# Patient Record
Sex: MALE | Race: WHITE | NOT HISPANIC OR LATINO | Employment: OTHER | URBAN - METROPOLITAN AREA
[De-identification: names, ages, dates, MRNs, and addresses within clinical notes are randomized per-mention and may not be internally consistent; named-entity substitution may affect disease eponyms.]

---

## 2023-06-05 LAB
C REACTIVE PROTEIN (MG/L) IN SER/PLAS: 3.29 MG/DL
ERYTHROCYTE DISTRIBUTION WIDTH (RATIO) BY AUTOMATED COUNT: 16.8 % (ref 11.5–14.5)
ERYTHROCYTE MEAN CORPUSCULAR HEMOGLOBIN CONCENTRATION (G/DL) BY AUTOMATED: 31 G/DL (ref 32–36)
ERYTHROCYTE MEAN CORPUSCULAR VOLUME (FL) BY AUTOMATED COUNT: 80 FL (ref 80–100)
ERYTHROCYTES (10*6/UL) IN BLOOD BY AUTOMATED COUNT: 4.33 X10E12/L (ref 4.5–5.9)
HEMATOCRIT (%) IN BLOOD BY AUTOMATED COUNT: 34.8 % (ref 41–52)
HEMOGLOBIN (G/DL) IN BLOOD: 10.8 G/DL (ref 13.5–17.5)
LEUKOCYTES (10*3/UL) IN BLOOD BY AUTOMATED COUNT: 8.8 X10E9/L (ref 4.4–11.3)
PLATELETS (10*3/UL) IN BLOOD AUTOMATED COUNT: 366 X10E9/L (ref 150–450)
SEDIMENTATION RATE, ERYTHROCYTE: 47 MM/H (ref 0–20)

## 2023-06-06 LAB
ESTIMATED AVERAGE GLUCOSE FOR HBA1C: 217 MG/DL
HEMOGLOBIN A1C/HEMOGLOBIN TOTAL IN BLOOD: 9.2 %

## 2023-10-06 ENCOUNTER — CLINICAL SUPPORT (OUTPATIENT)
Dept: WOUND CARE | Facility: CLINIC | Age: 58
End: 2023-10-06
Payer: MEDICARE

## 2023-10-06 ENCOUNTER — ANCILLARY PROCEDURE (OUTPATIENT)
Dept: RADIOLOGY | Facility: CLINIC | Age: 58
End: 2023-10-06
Payer: MEDICARE

## 2023-10-06 DIAGNOSIS — L97.513 NON-PRESSURE CHRONIC ULCER OF OTHER PART OF RIGHT FOOT WITH NECROSIS OF MUSCLE (MULTI): ICD-10-CM

## 2023-10-06 DIAGNOSIS — E11.621 TYPE 2 DIABETES MELLITUS WITH FOOT ULCER (CODE) (MULTI): ICD-10-CM

## 2023-10-06 PROCEDURE — 73630 X-RAY EXAM OF FOOT: CPT | Mod: RT

## 2023-10-06 PROCEDURE — 73630 X-RAY EXAM OF FOOT: CPT | Mod: RIGHT SIDE | Performed by: RADIOLOGY

## 2023-10-06 PROCEDURE — 99214 OFFICE O/P EST MOD 30 MIN: CPT

## 2023-10-11 ENCOUNTER — APPOINTMENT (OUTPATIENT)
Dept: RADIOLOGY | Facility: HOSPITAL | Age: 58
DRG: 617 | End: 2023-10-11
Payer: MEDICARE

## 2023-10-11 ENCOUNTER — HOSPITAL ENCOUNTER (INPATIENT)
Facility: HOSPITAL | Age: 58
LOS: 4 days | Discharge: HOME | DRG: 617 | End: 2023-10-15
Attending: EMERGENCY MEDICINE | Admitting: FAMILY MEDICINE
Payer: MEDICARE

## 2023-10-11 DIAGNOSIS — M86.9 DIABETIC FOOT ULCER WITH OSTEOMYELITIS (MULTI): Primary | ICD-10-CM

## 2023-10-11 DIAGNOSIS — L97.509 DIABETIC FOOT ULCER WITH OSTEOMYELITIS (MULTI): Primary | ICD-10-CM

## 2023-10-11 DIAGNOSIS — E11.69 DIABETIC FOOT ULCER WITH OSTEOMYELITIS (MULTI): Primary | ICD-10-CM

## 2023-10-11 DIAGNOSIS — M86.171 ACUTE OSTEOMYELITIS OF RIGHT FOOT (MULTI): ICD-10-CM

## 2023-10-11 DIAGNOSIS — E11.621 DIABETIC FOOT ULCER WITH OSTEOMYELITIS (MULTI): Primary | ICD-10-CM

## 2023-10-11 DIAGNOSIS — E11.621 TYPE 2 DIABETES MELLITUS WITH FOOT ULCER, WITH LONG-TERM CURRENT USE OF INSULIN (MULTI): ICD-10-CM

## 2023-10-11 DIAGNOSIS — Z79.4 TYPE 2 DIABETES MELLITUS WITH FOOT ULCER, WITH LONG-TERM CURRENT USE OF INSULIN (MULTI): ICD-10-CM

## 2023-10-11 DIAGNOSIS — L97.509 TYPE 2 DIABETES MELLITUS WITH FOOT ULCER, WITH LONG-TERM CURRENT USE OF INSULIN (MULTI): ICD-10-CM

## 2023-10-11 LAB
ALBUMIN SERPL BCP-MCNC: 3.8 G/DL (ref 3.4–5)
ALP SERPL-CCNC: 157 U/L (ref 33–120)
ALT SERPL W P-5'-P-CCNC: 10 U/L (ref 10–52)
ANION GAP SERPL CALC-SCNC: 11 MMOL/L (ref 10–20)
AST SERPL W P-5'-P-CCNC: 10 U/L (ref 9–39)
BASOPHILS # BLD AUTO: 0.09 X10*3/UL (ref 0–0.1)
BASOPHILS NFR BLD AUTO: 1 %
BILIRUB SERPL-MCNC: 0.3 MG/DL (ref 0–1.2)
BUN SERPL-MCNC: 10 MG/DL (ref 6–23)
CALCIUM SERPL-MCNC: 9.3 MG/DL (ref 8.6–10.3)
CHLORIDE SERPL-SCNC: 95 MMOL/L (ref 98–107)
CO2 SERPL-SCNC: 30 MMOL/L (ref 21–32)
CREAT SERPL-MCNC: 0.71 MG/DL (ref 0.5–1.3)
EOSINOPHIL # BLD AUTO: 0.2 X10*3/UL (ref 0–0.7)
EOSINOPHIL NFR BLD AUTO: 2.2 %
ERYTHROCYTE [DISTWIDTH] IN BLOOD BY AUTOMATED COUNT: 16.2 % (ref 11.5–14.5)
GFR SERPL CREATININE-BSD FRML MDRD: >90 ML/MIN/1.73M*2
GLUCOSE BLD MANUAL STRIP-MCNC: 309 MG/DL (ref 74–99)
GLUCOSE SERPL-MCNC: 280 MG/DL (ref 74–99)
HCT VFR BLD AUTO: 36.9 % (ref 41–52)
HGB BLD-MCNC: 11.6 G/DL (ref 13.5–17.5)
IMM GRANULOCYTES # BLD AUTO: 0.18 X10*3/UL (ref 0–0.7)
IMM GRANULOCYTES NFR BLD AUTO: 2 % (ref 0–0.9)
LACTATE SERPL-SCNC: 1 MMOL/L (ref 0.4–2)
LYMPHOCYTES # BLD AUTO: 2.43 X10*3/UL (ref 1.2–4.8)
LYMPHOCYTES NFR BLD AUTO: 26.4 %
MCH RBC QN AUTO: 24.2 PG (ref 26–34)
MCHC RBC AUTO-ENTMCNC: 31.4 G/DL (ref 32–36)
MCV RBC AUTO: 77 FL (ref 80–100)
MONOCYTES # BLD AUTO: 1.05 X10*3/UL (ref 0.1–1)
MONOCYTES NFR BLD AUTO: 11.4 %
NEUTROPHILS # BLD AUTO: 5.24 X10*3/UL (ref 1.2–7.7)
NEUTROPHILS NFR BLD AUTO: 57 %
NRBC BLD-RTO: 0 /100 WBCS (ref 0–0)
PLATELET # BLD AUTO: 470 X10*3/UL (ref 150–450)
PMV BLD AUTO: 10.2 FL (ref 7.5–11.5)
POTASSIUM SERPL-SCNC: 4.4 MMOL/L (ref 3.5–5.3)
PROT SERPL-MCNC: 7.5 G/DL (ref 6.4–8.2)
RBC # BLD AUTO: 4.79 X10*6/UL (ref 4.5–5.9)
SODIUM SERPL-SCNC: 132 MMOL/L (ref 136–145)
WBC # BLD AUTO: 9.2 X10*3/UL (ref 4.4–11.3)

## 2023-10-11 PROCEDURE — 2500000002 HC RX 250 W HCPCS SELF ADMINISTERED DRUGS (ALT 637 FOR MEDICARE OP, ALT 636 FOR OP/ED): Performed by: FAMILY MEDICINE

## 2023-10-11 PROCEDURE — 96367 TX/PROPH/DG ADDL SEQ IV INF: CPT

## 2023-10-11 PROCEDURE — 2500000004 HC RX 250 GENERAL PHARMACY W/ HCPCS (ALT 636 FOR OP/ED): Performed by: EMERGENCY MEDICINE

## 2023-10-11 PROCEDURE — 96366 THER/PROPH/DIAG IV INF ADDON: CPT

## 2023-10-11 PROCEDURE — 36415 COLL VENOUS BLD VENIPUNCTURE: CPT | Performed by: EMERGENCY MEDICINE

## 2023-10-11 PROCEDURE — 2500000004 HC RX 250 GENERAL PHARMACY W/ HCPCS (ALT 636 FOR OP/ED): Performed by: PHYSICIAN ASSISTANT

## 2023-10-11 PROCEDURE — 99223 1ST HOSP IP/OBS HIGH 75: CPT | Performed by: FAMILY MEDICINE

## 2023-10-11 PROCEDURE — 96365 THER/PROPH/DIAG IV INF INIT: CPT

## 2023-10-11 PROCEDURE — 96372 THER/PROPH/DIAG INJ SC/IM: CPT | Performed by: FAMILY MEDICINE

## 2023-10-11 PROCEDURE — 1100000001 HC PRIVATE ROOM DAILY

## 2023-10-11 PROCEDURE — 73630 X-RAY EXAM OF FOOT: CPT | Mod: RT,FY

## 2023-10-11 PROCEDURE — 99285 EMERGENCY DEPT VISIT HI MDM: CPT | Mod: 25 | Performed by: EMERGENCY MEDICINE

## 2023-10-11 PROCEDURE — 73630 X-RAY EXAM OF FOOT: CPT | Mod: RIGHT SIDE | Performed by: RADIOLOGY

## 2023-10-11 PROCEDURE — 87040 BLOOD CULTURE FOR BACTERIA: CPT | Mod: CMCLAB,PORLAB | Performed by: EMERGENCY MEDICINE

## 2023-10-11 PROCEDURE — 83605 ASSAY OF LACTIC ACID: CPT | Performed by: EMERGENCY MEDICINE

## 2023-10-11 PROCEDURE — 85025 COMPLETE CBC W/AUTO DIFF WBC: CPT | Performed by: EMERGENCY MEDICINE

## 2023-10-11 PROCEDURE — 2500000001 HC RX 250 WO HCPCS SELF ADMINISTERED DRUGS (ALT 637 FOR MEDICARE OP): Performed by: FAMILY MEDICINE

## 2023-10-11 PROCEDURE — 87075 CULTR BACTERIA EXCEPT BLOOD: CPT | Mod: CMCLAB,PORLAB | Performed by: EMERGENCY MEDICINE

## 2023-10-11 PROCEDURE — 82947 ASSAY GLUCOSE BLOOD QUANT: CPT

## 2023-10-11 PROCEDURE — 80053 COMPREHEN METABOLIC PANEL: CPT | Performed by: EMERGENCY MEDICINE

## 2023-10-11 RX ORDER — PANTOPRAZOLE SODIUM 40 MG/1
40 TABLET, DELAYED RELEASE ORAL
Status: DISCONTINUED | OUTPATIENT
Start: 2023-10-12 | End: 2023-10-15 | Stop reason: HOSPADM

## 2023-10-11 RX ORDER — ACETAMINOPHEN 325 MG/1
650 TABLET ORAL EVERY 4 HOURS PRN
Status: DISCONTINUED | OUTPATIENT
Start: 2023-10-11 | End: 2023-10-15 | Stop reason: HOSPADM

## 2023-10-11 RX ORDER — METFORMIN HYDROCHLORIDE 500 MG/1
500 TABLET ORAL
COMMUNITY
End: 2023-10-15 | Stop reason: HOSPADM

## 2023-10-11 RX ORDER — ONDANSETRON 4 MG/1
4 TABLET, FILM COATED ORAL EVERY 8 HOURS PRN
Status: DISCONTINUED | OUTPATIENT
Start: 2023-10-11 | End: 2023-10-15 | Stop reason: HOSPADM

## 2023-10-11 RX ORDER — ONDANSETRON HYDROCHLORIDE 2 MG/ML
4 INJECTION, SOLUTION INTRAVENOUS EVERY 8 HOURS PRN
Status: DISCONTINUED | OUTPATIENT
Start: 2023-10-11 | End: 2023-10-15 | Stop reason: HOSPADM

## 2023-10-11 RX ORDER — POLYETHYLENE GLYCOL 3350 17 G/17G
17 POWDER, FOR SOLUTION ORAL DAILY
Status: DISCONTINUED | OUTPATIENT
Start: 2023-10-11 | End: 2023-10-15 | Stop reason: HOSPADM

## 2023-10-11 RX ORDER — AMMONIUM LACTATE 12 G/100G
CREAM TOPICAL
COMMUNITY
Start: 2022-11-08

## 2023-10-11 RX ORDER — PANTOPRAZOLE SODIUM 40 MG/10ML
40 INJECTION, POWDER, LYOPHILIZED, FOR SOLUTION INTRAVENOUS
Status: DISCONTINUED | OUTPATIENT
Start: 2023-10-12 | End: 2023-10-15 | Stop reason: HOSPADM

## 2023-10-11 RX ORDER — ACETAMINOPHEN 650 MG/1
650 SUPPOSITORY RECTAL EVERY 4 HOURS PRN
Status: DISCONTINUED | OUTPATIENT
Start: 2023-10-11 | End: 2023-10-15 | Stop reason: HOSPADM

## 2023-10-11 RX ORDER — OMEPRAZOLE 40 MG/1
40 CAPSULE, DELAYED RELEASE ORAL DAILY
COMMUNITY

## 2023-10-11 RX ORDER — KETOROLAC TROMETHAMINE 30 MG/ML
30 INJECTION, SOLUTION INTRAMUSCULAR; INTRAVENOUS ONCE
Status: COMPLETED | OUTPATIENT
Start: 2023-10-11 | End: 2023-10-11

## 2023-10-11 RX ORDER — ASPIRIN 81 MG/1
81 TABLET ORAL DAILY
COMMUNITY

## 2023-10-11 RX ORDER — SODIUM CHLORIDE 0.9 % (FLUSH) 0.9 %
SYRINGE (ML) INJECTION
Status: DISPENSED
Start: 2023-10-11 | End: 2023-10-12

## 2023-10-11 RX ORDER — FLUTICASONE PROPIONATE AND SALMETEROL XINAFOATE 230; 21 UG/1; UG/1
1 AEROSOL, METERED RESPIRATORY (INHALATION) 2 TIMES DAILY
COMMUNITY
Start: 2017-06-19 | End: 2024-05-14 | Stop reason: WASHOUT

## 2023-10-11 RX ORDER — NICOTINE 7MG/24HR
1 PATCH, TRANSDERMAL 24 HOURS TRANSDERMAL DAILY
Status: DISCONTINUED | OUTPATIENT
Start: 2023-12-07 | End: 2023-10-15 | Stop reason: HOSPADM

## 2023-10-11 RX ORDER — SULFASALAZINE 500 MG/1
3 TABLET ORAL 2 TIMES DAILY
COMMUNITY

## 2023-10-11 RX ORDER — PREGABALIN 200 MG/1
200 CAPSULE ORAL 3 TIMES DAILY
COMMUNITY
End: 2023-10-15 | Stop reason: HOSPADM

## 2023-10-11 RX ORDER — DEXTROSE 50 % IN WATER (D50W) INTRAVENOUS SYRINGE
25
Status: DISCONTINUED | OUTPATIENT
Start: 2023-10-11 | End: 2023-10-15 | Stop reason: HOSPADM

## 2023-10-11 RX ORDER — DEXTROSE MONOHYDRATE 100 MG/ML
0.3 INJECTION, SOLUTION INTRAVENOUS ONCE AS NEEDED
Status: DISCONTINUED | OUTPATIENT
Start: 2023-10-11 | End: 2023-10-15 | Stop reason: HOSPADM

## 2023-10-11 RX ORDER — IBUPROFEN 200 MG
1 TABLET ORAL DAILY
Status: DISCONTINUED | OUTPATIENT
Start: 2023-11-23 | End: 2023-10-15 | Stop reason: HOSPADM

## 2023-10-11 RX ORDER — HYDROXYCHLOROQUINE SULFATE 200 MG/1
1 TABLET, FILM COATED ORAL 2 TIMES DAILY
COMMUNITY

## 2023-10-11 RX ORDER — ALBUTEROL SULFATE 90 UG/1
2 AEROSOL, METERED RESPIRATORY (INHALATION) EVERY 6 HOURS PRN
COMMUNITY
Start: 2018-06-28

## 2023-10-11 RX ORDER — ATORVASTATIN CALCIUM 40 MG/1
40 TABLET, FILM COATED ORAL
COMMUNITY
Start: 2017-01-03

## 2023-10-11 RX ORDER — VANCOMYCIN HYDROCHLORIDE 1 G/200ML
1 INJECTION, SOLUTION INTRAVENOUS ONCE
Status: DISCONTINUED | OUTPATIENT
Start: 2023-10-11 | End: 2023-10-11

## 2023-10-11 RX ORDER — FUROSEMIDE 40 MG/1
40 TABLET ORAL DAILY PRN
COMMUNITY
Start: 2018-06-28 | End: 2023-10-15 | Stop reason: HOSPADM

## 2023-10-11 RX ORDER — LEVOTHYROXINE SODIUM 50 UG/1
50 TABLET ORAL DAILY
COMMUNITY

## 2023-10-11 RX ORDER — TIOTROPIUM BROMIDE 18 UG/1
1 CAPSULE ORAL; RESPIRATORY (INHALATION)
COMMUNITY
Start: 2022-09-06 | End: 2024-06-07 | Stop reason: WASHOUT

## 2023-10-11 RX ORDER — IBUPROFEN 200 MG
1 TABLET ORAL DAILY
Status: DISCONTINUED | OUTPATIENT
Start: 2023-10-12 | End: 2023-10-15 | Stop reason: HOSPADM

## 2023-10-11 RX ORDER — LISINOPRIL 20 MG/1
20 TABLET ORAL
COMMUNITY
Start: 2021-12-17 | End: 2023-10-15 | Stop reason: HOSPADM

## 2023-10-11 RX ORDER — IBUPROFEN 800 MG/1
800 TABLET ORAL EVERY 8 HOURS PRN
COMMUNITY
End: 2023-10-15 | Stop reason: HOSPADM

## 2023-10-11 RX ORDER — DULOXETIN HYDROCHLORIDE 60 MG/1
60 CAPSULE, DELAYED RELEASE ORAL DAILY
COMMUNITY
Start: 2014-06-25 | End: 2023-10-15 | Stop reason: HOSPADM

## 2023-10-11 RX ORDER — ACETAMINOPHEN 160 MG/5ML
650 SOLUTION ORAL EVERY 4 HOURS PRN
Status: DISCONTINUED | OUTPATIENT
Start: 2023-10-11 | End: 2023-10-15 | Stop reason: HOSPADM

## 2023-10-11 RX ORDER — ENOXAPARIN SODIUM 100 MG/ML
40 INJECTION SUBCUTANEOUS EVERY 24 HOURS
Status: DISCONTINUED | OUTPATIENT
Start: 2023-10-11 | End: 2023-10-15 | Stop reason: HOSPADM

## 2023-10-11 RX ORDER — INSULIN DETEMIR 100 [IU]/ML
50 INJECTION, SOLUTION SUBCUTANEOUS NIGHTLY
COMMUNITY
Start: 2014-12-30

## 2023-10-11 RX ORDER — TALC
3 POWDER (GRAM) TOPICAL DAILY
Status: DISCONTINUED | OUTPATIENT
Start: 2023-10-11 | End: 2023-10-15 | Stop reason: HOSPADM

## 2023-10-11 RX ORDER — PREDNISONE 2.5 MG/1
2 TABLET ORAL DAILY
COMMUNITY

## 2023-10-11 RX ORDER — METHADONE HYDROCHLORIDE 10 MG/ML
77 CONCENTRATE ORAL
COMMUNITY

## 2023-10-11 RX ADMIN — PIPERACILLIN SODIUM AND TAZOBACTAM SODIUM 4.5 G: 4; .5 INJECTION, SOLUTION INTRAVENOUS at 10:37

## 2023-10-11 RX ADMIN — Medication 3 MG: at 20:44

## 2023-10-11 RX ADMIN — INSULIN DETEMIR 50 UNITS: 100 INJECTION, SOLUTION SUBCUTANEOUS at 22:00

## 2023-10-11 RX ADMIN — VANCOMYCIN HYDROCHLORIDE 2000 MG: 1 INJECTION, POWDER, LYOPHILIZED, FOR SOLUTION INTRAVENOUS at 11:12

## 2023-10-11 RX ADMIN — KETOROLAC TROMETHAMINE 30 MG: 30 INJECTION, SOLUTION INTRAMUSCULAR at 20:44

## 2023-10-11 SDOH — SOCIAL STABILITY: SOCIAL INSECURITY: HAVE YOU HAD THOUGHTS OF HARMING ANYONE ELSE?: NO

## 2023-10-11 SDOH — SOCIAL STABILITY: SOCIAL INSECURITY: WERE YOU ABLE TO COMPLETE ALL THE BEHAVIORAL HEALTH SCREENINGS?: YES

## 2023-10-11 ASSESSMENT — COGNITIVE AND FUNCTIONAL STATUS - GENERAL
WALKING IN HOSPITAL ROOM: A LITTLE
MOBILITY SCORE: 22
DAILY ACTIVITIY SCORE: 23
DRESSING REGULAR LOWER BODY CLOTHING: A LITTLE
MOBILITY SCORE: 22
CLIMB 3 TO 5 STEPS WITH RAILING: A LITTLE
DAILY ACTIVITIY SCORE: 23
CLIMB 3 TO 5 STEPS WITH RAILING: A LITTLE
WALKING IN HOSPITAL ROOM: A LITTLE
PATIENT BASELINE BEDBOUND: NO
DRESSING REGULAR LOWER BODY CLOTHING: A LITTLE

## 2023-10-11 ASSESSMENT — COLUMBIA-SUICIDE SEVERITY RATING SCALE - C-SSRS
6. HAVE YOU EVER DONE ANYTHING, STARTED TO DO ANYTHING, OR PREPARED TO DO ANYTHING TO END YOUR LIFE?: NO
2. HAVE YOU ACTUALLY HAD ANY THOUGHTS OF KILLING YOURSELF?: NO
1. IN THE PAST MONTH, HAVE YOU WISHED YOU WERE DEAD OR WISHED YOU COULD GO TO SLEEP AND NOT WAKE UP?: NO

## 2023-10-11 ASSESSMENT — LIFESTYLE VARIABLES
HAVE YOU EVER FELT YOU SHOULD CUT DOWN ON YOUR DRINKING: NO
AUDIT-C TOTAL SCORE: 0
HAVE PEOPLE ANNOYED YOU BY CRITICIZING YOUR DRINKING: NO
AUDIT-C TOTAL SCORE: 0
PRESCIPTION_ABUSE_PAST_12_MONTHS: NO
SKIP TO QUESTIONS 9-10: 1
HOW OFTEN DO YOU HAVE A DRINK CONTAINING ALCOHOL: NEVER
HOW OFTEN DO YOU HAVE 6 OR MORE DRINKS ON ONE OCCASION: NEVER
HOW MANY STANDARD DRINKS CONTAINING ALCOHOL DO YOU HAVE ON A TYPICAL DAY: PATIENT DOES NOT DRINK
SUBSTANCE_ABUSE_PAST_12_MONTHS: NO
EVER HAD A DRINK FIRST THING IN THE MORNING TO STEADY YOUR NERVES TO GET RID OF A HANGOVER: NO
EVER FELT BAD OR GUILTY ABOUT YOUR DRINKING: NO

## 2023-10-11 ASSESSMENT — PATIENT HEALTH QUESTIONNAIRE - PHQ9
SUM OF ALL RESPONSES TO PHQ9 QUESTIONS 1 & 2: 0
1. LITTLE INTEREST OR PLEASURE IN DOING THINGS: NOT AT ALL
2. FEELING DOWN, DEPRESSED OR HOPELESS: NOT AT ALL

## 2023-10-11 ASSESSMENT — PAIN - FUNCTIONAL ASSESSMENT: PAIN_FUNCTIONAL_ASSESSMENT: 0-10

## 2023-10-11 ASSESSMENT — ENCOUNTER SYMPTOMS
WOUND: 1
CARDIOVASCULAR NEGATIVE: 1
ALLERGIC/IMMUNOLOGIC NEGATIVE: 1
RESPIRATORY NEGATIVE: 1
GASTROINTESTINAL NEGATIVE: 1
NEUROLOGICAL NEGATIVE: 1
CONSTITUTIONAL NEGATIVE: 1
PSYCHIATRIC NEGATIVE: 1
ENDOCRINE NEGATIVE: 1
HEMATOLOGIC/LYMPHATIC NEGATIVE: 1
MUSCULOSKELETAL NEGATIVE: 1
EYES NEGATIVE: 1

## 2023-10-11 ASSESSMENT — PAIN DESCRIPTION - ORIENTATION: ORIENTATION: RIGHT

## 2023-10-11 ASSESSMENT — PAIN SCALES - GENERAL: PAINLEVEL_OUTOF10: 7

## 2023-10-11 ASSESSMENT — PAIN DESCRIPTION - LOCATION: LOCATION: FOOT

## 2023-10-11 NOTE — NURSING NOTE
Dr. Tracy notified pt to floor and orders needed. At 0629, Dr. Tracy notified, AGAIN, that orders needed. Still waiting for orders. Pt stable, irritated. I did have a diabetic tray sent up for pt without order so he could eat.

## 2023-10-11 NOTE — ED PROVIDER NOTES
HPI   Chief Complaint   Patient presents with   • Wound Infection     PT HAS TYPE 2 dm. PT HAS FOOT WOUND ON RIGHT FOOT AND RIGHT BIG TOE X 5 MONTHS. PT SEES WOUND CARE WEEKLY. CYFFEIUY0S COMING FROM WOUND, WHITE/GREEN. BONE IS VISIBLE. PT HAD IT WRAPPED IN DUCK TAPE.        Patient presents with a right foot wound.  He has had this foot wound for months.  He has been seeing wound care weekly for this.  The drainage has increased.  Is a whitish and green color.  He states that the bone is visible.  He denies any fevers.  He was seen at the wound center last week and they recommended he come in for IV antibiotics and admission but he did not do this.  He states he has a history of diabetes.  He does have neuropathy in that foot.  Has been checking his blood sugars at home and they have been running around 160.  He has seen a podiatrist at the wound center and he stated that they were speaking of doing a amputation because of these chronic wounds.  He does smoke.                          No data recorded                Patient History   Past Medical History:   Diagnosis Date   • Abnormal result of other cardiovascular function study 06/28/2018    Abnormal stress test   • Atherosclerotic heart disease of native coronary artery with unstable angina pectoris (CMS/[facility]) 06/28/2018    Unstable angina pectoris due to coronary arteriosclerosis   • Fibromyalgia     Fibromyalgia   • Personal history of other diseases of the circulatory system     History of hypertension   • Personal history of other diseases of the musculoskeletal system and connective tissue     History of rheumatoid arthritis   • Personal history of other diseases of the musculoskeletal system and connective tissue     History of degenerative disc disease   • Personal history of other diseases of the musculoskeletal system and connective tissue     History of osteoporosis   • Personal history of other endocrine, nutritional and metabolic disease     History  of hyperlipidemia   • Personal history of other endocrine, nutritional and metabolic disease     History of hypothyroidism   • Type 2 diabetes mellitus without complications (CMS/[facility]) 02/25/2022    Type 2 diabetes mellitus     Past Surgical History:   Procedure Laterality Date   • BACK SURGERY  06/28/2018    Back Surgery   • SEPTOPLASTY  06/28/2018    Septoplasty     No family history on file.  Social History     Tobacco Use   • Smoking status: Not on file   • Smokeless tobacco: Not on file   Substance Use Topics   • Alcohol use: Not on file   • Drug use: Not on file       Physical Exam   ED Triage Vitals [10/11/23 1024]   Temp Heart Rate Resp BP   35.9 °C (96.7 °F) 80 18 147/73      SpO2 Temp Source Heart Rate Source Patient Position   96 % Temporal Monitor --      BP Location FiO2 (%)     -- --       Physical Exam  Vitals and nursing note reviewed.   Constitutional:       Appearance: Normal appearance.   HENT:      Head: Normocephalic and atraumatic.      Mouth/Throat:      Mouth: Mucous membranes are moist.   Eyes:      Extraocular Movements: Extraocular movements intact.      Pupils: Pupils are equal, round, and reactive to light.   Cardiovascular:      Rate and Rhythm: Normal rate and regular rhythm.      Heart sounds: No murmur heard.  Pulmonary:      Effort: Pulmonary effort is normal. No respiratory distress.      Breath sounds: Normal breath sounds.   Abdominal:      General: There is no distension.      Tenderness: There is no abdominal tenderness.   Musculoskeletal:         General: No tenderness or deformity. Normal range of motion.      Right lower leg: No edema.      Left lower leg: No edema.      Comments: Right foot has a small wound on the underside of the great toe.  No erythema or drainage.  There is a larger wound at the ball of the foot on the medial side.  It measures about 4 x 4 cm.  There is a clearish drainage.  Bone is able to be seen.  Mild surrounding erythema and warmth noted.   Skin:      General: Skin is warm and dry.      Findings: No lesion or rash.   Neurological:      General: No focal deficit present.      Mental Status: He is alert and oriented to person, place, and time.      Sensory: No sensory deficit.      Motor: No weakness.   Psychiatric:         Behavior: Behavior normal.       Labs Reviewed   BLOOD CULTURE   BLOOD CULTURE   CBC WITH AUTO DIFFERENTIAL   COMPREHENSIVE METABOLIC PANEL   LACTATE     XR foot right 3+ views    (Results Pending)     ED Course & MDM   Diagnoses as of 10/11/23 3759   Acute osteomyelitis of right foot (CMS/[facility])   Type 2 diabetes mellitus with foot ulcer, with long-term current use of insulin (CMS/[facility])   Diabetic foot ulcer with osteomyelitis (CMS/[facility])       Medical Decision Making  The patient was given vancomycin and Zosyn after my evaluation.  He has a normal WBC count.  Lactate normal.  Glucose is 280.  X-ray of the foot shows soft tissue lucency at the first MTP region, concerning for osteomyelitis.  I feel that he requires admission for further IV antibiotic care and likely podiatry consultation.  Patient was discussed with the hospitalist, Dr. Tracy for admission        Procedure  Procedures     Mayank Reynolds MD  10/11/23 8320

## 2023-10-12 ENCOUNTER — APPOINTMENT (OUTPATIENT)
Dept: RADIOLOGY | Facility: HOSPITAL | Age: 58
DRG: 617 | End: 2023-10-12
Payer: MEDICARE

## 2023-10-12 PROBLEM — M86.171 ACUTE OSTEOMYELITIS OF RIGHT FOOT (MULTI): Status: ACTIVE | Noted: 2023-10-11

## 2023-10-12 LAB
ANION GAP SERPL CALC-SCNC: 10 MMOL/L (ref 10–20)
BUN SERPL-MCNC: 13 MG/DL (ref 6–23)
CALCIUM SERPL-MCNC: 8.8 MG/DL (ref 8.6–10.3)
CHLORIDE SERPL-SCNC: 99 MMOL/L (ref 98–107)
CO2 SERPL-SCNC: 30 MMOL/L (ref 21–32)
CREAT SERPL-MCNC: 0.73 MG/DL (ref 0.5–1.3)
CRP SERPL-MCNC: 5.86 MG/DL
ERYTHROCYTE [DISTWIDTH] IN BLOOD BY AUTOMATED COUNT: 16.1 % (ref 11.5–14.5)
EST. AVERAGE GLUCOSE BLD GHB EST-MCNC: 229 MG/DL
GFR SERPL CREATININE-BSD FRML MDRD: >90 ML/MIN/1.73M*2
GLUCOSE BLD MANUAL STRIP-MCNC: 136 MG/DL (ref 74–99)
GLUCOSE BLD MANUAL STRIP-MCNC: 253 MG/DL (ref 74–99)
GLUCOSE BLD MANUAL STRIP-MCNC: 286 MG/DL (ref 74–99)
GLUCOSE BLD MANUAL STRIP-MCNC: 97 MG/DL (ref 74–99)
GLUCOSE SERPL-MCNC: 93 MG/DL (ref 74–99)
HBA1C MFR BLD: 9.6 %
HCT VFR BLD AUTO: 35 % (ref 41–52)
HGB BLD-MCNC: 10.8 G/DL (ref 13.5–17.5)
MCH RBC QN AUTO: 23.6 PG (ref 26–34)
MCHC RBC AUTO-ENTMCNC: 30.9 G/DL (ref 32–36)
MCV RBC AUTO: 76 FL (ref 80–100)
NRBC BLD-RTO: 0 /100 WBCS (ref 0–0)
PLATELET # BLD AUTO: 411 X10*3/UL (ref 150–450)
PMV BLD AUTO: 10.3 FL (ref 7.5–11.5)
POTASSIUM SERPL-SCNC: 3.7 MMOL/L (ref 3.5–5.3)
RBC # BLD AUTO: 4.58 X10*6/UL (ref 4.5–5.9)
SODIUM SERPL-SCNC: 135 MMOL/L (ref 136–145)
WBC # BLD AUTO: 6.9 X10*3/UL (ref 4.4–11.3)

## 2023-10-12 PROCEDURE — 80048 BASIC METABOLIC PNL TOTAL CA: CPT | Performed by: FAMILY MEDICINE

## 2023-10-12 PROCEDURE — G1004 CDSM NDSC: HCPCS

## 2023-10-12 PROCEDURE — 85027 COMPLETE CBC AUTOMATED: CPT | Performed by: FAMILY MEDICINE

## 2023-10-12 PROCEDURE — 1100000001 HC PRIVATE ROOM DAILY

## 2023-10-12 PROCEDURE — 96372 THER/PROPH/DIAG INJ SC/IM: CPT | Performed by: FAMILY MEDICINE

## 2023-10-12 PROCEDURE — 73718 MRI LOWER EXTREMITY W/O DYE: CPT | Mod: RIGHT SIDE | Performed by: RADIOLOGY

## 2023-10-12 PROCEDURE — 36415 COLL VENOUS BLD VENIPUNCTURE: CPT | Performed by: FAMILY MEDICINE

## 2023-10-12 PROCEDURE — 83036 HEMOGLOBIN GLYCOSYLATED A1C: CPT | Mod: CMCLAB,PORLAB | Performed by: FAMILY MEDICINE

## 2023-10-12 PROCEDURE — 73718 MRI LOWER EXTREMITY W/O DYE: CPT | Mod: RT,MG

## 2023-10-12 PROCEDURE — 2500000001 HC RX 250 WO HCPCS SELF ADMINISTERED DRUGS (ALT 637 FOR MEDICARE OP): Performed by: FAMILY MEDICINE

## 2023-10-12 PROCEDURE — 99233 SBSQ HOSP IP/OBS HIGH 50: CPT | Performed by: FAMILY MEDICINE

## 2023-10-12 PROCEDURE — 2500000004 HC RX 250 GENERAL PHARMACY W/ HCPCS (ALT 636 FOR OP/ED): Performed by: FAMILY MEDICINE

## 2023-10-12 PROCEDURE — 36415 COLL VENOUS BLD VENIPUNCTURE: CPT | Performed by: STUDENT IN AN ORGANIZED HEALTH CARE EDUCATION/TRAINING PROGRAM

## 2023-10-12 PROCEDURE — S4991 NICOTINE PATCH NONLEGEND: HCPCS | Performed by: FAMILY MEDICINE

## 2023-10-12 PROCEDURE — 2500000002 HC RX 250 W HCPCS SELF ADMINISTERED DRUGS (ALT 637 FOR MEDICARE OP, ALT 636 FOR OP/ED): Performed by: FAMILY MEDICINE

## 2023-10-12 PROCEDURE — 82947 ASSAY GLUCOSE BLOOD QUANT: CPT

## 2023-10-12 PROCEDURE — 86140 C-REACTIVE PROTEIN: CPT | Performed by: STUDENT IN AN ORGANIZED HEALTH CARE EDUCATION/TRAINING PROGRAM

## 2023-10-12 RX ORDER — SULFASALAZINE 500 MG/1
1500 TABLET ORAL 2 TIMES DAILY
Status: DISCONTINUED | OUTPATIENT
Start: 2023-10-12 | End: 2023-10-15 | Stop reason: HOSPADM

## 2023-10-12 RX ORDER — ATORVASTATIN CALCIUM 40 MG/1
40 TABLET, FILM COATED ORAL NIGHTLY
Status: DISCONTINUED | OUTPATIENT
Start: 2023-10-12 | End: 2023-10-15 | Stop reason: HOSPADM

## 2023-10-12 RX ORDER — LEVOTHYROXINE SODIUM 50 UG/1
50 TABLET ORAL DAILY
Status: DISCONTINUED | OUTPATIENT
Start: 2023-10-12 | End: 2023-10-15 | Stop reason: HOSPADM

## 2023-10-12 RX ORDER — PREGABALIN 50 MG/1
200 CAPSULE ORAL 3 TIMES DAILY
Status: DISCONTINUED | OUTPATIENT
Start: 2023-10-12 | End: 2023-10-15 | Stop reason: HOSPADM

## 2023-10-12 RX ORDER — PANTOPRAZOLE SODIUM 40 MG/1
40 TABLET, DELAYED RELEASE ORAL
Status: DISCONTINUED | OUTPATIENT
Start: 2023-10-13 | End: 2023-10-12 | Stop reason: SDUPTHER

## 2023-10-12 RX ORDER — FLUTICASONE FUROATE AND VILANTEROL 100; 25 UG/1; UG/1
1 POWDER RESPIRATORY (INHALATION) DAILY
Status: DISCONTINUED | OUTPATIENT
Start: 2023-10-12 | End: 2023-10-15 | Stop reason: HOSPADM

## 2023-10-12 RX ORDER — FLUTICASONE PROPIONATE AND SALMETEROL XINAFOATE 230; 21 UG/1; UG/1
1 AEROSOL, METERED RESPIRATORY (INHALATION) 2 TIMES DAILY
Status: DISCONTINUED | OUTPATIENT
Start: 2023-10-12 | End: 2023-10-12 | Stop reason: RX

## 2023-10-12 RX ORDER — PREDNISONE 5 MG/1
5 TABLET ORAL DAILY
Status: DISCONTINUED | OUTPATIENT
Start: 2023-10-12 | End: 2023-10-15 | Stop reason: HOSPADM

## 2023-10-12 RX ORDER — LISINOPRIL 20 MG/1
20 TABLET ORAL
Status: DISCONTINUED | OUTPATIENT
Start: 2023-10-12 | End: 2023-10-15 | Stop reason: HOSPADM

## 2023-10-12 RX ORDER — SODIUM CHLORIDE 0.9 % (FLUSH) 0.9 %
SYRINGE (ML) INJECTION
Status: COMPLETED
Start: 2023-10-12 | End: 2023-10-12

## 2023-10-12 RX ORDER — ALBUTEROL SULFATE 90 UG/1
2 AEROSOL, METERED RESPIRATORY (INHALATION) EVERY 6 HOURS PRN
Status: DISCONTINUED | OUTPATIENT
Start: 2023-10-12 | End: 2023-10-15 | Stop reason: HOSPADM

## 2023-10-12 RX ORDER — ASPIRIN 81 MG/1
81 TABLET ORAL DAILY
Status: DISCONTINUED | OUTPATIENT
Start: 2023-10-12 | End: 2023-10-15 | Stop reason: HOSPADM

## 2023-10-12 RX ORDER — HYDROXYCHLOROQUINE SULFATE 200 MG/1
200 TABLET, FILM COATED ORAL 2 TIMES DAILY
Status: DISCONTINUED | OUTPATIENT
Start: 2023-10-12 | End: 2023-10-15 | Stop reason: HOSPADM

## 2023-10-12 RX ADMIN — HYDROXYCHLOROQUINE SULFATE 200 MG: 200 TABLET, FILM COATED ORAL at 10:23

## 2023-10-12 RX ADMIN — ACETAMINOPHEN 650 MG: 325 TABLET ORAL at 01:34

## 2023-10-12 RX ADMIN — PREGABALIN 200 MG: 50 CAPSULE ORAL at 21:21

## 2023-10-12 RX ADMIN — PIPERACILLIN SODIUM AND TAZOBACTAM SODIUM 4.5 G: 4; .5 INJECTION, SOLUTION INTRAVENOUS at 11:03

## 2023-10-12 RX ADMIN — Medication 3 MG: at 18:40

## 2023-10-12 RX ADMIN — PREGABALIN 200 MG: 50 CAPSULE ORAL at 15:48

## 2023-10-12 RX ADMIN — ASPIRIN 81 MG: 81 TABLET, COATED ORAL at 10:22

## 2023-10-12 RX ADMIN — SULFASALAZINE 1500 MG: 500 TABLET ORAL at 21:21

## 2023-10-12 RX ADMIN — VANCOMYCIN HYDROCHLORIDE 2000 MG: 1 INJECTION, POWDER, LYOPHILIZED, FOR SOLUTION INTRAVENOUS at 11:02

## 2023-10-12 RX ADMIN — PREDNISONE 5 MG: 5 TABLET ORAL at 10:23

## 2023-10-12 RX ADMIN — INSULIN DETEMIR 50 UNITS: 100 INJECTION, SOLUTION SUBCUTANEOUS at 22:00

## 2023-10-12 RX ADMIN — Medication 10 ML: at 11:04

## 2023-10-12 RX ADMIN — VANCOMYCIN HYDROCHLORIDE 1250 MG: 1.25 INJECTION, POWDER, LYOPHILIZED, FOR SOLUTION INTRAVENOUS at 22:43

## 2023-10-12 RX ADMIN — HYDROXYCHLOROQUINE SULFATE 200 MG: 200 TABLET, FILM COATED ORAL at 21:21

## 2023-10-12 RX ADMIN — PREGABALIN 200 MG: 50 CAPSULE ORAL at 10:23

## 2023-10-12 RX ADMIN — PIPERACILLIN SODIUM AND TAZOBACTAM SODIUM 4.5 G: 4; .5 INJECTION, SOLUTION INTRAVENOUS at 18:40

## 2023-10-12 RX ADMIN — ENOXAPARIN SODIUM 40 MG: 40 INJECTION SUBCUTANEOUS at 18:40

## 2023-10-12 RX ADMIN — ATORVASTATIN CALCIUM 40 MG: 40 TABLET, FILM COATED ORAL at 21:21

## 2023-10-12 RX ADMIN — NICOTINE 1 PATCH: 21 PATCH, EXTENDED RELEASE TRANSDERMAL at 08:32

## 2023-10-12 RX ADMIN — LISINOPRIL 20 MG: 20 TABLET ORAL at 10:23

## 2023-10-12 RX ADMIN — SULFASALAZINE 1500 MG: 500 TABLET ORAL at 11:02

## 2023-10-12 RX ADMIN — LEVOTHYROXINE SODIUM 50 MCG: 50 TABLET ORAL at 10:23

## 2023-10-12 ASSESSMENT — ENCOUNTER SYMPTOMS
GASTROINTESTINAL NEGATIVE: 1
WOUND: 1
HEMATOLOGIC/LYMPHATIC NEGATIVE: 1
RESPIRATORY NEGATIVE: 1
CONSTITUTIONAL NEGATIVE: 1
ALLERGIC/IMMUNOLOGIC NEGATIVE: 1
CARDIOVASCULAR NEGATIVE: 1
NEUROLOGICAL NEGATIVE: 1
PSYCHIATRIC NEGATIVE: 1
ENDOCRINE NEGATIVE: 1
MUSCULOSKELETAL NEGATIVE: 1
EYES NEGATIVE: 1

## 2023-10-12 ASSESSMENT — COGNITIVE AND FUNCTIONAL STATUS - GENERAL
DAILY ACTIVITIY SCORE: 23
CLIMB 3 TO 5 STEPS WITH RAILING: A LITTLE
MOBILITY SCORE: 24
DAILY ACTIVITIY SCORE: 24
DRESSING REGULAR LOWER BODY CLOTHING: A LITTLE
MOBILITY SCORE: 23

## 2023-10-12 ASSESSMENT — ACTIVITIES OF DAILY LIVING (ADL): LACK_OF_TRANSPORTATION: NO

## 2023-10-12 ASSESSMENT — PAIN SCALES - GENERAL: PAINLEVEL_OUTOF10: 0 - NO PAIN

## 2023-10-12 NOTE — CONSULTS
Reason For Consult  Osteomyelitis right foot    History Of Present Illness  Kael Zepeda is a 58 y.o. male presenting with a chronic diabetic foot ulcer on the right foot that is now open to bone. I evaluated patient several weeks ago in the wound center and noted the worsening condition of his foot and advise patient to limit his weight bearing and improve his compliance. He has non-compliance issues with follow up and limiting his weightbearing, as well as not dressing the wound.   He was evaluated by another provider at wound care last week and noted the exposed bone and infection. He was prompted to the ER last week, but did not comply until this week. He does not work, but states he needs to be home to care for his disabled brother.      Past Medical History  He has a past medical history of Abnormal result of other cardiovascular function study (06/28/2018), Atherosclerotic heart disease of native coronary artery with unstable angina pectoris (CMS/[facility]) (06/28/2018), Fibromyalgia, Personal history of other diseases of the circulatory system, Personal history of other diseases of the musculoskeletal system and connective tissue, Personal history of other diseases of the musculoskeletal system and connective tissue, Personal history of other diseases of the musculoskeletal system and connective tissue, Personal history of other endocrine, nutritional and metabolic disease, Personal history of other endocrine, nutritional and metabolic disease, and Type 2 diabetes mellitus without complications (CMS/[facility]) (02/25/2022).    Surgical History  He has a past surgical history that includes Septoplasty (06/28/2018) and Back surgery (06/28/2018).     Social History  He has no history on file for tobacco use, alcohol use, and drug use.    Family History  No family history on file.     Allergies  Patient has no known allergies.    Review of Systems  Asleep initially in the chair. Woke up with prompting.   Fully  "oriented.   No acute distress. Not septic appearing  Breathing room air. No respiratory distress  No subjective nausea, vomiting, fever, chills or general malaise     Physical Exam  Full thickness ulceration on the plantar right foot under the 1st metatarsal.   Bone exposed, appears to be a sesamoid bone.   No abscess or purulent drainage.   Periwound erythema present. No lymphangitis     Last Recorded Vitals  Blood pressure 131/72, pulse 71, temperature 36.7 °C (98.1 °F), resp. rate 17, height 1.702 m (5' 7\"), weight 84.8 kg (187 lb), SpO2 97 %.    Relevant Results      Scheduled medications  aspirin, 81 mg, oral, Daily  atorvastatin, 40 mg, oral, Nightly  enoxaparin, 40 mg, subcutaneous, q24h  fluticasone furoate-vilanteroL, 1 puff, inhalation, Daily  hydroxychloroquine, 200 mg, oral, BID  insulin detemir, 50 Units, subcutaneous, q24h  levothyroxine, 50 mcg, oral, Daily  lisinopril, 20 mg, oral, Daily  melatonin, 3 mg, oral, Daily  methadone, 155 mg, oral, Daily  nicotine, 1 patch, transdermal, Daily   Followed by  [START ON 11/23/2023] nicotine, 1 patch, transdermal, Daily   Followed by  [START ON 12/7/2023] nicotine, 1 patch, transdermal, Daily  pantoprazole, 40 mg, oral, Daily before breakfast   Or  pantoprazole, 40 mg, intravenous, Daily before breakfast  piperacillin-tazobactam, 4.5 g, intravenous, q8h  polyethylene glycol, 17 g, oral, Daily  predniSONE, 5 mg, oral, Daily  pregabalin, 200 mg, oral, TID  sulfaSALAzine, 1,500 mg, oral, BID  tiotropium, 2 Inhalation, inhalation, Daily  vancomycin, 1,250 mg, intravenous, q12h      Continuous medications     PRN medications  PRN medications: acetaminophen **OR** acetaminophen **OR** acetaminophen, albuterol, dextrose 10 % in water (D10W), dextrose, glucagon, ondansetron **OR** ondansetron  Results for orders placed or performed during the hospital encounter of 10/11/23 (from the past 24 hour(s))   POCT GLUCOSE   Result Value Ref Range    POCT Glucose 309 (H) 74 - " 99 mg/dL   Basic Metabolic Panel   Result Value Ref Range    Glucose 93 74 - 99 mg/dL    Sodium 135 (L) 136 - 145 mmol/L    Potassium 3.7 3.5 - 5.3 mmol/L    Chloride 99 98 - 107 mmol/L    Bicarbonate 30 21 - 32 mmol/L    Anion Gap 10 10 - 20 mmol/L    Urea Nitrogen 13 6 - 23 mg/dL    Creatinine 0.73 0.50 - 1.30 mg/dL    eGFR >90 >60 mL/min/1.73m*2    Calcium 8.8 8.6 - 10.3 mg/dL   CBC   Result Value Ref Range    WBC 6.9 4.4 - 11.3 x10*3/uL    nRBC 0.0 0.0 - 0.0 /100 WBCs    RBC 4.58 4.50 - 5.90 x10*6/uL    Hemoglobin 10.8 (L) 13.5 - 17.5 g/dL    Hematocrit 35.0 (L) 41.0 - 52.0 %    MCV 76 (L) 80 - 100 fL    MCH 23.6 (L) 26.0 - 34.0 pg    MCHC 30.9 (L) 32.0 - 36.0 g/dL    RDW 16.1 (H) 11.5 - 14.5 %    Platelets 411 150 - 450 x10*3/uL    MPV 10.3 7.5 - 11.5 fL   POCT GLUCOSE   Result Value Ref Range    POCT Glucose 97 74 - 99 mg/dL   POCT GLUCOSE   Result Value Ref Range    POCT Glucose 136 (H) 74 - 99 mg/dL        Assessment/Plan     Osteomyelitis of Chronic diabetic foot ulcer under 1st metatarsal on right foot  -MRI ordered. Not yet completed. For surgical planning.   -Surgery scheduled for tomorrow AM. Wound debridement, possible amputation of the 1st toe and distal metatarsal pending MRI results and surgical findings.     Addendum:  I reviewed MRI images just completed. Noted dislocated 1st toe, and bone marrow edema and osteolytic changes highly suggestive of osteomyelitis. Will plan for amputation. May likely need PICC line and 6 weeks antibiotics due to chronic nature of the wound and infection. ID consulted. Already discussed with Dr Lovett.       William Raya DPM

## 2023-10-12 NOTE — PROGRESS NOTES
10/12/23 1529   Discharge Planning   Living Arrangements Family members   Support Systems Family members   Assistance Needed Patient is independent - takes care of his younger brother   Type of Residence Private residence   Number of Stairs to Enter Residence 0   Home or Post Acute Services In home services   Type of Home Care Services Home nursing visits   Patient expects to be discharged to: Home - possible home care should it be needed for wound care   Does the patient need discharge transport arranged? No   Financial Resource Strain   How hard is it for you to pay for the very basics like food, housing, medical care, and heating? Not very   Housing Stability   In the last 12 months, was there a time when you were not able to pay the mortgage or rent on time? N   In the last 12 months, how many places have you lived? 1   In the last 12 months, was there a time when you did not have a steady place to sleep or slept in a shelter (including now)? N   Transportation Needs   In the past 12 months, has lack of transportation kept you from medical appointments or from getting medications? no   In the past 12 months, has lack of transportation kept you from meetings, work, or from getting things needed for daily living? 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  Plan is for O.R. on 10/13

## 2023-10-12 NOTE — CONSULTS
Wound Care Consult     Visit Date: 10/12/2023      Patient Name: Kael Zepeda         MRN: [id number]           YOB: 1965     Spoke with Dr. Raya via secure chat, states there is no reason for wound consult/exam at this time. Dr. Raya had seen patient today, applied dressing and plans on surgical intervention tomorrow.     Arabella Merida RN  Wound/Ostomy Care  [phone]  10/12/2023  2:06 PM

## 2023-10-12 NOTE — H&P
HISTORY & PHYSICAL    History Of Present Illness  Kael Zepeda is a 58 y.o. male presenting with right diabetic foot wound which has been present for a few months.  He was seen at the wound clinic on Friday and was recommended to present to the ED but did not do so until today.  Apparently at the wound clinic they could see the bone on exam..  He does have neuropathy and is not experiencing pain with this.  Admits he does not feel he has been taking good care of his diabetes as it is uncertain of his last hemoglobin A1c.    ED course: X-ray of the foot reveals likely osteomyelitis fifth MT    10/12: Today the patient is seen in the bedside chair.  Voices no specific complaints and no acute events overnight.  He does relate the need to be discharged by at least Monday so that he can attend his methadone clinic appointment on Tuesday.  Podiatry saw the patient and plans amputation tomorrow.     Past Medical History  He has a past medical history of Abnormal result of other cardiovascular function study (06/28/2018), Atherosclerotic heart disease of native coronary artery with unstable angina pectoris (CMS/[facility]) (06/28/2018), Fibromyalgia, Personal history of other diseases of the circulatory system, Personal history of other diseases of the musculoskeletal system and connective tissue, Personal history of other diseases of the musculoskeletal system and connective tissue, Personal history of other diseases of the musculoskeletal system and connective tissue, Personal history of other endocrine, nutritional and metabolic disease, Personal history of other endocrine, nutritional and metabolic disease, and Type 2 diabetes mellitus without complications (CMS/[facility]) (02/25/2022).    Surgical History  He has a past surgical history that includes Septoplasty (06/28/2018) and Back surgery (06/28/2018).     Social History  He has no history on file for tobacco use, alcohol use, and drug use.    Family History  No family  history on file.     Allergies  Patient has no known allergies.    Code Status  Full Code     Review of Systems   Constitutional: Negative.    HENT: Negative.     Eyes: Negative.    Respiratory: Negative.     Cardiovascular: Negative.    Gastrointestinal: Negative.    Endocrine: Negative.    Genitourinary: Negative.    Musculoskeletal: Negative.    Skin:  Positive for wound.   Allergic/Immunologic: Negative.    Neurological: Negative.    Hematological: Negative.    Psychiatric/Behavioral: Negative.         Last Recorded Vitals  /72   Pulse 71   Temp 36.7 °C (98.1 °F)   Resp 17   Wt 84.8 kg (187 lb)   SpO2 97%      Physical Exam  Constitutional:       General: He is not in acute distress.     Appearance: Normal appearance. He is normal weight. He is not ill-appearing or toxic-appearing.   HENT:      Head: Normocephalic and atraumatic.      Right Ear: External ear normal.      Left Ear: External ear normal.      Nose: Nose normal.      Mouth/Throat:      Mouth: Mucous membranes are moist.      Pharynx: Oropharynx is clear.   Eyes:      Extraocular Movements: Extraocular movements intact.      Conjunctiva/sclera: Conjunctivae normal.      Pupils: Pupils are equal, round, and reactive to light.   Cardiovascular:      Rate and Rhythm: Normal rate and regular rhythm.      Heart sounds: Normal heart sounds.   Pulmonary:      Effort: Pulmonary effort is normal.      Breath sounds: Normal breath sounds.   Abdominal:      General: Abdomen is flat. Bowel sounds are normal.      Palpations: Abdomen is soft.   Musculoskeletal:         General: Normal range of motion.      Cervical back: Normal range of motion and neck supple.   Skin:     General: Skin is warm and dry.      Findings: Lesion present.   Neurological:      General: No focal deficit present.      Mental Status: He is alert and oriented to person, place, and time.   Psychiatric:         Mood and Affect: Mood normal.         Behavior: Behavior normal.          Thought Content: Thought content normal.         Judgment: Judgment normal.          Relevant Results  Results for orders placed or performed during the hospital encounter of 10/11/23 (from the past 24 hour(s))   POCT GLUCOSE   Result Value Ref Range    POCT Glucose 309 (H) 74 - 99 mg/dL   Hemoglobin A1C   Result Value Ref Range    Hemoglobin A1C 9.6 (H) see below %    Estimated Average Glucose 229 Not Established mg/dL   Basic Metabolic Panel   Result Value Ref Range    Glucose 93 74 - 99 mg/dL    Sodium 135 (L) 136 - 145 mmol/L    Potassium 3.7 3.5 - 5.3 mmol/L    Chloride 99 98 - 107 mmol/L    Bicarbonate 30 21 - 32 mmol/L    Anion Gap 10 10 - 20 mmol/L    Urea Nitrogen 13 6 - 23 mg/dL    Creatinine 0.73 0.50 - 1.30 mg/dL    eGFR >90 >60 mL/min/1.73m*2    Calcium 8.8 8.6 - 10.3 mg/dL   CBC   Result Value Ref Range    WBC 6.9 4.4 - 11.3 x10*3/uL    nRBC 0.0 0.0 - 0.0 /100 WBCs    RBC 4.58 4.50 - 5.90 x10*6/uL    Hemoglobin 10.8 (L) 13.5 - 17.5 g/dL    Hematocrit 35.0 (L) 41.0 - 52.0 %    MCV 76 (L) 80 - 100 fL    MCH 23.6 (L) 26.0 - 34.0 pg    MCHC 30.9 (L) 32.0 - 36.0 g/dL    RDW 16.1 (H) 11.5 - 14.5 %    Platelets 411 150 - 450 x10*3/uL    MPV 10.3 7.5 - 11.5 fL   POCT GLUCOSE   Result Value Ref Range    POCT Glucose 97 74 - 99 mg/dL   POCT GLUCOSE   Result Value Ref Range    POCT Glucose 136 (H) 74 - 99 mg/dL   POCT GLUCOSE   Result Value Ref Range    POCT Glucose 253 (H) 74 - 99 mg/dL      IMAGING:  XR foot right 3+ views   Final Result   Soft tissue lucency with slight deformity to the bone. Findings raise   suspicion for osteomyelitis and or concurrent trauma. Consider CT   scan follow-up             MACRO:   None        Signed by: Luke Sanchez 10/11/2023 11:17 AM   Dictation workstation:   [id number]         Scheduled medications:  aspirin, 81 mg, oral, Daily  atorvastatin, 40 mg, oral, Nightly  enoxaparin, 40 mg, subcutaneous, q24h  fluticasone furoate-vilanteroL, 1 puff, inhalation,  Daily  hydroxychloroquine, 200 mg, oral, BID  insulin detemir, 50 Units, subcutaneous, q24h  levothyroxine, 50 mcg, oral, Daily  lisinopril, 20 mg, oral, Daily  melatonin, 3 mg, oral, Daily  methadone, 155 mg, oral, Daily  nicotine, 1 patch, transdermal, Daily   Followed by  [START ON 11/23/2023] nicotine, 1 patch, transdermal, Daily   Followed by  [START ON 12/7/2023] nicotine, 1 patch, transdermal, Daily  pantoprazole, 40 mg, oral, Daily before breakfast   Or  pantoprazole, 40 mg, intravenous, Daily before breakfast  piperacillin-tazobactam, 4.5 g, intravenous, q8h  polyethylene glycol, 17 g, oral, Daily  predniSONE, 5 mg, oral, Daily  pregabalin, 200 mg, oral, TID  sulfaSALAzine, 1,500 mg, oral, BID  tiotropium, 2 Inhalation, inhalation, Daily  vancomycin, 1,250 mg, intravenous, q12h      Continuous medications:     PRN medications:  PRN medications: acetaminophen **OR** acetaminophen **OR** acetaminophen, albuterol, dextrose 10 % in water (D10W), dextrose, glucagon, ondansetron **OR** ondansetron        Problem List Items Addressed This Visit          Infectious Diseases    * (Principal) Diabetic foot ulcer with osteomyelitis (CMS/[facility]) - Primary    Acute osteomyelitis of right foot (CMS/[facility])    Relevant Orders    Case Request Operating Room: Amputation Digit Foot (Completed)     Other Visit Diagnoses       Type 2 diabetes mellitus with foot ulcer, with long-term current use of insulin (CMS/[facility])               Assessment/Plan   Principal Problem:    Diabetic foot ulcer with osteomyelitis (CMS/[facility])  Active Problems:    Acute osteomyelitis of right foot (CMS/[facility])             Consultation to podiatry, empiric Zosyn and vancomycin             10/12: Podiatry plans amputation partial right foot tomorrow.  DM 2       Continue home insulin regimen, Accu-Cheks, SSI        10/12: Discussed with patient hemoglobin A1c of 9.5 and discussed he would benefit from better control of his diabetes.  Feel at this point he should be  able to do this with his PCP.       Vin Tracy MD  10/12/2023  7:35 PM

## 2023-10-12 NOTE — PROGRESS NOTES
"Vancomycin Dosing by Pharmacy- INITIAL CONSULT NOTE    Kael Zepeda is a 58 y.o. year old male who Pharmacy has been consulted for vancomycin dosing for Vancomycin Indications: Skin & Soft Tissue / Diabetic foot infection. Based on the patient's indication and renal status this patient will be dosed based on a goal AUC of 400-600.     Renal function is currently stable.    Visit Vitals  /74 (BP Location: Left arm, Patient Position: Sitting)   Pulse 74   Temp 36.1 °C (96.9 °F) (Temporal)   Resp 16           Lab Results   Component Value Date    CREATININE 0.73 10/12/2023    CREATININE 0.71 10/11/2023    CREATININE 0.62 02/15/2023    CREATININE 0.68 08/24/2022    CREATININE 0.69 08/23/2022    CREATININE 0.71 08/22/2022       Patient weight is No results found for: \"PTWEIGHT\"    No results found for: \"CULTURE\"    I/O last 3 completed shifts:  In: 100 (1.2 mL/kg) [IV Piggyback:100]  Out: - (0 mL/kg)   Weight: 84.8 kg     No results found for: \"PATIENTTEMP\"       Assessment/Plan     Patient will be given a loading dose of 2000 mg.  Will initiate vancomycin maintenance,  1250 mg every 12 hours.    This dosing regimen is predicted by [employer] to result in the following pharmacokinetic parameters:  Loading dose: N/A  Regimen: 1250 mg IV every 12 hours.  Start time: 11:02 on 10/13/2023  Exposure target: AUC24 (range)400-600 mg/L.hr   AUC24,ss: 515 mg/L.hr  Probability of AUC24 > 400: 75 %  Ctrough,ss: 15.5 mg/L  Probability of Ctrough,ss > 20: 31 %  Probability of nephrotoxicity (Lodise KELLY 2009): 11 %    Follow-up level will be ordered on 10/13 at 0500 unless clinically indicated sooner.  Will continue to monitor renal function daily while on vancomycin and order serum creatinine at least every 48 hours if not already ordered.  Follow for continued vancomycin needs, clinical response, and signs/symptoms of toxicity.       Onesimo Branham PharmD, BCPS      "

## 2023-10-12 NOTE — H&P
HISTORY & PHYSICAL    History Of Present Illness  Kael Zepeda is a 58 y.o. male presenting with right diabetic foot wound which has been present for a few months.  He was seen at the wound clinic on Friday and was recommended to present to the ED but did not do so until today.  Apparently at the wound clinic they could see the bone on exam..  He does have neuropathy and is not experiencing pain with this.  Admits he does not feel he has been taking good care of his diabetes as it is uncertain of his last hemoglobin A1c.    ED course: X-ray of the foot reveals likely osteomyelitis fifth MT     Past Medical History  He has a past medical history of Abnormal result of other cardiovascular function study (06/28/2018), Atherosclerotic heart disease of native coronary artery with unstable angina pectoris (CMS/[facility]) (06/28/2018), Fibromyalgia, Personal history of other diseases of the circulatory system, Personal history of other diseases of the musculoskeletal system and connective tissue, Personal history of other diseases of the musculoskeletal system and connective tissue, Personal history of other diseases of the musculoskeletal system and connective tissue, Personal history of other endocrine, nutritional and metabolic disease, Personal history of other endocrine, nutritional and metabolic disease, and Type 2 diabetes mellitus without complications (CMS/[facility]) (02/25/2022).    Surgical History  He has a past surgical history that includes Septoplasty (06/28/2018) and Back surgery (06/28/2018).     Social History  He has no history on file for tobacco use, alcohol use, and drug use.    Family History  No family history on file.     Allergies  Patient has no known allergies.    Code Status  Full Code     Review of Systems   Constitutional: Negative.    HENT: Negative.     Eyes: Negative.    Respiratory: Negative.     Cardiovascular: Negative.    Gastrointestinal: Negative.    Endocrine: Negative.     Genitourinary: Negative.    Musculoskeletal: Negative.    Skin:  Positive for wound.   Allergic/Immunologic: Negative.    Neurological: Negative.    Hematological: Negative.    Psychiatric/Behavioral: Negative.         Last Recorded Vitals  /80 (BP Location: Right arm, Patient Position: Sitting)   Pulse 63   Temp 36.2 °C (97.2 °F) (Temporal)   Resp 16   Wt 84.8 kg (187 lb)   SpO2 98%      Physical Exam  Constitutional:       General: He is not in acute distress.     Appearance: Normal appearance. He is normal weight. He is not ill-appearing or toxic-appearing.   HENT:      Head: Normocephalic and atraumatic.      Right Ear: External ear normal.      Left Ear: External ear normal.      Nose: Nose normal.      Mouth/Throat:      Mouth: Mucous membranes are moist.      Pharynx: Oropharynx is clear.   Eyes:      Extraocular Movements: Extraocular movements intact.      Conjunctiva/sclera: Conjunctivae normal.      Pupils: Pupils are equal, round, and reactive to light.   Cardiovascular:      Rate and Rhythm: Normal rate and regular rhythm.      Heart sounds: Normal heart sounds.   Pulmonary:      Effort: Pulmonary effort is normal.      Breath sounds: Normal breath sounds.   Abdominal:      General: Abdomen is flat. Bowel sounds are normal.      Palpations: Abdomen is soft.   Musculoskeletal:         General: Normal range of motion.      Cervical back: Normal range of motion and neck supple.   Skin:     General: Skin is warm and dry.      Findings: Lesion present.   Neurological:      General: No focal deficit present.      Mental Status: He is alert and oriented to person, place, and time.   Psychiatric:         Mood and Affect: Mood normal.         Behavior: Behavior normal.         Thought Content: Thought content normal.         Judgment: Judgment normal.          Relevant Results  Results for orders placed or performed during the hospital encounter of 10/11/23 (from the past 24 hour(s))   CBC and Auto  Differential   Result Value Ref Range    WBC 9.2 4.4 - 11.3 x10*3/uL    nRBC 0.0 0.0 - 0.0 /100 WBCs    RBC 4.79 4.50 - 5.90 x10*6/uL    Hemoglobin 11.6 (L) 13.5 - 17.5 g/dL    Hematocrit 36.9 (L) 41.0 - 52.0 %    MCV 77 (L) 80 - 100 fL    MCH 24.2 (L) 26.0 - 34.0 pg    MCHC 31.4 (L) 32.0 - 36.0 g/dL    RDW 16.2 (H) 11.5 - 14.5 %    Platelets 470 (H) 150 - 450 x10*3/uL    MPV 10.2 7.5 - 11.5 fL    Neutrophils % 57.0 40.0 - 80.0 %    Immature Granulocytes %, Automated 2.0 (H) 0.0 - 0.9 %    Lymphocytes % 26.4 13.0 - 44.0 %    Monocytes % 11.4 2.0 - 10.0 %    Eosinophils % 2.2 0.0 - 6.0 %    Basophils % 1.0 0.0 - 2.0 %    Neutrophils Absolute 5.24 1.20 - 7.70 x10*3/uL    Immature Granulocytes Absolute, Automated 0.18 0.00 - 0.70 x10*3/uL    Lymphocytes Absolute 2.43 1.20 - 4.80 x10*3/uL    Monocytes Absolute 1.05 (H) 0.10 - 1.00 x10*3/uL    Eosinophils Absolute 0.20 0.00 - 0.70 x10*3/uL    Basophils Absolute 0.09 0.00 - 0.10 x10*3/uL   Comprehensive Metabolic Panel   Result Value Ref Range    Glucose 280 (H) 74 - 99 mg/dL    Sodium 132 (L) 136 - 145 mmol/L    Potassium 4.4 3.5 - 5.3 mmol/L    Chloride 95 (L) 98 - 107 mmol/L    Bicarbonate 30 21 - 32 mmol/L    Anion Gap 11 10 - 20 mmol/L    Urea Nitrogen 10 6 - 23 mg/dL    Creatinine 0.71 0.50 - 1.30 mg/dL    eGFR >90 >60 mL/min/1.73m*2    Calcium 9.3 8.6 - 10.3 mg/dL    Albumin 3.8 3.4 - 5.0 g/dL    Alkaline Phosphatase 157 (H) 33 - 120 U/L    Total Protein 7.5 6.4 - 8.2 g/dL    AST 10 9 - 39 U/L    Bilirubin, Total 0.3 0.0 - 1.2 mg/dL    ALT 10 10 - 52 U/L   Lactate   Result Value Ref Range    Lactate 1.0 0.4 - 2.0 mmol/L   Blood Culture    Specimen: Peripheral Venipuncture; Blood culture   Result Value Ref Range    Blood Culture Loaded on Instrument - Culture in progress    Blood Culture    Specimen: Peripheral Venipuncture; Blood culture   Result Value Ref Range    Blood Culture Loaded on Instrument - Culture in progress       IMAGING:  XR foot right 3+ views    Final Result   Soft tissue lucency with slight deformity to the bone. Findings raise   suspicion for osteomyelitis and or concurrent trauma. Consider CT   scan follow-up             MACRO:   None        Signed by: Luke Sanchez 10/11/2023 11:17 AM   Dictation workstation:   [id number]         Scheduled medications:  enoxaparin, 40 mg, subcutaneous, q24h  melatonin, 3 mg, oral, Daily  [START ON 10/12/2023] pantoprazole, 40 mg, oral, Daily before breakfast   Or  [START ON 10/12/2023] pantoprazole, 40 mg, intravenous, Daily before breakfast  polyethylene glycol, 17 g, oral, Daily  sodium chloride 0.9%, , ,       Continuous medications:     PRN medications:  PRN medications: acetaminophen **OR** acetaminophen **OR** acetaminophen, ondansetron **OR** ondansetron, sodium chloride 0.9%        Problem List Items Addressed This Visit          Infectious Diseases    * (Principal) Diabetic foot ulcer with osteomyelitis (CMS/HCC) - Primary     Other Visit Diagnoses       Acute osteomyelitis of right foot (CMS/HCC)        Type 2 diabetes mellitus with foot ulcer, with long-term current use of insulin (CMS/[facility])               Assessment/Plan   Principal Problem:    Diabetic foot ulcer with osteomyelitis (CMS/HCC)             Consultation to podiatry, empiric Zosyn and vancomycin  DM 2       Continue home insulin regimen, Accu-Cheks, SSI       Vin Tracy MD  10/11/2023  9:25 PM

## 2023-10-12 NOTE — CONSULTS
Consults  Referred by Dr. Gilma Najera MD: Radha Greer MD    Reason For Consult    Right foot osteomyelitis  History Of Present Illness  Kael Zepeda is a 58 y.o. male with a past medical history of CAD fibromyalgia type 2 diabetes came in due to worsening of his right diabetic foot wound that has been present for the past few months and he was seen in the wound clinic due to worsening wounds as the wound was probing to wound and was concern for osteomyelitis  In the ED x-ray of the foot revealed osteomyelitis of his fifth metatarsal  Patient denies any fevers chills nausea vomiting or diarrhea denied cough shortness of breath or chest pain 12 point review of systems noted to be negative except as noted in HPI  Patient was started on vancomycin and Zosyn  Podiatry was consulted  ID has been consulted for additional recommendations      Review of systems 12 point review of systems noted to be negative except as noted in HPI      Past Medical History  He has a past medical history of Abnormal result of other cardiovascular function study (06/28/2018), Atherosclerotic heart disease of native coronary artery with unstable angina pectoris (CMS/[facility]) (06/28/2018), Fibromyalgia, Personal history of other diseases of the circulatory system, Personal history of other diseases of the musculoskeletal system and connective tissue, Personal history of other diseases of the musculoskeletal system and connective tissue, Personal history of other diseases of the musculoskeletal system and connective tissue, Personal history of other endocrine, nutritional and metabolic disease, Personal history of other endocrine, nutritional and metabolic disease, and Type 2 diabetes mellitus without complications (CMS/[facility]) (02/25/2022).    Surgical History  He has a past surgical history that includes Septoplasty (06/28/2018) and Back surgery (06/28/2018).     Social History     Occupational History    Not on file   Tobacco Use     Smoking status: Not on file    Smokeless tobacco: Not on file   Substance and Sexual Activity    Alcohol use: Not on file    Drug use: Not on file    Sexual activity: Not on file     Travel History   Travel since 09/12/23    No documented travel since 09/12/23              Family History  No family history on file.  Allergies  Patient has no known allergies.     Immunization History   Administered Date(s) Administered    Pfizer Purple Cap SARS-CoV-2 06/04/2021     Medications  Home medications:  Medications Prior to Admission   Medication Sig Dispense Refill Last Dose    albuterol 90 mcg/actuation inhaler Inhale 2 puffs every 6 hours if needed for shortness of breath.       ammonium lactate (Amlactin) 12 % cream Apply topically.       atorvastatin (Lipitor) 40 mg tablet Take 1 tablet (40 mg) by mouth once daily.       DULoxetine (Cymbalta) 60 mg DR capsule Take 1 capsule (60 mg) by mouth once daily.       fluticasone propion-salmeteroL (Advair HFA) 230-21 mcg/actuation inhaler Inhale 1 puff 2 times a day.       furosemide (Lasix) 40 mg tablet Take 1 tablet (40 mg) by mouth once daily as needed.       Levemir FlexPen 100 unit/mL (3 mL) pen Inject 50 Units under the skin once daily at bedtime.       lisinopril 20 mg tablet Take 1 tablet (20 mg) by mouth once daily.       tiotropium (Spiriva with HandiHaler) 18 mcg inhalation capsule Place 1 capsule (18 mcg) into inhaler and inhale once daily.       aspirin 81 mg EC tablet Take 1 tablet (81 mg) by mouth once daily.       hydroxychloroquine (Plaquenil) 200 mg tablet Take 1 tablet (200 mg) by mouth 2 times a day.       ibuprofen 800 mg tablet Take 1 tablet (800 mg) by mouth every 8 hours if needed for mild pain (1 - 3).       levothyroxine (Synthroid, Levoxyl) 50 mcg tablet Take 1 tablet (50 mcg) by mouth once daily.       metFORMIN (Glucophage) 500 mg tablet Take 1 tablet (500 mg) by mouth 2 times a day with meals.       methadone (Dolophine) 10 mg/mL solution Take  7.7 mL (77 mg) by mouth. IN THE MORNING THEN 78 MG IN THE EVENING       omeprazole (PriLOSEC) 40 mg DR capsule Take 1 capsule (40 mg) by mouth once daily.       predniSONE (Deltasone) 2.5 mg tablet Take 2 tablets (5 mg) by mouth once daily.       pregabalin (Lyrica) 200 mg capsule Take 1 capsule (200 mg) by mouth 3 times a day.       riTUXimab-abbs (Truxima) 10 mg/mL solution 100 mL (1,000 mg) every 6 months.   AUG    sulfaSALAzine (Azulfidine) 500 mg tablet Take 3 tablets (1,500 mg) by mouth 2 times a day.        Current medications:  Scheduled medications  aspirin, 81 mg, oral, Daily  atorvastatin, 40 mg, oral, Nightly  enoxaparin, 40 mg, subcutaneous, q24h  fluticasone furoate-vilanteroL, 1 puff, inhalation, Daily  hydroxychloroquine, 200 mg, oral, BID  insulin detemir, 50 Units, subcutaneous, q24h  levothyroxine, 50 mcg, oral, Daily  lisinopril, 20 mg, oral, Daily  melatonin, 3 mg, oral, Daily  methadone, 155 mg, oral, Daily  nicotine, 1 patch, transdermal, Daily   Followed by  [START ON 11/23/2023] nicotine, 1 patch, transdermal, Daily   Followed by  [START ON 12/7/2023] nicotine, 1 patch, transdermal, Daily  pantoprazole, 40 mg, oral, Daily before breakfast   Or  pantoprazole, 40 mg, intravenous, Daily before breakfast  piperacillin-tazobactam, 4.5 g, intravenous, q8h  polyethylene glycol, 17 g, oral, Daily  predniSONE, 5 mg, oral, Daily  pregabalin, 200 mg, oral, TID  sulfaSALAzine, 1,500 mg, oral, BID  tiotropium, 2 Inhalation, inhalation, Daily  vancomycin, 1,250 mg, intravenous, q12h      Continuous medications     PRN medications  PRN medications: acetaminophen **OR** acetaminophen **OR** acetaminophen, albuterol, dextrose 10 % in water (D10W), dextrose, glucagon, ondansetron **OR** ondansetron    Review of Systems  12 point review of systems noted to be negative except as noted in HPI  Objective  Range of Vitals (last 24 hours)  Heart Rate:  [71-79]   Temp:  [36.1 °C (96.9 °F)-36.9 °C (98.4 °F)]   Resp:   "[16-17]   BP: (120-131)/(64-74)   SpO2:  [94 %-98 %]   Daily Weight  10/11/23 : 84.8 kg (187 lb)    Body mass index is 29.29 kg/m².     Physical Exam  General alert oriented x3  Lungs clear to auscultation bilaterally  CV no murmurs  Abdomen soft nontender  Extremities right foot full-thickness ulceration on the plantar foot with exposed bone no open areas of drainage periwound erythema present   relevant Results  Outside Hospital Results    Labs  Results from last 72 hours   Lab Units 10/12/23  0327 10/11/23  1029   WBC AUTO x10*3/uL 6.9 9.2   HEMOGLOBIN g/dL 10.8* 11.6*   HEMATOCRIT % 35.0* 36.9*   PLATELETS AUTO x10*3/uL 411 470*   NEUTROS PCT AUTO %  --  57.0   LYMPHS PCT AUTO %  --  26.4   MONOS PCT AUTO %  --  11.4   EOS PCT AUTO %  --  2.2     Results from last 72 hours   Lab Units 10/12/23  0327 10/11/23  1029   SODIUM mmol/L 135* 132*   POTASSIUM mmol/L 3.7 4.4   CHLORIDE mmol/L 99 95*   CO2 mmol/L 30 30   BUN mg/dL 13 10   CREATININE mg/dL 0.73 0.71   GLUCOSE mg/dL 93 280*   CALCIUM mg/dL 8.8 9.3   ANION GAP mmol/L 10 11   EGFR mL/min/1.73m*2 >90 >90     Results from last 72 hours   Lab Units 10/11/23  1029   ALK PHOS U/L 157*   BILIRUBIN TOTAL mg/dL 0.3   PROTEIN TOTAL g/dL 7.5   ALT U/L 10   AST U/L 10   ALBUMIN g/dL 3.8     Estimated Creatinine Clearance: 113.4 mL/min (by C-G formula based on SCr of 0.73 mg/dL).  CRP   Date Value Ref Range Status   06/05/2023 3.29 (A) mg/dL Final     Comment:     REF VALUE  < 1.00     02/15/2023 2.17 (A) mg/dL Final     Comment:     REF VALUE  < 1.00       Sedimentation Rate   Date Value Ref Range Status   06/05/2023 47 (H) 0 - 20 mm/h Final   02/15/2023 43 (H) 0 - 20 mm/h Final     No results found for: \"HIV1X2\", \"HIVCONF\", \"QNXHIV3NN\"  No results found for: \"HEPCABINIT\", \"HEPCAB\", \"HCVPCRQUANT\"  Microbiology  Lab Results   Component Value Date    BLOODCULT Loaded on Instrument - Culture in progress 10/11/2023    BLOODCULT Loaded on Instrument - Culture in progress " 10/11/2023         Imaging     Assessment/Plan   Right foot osteomyelitis of the first metatarsal  CAD  Type 2 diabetes mellitus  Fibromyalgia  C-reactive protein 3.29 ESR 47  Plan  Await MRI of the right foot patient has been already started on vancomycin and Zosyn continue  Podiatry evaluated surgery scheduled for tomorrow a.m. wound debridement and possible amputation of the first toe and distal metatarsal  Patient has already been started on Vanco and Zosyn  Follow counts while on Zosyn due to risk of cytopenias  Follow serum creatinine and vancomycin levels while on vancomycin due to risk of nephrotoxicity/appreciate pharmacy's assistance in managing levels and adjusting dose  Obtain wound cultures if able/await debrided cultures  Follow fevers white count monitor electrolytes  ID team will follow with additional recommendations  Plan of care discussed with podiatry  Other medical issues  CAD type 2 diabetes fibromyalgia      Plan of care discussed with podiatrist  I spent 25  minutes in the professional and overall care of this patient.      Genet Lovett MD

## 2023-10-13 ENCOUNTER — ANESTHESIA EVENT (OUTPATIENT)
Dept: OPERATING ROOM | Facility: HOSPITAL | Age: 58
DRG: 617 | End: 2023-10-13
Payer: MEDICARE

## 2023-10-13 ENCOUNTER — ANESTHESIA (OUTPATIENT)
Dept: OPERATING ROOM | Facility: HOSPITAL | Age: 58
DRG: 617 | End: 2023-10-13
Payer: MEDICARE

## 2023-10-13 ENCOUNTER — APPOINTMENT (OUTPATIENT)
Dept: WOUND CARE | Facility: CLINIC | Age: 58
End: 2023-10-13
Payer: MEDICARE

## 2023-10-13 PROBLEM — E11.9 DIABETES MELLITUS, TYPE 2 (MULTI): Status: ACTIVE | Noted: 2023-10-13

## 2023-10-13 LAB
ALBUMIN SERPL BCP-MCNC: 3.6 G/DL (ref 3.4–5)
ALP SERPL-CCNC: 135 U/L (ref 33–120)
ALT SERPL W P-5'-P-CCNC: 9 U/L (ref 10–52)
ANION GAP SERPL CALC-SCNC: 14 MMOL/L (ref 10–20)
AST SERPL W P-5'-P-CCNC: 12 U/L (ref 9–39)
BILIRUB SERPL-MCNC: 0.3 MG/DL (ref 0–1.2)
BUN SERPL-MCNC: 18 MG/DL (ref 6–23)
CALCIUM SERPL-MCNC: 9.2 MG/DL (ref 8.6–10.3)
CHLORIDE SERPL-SCNC: 97 MMOL/L (ref 98–107)
CO2 SERPL-SCNC: 30 MMOL/L (ref 21–32)
CREAT SERPL-MCNC: 1.06 MG/DL (ref 0.5–1.3)
ERYTHROCYTE [DISTWIDTH] IN BLOOD BY AUTOMATED COUNT: 16.7 % (ref 11.5–14.5)
GFR SERPL CREATININE-BSD FRML MDRD: 81 ML/MIN/1.73M*2
GLUCOSE BLD MANUAL STRIP-MCNC: 138 MG/DL (ref 74–99)
GLUCOSE BLD MANUAL STRIP-MCNC: 362 MG/DL (ref 74–99)
GLUCOSE BLD MANUAL STRIP-MCNC: 81 MG/DL (ref 74–99)
GLUCOSE SERPL-MCNC: 145 MG/DL (ref 74–99)
HCT VFR BLD AUTO: 37.6 % (ref 41–52)
HGB BLD-MCNC: 11.4 G/DL (ref 13.5–17.5)
MCH RBC QN AUTO: 23.4 PG (ref 26–34)
MCHC RBC AUTO-ENTMCNC: 30.3 G/DL (ref 32–36)
MCV RBC AUTO: 77 FL (ref 80–100)
NRBC BLD-RTO: 0 /100 WBCS (ref 0–0)
PLATELET # BLD AUTO: 453 X10*3/UL (ref 150–450)
PMV BLD AUTO: 10.4 FL (ref 7.5–11.5)
POTASSIUM SERPL-SCNC: 4.1 MMOL/L (ref 3.5–5.3)
PROT SERPL-MCNC: 7.1 G/DL (ref 6.4–8.2)
RBC # BLD AUTO: 4.87 X10*6/UL (ref 4.5–5.9)
SODIUM SERPL-SCNC: 137 MMOL/L (ref 136–145)
VANCOMYCIN SERPL-MCNC: 24.3 UG/ML (ref 5–20)
WBC # BLD AUTO: 7 X10*3/UL (ref 4.4–11.3)

## 2023-10-13 PROCEDURE — 82374 ASSAY BLOOD CARBON DIOXIDE: CPT | Performed by: FAMILY MEDICINE

## 2023-10-13 PROCEDURE — 0753T DGTZ GLS MCRSCP SLD LEVEL IV: CPT | Mod: TC,PORLAB | Performed by: PODIATRIST

## 2023-10-13 PROCEDURE — 3600000008 HC OR TIME - EACH INCREMENTAL 1 MINUTE - PROCEDURE LEVEL THREE: Performed by: PODIATRIST

## 2023-10-13 PROCEDURE — 2500000004 HC RX 250 GENERAL PHARMACY W/ HCPCS (ALT 636 FOR OP/ED): Performed by: ANESTHESIOLOGY

## 2023-10-13 PROCEDURE — 2720000007 HC OR 272 NO HCPCS: Performed by: PODIATRIST

## 2023-10-13 PROCEDURE — 87070 CULTURE OTHR SPECIMN AEROBIC: CPT | Mod: CMCLAB,PORLAB | Performed by: PODIATRIST

## 2023-10-13 PROCEDURE — 2500000004 HC RX 250 GENERAL PHARMACY W/ HCPCS (ALT 636 FOR OP/ED): Performed by: NURSE ANESTHETIST, CERTIFIED REGISTERED

## 2023-10-13 PROCEDURE — 85027 COMPLETE CBC AUTOMATED: CPT | Performed by: FAMILY MEDICINE

## 2023-10-13 PROCEDURE — 99233 SBSQ HOSP IP/OBS HIGH 50: CPT | Performed by: FAMILY MEDICINE

## 2023-10-13 PROCEDURE — 96372 THER/PROPH/DIAG INJ SC/IM: CPT | Performed by: FAMILY MEDICINE

## 2023-10-13 PROCEDURE — 1100000001 HC PRIVATE ROOM DAILY

## 2023-10-13 PROCEDURE — 2580000001 HC RX 258 IV SOLUTIONS: Performed by: ANESTHESIOLOGY

## 2023-10-13 PROCEDURE — S4991 NICOTINE PATCH NONLEGEND: HCPCS | Performed by: FAMILY MEDICINE

## 2023-10-13 PROCEDURE — 7100000002 HC RECOVERY ROOM TIME - EACH INCREMENTAL 1 MINUTE: Performed by: PODIATRIST

## 2023-10-13 PROCEDURE — 2580000001 HC RX 258 IV SOLUTIONS: Performed by: NURSE ANESTHETIST, CERTIFIED REGISTERED

## 2023-10-13 PROCEDURE — 2500000004 HC RX 250 GENERAL PHARMACY W/ HCPCS (ALT 636 FOR OP/ED): Performed by: FAMILY MEDICINE

## 2023-10-13 PROCEDURE — 3600000003 HC OR TIME - INITIAL BASE CHARGE - PROCEDURE LEVEL THREE: Performed by: PODIATRIST

## 2023-10-13 PROCEDURE — 0753T DGTZ GLS MCRSCP SLD LEVEL IV: CPT | Mod: TC,SUR,PORLAB | Performed by: PODIATRIST

## 2023-10-13 PROCEDURE — 2500000004 HC RX 250 GENERAL PHARMACY W/ HCPCS (ALT 636 FOR OP/ED): Performed by: PODIATRIST

## 2023-10-13 PROCEDURE — 80202 ASSAY OF VANCOMYCIN: CPT | Performed by: FAMILY MEDICINE

## 2023-10-13 PROCEDURE — 88305 TISSUE EXAM BY PATHOLOGIST: CPT | Performed by: PATHOLOGY

## 2023-10-13 PROCEDURE — 3700000002 HC GENERAL ANESTHESIA TIME - EACH INCREMENTAL 1 MINUTE: Performed by: PODIATRIST

## 2023-10-13 PROCEDURE — 36415 COLL VENOUS BLD VENIPUNCTURE: CPT | Performed by: FAMILY MEDICINE

## 2023-10-13 PROCEDURE — 3700000001 HC GENERAL ANESTHESIA TIME - INITIAL BASE CHARGE: Performed by: PODIATRIST

## 2023-10-13 PROCEDURE — 7100000001 HC RECOVERY ROOM TIME - INITIAL BASE CHARGE: Performed by: PODIATRIST

## 2023-10-13 PROCEDURE — 88305 TISSUE EXAM BY PATHOLOGIST: CPT | Mod: TC,PORLAB | Performed by: PODIATRIST

## 2023-10-13 PROCEDURE — 2500000005 HC RX 250 GENERAL PHARMACY W/O HCPCS: Performed by: NURSE ANESTHETIST, CERTIFIED REGISTERED

## 2023-10-13 PROCEDURE — 94640 AIRWAY INHALATION TREATMENT: CPT

## 2023-10-13 PROCEDURE — 2500000002 HC RX 250 W HCPCS SELF ADMINISTERED DRUGS (ALT 637 FOR MEDICARE OP, ALT 636 FOR OP/ED): Performed by: FAMILY MEDICINE

## 2023-10-13 PROCEDURE — 0Y6M0Z9 DETACHMENT AT RIGHT FOOT, PARTIAL 1ST RAY, OPEN APPROACH: ICD-10-PCS | Performed by: PODIATRIST

## 2023-10-13 PROCEDURE — 2500000005 HC RX 250 GENERAL PHARMACY W/O HCPCS: Performed by: PODIATRIST

## 2023-10-13 PROCEDURE — 82947 ASSAY GLUCOSE BLOOD QUANT: CPT

## 2023-10-13 PROCEDURE — 87075 CULTR BACTERIA EXCEPT BLOOD: CPT | Mod: CMCLAB,PORLAB | Performed by: PODIATRIST

## 2023-10-13 PROCEDURE — 2500000001 HC RX 250 WO HCPCS SELF ADMINISTERED DRUGS (ALT 637 FOR MEDICARE OP): Performed by: FAMILY MEDICINE

## 2023-10-13 RX ORDER — LIDOCAINE HYDROCHLORIDE 10 MG/ML
INJECTION INFILTRATION; PERINEURAL AS NEEDED
Status: DISCONTINUED | OUTPATIENT
Start: 2023-10-13 | End: 2023-10-13 | Stop reason: HOSPADM

## 2023-10-13 RX ORDER — BUPIVACAINE HYDROCHLORIDE 5 MG/ML
INJECTION, SOLUTION EPIDURAL; INTRACAUDAL AS NEEDED
Status: DISCONTINUED | OUTPATIENT
Start: 2023-10-13 | End: 2023-10-13 | Stop reason: HOSPADM

## 2023-10-13 RX ORDER — LABETALOL HYDROCHLORIDE 5 MG/ML
5 INJECTION, SOLUTION INTRAVENOUS ONCE AS NEEDED
Status: DISCONTINUED | OUTPATIENT
Start: 2023-10-13 | End: 2023-10-13 | Stop reason: HOSPADM

## 2023-10-13 RX ORDER — PROPOFOL 10 MG/ML
INJECTION, EMULSION INTRAVENOUS AS NEEDED
Status: DISCONTINUED | OUTPATIENT
Start: 2023-10-13 | End: 2023-10-13

## 2023-10-13 RX ORDER — KETOROLAC TROMETHAMINE 30 MG/ML
30 INJECTION, SOLUTION INTRAMUSCULAR; INTRAVENOUS EVERY 6 HOURS PRN
Status: DISCONTINUED | OUTPATIENT
Start: 2023-10-13 | End: 2023-10-14

## 2023-10-13 RX ORDER — MEPERIDINE HYDROCHLORIDE 50 MG/ML
12.5 INJECTION INTRAMUSCULAR; INTRAVENOUS; SUBCUTANEOUS EVERY 10 MIN PRN
Status: DISCONTINUED | OUTPATIENT
Start: 2023-10-13 | End: 2023-10-13 | Stop reason: HOSPADM

## 2023-10-13 RX ORDER — LIDOCAINE HYDROCHLORIDE 20 MG/ML
INJECTION, SOLUTION INFILTRATION; PERINEURAL AS NEEDED
Status: DISCONTINUED | OUTPATIENT
Start: 2023-10-13 | End: 2023-10-13

## 2023-10-13 RX ORDER — VANCOMYCIN HYDROCHLORIDE 750 MG/150ML
750 INJECTION, SOLUTION INTRAVENOUS EVERY 12 HOURS
Status: DISCONTINUED | OUTPATIENT
Start: 2023-10-13 | End: 2023-10-14

## 2023-10-13 RX ORDER — PROPOFOL 10 MG/ML
INJECTION, EMULSION INTRAVENOUS CONTINUOUS PRN
Status: DISCONTINUED | OUTPATIENT
Start: 2023-10-13 | End: 2023-10-13

## 2023-10-13 RX ORDER — DIPHENHYDRAMINE HYDROCHLORIDE 50 MG/ML
12.5 INJECTION INTRAMUSCULAR; INTRAVENOUS ONCE AS NEEDED
Status: DISCONTINUED | OUTPATIENT
Start: 2023-10-13 | End: 2023-10-13 | Stop reason: HOSPADM

## 2023-10-13 RX ORDER — SODIUM CHLORIDE, SODIUM LACTATE, POTASSIUM CHLORIDE, CALCIUM CHLORIDE 600; 310; 30; 20 MG/100ML; MG/100ML; MG/100ML; MG/100ML
100 INJECTION, SOLUTION INTRAVENOUS CONTINUOUS
Status: DISCONTINUED | OUTPATIENT
Start: 2023-10-13 | End: 2023-10-13 | Stop reason: HOSPADM

## 2023-10-13 RX ORDER — OXYCODONE HYDROCHLORIDE 5 MG/1
5 TABLET ORAL EVERY 8 HOURS PRN
Status: DISCONTINUED | OUTPATIENT
Start: 2023-10-13 | End: 2023-10-15 | Stop reason: HOSPADM

## 2023-10-13 RX ORDER — FENTANYL CITRATE 50 UG/ML
INJECTION, SOLUTION INTRAMUSCULAR; INTRAVENOUS AS NEEDED
Status: DISCONTINUED | OUTPATIENT
Start: 2023-10-13 | End: 2023-10-13

## 2023-10-13 RX ORDER — FAMOTIDINE 10 MG/ML
20 INJECTION INTRAVENOUS ONCE
Status: COMPLETED | OUTPATIENT
Start: 2023-10-13 | End: 2023-10-13

## 2023-10-13 RX ORDER — LIDOCAINE HYDROCHLORIDE 10 MG/ML
0.1 INJECTION, SOLUTION EPIDURAL; INFILTRATION; INTRACAUDAL; PERINEURAL ONCE
Status: DISCONTINUED | OUTPATIENT
Start: 2023-10-13 | End: 2023-10-13 | Stop reason: HOSPADM

## 2023-10-13 RX ORDER — ALBUTEROL SULFATE 0.83 MG/ML
2.5 SOLUTION RESPIRATORY (INHALATION) ONCE AS NEEDED
Status: DISCONTINUED | OUTPATIENT
Start: 2023-10-13 | End: 2023-10-13 | Stop reason: HOSPADM

## 2023-10-13 RX ORDER — HYDRALAZINE HYDROCHLORIDE 20 MG/ML
5 INJECTION INTRAMUSCULAR; INTRAVENOUS EVERY 30 MIN PRN
Status: DISCONTINUED | OUTPATIENT
Start: 2023-10-13 | End: 2023-10-13 | Stop reason: HOSPADM

## 2023-10-13 RX ORDER — MORPHINE SULFATE 2 MG/ML
2 INJECTION, SOLUTION INTRAMUSCULAR; INTRAVENOUS EVERY 5 MIN PRN
Status: DISCONTINUED | OUTPATIENT
Start: 2023-10-13 | End: 2023-10-13 | Stop reason: HOSPADM

## 2023-10-13 RX ORDER — MIDAZOLAM HYDROCHLORIDE 1 MG/ML
INJECTION, SOLUTION INTRAMUSCULAR; INTRAVENOUS AS NEEDED
Status: DISCONTINUED | OUTPATIENT
Start: 2023-10-13 | End: 2023-10-13

## 2023-10-13 RX ORDER — OXYCODONE AND ACETAMINOPHEN 5; 325 MG/1; MG/1
1 TABLET ORAL EVERY 4 HOURS PRN
Status: DISCONTINUED | OUTPATIENT
Start: 2023-10-13 | End: 2023-10-13 | Stop reason: HOSPADM

## 2023-10-13 RX ORDER — SODIUM CHLORIDE, SODIUM LACTATE, POTASSIUM CHLORIDE, CALCIUM CHLORIDE 600; 310; 30; 20 MG/100ML; MG/100ML; MG/100ML; MG/100ML
100 INJECTION, SOLUTION INTRAVENOUS CONTINUOUS
Status: DISCONTINUED | OUTPATIENT
Start: 2023-10-13 | End: 2023-10-15

## 2023-10-13 RX ADMIN — LIDOCAINE HYDROCHLORIDE 60 MG: 20 INJECTION, SOLUTION INFILTRATION; PERINEURAL at 12:09

## 2023-10-13 RX ADMIN — LISINOPRIL 20 MG: 20 TABLET ORAL at 06:38

## 2023-10-13 RX ADMIN — VANCOMYCIN HYDROCHLORIDE 750 MG: 750 INJECTION, SOLUTION INTRAVENOUS at 18:13

## 2023-10-13 RX ADMIN — PROPOFOL 75 MCG/KG/MIN: 10 INJECTION, EMULSION INTRAVENOUS at 12:09

## 2023-10-13 RX ADMIN — NICOTINE 1 PATCH: 21 PATCH, EXTENDED RELEASE TRANSDERMAL at 08:24

## 2023-10-13 RX ADMIN — Medication 3 MG: at 20:11

## 2023-10-13 RX ADMIN — PREGABALIN 200 MG: 50 CAPSULE ORAL at 14:51

## 2023-10-13 RX ADMIN — ATORVASTATIN CALCIUM 40 MG: 40 TABLET, FILM COATED ORAL at 20:11

## 2023-10-13 RX ADMIN — PANTOPRAZOLE SODIUM 40 MG: 40 TABLET, DELAYED RELEASE ORAL at 06:38

## 2023-10-13 RX ADMIN — PIPERACILLIN SODIUM AND TAZOBACTAM SODIUM 4.5 G: 4; .5 INJECTION, SOLUTION INTRAVENOUS at 20:15

## 2023-10-13 RX ADMIN — PROPOFOL 50.2 MG: 10 INJECTION, EMULSION INTRAVENOUS at 12:09

## 2023-10-13 RX ADMIN — ENOXAPARIN SODIUM 40 MG: 40 INJECTION SUBCUTANEOUS at 20:14

## 2023-10-13 RX ADMIN — SULFASALAZINE 1500 MG: 500 TABLET ORAL at 20:10

## 2023-10-13 RX ADMIN — TIOTROPIUM BROMIDE INHALATION SPRAY 2 PUFF: 3.12 SPRAY, METERED RESPIRATORY (INHALATION) at 06:39

## 2023-10-13 RX ADMIN — FAMOTIDINE 20 MG: 10 INJECTION, SOLUTION INTRAVENOUS at 11:08

## 2023-10-13 RX ADMIN — INSULIN DETEMIR 50 UNITS: 100 INJECTION, SOLUTION SUBCUTANEOUS at 21:43

## 2023-10-13 RX ADMIN — PREGABALIN 200 MG: 50 CAPSULE ORAL at 20:10

## 2023-10-13 RX ADMIN — PIPERACILLIN SODIUM AND TAZOBACTAM SODIUM 4.5 G: 4; .5 INJECTION, SOLUTION INTRAVENOUS at 14:52

## 2023-10-13 RX ADMIN — MIDAZOLAM HYDROCHLORIDE 2 MG: 1 INJECTION, SOLUTION INTRAMUSCULAR; INTRAVENOUS at 12:02

## 2023-10-13 RX ADMIN — FENTANYL CITRATE 50 MCG: 50 INJECTION, SOLUTION INTRAMUSCULAR; INTRAVENOUS at 12:05

## 2023-10-13 RX ADMIN — LEVOTHYROXINE SODIUM 50 MCG: 50 TABLET ORAL at 08:24

## 2023-10-13 RX ADMIN — ACETAMINOPHEN 650 MG: 325 TABLET ORAL at 20:10

## 2023-10-13 RX ADMIN — FENTANYL CITRATE 50 MCG: 50 INJECTION, SOLUTION INTRAMUSCULAR; INTRAVENOUS at 12:53

## 2023-10-13 RX ADMIN — PREGABALIN 200 MG: 50 CAPSULE ORAL at 08:24

## 2023-10-13 RX ADMIN — HYDROXYCHLOROQUINE SULFATE 200 MG: 200 TABLET, FILM COATED ORAL at 20:11

## 2023-10-13 RX ADMIN — SODIUM CHLORIDE, POTASSIUM CHLORIDE, SODIUM LACTATE AND CALCIUM CHLORIDE 100 ML/HR: 600; 310; 30; 20 INJECTION, SOLUTION INTRAVENOUS at 11:08

## 2023-10-13 RX ADMIN — SODIUM CHLORIDE, SODIUM LACTATE, POTASSIUM CHLORIDE, AND CALCIUM CHLORIDE: 600; 310; 30; 20 INJECTION, SOLUTION INTRAVENOUS at 11:55

## 2023-10-13 RX ADMIN — ASPIRIN 81 MG: 81 TABLET, COATED ORAL at 09:00

## 2023-10-13 RX ADMIN — PREDNISONE 5 MG: 5 TABLET ORAL at 08:24

## 2023-10-13 RX ADMIN — SULFASALAZINE 1500 MG: 500 TABLET ORAL at 08:24

## 2023-10-13 RX ADMIN — HYDROXYCHLOROQUINE SULFATE 200 MG: 200 TABLET, FILM COATED ORAL at 08:24

## 2023-10-13 RX ADMIN — PIPERACILLIN SODIUM AND TAZOBACTAM SODIUM 4.5 G: 4; .5 INJECTION, SOLUTION INTRAVENOUS at 02:09

## 2023-10-13 SDOH — HEALTH STABILITY: MENTAL HEALTH: CURRENT SMOKER: 1

## 2023-10-13 ASSESSMENT — COGNITIVE AND FUNCTIONAL STATUS - GENERAL
TOILETING: A LITTLE
MOVING TO AND FROM BED TO CHAIR: A LITTLE
WALKING IN HOSPITAL ROOM: A LITTLE
CLIMB 3 TO 5 STEPS WITH RAILING: A LITTLE
DRESSING REGULAR LOWER BODY CLOTHING: A LITTLE
MOBILITY SCORE: 24
DAILY ACTIVITIY SCORE: 24
MOBILITY SCORE: 21
DAILY ACTIVITIY SCORE: 22

## 2023-10-13 ASSESSMENT — ENCOUNTER SYMPTOMS
GASTROINTESTINAL NEGATIVE: 1
CARDIOVASCULAR NEGATIVE: 1
ENDOCRINE NEGATIVE: 1
MUSCULOSKELETAL NEGATIVE: 1
EYES NEGATIVE: 1
RESPIRATORY NEGATIVE: 1
CONSTITUTIONAL NEGATIVE: 1
WOUND: 1
PSYCHIATRIC NEGATIVE: 1
NEUROLOGICAL NEGATIVE: 1
HEMATOLOGIC/LYMPHATIC NEGATIVE: 1
ALLERGIC/IMMUNOLOGIC NEGATIVE: 1

## 2023-10-13 ASSESSMENT — PAIN SCALES - GENERAL
PAIN_LEVEL: 0
PAINLEVEL_OUTOF10: 0 - NO PAIN
PAINLEVEL_OUTOF10: 6
PAINLEVEL_OUTOF10: 0 - NO PAIN
PAINLEVEL_OUTOF10: 6
PAINLEVEL_OUTOF10: 0 - NO PAIN

## 2023-10-13 ASSESSMENT — PAIN - FUNCTIONAL ASSESSMENT
PAIN_FUNCTIONAL_ASSESSMENT: 0-10

## 2023-10-13 ASSESSMENT — PAIN DESCRIPTION - DESCRIPTORS: DESCRIPTORS: THROBBING

## 2023-10-13 NOTE — ANESTHESIA PREPROCEDURE EVALUATION
Patient: Kael Zepeda    Procedure Information       Date/Time: 10/13/23 0473    Procedure: Amputation Digit Foot (Right: Foot)    Location: [facility] / [facility]    Surgeons: William Raya DPM            Relevant Problems   Endocrine   (+) Diabetes mellitus, type 2 (CMS/HCC)      Infectious Disease   (+) Acute osteomyelitis of right foot (CMS/HCC)      Other   (+) Acute osteomyelitis of right foot (CMS/HCC)       Clinical information reviewed:   Tobacco  Allergies  Meds  Problems  Med Hx  Surg Hx   Fam Hx  Soc   Hx        NPO Detail:  No data recorded     Physical Exam    Airway  Mallampati: II  TM distance: >3 FB     Cardiovascular - normal exam     Dental   (+) upper dentures, lower dentures     Pulmonary - normal exam     Abdominal - normal exam             Anesthesia Plan    ASA 3     general and MAC     The patient is a current smoker.  Patient was previously instructed to abstain from smoking on day of procedure.  Patient smoked on day of procedure.    intravenous induction   Anesthetic plan and risks discussed with patient.  Use of blood products discussed with patient who.    Plan discussed with CRNA.

## 2023-10-13 NOTE — PROGRESS NOTES
"Vancomycin Dosing by Pharmacy- FOLLOW UP    Kael Zepeda is a 58 y.o. year old male who Pharmacy has been consulted for vancomycin dosing for Vancomycin Indications: Skin & Soft Tissue. Based on the patient's indication and renal status this patient will be dosed based on a goal AUC of 400-600.     Renal function is currently declining. Will adjust plan to dose by level if serum creatinine continues to increase.    Current vancomycin dose:  750 mg every 12 hours    Estimated vancomycin AUC on current dose: 456 mg/L.hr     Visit Vitals  /66   Pulse 74   Temp 36.6 °C (97.8 °F) (Temporal)   Resp 16           Lab Results   Component Value Date    CREATININE 1.06 10/13/2023    CREATININE 0.73 10/12/2023    CREATININE 0.71 10/11/2023    CREATININE 0.62 02/15/2023    CREATININE 0.68 08/24/2022    CREATININE 0.69 08/23/2022    CREATININE 0.71 08/22/2022       Patient weight is No results found for: \"PTWEIGHT\"    No results found for: \"CULTURE\"    I/O last 3 completed shifts:  In: 600 (7.1 mL/kg) [P.O.:600]  Out: - (0 mL/kg)   Weight: 84.8 kg     No results found for: \"PATIENTTEMP\"       Assessment/Plan     Within goal AUC range. Continue current vancomycin regimen.    This dosing regimen is predicted by [employer] to result in the following pharmacokinetic parameters:  Loading dose: N/A  Regimen: 750 mg IV every 12 hours.  Start time: 10:43 on 10/13/2023  Exposure target: AUC24 (range)400-600 mg/L.hr   AUC24,ss: 456 mg/L.hr  Probability of AUC24 > 400: 74 %  Ctrough,ss: 14.9 mg/L  Probability of Ctrough,ss > 20: 14 %  Probability of nephrotoxicity (Lodise KELLY 2009): 10 %    The next level will be obtained on 10/18 at 0500. May be obtained sooner if clinically indicated.   Will continue to monitor renal function daily while on vancomycin and order serum creatinine at least every 48 hours if not already ordered.  Follow for continued vancomycin needs, clinical response, and signs/symptoms of toxicity.     Onesimo " Yonas PharmD, BCPS

## 2023-10-13 NOTE — CARE PLAN
The patient's goals for the shift include      The clinical goals for the shift include pt will be free from falls    Patient remains free from injury. No falls this shift.

## 2023-10-13 NOTE — H&P
HISTORY & PHYSICAL    History Of Present Illness  Kael Zepeda is a 58 y.o. male presenting with right diabetic foot wound which has been present for a few months.  He was seen at the wound clinic on Friday and was recommended to present to the ED but did not do so until today.  Apparently at the wound clinic they could see the bone on exam..  He does have neuropathy and is not experiencing pain with this.  Admits he does not feel he has been taking good care of his diabetes as it is uncertain of his last hemoglobin A1c.    ED course: X-ray of the foot reveals likely osteomyelitis fifth MT    10/12: Today the patient is seen in the bedside chair.  Voices no specific complaints and no acute events overnight.  He does relate the need to be discharged by at least Monday so that he can attend his methadone clinic appointment on Tuesday.  Podiatry saw the patient and plans amputation tomorrow.  10/13: Today patient is seen postoperatively after amputation of his toes.  He is in the bedside chair.  States he is feeling very well and pleased with how well the surgery went.  No apparent perioperative complications.  Awaiting ID antibiotic recommendations.  Once again he notes that he must be discharged no later than Monday.     Past Medical History  He has a past medical history of Abnormal result of other cardiovascular function study (06/28/2018), Atherosclerotic heart disease of native coronary artery with unstable angina pectoris (CMS/[facility]) (06/28/2018), Fibromyalgia, Personal history of other diseases of the circulatory system, Personal history of other diseases of the musculoskeletal system and connective tissue, Personal history of other diseases of the musculoskeletal system and connective tissue, Personal history of other diseases of the musculoskeletal system and connective tissue, Personal history of other endocrine, nutritional and metabolic disease, Personal history of other endocrine, nutritional and  metabolic disease, and Type 2 diabetes mellitus without complications (CMS/[facility]) (02/25/2022).    Surgical History  He has a past surgical history that includes Septoplasty (06/28/2018) and Back surgery (06/28/2018).     Social History  He reports that he has been smoking cigarettes. He has a 40.00 pack-year smoking history. He uses smokeless tobacco. He reports that he does not currently use alcohol. He reports that he does not use drugs.    Family History  No family history on file.     Allergies  Patient has no known allergies.    Code Status  Full Code     Review of Systems   Constitutional: Negative.    HENT: Negative.     Eyes: Negative.    Respiratory: Negative.     Cardiovascular: Negative.    Gastrointestinal: Negative.    Endocrine: Negative.    Genitourinary: Negative.    Musculoskeletal: Negative.    Skin:  Positive for wound.   Allergic/Immunologic: Negative.    Neurological: Negative.    Hematological: Negative.    Psychiatric/Behavioral: Negative.         Last Recorded Vitals  /67 (BP Location: Right arm, Patient Position: Sitting)   Pulse 89   Temp 36.8 °C (98.3 °F) (Temporal)   Resp 19   Wt 84.8 kg (187 lb)   SpO2 97%      Physical Exam  Constitutional:       General: He is not in acute distress.     Appearance: Normal appearance. He is normal weight. He is not ill-appearing or toxic-appearing.   HENT:      Head: Normocephalic and atraumatic.      Right Ear: External ear normal.      Left Ear: External ear normal.      Nose: Nose normal.      Mouth/Throat:      Mouth: Mucous membranes are moist.      Pharynx: Oropharynx is clear.   Eyes:      Extraocular Movements: Extraocular movements intact.      Conjunctiva/sclera: Conjunctivae normal.      Pupils: Pupils are equal, round, and reactive to light.   Cardiovascular:      Rate and Rhythm: Normal rate and regular rhythm.      Heart sounds: Normal heart sounds.   Pulmonary:      Effort: Pulmonary effort is normal.      Breath sounds: Normal  breath sounds.   Abdominal:      General: Abdomen is flat. Bowel sounds are normal.      Palpations: Abdomen is soft.   Musculoskeletal:         General: Normal range of motion.      Cervical back: Normal range of motion and neck supple.   Skin:     General: Skin is warm and dry.      Findings: Lesion present.   Neurological:      General: No focal deficit present.      Mental Status: He is alert and oriented to person, place, and time.   Psychiatric:         Mood and Affect: Mood normal.         Behavior: Behavior normal.         Thought Content: Thought content normal.         Judgment: Judgment normal.          Relevant Results  Results for orders placed or performed during the hospital encounter of 10/11/23 (from the past 24 hour(s))   POCT GLUCOSE   Result Value Ref Range    POCT Glucose 286 (H) 74 - 99 mg/dL   Vancomycin   Result Value Ref Range    Vancomycin 24.3 (H) 5.0 - 20.0 ug/mL   CBC   Result Value Ref Range    WBC 7.0 4.4 - 11.3 x10*3/uL    nRBC 0.0 0.0 - 0.0 /100 WBCs    RBC 4.87 4.50 - 5.90 x10*6/uL    Hemoglobin 11.4 (L) 13.5 - 17.5 g/dL    Hematocrit 37.6 (L) 41.0 - 52.0 %    MCV 77 (L) 80 - 100 fL    MCH 23.4 (L) 26.0 - 34.0 pg    MCHC 30.3 (L) 32.0 - 36.0 g/dL    RDW 16.7 (H) 11.5 - 14.5 %    Platelets 453 (H) 150 - 450 x10*3/uL    MPV 10.4 7.5 - 11.5 fL   Comprehensive Metabolic Panel   Result Value Ref Range    Glucose 145 (H) 74 - 99 mg/dL    Sodium 137 136 - 145 mmol/L    Potassium 4.1 3.5 - 5.3 mmol/L    Chloride 97 (L) 98 - 107 mmol/L    Bicarbonate 30 21 - 32 mmol/L    Anion Gap 14 10 - 20 mmol/L    Urea Nitrogen 18 6 - 23 mg/dL    Creatinine 1.06 0.50 - 1.30 mg/dL    eGFR 81 >60 mL/min/1.73m*2    Calcium 9.2 8.6 - 10.3 mg/dL    Albumin 3.6 3.4 - 5.0 g/dL    Alkaline Phosphatase 135 (H) 33 - 120 U/L    Total Protein 7.1 6.4 - 8.2 g/dL    AST 12 9 - 39 U/L    Bilirubin, Total 0.3 0.0 - 1.2 mg/dL    ALT 9 (L) 10 - 52 U/L   POCT GLUCOSE   Result Value Ref Range    POCT Glucose 138 (H) 74 - 99  mg/dL   POCT GLUCOSE   Result Value Ref Range    POCT Glucose 81 74 - 99 mg/dL      IMAGING:  XR foot right 3+ views   Final Result   Soft tissue lucency with slight deformity to the bone. Findings raise   suspicion for osteomyelitis and or concurrent trauma. Consider CT   scan follow-up             MACRO:   None        Signed by: Luke Sanchez 10/11/2023 11:17 AM   Dictation workstation:   [id number]         Scheduled medications:  aspirin, 81 mg, oral, Daily  atorvastatin, 40 mg, oral, Nightly  enoxaparin, 40 mg, subcutaneous, q24h  fluticasone furoate-vilanteroL, 1 puff, inhalation, Daily  hydroxychloroquine, 200 mg, oral, BID  insulin detemir, 50 Units, subcutaneous, q24h  levothyroxine, 50 mcg, oral, Daily  lisinopril, 20 mg, oral, Daily  melatonin, 3 mg, oral, Daily  methadone, 155 mg, oral, Daily  nicotine, 1 patch, transdermal, Daily   Followed by  [START ON 11/23/2023] nicotine, 1 patch, transdermal, Daily   Followed by  [START ON 12/7/2023] nicotine, 1 patch, transdermal, Daily  pantoprazole, 40 mg, oral, Daily before breakfast   Or  pantoprazole, 40 mg, intravenous, Daily before breakfast  piperacillin-tazobactam, 4.5 g, intravenous, q8h  polyethylene glycol, 17 g, oral, Daily  predniSONE, 5 mg, oral, Daily  pregabalin, 200 mg, oral, TID  sulfaSALAzine, 1,500 mg, oral, BID  tiotropium, 2 Inhalation, inhalation, Daily  vancomycin, 750 mg, intravenous, q12h      Continuous medications:  lactated Ringer's, 100 mL/hr, Last Rate: Stopped (10/13/23 1400)    PRN medications:  PRN medications: acetaminophen **OR** acetaminophen **OR** acetaminophen, albuterol, dextrose 10 % in water (D10W), dextrose, glucagon, ondansetron **OR** ondansetron        Problem List Items Addressed This Visit          Endocrine/Metabolic    Diabetes mellitus, type 2 (CMS/HCC)       Infectious Diseases    * (Principal) Diabetic foot ulcer with osteomyelitis (CMS/HCC) - Primary    Acute osteomyelitis of right foot (CMS/HCC)     Relevant Orders    Case Request Operating Room: Amputation Digit Foot (Completed)    Surgical Pathology Exam      Assessment/Plan   Principal Problem:    Diabetic foot ulcer with osteomyelitis (CMS/[facility])  Active Problems:    Acute osteomyelitis of right foot (CMS/[facility])             Consultation to podiatry, empiric Zosyn and vancomycin             10/12: Podiatry plans amputation partial right foot tomorrow.              10/13: Underwent amputation today and appears to be doing well.  Awaiting antibiotic discharge recommendations by ID.  DM 2       Continue home insulin regimen, Accu-Cheks, SSI        10/12: Discussed with patient hemoglobin A1c of 9.5 and discussed he would benefit from better control of his diabetes.  Feel at this point he should be able to do this with his PCP.       Vin Tracy MD  10/13/2023  7:07 PM

## 2023-10-13 NOTE — ANESTHESIA POSTPROCEDURE EVALUATION
Patient: Kael Zepeda    Procedure Summary       Date: 10/13/23 Room / Location: [facility] OR 06 / Virtual [facility]    Anesthesia Start: 1150 Anesthesia Stop: 1312    Procedure: Amputation Digit Foot (Right: Foot) Diagnosis:       Acute osteomyelitis of right foot (CMS/HCC)      (Acute osteomyelitis of right foot (CMS/HCC) [M86.171])    Surgeons: William Raya DPM Responsible Provider: NIKKO Maldonado    Anesthesia Type: general ASA Status: 3            Anesthesia Type: general    Vitals Value Taken Time   /61 10/13/23 1312   Temp 99.6 `   Pulse 68 10/13/23 1312   Resp 16 10/13/23 1312   SpO2 100 10/13/23 1312       Anesthesia Post Evaluation    Patient location during evaluation: bedside  Patient participation: complete - patient participated  Level of consciousness: responsive to light touch  Pain score: 0  Pain management: adequate  Airway patency: patent  Cardiovascular status: acceptable  Respiratory status: acceptable  Hydration status: acceptable        No notable events documented.

## 2023-10-13 NOTE — BRIEF OP NOTE
Date: 10/11/2023 - 10/13/2023  OR Location: [facility]    Name: Kael Zepeda, : 1965, Age: 58 y.o., MRN: [id number], Sex: male    Diagnosis  Pre-op Diagnosis     * Acute osteomyelitis of right foot (CMS/HCC) [M86.171] Post-op Diagnosis     * Acute osteomyelitis of right foot (CMS/HCC) [M86.171]     Procedures  Amputation Digit Foot  94688 - [place] AMPUTATION TOE METATARSOPHALANGEAL JOINT    Amputation of right 1st toe with metatarsal head  Surgeons      * William Raya - Primary    Resident/Fellow/Other Assistant:  No surgical staff documented.    Procedure Summary  Anesthesia: Monitor Anesthesia Care  ASA: III  Anesthesia Staff: CRNA: CHACE Maldonado-CRNA  Estimated Blood Loss: 0mL  Intra-op Medications:   Medication Name Total Dose   lidocaine (Xylocaine) 10 mg/mL (1 %) injection 5 mL   bupivacaine PF (Marcaine) 0.5 % (5 mg/mL) injection 5 mL              Anesthesia Record               Intraprocedure I/O Totals          Intake    Propofol Drip 0.00 mL    The total shown is the total volume documented since Anesthesia Start was filed.    Total Intake 0 mL          Specimen:   ID Type Source Tests Collected by Time   1 : bone margin right foot Tissue BONE BIOPSY (DECAL) SURGICAL PATHOLOGY EXAM William Raya DPM 10/13/2023 7208        Staff:   Circulator: Ben Burns RN  Scrub Person: Lorie Currie RN          Findings: Necrosis of the bone of the toe and metatarasl head    Complications:  None; patient tolerated the procedure well.     Disposition: PACU - hemodynamically stable.  Condition: stable  Specimens Collected:   ID Type Source Tests Collected by Time   1 : bone margin right foot Tissue BONE BIOPSY (DECAL) SURGICAL PATHOLOGY EXAM William Raya DPM 10/13/2023 1235     Attending Attestation:     William Raya  Phone Number: [phone]

## 2023-10-13 NOTE — PROGRESS NOTES
Kael Zepeda is a 58 y.o. male on day 2 of admission presenting with Diabetic foot ulcer with osteomyelitis (CMS/HCC).    Subjective   Interval History:   Patient seen and examined and underwent amputation of right 1st toe with metatarsal head   Blood cultures negative so far/no leukocytosis      Review of Systems  Denies any fevers chills nausea vomiting or diarrhea  Objective   Range of Vitals (last 24 hours)  Heart Rate:  [71-74]   Temp:  [36.6 °C (97.8 °F)-36.7 °C (98.1 °F)]   Resp:  [16-17]   BP: ()/(56-72)   SpO2:  [95 %-97 %]   Daily Weight  10/11/23 : 84.8 kg (187 lb)    Body mass index is 29.29 kg/m².    Physical Exam    General alert oriented x3  Lungs clear to auscultation bilaterally  CV no murmurs  Abdomen soft nontender  Extremities right foot wrapped in dressing  Antibiotics  piperacillin-tazobactam-dextrose (Zosyn) IV 4.5 g  vancomycin in dextrose 5 % (Vancocin) IVPB 1 g  vancomycin (Vancocin) 2,000 mg in dextrose 5 % in water (D5W) 500 mL IV  aspirin EC tablet      atorvastatin (Lipitor) tablet  DULoxetine (Cymbalta) DR capsule      levothyroxine (Synthroid, Levoxyl) tablet  lisinopril (Prinivil, Zestril) tablet  omeprazole (PriLOSEC) DR capsule  pregabalin (Lyrica) capsule    furosemide (Lasix) tablet  methadone (Dolophine) 10 mg/mL solution        ibuprofen (Advil, Motrin) tablet    metFORMIN (Glucophage) tablet  piperacillin-tazobactam-dextrose (Zosyn) IV 4.5 g  vancomycin (Vancocin) 2,000 mg in dextrose 5 % in water (D5W) 500 mL IV  albuterol 90 mcg/actuation inhaler 2 puff  aspirin EC tablet 81 mg  atorvastatin (Lipitor) tablet 40 mg  fluticasone propion-salmeteroL (Advair) 230-21 mcg/actuation inhaler 1 puff  hydroxychloroquine (Plaquenil) tablet 200 mg  levothyroxine (Synthroid, Levoxyl) tablet 50 mcg  lisinopril tablet 20 mg  methadone (Dolophine) tablet 155 mg  pantoprazole (ProtoNix) EC tablet 40 mg  predniSONE (Deltasone) tablet 5 mg  pregabalin (Lyrica) capsule 200  mg  sulfaSALAzine (Azulfidine) tablet 1,500 mg  tiotropium (Spiriva Respimat) 2.5 mcg/actuation inhaler 2 puff  pantoprazole (ProtoNix) EC tablet 40 mg  pantoprazole (ProtoNix) injection 40 mg  melatonin tablet 3 mg  polyethylene glycol (Glycolax, Miralax) packet 17 g  enoxaparin (Lovenox) syringe 40 mg  ondansetron (Zofran) tablet 4 mg  ondansetron (Zofran) injection 4 mg  ketorolac (Toradol) injection 30 mg  acetaminophen (Tylenol) tablet 650 mg  acetaminophen (Tylenol) oral liquid 650 mg  acetaminophen (Tylenol) suppository 650 mg  sodium chloride 0.9% flush  - Omnicell Override Pull  nicotine (Nicoderm CQ) 21 mg/24 hr patch 1 patch  nicotine (Nicoderm CQ) 14 mg/24 hr patch 1 patch  nicotine (Nicoderm CQ) 7 mg/24 hr patch 1 patch  dextrose 50 % injection 25 g  glucagon (Glucagen) injection 1 mg  dextrose 10 % in water (D10W) infusion  insulin detemir (Levemir) injection 50 Units  sodium chloride 0.9% flush  - Omnicell Override Pull  vancomycin (Vancocin) in dextrose 5 % water (D5W) 250 mL IV 1,250 mg  fluticasone furoate-vilanteroL (Breo Ellipta) 100-25 mcg/dose inhaler 1 puff      Relevant Results  Labs  Results from last 72 hours   Lab Units 10/13/23  0358 10/12/23  0327 10/11/23  1029   WBC AUTO x10*3/uL 7.0 6.9 9.2   HEMOGLOBIN g/dL 11.4* 10.8* 11.6*   HEMATOCRIT % 37.6* 35.0* 36.9*   PLATELETS AUTO x10*3/uL 453* 411 470*   NEUTROS PCT AUTO %  --   --  57.0   LYMPHS PCT AUTO %  --   --  26.4   MONOS PCT AUTO %  --   --  11.4   EOS PCT AUTO %  --   --  2.2     Results from last 72 hours   Lab Units 10/13/23  0358 10/12/23  0327 10/11/23  1029   SODIUM mmol/L 137 135* 132*   POTASSIUM mmol/L 4.1 3.7 4.4   CHLORIDE mmol/L 97* 99 95*   CO2 mmol/L 30 30 30   BUN mg/dL 18 13 10   CREATININE mg/dL 1.06 0.73 0.71   GLUCOSE mg/dL 145* 93 280*   CALCIUM mg/dL 9.2 8.8 9.3   ANION GAP mmol/L 14 10 11   EGFR mL/min/1.73m*2 81 >90 >90     Results from last 72 hours   Lab Units 10/13/23  0358 10/11/23  1029   ALK PHOS U/L  135* 157*   BILIRUBIN TOTAL mg/dL 0.3 0.3   PROTEIN TOTAL g/dL 7.1 7.5   ALT U/L 9* 10   AST U/L 12 10   ALBUMIN g/dL 3.6 3.8     Estimated Creatinine Clearance: 78.1 mL/min (by C-G formula based on SCr of 1.06 mg/dL).  C-Reactive Protein   Date Value Ref Range Status   10/12/2023 5.86 (H) <1.00 mg/dL Final     CRP   Date Value Ref Range Status   06/05/2023 3.29 (A) mg/dL Final     Comment:     REF VALUE  < 1.00     02/15/2023 2.17 (A) mg/dL Final     Comment:     REF VALUE  < 1.00       Microbiology  Lab Results   Component Value Date    BLOODCULT No growth at 1 day 10/11/2023    BLOODCULT No growth at 1 day 10/11/2023       Imaging  MR foot right wo IV contrast    Result Date: 10/13/2023  Interpreted By:  Sandeep Andrdae, STUDY: MRI of the  right forefoot  without contrast dated  10/12/2023.   INDICATION: Signs/Symptoms:infected diabetic foot ulcer right foot L97.514   COMPARISON: Radiographs dated 10/11/2023   ACCESSION NUMBER(S): [id number]   ORDERING CLINICIAN: MICHELL MONTANO   TECHNIQUE: Multiplanar multisequence MRI of the  right forefoot was performed without intravenous gadolinium based contrast.   FINDINGS: STUDY:   Exam is limited due to patient motion. There is a prominent region of susceptibility artifact along the lateral midfoot limiting assessment of soft tissues and osseous structures in this location.   OSSEOUS STRUCTURES AND JOINTS:   There is increased T2/STIR signal intensity seen in the proximal metaphysis into the head of the 1st metatarsal, the hallux sesamoid bones, and in the phalanges of the 1st digit with associated patchy low T1 signal intensity most evident in the sesamoid bones, 1st metatarsal and in the base of the proximal phalanx of the 1st digit. There is possibly some osseous destruction of at least the head of the 1st metatarsal. There is a degree of medially directed subluxation of the 1st digit at the metatarsophalangeal joint as seen on this examination. There is some patchy  regions of increased T2/STIR signal intensity seen in the tarsal bones without definitive associated low T1 signal intensity evident. Polyarticular degenerative changes seen in the midfoot. Severe degenerative change possibly with prior fracture deformity is seen of the 5th digit metatarsophalangeal joint. Degenerative changes are seen at multiple interphalangeal joints of the digits but not well assessed. There is some lateral deviation of the distal phalanx of the 2nd digit at the distal interphalangeal joint. No joint effusion is evident.   SOFT TISSUES:   There is generalized atrophy in the musculature. There is diffuse generalized increased T2 signal intensity in the musculature. The flexor hallucis longus tendon is not well assessed from the level of the distal 1st metatarsal into the toe and there is question of the tendon being torn with tendon stump of the distal tendon seen at image 8 at the level of the proximal metaphysis of the proximal phalanx and the proximal portion of the tendon likely at the level of the distal metaphysis the 1st metatarsal.   There is an ulcer on the plantar forefoot extending down to the surface of the head of the 1st metatarsal and the hallux sesamoid bones. Mottled artifact within ulcer likely represents packing material. There is confluence associated increased T2/STIR signal intensity at the base of the ulcer, extending along the medial margin of the 1st metatarsal distal metaphysis in the head, between the 1st and 2nd metatarsals distally, and extending into the 1st toe along the plantar to medial aspect. There may be a 2nd focus of ulceration at the distal plantar medial aspect of the 1st toe seen at image 9 in the short axis plane. There is a greater degree of more generalized reticular increased T2 signal intensity in the subcutaneous tissues of the visualized lower extremity.       1. Osteomyelitis of the head of the 1st metatarsal, the hallux sesamoid bones in the base  of the proximal phalanx of the 1st digit, with abnormal signal intensity in the 1st metatarsal metadiaphysis back to the proximal metaphysis, and in the more distal portion of the proximal phalanx of the 1st digit and in the distal phalanx of the 1st digit which is felt to be of higher probability for osteomyelitis. 2. Wound/ulcer on the plantar forefoot superficial to the 1st digit metatarsophalangeal joint extending down to adjacent to the hallux sesamoid bones and the head of the 1st metatarsal with associated surrounding confluence cellulitis which extends around the distal 1st metatarsal into the 1st toe as above. A component of devitalized tissue is not excluded but not well assessed for due to lack of intravenous contrast. 3. Wound/ulcer on the more distal aspect of the 1st toe with adjacent cellulitis, a component of devitalized tissue at the base of the ulcer is not excluded due to the lack of intravenous contrast. 4. Findings likely related to disruption of the flexor hallucis longus tendon at the level of the 1st digit metatarsophalangeal joint with the ulcer extends down close to the 1st metatarsal head. 5. Susceptibility artifact at the lateral midfoot superficial to the region the base of the 5th metatarsal/4th metatarsal likely related to small foreign body in the subcutaneous tissues seen on the recent radiographs. This does limit assessment for pathology in these locations. 6. Degree of more generalized increased T2 signal intensity in the subcutaneous tissues which may represent lymphedema and/or cellulitis. 7. Nonspecific myositis of the musculature possibly related to diabetic myopathy. A component of infectious myositis particularly adjacent to the wound/ulcer foci of the 1st ray is not excluded.   Signed by: Sandeep Andrade 10/13/2023 8:54 AM Dictation workstation:   [id number]    XR foot right 3+ views    Result Date: 10/11/2023  Interpreted By:  Luke Sanchez, STUDY: XR FOOT RIGHT 3+  VIEWS; ;  10/11/2023 11:05 am   INDICATION: Signs/Symptoms:wound.   COMPARISON: None.   ACCESSION NUMBER(S): [id number]   ORDERING CLINICIAN: HERO DIAMOND   FINDINGS: Pseudoarthrosis of the 5th metatarsophalangeal joint similar prior examination.   Again noted is soft tissue lucency of the 1st metatarsophalangeal region. Lucency to the bone is also present which would raise suspicion for osteomyelitis given the appropriate clinical presentation.   Joint space loss 1st metatarsophalangeal joint with deformity. Concurrent fracture can not be excluded.   Lucency projected over the 1st and 2nd cuneiform again noted probably within soft tissues similar prior examination       Soft tissue lucency with slight deformity to the bone. Findings raise suspicion for osteomyelitis and or concurrent trauma. Consider CT scan follow-up     MACRO: None   Signed by: Luke Sanchez 10/11/2023 11:17 AM Dictation workstation:   [id number]    XR foot right 3+ views    Result Date: 10/7/2023  Interpreted By:  Joe Husain, STUDY: XR FOOT RIGHT 3+ VIEWS; ;  10/6/2023 1:01 pm   INDICATION: Signs/Symptoms:xray.   COMPARISON: 06/22/2023   ACCESSION NUMBER(S): [id number]   ORDERING CLINICIAN: ELLIS NEGRETE   FINDINGS: Right foot, three views   There is medial subluxation of the 1st MTP joint. There is soft tissue edema about the 1st digit. Erosive changes are present about the 1st IP joint and to a lesser extent about the 1st MTP joint. There is soft tissue density as well centered about the joints. There is postsurgical change the 5th MTP joint. No acute fracture dislocation seen.       Erosive changes involving the 1st MTP and 1st IP joints with soft tissue edema and soft tissue density about this joint. There is medial subluxation of the 1st MTP joint. Underlying gas septic arthritis and gout is in the differential. Please correlate with patient's symptomatology.     MACRO: Critical Finding:  See findings. Notification was  initiated on 10/7/2023 at 8:51 am by  Joe Husain.  (**-OCF-**) Instructions:   Signed by: Joe Husain 10/7/2023 8:51 AM Dictation workstation:   [id number]           Assessment/Plan   Right foot osteomyelitis/septic arthritis of the first metatarsal status post amputation  CAD  Type 2 diabetes mellitus  Fibromyalgia  C-reactive protein 3.29 ESR 47  Plan  Continue on vancomycin and Zosyn continue  Podiatry evaluated surgery scheduled for tomorrow a.m. wound debridement and possible amputation of the first toe and distal metatarsal    Follow counts while on Zosyn due to risk of cytopenias  Follow serum creatinine and vancomycin levels while on vancomycin due to risk of nephrotoxicity/appreciate pharmacy's assistance in managing levels and adjusting dose  wait debrided cultures and proximal margin cultures today patient is post amputation  Follow fevers white count monitor electrolytes  ID team will follow with additional recommendations    DC planning  Blood cultures negative so far  Awaiting proximal margin cultures and debrided cultures ID team will discuss with podiatry about home-going antibiotics        Genet Lovett MD

## 2023-10-13 NOTE — OP NOTE
Amputation Digit Foot (R) Operative Note     Date: 10/13/2023  OR Location: [facility]    Name: Kael Zepeda, : 1965, Age: 58 y.o., MRN: [id number], Sex: male    Diagnosis  Pre-op Diagnosis     * Acute osteomyelitis of right foot (CMS/HCC) [M86.171] Post-op Diagnosis     * Acute osteomyelitis of right foot (CMS/HCC) [M86.171]     Procedures  Amputation Digit Foot  [address] - [place] AMPUTATION TOE METATARSOPHALANGEAL JOINT  Amputation of the Right 1st toe    Surgeons      * William Raya - Primary    Resident/Fellow/Other Assistant:  Alejandro Arauz - Resident  Leonor Garcia - Medical student    Procedure Summary  Anesthesia: Monitor Anesthesia Care  ASA: III  Anesthesia Staff: CRNA: CHACE Maldonado-CRNA  Estimated Blood Loss: 5mL  Intra-op Medications:   Medication Name Total Dose   lidocaine (Xylocaine) 10 mg/mL (1 %) injection 5 mL   bupivacaine PF (Marcaine) 0.5 % (5 mg/mL) injection 5 mL   lactated Ringer's infusion Cannot be calculated              Anesthesia Record               Intraprocedure I/O Totals          Intake    .00 mL    Propofol Drip 0.00 mL    The total shown is the total volume documented since Anesthesia Start was filed.    Total Intake 400 mL       Output    Est. Blood Loss 0 mL    Total Output 0 mL       Net    Net Volume 400 mL          Specimen:   ID Type Source Tests Collected by Time   1 : bone margin right foot Tissue BONE BIOPSY (DECAL) SURGICAL PATHOLOGY EXAM William Raya DPM 10/13/2023 1235        Staff:   Circulator: Ben Burns RN  Scrub Person: Lorie Currie RN         Drains and/or Catheters: * None in log *    Tourniquet Times:     Total Tourniquet Time Documented:  Leg (Right) - 27 minutes  Total: Leg (Right) - 27 minutes      Implants:     Findings: Necrosis down to bone of the 1st toe. No tracking abscess    Indications: Kael Zepeda is an 58 y.o. male who is having surgery for Acute osteomyelitis of right foot (CMS/HCC) [M86.171].     The patient was seen in  the preoperative area. The risks, benefits, complications, treatment options, non-operative alternatives, expected recovery and outcomes were discussed with the patient. The possibilities of reaction to medication, pulmonary aspiration, injury to surrounding structures, bleeding, recurrent infection, the need for additional procedures, failure to diagnose a condition, and creating a complication requiring transfusion or operation were discussed with the patient. The patient concurred with the proposed plan, giving informed consent.  The site of surgery was properly noted/marked if necessary per policy. The patient has been actively warmed in preoperative area. Preoperative antibiotics have been ordered and given within 1 hours of incision. Venous thrombosis prophylaxis are not indicated.    Procedure Details: Patient was brought to the operating room and positioned supine in the hospital bed. Surgical time out completed. IV sedation was achieved. Ankle tourniquet placed on left ankle. Local anesthetic block administered. Right  foot was then scrubbed and draped following standard protocols. Tourniquet inflated for hemostasis.   Incision made around the base of the right 1st toe, deepened to bone with careful dissection. Noted that the toe was dislocated from the metatarsal joint. Base of the toe was exposed to bone through the plantar ulceration. Toe was disarticulated and passed from the field. Extensive fibrotic adhesions noted from chronic nature of the wound.   Site flushed thoroughly with saline. Bone margin culture and pathology were collected.   No evidence of a tracking abscess was noted. Amputation incision was closed using 2-0 prolene and 3-0 nylon suture. Dry sterile gauze dressing applied. Tourniquet deflated and prompt hyperemic response was noted to the remaining toes.       Complications:  None; patient tolerated the procedure well.    Disposition: PACU - hemodynamically stable.  Condition: stable          Attending Attestation:     William Raya  Phone Number: [phone]

## 2023-10-14 LAB
ALBUMIN SERPL BCP-MCNC: 3.5 G/DL (ref 3.4–5)
ALP SERPL-CCNC: 126 U/L (ref 33–120)
ALT SERPL W P-5'-P-CCNC: 10 U/L (ref 10–52)
ANION GAP SERPL CALC-SCNC: 15 MMOL/L (ref 10–20)
APPEARANCE UR: CLEAR
AST SERPL W P-5'-P-CCNC: 21 U/L (ref 9–39)
BILIRUB SERPL-MCNC: 0.4 MG/DL (ref 0–1.2)
BILIRUB UR STRIP.AUTO-MCNC: NEGATIVE MG/DL
BUN SERPL-MCNC: 21 MG/DL (ref 6–23)
CALCIUM SERPL-MCNC: 8.5 MG/DL (ref 8.6–10.3)
CHLORIDE SERPL-SCNC: 100 MMOL/L (ref 98–107)
CO2 SERPL-SCNC: 27 MMOL/L (ref 21–32)
COLOR UR: YELLOW
CREAT SERPL-MCNC: 1.95 MG/DL (ref 0.5–1.3)
CREAT UR-MCNC: 41.1 MG/DL (ref 20–370)
ERYTHROCYTE [DISTWIDTH] IN BLOOD BY AUTOMATED COUNT: 16.7 % (ref 11.5–14.5)
GFR SERPL CREATININE-BSD FRML MDRD: 39 ML/MIN/1.73M*2
GLUCOSE BLD MANUAL STRIP-MCNC: 170 MG/DL (ref 74–99)
GLUCOSE BLD MANUAL STRIP-MCNC: 172 MG/DL (ref 74–99)
GLUCOSE BLD MANUAL STRIP-MCNC: 81 MG/DL (ref 74–99)
GLUCOSE BLD MANUAL STRIP-MCNC: 90 MG/DL (ref 74–99)
GLUCOSE SERPL-MCNC: 90 MG/DL (ref 74–99)
GLUCOSE UR STRIP.AUTO-MCNC: NEGATIVE MG/DL
HCT VFR BLD AUTO: 35.6 % (ref 41–52)
HGB BLD-MCNC: 10.9 G/DL (ref 13.5–17.5)
KETONES UR STRIP.AUTO-MCNC: NEGATIVE MG/DL
LEUKOCYTE ESTERASE UR QL STRIP.AUTO: NEGATIVE
MCH RBC QN AUTO: 23.7 PG (ref 26–34)
MCHC RBC AUTO-ENTMCNC: 30.6 G/DL (ref 32–36)
MCV RBC AUTO: 78 FL (ref 80–100)
NITRITE UR QL STRIP.AUTO: NEGATIVE
NRBC BLD-RTO: 0 /100 WBCS (ref 0–0)
PH UR STRIP.AUTO: 5 [PH]
PLATELET # BLD AUTO: 411 X10*3/UL (ref 150–450)
PMV BLD AUTO: 11 FL (ref 7.5–11.5)
POTASSIUM SERPL-SCNC: 3.8 MMOL/L (ref 3.5–5.3)
PROT SERPL-MCNC: 7.1 G/DL (ref 6.4–8.2)
PROT UR STRIP.AUTO-MCNC: NEGATIVE MG/DL
RBC # BLD AUTO: 4.59 X10*6/UL (ref 4.5–5.9)
RBC # UR STRIP.AUTO: NEGATIVE /UL
SODIUM SERPL-SCNC: 138 MMOL/L (ref 136–145)
SODIUM UR-SCNC: 17 MMOL/L
SODIUM/CREAT UR-RTO: 41 MMOL/G CREAT
SP GR UR STRIP.AUTO: 1.01
UROBILINOGEN UR STRIP.AUTO-MCNC: <2 MG/DL
WBC # BLD AUTO: 10.2 X10*3/UL (ref 4.4–11.3)

## 2023-10-14 PROCEDURE — 2500000001 HC RX 250 WO HCPCS SELF ADMINISTERED DRUGS (ALT 637 FOR MEDICARE OP): Performed by: FAMILY MEDICINE

## 2023-10-14 PROCEDURE — 85027 COMPLETE CBC AUTOMATED: CPT | Performed by: FAMILY MEDICINE

## 2023-10-14 PROCEDURE — 2500000002 HC RX 250 W HCPCS SELF ADMINISTERED DRUGS (ALT 637 FOR MEDICARE OP, ALT 636 FOR OP/ED): Performed by: FAMILY MEDICINE

## 2023-10-14 PROCEDURE — 82570 ASSAY OF URINE CREATININE: CPT | Performed by: INTERNAL MEDICINE

## 2023-10-14 PROCEDURE — 96372 THER/PROPH/DIAG INJ SC/IM: CPT | Performed by: FAMILY MEDICINE

## 2023-10-14 PROCEDURE — 2500000004 HC RX 250 GENERAL PHARMACY W/ HCPCS (ALT 636 FOR OP/ED): Performed by: FAMILY MEDICINE

## 2023-10-14 PROCEDURE — S4991 NICOTINE PATCH NONLEGEND: HCPCS | Performed by: FAMILY MEDICINE

## 2023-10-14 PROCEDURE — 99233 SBSQ HOSP IP/OBS HIGH 50: CPT | Performed by: FAMILY MEDICINE

## 2023-10-14 PROCEDURE — 84300 ASSAY OF URINE SODIUM: CPT | Performed by: INTERNAL MEDICINE

## 2023-10-14 PROCEDURE — 2500000004 HC RX 250 GENERAL PHARMACY W/ HCPCS (ALT 636 FOR OP/ED): Performed by: STUDENT IN AN ORGANIZED HEALTH CARE EDUCATION/TRAINING PROGRAM

## 2023-10-14 PROCEDURE — 2500000001 HC RX 250 WO HCPCS SELF ADMINISTERED DRUGS (ALT 637 FOR MEDICARE OP): Performed by: STUDENT IN AN ORGANIZED HEALTH CARE EDUCATION/TRAINING PROGRAM

## 2023-10-14 PROCEDURE — 2580000001 HC RX 258 IV SOLUTIONS: Performed by: INTERNAL MEDICINE

## 2023-10-14 PROCEDURE — 1100000001 HC PRIVATE ROOM DAILY

## 2023-10-14 PROCEDURE — S0109 METHADONE ORAL 5MG: HCPCS | Performed by: FAMILY MEDICINE

## 2023-10-14 PROCEDURE — 82947 ASSAY GLUCOSE BLOOD QUANT: CPT

## 2023-10-14 PROCEDURE — 36415 COLL VENOUS BLD VENIPUNCTURE: CPT | Performed by: FAMILY MEDICINE

## 2023-10-14 PROCEDURE — 80053 COMPREHEN METABOLIC PANEL: CPT | Performed by: FAMILY MEDICINE

## 2023-10-14 PROCEDURE — 81003 URINALYSIS AUTO W/O SCOPE: CPT | Performed by: INTERNAL MEDICINE

## 2023-10-14 RX ORDER — SODIUM CHLORIDE, SODIUM LACTATE, POTASSIUM CHLORIDE, CALCIUM CHLORIDE 600; 310; 30; 20 MG/100ML; MG/100ML; MG/100ML; MG/100ML
125 INJECTION, SOLUTION INTRAVENOUS CONTINUOUS
Status: ACTIVE | OUTPATIENT
Start: 2023-10-14 | End: 2023-10-14

## 2023-10-14 RX ORDER — VANCOMYCIN HYDROCHLORIDE 1 G/20ML
INJECTION, POWDER, LYOPHILIZED, FOR SOLUTION INTRAVENOUS DAILY PRN
Status: DISCONTINUED | OUTPATIENT
Start: 2023-10-14 | End: 2023-10-15 | Stop reason: HOSPADM

## 2023-10-14 RX ADMIN — TIOTROPIUM BROMIDE INHALATION SPRAY 2 PUFF: 3.12 SPRAY, METERED RESPIRATORY (INHALATION) at 05:50

## 2023-10-14 RX ADMIN — SULFASALAZINE 1500 MG: 500 TABLET ORAL at 08:24

## 2023-10-14 RX ADMIN — INSULIN DETEMIR 50 UNITS: 100 INJECTION, SOLUTION SUBCUTANEOUS at 21:52

## 2023-10-14 RX ADMIN — PREGABALIN 200 MG: 50 CAPSULE ORAL at 08:24

## 2023-10-14 RX ADMIN — ENOXAPARIN SODIUM 40 MG: 40 INJECTION SUBCUTANEOUS at 18:32

## 2023-10-14 RX ADMIN — PANTOPRAZOLE SODIUM 40 MG: 40 TABLET, DELAYED RELEASE ORAL at 06:00

## 2023-10-14 RX ADMIN — ASPIRIN 81 MG: 81 TABLET, COATED ORAL at 08:24

## 2023-10-14 RX ADMIN — VANCOMYCIN HYDROCHLORIDE 750 MG: 750 INJECTION, SOLUTION INTRAVENOUS at 05:52

## 2023-10-14 RX ADMIN — PIPERACILLIN SODIUM AND TAZOBACTAM SODIUM 4.5 G: 4; .5 INJECTION, SOLUTION INTRAVENOUS at 03:21

## 2023-10-14 RX ADMIN — PIPERACILLIN SODIUM AND TAZOBACTAM SODIUM 4.5 G: 4; .5 INJECTION, SOLUTION INTRAVENOUS at 18:32

## 2023-10-14 RX ADMIN — OXYCODONE HYDROCHLORIDE 5 MG: 5 TABLET ORAL at 03:26

## 2023-10-14 RX ADMIN — SODIUM CHLORIDE, POTASSIUM CHLORIDE, SODIUM LACTATE AND CALCIUM CHLORIDE 125 ML/HR: 600; 310; 30; 20 INJECTION, SOLUTION INTRAVENOUS at 12:00

## 2023-10-14 RX ADMIN — PREGABALIN 200 MG: 50 CAPSULE ORAL at 15:11

## 2023-10-14 RX ADMIN — PIPERACILLIN SODIUM AND TAZOBACTAM SODIUM 4.5 G: 4; .5 INJECTION, SOLUTION INTRAVENOUS at 12:19

## 2023-10-14 RX ADMIN — LEVOTHYROXINE SODIUM 50 MCG: 50 TABLET ORAL at 08:24

## 2023-10-14 RX ADMIN — SULFASALAZINE 1500 MG: 500 TABLET ORAL at 21:39

## 2023-10-14 RX ADMIN — METHADONE HYDROCHLORIDE 155 MG: 10 TABLET ORAL at 12:20

## 2023-10-14 RX ADMIN — PREDNISONE 5 MG: 5 TABLET ORAL at 08:24

## 2023-10-14 RX ADMIN — NICOTINE 1 PATCH: 21 PATCH, EXTENDED RELEASE TRANSDERMAL at 08:25

## 2023-10-14 RX ADMIN — HYDROXYCHLOROQUINE SULFATE 200 MG: 200 TABLET, FILM COATED ORAL at 21:39

## 2023-10-14 RX ADMIN — KETOROLAC TROMETHAMINE 30 MG: 30 INJECTION, SOLUTION INTRAMUSCULAR at 05:59

## 2023-10-14 RX ADMIN — HYDROXYCHLOROQUINE SULFATE 200 MG: 200 TABLET, FILM COATED ORAL at 08:24

## 2023-10-14 RX ADMIN — ATORVASTATIN CALCIUM 40 MG: 40 TABLET, FILM COATED ORAL at 21:39

## 2023-10-14 RX ADMIN — PREGABALIN 200 MG: 50 CAPSULE ORAL at 21:39

## 2023-10-14 ASSESSMENT — ENCOUNTER SYMPTOMS
CARDIOVASCULAR NEGATIVE: 1
HEMATOLOGIC/LYMPHATIC NEGATIVE: 1
ALLERGIC/IMMUNOLOGIC NEGATIVE: 1
ENDOCRINE NEGATIVE: 1
MUSCULOSKELETAL NEGATIVE: 1
CONSTITUTIONAL NEGATIVE: 1
EYES NEGATIVE: 1
RESPIRATORY NEGATIVE: 1
GASTROINTESTINAL NEGATIVE: 1
PSYCHIATRIC NEGATIVE: 1
NEUROLOGICAL NEGATIVE: 1
WOUND: 1

## 2023-10-14 ASSESSMENT — COGNITIVE AND FUNCTIONAL STATUS - GENERAL
MOBILITY SCORE: 19
WALKING IN HOSPITAL ROOM: A LITTLE
DRESSING REGULAR LOWER BODY CLOTHING: A LITTLE
CLIMB 3 TO 5 STEPS WITH RAILING: A LOT
STANDING UP FROM CHAIR USING ARMS: A LITTLE
HELP NEEDED FOR BATHING: A LITTLE
DAILY ACTIVITIY SCORE: 22
MOVING TO AND FROM BED TO CHAIR: A LITTLE

## 2023-10-14 ASSESSMENT — PAIN SCALES - GENERAL
PAINLEVEL_OUTOF10: 6
PAINLEVEL_OUTOF10: 7

## 2023-10-14 NOTE — CONSULTS
Nephrology Consult Note                                                                                                                                         Inpatient consult to Nephrology  Consult performed by: Michelle Goldsmith DO  Consult ordered by: Vin Tracy MD                                                                                                               HPI  Patient is a 58 y.o. male who is admitted to hospital with complaints of  R foot ulcer worsening. Nephrology consulted in view of  JUAN J.  Patient has DM2 for the past 10 years.  It has been uncontrolled. He also has rheumatoid arthritis followed by Dr. Del Toro.   He has been dealing with a right diabetic foot ulcer for the past 6 months and it has progressed to osteomyelitis.  He did have toe amputation during this admission on 10/13/2023.  The patient's creatinine on admission was 0.6.  It has increased from 1.0-1.95 over the past 24 hours.  Patient received Toradol on 10/11.  He has been on lisinopril and he is hypotensive today.  He does have some orthostatic symptoms and complains of dizziness.  He does not have any lower extremity swelling or shortness of breath.  Patient denies any family history of kidney disease.  Denies any retinopathy.  He reports good appetite.  He reports good appetite.  No nausea vomiting or diarrhea    Risk factors for JUAN J  Hypotension yes  Contrast no  At risk medications yes    Past Medical History:   Diagnosis Date    Abnormal result of other cardiovascular function study 06/28/2018    Abnormal stress test    Atherosclerotic heart disease of native coronary artery with unstable angina pectoris (CMS/[facility]) 06/28/2018    Unstable angina pectoris due to coronary arteriosclerosis    Fibromyalgia     Fibromyalgia    Personal history of other diseases of the circulatory system      History of hypertension    Personal history of other diseases of the musculoskeletal system and connective tissue     History of rheumatoid arthritis    Personal history of other diseases of the musculoskeletal system and connective tissue     History of degenerative disc disease    Personal history of other diseases of the musculoskeletal system and connective tissue     History of osteoporosis    Personal history of other endocrine, nutritional and metabolic disease     History of hyperlipidemia    Personal history of other endocrine, nutritional and metabolic disease     History of hypothyroidism    Type 2 diabetes mellitus without complications (CMS/[facility]) 02/25/2022    Type 2 diabetes mellitus      Social History     Socioeconomic History    Marital status:      Spouse name: Not on file    Number of children: Not on file    Years of education: Not on file    Highest education level: Not on file   Occupational History    Not on file   Tobacco Use    Smoking status: Every Day     Packs/day: 1.00     Years: 40.00     Additional pack years: 0.00     Total pack years: 40.00     Types: Cigarettes    Smokeless tobacco: Current   Vaping Use    Vaping Use: Not on file   Substance and Sexual Activity    Alcohol use: Not Currently    Drug use: Never    Sexual activity: Not on file   Other Topics Concern    Not on file   Social History Narrative    Not on file     Social Determinants of Health     Financial Resource Strain: Low Risk  (10/12/2023)    Overall Financial Resource Strain (CARDIA)     Difficulty of Paying Living Expenses: Not very hard   Food Insecurity: Not on file   Transportation Needs: No Transportation Needs (10/12/2023)    PRAPARE - Transportation     Lack of Transportation (Medical): No     Lack of Transportation (Non-Medical): No   Physical Activity: Not on file   Stress: Not on file   Social Connections: Not on file   Intimate Partner Violence: Not on file   Housing Stability: Low Risk   (10/12/2023)    Housing Stability Vital Sign     Unable to Pay for Housing in the Last Year: No     Number of Places Lived in the Last Year: 1     Unstable Housing in the Last Year: No      No family history on file.   No current facility-administered medications on file prior to encounter.     Current Outpatient Medications on File Prior to Encounter   Medication Sig Dispense Refill    albuterol 90 mcg/actuation inhaler Inhale 2 puffs every 6 hours if needed for shortness of breath.      ammonium lactate (Amlactin) 12 % cream Apply topically.      atorvastatin (Lipitor) 40 mg tablet Take 1 tablet (40 mg) by mouth once daily.      DULoxetine (Cymbalta) 60 mg DR capsule Take 1 capsule (60 mg) by mouth once daily.      fluticasone propion-salmeteroL (Advair HFA) 230-21 mcg/actuation inhaler Inhale 1 puff 2 times a day.      furosemide (Lasix) 40 mg tablet Take 1 tablet (40 mg) by mouth once daily as needed.      Levemir FlexPen 100 unit/mL (3 mL) pen Inject 50 Units under the skin once daily at bedtime.      lisinopril 20 mg tablet Take 1 tablet (20 mg) by mouth once daily.      tiotropium (Spiriva with HandiHaler) 18 mcg inhalation capsule Place 1 capsule (18 mcg) into inhaler and inhale once daily.      aspirin 81 mg EC tablet Take 1 tablet (81 mg) by mouth once daily.      hydroxychloroquine (Plaquenil) 200 mg tablet Take 1 tablet (200 mg) by mouth 2 times a day.      ibuprofen 800 mg tablet Take 1 tablet (800 mg) by mouth every 8 hours if needed for mild pain (1 - 3).      levothyroxine (Synthroid, Levoxyl) 50 mcg tablet Take 1 tablet (50 mcg) by mouth once daily.      metFORMIN (Glucophage) 500 mg tablet Take 1 tablet (500 mg) by mouth 2 times a day with meals.      methadone (Dolophine) 10 mg/mL solution Take 7.7 mL (77 mg) by mouth. IN THE MORNING THEN 78 MG IN THE EVENING      omeprazole (PriLOSEC) 40 mg DR capsule Take 1 capsule (40 mg) by mouth once daily.      predniSONE (Deltasone) 2.5 mg tablet Take 2  "tablets (5 mg) by mouth once daily.      pregabalin (Lyrica) 200 mg capsule Take 1 capsule (200 mg) by mouth 3 times a day.      riTUXimab-abbs (Truxima) 10 mg/mL solution 100 mL (1,000 mg) every 6 months.      sulfaSALAzine (Azulfidine) 500 mg tablet Take 3 tablets (1,500 mg) by mouth 2 times a day.        Scheduled medications  aspirin, 81 mg, oral, Daily  atorvastatin, 40 mg, oral, Nightly  enoxaparin, 40 mg, subcutaneous, q24h  fluticasone furoate-vilanteroL, 1 puff, inhalation, Daily  hydroxychloroquine, 200 mg, oral, BID  insulin detemir, 50 Units, subcutaneous, q24h  levothyroxine, 50 mcg, oral, Daily  [Held by provider] lisinopril, 20 mg, oral, Daily  melatonin, 3 mg, oral, Daily  methadone, 155 mg, oral, Daily  nicotine, 1 patch, transdermal, Daily   Followed by  [START ON 11/23/2023] nicotine, 1 patch, transdermal, Daily   Followed by  [START ON 12/7/2023] nicotine, 1 patch, transdermal, Daily  pantoprazole, 40 mg, oral, Daily before breakfast   Or  pantoprazole, 40 mg, intravenous, Daily before breakfast  piperacillin-tazobactam, 4.5 g, intravenous, q8h  polyethylene glycol, 17 g, oral, Daily  predniSONE, 5 mg, oral, Daily  pregabalin, 200 mg, oral, TID  sulfaSALAzine, 1,500 mg, oral, BID  tiotropium, 2 Inhalation, inhalation, Daily  vancomycin, 750 mg, intravenous, q12h      Continuous medications  lactated Ringer's, 100 mL/hr, Last Rate: Stopped (10/13/23 1400)  lactated Ringer's, 125 mL/hr      PRN medications  PRN medications: acetaminophen **OR** acetaminophen **OR** acetaminophen, albuterol, dextrose 10 % in water (D10W), dextrose, glucagon, ondansetron **OR** ondansetron, oxyCODONE     Review of systems as per HPI otherwise 10 point review systems negative    BP 99/55 (BP Location: Right arm, Patient Position: Sitting)   Pulse 70   Temp 36.2 °C (97.2 °F) (Temporal)   Resp 16   Ht 1.702 m (5' 7\")   Wt 84.8 kg (187 lb)   SpO2 98%   BMI 29.29 kg/m²     Input / Output:  24 HR:   Intake/Output " Summary (Last 24 hours) at 10/14/2023 1156  Last data filed at 10/14/2023 0121  Gross per 24 hour   Intake 2536.67 ml   Output 1530 ml   Net 1006.67 ml       Physical Exam   Alert and oriented x 3, NAD  EOMI  OP clear  Neck: supple, No JVD  CV: RRR without m/r/g  Lungs: CTA bilaterally  Abd: soft NT/ND +BS  Ext: no lower extremity edema   R foot wrapped   : no carpenter  Neuro: grossly intact  Skin: no rashes    Results from last 7 days   Lab Units 10/14/23  0342   SODIUM mmol/L 138   POTASSIUM mmol/L 3.8   CHLORIDE mmol/L 100   CO2 mmol/L 27   BUN mg/dL 21   CREATININE mg/dL 1.95*   CALCIUM mg/dL 8.5*   PROTEIN TOTAL g/dL 7.1   BILIRUBIN TOTAL mg/dL 0.4   ALK PHOS U/L 126*   ALT U/L 10   AST U/L 21   GLUCOSE mg/dL 90           Results from last 7 days   Lab Units 10/14/23  0342 10/13/23  0358 10/12/23  0327   WBC AUTO x10*3/uL 10.2 7.0 6.9   HEMOGLOBIN g/dL 10.9* 11.4* 10.8*   HEMATOCRIT % 35.6* 37.6* 35.0*   PLATELETS AUTO x10*3/uL 411 453* 411        XR foot right 3+ views   Final Result   Soft tissue lucency with slight deformity to the bone. Findings raise   suspicion for osteomyelitis and or concurrent trauma. Consider CT   scan follow-up             MACRO:   None        Signed by: Luke Sanchez 10/11/2023 11:17 AM   Dictation workstation:   [id number]           Assessment:   Patient is 58 y.o. male who is admitted to hospital with complaints of worsening right foot ulcer. Nephrology consulted in view of JUAN J.    JUAN J-  -creatinine increased from 0.7-1.95 in 48 hours.    -Suspect this is hemodynamic related JUAN J as he is having some soft blood pressure on lisinopril, also received IV Toradol  -He is on combination of Vanco and Zosyn which can also be nephrotoxic but okay to continue  -If creatinine not improved, will check  renal ultrasound but doubt obstruction    Electrolytes-at goal   acid-base-compensated    History of hypertension  BP running low on lisinopril    DM 2  -Uncontrolled  -Denies  retinopathy    Rheumatoid arthritis  -On hydroxychloroquine        Recommendations:   -If creatinine not improved, will check  renal ultrasound but doubt obstruction  -will check a bladder scan to rule out acute urinary retention  -UA, urine indices  -Start IV fluids for 1 L  -Hold lisinopril, DC Toradol order  -May need to adjust the Lyrica dosing if creatinine worsens  -De-escalate from Vanco/Zosyn as able  -RFP in am    Will follow.     Please message me through [employer] chat with any questions or concerns.     Michelle Goldsmith DO  10/14/2023  11:56 AM     [facility]    [address] [place] [address], [address]   [place] [address]  Office: [phone]

## 2023-10-14 NOTE — H&P
HISTORY & PHYSICAL    History Of Present Illness  Kael Zepeda is a 58 y.o. male presenting with right diabetic foot wound which has been present for a few months.  He was seen at the wound clinic on Friday and was recommended to present to the ED but did not do so until today.  Apparently at the wound clinic they could see the bone on exam..  He does have neuropathy and is not experiencing pain with this.  Admits he does not feel he has been taking good care of his diabetes as it is uncertain of his last hemoglobin A1c.    ED course: X-ray of the foot reveals likely osteomyelitis fifth MT    10/12: Today the patient is seen in the bedside chair.  Voices no specific complaints and no acute events overnight.  He does relate the need to be discharged by at least Monday so that he can attend his methadone clinic appointment on Tuesday.  Podiatry saw the patient and plans amputation tomorrow.  10/13: Today patient is seen postoperatively after amputation of his toes.  He is in the bedside chair.  States he is feeling very well and pleased with how well the surgery went.  No apparent perioperative complications.  Awaiting ID antibiotic recommendations.  Once again he notes that he must be discharged no later than Monday.  10/14: Today once again patient seen in the bedside chair.  He was having pain last evening and night provider gave Toradol and oxycodone.  Of note his creatinine seems to have risen today and consulted nephrology who feels also contributing would be lisinopril and his low blood pressure.  Also Toradol which has been discontinued.  Giving 1 L IV fluids and if creatinine worsens will need to adjust his Lyrica.  Also the pharmacy I was informed today was unable to provide his 151 mg methadone yesterday or the day before but he got started on this today.  That may be part of his pain issue as well as he appears overall less comfortable than yesterday.     Past Medical History  He has a past medical  history of Abnormal result of other cardiovascular function study (06/28/2018), Atherosclerotic heart disease of native coronary artery with unstable angina pectoris (CMS/[facility]) (06/28/2018), Fibromyalgia, Personal history of other diseases of the circulatory system, Personal history of other diseases of the musculoskeletal system and connective tissue, Personal history of other diseases of the musculoskeletal system and connective tissue, Personal history of other diseases of the musculoskeletal system and connective tissue, Personal history of other endocrine, nutritional and metabolic disease, Personal history of other endocrine, nutritional and metabolic disease, and Type 2 diabetes mellitus without complications (CMS/[facility]) (02/25/2022).    Surgical History  He has a past surgical history that includes Septoplasty (06/28/2018) and Back surgery (06/28/2018).     Social History  He reports that he has been smoking cigarettes. He has a 40.00 pack-year smoking history. He uses smokeless tobacco. He reports that he does not currently use alcohol. He reports that he does not use drugs.    Family History  No family history on file.     Allergies  Patient has no known allergies.    Code Status  Full Code     Review of Systems   Constitutional: Negative.    HENT: Negative.     Eyes: Negative.    Respiratory: Negative.     Cardiovascular: Negative.    Gastrointestinal: Negative.    Endocrine: Negative.    Genitourinary: Negative.    Musculoskeletal: Negative.    Skin:  Positive for wound.   Allergic/Immunologic: Negative.    Neurological: Negative.    Hematological: Negative.    Psychiatric/Behavioral: Negative.         Last Recorded Vitals  /67 (BP Location: Left arm, Patient Position: Sitting)   Pulse 72   Temp 36.3 °C (97.4 °F) (Temporal)   Resp 17   Wt 84.8 kg (187 lb)   SpO2 97%      Physical Exam  Constitutional:       General: He is not in acute distress.     Appearance: Normal appearance. He is normal  weight. He is not ill-appearing or toxic-appearing.   HENT:      Head: Normocephalic and atraumatic.      Right Ear: External ear normal.      Left Ear: External ear normal.      Nose: Nose normal.      Mouth/Throat:      Mouth: Mucous membranes are moist.      Pharynx: Oropharynx is clear.   Eyes:      Extraocular Movements: Extraocular movements intact.      Conjunctiva/sclera: Conjunctivae normal.      Pupils: Pupils are equal, round, and reactive to light.   Cardiovascular:      Rate and Rhythm: Normal rate and regular rhythm.      Heart sounds: Normal heart sounds.   Pulmonary:      Effort: Pulmonary effort is normal.      Breath sounds: Normal breath sounds.   Abdominal:      General: Abdomen is flat. Bowel sounds are normal.      Palpations: Abdomen is soft.   Musculoskeletal:         General: Normal range of motion.      Cervical back: Normal range of motion and neck supple.   Skin:     General: Skin is warm and dry.      Findings: Lesion present.   Neurological:      General: No focal deficit present.      Mental Status: He is alert and oriented to person, place, and time.   Psychiatric:         Mood and Affect: Mood normal.         Behavior: Behavior normal.         Thought Content: Thought content normal.         Judgment: Judgment normal.          Relevant Results  Results for orders placed or performed during the hospital encounter of 10/11/23 (from the past 24 hour(s))   POCT GLUCOSE   Result Value Ref Range    POCT Glucose 362 (H) 74 - 99 mg/dL   Comprehensive Metabolic Panel   Result Value Ref Range    Glucose 90 74 - 99 mg/dL    Sodium 138 136 - 145 mmol/L    Potassium 3.8 3.5 - 5.3 mmol/L    Chloride 100 98 - 107 mmol/L    Bicarbonate 27 21 - 32 mmol/L    Anion Gap 15 10 - 20 mmol/L    Urea Nitrogen 21 6 - 23 mg/dL    Creatinine 1.95 (H) 0.50 - 1.30 mg/dL    eGFR 39 (L) >60 mL/min/1.73m*2    Calcium 8.5 (L) 8.6 - 10.3 mg/dL    Albumin 3.5 3.4 - 5.0 g/dL    Alkaline Phosphatase 126 (H) 33 - 120 U/L     Total Protein 7.1 6.4 - 8.2 g/dL    AST 21 9 - 39 U/L    Bilirubin, Total 0.4 0.0 - 1.2 mg/dL    ALT 10 10 - 52 U/L   CBC   Result Value Ref Range    WBC 10.2 4.4 - 11.3 x10*3/uL    nRBC 0.0 0.0 - 0.0 /100 WBCs    RBC 4.59 4.50 - 5.90 x10*6/uL    Hemoglobin 10.9 (L) 13.5 - 17.5 g/dL    Hematocrit 35.6 (L) 41.0 - 52.0 %    MCV 78 (L) 80 - 100 fL    MCH 23.7 (L) 26.0 - 34.0 pg    MCHC 30.6 (L) 32.0 - 36.0 g/dL    RDW 16.7 (H) 11.5 - 14.5 %    Platelets 411 150 - 450 x10*3/uL    MPV 11.0 7.5 - 11.5 fL   POCT GLUCOSE   Result Value Ref Range    POCT Glucose 90 74 - 99 mg/dL   POCT GLUCOSE   Result Value Ref Range    POCT Glucose 170 (H) 74 - 99 mg/dL   Urinalysis with Reflex Microscopic   Result Value Ref Range    Color, Urine Yellow Straw, Yellow    Appearance, Urine Clear Clear    Specific Gravity, Urine 1.008 1.005 - 1.035    pH, Urine 5.0 5.0, 5.5, 6.0, 6.5, 7.0, 7.5, 8.0    Protein, Urine NEGATIVE NEGATIVE mg/dL    Glucose, Urine NEGATIVE NEGATIVE mg/dL    Blood, Urine NEGATIVE NEGATIVE    Ketones, Urine NEGATIVE NEGATIVE mg/dL    Bilirubin, Urine NEGATIVE NEGATIVE    Urobilinogen, Urine <2.0 <2.0 mg/dL    Nitrite, Urine NEGATIVE NEGATIVE    Leukocyte Esterase, Urine NEGATIVE NEGATIVE   Sodium, Urine Random   Result Value Ref Range    Sodium, Urine Random 17 mmol/L    Creatinine, Urine Random 41.1 20.0 - 370.0 mg/dL    Sodium/Creatinine Ratio 41 Not established. mmol/g Creat      IMAGING:  XR foot right 3+ views   Final Result   Soft tissue lucency with slight deformity to the bone. Findings raise   suspicion for osteomyelitis and or concurrent trauma. Consider CT   scan follow-up             MACRO:   None        Signed by: Luke Sanchez 10/11/2023 11:17 AM   Dictation workstation:   [id number]         Scheduled medications:  aspirin, 81 mg, oral, Daily  atorvastatin, 40 mg, oral, Nightly  enoxaparin, 40 mg, subcutaneous, q24h  fluticasone furoate-vilanteroL, 1 puff, inhalation, Daily  hydroxychloroquine,  200 mg, oral, BID  insulin detemir, 50 Units, subcutaneous, q24h  levothyroxine, 50 mcg, oral, Daily  [Held by provider] lisinopril, 20 mg, oral, Daily  melatonin, 3 mg, oral, Daily  methadone, 155 mg, oral, Daily  nicotine, 1 patch, transdermal, Daily   Followed by  [START ON 11/23/2023] nicotine, 1 patch, transdermal, Daily   Followed by  [START ON 12/7/2023] nicotine, 1 patch, transdermal, Daily  pantoprazole, 40 mg, oral, Daily before breakfast   Or  pantoprazole, 40 mg, intravenous, Daily before breakfast  piperacillin-tazobactam, 4.5 g, intravenous, q8h  polyethylene glycol, 17 g, oral, Daily  predniSONE, 5 mg, oral, Daily  pregabalin, 200 mg, oral, TID  sulfaSALAzine, 1,500 mg, oral, BID  tiotropium, 2 Inhalation, inhalation, Daily      Continuous medications:  lactated Ringer's, 100 mL/hr, Last Rate: Stopped (10/13/23 1400)  lactated Ringer's, 125 mL/hr, Last Rate: 125 mL/hr (10/14/23 1200)    PRN medications:  PRN medications: acetaminophen **OR** acetaminophen **OR** acetaminophen, albuterol, dextrose 10 % in water (D10W), dextrose, glucagon, ondansetron **OR** ondansetron, oxyCODONE, vancomycin        Problem List Items Addressed This Visit          Endocrine/Metabolic    Diabetes mellitus, type 2 (CMS/HCC)       Infectious Diseases    * (Principal) Diabetic foot ulcer with osteomyelitis (CMS/[facility]) - Primary    Acute osteomyelitis of right foot (CMS/[facility])    Relevant Orders    Case Request Operating Room: Amputation Digit Foot (Completed)    Surgical Pathology Exam      Assessment/Plan   Principal Problem:    Diabetic foot ulcer with osteomyelitis (CMS/HCC)  Active Problems:    Acute osteomyelitis of right foot (CMS/HCC)             Consultation to podiatry, empiric Zosyn and vancomycin             10/12: Podiatry plans amputation partial right foot tomorrow.              10/13: Underwent amputation today and appears to be doing well.  Awaiting antibiotic discharge recommendations by ID.              10/14:  Is complaining of pain but informed today that he had not been getting his methadone yesterday or the day before as pharmacy did not have this.  Started today.  DM 2       Continue home insulin regimen, ANTONINO Kiran        10/12: Discussed with patient hemoglobin A1c of 9.5 and discussed he would benefit from better control of his diabetes.  Feel at this point he should be able to do this with his PCP.    JUAN J          10/14: Creatinine has trended up to 1.95 today.  Likely multifactorial including lisinopril low blood pressure today and Toradol last evening.  Nephrology following and started 1 L IV fluids and will need to possibly adjust Lyrica if does not improve.       Vin Tracy MD  10/14/2023  4:20 PM

## 2023-10-14 NOTE — PROGRESS NOTES
"Kael Zepeda is a 58 y.o. male on day 3 of admission presenting with Diabetic foot ulcer with osteomyelitis (CMS/[facility]).    Subjective   Patient had amputation of the right 1st toe yesterday. He reports pain is better controlled today. In general he feels better after the surgery than before. He is pleased with how well he is handling the amputation.        Objective     Physical Exam  Constitutional:       Appearance: Normal appearance.   HENT:      Head: Normocephalic.   Cardiovascular:      Pulses: Normal pulses.   Pulmonary:      Effort: Pulmonary effort is normal.   Musculoskeletal:      Right lower leg: Edema present.   Skin:     General: Skin is warm.      Capillary Refill: Capillary refill takes less than 2 seconds.      Findings: Erythema and lesion present.      Comments: Incision from toe amputation is stable. Sutures all intact. No active bleeding.   Chronic ulcer site is healing quickly. Retention sutures in place. No purulence. Cellulitis is largely resolved.    Neurological:      Mental Status: He is alert and oriented to person, place, and time.      Sensory: Sensory deficit present.      Comments: Absent protective sensation in the lower extremities from diabetic neuropathy   Psychiatric:         Mood and Affect: Mood normal.         Behavior: Behavior normal.         Last Recorded Vitals  Blood pressure 127/67, pulse 72, temperature 36.3 °C (97.4 °F), temperature source Temporal, resp. rate 17, height 1.702 m (5' 7\"), weight 84.8 kg (187 lb), SpO2 97 %.  Intake/Output last 3 Shifts:  I/O last 3 completed shifts:  In: 2536.7 (29.9 mL/kg) [P.O.:440; I.V.:1146.7 (13.5 mL/kg); IV Piggyback:950]  Out: 1531 (18 mL/kg) [Urine:1531 (0.5 mL/kg/hr)]  Weight: 84.8 kg     Relevant Results      Scheduled medications  aspirin, 81 mg, oral, Daily  atorvastatin, 40 mg, oral, Nightly  enoxaparin, 40 mg, subcutaneous, q24h  fluticasone furoate-vilanteroL, 1 puff, inhalation, Daily  hydroxychloroquine, 200 mg, " oral, BID  insulin detemir, 50 Units, subcutaneous, q24h  levothyroxine, 50 mcg, oral, Daily  [Held by provider] lisinopril, 20 mg, oral, Daily  melatonin, 3 mg, oral, Daily  methadone, 155 mg, oral, Daily  nicotine, 1 patch, transdermal, Daily   Followed by  [START ON 11/23/2023] nicotine, 1 patch, transdermal, Daily   Followed by  [START ON 12/7/2023] nicotine, 1 patch, transdermal, Daily  pantoprazole, 40 mg, oral, Daily before breakfast   Or  pantoprazole, 40 mg, intravenous, Daily before breakfast  piperacillin-tazobactam, 4.5 g, intravenous, q8h  polyethylene glycol, 17 g, oral, Daily  predniSONE, 5 mg, oral, Daily  pregabalin, 200 mg, oral, TID  sulfaSALAzine, 1,500 mg, oral, BID  tiotropium, 2 Inhalation, inhalation, Daily      Continuous medications  lactated Ringer's, 100 mL/hr, Last Rate: Stopped (10/13/23 1400)  lactated Ringer's, 125 mL/hr, Last Rate: 125 mL/hr (10/14/23 1200)      PRN medications  PRN medications: acetaminophen **OR** acetaminophen **OR** acetaminophen, albuterol, dextrose 10 % in water (D10W), dextrose, glucagon, ondansetron **OR** ondansetron, oxyCODONE, vancomycin  Results for orders placed or performed during the hospital encounter of 10/11/23 (from the past 24 hour(s))   POCT GLUCOSE   Result Value Ref Range    POCT Glucose 362 (H) 74 - 99 mg/dL   Comprehensive Metabolic Panel   Result Value Ref Range    Glucose 90 74 - 99 mg/dL    Sodium 138 136 - 145 mmol/L    Potassium 3.8 3.5 - 5.3 mmol/L    Chloride 100 98 - 107 mmol/L    Bicarbonate 27 21 - 32 mmol/L    Anion Gap 15 10 - 20 mmol/L    Urea Nitrogen 21 6 - 23 mg/dL    Creatinine 1.95 (H) 0.50 - 1.30 mg/dL    eGFR 39 (L) >60 mL/min/1.73m*2    Calcium 8.5 (L) 8.6 - 10.3 mg/dL    Albumin 3.5 3.4 - 5.0 g/dL    Alkaline Phosphatase 126 (H) 33 - 120 U/L    Total Protein 7.1 6.4 - 8.2 g/dL    AST 21 9 - 39 U/L    Bilirubin, Total 0.4 0.0 - 1.2 mg/dL    ALT 10 10 - 52 U/L   CBC   Result Value Ref Range    WBC 10.2 4.4 - 11.3 x10*3/uL     nRBC 0.0 0.0 - 0.0 /100 WBCs    RBC 4.59 4.50 - 5.90 x10*6/uL    Hemoglobin 10.9 (L) 13.5 - 17.5 g/dL    Hematocrit 35.6 (L) 41.0 - 52.0 %    MCV 78 (L) 80 - 100 fL    MCH 23.7 (L) 26.0 - 34.0 pg    MCHC 30.6 (L) 32.0 - 36.0 g/dL    RDW 16.7 (H) 11.5 - 14.5 %    Platelets 411 150 - 450 x10*3/uL    MPV 11.0 7.5 - 11.5 fL   POCT GLUCOSE   Result Value Ref Range    POCT Glucose 90 74 - 99 mg/dL   POCT GLUCOSE   Result Value Ref Range    POCT Glucose 170 (H) 74 - 99 mg/dL   Urinalysis with Reflex Microscopic   Result Value Ref Range    Color, Urine Yellow Straw, Yellow    Appearance, Urine Clear Clear    Specific Gravity, Urine 1.008 1.005 - 1.035    pH, Urine 5.0 5.0, 5.5, 6.0, 6.5, 7.0, 7.5, 8.0    Protein, Urine NEGATIVE NEGATIVE mg/dL    Glucose, Urine NEGATIVE NEGATIVE mg/dL    Blood, Urine NEGATIVE NEGATIVE    Ketones, Urine NEGATIVE NEGATIVE mg/dL    Bilirubin, Urine NEGATIVE NEGATIVE    Urobilinogen, Urine <2.0 <2.0 mg/dL    Nitrite, Urine NEGATIVE NEGATIVE    Leukocyte Esterase, Urine NEGATIVE NEGATIVE   Sodium, Urine Random   Result Value Ref Range    Sodium, Urine Random 17 mmol/L    Creatinine, Urine Random 41.1 20.0 - 370.0 mg/dL    Sodium/Creatinine Ratio 41 Not established. mmol/g Creat   POCT GLUCOSE   Result Value Ref Range    POCT Glucose 81 74 - 99 mg/dL                            Assessment/Plan   Principal Problem:    Diabetic foot ulcer with osteomyelitis (CMS/HCC)  Active Problems:    Acute osteomyelitis of right foot (CMS/[facility])    Diabetes mellitus, type 2 (CMS/[facility])    S/P amputation of the right 1st toe  -Bone margin cultures negative so far.   -Surgically the remaining bone appeared free of infection.   -If cultures negative tomorrow, I would be okay with him discharged home on oral antibiotics.   -Will follow while inpatient and he will follow up with me in wound center after discharge       I spent 30 minutes in the professional and overall care of this patient.      William Raya DPM

## 2023-10-15 ENCOUNTER — PHARMACY VISIT (OUTPATIENT)
Dept: PHARMACY | Facility: CLINIC | Age: 58
End: 2023-10-15
Payer: MEDICARE

## 2023-10-15 VITALS
HEART RATE: 85 BPM | SYSTOLIC BLOOD PRESSURE: 144 MMHG | HEIGHT: 67 IN | BODY MASS INDEX: 29.35 KG/M2 | OXYGEN SATURATION: 94 % | DIASTOLIC BLOOD PRESSURE: 76 MMHG | WEIGHT: 187 LBS | RESPIRATION RATE: 18 BRPM | TEMPERATURE: 97.7 F

## 2023-10-15 LAB
ALBUMIN SERPL BCP-MCNC: 3.4 G/DL (ref 3.4–5)
ANION GAP SERPL CALC-SCNC: 13 MMOL/L (ref 10–20)
BACTERIA BLD CULT: NORMAL
BACTERIA BLD CULT: NORMAL
BACTERIA SPEC CULT: NORMAL
BUN SERPL-MCNC: 19 MG/DL (ref 6–23)
CALCIUM SERPL-MCNC: 8.6 MG/DL (ref 8.6–10.3)
CHLORIDE SERPL-SCNC: 101 MMOL/L (ref 98–107)
CO2 SERPL-SCNC: 27 MMOL/L (ref 21–32)
CREAT SERPL-MCNC: 2.04 MG/DL (ref 0.5–1.3)
ERYTHROCYTE [DISTWIDTH] IN BLOOD BY AUTOMATED COUNT: 16.7 % (ref 11.5–14.5)
GFR SERPL CREATININE-BSD FRML MDRD: 37 ML/MIN/1.73M*2
GLUCOSE BLD MANUAL STRIP-MCNC: 160 MG/DL (ref 74–99)
GLUCOSE BLD MANUAL STRIP-MCNC: 84 MG/DL (ref 74–99)
GLUCOSE SERPL-MCNC: 57 MG/DL (ref 74–99)
GRAM STN SPEC: NORMAL
GRAM STN SPEC: NORMAL
HCT VFR BLD AUTO: 32.3 % (ref 41–52)
HGB BLD-MCNC: 9.9 G/DL (ref 13.5–17.5)
MCH RBC QN AUTO: 23.5 PG (ref 26–34)
MCHC RBC AUTO-ENTMCNC: 30.7 G/DL (ref 32–36)
MCV RBC AUTO: 77 FL (ref 80–100)
NRBC BLD-RTO: 0 /100 WBCS (ref 0–0)
PHOSPHATE SERPL-MCNC: 4.6 MG/DL (ref 2.5–4.9)
PLATELET # BLD AUTO: 349 X10*3/UL (ref 150–450)
PMV BLD AUTO: 10.9 FL (ref 7.5–11.5)
POTASSIUM SERPL-SCNC: 3.4 MMOL/L (ref 3.5–5.3)
RBC # BLD AUTO: 4.22 X10*6/UL (ref 4.5–5.9)
SODIUM SERPL-SCNC: 138 MMOL/L (ref 136–145)
VANCOMYCIN TROUGH SERPL-MCNC: 17.2 UG/ML (ref 5–20)
WBC # BLD AUTO: 6.5 X10*3/UL (ref 4.4–11.3)

## 2023-10-15 PROCEDURE — 99239 HOSP IP/OBS DSCHRG MGMT >30: CPT | Performed by: FAMILY MEDICINE

## 2023-10-15 PROCEDURE — 84100 ASSAY OF PHOSPHORUS: CPT | Performed by: INTERNAL MEDICINE

## 2023-10-15 PROCEDURE — 2500000001 HC RX 250 WO HCPCS SELF ADMINISTERED DRUGS (ALT 637 FOR MEDICARE OP): Performed by: STUDENT IN AN ORGANIZED HEALTH CARE EDUCATION/TRAINING PROGRAM

## 2023-10-15 PROCEDURE — 2500000001 HC RX 250 WO HCPCS SELF ADMINISTERED DRUGS (ALT 637 FOR MEDICARE OP): Performed by: FAMILY MEDICINE

## 2023-10-15 PROCEDURE — 82947 ASSAY GLUCOSE BLOOD QUANT: CPT

## 2023-10-15 PROCEDURE — RXMED WILLOW AMBULATORY MEDICATION CHARGE

## 2023-10-15 PROCEDURE — S4991 NICOTINE PATCH NONLEGEND: HCPCS | Performed by: FAMILY MEDICINE

## 2023-10-15 PROCEDURE — 2500000002 HC RX 250 W HCPCS SELF ADMINISTERED DRUGS (ALT 637 FOR MEDICARE OP, ALT 636 FOR OP/ED): Performed by: FAMILY MEDICINE

## 2023-10-15 PROCEDURE — 36415 COLL VENOUS BLD VENIPUNCTURE: CPT | Performed by: INTERNAL MEDICINE

## 2023-10-15 PROCEDURE — 2500000004 HC RX 250 GENERAL PHARMACY W/ HCPCS (ALT 636 FOR OP/ED): Performed by: FAMILY MEDICINE

## 2023-10-15 PROCEDURE — 80202 ASSAY OF VANCOMYCIN: CPT | Performed by: FAMILY MEDICINE

## 2023-10-15 PROCEDURE — 85027 COMPLETE CBC AUTOMATED: CPT | Performed by: FAMILY MEDICINE

## 2023-10-15 RX ORDER — DOXYCYCLINE 100 MG/1
100 CAPSULE ORAL 2 TIMES DAILY
Qty: 14 CAPSULE | Refills: 0 | Status: SHIPPED | OUTPATIENT
Start: 2023-10-15 | End: 2023-10-22

## 2023-10-15 RX ORDER — AMOXICILLIN AND CLAVULANATE POTASSIUM 875; 125 MG/1; MG/1
1 TABLET, FILM COATED ORAL 2 TIMES DAILY
Qty: 14 TABLET | Refills: 0 | Status: SHIPPED | OUTPATIENT
Start: 2023-10-15 | End: 2023-10-22

## 2023-10-15 RX ORDER — OXYCODONE HYDROCHLORIDE 5 MG/1
5 TABLET ORAL EVERY 8 HOURS PRN
Qty: 9 TABLET | Refills: 0 | Status: SHIPPED | OUTPATIENT
Start: 2023-10-15 | End: 2023-10-18

## 2023-10-15 RX ADMIN — PANTOPRAZOLE SODIUM 40 MG: 40 TABLET, DELAYED RELEASE ORAL at 07:27

## 2023-10-15 RX ADMIN — PREDNISONE 5 MG: 5 TABLET ORAL at 09:22

## 2023-10-15 RX ADMIN — POLYETHYLENE GLYCOL 3350 17 G: 17 POWDER, FOR SOLUTION ORAL at 09:24

## 2023-10-15 RX ADMIN — PREGABALIN 200 MG: 50 CAPSULE ORAL at 09:22

## 2023-10-15 RX ADMIN — PIPERACILLIN SODIUM AND TAZOBACTAM SODIUM 4.5 G: 4; .5 INJECTION, SOLUTION INTRAVENOUS at 11:17

## 2023-10-15 RX ADMIN — NICOTINE 1 PATCH: 21 PATCH, EXTENDED RELEASE TRANSDERMAL at 09:22

## 2023-10-15 RX ADMIN — HYDROXYCHLOROQUINE SULFATE 200 MG: 200 TABLET, FILM COATED ORAL at 09:22

## 2023-10-15 RX ADMIN — TIOTROPIUM BROMIDE INHALATION SPRAY 2 PUFF: 3.12 SPRAY, METERED RESPIRATORY (INHALATION) at 07:29

## 2023-10-15 RX ADMIN — LEVOTHYROXINE SODIUM 50 MCG: 50 TABLET ORAL at 09:22

## 2023-10-15 RX ADMIN — ASPIRIN 81 MG: 81 TABLET, COATED ORAL at 09:22

## 2023-10-15 RX ADMIN — SULFASALAZINE 1500 MG: 500 TABLET ORAL at 09:22

## 2023-10-15 RX ADMIN — PIPERACILLIN SODIUM AND TAZOBACTAM SODIUM 4.5 G: 4; .5 INJECTION, SOLUTION INTRAVENOUS at 03:06

## 2023-10-15 RX ADMIN — ACETAMINOPHEN 650 MG: 325 TABLET ORAL at 05:02

## 2023-10-15 RX ADMIN — OXYCODONE HYDROCHLORIDE 5 MG: 5 TABLET ORAL at 05:03

## 2023-10-15 ASSESSMENT — PAIN SCALES - GENERAL
PAINLEVEL_OUTOF10: 7
PAINLEVEL_OUTOF10: 3
PAINLEVEL_OUTOF10: 4

## 2023-10-15 ASSESSMENT — COGNITIVE AND FUNCTIONAL STATUS - GENERAL
TOILETING: A LITTLE
WALKING IN HOSPITAL ROOM: A LITTLE
DRESSING REGULAR LOWER BODY CLOTHING: A LITTLE
DAILY ACTIVITIY SCORE: 22
CLIMB 3 TO 5 STEPS WITH RAILING: A LITTLE
MOBILITY SCORE: 22

## 2023-10-15 ASSESSMENT — PAIN - FUNCTIONAL ASSESSMENT
PAIN_FUNCTIONAL_ASSESSMENT: 0-10
PAIN_FUNCTIONAL_ASSESSMENT: 0-10

## 2023-10-15 ASSESSMENT — PAIN SCALES - WONG BAKER: WONGBAKER_NUMERICALRESPONSE: HURTS WHOLE LOT

## 2023-10-15 NOTE — PROGRESS NOTES
Kael Zepeda is a 58 y.o. male on day 3 of admission presenting with Diabetic foot ulcer with osteomyelitis (CMS/HCC).    Subjective   Interval History:   Patient had amputation of the right 1st toe   Pain is better. Blood cultures negative so far/no leukocytosis      Review of Systems  Denies any fevers chills nausea vomiting or diarrhea  Objective   Range of Vitals (last 24 hours)  Heart Rate:  [70-76]   Temp:  [36.2 °C (97.2 °F)-36.8 °C (98.3 °F)]   Resp:  [16-18]   BP: ()/(55-75)   SpO2:  [94 %-98 %]   Daily Weight  10/13/23 : 84.8 kg (187 lb)    Body mass index is 29.29 kg/m².    Physical Exam    General alert oriented x3  Lungs clear to auscultation bilaterally  CV no murmurs  Abdomen soft nontender  Extremities right foot wrapped in dressing  Antibiotics  piperacillin-tazobactam-dextrose (Zosyn) IV 4.5 g  vancomycin in dextrose 5 % (Vancocin) IVPB 1 g  vancomycin (Vancocin) 2,000 mg in dextrose 5 % in water (D5W) 500 mL IV  aspirin EC tablet      atorvastatin (Lipitor) tablet  DULoxetine (Cymbalta) DR capsule      levothyroxine (Synthroid, Levoxyl) tablet  lisinopril (Prinivil, Zestril) tablet  omeprazole (PriLOSEC) DR capsule  pregabalin (Lyrica) capsule    furosemide (Lasix) tablet  methadone (Dolophine) 10 mg/mL solution        ibuprofen (Advil, Motrin) tablet    metFORMIN (Glucophage) tablet  piperacillin-tazobactam-dextrose (Zosyn) IV 4.5 g  vancomycin (Vancocin) 2,000 mg in dextrose 5 % in water (D5W) 500 mL IV  albuterol 90 mcg/actuation inhaler 2 puff  aspirin EC tablet 81 mg  atorvastatin (Lipitor) tablet 40 mg  fluticasone propion-salmeteroL (Advair) 230-21 mcg/actuation inhaler 1 puff  hydroxychloroquine (Plaquenil) tablet 200 mg  levothyroxine (Synthroid, Levoxyl) tablet 50 mcg  lisinopril tablet 20 mg  methadone (Dolophine) tablet 155 mg  pantoprazole (ProtoNix) EC tablet 40 mg  predniSONE (Deltasone) tablet 5 mg  pregabalin (Lyrica) capsule 200 mg  sulfaSALAzine (Azulfidine) tablet  1,500 mg  tiotropium (Spiriva Respimat) 2.5 mcg/actuation inhaler 2 puff  pantoprazole (ProtoNix) EC tablet 40 mg  pantoprazole (ProtoNix) injection 40 mg  melatonin tablet 3 mg  polyethylene glycol (Glycolax, Miralax) packet 17 g  enoxaparin (Lovenox) syringe 40 mg  ondansetron (Zofran) tablet 4 mg  ondansetron (Zofran) injection 4 mg  ketorolac (Toradol) injection 30 mg  acetaminophen (Tylenol) tablet 650 mg  acetaminophen (Tylenol) oral liquid 650 mg  acetaminophen (Tylenol) suppository 650 mg  sodium chloride 0.9% flush  - Omnicell Override Pull  nicotine (Nicoderm CQ) 21 mg/24 hr patch 1 patch  nicotine (Nicoderm CQ) 14 mg/24 hr patch 1 patch  nicotine (Nicoderm CQ) 7 mg/24 hr patch 1 patch  dextrose 50 % injection 25 g  glucagon (Glucagen) injection 1 mg  dextrose 10 % in water (D10W) infusion  insulin detemir (Levemir) injection 50 Units  sodium chloride 0.9% flush  - Omnicell Override Pull  vancomycin (Vancocin) in dextrose 5 % water (D5W) 250 mL IV 1,250 mg  fluticasone furoate-vilanteroL (Breo Ellipta) 100-25 mcg/dose inhaler 1 puff      Relevant Results  Labs  Results from last 72 hours   Lab Units 10/14/23  0342 10/13/23  0358 10/12/23  0327   WBC AUTO x10*3/uL 10.2 7.0 6.9   HEMOGLOBIN g/dL 10.9* 11.4* 10.8*   HEMATOCRIT % 35.6* 37.6* 35.0*   PLATELETS AUTO x10*3/uL 411 453* 411       Results from last 72 hours   Lab Units 10/14/23  0342 10/13/23  0358 10/12/23  0327   SODIUM mmol/L 138 137 135*   POTASSIUM mmol/L 3.8 4.1 3.7   CHLORIDE mmol/L 100 97* 99   CO2 mmol/L 27 30 30   BUN mg/dL 21 18 13   CREATININE mg/dL 1.95* 1.06 0.73   GLUCOSE mg/dL 90 145* 93   CALCIUM mg/dL 8.5* 9.2 8.8   ANION GAP mmol/L 15 14 10   EGFR mL/min/1.73m*2 39* 81 >90       Results from last 72 hours   Lab Units 10/14/23  0342 10/13/23  0358   ALK PHOS U/L 126* 135*   BILIRUBIN TOTAL mg/dL 0.4 0.3   PROTEIN TOTAL g/dL 7.1 7.1   ALT U/L 10 9*   AST U/L 21 12   ALBUMIN g/dL 3.5 3.6       Estimated Creatinine Clearance: 42.5  mL/min (A) (by C-G formula based on SCr of 1.95 mg/dL (H)).  C-Reactive Protein   Date Value Ref Range Status   10/12/2023 5.86 (H) <1.00 mg/dL Final     CRP   Date Value Ref Range Status   06/05/2023 3.29 (A) mg/dL Final     Comment:     REF VALUE  < 1.00     02/15/2023 2.17 (A) mg/dL Final     Comment:     REF VALUE  < 1.00       Microbiology  Lab Results   Component Value Date    BLOODCULT No growth at 3 days 10/11/2023    BLOODCULT No growth at 3 days 10/11/2023       Imaging  MR foot right wo IV contrast    Result Date: 10/13/2023  Interpreted By:  Sandeep Andrade, STUDY: MRI of the  right forefoot  without contrast dated  10/12/2023.   INDICATION: Signs/Symptoms:infected diabetic foot ulcer right foot L97.514   COMPARISON: Radiographs dated 10/11/2023   ACCESSION NUMBER(S): [id number]   ORDERING CLINICIAN: MICHELL MONTANO   TECHNIQUE: Multiplanar multisequence MRI of the  right forefoot was performed without intravenous gadolinium based contrast.   FINDINGS: STUDY:   Exam is limited due to patient motion. There is a prominent region of susceptibility artifact along the lateral midfoot limiting assessment of soft tissues and osseous structures in this location.   OSSEOUS STRUCTURES AND JOINTS:   There is increased T2/STIR signal intensity seen in the proximal metaphysis into the head of the 1st metatarsal, the hallux sesamoid bones, and in the phalanges of the 1st digit with associated patchy low T1 signal intensity most evident in the sesamoid bones, 1st metatarsal and in the base of the proximal phalanx of the 1st digit. There is possibly some osseous destruction of at least the head of the 1st metatarsal. There is a degree of medially directed subluxation of the 1st digit at the metatarsophalangeal joint as seen on this examination. There is some patchy regions of increased T2/STIR signal intensity seen in the tarsal bones without definitive associated low T1 signal intensity evident. Polyarticular  degenerative changes seen in the midfoot. Severe degenerative change possibly with prior fracture deformity is seen of the 5th digit metatarsophalangeal joint. Degenerative changes are seen at multiple interphalangeal joints of the digits but not well assessed. There is some lateral deviation of the distal phalanx of the 2nd digit at the distal interphalangeal joint. No joint effusion is evident.   SOFT TISSUES:   There is generalized atrophy in the musculature. There is diffuse generalized increased T2 signal intensity in the musculature. The flexor hallucis longus tendon is not well assessed from the level of the distal 1st metatarsal into the toe and there is question of the tendon being torn with tendon stump of the distal tendon seen at image 8 at the level of the proximal metaphysis of the proximal phalanx and the proximal portion of the tendon likely at the level of the distal metaphysis the 1st metatarsal.   There is an ulcer on the plantar forefoot extending down to the surface of the head of the 1st metatarsal and the hallux sesamoid bones. Mottled artifact within ulcer likely represents packing material. There is confluence associated increased T2/STIR signal intensity at the base of the ulcer, extending along the medial margin of the 1st metatarsal distal metaphysis in the head, between the 1st and 2nd metatarsals distally, and extending into the 1st toe along the plantar to medial aspect. There may be a 2nd focus of ulceration at the distal plantar medial aspect of the 1st toe seen at image 9 in the short axis plane. There is a greater degree of more generalized reticular increased T2 signal intensity in the subcutaneous tissues of the visualized lower extremity.       1. Osteomyelitis of the head of the 1st metatarsal, the hallux sesamoid bones in the base of the proximal phalanx of the 1st digit, with abnormal signal intensity in the 1st metatarsal metadiaphysis back to the proximal metaphysis, and in  the more distal portion of the proximal phalanx of the 1st digit and in the distal phalanx of the 1st digit which is felt to be of higher probability for osteomyelitis. 2. Wound/ulcer on the plantar forefoot superficial to the 1st digit metatarsophalangeal joint extending down to adjacent to the hallux sesamoid bones and the head of the 1st metatarsal with associated surrounding confluence cellulitis which extends around the distal 1st metatarsal into the 1st toe as above. A component of devitalized tissue is not excluded but not well assessed for due to lack of intravenous contrast. 3. Wound/ulcer on the more distal aspect of the 1st toe with adjacent cellulitis, a component of devitalized tissue at the base of the ulcer is not excluded due to the lack of intravenous contrast. 4. Findings likely related to disruption of the flexor hallucis longus tendon at the level of the 1st digit metatarsophalangeal joint with the ulcer extends down close to the 1st metatarsal head. 5. Susceptibility artifact at the lateral midfoot superficial to the region the base of the 5th metatarsal/4th metatarsal likely related to small foreign body in the subcutaneous tissues seen on the recent radiographs. This does limit assessment for pathology in these locations. 6. Degree of more generalized increased T2 signal intensity in the subcutaneous tissues which may represent lymphedema and/or cellulitis. 7. Nonspecific myositis of the musculature possibly related to diabetic myopathy. A component of infectious myositis particularly adjacent to the wound/ulcer foci of the 1st ray is not excluded.   Signed by: Sandeep Andrade 10/13/2023 8:54 AM Dictation workstation:   [id number]    XR foot right 3+ views    Result Date: 10/11/2023  Interpreted By:  Luke Sanchez, STUDY: XR FOOT RIGHT 3+ VIEWS; ;  10/11/2023 11:05 am   INDICATION: Signs/Symptoms:wound.   COMPARISON: None.   ACCESSION NUMBER(S): [id number]   ORDERING CLINICIAN: HERO  LOWE   FINDINGS: Pseudoarthrosis of the 5th metatarsophalangeal joint similar prior examination.   Again noted is soft tissue lucency of the 1st metatarsophalangeal region. Lucency to the bone is also present which would raise suspicion for osteomyelitis given the appropriate clinical presentation.   Joint space loss 1st metatarsophalangeal joint with deformity. Concurrent fracture can not be excluded.   Lucency projected over the 1st and 2nd cuneiform again noted probably within soft tissues similar prior examination       Soft tissue lucency with slight deformity to the bone. Findings raise suspicion for osteomyelitis and or concurrent trauma. Consider CT scan follow-up     MACRO: None   Signed by: Luke Sanchez 10/11/2023 11:17 AM Dictation workstation:   [id number]    XR foot right 3+ views    Result Date: 10/7/2023  Interpreted By:  Joe Husain, STUDY: XR FOOT RIGHT 3+ VIEWS; ;  10/6/2023 1:01 pm   INDICATION: Signs/Symptoms:xray.   COMPARISON: 06/22/2023   ACCESSION NUMBER(S): [id number]   ORDERING CLINICIAN: ELLIS NEGRETE   FINDINGS: Right foot, three views   There is medial subluxation of the 1st MTP joint. There is soft tissue edema about the 1st digit. Erosive changes are present about the 1st IP joint and to a lesser extent about the 1st MTP joint. There is soft tissue density as well centered about the joints. There is postsurgical change the 5th MTP joint. No acute fracture dislocation seen.       Erosive changes involving the 1st MTP and 1st IP joints with soft tissue edema and soft tissue density about this joint. There is medial subluxation of the 1st MTP joint. Underlying gas septic arthritis and gout is in the differential. Please correlate with patient's symptomatology.     MACRO: Critical Finding:  See findings. Notification was initiated on 10/7/2023 at 8:51 am by  Joe Husain.  (**-OCF-**) Instructions:   Signed by: Joe Husain 10/7/2023 8:51 AM Dictation workstation:    [id number]           Assessment/Plan   Right foot osteomyelitis/septic arthritis of the first metatarsal status post amputation  CAD  Type 2 diabetes mellitus  Fibromyalgia  C-reactive protein 3.29 ESR 47  Plan  Continue on vancomycin and Zosyn continue  Podiatry evaluated surgery scheduled for tomorrow a.m. wound debridement and possible amputation of the first toe and distal metatarsal    Follow counts while on Zosyn due to risk of cytopenias  Follow serum creatinine and vancomycin levels while on vancomycin due to risk of nephrotoxicity/appreciate pharmacy's assistance in managing levels and adjusting dose  wait debrided cultures and proximal margin cultures today patient is post amputation  Follow fevers white count monitor electrolytes  ID team will follow with additional recommendations    DC planning  Blood cultures and tissue negative so far  Negative blood cx and proximal margin cultures and debrided cultures ID team will discuss with podiatry about home-going antibiotics  Consider discharge on PO Doxy and augmentin x 6 days if cultures remain negative. Will discuss w podiatry and IM    Freddy Mike MD

## 2023-10-15 NOTE — PROGRESS NOTES
"Kael Zepeda is a 58 y.o. male on day 4 of admission presenting with Diabetic foot ulcer with osteomyelitis (CMS/HCC).    Subjective   Patient is s/p day 2 from right 1st toe/metatarsal head amputation. Hemodynamically stable. Noted JUAN J, being followed by Nephrology.          Objective     Physical Exam  Constitutional:       Appearance: He is normal weight.   Cardiovascular:      Pulses: Normal pulses.   Pulmonary:      Effort: Pulmonary effort is normal.   Musculoskeletal:      Right lower leg: Edema present.   Skin:     General: Skin is warm.      Capillary Refill: Capillary refill takes less than 2 seconds.      Findings: Lesion present.      Comments: Surgical incision is healing quickly. All sutures intact. Residual chronic ulcer on the plantar foot is granulating. Minor serous fluid drainage. No evident residual infection.    Neurological:      Mental Status: He is alert and oriented to person, place, and time. Mental status is at baseline.   Psychiatric:         Mood and Affect: Mood normal.         Last Recorded Vitals  Blood pressure 152/76, pulse 74, temperature 36.3 °C (97.4 °F), temperature source Temporal, resp. rate 18, height 1.702 m (5' 7\"), weight 84.8 kg (187 lb), SpO2 98 %.  Intake/Output last 3 Shifts:  I/O last 3 completed shifts:  In: 300 (3.5 mL/kg) [P.O.:300]  Out: 1600 (18.9 mL/kg) [Urine:1600 (0.5 mL/kg/hr)]  Weight: 84.8 kg     Relevant Results      Scheduled medications  aspirin, 81 mg, oral, Daily  atorvastatin, 40 mg, oral, Nightly  enoxaparin, 40 mg, subcutaneous, q24h  fluticasone furoate-vilanteroL, 1 puff, inhalation, Daily  hydroxychloroquine, 200 mg, oral, BID  insulin detemir, 50 Units, subcutaneous, q24h  levothyroxine, 50 mcg, oral, Daily  [Held by provider] lisinopril, 20 mg, oral, Daily  melatonin, 3 mg, oral, Daily  methadone, 155 mg, oral, Daily  nicotine, 1 patch, transdermal, Daily   Followed by  [START ON 11/23/2023] nicotine, 1 patch, transdermal, Daily   " Followed by  [START ON 12/7/2023] nicotine, 1 patch, transdermal, Daily  pantoprazole, 40 mg, oral, Daily before breakfast   Or  pantoprazole, 40 mg, intravenous, Daily before breakfast  piperacillin-tazobactam, 4.5 g, intravenous, q8h  polyethylene glycol, 17 g, oral, Daily  predniSONE, 5 mg, oral, Daily  pregabalin, 200 mg, oral, TID  sulfaSALAzine, 1,500 mg, oral, BID  tiotropium, 2 Inhalation, inhalation, Daily      Continuous medications     PRN medications  PRN medications: acetaminophen **OR** acetaminophen **OR** acetaminophen, albuterol, dextrose 10 % in water (D10W), dextrose, glucagon, ondansetron **OR** ondansetron, oxyCODONE, vancomycin  Results for orders placed or performed during the hospital encounter of 10/11/23 (from the past 24 hour(s))   Urinalysis with Reflex Microscopic   Result Value Ref Range    Color, Urine Yellow Straw, Yellow    Appearance, Urine Clear Clear    Specific Gravity, Urine 1.008 1.005 - 1.035    pH, Urine 5.0 5.0, 5.5, 6.0, 6.5, 7.0, 7.5, 8.0    Protein, Urine NEGATIVE NEGATIVE mg/dL    Glucose, Urine NEGATIVE NEGATIVE mg/dL    Blood, Urine NEGATIVE NEGATIVE    Ketones, Urine NEGATIVE NEGATIVE mg/dL    Bilirubin, Urine NEGATIVE NEGATIVE    Urobilinogen, Urine <2.0 <2.0 mg/dL    Nitrite, Urine NEGATIVE NEGATIVE    Leukocyte Esterase, Urine NEGATIVE NEGATIVE   Sodium, Urine Random   Result Value Ref Range    Sodium, Urine Random 17 mmol/L    Creatinine, Urine Random 41.1 20.0 - 370.0 mg/dL    Sodium/Creatinine Ratio 41 Not established. mmol/g Creat   POCT GLUCOSE   Result Value Ref Range    POCT Glucose 81 74 - 99 mg/dL   POCT GLUCOSE   Result Value Ref Range    POCT Glucose 160 (H) 74 - 99 mg/dL   POCT GLUCOSE   Result Value Ref Range    POCT Glucose 172 (H) 74 - 99 mg/dL   Renal Function Panel   Result Value Ref Range    Glucose 57 (L) 74 - 99 mg/dL    Sodium 138 136 - 145 mmol/L    Potassium 3.4 (L) 3.5 - 5.3 mmol/L    Chloride 101 98 - 107 mmol/L    Bicarbonate 27 21 - 32  mmol/L    Anion Gap 13 10 - 20 mmol/L    Urea Nitrogen 19 6 - 23 mg/dL    Creatinine 2.04 (H) 0.50 - 1.30 mg/dL    eGFR 37 (L) >60 mL/min/1.73m*2    Calcium 8.6 8.6 - 10.3 mg/dL    Phosphorus 4.6 2.5 - 4.9 mg/dL    Albumin 3.4 3.4 - 5.0 g/dL   Vancomycin, Trough   Result Value Ref Range    Vancomycin, Trough 17.2 5.0 - 20.0 ug/mL   CBC   Result Value Ref Range    WBC 6.5 4.4 - 11.3 x10*3/uL    nRBC 0.0 0.0 - 0.0 /100 WBCs    RBC 4.22 (L) 4.50 - 5.90 x10*6/uL    Hemoglobin 9.9 (L) 13.5 - 17.5 g/dL    Hematocrit 32.3 (L) 41.0 - 52.0 %    MCV 77 (L) 80 - 100 fL    MCH 23.5 (L) 26.0 - 34.0 pg    MCHC 30.7 (L) 32.0 - 36.0 g/dL    RDW 16.7 (H) 11.5 - 14.5 %    Platelets 349 150 - 450 x10*3/uL    MPV 10.9 7.5 - 11.5 fL   POCT GLUCOSE   Result Value Ref Range    POCT Glucose 84 74 - 99 mg/dL                            Assessment/Plan   Principal Problem:    Diabetic foot ulcer with osteomyelitis (CMS/HCC)  Active Problems:    Acute osteomyelitis of right foot (CMS/[facility])    Diabetes mellitus, type 2 (CMS/[facility])    S/P amputation of right 1st toe/metatarsal head  -Bone margin cultures remain negative. Some contaminant skin itzel.   -Once medically cleared, patient is okay for discharge home on oral antibiotic, to be determined by ID.   -Patient already established at the wound center. He will follow up with me there.        I spent 25 minutes in the professional and overall care of this patient.      William Raya DPM

## 2023-10-15 NOTE — PROGRESS NOTES
"      Nephrology Progress Note      Nephrology following for JUAN J  Events over night: none  He feels better today  Not dizzy, no SOB      /76   Pulse 74   Temp 36.3 °C (97.4 °F) (Temporal)   Resp 18   Ht 1.702 m (5' 7\")   Wt 84.8 kg (187 lb)   SpO2 98%   BMI 29.29 kg/m²     Input / Output:  24 HR:   Intake/Output Summary (Last 24 hours) at 10/15/2023 1122  Last data filed at 10/15/2023 1100  Gross per 24 hour   Intake 325 ml   Output 1350 ml   Net -1025 ml       Physical Exam   Alert and oriented x 3 NAD  Neck: no JVD  CV: RRR  Lungs: CTA bilaterally  Ext: no lower extremity edema    Scheduled medications  aspirin, 81 mg, oral, Daily  atorvastatin, 40 mg, oral, Nightly  enoxaparin, 40 mg, subcutaneous, q24h  fluticasone furoate-vilanteroL, 1 puff, inhalation, Daily  hydroxychloroquine, 200 mg, oral, BID  insulin detemir, 50 Units, subcutaneous, q24h  levothyroxine, 50 mcg, oral, Daily  [Held by provider] lisinopril, 20 mg, oral, Daily  melatonin, 3 mg, oral, Daily  methadone, 155 mg, oral, Daily  nicotine, 1 patch, transdermal, Daily   Followed by  [START ON 11/23/2023] nicotine, 1 patch, transdermal, Daily   Followed by  [START ON 12/7/2023] nicotine, 1 patch, transdermal, Daily  pantoprazole, 40 mg, oral, Daily before breakfast   Or  pantoprazole, 40 mg, intravenous, Daily before breakfast  piperacillin-tazobactam, 4.5 g, intravenous, q8h  polyethylene glycol, 17 g, oral, Daily  predniSONE, 5 mg, oral, Daily  pregabalin, 200 mg, oral, TID  sulfaSALAzine, 1,500 mg, oral, BID  tiotropium, 2 Inhalation, inhalation, Daily      Continuous medications  lactated Ringer's, 100 mL/hr, Last Rate: Stopped (10/13/23 1400)      PRN medications  PRN medications: acetaminophen **OR** acetaminophen **OR** acetaminophen, albuterol, dextrose 10 % in water (D10W), dextrose, glucagon, ondansetron **OR** ondansetron, oxyCODONE, vancomycin   Results from last 7 days   Lab Units 10/15/23  0436 10/14/23  0342   SODIUM mmol/L " 138 138   POTASSIUM mmol/L 3.4* 3.8   CHLORIDE mmol/L 101 100   CO2 mmol/L 27 27   BUN mg/dL 19 21   CREATININE mg/dL 2.04* 1.95*   CALCIUM mg/dL 8.6 8.5*   PROTEIN TOTAL g/dL  --  7.1   BILIRUBIN TOTAL mg/dL  --  0.4   ALK PHOS U/L  --  126*   ALT U/L  --  10   AST U/L  --  21   GLUCOSE mg/dL 57* 90           Results from last 7 days   Lab Units 10/15/23  0436 10/14/23  0342 10/13/23  0358   WBC AUTO x10*3/uL 6.5 10.2 7.0   HEMOGLOBIN g/dL 9.9* 10.9* 11.4*   HEMATOCRIT % 32.3* 35.6* 37.6*   PLATELETS AUTO x10*3/uL [phone]*        Assessment & Plan:   Patient is 58 y.o. male who is admitted to hospital with complaints of worsening right foot ulcer. Nephrology consulted in view of JUAN J.     JUAN J-  -creatinine increased from 0.7-1.95 in 48 hours.    -Cr plateau now 2.0  -Suspect this is hemodynamic related JUAN J as he is having some soft blood pressure on lisinopril, also received IV Toradol  -He is on combination of Vanco and Zosyn which can also be nephrotoxic but okay to continue  -If creatinine not improved, will check  renal ultrasound but doubt obstruction     Electrolytes  -mild hypokalemia   acid-base-compensated     History of hypertension  Was on low side, at goal   Lisinopril on hold       DM 2  -Uncontrolled  -Denies retinopathy     Rheumatoid arthritis  -On hydroxychloroquine     Recommendations:   -check renal ultrasound but doubt obstruction  -UA, urine indices  -Hold lisinopril  -May need to adjust the Lyrica dosing if creatinine worsens  -De-escalate from Vanco/Zosyn as able  -avoid NSIADS  -RFP in am    Please message me through EPIC chat with any questions or concerns.     Michelle Goldsmith DO  10/15/2023  11:22 AM     [facility]    [address], [address]   [place] [address]  Office: [phone]

## 2023-10-15 NOTE — DOCUMENTATION CLARIFICATION NOTE
PATIENT:               ANDRE AQUINO  ACCT #:                  [id number]  MRN:                       [id number]  :                       1965  ADMIT DATE:       10/11/2023 10:16 AM  DISCH DATE:        10/15/2023 2:37 PM  RESPONDING PROVIDER #:        [id number]          PROVIDER RESPONSE TEXT:    Sodium levels not requiring treatment or evaluation    CDI QUERY TEXT:    [facility]_Abnormal Studies    Instruction:    Based on your assessment of the patient and the clinical information, please provide the requested documentation by clicking on the appropriate radio button and enter any additional information if prompted.    Question: Is there a diagnosis indicative of the lab values    When answering this query, please exercise your independent professional judgment. The fact that a question is being asked, does not imply that any particular answer is desired or expected.    The patient's clinical indicators include:  Clinical Information: Patient admitted with acute osteomyelitis with diabetes    Clinical Indicators:  10/11/23 10:29  SODIUM: 132    10/12/23 03:27  SODIUM: 135    10/13/23 03:58  SODIUM: 137    Treatment: cont infusion LR@100cc/hr, monitoring sodium levels    Risk Factors: 58yom admitted with acute osteomyelitis with diabetes  Options provided:  -- Hyponatremia is clinically significant and required treatment/monitoring, now resolved  -- Sodium levels not requiring treatment or evaluation  -- Other - I will add my own diagnosis  -- Refer to Clinical Documentation Reviewer    Query created by: Penelope Davis on 10/13/2023 3:28 PM      Electronically signed by:  YIFAN BALBUENA MD 10/15/2023 5:15 PM

## 2023-10-15 NOTE — DISCHARGE SUMMARY
Discharge Diagnosis  Diabetic foot ulcer with osteomyelitis (CMS/[facility])    Issues Requiring Follow-Up  Osteomyelitis right foot    This discharge took greater than 35 minutes.    Test Results Pending At Discharge  Pending Labs       Order Current Status    Surgical Pathology Exam In process    Blood Culture Preliminary result    Blood Culture Preliminary result            Hospital Course   58 y.o. male presenting with right diabetic foot wound which has been present for a few months.  He was seen at the wound clinic on Friday and was recommended to present to the ED but did not do so until today.  Apparently at the wound clinic they could see the bone on exam..  He does have neuropathy and is not experiencing pain with this.  Admits he does not feel he has been taking good care of his diabetes as it is uncertain of his last hemoglobin A1c.     ED course: X-ray of the foot reveals likely osteomyelitis fifth MT     10/12: Today the patient is seen in the bedside chair.  Voices no specific complaints and no acute events overnight.  He does relate the need to be discharged by at least Monday so that he can attend his methadone clinic appointment on Tuesday.  Podiatry saw the patient and plans amputation tomorrow.  10/13: Today patient is seen postoperatively after amputation of his toes.  He is in the bedside chair.  States he is feeling very well and pleased with how well the surgery went.  No apparent perioperative complications.  Awaiting ID antibiotic recommendations.  Once again he notes that he must be discharged no later than Monday.  10/14: Today once again patient seen in the bedside chair.  He was having pain last evening and night provider gave Toradol and oxycodone.  Of note his creatinine seems to have risen today and consulted nephrology who feels also contributing would be lisinopril and his low blood pressure.  Also Toradol which has been discontinued.  Giving 1 L IV fluids and if creatinine worsens will  need to adjust his Lyrica.  Also the pharmacy I was informed today was unable to provide his 151 mg methadone yesterday or the day before but he got started on this today.  That may be part of his pain issue as well as he appears overall less comfortable than yesterday.  10/15:          Today the patient is once again in the bedside chair and states he continues to feel improved.  Not complaining of pain now that he is back on his methadone.  At this point feel he is suitable for discharge and he will continue Augmentin and doxycycline for 6 days.  He will follow-up with the wound clinic.  Creatinine did trend up slightly from yesterday and will need to be followed as outpatient.    Review of Systems   Constitutional: Negative.    HENT: Negative.     Eyes: Negative.    Respiratory: Negative.     Cardiovascular: Negative.    Gastrointestinal: Negative.    Endocrine: Negative.    Genitourinary: Negative.    Musculoskeletal: Negative.    Skin:  Positive for wound.   Allergic/Immunologic: Negative.    Neurological: Negative.    Hematological: Negative.    Psychiatric/Behavioral: Negative.         Pertinent Physical Exam At Time of Discharge  Physical Exam  Constitutional:       General: He is not in acute distress.     Appearance: Normal appearance. He is normal weight. He is not ill-appearing or toxic-appearing.   HENT:      Head: Normocephalic and atraumatic.      Right Ear: External ear normal.      Left Ear: External ear normal.      Nose: Nose normal.      Mouth/Throat:      Mouth: Mucous membranes are moist.      Pharynx: Oropharynx is clear.   Eyes:      Extraocular Movements: Extraocular movements intact.      Conjunctiva/sclera: Conjunctivae normal.      Pupils: Pupils are equal, round, and reactive to light.   Cardiovascular:      Rate and Rhythm: Normal rate and regular rhythm.      Heart sounds: Normal heart sounds.   Pulmonary:      Effort: Pulmonary effort is normal.      Breath sounds: Normal breath  sounds.   Abdominal:      General: Abdomen is flat. Bowel sounds are normal.      Palpations: Abdomen is soft.   Musculoskeletal:         General: Normal range of motion.      Cervical back: Normal range of motion and neck supple.   Skin:     General: Skin is warm and dry.      Findings: Lesion present.   Neurological:      General: No focal deficit present.      Mental Status: He is alert and oriented to person, place, and time.   Psychiatric:         Mood and Affect: Mood normal.         Behavior: Behavior normal.         Thought Content: Thought content normal.         Judgment: Judgment normal.  Home Medications     Medication List      START taking these medications     amoxicillin-pot clavulanate 875-125 mg tablet; Commonly known as:   Augmentin; Take 1 tablet (875 mg) by mouth 2 times a day for 7 days.   oxyCODONE 5 mg immediate release tablet; Commonly known as: Roxicodone;   Take 1 tablet (5 mg) by mouth every 8 hours if needed for moderate pain (4   - 6) for up to 3 days.     CONTINUE taking these medications     Advair -21 mcg/actuation inhaler; Generic drug: fluticasone   propion-salmeteroL   albuterol 90 mcg/actuation inhaler   ammonium lactate 12 % cream; Commonly known as: Amlactin   aspirin 81 mg EC tablet   atorvastatin 40 mg tablet; Commonly known as: Lipitor   hydroxychloroquine 200 mg tablet; Commonly known as: Plaquenil   Levemir FlexPen 100 unit/mL (3 mL) pen; Generic drug: insulin detemir   levothyroxine 50 mcg tablet; Commonly known as: Synthroid, Levoxyl   methadone 10 mg/mL solution; Commonly known as: Dolophine   omeprazole 40 mg DR capsule; Commonly known as: PriLOSEC   predniSONE 2.5 mg tablet; Commonly known as: Deltasone   riTUXimab-abbs 10 mg/mL solution; Commonly known as: Truxima   Spiriva with HandiHaler 18 mcg inhalation capsule; Generic drug:   tiotropium   sulfaSALAzine 500 mg tablet; Commonly known as: Azulfidine     STOP taking these medications     Cymbalta 60 mg DR  capsule; Generic drug: DULoxetine   furosemide 40 mg tablet; Commonly known as: Lasix   ibuprofen 800 mg tablet   lisinopril 20 mg tablet   metFORMIN 500 mg tablet; Commonly known as: Glucophage   pregabalin 200 mg capsule; Commonly known as: Lyrica       Outpatient Follow-Up  He will follow-up with Dr. Elver Tracy MD

## 2023-10-18 ENCOUNTER — OFFICE VISIT (OUTPATIENT)
Dept: WOUND CARE | Facility: CLINIC | Age: 58
End: 2023-10-18
Payer: MEDICARE

## 2023-10-18 PROCEDURE — 99213 OFFICE O/P EST LOW 20 MIN: CPT

## 2023-10-20 ENCOUNTER — CLINICAL SUPPORT (OUTPATIENT)
Dept: WOUND CARE | Facility: CLINIC | Age: 58
End: 2023-10-20
Payer: MEDICARE

## 2023-10-20 PROCEDURE — 99213 OFFICE O/P EST LOW 20 MIN: CPT

## 2023-10-23 ENCOUNTER — APPOINTMENT (OUTPATIENT)
Dept: WOUND CARE | Facility: CLINIC | Age: 58
End: 2023-10-23
Payer: MEDICARE

## 2023-10-25 ENCOUNTER — OFFICE VISIT (OUTPATIENT)
Dept: WOUND CARE | Facility: CLINIC | Age: 58
End: 2023-10-25
Payer: MEDICARE

## 2023-10-25 PROCEDURE — 11043 DBRDMT MUSC&/FSCA 1ST 20/<: CPT | Performed by: CLINICAL NURSE SPECIALIST

## 2023-10-25 PROCEDURE — 11043 DBRDMT MUSC&/FSCA 1ST 20/<: CPT

## 2023-10-25 PROCEDURE — 99213 OFFICE O/P EST LOW 20 MIN: CPT | Performed by: CLINICAL NURSE SPECIALIST

## 2023-10-25 PROCEDURE — 82962 GLUCOSE BLOOD TEST: CPT

## 2023-10-25 PROCEDURE — 3060F POS MICROALBUMINURIA REV: CPT | Performed by: CLINICAL NURSE SPECIALIST

## 2023-10-25 PROCEDURE — 3046F HEMOGLOBIN A1C LEVEL >9.0%: CPT | Performed by: CLINICAL NURSE SPECIALIST

## 2023-10-27 LAB
LABORATORY COMMENT REPORT: NORMAL
PATH REPORT.FINAL DX SPEC: NORMAL
PATH REPORT.GROSS SPEC: NORMAL
PATH REPORT.RELEVANT HX SPEC: NORMAL
PATH REPORT.TOTAL CANCER: NORMAL

## 2023-11-01 ENCOUNTER — APPOINTMENT (OUTPATIENT)
Dept: WOUND CARE | Facility: CLINIC | Age: 58
End: 2023-11-01
Payer: MEDICARE

## 2023-11-03 ENCOUNTER — APPOINTMENT (OUTPATIENT)
Dept: WOUND CARE | Facility: CLINIC | Age: 58
End: 2023-11-03
Payer: MEDICARE

## 2023-11-08 ENCOUNTER — OFFICE VISIT (OUTPATIENT)
Dept: WOUND CARE | Facility: CLINIC | Age: 58
End: 2023-11-08
Payer: MEDICARE

## 2023-11-08 ENCOUNTER — APPOINTMENT (OUTPATIENT)
Dept: WOUND CARE | Facility: CLINIC | Age: 58
End: 2023-11-08
Payer: MEDICARE

## 2023-11-08 PROCEDURE — 11042 DBRDMT SUBQ TIS 1ST 20SQCM/<: CPT

## 2023-11-09 ENCOUNTER — APPOINTMENT (OUTPATIENT)
Dept: WOUND CARE | Facility: CLINIC | Age: 58
End: 2023-11-09
Payer: MEDICARE

## 2023-11-15 ENCOUNTER — OFFICE VISIT (OUTPATIENT)
Dept: WOUND CARE | Facility: CLINIC | Age: 58
End: 2023-11-15
Payer: MEDICARE

## 2023-11-15 PROCEDURE — 11043 DBRDMT MUSC&/FSCA 1ST 20/<: CPT

## 2023-11-22 ENCOUNTER — APPOINTMENT (OUTPATIENT)
Dept: WOUND CARE | Facility: CLINIC | Age: 58
End: 2023-11-22
Payer: MEDICARE

## 2023-11-29 ENCOUNTER — LAB REQUISITION (OUTPATIENT)
Dept: LAB | Facility: HOSPITAL | Age: 58
End: 2023-11-29
Payer: MEDICARE

## 2023-11-29 ENCOUNTER — OFFICE VISIT (OUTPATIENT)
Dept: WOUND CARE | Facility: CLINIC | Age: 58
End: 2023-11-29
Payer: MEDICARE

## 2023-11-29 DIAGNOSIS — L97.513 NON-PRESSURE CHRONIC ULCER OF OTHER PART OF RIGHT FOOT WITH NECROSIS OF MUSCLE (MULTI): ICD-10-CM

## 2023-11-29 PROCEDURE — 11043 DBRDMT MUSC&/FSCA 1ST 20/<: CPT

## 2023-11-29 PROCEDURE — 87070 CULTURE OTHR SPECIMN AEROBIC: CPT | Mod: OUT,PORLAB | Performed by: PODIATRIST

## 2023-12-02 LAB
BACTERIA SPEC CULT: ABNORMAL
GRAM STN SPEC: ABNORMAL
GRAM STN SPEC: ABNORMAL

## 2023-12-06 ENCOUNTER — OFFICE VISIT (OUTPATIENT)
Dept: WOUND CARE | Facility: CLINIC | Age: 58
End: 2023-12-06
Payer: MEDICARE

## 2023-12-06 PROCEDURE — 11043 DBRDMT MUSC&/FSCA 1ST 20/<: CPT

## 2023-12-13 ENCOUNTER — APPOINTMENT (OUTPATIENT)
Dept: WOUND CARE | Facility: CLINIC | Age: 58
End: 2023-12-13
Payer: MEDICARE

## 2023-12-20 ENCOUNTER — OFFICE VISIT (OUTPATIENT)
Dept: WOUND CARE | Facility: CLINIC | Age: 58
End: 2023-12-20
Payer: MEDICARE

## 2023-12-20 PROCEDURE — 11042 DBRDMT SUBQ TIS 1ST 20SQCM/<: CPT

## 2023-12-28 ENCOUNTER — OFFICE VISIT (OUTPATIENT)
Dept: WOUND CARE | Facility: CLINIC | Age: 58
End: 2023-12-28
Payer: MEDICARE

## 2023-12-28 PROCEDURE — 11042 DBRDMT SUBQ TIS 1ST 20SQCM/<: CPT

## 2024-01-03 ENCOUNTER — APPOINTMENT (OUTPATIENT)
Dept: WOUND CARE | Facility: CLINIC | Age: 59
End: 2024-01-03
Payer: MEDICARE

## 2024-01-08 ENCOUNTER — OFFICE VISIT (OUTPATIENT)
Dept: WOUND CARE | Facility: CLINIC | Age: 59
End: 2024-01-08
Payer: MEDICARE

## 2024-01-08 PROCEDURE — 11045 DBRDMT SUBQ TISS EACH ADDL: CPT

## 2024-01-08 PROCEDURE — 11042 DBRDMT SUBQ TIS 1ST 20SQCM/<: CPT

## 2024-01-15 ENCOUNTER — APPOINTMENT (OUTPATIENT)
Dept: WOUND CARE | Facility: CLINIC | Age: 59
End: 2024-01-15
Payer: MEDICARE

## 2024-01-24 ENCOUNTER — APPOINTMENT (OUTPATIENT)
Dept: WOUND CARE | Facility: CLINIC | Age: 59
End: 2024-01-24
Payer: MEDICARE

## 2024-01-29 ENCOUNTER — LAB REQUISITION (OUTPATIENT)
Dept: LAB | Facility: HOSPITAL | Age: 59
End: 2024-01-29
Payer: MEDICARE

## 2024-01-29 ENCOUNTER — OFFICE VISIT (OUTPATIENT)
Dept: WOUND CARE | Facility: CLINIC | Age: 59
End: 2024-01-29
Payer: MEDICARE

## 2024-01-29 DIAGNOSIS — L97.522 NON-PRESSURE CHRONIC ULCER OF OTHER PART OF LEFT FOOT WITH FAT LAYER EXPOSED (MULTI): ICD-10-CM

## 2024-01-29 PROCEDURE — 11042 DBRDMT SUBQ TIS 1ST 20SQCM/<: CPT

## 2024-01-29 PROCEDURE — 87070 CULTURE OTHR SPECIMN AEROBIC: CPT | Mod: OUT,PORLAB | Performed by: STUDENT IN AN ORGANIZED HEALTH CARE EDUCATION/TRAINING PROGRAM

## 2024-02-01 LAB
BACTERIA SPEC CULT: ABNORMAL
GRAM STN SPEC: ABNORMAL
GRAM STN SPEC: ABNORMAL

## 2024-02-05 ENCOUNTER — APPOINTMENT (OUTPATIENT)
Dept: WOUND CARE | Facility: CLINIC | Age: 59
End: 2024-02-05
Payer: MEDICARE

## 2024-02-07 ENCOUNTER — APPOINTMENT (OUTPATIENT)
Dept: WOUND CARE | Facility: CLINIC | Age: 59
End: 2024-02-07
Payer: MEDICARE

## 2024-02-21 ENCOUNTER — OFFICE VISIT (OUTPATIENT)
Dept: WOUND CARE | Facility: CLINIC | Age: 59
End: 2024-02-21
Payer: MEDICARE

## 2024-02-21 PROCEDURE — 99213 OFFICE O/P EST LOW 20 MIN: CPT

## 2024-03-06 ENCOUNTER — APPOINTMENT (OUTPATIENT)
Dept: WOUND CARE | Facility: CLINIC | Age: 59
End: 2024-03-06
Payer: MEDICARE

## 2024-03-11 ENCOUNTER — APPOINTMENT (OUTPATIENT)
Dept: WOUND CARE | Facility: CLINIC | Age: 59
End: 2024-03-11
Payer: MEDICARE

## 2024-03-12 ENCOUNTER — LAB (OUTPATIENT)
Dept: LAB | Facility: LAB | Age: 59
End: 2024-03-12
Payer: MEDICARE

## 2024-03-12 DIAGNOSIS — D63.8 ANEMIA IN OTHER CHRONIC DISEASES CLASSIFIED ELSEWHERE: ICD-10-CM

## 2024-03-12 DIAGNOSIS — M05.742 RHEUMATOID ARTHRITIS WITH RHEUMATOID FACTOR OF LEFT HAND WITHOUT ORGAN OR SYSTEMS INVOLVEMENT (MULTI): ICD-10-CM

## 2024-03-12 DIAGNOSIS — M05.741 RHEUMATOID ARTHRITIS WITH RHEUMATOID FACTOR OF RIGHT HAND WITHOUT ORGAN OR SYSTEMS INVOLVEMENT (MULTI): Primary | ICD-10-CM

## 2024-03-12 DIAGNOSIS — M05.741 RHEUMATOID ARTHRITIS WITH RHEUMATOID FACTOR OF RIGHT HAND WITHOUT ORGAN OR SYSTEMS INVOLVEMENT (MULTI): ICD-10-CM

## 2024-03-12 DIAGNOSIS — N17.9 ACUTE KIDNEY FAILURE, UNSPECIFIED (CMS-HCC): Primary | ICD-10-CM

## 2024-03-12 LAB
ALBUMIN SERPL BCP-MCNC: 3.7 G/DL (ref 3.4–5)
ALP SERPL-CCNC: 102 U/L (ref 33–120)
ALT SERPL W P-5'-P-CCNC: 7 U/L (ref 10–52)
ANION GAP SERPL CALC-SCNC: 11 MMOL/L (ref 10–20)
AST SERPL W P-5'-P-CCNC: 10 U/L (ref 9–39)
BASOPHILS # BLD AUTO: 0.06 X10*3/UL (ref 0–0.1)
BASOPHILS NFR BLD AUTO: 0.6 %
BILIRUB SERPL-MCNC: 0.3 MG/DL (ref 0–1.2)
BUN SERPL-MCNC: 13 MG/DL (ref 6–23)
CALCIUM SERPL-MCNC: 8.6 MG/DL (ref 8.6–10.3)
CHLORIDE SERPL-SCNC: 104 MMOL/L (ref 98–107)
CO2 SERPL-SCNC: 26 MMOL/L (ref 21–32)
CREAT SERPL-MCNC: 0.7 MG/DL (ref 0.5–1.3)
EGFRCR SERPLBLD CKD-EPI 2021: >90 ML/MIN/1.73M*2
EOSINOPHIL # BLD AUTO: 0.25 X10*3/UL (ref 0–0.7)
EOSINOPHIL NFR BLD AUTO: 2.3 %
ERYTHROCYTE [DISTWIDTH] IN BLOOD BY AUTOMATED COUNT: 18.1 % (ref 11.5–14.5)
ERYTHROCYTE [SEDIMENTATION RATE] IN BLOOD BY WESTERGREN METHOD: 71 MM/H (ref 0–20)
GLUCOSE SERPL-MCNC: 84 MG/DL (ref 74–99)
HCT VFR BLD AUTO: 34 % (ref 41–52)
HGB BLD-MCNC: 10.8 G/DL (ref 13.5–17.5)
IMM GRANULOCYTES # BLD AUTO: 0.08 X10*3/UL (ref 0–0.7)
IMM GRANULOCYTES NFR BLD AUTO: 0.7 % (ref 0–0.9)
LYMPHOCYTES # BLD AUTO: 2.42 X10*3/UL (ref 1.2–4.8)
LYMPHOCYTES NFR BLD AUTO: 22.3 %
MCH RBC QN AUTO: 24.6 PG (ref 26–34)
MCHC RBC AUTO-ENTMCNC: 31.8 G/DL (ref 32–36)
MCV RBC AUTO: 77 FL (ref 80–100)
MONOCYTES # BLD AUTO: 1.04 X10*3/UL (ref 0.1–1)
MONOCYTES NFR BLD AUTO: 9.6 %
NEUTROPHILS # BLD AUTO: 7 X10*3/UL (ref 1.2–7.7)
NEUTROPHILS NFR BLD AUTO: 64.5 %
NRBC BLD-RTO: 0 /100 WBCS (ref 0–0)
PLATELET # BLD AUTO: 403 X10*3/UL (ref 150–450)
POTASSIUM SERPL-SCNC: 4.3 MMOL/L (ref 3.5–5.3)
PROT SERPL-MCNC: 6.6 G/DL (ref 6.4–8.2)
RBC # BLD AUTO: 4.39 X10*6/UL (ref 4.5–5.9)
SODIUM SERPL-SCNC: 137 MMOL/L (ref 136–145)
WBC # BLD AUTO: 10.9 X10*3/UL (ref 4.4–11.3)

## 2024-03-12 PROCEDURE — 82728 ASSAY OF FERRITIN: CPT

## 2024-03-12 PROCEDURE — 83540 ASSAY OF IRON: CPT

## 2024-03-12 PROCEDURE — 80053 COMPREHEN METABOLIC PANEL: CPT

## 2024-03-12 PROCEDURE — 85652 RBC SED RATE AUTOMATED: CPT

## 2024-03-12 PROCEDURE — 85025 COMPLETE CBC W/AUTO DIFF WBC: CPT

## 2024-03-12 PROCEDURE — 83550 IRON BINDING TEST: CPT

## 2024-03-12 PROCEDURE — 36415 COLL VENOUS BLD VENIPUNCTURE: CPT

## 2024-03-13 LAB
FERRITIN SERPL-MCNC: 22 NG/ML (ref 20–300)
IRON SATN MFR SERPL: 7 % (ref 25–45)
IRON SERPL-MCNC: 28 UG/DL (ref 35–150)
TIBC SERPL-MCNC: 384 UG/DL (ref 240–445)
UIBC SERPL-MCNC: 356 UG/DL (ref 110–370)

## 2024-03-18 ENCOUNTER — APPOINTMENT (OUTPATIENT)
Dept: WOUND CARE | Facility: CLINIC | Age: 59
End: 2024-03-18
Payer: MEDICARE

## 2024-03-25 ENCOUNTER — APPOINTMENT (OUTPATIENT)
Dept: WOUND CARE | Facility: CLINIC | Age: 59
End: 2024-03-25
Payer: MEDICARE

## 2024-04-17 ENCOUNTER — APPOINTMENT (OUTPATIENT)
Dept: WOUND CARE | Facility: CLINIC | Age: 59
End: 2024-04-17
Payer: MEDICARE

## 2024-04-22 ENCOUNTER — APPOINTMENT (OUTPATIENT)
Dept: WOUND CARE | Facility: CLINIC | Age: 59
End: 2024-04-22
Payer: MEDICARE

## 2024-05-14 RX ORDER — METFORMIN HYDROCHLORIDE 500 MG/1
500 TABLET ORAL
COMMUNITY
Start: 2024-02-12

## 2024-05-14 RX ORDER — CELECOXIB 200 MG/1
200 CAPSULE ORAL 2 TIMES DAILY
COMMUNITY
Start: 2024-03-04 | End: 2024-08-31

## 2024-05-14 RX ORDER — PREGABALIN 200 MG/1
200 CAPSULE ORAL 3 TIMES DAILY
COMMUNITY
Start: 2024-04-04

## 2024-05-14 RX ORDER — PANTOPRAZOLE SODIUM 40 MG/1
40 TABLET, DELAYED RELEASE ORAL
COMMUNITY

## 2024-05-14 RX ORDER — PREDNISONE 5 MG/1
5 TABLET ORAL DAILY
COMMUNITY
Start: 2024-04-01

## 2024-05-14 RX ORDER — FLUTICASONE PROPIONATE AND SALMETEROL 100; 50 UG/1; UG/1
POWDER RESPIRATORY (INHALATION)
COMMUNITY
Start: 2023-06-29

## 2024-05-14 RX ORDER — BLOOD-GLUCOSE SENSOR
EACH MISCELLANEOUS
COMMUNITY
Start: 2023-12-08

## 2024-06-05 ENCOUNTER — HOSPITAL ENCOUNTER (EMERGENCY)
Facility: HOSPITAL | Age: 59
Discharge: HOME | End: 2024-06-05
Payer: MEDICARE

## 2024-06-05 VITALS
RESPIRATION RATE: 18 BRPM | SYSTOLIC BLOOD PRESSURE: 141 MMHG | HEART RATE: 83 BPM | BODY MASS INDEX: 30.53 KG/M2 | TEMPERATURE: 98.6 F | WEIGHT: 190 LBS | DIASTOLIC BLOOD PRESSURE: 66 MMHG | HEIGHT: 66 IN | OXYGEN SATURATION: 100 %

## 2024-06-05 DIAGNOSIS — S90.821A BLISTER OF RIGHT FOOT, INITIAL ENCOUNTER: Primary | ICD-10-CM

## 2024-06-05 PROCEDURE — 99283 EMERGENCY DEPT VISIT LOW MDM: CPT | Performed by: NURSE PRACTITIONER

## 2024-06-05 PROCEDURE — 2500000005 HC RX 250 GENERAL PHARMACY W/O HCPCS

## 2024-06-05 RX ADMIN — Medication 1 EACH: at 11:54

## 2024-06-05 ASSESSMENT — LIFESTYLE VARIABLES
HAVE PEOPLE ANNOYED YOU BY CRITICIZING YOUR DRINKING: NO
EVER HAD A DRINK FIRST THING IN THE MORNING TO STEADY YOUR NERVES TO GET RID OF A HANGOVER: NO
TOTAL SCORE: 0
HAVE YOU EVER FELT YOU SHOULD CUT DOWN ON YOUR DRINKING: NO
EVER FELT BAD OR GUILTY ABOUT YOUR DRINKING: NO

## 2024-06-05 ASSESSMENT — PAIN SCALES - GENERAL: PAINLEVEL_OUTOF10: 0 - NO PAIN

## 2024-06-05 ASSESSMENT — PAIN - FUNCTIONAL ASSESSMENT: PAIN_FUNCTIONAL_ASSESSMENT: 0-10

## 2024-06-05 ASSESSMENT — COLUMBIA-SUICIDE SEVERITY RATING SCALE - C-SSRS
2. HAVE YOU ACTUALLY HAD ANY THOUGHTS OF KILLING YOURSELF?: NO
1. IN THE PAST MONTH, HAVE YOU WISHED YOU WERE DEAD OR WISHED YOU COULD GO TO SLEEP AND NOT WAKE UP?: NO
6. HAVE YOU EVER DONE ANYTHING, STARTED TO DO ANYTHING, OR PREPARED TO DO ANYTHING TO END YOUR LIFE?: NO

## 2024-06-05 NOTE — ED PROVIDER NOTES
Chief Complaint   Patient presents with   • Wound Check       HPI       58 year old male presents to the Emergency Department today complaining of bleeding from a blister on his right foot that has been present over the last 2-3 days. Notes that EMS wrapped the right foot and believes that the bleeding may have ceased. Denies any associated fever, chills, headache, neck pain, chest pain, shortness of breath, abdominal pain, nausea, vomiting, diarrhea, constipation, or urinary symptoms.       History provided by:  Patient             Patient History   Past Medical History:   Diagnosis Date   • Abnormal result of other cardiovascular function study 06/28/2018    Abnormal stress test   • Atherosclerotic heart disease of native coronary artery with unstable angina pectoris (Multi) 06/28/2018    Unstable angina pectoris due to coronary arteriosclerosis   • Fibromyalgia     Fibromyalgia   • Personal history of other diseases of the circulatory system     History of hypertension   • Personal history of other diseases of the musculoskeletal system and connective tissue     History of rheumatoid arthritis   • Personal history of other diseases of the musculoskeletal system and connective tissue     History of degenerative disc disease   • Personal history of other diseases of the musculoskeletal system and connective tissue     History of osteoporosis   • Personal history of other endocrine, nutritional and metabolic disease     History of hyperlipidemia   • Personal history of other endocrine, nutritional and metabolic disease     History of hypothyroidism   • Type 2 diabetes mellitus without complications (Multi) 02/25/2022    Type 2 diabetes mellitus     Past Surgical History:   Procedure Laterality Date   • BACK SURGERY  06/28/2018    Back Surgery   • SEPTOPLASTY  06/28/2018    Septoplasty     No family history on file.  Social History     Tobacco Use   • Smoking status: Every Day     Current packs/day: 1.00     Average  packs/day: 1 pack/day for 40.0 years (40.0 ttl pk-yrs)     Types: Cigarettes   • Smokeless tobacco: Current   Vaping Use   • Vaping status: Not on file   Substance Use Topics   • Alcohol use: Not Currently   • Drug use: Never           Physical Exam  Constitutional:       General: He is awake.      Appearance: Normal appearance.   Cardiovascular:      Rate and Rhythm: Normal rate and regular rhythm.      Pulses:           Radial pulses are 3+ on the right side and 3+ on the left side.      Heart sounds: Normal heart sounds. No murmur heard.     No friction rub. No gallop.   Pulmonary:      Effort: Pulmonary effort is normal.      Breath sounds: Normal breath sounds and air entry.   Skin:     Comments: There is a small (dime sized) open blister noted to the dorsal surface of the distal right second metatarsal region that is not actively bleeding. No signs of infection. Chronic diabetic ulcer noted to the plantar surface of the right foot as well.    Neurological:      Mental Status: He is alert.   Psychiatric:         Behavior: Behavior is cooperative.         Labs Reviewed - No data to display    No orders to display            ED Course & MDM   Diagnoses as of 06/05/24 1157   Blister of right foot, initial encounter           Medical Decision Making  Patient was seen and evaluated by myself. Gelfoam dressing was applied to re-enforce the prior bleeding blister. There are no signs of infection. Referred to the wound care center for further follow up. Return if worse in any way. Discharged in stable condition with computer instructions.     Diagnostic Impression:    1. Foot blister               Your medication list        CONTINUE taking these medications        Instructions Last Dose Given Next Dose Due   FreeStyle Mikala 3 Sensor device  Generic drug: blood-glucose sensor                  ASK your doctor about these medications        Instructions Last Dose Given Next Dose Due   albuterol 90 mcg/actuation inhaler            ammonium lactate 12 % cream  Commonly known as: Amlactin           aspirin 81 mg EC tablet           atorvastatin 40 mg tablet  Commonly known as: Lipitor           celecoxib 200 mg capsule  Commonly known as: CeleBREX           hydroxychloroquine 200 mg tablet  Commonly known as: Plaquenil           Levemir FlexPen 100 unit/mL (3 mL) pen  Generic drug: insulin detemir           levothyroxine 50 mcg tablet  Commonly known as: Synthroid, Levoxyl           metFORMIN 500 mg tablet  Commonly known as: Glucophage           methadone 10 mg/mL solution  Commonly known as: Dolophine           omeprazole 40 mg DR capsule  Commonly known as: PriLOSEC           pantoprazole 40 mg EC tablet  Commonly known as: ProtoNix           predniSONE 2.5 mg tablet  Commonly known as: Deltasone           predniSONE 5 mg tablet  Commonly known as: Deltasone           pregabalin 200 mg capsule  Commonly known as: Lyrica           riTUXimab-abbs 10 mg/mL solution  Commonly known as: Truxima           Spiriva with HandiHaler 18 mcg inhalation capsule  Generic drug: tiotropium           sulfaSALAzine 500 mg tablet  Commonly known as: Azulfidine           Wixela Inhub 100-50 mcg/dose diskus inhaler  Generic drug: fluticasone propion-salmeteroL                      Procedure  Procedures     Sanchez Garcia, CHACE-CNP  06/05/24 3565

## 2024-06-07 ENCOUNTER — OFFICE VISIT (OUTPATIENT)
Dept: WOUND CARE | Facility: CLINIC | Age: 59
End: 2024-06-07
Payer: MEDICARE

## 2024-06-07 ENCOUNTER — APPOINTMENT (OUTPATIENT)
Dept: CARDIOLOGY | Facility: CLINIC | Age: 59
End: 2024-06-07
Payer: MEDICARE

## 2024-06-07 PROBLEM — M51.36 DEGENERATION OF INTERVERTEBRAL DISC OF LUMBAR REGION: Status: ACTIVE | Noted: 2024-06-07

## 2024-06-07 PROBLEM — R00.2 HEART PALPITATIONS: Status: ACTIVE | Noted: 2024-06-07

## 2024-06-07 PROBLEM — R76.8 HEPATITIS B CORE ANTIBODY POSITIVE: Status: ACTIVE | Noted: 2020-01-23

## 2024-06-07 PROBLEM — N49.2 SCROTAL WALL ABSCESS: Status: ACTIVE | Noted: 2024-06-07

## 2024-06-07 PROBLEM — Z91.199 PATIENT'S NONCOMPLIANCE WITH OTHER MEDICAL TREATMENT AND REGIMEN DUE TO UNSPECIFIED REASON: Status: ACTIVE | Noted: 2023-04-27

## 2024-06-07 PROBLEM — Z86.19 HISTORY OF HEPATITIS C: Status: ACTIVE | Noted: 2019-01-10

## 2024-06-07 PROBLEM — N49.2 ABSCESS OF SCROTUM: Status: ACTIVE | Noted: 2024-06-07

## 2024-06-07 PROBLEM — K21.9 GASTROESOPHAGEAL REFLUX DISEASE WITHOUT ESOPHAGITIS: Status: ACTIVE | Noted: 2024-02-12

## 2024-06-07 PROBLEM — J43.9 PULMONARY EMPHYSEMA (MULTI): Status: ACTIVE | Noted: 2023-05-10

## 2024-06-07 PROBLEM — E66.01 MORBID (SEVERE) OBESITY DUE TO EXCESS CALORIES (MULTI): Status: ACTIVE | Noted: 2023-05-10

## 2024-06-07 PROBLEM — M48.07 SPINAL STENOSIS OF LUMBOSACRAL REGION: Status: ACTIVE | Noted: 2024-06-07

## 2024-06-07 PROBLEM — E66.811 OBESITY, CLASS I, BMI 30-34.9: Status: ACTIVE | Noted: 2023-05-10

## 2024-06-07 PROBLEM — Z86.39 HISTORY OF HYPOTHYROIDISM: Status: ACTIVE | Noted: 2024-06-07

## 2024-06-07 PROBLEM — N17.9 ACUTE RENAL FAILURE SYNDROME (CMS-HCC): Status: ACTIVE | Noted: 2024-06-07

## 2024-06-07 PROBLEM — E03.9 HYPOTHYROIDISM, ACQUIRED: Status: ACTIVE | Noted: 2021-10-20

## 2024-06-07 PROBLEM — Z86.79 HISTORY OF HYPERTENSION: Status: ACTIVE | Noted: 2024-06-07

## 2024-06-07 PROBLEM — L97.511 ULCER OF TOE OF RIGHT FOOT, LIMITED TO BREAKDOWN OF SKIN (MULTI): Status: ACTIVE | Noted: 2023-06-07

## 2024-06-07 PROBLEM — L84 CORNS AND CALLOSITIES: Status: ACTIVE | Noted: 2023-06-09

## 2024-06-07 PROBLEM — K21.9 CHRONIC GERD: Status: ACTIVE | Noted: 2024-06-07

## 2024-06-07 PROBLEM — R07.89 CHEST DISCOMFORT: Status: ACTIVE | Noted: 2024-06-07

## 2024-06-07 PROBLEM — I10 BENIGN ESSENTIAL HYPERTENSION: Status: ACTIVE | Noted: 2021-10-20

## 2024-06-07 PROBLEM — M54.9 CHRONIC BACK PAIN: Status: ACTIVE | Noted: 2024-06-07

## 2024-06-07 PROBLEM — G89.29 CHRONIC BACK PAIN: Status: ACTIVE | Noted: 2024-06-07

## 2024-06-07 PROBLEM — T38.0X5A STEROID-INDUCED OSTEOPOROSIS: Status: ACTIVE | Noted: 2022-01-26

## 2024-06-07 PROBLEM — L30.9 DERMATITIS: Status: ACTIVE | Noted: 2018-12-12

## 2024-06-07 PROBLEM — M06.30 RHEUMATOID NODULES (MULTI): Status: ACTIVE | Noted: 2017-05-26

## 2024-06-07 PROBLEM — R79.89 LOW TESTOSTERONE: Status: ACTIVE | Noted: 2017-08-28

## 2024-06-07 PROBLEM — M54.16 LUMBAR RADICULOPATHY: Status: ACTIVE | Noted: 2024-06-07

## 2024-06-07 PROBLEM — M81.8 STEROID-INDUCED OSTEOPOROSIS: Status: ACTIVE | Noted: 2022-01-26

## 2024-06-07 PROBLEM — F32.A DEPRESSION: Status: ACTIVE | Noted: 2017-02-06

## 2024-06-07 PROBLEM — M05.742 RHEUMATOID ARTHRITIS INVOLVING BOTH HANDS WITH POSITIVE RHEUMATOID FACTOR (MULTI): Status: ACTIVE | Noted: 2017-02-06

## 2024-06-07 PROBLEM — M05.741 RHEUMATOID ARTHRITIS INVOLVING BOTH HANDS WITH POSITIVE RHEUMATOID FACTOR (MULTI): Status: ACTIVE | Noted: 2017-02-06

## 2024-06-07 PROBLEM — E55.9 VITAMIN D DEFICIENCY: Status: ACTIVE | Noted: 2017-05-26

## 2024-06-07 PROBLEM — M79.7 FIBROMYALGIA: Status: ACTIVE | Noted: 2017-02-06

## 2024-06-07 PROBLEM — G89.18 POST-OP PAIN: Status: ACTIVE | Noted: 2024-06-07

## 2024-06-07 PROBLEM — F17.200 TOBACCO USE DISORDER: Status: ACTIVE | Noted: 2024-06-07

## 2024-06-07 PROBLEM — L72.9 SCROTAL CYST: Status: ACTIVE | Noted: 2024-06-07

## 2024-06-07 PROBLEM — F11.21 OPIOID DEPENDENCE, IN REMISSION (MULTI): Status: ACTIVE | Noted: 2023-05-10

## 2024-06-07 PROBLEM — Z86.39 HISTORY OF ELEVATED LIPIDS: Status: ACTIVE | Noted: 2024-06-07

## 2024-06-07 PROBLEM — M51.369 DEGENERATION OF INTERVERTEBRAL DISC OF LUMBAR REGION: Status: ACTIVE | Noted: 2024-06-07

## 2024-06-07 PROBLEM — G62.9 PERIPHERAL NEUROPATHY: Status: ACTIVE | Noted: 2024-06-07

## 2024-06-07 PROBLEM — F51.01 PRIMARY INSOMNIA: Status: ACTIVE | Noted: 2017-05-26

## 2024-06-07 PROBLEM — E66.9 OBESITY, CLASS I, BMI 30-34.9: Status: ACTIVE | Noted: 2023-05-10

## 2024-06-07 PROBLEM — M47.816 LUMBAR SPONDYLOSIS: Status: ACTIVE | Noted: 2024-06-07

## 2024-06-07 PROBLEM — E78.2 HYPERLIPIDEMIA, MIXED: Status: ACTIVE | Noted: 2021-10-20

## 2024-06-07 PROCEDURE — 11043 DBRDMT MUSC&/FSCA 1ST 20/<: CPT

## 2024-06-07 PROCEDURE — 87186 SC STD MICRODIL/AGAR DIL: CPT | Mod: OUT | Performed by: PODIATRIST

## 2024-06-07 PROCEDURE — 11042 DBRDMT SUBQ TIS 1ST 20SQCM/<: CPT | Mod: 59

## 2024-06-07 PROCEDURE — 99213 OFFICE O/P EST LOW 20 MIN: CPT | Mod: 25

## 2024-06-07 PROCEDURE — 87205 SMEAR GRAM STAIN: CPT | Mod: OUT | Performed by: PODIATRIST

## 2024-06-07 RX ORDER — LISINOPRIL 20 MG/1
20 TABLET ORAL DAILY
COMMUNITY

## 2024-06-07 RX ORDER — PEN NEEDLE, DIABETIC 32GX 5/32"
NEEDLE, DISPOSABLE MISCELLANEOUS
COMMUNITY
Start: 2024-05-15

## 2024-06-07 RX ORDER — FUROSEMIDE 40 MG/1
40 TABLET ORAL DAILY
COMMUNITY

## 2024-06-07 RX ORDER — INSULIN LISPRO 100 [IU]/ML
10 INJECTION, SOLUTION INTRAVENOUS; SUBCUTANEOUS
COMMUNITY
Start: 2024-05-16 | End: 2025-05-16

## 2024-06-07 RX ORDER — DULOXETIN HYDROCHLORIDE 60 MG/1
60 CAPSULE, DELAYED RELEASE ORAL EVERY 24 HOURS
COMMUNITY

## 2024-06-07 RX ORDER — TIOTROPIUM BROMIDE INHALATION SPRAY 3.12 UG/1
2 SPRAY, METERED RESPIRATORY (INHALATION) DAILY
COMMUNITY
Start: 2024-05-16

## 2024-06-07 NOTE — PROGRESS NOTES
"Counseling:  The patient was counseled regarding diagnostic results, instructions for management, risk factor reductions, prognosis, patient and family education, impressions, risks and benefits of treatment options and importance of compliance with treatment.      Chief Complaint:  The patient presents today for cardiovascular evaluation of chest pressure.     History Of Present Illness:    Kael Zepeda is a 58 y.o. male patient whose PMH is significant for HTN, palpitations, hyperlipidemia, DM type 2, hypothyroidism, GERD, depression, anxiety, rheumatoid arthritis, fibromyalgia, pulmonary emphysema, and tobacco use. He presents today to establish cardiovascular care for the evaluation and management of chest pressure.    Past Surgical History:  He has a past surgical history that includes Septoplasty (06/28/2018) and Back surgery (06/28/2018).      Social History:  He reports that he has been smoking cigarettes. He has a 40 pack-year smoking history. He uses smokeless tobacco. He reports that he does not currently use alcohol. He reports that he does not use drugs.    Family History:  No family history on file.     Allergies:  Patient has no known allergies.    Outpatient Medications:  Current Outpatient Medications   Medication Instructions    albuterol 90 mcg/actuation inhaler 2 puffs, inhalation, Every 6 hours PRN    ammonium lactate (Amlactin) 12 % cream Topical    aspirin 81 mg, oral, Daily    atorvastatin (LIPITOR) 40 mg, oral, Daily RT    BD Lois 2nd Gen Pen Needle 32 gauge x 5/32\" needle USE 1 NEEDLE ONCE DAILY    celecoxib (CELEBREX) 200 mg, oral, 2 times daily    DULoxetine (CYMBALTA) 60 mg, Every 24 hours    FreeStyle Mikala 3 Sensor device     furosemide (LASIX) 40 mg, oral, Daily    hydroxychloroquine (Plaquenil) 200 mg tablet 1 tablet, oral, 2 times daily    insulin lispro (HUMALOG) 10 Units, subcutaneous    Levemir FlexPen 50 Units, subcutaneous, Nightly    levothyroxine (SYNTHROID, LEVOXYL) " 50 mcg, oral, Daily    lisinopril 20 mg, oral, Daily    metFORMIN (GLUCOPHAGE) 500 mg, oral, 2 times daily (morning and late afternoon)    methadone (DOLOPHINE) 77 mg, oral, IN THE MORNING THEN 78 MG IN THE EVENING    omeprazole (PRILOSEC) 40 mg, oral, Daily    pantoprazole (PROTONIX) 40 mg    predniSONE (Deltasone) 2.5 mg tablet 2 tablets, oral, Daily    predniSONE (DELTASONE) 5 mg, oral, Daily    pregabalin (LYRICA) 200 mg, oral, 3 times daily    riTUXimab-abbs (TRUXIMA) 1,000 mg, Every 6 months    Spiriva Respimat 2.5 mcg/actuation inhaler 2 puffs, inhalation, Daily    sulfaSALAzine (Azulfidine) 500 mg tablet 3 tablets, oral, 2 times daily    Wixela Inhub 100-50 mcg/dose diskus inhaler INHALE 1 PUFF AS INSTRUCTED TWICE DAILY. RINSE AND GARGLE MOUTH WITH WATER AFTER EACH USE.        Last Recorded Vitals:  There were no vitals filed for this visit.    Review of Systems   All other systems reviewed and are negative.     Physical Exam:  Constitutional:       Appearance: Healthy appearance. Not in distress.   Neck:      Vascular: No JVR. JVD normal.   Pulmonary:      Effort: Pulmonary effort is normal.      Breath sounds: Normal breath sounds. No wheezing. No rhonchi. No rales.   Chest:      Chest wall: Not tender to palpatation.   Cardiovascular:      PMI at left midclavicular line. Normal rate. Regular rhythm. Normal S1. Normal S2.       Murmurs: There is no murmur.      No gallop.  No click. No rub.   Pulses:     Intact distal pulses.   Edema:     Peripheral edema absent.   Abdominal:      General: Bowel sounds are normal.      Palpations: Abdomen is soft.      Tenderness: There is no abdominal tenderness.   Musculoskeletal: Normal range of motion.         General: No tenderness. Skin:     General: Skin is warm and dry.   Neurological:      General: No focal deficit present.      Mental Status: Alert and oriented to person, place and time.            Last Labs:  CBC -  Lab Results   Component Value Date    WBC 10.9  03/12/2024    HGB 10.8 (L) 03/12/2024    HCT 34.0 (L) 03/12/2024    MCV 77 (L) 03/12/2024     03/12/2024       CMP -  Lab Results   Component Value Date    CALCIUM 8.6 03/12/2024    PHOS 4.6 10/15/2023    PROT 6.6 03/12/2024    ALBUMIN 3.7 03/12/2024    AST 10 03/12/2024    ALT 7 (L) 03/12/2024    ALKPHOS 102 03/12/2024    BILITOT 0.3 03/12/2024       LIPID PANEL -   Lab Results   Component Value Date    CHOL 186 01/20/2021    TRIG 331 (H) 01/20/2021    HDL 37.0 (A) 01/20/2021    CHHDL 5.0 01/20/2021    LDLF 83 01/20/2021    VLDL 66 (H) 01/20/2021    NHDL 149 01/20/2021       RENAL FUNCTION PANEL -   Lab Results   Component Value Date    GLUCOSE 84 03/12/2024     03/12/2024    K 4.3 03/12/2024     03/12/2024    CO2 26 03/12/2024    ANIONGAP 11 03/12/2024    BUN 13 03/12/2024    CREATININE 0.70 03/12/2024    GFRMALE >90 02/15/2023    CALCIUM 8.6 03/12/2024    PHOS 4.6 10/15/2023    ALBUMIN 3.7 03/12/2024        Lab Results   Component Value Date    HGBA1C 8.5 (H) 05/15/2024    HGBA1C 9.6 (H) 10/12/2023       Last Cardiology Tests:  07/02/2018 - Cardiac Catheterization (LH)  Mild Non-Obstructive CAD.     08/04/2018 - Stress Test  1. Abnormal Regadenoson stress test secondary to chest pain without EKG changes.  2. Technically difficult study due to gut uptake. Probable fixed inferior scar. Well-maintained left ventricular function.     Lab review: I have personally reviewed the laboratory result(s).    Assessment/Plan   1) Chest Pressure in a Patient with a H/O Mild CAD by Cath 2018  Stress 08/04/2018 abnormal s/t chest pain without EKG changes  LHC 07/02/2018 with mild non-obstructive CAD  Lipid panel 05/14/2024 with total cholesterol, LDL and triglycerides of 194, 111 and 242 respectively        Scribe Attestation  By signing my name below, IDiane Scribe   attest that this documentation has been prepared under the direction and in the presence of Wesley Arriaga MD.

## 2024-06-08 ENCOUNTER — LAB REQUISITION (OUTPATIENT)
Dept: LAB | Facility: HOSPITAL | Age: 59
End: 2024-06-08
Payer: MEDICARE

## 2024-06-08 DIAGNOSIS — E11.621 TYPE 2 DIABETES MELLITUS WITH FOOT ULCER (CODE) (MULTI): ICD-10-CM

## 2024-06-10 LAB
BACTERIA SPEC CULT: ABNORMAL
GRAM STN SPEC: ABNORMAL
GRAM STN SPEC: ABNORMAL

## 2024-06-14 ENCOUNTER — APPOINTMENT (OUTPATIENT)
Dept: WOUND CARE | Facility: CLINIC | Age: 59
End: 2024-06-14
Payer: MEDICARE

## 2024-06-27 RX ORDER — IBUPROFEN 800 MG/1
800 TABLET ORAL EVERY 8 HOURS PRN
Status: ON HOLD | COMMUNITY
Start: 2024-02-12

## 2024-06-30 ENCOUNTER — APPOINTMENT (OUTPATIENT)
Dept: RADIOLOGY | Facility: HOSPITAL | Age: 59
DRG: 282 | End: 2024-06-30
Payer: MEDICARE

## 2024-06-30 ENCOUNTER — APPOINTMENT (OUTPATIENT)
Dept: CARDIOLOGY | Facility: HOSPITAL | Age: 59
DRG: 282 | End: 2024-06-30
Payer: MEDICARE

## 2024-06-30 ENCOUNTER — HOSPITAL ENCOUNTER (INPATIENT)
Facility: HOSPITAL | Age: 59
End: 2024-06-30
Attending: EMERGENCY MEDICINE | Admitting: STUDENT IN AN ORGANIZED HEALTH CARE EDUCATION/TRAINING PROGRAM
Payer: MEDICARE

## 2024-06-30 VITALS
BODY MASS INDEX: 31.8 KG/M2 | HEIGHT: 67 IN | TEMPERATURE: 97.5 F | OXYGEN SATURATION: 100 % | WEIGHT: 202.6 LBS | RESPIRATION RATE: 16 BRPM | SYSTOLIC BLOOD PRESSURE: 125 MMHG | HEART RATE: 73 BPM | DIASTOLIC BLOOD PRESSURE: 61 MMHG

## 2024-06-30 DIAGNOSIS — R06.09 DOE (DYSPNEA ON EXERTION): ICD-10-CM

## 2024-06-30 DIAGNOSIS — I21.4 NSTEMI (NON-ST ELEVATED MYOCARDIAL INFARCTION) (MULTI): Primary | ICD-10-CM

## 2024-06-30 PROBLEM — D50.9 MICROCYTIC ANEMIA: Status: ACTIVE | Noted: 2024-06-30

## 2024-06-30 PROBLEM — R73.9 HYPERGLYCEMIA: Status: ACTIVE | Noted: 2024-06-30

## 2024-06-30 PROBLEM — F17.210 CIGARETTE NICOTINE DEPENDENCE: Status: ACTIVE | Noted: 2024-06-30

## 2024-06-30 LAB
ALBUMIN SERPL BCP-MCNC: 3.7 G/DL (ref 3.4–5)
ALP SERPL-CCNC: 143 U/L (ref 33–120)
ALT SERPL W P-5'-P-CCNC: 10 U/L (ref 10–52)
ANION GAP SERPL CALC-SCNC: 11 MMOL/L (ref 10–20)
ANION GAP SERPL CALC-SCNC: 14 MMOL/L (ref 10–20)
AST SERPL W P-5'-P-CCNC: 11 U/L (ref 9–39)
BASOPHILS # BLD AUTO: 0.08 X10*3/UL (ref 0–0.1)
BASOPHILS NFR BLD AUTO: 0.8 %
BILIRUB SERPL-MCNC: 0.4 MG/DL (ref 0–1.2)
BUN SERPL-MCNC: 13 MG/DL (ref 6–23)
BUN SERPL-MCNC: 14 MG/DL (ref 6–23)
CALCIUM SERPL-MCNC: 8.7 MG/DL (ref 8.6–10.3)
CALCIUM SERPL-MCNC: 9 MG/DL (ref 8.6–10.3)
CARDIAC TROPONIN I PNL SERPL HS: 135 NG/L (ref 0–20)
CARDIAC TROPONIN I PNL SERPL HS: 146 NG/L (ref 0–20)
CHLORIDE SERPL-SCNC: 102 MMOL/L (ref 98–107)
CHLORIDE SERPL-SCNC: 103 MMOL/L (ref 98–107)
CO2 SERPL-SCNC: 25 MMOL/L (ref 21–32)
CO2 SERPL-SCNC: 25 MMOL/L (ref 21–32)
CREAT SERPL-MCNC: 0.69 MG/DL (ref 0.5–1.3)
CREAT SERPL-MCNC: 0.72 MG/DL (ref 0.5–1.3)
EGFRCR SERPLBLD CKD-EPI 2021: >90 ML/MIN/1.73M*2
EGFRCR SERPLBLD CKD-EPI 2021: >90 ML/MIN/1.73M*2
EOSINOPHIL # BLD AUTO: 0.21 X10*3/UL (ref 0–0.7)
EOSINOPHIL NFR BLD AUTO: 2.1 %
ERYTHROCYTE [DISTWIDTH] IN BLOOD BY AUTOMATED COUNT: 16.7 % (ref 11.5–14.5)
ERYTHROCYTE [DISTWIDTH] IN BLOOD BY AUTOMATED COUNT: 16.8 % (ref 11.5–14.5)
GLUCOSE BLD MANUAL STRIP-MCNC: 135 MG/DL (ref 74–99)
GLUCOSE BLD MANUAL STRIP-MCNC: 194 MG/DL (ref 74–99)
GLUCOSE BLD MANUAL STRIP-MCNC: 202 MG/DL (ref 74–99)
GLUCOSE BLD MANUAL STRIP-MCNC: 228 MG/DL (ref 74–99)
GLUCOSE BLD MANUAL STRIP-MCNC: 245 MG/DL (ref 74–99)
GLUCOSE SERPL-MCNC: 192 MG/DL (ref 74–99)
GLUCOSE SERPL-MCNC: 227 MG/DL (ref 74–99)
HCT VFR BLD AUTO: 33.9 % (ref 41–52)
HCT VFR BLD AUTO: 34.8 % (ref 41–52)
HGB BLD-MCNC: 10.7 G/DL (ref 13.5–17.5)
HGB BLD-MCNC: 10.9 G/DL (ref 13.5–17.5)
IMM GRANULOCYTES # BLD AUTO: 0.06 X10*3/UL (ref 0–0.7)
IMM GRANULOCYTES NFR BLD AUTO: 0.6 % (ref 0–0.9)
LYMPHOCYTES # BLD AUTO: 1.94 X10*3/UL (ref 1.2–4.8)
LYMPHOCYTES NFR BLD AUTO: 19.4 %
MAGNESIUM SERPL-MCNC: 1.81 MG/DL (ref 1.6–2.4)
MCH RBC QN AUTO: 23.8 PG (ref 26–34)
MCH RBC QN AUTO: 23.9 PG (ref 26–34)
MCHC RBC AUTO-ENTMCNC: 31.3 G/DL (ref 32–36)
MCHC RBC AUTO-ENTMCNC: 31.6 G/DL (ref 32–36)
MCV RBC AUTO: 75 FL (ref 80–100)
MCV RBC AUTO: 76 FL (ref 80–100)
MONOCYTES # BLD AUTO: 0.72 X10*3/UL (ref 0.1–1)
MONOCYTES NFR BLD AUTO: 7.2 %
NEUTROPHILS # BLD AUTO: 6.99 X10*3/UL (ref 1.2–7.7)
NEUTROPHILS NFR BLD AUTO: 69.9 %
NRBC BLD-RTO: 0 /100 WBCS (ref 0–0)
NRBC BLD-RTO: 0.2 /100 WBCS (ref 0–0)
PLATELET # BLD AUTO: 451 X10*3/UL (ref 150–450)
PLATELET # BLD AUTO: 492 X10*3/UL (ref 150–450)
POTASSIUM SERPL-SCNC: 3.9 MMOL/L (ref 3.5–5.3)
POTASSIUM SERPL-SCNC: 4.1 MMOL/L (ref 3.5–5.3)
PROT SERPL-MCNC: 7.3 G/DL (ref 6.4–8.2)
RBC # BLD AUTO: 4.5 X10*6/UL (ref 4.5–5.9)
RBC # BLD AUTO: 4.57 X10*6/UL (ref 4.5–5.9)
SODIUM SERPL-SCNC: 135 MMOL/L (ref 136–145)
SODIUM SERPL-SCNC: 137 MMOL/L (ref 136–145)
TSH SERPL-ACNC: 1.25 MIU/L (ref 0.44–3.98)
UFH PPP CHRO-ACNC: 0.2 IU/ML
UFH PPP CHRO-ACNC: 0.2 IU/ML
UFH PPP CHRO-ACNC: 0.3 IU/ML
UFH PPP CHRO-ACNC: 0.3 IU/ML
WBC # BLD AUTO: 10 X10*3/UL (ref 4.4–11.3)
WBC # BLD AUTO: 11 X10*3/UL (ref 4.4–11.3)

## 2024-06-30 PROCEDURE — 2500000001 HC RX 250 WO HCPCS SELF ADMINISTERED DRUGS (ALT 637 FOR MEDICARE OP): Performed by: STUDENT IN AN ORGANIZED HEALTH CARE EDUCATION/TRAINING PROGRAM

## 2024-06-30 PROCEDURE — 85025 COMPLETE CBC W/AUTO DIFF WBC: CPT | Performed by: EMERGENCY MEDICINE

## 2024-06-30 PROCEDURE — 2500000004 HC RX 250 GENERAL PHARMACY W/ HCPCS (ALT 636 FOR OP/ED): Performed by: EMERGENCY MEDICINE

## 2024-06-30 PROCEDURE — 80048 BASIC METABOLIC PNL TOTAL CA: CPT | Mod: CCI | Performed by: STUDENT IN AN ORGANIZED HEALTH CARE EDUCATION/TRAINING PROGRAM

## 2024-06-30 PROCEDURE — 84443 ASSAY THYROID STIM HORMONE: CPT | Performed by: STUDENT IN AN ORGANIZED HEALTH CARE EDUCATION/TRAINING PROGRAM

## 2024-06-30 PROCEDURE — 93005 ELECTROCARDIOGRAM TRACING: CPT

## 2024-06-30 PROCEDURE — 84484 ASSAY OF TROPONIN QUANT: CPT | Performed by: EMERGENCY MEDICINE

## 2024-06-30 PROCEDURE — 85027 COMPLETE CBC AUTOMATED: CPT | Performed by: STUDENT IN AN ORGANIZED HEALTH CARE EDUCATION/TRAINING PROGRAM

## 2024-06-30 PROCEDURE — 83735 ASSAY OF MAGNESIUM: CPT | Performed by: EMERGENCY MEDICINE

## 2024-06-30 PROCEDURE — 82947 ASSAY GLUCOSE BLOOD QUANT: CPT | Mod: 91

## 2024-06-30 PROCEDURE — 96374 THER/PROPH/DIAG INJ IV PUSH: CPT

## 2024-06-30 PROCEDURE — 99291 CRITICAL CARE FIRST HOUR: CPT | Performed by: EMERGENCY MEDICINE

## 2024-06-30 PROCEDURE — 36415 COLL VENOUS BLD VENIPUNCTURE: CPT | Performed by: EMERGENCY MEDICINE

## 2024-06-30 PROCEDURE — 71045 X-RAY EXAM CHEST 1 VIEW: CPT

## 2024-06-30 PROCEDURE — 2500000004 HC RX 250 GENERAL PHARMACY W/ HCPCS (ALT 636 FOR OP/ED): Performed by: STUDENT IN AN ORGANIZED HEALTH CARE EDUCATION/TRAINING PROGRAM

## 2024-06-30 PROCEDURE — 2060000001 HC INTERMEDIATE ICU ROOM DAILY

## 2024-06-30 PROCEDURE — 2500000001 HC RX 250 WO HCPCS SELF ADMINISTERED DRUGS (ALT 637 FOR MEDICARE OP): Performed by: HOSPITALIST

## 2024-06-30 PROCEDURE — 99223 1ST HOSP IP/OBS HIGH 75: CPT | Performed by: INTERNAL MEDICINE

## 2024-06-30 PROCEDURE — 2500000005 HC RX 250 GENERAL PHARMACY W/O HCPCS: Performed by: HOSPITALIST

## 2024-06-30 PROCEDURE — S0109 METHADONE ORAL 5MG: HCPCS | Performed by: STUDENT IN AN ORGANIZED HEALTH CARE EDUCATION/TRAINING PROGRAM

## 2024-06-30 PROCEDURE — 2500000002 HC RX 250 W HCPCS SELF ADMINISTERED DRUGS (ALT 637 FOR MEDICARE OP, ALT 636 FOR OP/ED): Performed by: INTERNAL MEDICINE

## 2024-06-30 PROCEDURE — 99406 BEHAV CHNG SMOKING 3-10 MIN: CPT | Performed by: STUDENT IN AN ORGANIZED HEALTH CARE EDUCATION/TRAINING PROGRAM

## 2024-06-30 PROCEDURE — 84484 ASSAY OF TROPONIN QUANT: CPT | Mod: 91 | Performed by: EMERGENCY MEDICINE

## 2024-06-30 PROCEDURE — 99223 1ST HOSP IP/OBS HIGH 75: CPT | Performed by: STUDENT IN AN ORGANIZED HEALTH CARE EDUCATION/TRAINING PROGRAM

## 2024-06-30 PROCEDURE — 85520 HEPARIN ASSAY: CPT | Mod: 91 | Performed by: EMERGENCY MEDICINE

## 2024-06-30 PROCEDURE — 71045 X-RAY EXAM CHEST 1 VIEW: CPT | Performed by: RADIOLOGY

## 2024-06-30 PROCEDURE — 2500000001 HC RX 250 WO HCPCS SELF ADMINISTERED DRUGS (ALT 637 FOR MEDICARE OP): Performed by: EMERGENCY MEDICINE

## 2024-06-30 PROCEDURE — 82947 ASSAY GLUCOSE BLOOD QUANT: CPT

## 2024-06-30 PROCEDURE — 80053 COMPREHEN METABOLIC PANEL: CPT | Performed by: EMERGENCY MEDICINE

## 2024-06-30 PROCEDURE — 2500000002 HC RX 250 W HCPCS SELF ADMINISTERED DRUGS (ALT 637 FOR MEDICARE OP, ALT 636 FOR OP/ED): Performed by: STUDENT IN AN ORGANIZED HEALTH CARE EDUCATION/TRAINING PROGRAM

## 2024-06-30 PROCEDURE — 82374 ASSAY BLOOD CARBON DIOXIDE: CPT | Performed by: STUDENT IN AN ORGANIZED HEALTH CARE EDUCATION/TRAINING PROGRAM

## 2024-06-30 PROCEDURE — 93010 ELECTROCARDIOGRAM REPORT: CPT | Performed by: INTERNAL MEDICINE

## 2024-06-30 RX ORDER — ACETAMINOPHEN 325 MG/1
650 TABLET ORAL EVERY 6 HOURS PRN
Status: ACTIVE | OUTPATIENT
Start: 2024-06-30

## 2024-06-30 RX ORDER — ONDANSETRON 4 MG/1
4 TABLET, FILM COATED ORAL EVERY 8 HOURS PRN
Status: ACTIVE | OUTPATIENT
Start: 2024-06-30

## 2024-06-30 RX ORDER — PANTOPRAZOLE SODIUM 40 MG/10ML
40 INJECTION, POWDER, LYOPHILIZED, FOR SOLUTION INTRAVENOUS DAILY
Status: ACTIVE | OUTPATIENT
Start: 2024-06-30

## 2024-06-30 RX ORDER — METHADONE HYDROCHLORIDE 10 MG/1
80 TABLET ORAL NIGHTLY
Status: DISPENSED | OUTPATIENT
Start: 2024-06-30

## 2024-06-30 RX ORDER — HYDROXYCHLOROQUINE SULFATE 200 MG/1
200 TABLET, FILM COATED ORAL 2 TIMES DAILY
Status: DISPENSED | OUTPATIENT
Start: 2024-06-30

## 2024-06-30 RX ORDER — BISACODYL 5 MG
10 TABLET, DELAYED RELEASE (ENTERIC COATED) ORAL DAILY PRN
Status: ACTIVE | OUTPATIENT
Start: 2024-06-30

## 2024-06-30 RX ORDER — ATORVASTATIN CALCIUM 40 MG/1
40 TABLET, FILM COATED ORAL NIGHTLY
Status: DISPENSED | OUTPATIENT
Start: 2024-06-30

## 2024-06-30 RX ORDER — METHADONE HYDROCHLORIDE 5 MG/5ML
77 SOLUTION ORAL EVERY 12 HOURS
Status: DISCONTINUED | OUTPATIENT
Start: 2024-06-30 | End: 2024-06-30 | Stop reason: DRUGHIGH

## 2024-06-30 RX ORDER — PREDNISONE 5 MG/1
5 TABLET ORAL DAILY
Status: DISPENSED | OUTPATIENT
Start: 2024-06-30

## 2024-06-30 RX ORDER — BISACODYL 10 MG/1
10 SUPPOSITORY RECTAL DAILY PRN
Status: ACTIVE | OUTPATIENT
Start: 2024-06-30

## 2024-06-30 RX ORDER — NITROGLYCERIN 0.4 MG/1
0.4 TABLET SUBLINGUAL ONCE
Status: COMPLETED | OUTPATIENT
Start: 2024-06-30 | End: 2024-06-30

## 2024-06-30 RX ORDER — INSULIN LISPRO 100 [IU]/ML
0-5 INJECTION, SOLUTION INTRAVENOUS; SUBCUTANEOUS
Status: DISPENSED | OUTPATIENT
Start: 2024-06-30

## 2024-06-30 RX ORDER — PANTOPRAZOLE SODIUM 40 MG/1
40 TABLET, DELAYED RELEASE ORAL DAILY
Status: DISPENSED | OUTPATIENT
Start: 2024-06-30

## 2024-06-30 RX ORDER — METOPROLOL TARTRATE 25 MG/1
25 TABLET, FILM COATED ORAL 2 TIMES DAILY
Status: DISPENSED | OUTPATIENT
Start: 2024-06-30

## 2024-06-30 RX ORDER — FLUTICASONE FUROATE AND VILANTEROL 100; 25 UG/1; UG/1
1 POWDER RESPIRATORY (INHALATION) DAILY
Status: DISPENSED | OUTPATIENT
Start: 2024-06-30

## 2024-06-30 RX ORDER — METHADONE HYDROCHLORIDE 10 MG/1
70 TABLET ORAL DAILY
Status: DISPENSED | OUTPATIENT
Start: 2024-06-30

## 2024-06-30 RX ORDER — PREGABALIN 75 MG/1
150 CAPSULE ORAL 3 TIMES DAILY
Status: DISPENSED | OUTPATIENT
Start: 2024-06-30

## 2024-06-30 RX ORDER — DEXTROSE 50 % IN WATER (D50W) INTRAVENOUS SYRINGE
12.5
Status: ACTIVE | OUTPATIENT
Start: 2024-06-30

## 2024-06-30 RX ORDER — METHADONE HYDROCHLORIDE 5 MG/1
5 TABLET ORAL DAILY
Status: DISPENSED | OUTPATIENT
Start: 2024-06-30

## 2024-06-30 RX ORDER — NAPROXEN SODIUM 220 MG/1
324 TABLET, FILM COATED ORAL ONCE
Status: COMPLETED | OUTPATIENT
Start: 2024-06-30 | End: 2024-06-30

## 2024-06-30 RX ORDER — HEPARIN SODIUM 10000 [USP'U]/100ML
0-4000 INJECTION, SOLUTION INTRAVENOUS CONTINUOUS
Status: DISPENSED | OUTPATIENT
Start: 2024-06-30

## 2024-06-30 RX ORDER — ONDANSETRON HYDROCHLORIDE 2 MG/ML
4 INJECTION, SOLUTION INTRAVENOUS EVERY 8 HOURS PRN
Status: ACTIVE | OUTPATIENT
Start: 2024-06-30

## 2024-06-30 RX ORDER — DEXTROSE 50 % IN WATER (D50W) INTRAVENOUS SYRINGE
25
Status: ACTIVE | OUTPATIENT
Start: 2024-06-30

## 2024-06-30 RX ORDER — LEVOTHYROXINE SODIUM 50 UG/1
50 TABLET ORAL DAILY
Status: DISPENSED | OUTPATIENT
Start: 2024-06-30

## 2024-06-30 RX ORDER — TALC
3 POWDER (GRAM) TOPICAL NIGHTLY PRN
Status: ACTIVE | OUTPATIENT
Start: 2024-06-30

## 2024-06-30 RX ORDER — GUAIFENESIN 600 MG/1
600 TABLET, EXTENDED RELEASE ORAL EVERY 12 HOURS PRN
Status: ACTIVE | OUTPATIENT
Start: 2024-06-30

## 2024-06-30 RX ORDER — FUROSEMIDE 40 MG/1
40 TABLET ORAL DAILY
Status: DISCONTINUED | OUTPATIENT
Start: 2024-06-30 | End: 2024-06-30 | Stop reason: ALTCHOICE

## 2024-06-30 RX ORDER — LISINOPRIL 10 MG/1
20 TABLET ORAL DAILY
Status: DISCONTINUED | OUTPATIENT
Start: 2024-06-30 | End: 2024-06-30 | Stop reason: ALTCHOICE

## 2024-06-30 RX ORDER — PREGABALIN 50 MG/1
50 CAPSULE ORAL 3 TIMES DAILY
Status: DISPENSED | OUTPATIENT
Start: 2024-06-30

## 2024-06-30 RX ORDER — AMMONIUM LACTATE 12 G/100G
CREAM TOPICAL AS NEEDED
Status: DISCONTINUED | OUTPATIENT
Start: 2024-06-30 | End: 2024-06-30 | Stop reason: ALTCHOICE

## 2024-06-30 RX ORDER — ASPIRIN 81 MG/1
81 TABLET ORAL DAILY
Status: DISPENSED | OUTPATIENT
Start: 2024-06-30

## 2024-06-30 RX ORDER — DULOXETIN HYDROCHLORIDE 30 MG/1
60 CAPSULE, DELAYED RELEASE ORAL EVERY 24 HOURS
Status: DISCONTINUED | OUTPATIENT
Start: 2024-06-30 | End: 2024-06-30 | Stop reason: ALTCHOICE

## 2024-06-30 RX ORDER — LORAZEPAM 0.5 MG/1
0.5 TABLET ORAL EVERY 8 HOURS PRN
Status: COMPLETED | OUTPATIENT
Start: 2024-06-30 | End: 2024-06-30

## 2024-06-30 RX ADMIN — METHADONE HYDROCHLORIDE 80 MG: 10 TABLET ORAL at 21:06

## 2024-06-30 RX ADMIN — ASPIRIN 81 MG CHEWABLE TABLET 324 MG: 81 TABLET CHEWABLE at 02:23

## 2024-06-30 RX ADMIN — ATORVASTATIN CALCIUM 40 MG: 40 TABLET, FILM COATED ORAL at 21:07

## 2024-06-30 RX ADMIN — METHADONE HYDROCHLORIDE 5 MG: 5 TABLET ORAL at 08:48

## 2024-06-30 RX ADMIN — PREGABALIN 150 MG: 75 CAPSULE ORAL at 08:48

## 2024-06-30 RX ADMIN — LEVOTHYROXINE SODIUM 50 MCG: 50 TABLET ORAL at 06:22

## 2024-06-30 RX ADMIN — INSULIN LISPRO 2 UNITS: 100 INJECTION, SOLUTION INTRAVENOUS; SUBCUTANEOUS at 17:01

## 2024-06-30 RX ADMIN — TICAGRELOR 90 MG: 90 TABLET ORAL at 21:07

## 2024-06-30 RX ADMIN — METHADONE HYDROCHLORIDE 70 MG: 10 TABLET ORAL at 08:46

## 2024-06-30 RX ADMIN — TIOTROPIUM BROMIDE INHALATION SPRAY 2 PUFF: 3.12 SPRAY, METERED RESPIRATORY (INHALATION) at 08:47

## 2024-06-30 RX ADMIN — INSULIN DETEMIR 50 UNITS: 100 INJECTION, SOLUTION SUBCUTANEOUS at 21:07

## 2024-06-30 RX ADMIN — PREGABALIN 150 MG: 75 CAPSULE ORAL at 15:40

## 2024-06-30 RX ADMIN — PREDNISONE 5 MG: 5 TABLET ORAL at 08:45

## 2024-06-30 RX ADMIN — METOPROLOL TARTRATE 25 MG: 25 TABLET, FILM COATED ORAL at 08:46

## 2024-06-30 RX ADMIN — METOPROLOL TARTRATE 25 MG: 25 TABLET, FILM COATED ORAL at 21:07

## 2024-06-30 RX ADMIN — PREGABALIN 150 MG: 75 CAPSULE ORAL at 21:06

## 2024-06-30 RX ADMIN — HEPARIN SODIUM 1400 UNITS/HR: 10000 INJECTION, SOLUTION INTRAVENOUS at 19:13

## 2024-06-30 RX ADMIN — HYDROXYCHLOROQUINE SULFATE 200 MG: 200 TABLET, FILM COATED ORAL at 21:07

## 2024-06-30 RX ADMIN — Medication 2 L/MIN: at 19:53

## 2024-06-30 RX ADMIN — PREGABALIN 50 MG: 50 CAPSULE ORAL at 08:45

## 2024-06-30 RX ADMIN — PANTOPRAZOLE SODIUM 40 MG: 40 TABLET, DELAYED RELEASE ORAL at 08:47

## 2024-06-30 RX ADMIN — FLUTICASONE FUROATE AND VILANTEROL TRIFENATATE 1 PUFF: 100; 25 POWDER RESPIRATORY (INHALATION) at 08:47

## 2024-06-30 RX ADMIN — LORAZEPAM 0.5 MG: 0.5 TABLET ORAL at 17:01

## 2024-06-30 RX ADMIN — PREGABALIN 50 MG: 50 CAPSULE ORAL at 15:40

## 2024-06-30 RX ADMIN — INSULIN LISPRO 1 UNITS: 100 INJECTION, SOLUTION INTRAVENOUS; SUBCUTANEOUS at 08:00

## 2024-06-30 RX ADMIN — HEPARIN SODIUM 1000 UNITS/HR: 10000 INJECTION, SOLUTION INTRAVENOUS at 03:17

## 2024-06-30 RX ADMIN — HYDROXYCHLOROQUINE SULFATE 200 MG: 200 TABLET, FILM COATED ORAL at 08:46

## 2024-06-30 RX ADMIN — PREGABALIN 50 MG: 50 CAPSULE ORAL at 21:06

## 2024-06-30 RX ADMIN — ASPIRIN 81 MG: 81 TABLET, COATED ORAL at 08:45

## 2024-06-30 RX ADMIN — NITROGLYCERIN 0.4 MG: 0.4 TABLET SUBLINGUAL at 03:26

## 2024-06-30 RX ADMIN — TICAGRELOR 180 MG: 90 TABLET ORAL at 13:00

## 2024-06-30 SDOH — SOCIAL STABILITY: SOCIAL INSECURITY: HAVE YOU HAD ANY THOUGHTS OF HARMING ANYONE ELSE?: NO

## 2024-06-30 SDOH — SOCIAL STABILITY: SOCIAL INSECURITY: HAVE YOU HAD THOUGHTS OF HARMING ANYONE ELSE?: NO

## 2024-06-30 SDOH — SOCIAL STABILITY: SOCIAL INSECURITY: ARE YOU OR HAVE YOU BEEN THREATENED OR ABUSED PHYSICALLY, EMOTIONALLY, OR SEXUALLY BY ANYONE?: NO

## 2024-06-30 SDOH — SOCIAL STABILITY: SOCIAL INSECURITY: DO YOU FEEL ANYONE HAS EXPLOITED OR TAKEN ADVANTAGE OF YOU FINANCIALLY OR OF YOUR PERSONAL PROPERTY?: NO

## 2024-06-30 SDOH — SOCIAL STABILITY: SOCIAL INSECURITY: DO YOU FEEL UNSAFE GOING BACK TO THE PLACE WHERE YOU ARE LIVING?: NO

## 2024-06-30 SDOH — SOCIAL STABILITY: SOCIAL INSECURITY: DOES ANYONE TRY TO KEEP YOU FROM HAVING/CONTACTING OTHER FRIENDS OR DOING THINGS OUTSIDE YOUR HOME?: NO

## 2024-06-30 SDOH — SOCIAL STABILITY: SOCIAL INSECURITY: HAS ANYONE EVER THREATENED TO HURT YOUR FAMILY OR YOUR PETS?: NO

## 2024-06-30 SDOH — SOCIAL STABILITY: SOCIAL INSECURITY: WERE YOU ABLE TO COMPLETE ALL THE BEHAVIORAL HEALTH SCREENINGS?: YES

## 2024-06-30 SDOH — SOCIAL STABILITY: SOCIAL INSECURITY: ABUSE: ADULT

## 2024-06-30 SDOH — SOCIAL STABILITY: SOCIAL INSECURITY: ARE THERE ANY APPARENT SIGNS OF INJURIES/BEHAVIORS THAT COULD BE RELATED TO ABUSE/NEGLECT?: NO

## 2024-06-30 ASSESSMENT — ACTIVITIES OF DAILY LIVING (ADL)
HEARING - LEFT EAR: FUNCTIONAL
TOILETING: INDEPENDENT
JUDGMENT_ADEQUATE_SAFELY_COMPLETE_DAILY_ACTIVITIES: YES
DRESSING YOURSELF: INDEPENDENT
PATIENT'S MEMORY ADEQUATE TO SAFELY COMPLETE DAILY ACTIVITIES?: YES
BATHING: INDEPENDENT
HEARING - RIGHT EAR: FUNCTIONAL
LACK_OF_TRANSPORTATION: NO
WALKS IN HOME: INDEPENDENT
FEEDING YOURSELF: INDEPENDENT
GROOMING: INDEPENDENT
ADEQUATE_TO_COMPLETE_ADL: YES

## 2024-06-30 ASSESSMENT — ENCOUNTER SYMPTOMS
MYALGIAS: 0
NAUSEA: 1
RHINORRHEA: 0
ACTIVITY CHANGE: 0
COLOR CHANGE: 0
ABDOMINAL DISTENTION: 0
HEADACHES: 0
FLANK PAIN: 0
SORE THROAT: 0
CHEST TIGHTNESS: 0
WOUND: 0
AGITATION: 0
WEAKNESS: 0
DECREASED CONCENTRATION: 0
WHEEZING: 0
CHILLS: 0
HEMATURIA: 0
NUMBNESS: 0
APNEA: 0
FEVER: 0
CONSTIPATION: 0
DIAPHORESIS: 0
DIZZINESS: 0
DIARRHEA: 0
NERVOUS/ANXIOUS: 0
SHORTNESS OF BREATH: 1
FATIGUE: 0
JOINT SWELLING: 0
LIGHT-HEADEDNESS: 0
DYSURIA: 0
DIFFICULTY URINATING: 0
ABDOMINAL PAIN: 0
BACK PAIN: 0
CONFUSION: 0
APPETITE CHANGE: 0
PALPITATIONS: 0
COUGH: 0
FREQUENCY: 0
STRIDOR: 0
ARTHRALGIAS: 0
VOMITING: 0
SINUS PAIN: 0

## 2024-06-30 ASSESSMENT — PAIN SCALES - GENERAL
PAINLEVEL_OUTOF10: 3
PAINLEVEL_OUTOF10: 0 - NO PAIN

## 2024-06-30 ASSESSMENT — LIFESTYLE VARIABLES
AUDIT-C TOTAL SCORE: 0
HOW MANY STANDARD DRINKS CONTAINING ALCOHOL DO YOU HAVE ON A TYPICAL DAY: PATIENT DOES NOT DRINK
AUDIT-C TOTAL SCORE: 0
EVER HAD A DRINK FIRST THING IN THE MORNING TO STEADY YOUR NERVES TO GET RID OF A HANGOVER: NO
TOTAL SCORE: 0
SUBSTANCE_ABUSE_PAST_12_MONTHS: NO
HOW OFTEN DO YOU HAVE A DRINK CONTAINING ALCOHOL: NEVER
HOW OFTEN DO YOU HAVE 6 OR MORE DRINKS ON ONE OCCASION: NEVER
PRESCIPTION_ABUSE_PAST_12_MONTHS: NO
HAVE PEOPLE ANNOYED YOU BY CRITICIZING YOUR DRINKING: NO
SKIP TO QUESTIONS 9-10: 1
HAVE YOU EVER FELT YOU SHOULD CUT DOWN ON YOUR DRINKING: NO
EVER FELT BAD OR GUILTY ABOUT YOUR DRINKING: NO

## 2024-06-30 ASSESSMENT — PAIN DESCRIPTION - LOCATION: LOCATION: CHEST

## 2024-06-30 ASSESSMENT — COGNITIVE AND FUNCTIONAL STATUS - GENERAL
MOBILITY SCORE: 24
MOBILITY SCORE: 24
DAILY ACTIVITIY SCORE: 24
PATIENT BASELINE BEDBOUND: NO
DAILY ACTIVITIY SCORE: 24

## 2024-06-30 ASSESSMENT — COLUMBIA-SUICIDE SEVERITY RATING SCALE - C-SSRS
6. HAVE YOU EVER DONE ANYTHING, STARTED TO DO ANYTHING, OR PREPARED TO DO ANYTHING TO END YOUR LIFE?: NO
1. IN THE PAST MONTH, HAVE YOU WISHED YOU WERE DEAD OR WISHED YOU COULD GO TO SLEEP AND NOT WAKE UP?: NO
2. HAVE YOU ACTUALLY HAD ANY THOUGHTS OF KILLING YOURSELF?: NO

## 2024-06-30 ASSESSMENT — PAIN - FUNCTIONAL ASSESSMENT
PAIN_FUNCTIONAL_ASSESSMENT: 0-10

## 2024-06-30 ASSESSMENT — PATIENT HEALTH QUESTIONNAIRE - PHQ9
1. LITTLE INTEREST OR PLEASURE IN DOING THINGS: NOT AT ALL
2. FEELING DOWN, DEPRESSED OR HOPELESS: NOT AT ALL
SUM OF ALL RESPONSES TO PHQ9 QUESTIONS 1 & 2: 0

## 2024-06-30 NOTE — NURSING NOTE
Patient repeatedly saying he feels short of breath and anxious despite spo2 reading 99% on room air. IMS notified.    New order for one time dose of 0.5mg po ativan and PRN O2 at 2L

## 2024-06-30 NOTE — PROGRESS NOTES
"Kael Zepeda [id number]   Service: Internal Medicine / Hospitalist Date of service: 6/30/2024                          Full Code                    Subjective    History Of Present Illness:  Kael Zepeda is a 58 y.o. male with PMHx s/f RA, IDDM2, COPD, hypothyroidism, chronic tobacco abuse, chronic methadone dependency,  neuropathy/diabetic foot ulcer with osteomyelitis presenting with chest pain. Patient states for the past week he has had intermittent chest pains, but they have worsened substantially in the last 3 days.  He describes it as a left-sided pressure that radiates down his arms and last 10 to 15 minutes at times.  He also describes a full body \"flushing\" sensation that occurs when the chest pain ends.  He also admits to worsening dyspnea on exertion and overall is unable to ambulate much without becoming severely winded lately.  He is having some mild nausea with it but no vomiting.  He has no known history of cardiac disease but does have a history of insulin-dependent diabetes mellitus type 2.  He was also seen in the ER here earlier this month for calluses and ulcerations on his feet.  He has recently established with the wound center for follow-up.  Patient denies vomiting, cough,, sputum production, abdominal pain, lightheadedness, dizziness, fever or chills. He is a chronic cigarette smoker 1 ppd X 40 years.     ED Course (Summary):   Vitals on presentation: 97.2 F<, 91 bpm, 12 rr, 133/68, 100% on RA  Labs: CMP glu 192, alk phos 143  Mag 1.81  Trop 146 --> 135  CBC WBC 10.0, Hg 10.9, Plt 492  Imaging: CXR - No acute cardiopulmonary process.  EKG - Sinus rhythm at 91 bpm. No significant ST changes.  Interventions: Nitrostat 0.4 mg, aspirin 324 mg, Cardiology was consulted for his elevated troponins and recommended heparin drip to treat NSTEMI and will see pt in morning. Pt will be admitted for further care.    6/30................. Patient reported shortness of breath this morning " but pulse ox at 99% when checked.  No reported : nausea, vomiting, tremors, fevers, chills, palpitations or abdominal pain.      Review of Systems:   Review of system otherwise negative if not aforementioned above in subjective.    Objective              Physical Exam     Constitutional:       Appearance: Patient appeared in no acute cardiopulmonary distress.     Comments: Patient alert and oriented to person place time and situation.  HEENT:      Head: Normocephalic and atraumatic.Trachea midline      Nose:No observed congestion or rhinorrhea.     Mouth/Throat: Mucous membranes Moist, Trachea appeared  midline.  Eyes:      Extraocular Movements: Extraocular movements intact.      Pupils: Pupils are equal, round, and reactive to light.      Comments: No scleral icterus or conjunctival injection appreciated.   Cardiovascular:      Rate and Rhythm: Normal rate and regular rhythm. No clicks rubs or gallops, normal S1 and S2.No peripheral stigmata of endocarditis appreciated.     Pulmonary:      Lung bases diminished but no adventitious sound appreciated.  Abdominal:      General: Abdomen soft, nontender, active bowel sounds, no involuntary guarding or rebound tenderness appreciated.     Comments: None   Musculoskeletal:       Patient appeared to have full active range of motion for upper and lower extremities, no acute apparent joint deformity appreciated on examination.   No pitting edema or cyanosis appreciated.       Lymphadenopathy:      No appreciable palpable lymphadenopathy  Skin:     General: Skin is warm.      Coloration:  No jaundice       Neurological:      General: No focal sensory or motor deficits appreciated, no meningeal signs or dysmetria noted.      Cranial Nerves: Cranial nerves II to XII appearing grossly intact.     Genitals:  Deferred  Psychiatric:         The patient appears to be displaying normal mood and affect at the time of evaluation.    Labs:     Lab Results   Component Value Date     GLUCOSE 227 (H) 06/30/2024    CALCIUM 8.7 06/30/2024     (L) 06/30/2024    K 3.9 06/30/2024    CO2 25 06/30/2024     06/30/2024    BUN 13 06/30/2024    CREATININE 0.69 06/30/2024      Lab Results   Component Value Date    WBC 11.0 06/30/2024    HGB 10.7 (L) 06/30/2024    HCT 33.9 (L) 06/30/2024    MCV 75 (L) 06/30/2024     (H) 06/30/2024      [unfilled]   [unfilled]   Susceptibility data from last 90 days.  Collected Specimen Info Organism Clindamycin Erythromycin Oxacillin Tetracycline Trimethoprim/Sulfamethoxazole Vancomycin   06/07/24 Tissue/Biopsy from Wound/Tissue Methicillin Resistant Staphylococcus aureus (MRSA)  S  R  R  S  S  S               X-rays/ Images    [unfilled] Radiology Results (last 21 days)    Procedure Component Value Units Date/Time   XR chest 1 view [[id number]] Collected: 06/30/24 0328   Order Status: Completed Updated: 06/30/24 0328   Narrative:     Interpreted By:  Andreea James,  STUDY:  XR CHEST 1 VIEW;  6/30/2024 2:34 am      INDICATION:  Signs/Symptoms:Chest Pain.      COMPARISON:  06/27/2018      ACCESSION NUMBER(S):  [id number]      ORDERING CLINICIAN:  MILANA FISHER      FINDINGS:          CARDIOMEDIASTINAL SILHOUETTE:  Cardiomediastinal silhouette is normal in size and configuration.      LUNGS:  No pulmonary consolidation, pleural effusion or pneumothorax. Chronic  interstitial coarsening.      ABDOMEN:  No remarkable upper abdominal findings.      BONES:  No acute osseous abnormality.       Impression:     No acute cardiopulmonary process.      MACRO:  None      Signed by: Andreea James 6/30/2024 3:27 AM  Dictation workstation:   [id number]             Medical Problems       Problem List       * (Principal) NSTEMI (non-ST elevated myocardial infarction) (Multi)    Diabetic foot ulcer with osteomyelitis (Multi)    Diabetes mellitus, type 2 (Multi)    Acute renal failure syndrome (CMS-HCC)    Benign essential hypertension    Chest discomfort     Chronic GERD    Gastroesophageal reflux disease without esophagitis    Corns and callosities    Degeneration of intervertebral disc of lumbar region    Depression    Dermatitis    Chronic back pain    Fibromyalgia    Generalized anxiety disorder    Overview Signed 6/7/2024  8:16 AM by Addis Moore MA     Note: Dov         Heart palpitations    Hepatitis B core antibody positive    History of elevated lipids    History of hepatitis C    History of hypertension    History of hypothyroidism    Hyperlipidemia, mixed    Hypothyroidism, acquired    Elevated troponin    Lumbar radiculopathy    Lumbar spondylosis    Morbid (severe) obesity due to excess calories (Multi)    Obesity, Class I, BMI 30-34.9    Opioid dependence, in remission (Multi)    Patient's noncompliance with other medical treatment and regimen due to unspecified reason    Peripheral neuropathy    Post-op pain    Primary insomnia    Pulmonary emphysema (Multi)    Rheumatoid arthritis (Multi)    Overview Signed 6/7/2024  8:16 AM by Addis Moore MA     Note: Dov         Rheumatoid arthritis involving both hands with positive rheumatoid factor (Multi)    Rheumatoid nodules (Multi)    Abscess of scrotum    Scrotal cyst    Scrotal wall abscess    Spinal stenosis of lumbosacral region    Steroid-induced osteoporosis    Tobacco use disorder    Ulcer of toe of right foot, limited to breakdown of skin (Multi)    Vitamin D deficiency    Hyperglycemia    Microcytic anemia    Cigarette nicotine dependence    Acute osteomyelitis of right foot (Multi)               Above medical problems may be reflective of historical medical problems that may have resolved and may not related to acute clinical condition/medical problems.        Clinical impression/plan:    Principal Problem:    NSTEMI (non-ST elevated myocardial infarction) (Multi)  Active Problems:    Diabetes mellitus, type 2 (Multi)    Benign essential hypertension    Chest discomfort     Fibromyalgia    Generalized anxiety disorder    History of hypertension    History of hypothyroidism    Elevated troponin    Rheumatoid arthritis (Multi)    Hyperglycemia    Microcytic anemia    Cigarette nicotine dependence     Plan:  Admit to stepdown unit     NSTEMI:  Trops 146 --> 135  Heparin drip, ASA, and Lopressor PO BID started  Cardiology consulted  No EKG changes appreciated.  Home Lipitor  TTE ordered     RA:  Home Plaquenil and Prednisone     Neuropathy with ulcerations/wounds:  Home Lyrica  Will need outpatient follow-up     COPD, tobacco abuse:  Home inhalers  Not in acute exacerbation on exam  5 minutes spent on tobacco cessation counseling.     Hypothyroidism:  Home Synthroid  Check TSH/free T4     DM2:  Home Levemir + SSI     Diet: Carb controlled  DVT Prophylaxis: Heparin drip  Code Status: Full code per discussion with pt.         Disposition/additional care plan/interventions: 6/30/2024    Appreciate input and recommendations from cardiology    Dual antiplatelet therapy    Continue heparin drip................ monitor for bleeding complications    Anticipate heart catheterization in the next 24 hours/invasive evaluation of patient's current anatomy.    Hypoglycemic protocol    Known COPD without acute exacerbation    Tobacco cessation advised, patient reported not smoking for the last 3 days.    Patient pretty medically stable but still remains at risk for clinical decline and deterioration.    Hold metformin    Continue insulin sliding scale    Continue chronic longitudinal therapy/LAMA LABA for COPD no clinical signs of exacerbation appreciate this juncture.    Tobacco patient advised      Addendum: Patient reported some shortness of breath better  latertoday mild in nature pulse oximetry at 99% possible shortness of breath related to Brilinta, will continue to monitor    Patient appeared hemodynamically stable but still remains at risk for clinical decline and deterioration will continue  close monitoring  The patient was informed of differential diagnosis , work up , plan of care and possible sequelae of clinical disposition.Patient in agreement with plan of care. Further recommendations forthcoming in accordance with patient's clinical disposition and response to care.    Discharge planning:Discharge timing to be determined pending heart catheterization findings.    Care time: > 55 mins           Dictation performed with assistance of voice recognition device therefore transcription errors are possible.

## 2024-06-30 NOTE — CARE PLAN
The patient's goals for the shift include  stay informed    The clinical goals for the shift include patient will remain hemodynamically stable throughout the shift    Over the shift, the patient did make progress toward the following goals. Barriers to progression include understanding. Recommendations to address these barriers include education.

## 2024-06-30 NOTE — H&P
"[facility] - GENERAL MEDICINE HISTORY AND PHYSICAL    History Obtained From: Pt    History Of Present Illness:  Kael Zepeda is a 58 y.o. male with PMHx s/f RA, IDDM2, COPD, hypothyroidism, chronic tobacco abuse, chronic methadone dependency,  neuropathy/diabetic foot ulcer with osteomyelitis presenting with chest pain. Patient states for the past week he has had intermittent chest pains, but they have worsened substantially in the last 3 days.  He describes it as a left-sided pressure that radiates down his arms and last 10 to 15 minutes at times.  He also describes a full body \"flushing\" sensation that occurs when the chest pain ends.  He also admits to worsening dyspnea on exertion and overall is unable to ambulate much without becoming severely winded lately.  He is having some mild nausea with it but no vomiting.  He has no known history of cardiac disease but does have a history of insulin-dependent diabetes mellitus type 2.  He was also seen in the ER here earlier this month for calluses and ulcerations on his feet.  He has recently established with the wound center for follow-up.  Patient denies vomiting, cough,, sputum production, abdominal pain, lightheadedness, dizziness, fever or chills. He is a chronic cigarette smoker 1 ppd X 40 years.    ED Course (Summary):   Vitals on presentation: 97.2 F<, 91 bpm, 12 rr, 133/68, 100% on RA  Labs: CMP glu 192, alk phos 143  Mag 1.81  Trop 146 --> 135  CBC WBC 10.0, Hg 10.9, Plt 492  Imaging: CXR - No acute cardiopulmonary process.  EKG - Sinus rhythm at 91 bpm. No significant ST changes.  Interventions: Nitrostat 0.4 mg, aspirin 324 mg, Cardiology was consulted for his elevated troponins and recommended heparin drip to treat NSTEMI and will see pt in morning. Pt will be admitted for further care.    ED Course (From Provider):  Diagnoses as of 06/30/24 0414   NSTEMI (non-ST elevated myocardial infarction) (Multi)     Relevant Results  Results " for orders placed or performed during the hospital encounter of 06/30/24 (from the past 24 hour(s))   CBC and Auto Differential   Result Value Ref Range    WBC 10.0 4.4 - 11.3 x10*3/uL    nRBC 0.0 0.0 - 0.0 /100 WBCs    RBC 4.57 4.50 - 5.90 x10*6/uL    Hemoglobin 10.9 (L) 13.5 - 17.5 g/dL    Hematocrit 34.8 (L) 41.0 - 52.0 %    MCV 76 (L) 80 - 100 fL    MCH 23.9 (L) 26.0 - 34.0 pg    MCHC 31.3 (L) 32.0 - 36.0 g/dL    RDW 16.8 (H) 11.5 - 14.5 %    Platelets 492 (H) 150 - 450 x10*3/uL    Neutrophils % 69.9 40.0 - 80.0 %    Immature Granulocytes %, Automated 0.6 0.0 - 0.9 %    Lymphocytes % 19.4 13.0 - 44.0 %    Monocytes % 7.2 2.0 - 10.0 %    Eosinophils % 2.1 0.0 - 6.0 %    Basophils % 0.8 0.0 - 2.0 %    Neutrophils Absolute 6.99 1.20 - 7.70 x10*3/uL    Immature Granulocytes Absolute, Automated 0.06 0.00 - 0.70 x10*3/uL    Lymphocytes Absolute 1.94 1.20 - 4.80 x10*3/uL    Monocytes Absolute 0.72 0.10 - 1.00 x10*3/uL    Eosinophils Absolute 0.21 0.00 - 0.70 x10*3/uL    Basophils Absolute 0.08 0.00 - 0.10 x10*3/uL   Comprehensive Metabolic Panel   Result Value Ref Range    Glucose 192 (H) 74 - 99 mg/dL    Sodium 137 136 - 145 mmol/L    Potassium 4.1 3.5 - 5.3 mmol/L    Chloride 102 98 - 107 mmol/L    Bicarbonate 25 21 - 32 mmol/L    Anion Gap 14 10 - 20 mmol/L    Urea Nitrogen 14 6 - 23 mg/dL    Creatinine 0.72 0.50 - 1.30 mg/dL    eGFR >90 >60 mL/min/1.73m*2    Calcium 9.0 8.6 - 10.3 mg/dL    Albumin 3.7 3.4 - 5.0 g/dL    Alkaline Phosphatase 143 (H) 33 - 120 U/L    Total Protein 7.3 6.4 - 8.2 g/dL    AST 11 9 - 39 U/L    Bilirubin, Total 0.4 0.0 - 1.2 mg/dL    ALT 10 10 - 52 U/L   Magnesium   Result Value Ref Range    Magnesium 1.81 1.60 - 2.40 mg/dL   Troponin I, High Sensitivity, Initial   Result Value Ref Range    Troponin I, High Sensitivity 146 (HH) 0 - 20 ng/L   Troponin, High Sensitivity, 1 Hour   Result Value Ref Range    Troponin I, High Sensitivity 135 (HH) 0 - 20 ng/L      XR chest 1 view    Result Date:  6/30/2024  Interpreted By:  Andreea James, STUDY: XR CHEST 1 VIEW;  6/30/2024 2:34 am   INDICATION: Signs/Symptoms:Chest Pain.   COMPARISON: 06/27/2018   ACCESSION NUMBER(S): [id number]   ORDERING CLINICIAN: MILANA FISHER   FINDINGS:     CARDIOMEDIASTINAL SILHOUETTE: Cardiomediastinal silhouette is normal in size and configuration.   LUNGS: No pulmonary consolidation, pleural effusion or pneumothorax. Chronic interstitial coarsening.   ABDOMEN: No remarkable upper abdominal findings.   BONES: No acute osseous abnormality.       No acute cardiopulmonary process.   MACRO: None   Signed by: Andreea James 6/30/2024 3:27 AM Dictation workstation:   [id number]    Scheduled medications:  aspirin, 81 mg, oral, Daily  atorvastatin, 40 mg, oral, Daily  DULoxetine, 60 mg, oral, q24h  fluticasone furoate-vilanteroL, 1 puff, inhalation, Daily  furosemide, 40 mg, oral, Daily  hydroxychloroquine, 200 mg, oral, BID  insulin detemir, 50 Units, subcutaneous, Nightly  insulin lispro, 0-5 Units, subcutaneous, TID  levothyroxine, 50 mcg, oral, Daily  lisinopril, 20 mg, oral, Daily  metoprolol tartrate, 25 mg, oral, BID  pantoprazole, 40 mg, oral, Daily before breakfast   Or  pantoprazole, 40 mg, intravenous, Daily before breakfast  predniSONE, 5 mg, oral, Daily  pregabalin, 200 mg, oral, TID  tiotropium, 2 puff, inhalation, Daily      Continuous medications:  heparin, 0-4,000 Units/hr, Last Rate: 1,000 Units/hr (06/30/24 0317)      PRN medications:  PRN medications: ammonium lactate, bisacodyl, bisacodyl, dextrose, dextrose, glucagon, glucagon, guaiFENesin, heparin, melatonin, ondansetron **OR** ondansetron     Past Medical History  He has a past medical history of Abnormal result of other cardiovascular function study (06/28/2018), Atherosclerotic heart disease of native coronary artery with unstable angina pectoris (Multi) (06/28/2018), Fibromyalgia, Personal history of other diseases of the circulatory system, Personal  history of other diseases of the musculoskeletal system and connective tissue, Personal history of other diseases of the musculoskeletal system and connective tissue, Personal history of other diseases of the musculoskeletal system and connective tissue, Personal history of other endocrine, nutritional and metabolic disease, Personal history of other endocrine, nutritional and metabolic disease, and Type 2 diabetes mellitus without complications (Multi) (02/25/2022).    Surgical History  He has a past surgical history that includes Septoplasty (06/28/2018) and Back surgery (06/28/2018).     Social History  He reports that he has been smoking cigarettes. He has a 40 pack-year smoking history. He has never used smokeless tobacco. He reports that he does not currently use alcohol. He reports that he does not use drugs.    Family History  No family history on file.    Allergies  Patient has no known allergies.    Code Status  Full Code     Review of Systems   Constitutional:  Negative for activity change, appetite change, chills, diaphoresis, fatigue and fever.   HENT:  Negative for congestion, ear pain, rhinorrhea, sinus pain and sore throat.    Respiratory:  Positive for shortness of breath. Negative for apnea, cough, chest tightness, wheezing and stridor.    Cardiovascular:  Positive for chest pain. Negative for palpitations and leg swelling.   Gastrointestinal:  Positive for nausea. Negative for abdominal distention, abdominal pain, constipation, diarrhea and vomiting.   Genitourinary:  Negative for difficulty urinating, dysuria, flank pain, frequency, hematuria and urgency.   Musculoskeletal:  Negative for arthralgias, back pain, gait problem, joint swelling and myalgias.   Skin:  Negative for color change, pallor, rash and wound.   Neurological:  Negative for dizziness, syncope, weakness, light-headedness, numbness and headaches.   Psychiatric/Behavioral:  Negative for agitation, behavioral problems, confusion and  decreased concentration. The patient is not nervous/anxious.    All other systems reviewed and are negative.      Last Recorded Vitals  /71   Pulse 88   Temp 36.2 °C (97.2 °F)   Resp 14   Wt 86.2 kg (190 lb)   SpO2 97%      Physical Exam:  Vital signs and nursing notes reviewed.   Constitutional: Pleasant and cooperative. Laying in bed in no acute distress. Conversant.   Skin: Warm and dry; no obvious lesions, rashes, pallor, or jaundice. Good turgor.   Eyes: EOMI. Anicteric sclera.   ENT: Mucous membranes moist; no obvious injury or deformity appreciated.   Head and Neck: Normocephalic, atraumatic. ROM preserved. Trachea midline. No appreciable JVD.   Respiratory: Nonlabored on RA. Lungs clear to auscultation bilaterally without obvious adventitious sounds. Chest rise is equal.  Cardiovascular: RRR. No gross murmur, gallop, or rub. Extremities are warm and well-perfused with good capillary refill (< 3 seconds). No chest wall tenderness.   GI: Abdomen soft, nontender, nondistended. No obvious organomegaly appreciated. Bowel sounds are present and normoactive.  : No CVA tenderness.   MSK: No gross abnormalities appreciated. No limitations to AROM/PROM appreciated.   Extremities: No cyanosis or clubbing evident. Neurovascularly intact. 1+ pitting edema b/l with callousing of feet b/l  Neuro: A&Ox3. CN 2-12 grossly intact. Able to respond to questions appropriately and clearly. No acute focal neurologic deficits appreciated.  Psych: Appropriate mood and behavior.    Assessment/Plan   Principal Problem:    NSTEMI (non-ST elevated myocardial infarction) (Multi)  Active Problems:    Diabetes mellitus, type 2 (Multi)    Benign essential hypertension    Chest discomfort    Fibromyalgia    Generalized anxiety disorder    History of hypertension    History of hypothyroidism    Elevated troponin    Rheumatoid arthritis (Multi)    Hyperglycemia    Microcytic anemia    Cigarette nicotine dependence    Plan:  Admit  to stepdown unit    NSTEMI:  Trops 146 --> 135  Heparin drip, ASA, and Lopressor PO BID started  Cardiology consulted  No EKG changes appreciated.  Home Lipitor  TTE ordered    RA:  Home Plaquenil and Prednisone    Neuropathy with ulcerations/wounds:  Home Lyrica  Will need outpatient follow-up    COPD, tobacco abuse:  Home inhalers  Not in acute exacerbation on exam  5 minutes spent on tobacco cessation counseling.    Hypothyroidism:  Home Synthroid  Check TSH/free T4    DM2:  Home Levemir + SSI    Diet: Carb controlled  DVT Prophylaxis: Heparin drip  Code Status: Full code per discussion with pt.     DO Yash Morton dictation software was used to dictate this note and thus there may be minor errors in translation/transcription including garbled speech or misspellings. Please contact for clarification if needed.

## 2024-06-30 NOTE — ED PROVIDER NOTES
HPI   Chief Complaint   Patient presents with    Chest Pain     Cheswt pain x 3 days, mild relief from nitroglycerin/tylenol in squad, pt drank flush, spit fluid out on self       Patient presents to the emergency department secondary to chest pain.  Patient has had chest pain off and on for the last 3 days.  He does have an appointment scheduled with cardiology in July.  He is scheduled about a month ago for similar symptoms.  Symptoms seem to go away but then came back the last few days.  He describes it as mild pain at this time.  Mild shortness of breath.  Mild nausea.  Patient is a diabetic.  He sees the wound center because he has chronic ulcerations on bilateral feet.  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  No abdominal pain.  No vomiting.  No change in bowel movements.  No change in urination.  He denies history of cardiac disease.  His chart does state that he has coronary disease.                        Karen Coma Scale Score: 15                  Patient History   Past Medical History:   Diagnosis Date    Abnormal result of other cardiovascular function study 06/28/2018    Abnormal stress test    Atherosclerotic heart disease of native coronary artery with unstable angina pectoris (Multi) 06/28/2018    Unstable angina pectoris due to coronary arteriosclerosis    Fibromyalgia     Fibromyalgia    Personal history of other diseases of the circulatory system     History of hypertension    Personal history of other diseases of the musculoskeletal system and connective tissue     History of rheumatoid arthritis    Personal history of other diseases of the musculoskeletal system and connective tissue     History of degenerative disc disease    Personal history of other diseases of the musculoskeletal system and connective tissue     History of osteoporosis    Personal history of other endocrine, nutritional and metabolic disease     History of hyperlipidemia    Personal history of other endocrine,  nutritional and metabolic disease     History of hypothyroidism    Type 2 diabetes mellitus without complications (Multi) 02/25/2022    Type 2 diabetes mellitus     Past Surgical History:   Procedure Laterality Date    BACK SURGERY  06/28/2018    Back Surgery    SEPTOPLASTY  06/28/2018    Septoplasty     No family history on file.  Social History     Tobacco Use    Smoking status: Every Day     Current packs/day: 1.00     Average packs/day: 1 pack/day for 40.0 years (40.0 ttl pk-yrs)     Types: Cigarettes    Smokeless tobacco: Never   Vaping Use    Vaping status: Not on file   Substance Use Topics    Alcohol use: Not Currently    Drug use: Never       Physical Exam   ED Triage Vitals [06/30/24 0103]   Temperature Heart Rate Respirations BP   36.2 °C (97.2 °F) 91 12 133/68      Pulse Ox Temp src Heart Rate Source Patient Position   100 % -- -- --      BP Location FiO2 (%)     -- --       Physical Exam  Vitals reviewed.   Constitutional:       Appearance: Normal appearance. He is well-developed.   HENT:      Head: Normocephalic.      Right Ear: Tympanic membrane normal.      Left Ear: Tympanic membrane normal.      Nose: Nose normal.      Mouth/Throat:      Mouth: Mucous membranes are moist.   Eyes:      Extraocular Movements: Extraocular movements intact.      Pupils: Pupils are equal, round, and reactive to light.   Cardiovascular:      Rate and Rhythm: Normal rate and regular rhythm.      Pulses: Normal pulses.      Heart sounds: Normal heart sounds. No murmur heard.  Pulmonary:      Effort: Pulmonary effort is normal.      Breath sounds: Normal breath sounds. No decreased breath sounds, wheezing, rhonchi or rales.   Chest:      Chest wall: No tenderness.   Abdominal:      General: Abdomen is flat. Bowel sounds are normal.      Palpations: Abdomen is soft. There is no mass.      Tenderness: There is no abdominal tenderness. There is no guarding or rebound.   Musculoskeletal:         General: Normal range of motion.       Cervical back: Normal range of motion.      Right lower leg: No edema.      Left lower leg: No edema.   Skin:     General: Skin is warm.      Capillary Refill: Capillary refill takes less than 2 seconds.      Comments: Patient has chronic ulcerations along the pads of both feet.  No evidence of drainage.  Ulcerations have clean bases.  No fluctuance.  No soft tissue swelling.   Neurological:      General: No focal deficit present.      Mental Status: He is alert and oriented to person, place, and time.   Psychiatric:         Mood and Affect: Mood normal.         Behavior: Behavior normal.     Labs Reviewed   TROPONIN SERIES- (INITIAL, 1 HR)    Narrative:     The following orders were created for panel order Troponin I Series, High Sensitivity (0, 1 HR).  Procedure                               Abnormality         Status                     ---------                               -----------         ------                     Troponin I, High Sensiti...[[id number]]                                                 Troponin, High Sensitivi...[[id number]]                                                   Please view results for these tests on the individual orders.   CBC WITH AUTO DIFFERENTIAL   COMPREHENSIVE METABOLIC PANEL   MAGNESIUM   SERIAL TROPONIN-INITIAL   SERIAL TROPONIN, 1 HOUR     Pain Management Panel           No data to display              XR chest 1 view    (Results Pending)     ED Course & MDM   Diagnoses as of 06/30/24 0339   NSTEMI (non-ST elevated myocardial infarction) (Multi)       Medical Decision Making  Patient presents secondary to chest pain.  Differential diagnosis for this patient is coronary artery disease, pneumonia, musculoskeletal pain or other acute cause.  Patient is evaluated in the emergency department chest x-ray and laboratory workup.  EKG is obtained.  Patient is evaluated with laboratory workup.  Initial troponin was 146.  Second troponin was 135.  Mag was within normal limits.   CBC shows a normal white count.  Hemoglobin is mildly low at 10.9.  Chemistry shows a glucose of 192 and alk phos of 143.  A repeat EKG was obtained at 2:37 AM.  It is sinus rhythm with a rate of 91.  No acute ST elevation.  Inferior T wave changes.  Patient was discussed with Dr. Bansal.  EKG was sent to him by secure messenger.  At this time he would like us to treat the patient as a non-ST elevation MI.  Patient was given sublingual nitro and heparin was started.  I did discuss with him whether or not he would like Brilinta.  He states that it this point I can start it if I want to I do not have to.  After discussion with Dr. Walker decision was made to hold it for now.  Patient is admitted to the stepdown unit for further management.        Procedure  ECG 12 lead    Performed by: Alyse Bridges MD  Authorized by: Alyse Bridges MD    Interpretation:     Details:  EKG was obtained at 1:07 AM.  It is regular.  P waves are hard to see due to baseline.  Rate is 89.  No acute ST elevation.  QTc is 509.  Critical Care    Performed by: Alyse Bridges MD  Authorized by: Alyse Bridges MD    Critical care provider statement:     Critical care time (minutes):  30    Critical care time was exclusive of:  Separately billable procedures and treating other patients    Critical care was necessary to treat or prevent imminent or life-threatening deterioration of the following conditions: NSTEMI.    Critical care was time spent personally by me on the following activities:  Discussions with consultants, evaluation of patient's response to treatment, examination of patient, review of old charts, re-evaluation of patient's condition, pulse oximetry, ordering and review of radiographic studies and ordering and review of laboratory studies    Care discussed with: admitting provider         Alyse Bridges MD  06/30/24 5179

## 2024-06-30 NOTE — CONSULTS
[facility]    Patient Name: Kael Zepeda  Patient : 1965  Room/Bed: -A    Date: 24  Time: 10:41 AM    Referred by Dr. Mccray ref. provider found for Chest Pain (Cheswt pain x 3 days, mild relief from nitroglycerin/tylenol in squad, pt drank flush, spit fluid out on self)     History Of Present Illness:    Kael Zepeda is a 58 y.o. male who presented to the emergency department with intermittent chest pain.  Patient states that he has had intermittent episodes of chest pain for the last week.  He states the chest pain can occur both at rest or with exertion.  He states that when he is having the chest pain he will have midsternal chest pressure which radiates down both arms.  He states he has associated shortness of breath and diaphoresis.  He states that the episodes of chest pain last for approximately 10 minutes before resolving on its own.  Yesterday he had a more significant episode which is what finally brought him to the emergency department.  BMP shows a serum sodium of 135, serum potassium 3.9, serum creatinine is 0.69, serum magnesium was 1.81, TSH was 1.25, troponin was 146-135.  CBC showed a hemoglobin of 10.7.  Chest x-ray showed no acute cardiopulmonary process.  ECG showed sinus rhythm with possible inferior infarct age undetermined and a QTc of 507 ms.  Cardiac catheterization done in 2018 showed mild nonobstructive disease.  Patient was subsequently started on a heparin infusion and admitted for further evaluation.  During my exam the patient was resting comfortably in bed.    Assessment/Plan:   1.  NSTEMI/CAD  The patient is reporting intermittent chest pain and found to have elevated troponin of 146-135.  ECG showing sinus rhythm with possible inferior infarct age undetermined.  Patient started on a heparin infusion.  Will add DAPT with aspirin and ticagrelor.  Will keep n.p.o. for left heart catheterization tomorrow.  Check  echocardiogram.    2.  Hypertension  Blood pressure appears controlled on exam today.  He should continue his current antihypertensive medical therapy.  Continue to monitor and adjust antihypertensive medications as necessary.    3.  Dyslipidemia  Continue statin therapy    4.  Diabetes mellitus  Stable, medication management per hospitalist service    5.  COPD/tobacco use  Discussed with him the importance of smoking cessation as well as several strategies to assist him in quitting smoking.  Patient states he plans to quit after this hospitalization.      Past Medical History:  He has a past medical history of Abnormal result of other cardiovascular function study (06/28/2018), Atherosclerotic heart disease of native coronary artery with unstable angina pectoris (Multi) (06/28/2018), Fibromyalgia, Personal history of other diseases of the circulatory system, Personal history of other diseases of the musculoskeletal system and connective tissue, Personal history of other diseases of the musculoskeletal system and connective tissue, Personal history of other diseases of the musculoskeletal system and connective tissue, Personal history of other endocrine, nutritional and metabolic disease, Personal history of other endocrine, nutritional and metabolic disease, and Type 2 diabetes mellitus without complications (Multi) (02/25/2022).    Past Surgical History:  He has a past surgical history that includes Septoplasty (06/28/2018) and Back surgery (06/28/2018).      Social History:  He reports that he has been smoking cigarettes. He has a 40 pack-year smoking history. He has never used smokeless tobacco. He reports that he does not currently use alcohol. He reports that he does not use drugs.    Family History:  No family history on file.     Allergies:  Patient has no known allergies.    Outpatient Medications:  Current Outpatient Medications   Medication Instructions    albuterol 90 mcg/actuation inhaler 2 puffs,  "inhalation, Every 6 hours PRN    ammonium lactate (Amlactin) 12 % cream Topical    aspirin 81 mg, oral, Daily    atorvastatin (LIPITOR) 40 mg, oral, Daily RT    BD Lois 2nd Gen Pen Needle 32 gauge x 5/32\" needle USE 1 NEEDLE ONCE DAILY    celecoxib (CELEBREX) 200 mg, oral, 2 times daily    DULoxetine (CYMBALTA) 60 mg, Every 24 hours    furosemide (LASIX) 40 mg, oral, Daily    hydroxychloroquine (Plaquenil) 200 mg tablet 1 tablet, oral, 2 times daily    ibuprofen 800 mg, oral, Every 8 hours PRN    insulin lispro (HUMALOG) 10 Units, subcutaneous    Levemir FlexPen 50 Units, subcutaneous, Nightly    levothyroxine (SYNTHROID, LEVOXYL) 50 mcg, oral, Daily    lisinopril 20 mg, oral, Daily    metFORMIN (GLUCOPHAGE) 500 mg, oral, 2 times daily (morning and late afternoon)    methadone (DOLOPHINE) 77 mg, oral, IN THE MORNING THEN 78 MG IN THE EVENING    omeprazole (PRILOSEC) 40 mg, oral, Daily    predniSONE (Deltasone) 2.5 mg tablet 2 tablets, oral, Daily    pregabalin (LYRICA) 200 mg, oral, 3 times daily    riTUXimab-abbs (TRUXIMA) 1,000 mg, Every 6 months    Spiriva Respimat 2.5 mcg/actuation inhaler 2 puffs, inhalation, Daily    Wixela Inhub 100-50 mcg/dose diskus inhaler INHALE 1 PUFF AS INSTRUCTED TWICE DAILY. RINSE AND GARGLE MOUTH WITH WATER AFTER EACH USE.        ROS:  A 14 point review of systems was done and is negative other than as stated in HPI    Vitals:  Vitals:    06/30/24 0301 06/30/24 0326 06/30/24 0418 06/30/24 0700   BP: 133/68 125/71 125/66 112/63   BP Location:   Right arm Right arm   Patient Position:   Lying Lying   Pulse: 91 88 92 86   Resp: 14  18 18   Temp:   35.6 °C (96 °F) 36.4 °C (97.6 °F)   TempSrc:   Temporal Temporal   SpO2: 97%  95% 95%   Weight:   91.9 kg (202 lb 9.6 oz)    Height:           Physical Exam:     Constitutional: Cooperative, in no acute distress, alert, appears stated age.  Skin: Skin color, texture, turgor normal. No rashes or lesions.  Head: Normocephalic. No masses, " "lesions, tenderness or abnormalities  Eyes: Extraocular movements are grossly intact.  Mouth and throat: Mucous membranes moist  Neck: Neck supple, no carotid bruits, no JVD  Respiratory: Lungs clear to auscultation, no wheezing or rhonchi, no use of accessory muscles  Chest wall: No scars, normal excursion with respiration  Cardiovascular: Regular rhythm without murmur  Gastrointestinal: Abdomen soft, nontender. Bowel sounds normal.  Musculoskeletal: Strength equal in upper extremities  Extremities: No pitting edema  Neurologic: Sensation grossly intact, alert and oriented ×3    Intake/Output:   No intake/output data recorded.    Outpatient Medications  No current facility-administered medications on file prior to encounter.     Current Outpatient Medications on File Prior to Encounter   Medication Sig Dispense Refill    albuterol 90 mcg/actuation inhaler Inhale 2 puffs every 6 hours if needed for shortness of breath.      aspirin 81 mg EC tablet Take 1 tablet (81 mg) by mouth once daily.      atorvastatin (Lipitor) 40 mg tablet Take 1 tablet (40 mg) by mouth once daily.      BD Lois 2nd Gen Pen Needle 32 gauge x 5/32\" needle USE 1 NEEDLE ONCE DAILY      celecoxib (CeleBREX) 200 mg capsule Take 1 capsule (200 mg) by mouth twice a day.      furosemide (Lasix) 40 mg tablet Take 1 tablet (40 mg) by mouth once daily.      hydroxychloroquine (Plaquenil) 200 mg tablet Take 1 tablet (200 mg) by mouth 2 times a day.      insulin lispro (HumaLOG) 100 unit/mL injection Inject 10 Units under the skin.      Levemir FlexPen 100 unit/mL (3 mL) pen Inject 50 Units under the skin once daily at bedtime.      levothyroxine (Synthroid, Levoxyl) 50 mcg tablet Take 1 tablet (50 mcg) by mouth once daily.      lisinopril 20 mg tablet Take 1 tablet (20 mg) by mouth once daily.      metFORMIN (Glucophage) 500 mg tablet Take 1 tablet (500 mg) by mouth 2 times daily (morning and late afternoon).      methadone (Dolophine) 10 mg/mL solution " Take 7.7 mL (77 mg) by mouth. IN THE MORNING THEN 78 MG IN THE EVENING      omeprazole (PriLOSEC) 40 mg DR capsule Take 1 capsule (40 mg) by mouth once daily.      predniSONE (Deltasone) 2.5 mg tablet Take 2 tablets (5 mg) by mouth once daily.      pregabalin (Lyrica) 200 mg capsule Take 1 capsule (200 mg) by mouth 3 times a day.      Spiriva Respimat 2.5 mcg/actuation inhaler Inhale 2 puffs once daily.      Wixela Inhub 100-50 mcg/dose diskus inhaler INHALE 1 PUFF AS INSTRUCTED TWICE DAILY. RINSE AND GARGLE MOUTH WITH WATER AFTER EACH USE.      [DISCONTINUED] sulfaSALAzine (Azulfidine) 500 mg tablet Take 3 tablets (1,500 mg) by mouth 2 times a day.      ammonium lactate (Amlactin) 12 % cream Apply topically.      DULoxetine (Cymbalta) 60 mg DR capsule 1 capsule (60 mg) once every 24 hours.      riTUXimab-abbs (Truxima) 10 mg/mL solution 100 mL (1,000 mg) every 6 months.      [DISCONTINUED] FreeStyle Mikala 3 Sensor device       [DISCONTINUED] pantoprazole (ProtoNix) 40 mg EC tablet 1 tablet (40 mg).      [DISCONTINUED] predniSONE (Deltasone) 5 mg tablet Take 1 tablet (5 mg) by mouth once daily.         Scheduled medications  aspirin, 81 mg, oral, Daily  atorvastatin, 40 mg, oral, Nightly  fluticasone furoate-vilanteroL, 1 puff, inhalation, Daily  hydroxychloroquine, 200 mg, oral, BID  insulin detemir, 50 Units, subcutaneous, Nightly  insulin lispro, 0-5 Units, subcutaneous, TID  levothyroxine, 50 mcg, oral, Daily  methadone, 70 mg, oral, Daily   And  methadone, 5 mg, oral, Daily  methadone, 80 mg, oral, Nightly  metoprolol tartrate, 25 mg, oral, BID  pantoprazole, 40 mg, oral, Daily   Or  pantoprazole, 40 mg, intravenous, Daily  perflutren protein A microsphere, 0.5 mL, intravenous, Once in imaging  predniSONE, 5 mg, oral, Daily  pregabalin, 150 mg, oral, TID   And  pregabalin, 50 mg, oral, TID  tiotropium, 2 puff, inhalation, Daily      Continuous medications  heparin, 0-4,000 Units/hr, Last Rate: 1,200 Units/hr  "(06/30/24 8542)      PRN medications  PRN medications: acetaminophen, bisacodyl, bisacodyl, dextrose, dextrose, glucagon, glucagon, guaiFENesin, heparin, melatonin, ondansetron **OR** ondansetron   Medications Prior to Admission   Medication Sig Dispense Refill Last Dose    albuterol 90 mcg/actuation inhaler Inhale 2 puffs every 6 hours if needed for shortness of breath.   6/29/2024 at 2100    aspirin 81 mg EC tablet Take 1 tablet (81 mg) by mouth once daily.   6/29/2024 at 0800    atorvastatin (Lipitor) 40 mg tablet Take 1 tablet (40 mg) by mouth once daily.   6/29/2024 at 2000    Connoshoer Lois 2nd Gen Pen Needle 32 gauge x 5/32\" needle USE 1 NEEDLE ONCE DAILY   6/28/2024 at 2100    celecoxib (CeleBREX) 200 mg capsule Take 1 capsule (200 mg) by mouth twice a day.   6/22/2024 at 2100    furosemide (Lasix) 40 mg tablet Take 1 tablet (40 mg) by mouth once daily.   6/29/2024 at 0800    hydroxychloroquine (Plaquenil) 200 mg tablet Take 1 tablet (200 mg) by mouth 2 times a day.   6/29/2024 at 0800    insulin lispro (HumaLOG) 100 unit/mL injection Inject 10 Units under the skin.   6/29/2024 at 2100    Levemir FlexPen 100 unit/mL (3 mL) pen Inject 50 Units under the skin once daily at bedtime.   6/28/2024 at 2100    levothyroxine (Synthroid, Levoxyl) 50 mcg tablet Take 1 tablet (50 mcg) by mouth once daily.   6/29/2024 at 0800    lisinopril 20 mg tablet Take 1 tablet (20 mg) by mouth once daily.   6/29/2024 at 0800    metFORMIN (Glucophage) 500 mg tablet Take 1 tablet (500 mg) by mouth 2 times daily (morning and late afternoon).   6/29/2024 at 0800    methadone (Dolophine) 10 mg/mL solution Take 7.7 mL (77 mg) by mouth. IN THE MORNING THEN 78 MG IN THE EVENING   6/29/2024 at 0800    omeprazole (PriLOSEC) 40 mg DR capsule Take 1 capsule (40 mg) by mouth once daily.   6/29/2024 at 0800    predniSONE (Deltasone) 2.5 mg tablet Take 2 tablets (5 mg) by mouth once daily.   6/29/2024 at 0800    pregabalin (Lyrica) 200 mg capsule Take 1 " capsule (200 mg) by mouth 3 times a day.   6/29/2024 at 0800    Spiriva Respimat 2.5 mcg/actuation inhaler Inhale 2 puffs once daily.   6/29/2024 at 2100    Wixela Inhub 100-50 mcg/dose diskus inhaler INHALE 1 PUFF AS INSTRUCTED TWICE DAILY. RINSE AND GARGLE MOUTH WITH WATER AFTER EACH USE.   6/29/2024 at 2100    ammonium lactate (Amlactin) 12 % cream Apply topically.   More than a month at 2100    DULoxetine (Cymbalta) 60 mg DR capsule 1 capsule (60 mg) once every 24 hours.   Unknown at 2100    ibuprofen 800 mg tablet Take 1 tablet (800 mg) by mouth every 8 hours if needed for pain.       riTUXimab-abbs (Truxima) 10 mg/mL solution 100 mL (1,000 mg) every 6 months.   More than a month at 2100       Recent Labs: (past 2 days)  Recent Results (from the past 48 hour(s))   CBC and Auto Differential    Collection Time: 06/30/24  1:40 AM   Result Value Ref Range    WBC 10.0 4.4 - 11.3 x10*3/uL    nRBC 0.0 0.0 - 0.0 /100 WBCs    RBC 4.57 4.50 - 5.90 x10*6/uL    Hemoglobin 10.9 (L) 13.5 - 17.5 g/dL    Hematocrit 34.8 (L) 41.0 - 52.0 %    MCV 76 (L) 80 - 100 fL    MCH 23.9 (L) 26.0 - 34.0 pg    MCHC 31.3 (L) 32.0 - 36.0 g/dL    RDW 16.8 (H) 11.5 - 14.5 %    Platelets 492 (H) 150 - 450 x10*3/uL    Neutrophils % 69.9 40.0 - 80.0 %    Immature Granulocytes %, Automated 0.6 0.0 - 0.9 %    Lymphocytes % 19.4 13.0 - 44.0 %    Monocytes % 7.2 2.0 - 10.0 %    Eosinophils % 2.1 0.0 - 6.0 %    Basophils % 0.8 0.0 - 2.0 %    Neutrophils Absolute 6.99 1.20 - 7.70 x10*3/uL    Immature Granulocytes Absolute, Automated 0.06 0.00 - 0.70 x10*3/uL    Lymphocytes Absolute 1.94 1.20 - 4.80 x10*3/uL    Monocytes Absolute 0.72 0.10 - 1.00 x10*3/uL    Eosinophils Absolute 0.21 0.00 - 0.70 x10*3/uL    Basophils Absolute 0.08 0.00 - 0.10 x10*3/uL   Comprehensive Metabolic Panel    Collection Time: 06/30/24  1:40 AM   Result Value Ref Range    Glucose 192 (H) 74 - 99 mg/dL    Sodium 137 136 - 145 mmol/L    Potassium 4.1 3.5 - 5.3 mmol/L    Chloride  102 98 - 107 mmol/L    Bicarbonate 25 21 - 32 mmol/L    Anion Gap 14 10 - 20 mmol/L    Urea Nitrogen 14 6 - 23 mg/dL    Creatinine 0.72 0.50 - 1.30 mg/dL    eGFR >90 >60 mL/min/1.73m*2    Calcium 9.0 8.6 - 10.3 mg/dL    Albumin 3.7 3.4 - 5.0 g/dL    Alkaline Phosphatase 143 (H) 33 - 120 U/L    Total Protein 7.3 6.4 - 8.2 g/dL    AST 11 9 - 39 U/L    Bilirubin, Total 0.4 0.0 - 1.2 mg/dL    ALT 10 10 - 52 U/L   Magnesium    Collection Time: 06/30/24  1:40 AM   Result Value Ref Range    Magnesium 1.81 1.60 - 2.40 mg/dL   Troponin I, High Sensitivity, Initial    Collection Time: 06/30/24  1:40 AM   Result Value Ref Range    Troponin I, High Sensitivity 146 (HH) 0 - 20 ng/L   Troponin, High Sensitivity, 1 Hour    Collection Time: 06/30/24  2:36 AM   Result Value Ref Range    Troponin I, High Sensitivity 135 (HH) 0 - 20 ng/L   TSH with reflex to Free T4 if abnormal    Collection Time: 06/30/24  2:36 AM   Result Value Ref Range    Thyroid Stimulating Hormone 1.25 0.44 - 3.98 mIU/L   POCT GLUCOSE    Collection Time: 06/30/24  4:12 AM   Result Value Ref Range    POCT Glucose 228 (H) 74 - 99 mg/dL   CBC    Collection Time: 06/30/24  4:47 AM   Result Value Ref Range    WBC 11.0 4.4 - 11.3 x10*3/uL    nRBC 0.2 (H) 0.0 - 0.0 /100 WBCs    RBC 4.50 4.50 - 5.90 x10*6/uL    Hemoglobin 10.7 (L) 13.5 - 17.5 g/dL    Hematocrit 33.9 (L) 41.0 - 52.0 %    MCV 75 (L) 80 - 100 fL    MCH 23.8 (L) 26.0 - 34.0 pg    MCHC 31.6 (L) 32.0 - 36.0 g/dL    RDW 16.7 (H) 11.5 - 14.5 %    Platelets 451 (H) 150 - 450 x10*3/uL   Basic metabolic panel    Collection Time: 06/30/24  4:47 AM   Result Value Ref Range    Glucose 227 (H) 74 - 99 mg/dL    Sodium 135 (L) 136 - 145 mmol/L    Potassium 3.9 3.5 - 5.3 mmol/L    Chloride 103 98 - 107 mmol/L    Bicarbonate 25 21 - 32 mmol/L    Anion Gap 11 10 - 20 mmol/L    Urea Nitrogen 13 6 - 23 mg/dL    Creatinine 0.69 0.50 - 1.30 mg/dL    eGFR >90 >60 mL/min/1.73m*2    Calcium 8.7 8.6 - 10.3 mg/dL   POCT GLUCOSE     Collection Time: 06/30/24  7:35 AM   Result Value Ref Range    POCT Glucose 194 (H) 74 - 99 mg/dL   Heparin Assay, UFH    Collection Time: 06/30/24  8:59 AM   Result Value Ref Range    Heparin Unfractionated 0.2 See Comment Below for Therapeutic Ranges IU/mL       CV Studies:  EKG:No results found for this or any previous visit (from the past 4464 hour(s)).  Echocardiogram: No results found for this or any previous visit from the past 1825 days.    Stress Testing IMGRESULT(WFN4802:1:1825): No results found for this or any previous visit from the past 1825 days.    Cardiac Catheterization: No results found for this or any previous visit from the past 1825 days.  No results found for this or any previous visit from the past 3650 days.     Cardiac Scoring: No results found for this or any previous visit from the past 1825 days.    AAA : No results found for this or any previous visit from the past 1825 days.    OTHER: No results found for this or any previous visit from the past 1825 days.    LAST IMAGING RESULTS  XR chest 1 view  Narrative: Interpreted By:  Andreea James,   STUDY:  XR CHEST 1 VIEW;  6/30/2024 2:34 am      INDICATION:  Signs/Symptoms:Chest Pain.      COMPARISON:  06/27/2018      ACCESSION NUMBER(S):  [id number]      ORDERING CLINICIAN:  MILANA FISHER      FINDINGS:          CARDIOMEDIASTINAL SILHOUETTE:  Cardiomediastinal silhouette is normal in size and configuration.      LUNGS:  No pulmonary consolidation, pleural effusion or pneumothorax. Chronic  interstitial coarsening.      ABDOMEN:  No remarkable upper abdominal findings.      BONES:  No acute osseous abnormality.      Impression: No acute cardiopulmonary process.      MACRO:  None      Signed by: Andreea James 6/30/2024 3:27 AM  Dictation workstation:   [id number]        Chang Bansal DO, FACC, FACOI  6/30/2024  10:41 AM

## 2024-06-30 NOTE — Clinical Note
Closure device placed in the right radial artery. Site closed by radial compression system. 12 ml air in band

## 2024-06-30 NOTE — NURSING NOTE
1400:  Heparin assay 0.3, first therapeutic result.    Continue infusion at 12ml/hr per orders    Next assay at 1730    1800:  Heparin assay 0.2 bolus     Order to bolus 2000 units and increase rate by 200 units per orders to 14ml/hr    Next assay at 2200

## 2024-06-30 NOTE — NURSING NOTE
Report receive from Aundrea QURESHI    Patient is on a heparin drip at 10 ml/hr next assay 0800    No therapeutics yet    0830:  Patient is reporting 5/10 chest pain pressure, IMS notified EKG obtained    Tylenol added for pain/fever     1100:  Patient plan to have LHC tomorrow will be NPO after midnight. Procedure explained to patient

## 2024-07-01 ENCOUNTER — HOSPITAL ENCOUNTER (INPATIENT)
Facility: HOSPITAL | Age: 59
End: 2024-07-01
Attending: INTERNAL MEDICINE | Admitting: INTERNAL MEDICINE
Payer: MEDICARE

## 2024-07-01 ENCOUNTER — APPOINTMENT (OUTPATIENT)
Dept: CARDIOLOGY | Facility: HOSPITAL | Age: 59
DRG: 282 | End: 2024-07-01
Payer: MEDICARE

## 2024-07-01 VITALS
SYSTOLIC BLOOD PRESSURE: 119 MMHG | DIASTOLIC BLOOD PRESSURE: 75 MMHG | WEIGHT: 198.41 LBS | HEART RATE: 85 BPM | HEIGHT: 67 IN | OXYGEN SATURATION: 99 % | TEMPERATURE: 98.7 F | BODY MASS INDEX: 31.14 KG/M2 | RESPIRATION RATE: 17 BRPM

## 2024-07-01 DIAGNOSIS — G47.33 OSA AND COPD OVERLAP SYNDROME (MULTI): ICD-10-CM

## 2024-07-01 DIAGNOSIS — Z95.1 S/P CABG X 3: ICD-10-CM

## 2024-07-01 DIAGNOSIS — Z01.818 ENCOUNTER FOR OTHER PREPROCEDURAL EXAMINATION: ICD-10-CM

## 2024-07-01 DIAGNOSIS — M81.8 STEROID-INDUCED OSTEOPOROSIS: ICD-10-CM

## 2024-07-01 DIAGNOSIS — L97.511 ULCER OF TOE OF RIGHT FOOT, LIMITED TO BREAKDOWN OF SKIN (MULTI): ICD-10-CM

## 2024-07-01 DIAGNOSIS — I25.85 CHRONIC CORONARY MICROVASCULAR DYSFUNCTION: ICD-10-CM

## 2024-07-01 DIAGNOSIS — Z95.1 S/P CABG (CORONARY ARTERY BYPASS GRAFT): ICD-10-CM

## 2024-07-01 DIAGNOSIS — R07.9 CHEST PAIN, UNSPECIFIED: ICD-10-CM

## 2024-07-01 DIAGNOSIS — I79.8 OTHER DISORDERS OF ARTERIES, ARTERIOLES AND CAPILLARIES IN DISEASES CLASSIFIED ELSEWHERE (CMS-HCC): ICD-10-CM

## 2024-07-01 DIAGNOSIS — Z79.4 TYPE 2 DIABETES MELLITUS WITH OTHER SPECIFIED COMPLICATION, WITH LONG-TERM CURRENT USE OF INSULIN (MULTI): ICD-10-CM

## 2024-07-01 DIAGNOSIS — T38.0X5A STEROID-INDUCED OSTEOPOROSIS: ICD-10-CM

## 2024-07-01 DIAGNOSIS — L97.512 ULCER OF BOTH FEET WITH FAT LAYER EXPOSED (MULTI): ICD-10-CM

## 2024-07-01 DIAGNOSIS — J44.9 OSA AND COPD OVERLAP SYNDROME (MULTI): ICD-10-CM

## 2024-07-01 DIAGNOSIS — E11.69 TYPE 2 DIABETES MELLITUS WITH OTHER SPECIFIED COMPLICATION, WITH LONG-TERM CURRENT USE OF INSULIN (MULTI): ICD-10-CM

## 2024-07-01 DIAGNOSIS — R09.89 OTHER SPECIFIED SYMPTOMS AND SIGNS INVOLVING THE CIRCULATORY AND RESPIRATORY SYSTEMS: ICD-10-CM

## 2024-07-01 DIAGNOSIS — L97.522 ULCER OF BOTH FEET WITH FAT LAYER EXPOSED (MULTI): ICD-10-CM

## 2024-07-01 DIAGNOSIS — I73.9 PERIPHERAL VASCULAR DISEASE, UNSPECIFIED (CMS-HCC): ICD-10-CM

## 2024-07-01 DIAGNOSIS — I10 BENIGN ESSENTIAL HYPERTENSION: ICD-10-CM

## 2024-07-01 DIAGNOSIS — I73.9 PERIPHERAL VASCULAR DISEASE (CMS-HCC): ICD-10-CM

## 2024-07-01 DIAGNOSIS — I21.4 NSTEMI (NON-ST ELEVATED MYOCARDIAL INFARCTION) (MULTI): ICD-10-CM

## 2024-07-01 DIAGNOSIS — I25.10 CAD IN NATIVE ARTERY: Primary | ICD-10-CM

## 2024-07-01 LAB
ANION GAP SERPL CALC-SCNC: 12 MMOL/L (ref 10–20)
AORTIC VALVE MEAN GRADIENT: 4.1 MMHG
AORTIC VALVE PEAK VELOCITY: 1.43 M/S
APTT PPP: 28 SECONDS (ref 27–38)
ATRIAL RATE: 94 BPM
AV PEAK GRADIENT: 8.2 MMHG
AVA (PEAK VEL): 2.22 CM2
AVA (VTI): 2.33 CM2
BUN SERPL-MCNC: 15 MG/DL (ref 6–23)
CALCIUM SERPL-MCNC: 8.8 MG/DL (ref 8.6–10.3)
CHLORIDE SERPL-SCNC: 103 MMOL/L (ref 98–107)
CO2 SERPL-SCNC: 28 MMOL/L (ref 21–32)
CREAT SERPL-MCNC: 0.7 MG/DL (ref 0.5–1.3)
EGFRCR SERPLBLD CKD-EPI 2021: >90 ML/MIN/1.73M*2
EJECTION FRACTION APICAL 4 CHAMBER: 49.3
EJECTION FRACTION: 43 %
ERYTHROCYTE [DISTWIDTH] IN BLOOD BY AUTOMATED COUNT: 16.6 % (ref 11.5–14.5)
GLUCOSE BLD MANUAL STRIP-MCNC: 77 MG/DL (ref 74–99)
GLUCOSE BLD MANUAL STRIP-MCNC: 78 MG/DL (ref 74–99)
GLUCOSE BLD MANUAL STRIP-MCNC: 83 MG/DL (ref 74–99)
GLUCOSE SERPL-MCNC: 108 MG/DL (ref 74–99)
HCT VFR BLD AUTO: 33.4 % (ref 41–52)
HGB BLD-MCNC: 10.4 G/DL (ref 13.5–17.5)
LEFT ATRIUM VOLUME AREA LENGTH INDEX BSA: 30.1 ML/M2
LEFT VENTRICLE INTERNAL DIMENSION DIASTOLE: 5.3 CM (ref 3.5–6)
LEFT VENTRICULAR OUTFLOW TRACT DIAMETER: 2.04 CM
LV EJECTION FRACTION BIPLANE: 52 %
MAGNESIUM SERPL-MCNC: 2.04 MG/DL (ref 1.6–2.4)
MCH RBC QN AUTO: 24 PG (ref 26–34)
MCHC RBC AUTO-ENTMCNC: 31.1 G/DL (ref 32–36)
MCV RBC AUTO: 77 FL (ref 80–100)
MITRAL VALVE E/A RATIO: 1.11
NRBC BLD-RTO: 0 /100 WBCS (ref 0–0)
P AXIS: 45 DEGREES
P OFFSET: 173 MS
P ONSET: 111 MS
PLATELET # BLD AUTO: 432 X10*3/UL (ref 150–450)
POTASSIUM SERPL-SCNC: 3.7 MMOL/L (ref 3.5–5.3)
PR INTERVAL: 206 MS
Q ONSET: 214 MS
QRS COUNT: 15 BEATS
QRS DURATION: 104 MS
QT INTERVAL: 406 MS
QTC CALCULATION(BAZETT): 507 MS
QTC FREDERICIA: 471 MS
R AXIS: 54 DEGREES
RBC # BLD AUTO: 4.34 X10*6/UL (ref 4.5–5.9)
RIGHT VENTRICLE FREE WALL PEAK S': 12.56 CM/S
RIGHT VENTRICLE PEAK SYSTOLIC PRESSURE: 29.1 MMHG
SODIUM SERPL-SCNC: 139 MMOL/L (ref 136–145)
T AXIS: 5 DEGREES
T OFFSET: 417 MS
TRICUSPID ANNULAR PLANE SYSTOLIC EXCURSION: 2.1 CM
UFH PPP CHRO-ACNC: 0.4 IU/ML
VENTRICULAR RATE: 94 BPM
WBC # BLD AUTO: 8.3 X10*3/UL (ref 4.4–11.3)

## 2024-07-01 PROCEDURE — 2500000005 HC RX 250 GENERAL PHARMACY W/O HCPCS: Performed by: STUDENT IN AN ORGANIZED HEALTH CARE EDUCATION/TRAINING PROGRAM

## 2024-07-01 PROCEDURE — 85730 THROMBOPLASTIN TIME PARTIAL: CPT | Performed by: STUDENT IN AN ORGANIZED HEALTH CARE EDUCATION/TRAINING PROGRAM

## 2024-07-01 PROCEDURE — S0109 METHADONE ORAL 5MG: HCPCS | Performed by: STUDENT IN AN ORGANIZED HEALTH CARE EDUCATION/TRAINING PROGRAM

## 2024-07-01 PROCEDURE — 2500000004 HC RX 250 GENERAL PHARMACY W/ HCPCS (ALT 636 FOR OP/ED): Performed by: STUDENT IN AN ORGANIZED HEALTH CARE EDUCATION/TRAINING PROGRAM

## 2024-07-01 PROCEDURE — 2500000001 HC RX 250 WO HCPCS SELF ADMINISTERED DRUGS (ALT 637 FOR MEDICARE OP): Performed by: STUDENT IN AN ORGANIZED HEALTH CARE EDUCATION/TRAINING PROGRAM

## 2024-07-01 PROCEDURE — 2500000002 HC RX 250 W HCPCS SELF ADMINISTERED DRUGS (ALT 637 FOR MEDICARE OP, ALT 636 FOR OP/ED): Performed by: STUDENT IN AN ORGANIZED HEALTH CARE EDUCATION/TRAINING PROGRAM

## 2024-07-01 PROCEDURE — B211YZZ FLUOROSCOPY OF MULTIPLE CORONARY ARTERIES USING OTHER CONTRAST: ICD-10-PCS | Performed by: STUDENT IN AN ORGANIZED HEALTH CARE EDUCATION/TRAINING PROGRAM

## 2024-07-01 PROCEDURE — 83735 ASSAY OF MAGNESIUM: CPT | Performed by: HOSPITALIST

## 2024-07-01 PROCEDURE — 2500000004 HC RX 250 GENERAL PHARMACY W/ HCPCS (ALT 636 FOR OP/ED): Performed by: NURSE PRACTITIONER

## 2024-07-01 PROCEDURE — 80048 BASIC METABOLIC PNL TOTAL CA: CPT | Performed by: HOSPITALIST

## 2024-07-01 PROCEDURE — 93458 L HRT ARTERY/VENTRICLE ANGIO: CPT | Performed by: STUDENT IN AN ORGANIZED HEALTH CARE EDUCATION/TRAINING PROGRAM

## 2024-07-01 PROCEDURE — 4A023N7 MEASUREMENT OF CARDIAC SAMPLING AND PRESSURE, LEFT HEART, PERCUTANEOUS APPROACH: ICD-10-PCS | Performed by: STUDENT IN AN ORGANIZED HEALTH CARE EDUCATION/TRAINING PROGRAM

## 2024-07-01 PROCEDURE — 93306 TTE W/DOPPLER COMPLETE: CPT

## 2024-07-01 PROCEDURE — 2500000002 HC RX 250 W HCPCS SELF ADMINISTERED DRUGS (ALT 637 FOR MEDICARE OP, ALT 636 FOR OP/ED): Performed by: INTERNAL MEDICINE

## 2024-07-01 PROCEDURE — 36415 COLL VENOUS BLD VENIPUNCTURE: CPT | Performed by: HOSPITALIST

## 2024-07-01 PROCEDURE — 85520 HEPARIN ASSAY: CPT | Performed by: EMERGENCY MEDICINE

## 2024-07-01 PROCEDURE — 82947 ASSAY GLUCOSE BLOOD QUANT: CPT

## 2024-07-01 PROCEDURE — 1200000002 HC GENERAL ROOM WITH TELEMETRY DAILY

## 2024-07-01 PROCEDURE — 99152 MOD SED SAME PHYS/QHP 5/>YRS: CPT | Performed by: STUDENT IN AN ORGANIZED HEALTH CARE EDUCATION/TRAINING PROGRAM

## 2024-07-01 PROCEDURE — 2500000005 HC RX 250 GENERAL PHARMACY W/O HCPCS: Performed by: HOSPITALIST

## 2024-07-01 PROCEDURE — 99233 SBSQ HOSP IP/OBS HIGH 50: CPT | Performed by: INTERNAL MEDICINE

## 2024-07-01 PROCEDURE — C1894 INTRO/SHEATH, NON-LASER: HCPCS | Performed by: STUDENT IN AN ORGANIZED HEALTH CARE EDUCATION/TRAINING PROGRAM

## 2024-07-01 PROCEDURE — C1760 CLOSURE DEV, VASC: HCPCS | Performed by: STUDENT IN AN ORGANIZED HEALTH CARE EDUCATION/TRAINING PROGRAM

## 2024-07-01 PROCEDURE — 99223 1ST HOSP IP/OBS HIGH 75: CPT | Performed by: STUDENT IN AN ORGANIZED HEALTH CARE EDUCATION/TRAINING PROGRAM

## 2024-07-01 PROCEDURE — 2720000007 HC OR 272 NO HCPCS: Performed by: STUDENT IN AN ORGANIZED HEALTH CARE EDUCATION/TRAINING PROGRAM

## 2024-07-01 PROCEDURE — 93306 TTE W/DOPPLER COMPLETE: CPT | Performed by: INTERNAL MEDICINE

## 2024-07-01 PROCEDURE — C1887 CATHETER, GUIDING: HCPCS | Performed by: STUDENT IN AN ORGANIZED HEALTH CARE EDUCATION/TRAINING PROGRAM

## 2024-07-01 PROCEDURE — 2550000001 HC RX 255 CONTRASTS: Performed by: STUDENT IN AN ORGANIZED HEALTH CARE EDUCATION/TRAINING PROGRAM

## 2024-07-01 PROCEDURE — 36415 COLL VENOUS BLD VENIPUNCTURE: CPT | Performed by: EMERGENCY MEDICINE

## 2024-07-01 PROCEDURE — 85027 COMPLETE CBC AUTOMATED: CPT | Performed by: STUDENT IN AN ORGANIZED HEALTH CARE EDUCATION/TRAINING PROGRAM

## 2024-07-01 PROCEDURE — C1769 GUIDE WIRE: HCPCS | Performed by: STUDENT IN AN ORGANIZED HEALTH CARE EDUCATION/TRAINING PROGRAM

## 2024-07-01 RX ORDER — SODIUM CHLORIDE 9 MG/ML
75 INJECTION, SOLUTION INTRAVENOUS CONTINUOUS
Status: DISCONTINUED | OUTPATIENT
Start: 2024-07-01 | End: 2024-07-01 | Stop reason: HOSPADM

## 2024-07-01 RX ORDER — POLYETHYLENE GLYCOL 3350 17 G/17G
17 POWDER, FOR SOLUTION ORAL DAILY
Status: DISCONTINUED | OUTPATIENT
Start: 2024-07-02 | End: 2024-07-09

## 2024-07-01 RX ORDER — PREDNISONE 10 MG/1
5 TABLET ORAL DAILY
Status: DISCONTINUED | OUTPATIENT
Start: 2024-07-02 | End: 2024-07-11

## 2024-07-01 RX ORDER — FENTANYL CITRATE 50 UG/ML
INJECTION, SOLUTION INTRAMUSCULAR; INTRAVENOUS AS NEEDED
Status: DISCONTINUED | OUTPATIENT
Start: 2024-07-01 | End: 2024-07-01 | Stop reason: HOSPADM

## 2024-07-01 RX ORDER — DEXTROSE 50 % IN WATER (D50W) INTRAVENOUS SYRINGE
12.5
Status: DISCONTINUED | OUTPATIENT
Start: 2024-07-01 | End: 2024-07-03

## 2024-07-01 RX ORDER — SODIUM CHLORIDE 9 MG/ML
1000 INJECTION, SOLUTION INTRAVENOUS ONCE AS NEEDED
Status: DISCONTINUED | OUTPATIENT
Start: 2024-07-01 | End: 2024-07-01 | Stop reason: HOSPADM

## 2024-07-01 RX ORDER — DULOXETIN HYDROCHLORIDE 60 MG/1
60 CAPSULE, DELAYED RELEASE ORAL EVERY 24 HOURS
Status: DISCONTINUED | OUTPATIENT
Start: 2024-07-01 | End: 2024-07-09

## 2024-07-01 RX ORDER — HEPARIN SODIUM 1000 [USP'U]/ML
INJECTION, SOLUTION INTRAVENOUS; SUBCUTANEOUS AS NEEDED
Status: DISCONTINUED | OUTPATIENT
Start: 2024-07-01 | End: 2024-07-01 | Stop reason: HOSPADM

## 2024-07-01 RX ORDER — LEVOTHYROXINE SODIUM 50 UG/1
50 TABLET ORAL DAILY
Status: DISCONTINUED | OUTPATIENT
Start: 2024-07-02 | End: 2024-07-26 | Stop reason: HOSPADM

## 2024-07-01 RX ORDER — HEPARIN SODIUM 10000 [USP'U]/100ML
0-4000 INJECTION, SOLUTION INTRAVENOUS CONTINUOUS
Status: DISCONTINUED | OUTPATIENT
Start: 2024-07-01 | End: 2024-07-09

## 2024-07-01 RX ORDER — PANTOPRAZOLE SODIUM 40 MG/1
40 TABLET, DELAYED RELEASE ORAL
Status: DISCONTINUED | OUTPATIENT
Start: 2024-07-02 | End: 2024-07-09

## 2024-07-01 RX ORDER — METOPROLOL SUCCINATE 50 MG/1
50 TABLET, EXTENDED RELEASE ORAL DAILY
Status: DISCONTINUED | OUTPATIENT
Start: 2024-07-02 | End: 2024-07-09

## 2024-07-01 RX ORDER — ACETAMINOPHEN 650 MG/1
650 SUPPOSITORY RECTAL EVERY 4 HOURS PRN
Status: DISCONTINUED | OUTPATIENT
Start: 2024-07-01 | End: 2024-07-03

## 2024-07-01 RX ORDER — ASPIRIN 81 MG/1
81 TABLET ORAL DAILY
Status: DISCONTINUED | OUTPATIENT
Start: 2024-07-02 | End: 2024-07-09

## 2024-07-01 RX ORDER — PREGABALIN 75 MG/1
150 CAPSULE ORAL 3 TIMES DAILY
Status: DISCONTINUED | OUTPATIENT
Start: 2024-07-01 | End: 2024-07-01

## 2024-07-01 RX ORDER — ATORVASTATIN CALCIUM 40 MG/1
40 TABLET, FILM COATED ORAL
Status: DISCONTINUED | OUTPATIENT
Start: 2024-07-02 | End: 2024-07-02

## 2024-07-01 RX ORDER — DEXTROSE 50 % IN WATER (D50W) INTRAVENOUS SYRINGE
25
Status: DISCONTINUED | OUTPATIENT
Start: 2024-07-01 | End: 2024-07-03

## 2024-07-01 RX ORDER — HEPARIN SODIUM 10000 [USP'U]/100ML
0-4000 INJECTION, SOLUTION INTRAVENOUS CONTINUOUS
Status: DISCONTINUED | OUTPATIENT
Start: 2024-07-01 | End: 2024-07-01 | Stop reason: HOSPADM

## 2024-07-01 RX ORDER — DEXTROSE 50 % IN WATER (D50W) INTRAVENOUS SYRINGE
25
Status: DISCONTINUED | OUTPATIENT
Start: 2024-07-01 | End: 2024-07-09

## 2024-07-01 RX ORDER — ALBUTEROL SULFATE 90 UG/1
2 AEROSOL, METERED RESPIRATORY (INHALATION) EVERY 6 HOURS PRN
Status: DISCONTINUED | OUTPATIENT
Start: 2024-07-01 | End: 2024-07-09

## 2024-07-01 RX ORDER — LIDOCAINE HYDROCHLORIDE 20 MG/ML
INJECTION, SOLUTION INFILTRATION; PERINEURAL AS NEEDED
Status: DISCONTINUED | OUTPATIENT
Start: 2024-07-01 | End: 2024-07-01 | Stop reason: HOSPADM

## 2024-07-01 RX ORDER — DEXTROSE 50 % IN WATER (D50W) INTRAVENOUS SYRINGE
12.5
Status: DISCONTINUED | OUTPATIENT
Start: 2024-07-01 | End: 2024-07-09

## 2024-07-01 RX ORDER — HYDROXYCHLOROQUINE SULFATE 200 MG/1
200 TABLET, FILM COATED ORAL 2 TIMES DAILY
Status: DISCONTINUED | OUTPATIENT
Start: 2024-07-02 | End: 2024-07-26 | Stop reason: HOSPADM

## 2024-07-01 RX ORDER — MIDAZOLAM HYDROCHLORIDE 1 MG/ML
INJECTION, SOLUTION INTRAMUSCULAR; INTRAVENOUS AS NEEDED
Status: DISCONTINUED | OUTPATIENT
Start: 2024-07-01 | End: 2024-07-01 | Stop reason: HOSPADM

## 2024-07-01 RX ORDER — ACETAMINOPHEN 325 MG/1
650 TABLET ORAL EVERY 4 HOURS PRN
Status: DISCONTINUED | OUTPATIENT
Start: 2024-07-01 | End: 2024-07-09

## 2024-07-01 RX ORDER — INSULIN LISPRO 100 [IU]/ML
0-15 INJECTION, SOLUTION INTRAVENOUS; SUBCUTANEOUS
Status: DISCONTINUED | OUTPATIENT
Start: 2024-07-02 | End: 2024-07-03

## 2024-07-01 RX ORDER — PREGABALIN 100 MG/1
200 CAPSULE ORAL 3 TIMES DAILY
Status: DISCONTINUED | OUTPATIENT
Start: 2024-07-01 | End: 2024-07-01

## 2024-07-01 RX ORDER — ACETAMINOPHEN 160 MG/5ML
650 SOLUTION ORAL EVERY 4 HOURS PRN
Status: DISCONTINUED | OUTPATIENT
Start: 2024-07-01 | End: 2024-07-03

## 2024-07-01 RX ADMIN — GUAIFENESIN 600 MG: 600 TABLET ORAL at 01:57

## 2024-07-01 RX ADMIN — PREGABALIN 150 MG: 75 CAPSULE ORAL at 15:59

## 2024-07-01 RX ADMIN — ASPIRIN 81 MG: 81 TABLET, COATED ORAL at 09:04

## 2024-07-01 RX ADMIN — METHADONE HYDROCHLORIDE 70 MG: 10 TABLET ORAL at 15:58

## 2024-07-01 RX ADMIN — TICAGRELOR 90 MG: 90 TABLET ORAL at 09:04

## 2024-07-01 RX ADMIN — PANTOPRAZOLE SODIUM 40 MG: 40 TABLET, DELAYED RELEASE ORAL at 09:04

## 2024-07-01 RX ADMIN — TIOTROPIUM BROMIDE INHALATION SPRAY 2 PUFF: 3.12 SPRAY, METERED RESPIRATORY (INHALATION) at 09:00

## 2024-07-01 RX ADMIN — SODIUM CHLORIDE 75 ML/HR: 9 INJECTION, SOLUTION INTRAVENOUS at 15:45

## 2024-07-01 RX ADMIN — HYDROXYCHLOROQUINE SULFATE 200 MG: 200 TABLET, FILM COATED ORAL at 15:58

## 2024-07-01 RX ADMIN — METHADONE HYDROCHLORIDE 5 MG: 5 TABLET ORAL at 16:02

## 2024-07-01 RX ADMIN — PREDNISONE 5 MG: 5 TABLET ORAL at 15:59

## 2024-07-01 RX ADMIN — Medication 2 L/MIN: at 00:06

## 2024-07-01 RX ADMIN — FLUTICASONE FUROATE AND VILANTEROL TRIFENATATE 1 PUFF: 100; 25 POWDER RESPIRATORY (INHALATION) at 09:03

## 2024-07-01 RX ADMIN — LEVOTHYROXINE SODIUM 50 MCG: 50 TABLET ORAL at 05:24

## 2024-07-01 SDOH — SOCIAL STABILITY: SOCIAL INSECURITY: DOES ANYONE TRY TO KEEP YOU FROM HAVING/CONTACTING OTHER FRIENDS OR DOING THINGS OUTSIDE YOUR HOME?: NO

## 2024-07-01 SDOH — SOCIAL STABILITY: SOCIAL INSECURITY: HAVE YOU HAD ANY THOUGHTS OF HARMING ANYONE ELSE?: NO

## 2024-07-01 SDOH — SOCIAL STABILITY: SOCIAL INSECURITY: ARE YOU OR HAVE YOU BEEN THREATENED OR ABUSED PHYSICALLY, EMOTIONALLY, OR SEXUALLY BY ANYONE?: NO

## 2024-07-01 SDOH — SOCIAL STABILITY: SOCIAL INSECURITY: ARE THERE ANY APPARENT SIGNS OF INJURIES/BEHAVIORS THAT COULD BE RELATED TO ABUSE/NEGLECT?: NO

## 2024-07-01 SDOH — SOCIAL STABILITY: SOCIAL INSECURITY: DO YOU FEEL UNSAFE GOING BACK TO THE PLACE WHERE YOU ARE LIVING?: NO

## 2024-07-01 SDOH — SOCIAL STABILITY: SOCIAL INSECURITY: DO YOU FEEL ANYONE HAS EXPLOITED OR TAKEN ADVANTAGE OF YOU FINANCIALLY OR OF YOUR PERSONAL PROPERTY?: NO

## 2024-07-01 SDOH — SOCIAL STABILITY: SOCIAL INSECURITY: HAS ANYONE EVER THREATENED TO HURT YOUR FAMILY OR YOUR PETS?: NO

## 2024-07-01 SDOH — SOCIAL STABILITY: SOCIAL INSECURITY: HAVE YOU HAD THOUGHTS OF HARMING ANYONE ELSE?: NO

## 2024-07-01 SDOH — SOCIAL STABILITY: SOCIAL INSECURITY: WERE YOU ABLE TO COMPLETE ALL THE BEHAVIORAL HEALTH SCREENINGS?: YES

## 2024-07-01 SDOH — SOCIAL STABILITY: SOCIAL INSECURITY: ABUSE: ADULT

## 2024-07-01 ASSESSMENT — COLUMBIA-SUICIDE SEVERITY RATING SCALE - C-SSRS

## 2024-07-01 ASSESSMENT — COGNITIVE AND FUNCTIONAL STATUS - GENERAL
PATIENT BASELINE BEDBOUND: NO
MOBILITY SCORE: 24
DAILY ACTIVITIY SCORE: 24

## 2024-07-01 ASSESSMENT — PAIN SCALES - GENERAL
PAINLEVEL_OUTOF10: 3
PAINLEVEL_OUTOF10: 0 - NO PAIN

## 2024-07-01 ASSESSMENT — PATIENT HEALTH QUESTIONNAIRE - PHQ9
2. FEELING DOWN, DEPRESSED OR HOPELESS: NOT AT ALL
SUM OF ALL RESPONSES TO PHQ9 QUESTIONS 1 & 2: 0
1. LITTLE INTEREST OR PLEASURE IN DOING THINGS: NOT AT ALL

## 2024-07-01 ASSESSMENT — ACTIVITIES OF DAILY LIVING (ADL)
JUDGMENT_ADEQUATE_SAFELY_COMPLETE_DAILY_ACTIVITIES: YES
TOILETING: INDEPENDENT
LACK_OF_TRANSPORTATION: NO
BATHING: INDEPENDENT
DRESSING YOURSELF: INDEPENDENT
PATIENT'S MEMORY ADEQUATE TO SAFELY COMPLETE DAILY ACTIVITIES?: YES
GROOMING: INDEPENDENT
HEARING - LEFT EAR: FUNCTIONAL
HEARING - RIGHT EAR: FUNCTIONAL
FEEDING YOURSELF: INDEPENDENT
WALKS IN HOME: INDEPENDENT
ADEQUATE_TO_COMPLETE_ADL: YES

## 2024-07-01 ASSESSMENT — LIFESTYLE VARIABLES
AUDIT-C TOTAL SCORE: 0
AUDIT-C TOTAL SCORE: 0
HOW MANY STANDARD DRINKS CONTAINING ALCOHOL DO YOU HAVE ON A TYPICAL DAY: PATIENT DOES NOT DRINK
SKIP TO QUESTIONS 9-10: 1
HOW OFTEN DO YOU HAVE A DRINK CONTAINING ALCOHOL: NEVER
HOW OFTEN DO YOU HAVE 6 OR MORE DRINKS ON ONE OCCASION: NEVER

## 2024-07-01 ASSESSMENT — PAIN - FUNCTIONAL ASSESSMENT
PAIN_FUNCTIONAL_ASSESSMENT: 0-10

## 2024-07-01 NOTE — POST-PROCEDURE NOTE
Physician Transition of Care Summary  Invasive Cardiovascular Lab    Procedure Date: 7/1/2024  Attending:    Сергей Calvin - Primary  Resident/Fellow/Other Assistant: Surgeons and Role:  * No surgeons found with a matching role *    Indications:   Pre-op Diagnosis     * NSTEMI (non-ST elevated myocardial infarction) (Multi) [I21.4]    Post-procedure diagnosis:   Post-op Diagnosis     * NSTEMI (non-ST elevated myocardial infarction) (Multi) [I21.4]    Procedure(s):   Left Heart Cath  34495 - CA CATH PLMT L HRT & ARTS W/NJX & ANGIO IMG S&I        Procedure Findings:   Multiple vessel disease, % occluded, LCX lesion and proximal LAD lesion   See report for full details    Description of the Procedure:   LHC  Rt radial artery     Complications:   None     Stents/Implants:   Implants       No implant documentation for this case.            Anticoagulation/Antiplatelet Plan:   Aspirin 81 mg daily, restart heparin drip 2 hours after TR band removed     Estimated Blood Loss:   5 mL    Anesthesia: Moderate Sedation Anesthesia Staff: No anesthesia staff entered.    Any Specimen(s) Removed:   No specimens collected during this procedure.    Disposition:   Floor     Recs:  [place] [facility]       Electronically signed by: NETTIE Abarca, 7/1/2024 3:19 PM

## 2024-07-01 NOTE — PROGRESS NOTES
07/01/24 1412   Discharge Planning   Living Arrangements Family members   Support Systems Family members   Assistance Needed none   Type of Residence Private residence   Home or Post Acute Services None   Patient expects to be discharged to: home   Does the patient need discharge transport arranged? No     Discharge planning assessment completed with patient. Patient is from home with his brother, and assists his brother with care at home. Patient himself is independent with ambulation and ADL's. He has bilateral foot wounds, and follows at the [facility] for these. He is currently on supplemental oxygen, which he does not wear at home. Patient plans to return home at discharge and denies needs at this time.

## 2024-07-01 NOTE — NURSING NOTE
Report called to Sidney at [facility].  Pt to be transported to [facility] LT [id number].

## 2024-07-01 NOTE — CARE PLAN
The patient's goals for the shift include      The clinical goals for the shift include no CP      Problem: Pain - Adult  Goal: Verbalizes/displays adequate comfort level or baseline comfort level  Outcome: Progressing     Problem: Safety - Adult  Goal: Free from fall injury  Outcome: Progressing     Problem: Discharge Planning  Goal: Discharge to home or other facility with appropriate resources  Outcome: Progressing     Problem: Chronic Conditions and Co-morbidities  Goal: Patient's chronic conditions and co-morbidity symptoms are monitored and maintained or improved  Outcome: Progressing     Problem: Diabetes  Goal: Achieve decreasing blood glucose levels by end of shift  Outcome: Progressing  Goal: Increase stability of blood glucose readings by end of shift  Outcome: Progressing  Goal: Decrease in ketones present in urine by end of shift  Outcome: Progressing  Goal: Maintain electrolyte levels within acceptable range throughout shift  Outcome: Progressing  Goal: Maintain glucose levels >70mg/dl to <250mg/dl throughout shift  Outcome: Progressing  Goal: No changes in neurological exam by end of shift  Outcome: Progressing  Goal: Learn about and adhere to nutrition recommendations by end of shift  Outcome: Progressing  Goal: Vital signs within normal range for age by end of shift  Outcome: Progressing  Goal: Increase self care and/or family involovement by end of shift  Outcome: Progressing  Goal: Receive DSME education by end of shift  Outcome: Progressing

## 2024-07-01 NOTE — CONSULTS
Wound Care Consult     Visit Date: 7/1/2024      Patient Name: Kael Zepeda         MRN: [id number]           YOB: 1965     Pertinent Labs:   Albumin   Date Value Ref Range Status   06/30/2024 3.7 3.4 - 5.0 g/dL Final     Wound Assessment:  Wound 06/30/24 Diabetic Ulcer Foot Right;Plantar (Active)   Wound Image   07/01/24 1331   Site Assessment Red;[place] 07/01/24 1331   Chelsie-Wound Assessment Dry;Calloused 07/01/24 1331   Non-staged Wound Description Full thickness 07/01/24 1331   Shape oval 07/01/24 1331   Wound Length (cm) 2.8 cm 07/01/24 1331   Wound Width (cm) 2.3 cm 07/01/24 1331   Wound Surface Area (cm^2) 6.44 cm^2 07/01/24 1331   Wound Depth (cm) 0.4 cm 07/01/24 1331   Wound Volume (cm^3) 2.576 cm^3 07/01/24 1331   State of Healing Non-healing 07/01/24 1331   Margins Well-defined edges 07/01/24 1331   Drainage Description Serosanguineous;Yellow 07/01/24 1331   Drainage Amount Moderate 07/01/24 1331   Dressing Silver dressing;Alginate;ABD;Kerlix/rolled gauze 07/01/24 1331   Dressing Changed Changed 07/01/24 1331       Wound 06/30/24 Diabetic Ulcer Foot Left;Medial (Active)   Wound Image   07/01/24 1330   Site Assessment Pink;Red;White 07/01/24 1330   Chelsie-Wound Assessment Dry;Calloused 07/01/24 1330   Non-staged Wound Description Full thickness 07/01/24 1330   Shape circular 07/01/24 1330   Wound Length (cm) 2.5 cm 07/01/24 1330   Wound Width (cm) 1.6 cm 07/01/24 1330   Wound Surface Area (cm^2) 4 cm^2 07/01/24 1330   Wound Depth (cm) 0.2 cm 07/01/24 1330   Wound Volume (cm^3) 0.8 cm^3 07/01/24 1330   State of Healing Non-healing 07/01/24 1330   Margins Well-defined edges 07/01/24 1330   Drainage Description Serosanguineous;Yellow 07/01/24 1330   Drainage Amount Small 07/01/24 1330   Dressing Silver dressing;Alginate;ABD;Kerlix/rolled gauze 07/01/24 1330   Dressing Changed Changed 07/01/24 1330   Dressing Status Clean;Dry 07/01/24 0455       Wound 06/30/24 Diabetic Ulcer Toe (Comment   which one) Left (Active)   Wound Image   07/01/24 1329   Site Assessment Red;Pink;White 07/01/24 1329   Chelsie-Wound Assessment Dry;Calloused 07/01/24 1329   Non-staged Wound Description Full thickness 07/01/24 1329   Shape circular 07/01/24 1329   Wound Length (cm) 1 cm 07/01/24 1329   Wound Width (cm) 0.8 cm 07/01/24 1329   Wound Surface Area (cm^2) 0.8 cm^2 07/01/24 1329   Wound Depth (cm) 0.3 cm 07/01/24 1329   Wound Volume (cm^3) 0.24 cm^3 07/01/24 1329   Margins Well-defined edges 07/01/24 1329   Drainage Description Serosanguineous;Yellow 07/01/24 1329   Drainage Amount Scant 07/01/24 1329   Dressing Silver dressing;Alginate;ABD;Kerlix/rolled gauze 07/01/24 1329   Dressing Changed Changed 07/01/24 1329   Dressing Status Clean;Dry 07/01/24 0455       Wound 07/01/24 Diabetic Ulcer Foot Dorsal foot;Right (Active)   Wound Image   07/01/24 1332   Site Assessment Red;Pink;White;Yellow 07/01/24 1332   Chelsie-Wound Assessment Dry;Calloused 07/01/24 1332   Non-staged Wound Description Full thickness 07/01/24 1332   Wound Length (cm) 1 cm 07/01/24 1332   Wound Width (cm) 1 cm 07/01/24 1332   Wound Surface Area (cm^2) 1 cm^2 07/01/24 1332   Wound Depth (cm) 0.2 cm 07/01/24 1332   Wound Volume (cm^3) 0.2 cm^3 07/01/24 1332   Margins Well-defined edges 07/01/24 1332   Drainage Description Serosanguineous;Yellow 07/01/24 1332   Drainage Amount Small 07/01/24 1332   Dressing Silver dressing;Alginate;ABD;Kerlix/rolled gauze 07/01/24 1332   Dressing Changed Changed 07/01/24 1332       Patient seen for bilateral foot wounds (present on admission) complicated by PMH: RA, IDDM2, COPD, hypothyroidism, chronic tobacco abuse, chronic methadone dependency, neuropathy/diabetic foot ulcer with osteomyelitis per chart. Patient previously seen at wound center 6/7, reports he has not followed up since. Patient has history of osteo, last imagining in chart noted October 2023. BLE edema noted with redness to feet/legs, PPP. Multiple diabetic  ulcerations to bilateral feet. Recommend podiatry consult. Skin hygiene and dressing care provided. See detailed assessment above from flowsheet. Recommendations below, reviewed with Dr. Duong.     Wound location: Bilateral foot ulcerations    Treatment protocol recommended:  Cleanse with vashe wound cleanser.   Apply aquacel ag cut to size, cover with clean dry dressing every other day/prn.   Podiatry consult.   Continue to off load, turning at least every 2 hours. Offload heels.    Therapeutic surface: Patient on [employer] pressure relieving mattress. Patient demonstrates ability to turn/reposition self independently, encouraged to do so atleast every 2 hours. Offload heels. Elevate legs when resting.     Nursing updated, continue pressure injury preventions, wound care to be completed by nursing per orders. and re-consult wound RN if needed.    See above recommendations for treatment. I would recommend follow back up in [facility] upon discharge.     Please contact me with questions or changes in patient condition.  Arabella Merida RN  [facility]   [phone]

## 2024-07-01 NOTE — PROGRESS NOTES
"Kael Zepeda is a 58 y.o. male on day 1 of admission presenting with NSTEMI (non-ST elevated myocardial infarction) (Multi).      Subjective   Kael Zepeda is a 58 y.o. male with PMHx s/f RA, IDDM2, COPD, hypothyroidism, chronic tobacco abuse, chronic methadone dependency,  neuropathy/diabetic foot ulcer with osteomyelitis presenting with chest pain. Patient states for the past week he has had intermittent chest pains, but they have worsened substantially in the last 3 days.  He describes it as a left-sided pressure that radiates down his arms and last 10 to 15 minutes at times.  He also describes a full body \"flushing\" sensation that occurs when the chest pain ends.  He also admits to worsening dyspnea on exertion and overall is unable to ambulate much without becoming severely winded lately.  He is having some mild nausea with it but no vomiting.  He has no known history of cardiac disease but does have a history of insulin-dependent diabetes mellitus type 2.  He was also seen in the ER here earlier this month for calluses and ulcerations on his feet.  He has recently established with the wound center for follow-up.  Patient denies vomiting, cough,, sputum production, abdominal pain, lightheadedness, dizziness, fever or chills. He is a chronic cigarette smoker 1 ppd X 40 years.     ED Course (Summary):   Vitals on presentation: 97.2 F<, 91 bpm, 12 rr, 133/68, 100% on RA  Labs: CMP glu 192, alk phos 143  Mag 1.81  Trop 146 --> 135  CBC WBC 10.0, Hg 10.9, Plt 492  Imaging: CXR - No acute cardiopulmonary process.  EKG - Sinus rhythm at 91 bpm. No significant ST changes.  Interventions: Nitrostat 0.4 mg, aspirin 324 mg, Cardiology was consulted for his elevated troponins and recommended heparin drip to treat NSTEMI and will see pt in morning. Pt will be admitted for further care.     6/30 Patient reported shortness of breath this morning but pulse ox at 99% when checked.  No reported : nausea, " vomiting, tremors, fevers, chills, palpitations or abdominal pain.  7/1: No acute events overnight.  Patient still with intermittent chest pressure and angina symptoms.  Cardiology recommendations appreciated.  Plan for left heart catheterization today.       Objective     Last Recorded Vitals  /75   Pulse 87   Temp 36.7 °C (98 °F) (Temporal)   Resp 16   Wt 90 kg (198 lb 6.6 oz)   SpO2 100%   Intake/Output last 3 Shifts:    Intake/Output Summary (Last 24 hours) at 7/1/2024 1539  Last data filed at 7/1/2024 1502  Gross per 24 hour   Intake 915.33 ml   Output 5 ml   Net 910.33 ml       Admission Weight  Weight: 86.2 kg (190 lb) (06/30/24 0103)    Daily Weight  07/01/24 : 90 kg (198 lb 6.6 oz)    Image Results  ECG 12 lead  Normal sinus rhythm  Cannot rule out Inferior infarct , age undetermined  Prolonged QT  Abnormal ECG  When compared with ECG of 30-JUN-2024 02:37,  PREVIOUS ECG IS PRESENT  ECG 12 lead  Sinus rhythm  Probable inferior infarct, age indeterminate  ECG 12 lead  Atrial fibrillation  Probable inferior infarct, age indeterminate  Prolonged QT interval      Physical Exam  CONSTITUTIONAL - alert, looks tired, unwell  CHEST - clear to auscultation, no wheezing, no crackles and no rales, good effort  CARDIAC - regular rate and regular rhythm, no murmur  ABDOMEN - no organomegaly, soft, nontender, nondistended,  EXTREMITIES - no edema, no deformities  NEUROLOGICAL - alert, oriented x3 and no acute focal signs  PSYCHIATRIC - alert, pleasant and cordial, age-appropriate    Relevant Results               Assessment/Plan   This patient currently has cardiac telemetry ordered; if you would like to modify or discontinue the telemetry order, click here to go to the orders activity to modify/discontinue the order.      #Non-STEMI  Heart cath showed multivessel disease with 100% RCA occlusion, left circumflex lesion and proximal LAD lesion  Cardiology recommendations appreciated  Continue heparin  drip  [place] [facility] [place]    #Rheumatoid arthritis  COPD  Tobacco abuse  Acquired hypothyroidism  Insulin-dependent diabetes mellitus type 2             Mindy Duong MD

## 2024-07-02 ENCOUNTER — APPOINTMENT (OUTPATIENT)
Dept: RESPIRATORY THERAPY | Facility: HOSPITAL | Age: 59
End: 2024-07-02
Payer: MEDICARE

## 2024-07-02 ENCOUNTER — APPOINTMENT (OUTPATIENT)
Dept: CARDIOLOGY | Facility: CLINIC | Age: 59
End: 2024-07-02
Payer: MEDICARE

## 2024-07-02 ENCOUNTER — APPOINTMENT (OUTPATIENT)
Dept: CARDIOLOGY | Facility: HOSPITAL | Age: 59
End: 2024-07-02
Payer: MEDICARE

## 2024-07-02 ENCOUNTER — APPOINTMENT (OUTPATIENT)
Dept: VASCULAR MEDICINE | Facility: HOSPITAL | Age: 59
End: 2024-07-02
Payer: MEDICARE

## 2024-07-02 LAB
ALBUMIN SERPL BCP-MCNC: 3.8 G/DL (ref 3.4–5)
ALP SERPL-CCNC: 122 U/L (ref 33–120)
ALT SERPL W P-5'-P-CCNC: 10 U/L (ref 10–52)
ANION GAP SERPL CALC-SCNC: 14 MMOL/L (ref 10–20)
ANION GAP SERPL CALC-SCNC: 14 MMOL/L (ref 10–20)
APPEARANCE UR: CLEAR
AST SERPL W P-5'-P-CCNC: 19 U/L (ref 9–39)
ATRIAL RATE: 82 BPM
BASE EXCESS BLDA CALC-SCNC: 0.9 MMOL/L (ref -2–3)
BILIRUB DIRECT SERPL-MCNC: 0.1 MG/DL (ref 0–0.3)
BILIRUB SERPL-MCNC: 0.4 MG/DL (ref 0–1.2)
BILIRUB UR STRIP.AUTO-MCNC: NEGATIVE MG/DL
BNP SERPL-MCNC: 252 PG/ML (ref 0–99)
BODY TEMPERATURE: 37 DEGREES CELSIUS
BUN SERPL-MCNC: 14 MG/DL (ref 6–23)
BUN SERPL-MCNC: 16 MG/DL (ref 6–23)
CALCIUM SERPL-MCNC: 8.9 MG/DL (ref 8.6–10.6)
CALCIUM SERPL-MCNC: 9.1 MG/DL (ref 8.6–10.6)
CHLORIDE SERPL-SCNC: 101 MMOL/L (ref 98–107)
CHLORIDE SERPL-SCNC: 102 MMOL/L (ref 98–107)
CHOLEST SERPL-MCNC: 122 MG/DL (ref 0–199)
CHOLESTEROL/HDL RATIO: 2.7
CO2 SERPL-SCNC: 26 MMOL/L (ref 21–32)
CO2 SERPL-SCNC: 27 MMOL/L (ref 21–32)
COHGB MFR BLDA: 1.9 %
COLOR UR: YELLOW
CREAT SERPL-MCNC: 0.74 MG/DL (ref 0.5–1.3)
CREAT SERPL-MCNC: 0.77 MG/DL (ref 0.5–1.3)
DO-HGB MFR BLDA: 4.5 % (ref 0–5)
EGFRCR SERPLBLD CKD-EPI 2021: >90 ML/MIN/1.73M*2
EGFRCR SERPLBLD CKD-EPI 2021: >90 ML/MIN/1.73M*2
ERYTHROCYTE [DISTWIDTH] IN BLOOD BY AUTOMATED COUNT: 16.9 % (ref 11.5–14.5)
ERYTHROCYTE [DISTWIDTH] IN BLOOD BY AUTOMATED COUNT: 17 % (ref 11.5–14.5)
GLUCOSE BLD MANUAL STRIP-MCNC: 128 MG/DL (ref 74–99)
GLUCOSE BLD MANUAL STRIP-MCNC: 141 MG/DL (ref 74–99)
GLUCOSE BLD MANUAL STRIP-MCNC: 179 MG/DL (ref 74–99)
GLUCOSE BLD MANUAL STRIP-MCNC: 212 MG/DL (ref 74–99)
GLUCOSE BLD MANUAL STRIP-MCNC: 67 MG/DL (ref 74–99)
GLUCOSE SERPL-MCNC: 157 MG/DL (ref 74–99)
GLUCOSE SERPL-MCNC: 81 MG/DL (ref 74–99)
GLUCOSE UR STRIP.AUTO-MCNC: NORMAL MG/DL
HCO3 BLDA-SCNC: 26.6 MMOL/L (ref 22–26)
HCT VFR BLD AUTO: 33.2 % (ref 41–52)
HCT VFR BLD AUTO: 35.9 % (ref 41–52)
HDLC SERPL-MCNC: 45.9 MG/DL
HGB BLD-MCNC: 10.1 G/DL (ref 13.5–17.5)
HGB BLD-MCNC: 11.1 G/DL (ref 13.5–17.5)
HGB BLDA-MCNC: 10.7 G/DL (ref 13.5–17.5)
KETONES UR STRIP.AUTO-MCNC: NEGATIVE MG/DL
LDLC SERPL CALC-MCNC: 60 MG/DL
LEUKOCYTE ESTERASE UR QL STRIP.AUTO: ABNORMAL
MCH RBC QN AUTO: 23.7 PG (ref 26–34)
MCH RBC QN AUTO: 24.4 PG (ref 26–34)
MCHC RBC AUTO-ENTMCNC: 30.4 G/DL (ref 32–36)
MCHC RBC AUTO-ENTMCNC: 30.9 G/DL (ref 32–36)
MCV RBC AUTO: 78 FL (ref 80–100)
MCV RBC AUTO: 79 FL (ref 80–100)
METHGB MFR BLDA: 0 % (ref 0–1.5)
MUCOUS THREADS #/AREA URNS AUTO: NORMAL /LPF
NITRITE UR QL STRIP.AUTO: NEGATIVE
NON HDL CHOLESTEROL: 76 MG/DL (ref 0–149)
NRBC BLD-RTO: 0 /100 WBCS (ref 0–0)
NRBC BLD-RTO: 0 /100 WBCS (ref 0–0)
OXYHGB MFR BLDA: 93.6 % (ref 94–98)
P AXIS: 46 DEGREES
P OFFSET: 176 MS
P ONSET: 125 MS
PCO2 BLDA: 46 MM HG (ref 38–42)
PH BLDA: 7.37 PH (ref 7.38–7.42)
PH UR STRIP.AUTO: 6.5 [PH]
PHOSPHATE SERPL-MCNC: 4.7 MG/DL (ref 2.5–4.9)
PLATELET # BLD AUTO: 434 X10*3/UL (ref 150–450)
PLATELET # BLD AUTO: 452 X10*3/UL (ref 150–450)
PO2 BLDA: 72 MM HG (ref 85–95)
POTASSIUM SERPL-SCNC: 3.6 MMOL/L (ref 3.5–5.3)
POTASSIUM SERPL-SCNC: 3.8 MMOL/L (ref 3.5–5.3)
PR INTERVAL: 190 MS
PROT SERPL-MCNC: 7 G/DL (ref 6.4–8.2)
PROT UR STRIP.AUTO-MCNC: ABNORMAL MG/DL
Q ONSET: 220 MS
QRS COUNT: 14 BEATS
QRS DURATION: 98 MS
QT INTERVAL: 438 MS
QTC CALCULATION(BAZETT): 511 MS
QTC FREDERICIA: 486 MS
R AXIS: 54 DEGREES
RBC # BLD AUTO: 4.26 X10*6/UL (ref 4.5–5.9)
RBC # BLD AUTO: 4.55 X10*6/UL (ref 4.5–5.9)
RBC # UR STRIP.AUTO: NEGATIVE /UL
RBC #/AREA URNS AUTO: NORMAL /HPF
SAO2 % BLDA: 95 % (ref 94–100)
SODIUM SERPL-SCNC: 138 MMOL/L (ref 136–145)
SODIUM SERPL-SCNC: 138 MMOL/L (ref 136–145)
SP GR UR STRIP.AUTO: 1.02
T AXIS: 2 DEGREES
T OFFSET: 439 MS
TRIGL SERPL-MCNC: 81 MG/DL (ref 0–149)
TSH SERPL-ACNC: 2.67 MIU/L (ref 0.44–3.98)
UFH PPP CHRO-ACNC: 0.2 IU/ML
UFH PPP CHRO-ACNC: 0.2 IU/ML
UFH PPP CHRO-ACNC: 0.3 IU/ML
UFH PPP CHRO-ACNC: 0.4 IU/ML
UFH PPP CHRO-ACNC: 0.4 IU/ML
UROBILINOGEN UR STRIP.AUTO-MCNC: NORMAL MG/DL
VENTRICULAR RATE: 82 BPM
VLDL: 16 MG/DL (ref 0–40)
WBC # BLD AUTO: 8.5 X10*3/UL (ref 4.4–11.3)
WBC # BLD AUTO: 8.9 X10*3/UL (ref 4.4–11.3)
WBC #/AREA URNS AUTO: NORMAL /HPF

## 2024-07-02 PROCEDURE — 82728 ASSAY OF FERRITIN: CPT

## 2024-07-02 PROCEDURE — 2500000001 HC RX 250 WO HCPCS SELF ADMINISTERED DRUGS (ALT 637 FOR MEDICARE OP): Performed by: PHYSICIAN ASSISTANT

## 2024-07-02 PROCEDURE — 84100 ASSAY OF PHOSPHORUS: CPT | Performed by: NURSE PRACTITIONER

## 2024-07-02 PROCEDURE — 2500000001 HC RX 250 WO HCPCS SELF ADMINISTERED DRUGS (ALT 637 FOR MEDICARE OP): Performed by: NURSE PRACTITIONER

## 2024-07-02 PROCEDURE — 93005 ELECTROCARDIOGRAM TRACING: CPT

## 2024-07-02 PROCEDURE — 84443 ASSAY THYROID STIM HORMONE: CPT | Performed by: PHYSICIAN ASSISTANT

## 2024-07-02 PROCEDURE — 80048 BASIC METABOLIC PNL TOTAL CA: CPT | Performed by: STUDENT IN AN ORGANIZED HEALTH CARE EDUCATION/TRAINING PROGRAM

## 2024-07-02 PROCEDURE — 36415 COLL VENOUS BLD VENIPUNCTURE: CPT | Performed by: STUDENT IN AN ORGANIZED HEALTH CARE EDUCATION/TRAINING PROGRAM

## 2024-07-02 PROCEDURE — 80048 BASIC METABOLIC PNL TOTAL CA: CPT | Mod: CCI | Performed by: NURSE PRACTITIONER

## 2024-07-02 PROCEDURE — 2500000002 HC RX 250 W HCPCS SELF ADMINISTERED DRUGS (ALT 637 FOR MEDICARE OP, ALT 636 FOR OP/ED): Performed by: STUDENT IN AN ORGANIZED HEALTH CARE EDUCATION/TRAINING PROGRAM

## 2024-07-02 PROCEDURE — 87081 CULTURE SCREEN ONLY: CPT | Performed by: PHYSICIAN ASSISTANT

## 2024-07-02 PROCEDURE — 2500000001 HC RX 250 WO HCPCS SELF ADMINISTERED DRUGS (ALT 637 FOR MEDICARE OP): Performed by: STUDENT IN AN ORGANIZED HEALTH CARE EDUCATION/TRAINING PROGRAM

## 2024-07-02 PROCEDURE — 85027 COMPLETE CBC AUTOMATED: CPT | Performed by: STUDENT IN AN ORGANIZED HEALTH CARE EDUCATION/TRAINING PROGRAM

## 2024-07-02 PROCEDURE — 99223 1ST HOSP IP/OBS HIGH 75: CPT | Performed by: PHYSICIAN ASSISTANT

## 2024-07-02 PROCEDURE — 87086 URINE CULTURE/COLONY COUNT: CPT | Performed by: PHYSICIAN ASSISTANT

## 2024-07-02 PROCEDURE — 94010 BREATHING CAPACITY TEST: CPT

## 2024-07-02 PROCEDURE — 2500000002 HC RX 250 W HCPCS SELF ADMINISTERED DRUGS (ALT 637 FOR MEDICARE OP, ALT 636 FOR OP/ED): Performed by: NURSE PRACTITIONER

## 2024-07-02 PROCEDURE — 85027 COMPLETE CBC AUTOMATED: CPT | Performed by: NURSE PRACTITIONER

## 2024-07-02 PROCEDURE — 93880 EXTRACRANIAL BILAT STUDY: CPT

## 2024-07-02 PROCEDURE — 1200000002 HC GENERAL ROOM WITH TELEMETRY DAILY

## 2024-07-02 PROCEDURE — 93010 ELECTROCARDIOGRAM REPORT: CPT | Performed by: INTERNAL MEDICINE

## 2024-07-02 PROCEDURE — 83735 ASSAY OF MAGNESIUM: CPT

## 2024-07-02 PROCEDURE — 93922 UPR/L XTREMITY ART 2 LEVELS: CPT

## 2024-07-02 PROCEDURE — 83540 ASSAY OF IRON: CPT

## 2024-07-02 PROCEDURE — 80061 LIPID PANEL: CPT | Performed by: PHYSICIAN ASSISTANT

## 2024-07-02 PROCEDURE — 93970 EXTREMITY STUDY: CPT

## 2024-07-02 PROCEDURE — 82375 ASSAY CARBOXYHB QUANT: CPT

## 2024-07-02 PROCEDURE — 82248 BILIRUBIN DIRECT: CPT | Performed by: PHYSICIAN ASSISTANT

## 2024-07-02 PROCEDURE — 85520 HEPARIN ASSAY: CPT | Performed by: STUDENT IN AN ORGANIZED HEALTH CARE EDUCATION/TRAINING PROGRAM

## 2024-07-02 PROCEDURE — 2500000004 HC RX 250 GENERAL PHARMACY W/ HCPCS (ALT 636 FOR OP/ED): Performed by: STUDENT IN AN ORGANIZED HEALTH CARE EDUCATION/TRAINING PROGRAM

## 2024-07-02 PROCEDURE — 93922 UPR/L XTREMITY ART 2 LEVELS: CPT | Performed by: INTERNAL MEDICINE

## 2024-07-02 PROCEDURE — 82947 ASSAY GLUCOSE BLOOD QUANT: CPT

## 2024-07-02 PROCEDURE — 36600 WITHDRAWAL OF ARTERIAL BLOOD: CPT

## 2024-07-02 PROCEDURE — 81003 URINALYSIS AUTO W/O SCOPE: CPT | Performed by: PHYSICIAN ASSISTANT

## 2024-07-02 PROCEDURE — 93970 EXTREMITY STUDY: CPT | Performed by: INTERNAL MEDICINE

## 2024-07-02 PROCEDURE — S0109 METHADONE ORAL 5MG: HCPCS | Performed by: NURSE PRACTITIONER

## 2024-07-02 PROCEDURE — 93880 EXTRACRANIAL BILAT STUDY: CPT | Performed by: INTERNAL MEDICINE

## 2024-07-02 PROCEDURE — 83880 ASSAY OF NATRIURETIC PEPTIDE: CPT | Performed by: NURSE PRACTITIONER

## 2024-07-02 PROCEDURE — 94010 BREATHING CAPACITY TEST: CPT | Performed by: INTERNAL MEDICINE

## 2024-07-02 RX ORDER — ISOSORBIDE DINITRATE 10 MG/1
10 TABLET ORAL
Status: DISCONTINUED | OUTPATIENT
Start: 2024-07-02 | End: 2024-07-09

## 2024-07-02 RX ORDER — ATORVASTATIN CALCIUM 80 MG/1
80 TABLET, FILM COATED ORAL NIGHTLY
Status: DISCONTINUED | OUTPATIENT
Start: 2024-07-03 | End: 2024-07-09

## 2024-07-02 RX ORDER — MUPIROCIN 20 MG/G
OINTMENT TOPICAL 2 TIMES DAILY
Status: COMPLETED | OUTPATIENT
Start: 2024-07-02 | End: 2024-07-06

## 2024-07-02 ASSESSMENT — COGNITIVE AND FUNCTIONAL STATUS - GENERAL
CLIMB 3 TO 5 STEPS WITH RAILING: A LITTLE
MOBILITY SCORE: 24
DAILY ACTIVITIY SCORE: 24
MOBILITY SCORE: 24
DAILY ACTIVITIY SCORE: 24
DAILY ACTIVITIY SCORE: 24
MOBILITY SCORE: 23

## 2024-07-02 ASSESSMENT — ENCOUNTER SYMPTOMS
ENDOCRINE NEGATIVE: 1
MUSCULOSKELETAL NEGATIVE: 1
ACTIVITY CHANGE: 1
PSYCHIATRIC NEGATIVE: 1
CONSTITUTIONAL NEGATIVE: 1
GASTROINTESTINAL NEGATIVE: 1
EYES NEGATIVE: 1
WOUND: 1
RESPIRATORY NEGATIVE: 1
NEUROLOGICAL NEGATIVE: 1
HEMATOLOGIC/LYMPHATIC NEGATIVE: 1
ALLERGIC/IMMUNOLOGIC NEGATIVE: 1

## 2024-07-02 ASSESSMENT — PAIN SCALES - GENERAL
PAINLEVEL_OUTOF10: 7
PAINLEVEL_OUTOF10: 0 - NO PAIN

## 2024-07-02 ASSESSMENT — PAIN - FUNCTIONAL ASSESSMENT: PAIN_FUNCTIONAL_ASSESSMENT: 0-10

## 2024-07-02 NOTE — H&P
"History Of Present Illness:        Kael Zepeda is a 58 y.o. male with PMHx s/f RA, IDDM2, COPD, hypothyroidism, chronic tobacco abuse, chronic methadone dependency, neuropathy/diabetic foot ulcer with osteomyelitis. Patient presented to [facility] on 6/30 with intermittent chest pain, now s/p LHC showing multi-vessels CAD, [place] [facility] [place].     Patient states for the past week he has had intermittent chest pains, but they have worsened substantially in the last 3 days.  He describes it as a left-sided pressure that radiates down his arms and last 10 to 15 minutes at times, before resolving on its own.  He also describes a full body \"flushing\" sensation that occurs when the chest pain ends.  He also admits to worsening dyspnea on exertion and overall is unable to ambulate much without becoming severely winded lately.  He is having some mild nausea with it but no vomiting.  He has no known history of cardiac disease but does have a history of insulin-dependent diabetes mellitus type 2. He is a chronic cigarette smoker 1 ppd X 40 years.   On presentation to [facility] ED his trop was found to be 146 --> 135 and EKG w Sinus rhythm at 91 bpm. No significant ST changes. He was loaded with ASA, and cardiology consulted. Patient was taken to the cath lab today and it showed multivessel disease.    On arrival to [facility], he is vitally stable and denies any CP.   Patient denies palpations, SOB, vomiting, cough, sputum production, abdominal pain, lightheadedness, dizziness, fever or chills.      Cardiac studies:     TTE 7/1/24:  1. The left ventricular systolic function is mildly decreased, with a visually estimated ejection fraction of 40-45%.   2. Spectral Doppler shows a pseudonormal pattern of left ventricular diastolic filling.   3. Akinetic inferior wall and inferolateral wall.   4. There is normal right ventricular global systolic function.   5. Mild aortic valve regurgitation.    [facility] " "7/1/24:  Triple vessel coronary artery disease with proximal left anterior descending involvement.        Past Medical History:  He has a past medical history of Abnormal result of other cardiovascular function study (06/28/2018), Atherosclerotic heart disease of native coronary artery with unstable angina pectoris (Multi) (06/28/2018), Fibromyalgia, Personal history of other diseases of the circulatory system, Personal history of other diseases of the musculoskeletal system and connective tissue, Personal history of other diseases of the musculoskeletal system and connective tissue, Personal history of other diseases of the musculoskeletal system and connective tissue, Personal history of other endocrine, nutritional and metabolic disease, Personal history of other endocrine, nutritional and metabolic disease, and Type 2 diabetes mellitus without complications (Multi) (02/25/2022).    Past Surgical History:  He has a past surgical history that includes Septoplasty (06/28/2018) and Back surgery (06/28/2018).      Social History:  He reports that he has been smoking cigarettes. He has a 40 pack-year smoking history. He has never used smokeless tobacco. He reports that he does not currently use alcohol. He reports that he does not use drugs.    Family History:  No family history on file.     Allergies:  Patient has no known allergies.      Outpatient Medications:  Current Outpatient Medications   Medication Instructions    albuterol 90 mcg/actuation inhaler 2 puffs, inhalation, Every 6 hours PRN    ammonium lactate (Amlactin) 12 % cream Topical    aspirin 81 mg, oral, Daily    atorvastatin (LIPITOR) 40 mg, oral, Daily RT    BD Lois 2nd Gen Pen Needle 32 gauge x 5/32\" needle USE 1 NEEDLE ONCE DAILY    celecoxib (CELEBREX) 200 mg, oral, 2 times daily    DULoxetine (CYMBALTA) 60 mg, Every 24 hours    furosemide (LASIX) 40 mg, oral, Daily    hydroxychloroquine (Plaquenil) 200 mg tablet 1 tablet, oral, 2 times daily    " "ibuprofen 800 mg, oral, Every 8 hours PRN    insulin lispro (HUMALOG) 10 Units, subcutaneous    Levemir FlexPen 50 Units, subcutaneous, Nightly    levothyroxine (SYNTHROID, LEVOXYL) 50 mcg, oral, Daily    lisinopril 20 mg, oral, Daily    metFORMIN (GLUCOPHAGE) 500 mg, oral, 2 times daily (morning and late afternoon)    methadone (DOLOPHINE) 77 mg, oral, IN THE MORNING THEN 78 MG IN THE EVENING    omeprazole (PRILOSEC) 40 mg, oral, Daily    predniSONE (Deltasone) 2.5 mg tablet 2 tablets, oral, Daily    pregabalin (LYRICA) 200 mg, oral, 3 times daily    riTUXimab-abbs (TRUXIMA) 1,000 mg, Every 6 months    Spiriva Respimat 2.5 mcg/actuation inhaler 2 puffs, inhalation, Daily    Wixela Inhub 100-50 mcg/dose diskus inhaler INHALE 1 PUFF AS INSTRUCTED TWICE DAILY. RINSE AND GARGLE MOUTH WITH WATER AFTER EACH USE.       Physical Exam:              Last Recorded Vitals:  Vitals:    07/01/24 2040 07/01/24 2046   BP: 121/69    Pulse: 75    Resp: 18    Temp: 36.4 °C (97.5 °F)    TempSrc: Temporal    SpO2: 95%    Weight:  85.3 kg (188 lb)   Height:  1.676 m (5' 6\")         Constitutional: Cooperative, in no acute distress, alert, appears stated age.  Skin: Skin color, texture, turgor normal. No rashes or lesions.  Neck: Neck supple, no carotid bruits, no JVD  Respiratory: Lungs clear to auscultation, no wheezing or rhonchi, no use of accessory muscles  Chest wall: No scars, normal excursion with respiration  Cardiovascular: Regular rhythm without murmur  Gastrointestinal: Abdomen soft, nontender. Bowel sounds normal.  Musculoskeletal: Strength equal in upper extremities  Extremities: No pitting edema  Neurologic: Sensation grossly intact, alert and oriented ×3    Last Labs:  CBC - 6/30/2024:  4:47 AM  11.0 10.7 451    33.9      CMP - 7/1/2024:  4:58 AM  8.8 7.3 11 --- 0.4   4.6 3.7 10 143      PTT - No results in last year.  _   _ _     Troponin I, High Sensitivity   Date/Time Value Ref Range Status   06/30/2024 02:36  " (HH) 0 - 20 ng/L Final     Comment:     Previous result verified on 6/30/2024 0210 on specimen/case [id number] called with component [facility] for procedure Troponin I, High Sensitivity, Initial with value 146 ng/L.   06/30/2024 01:40  (HH) 0 - 20 ng/L Final     Hemoglobin A1C   Date/Time Value Ref Range Status   05/15/2024 10:26 AM 8.5 (H) 4.3 - 5.6 % Final     Comment:     American Diabetes Association guidelines indicate that patients with HgbA1c in the range 5.7-6.4% are at increased risk for development of diabetes, and intervention by lifestyle modification may be beneficial. HgbA1c greater or equal to 6.5% is considered diagnostic of diabetes.   02/20/2024 08:51 AM 9.4 (H) 4.3 - 5.6 % Final     Comment:     American Diabetes Association guidelines indicate that patients with HgbA1c in the range 5.7-6.4% are at increased risk for development of diabetes, and intervention by lifestyle modification may be beneficial. HgbA1c greater or equal to 6.5% is considered diagnostic of diabetes.   10/12/2023 03:27 AM 9.6 (H) see below % Final     VLDL   Date/Time Value Ref Range Status   01/20/2021 01:08 PM 66 (H) 0 - 40 mg/dL Final   10/06/2020 11:50 AM 67 (H) 0 - 40 mg/dL Final   12/17/2019 02:57 PM 57 (H) 0 - 40 mg/dL Final         Assessment/Plan         Kael Zepeda is a 58 y.o. male with PMHx s/f RA, IDDM2, COPD, hypothyroidism, chronic tobacco abuse, chronic methadone dependency, neuropathy/diabetic foot ulcer with osteomyelitis. Patient presented to [place] on 6/30 with intermittent chest pain, now s/p LHC showing multi-vessels CAD, transferred to [facility] [place] CABG evaluation.       #Multivessel CAD with proximal LAD involvement  #NSTEMI  -Intermittent chest pain, worsened in the last 3 days  -ECG showing sinus rhythm with q waves in inferior leads.  -Trop of 146->135 -Patient started on a heparin infusion.    -S/p ASA load, and was given Ticagrelor dose on 6/30 PM and 7/1 AM.   -LHC 7/1/24: multivessel  disease with 100% RCA occlusion, left circumflex lesion and proximal LAD lesion   -Continue ASA, heparin gtt, high intensity statin and BB  -CTS consult in AM, cath images available on syngo       #Newly diagnosed ICM/HFrEF  #HFmrEF (EF 40-45%), ACC/AHA class C  -Appears euvolemic on exam  -CXR c/w pulm congestion   -TTE 7/1/24 with EF of 40-45%, akinetic inferior and inferolateral walls   -Current GDMT:  *BB: switch metoprolol tart 25mg BID to succinate 50mg daily   *ACE/ARB/ARNI: consider adding post revascularization  (class 2B recommendation per ACC)   *MRA: consider adding post revascularization (class 2B recommendation per ACC)   *SGLT2-inhibitor: Start Empa 10mg daily post revascularization  (class 2A recommendation per ACC)   -Not currently on diuretics, add as clinically indicated   -Strict I/O, daily weights     #Hypertension  -120/80s on admission  -Continue metoprolol succinate as above     #Dyslipidemia  -Continue atorvastatin 40mg at bedtime       #Diabetes mellitus  -Not well controlled, A1C 8-9.5  -DM education consult placed  -Insulin Detemir 50u qpm  -Insulin sliding scale TID    #COPD/tobacco use  -Continue home inhalers  -Discussed in length with him the importance of smoking cessation as well as several strategies to assist him in quitting smoking.  Patient states he plans to quit after this hospitalization.       #RA  -Home Plaquenil 200mg BID and Prednisone 5mg daily       #Neuropathy with ulcerations/wounds  -Home Lyrica 200mg TID  -Will need outpatient follow-up       #Hypothyroidism  -Home Synthroid 50mcg daily          Code Status:  Full Code               Dalton Puri MD  Cardiology Fellow PGY-6

## 2024-07-02 NOTE — CARE PLAN
The patient's goals for the shift include      The clinical goals for the shift include pt will remain HDS      Problem: Pain - Adult  Goal: Verbalizes/displays adequate comfort level or baseline comfort level  Outcome: Progressing     Problem: Safety - Adult  Goal: Free from fall injury  Outcome: Progressing     Problem: Discharge Planning  Goal: Discharge to home or other facility with appropriate resources  Outcome: Progressing     Problem: Chronic Conditions and Co-morbidities  Goal: Patient's chronic conditions and co-morbidity symptoms are monitored and maintained or improved  Outcome: Progressing

## 2024-07-02 NOTE — NURSING NOTE
Assumed care of transferred patient to LT7. Patient oriented to room and call light use and shows no s/s of distress at this time. MD made aware of patient arrival.

## 2024-07-02 NOTE — H&P (VIEW-ONLY)
Reason for Consult: CAD    CARDIAC SURGERY CONSULT NOTE    HISTORY OF PRESENT ILLNESS  Mr. Zepeda is a 59yo male with a history of RA on chronic prednisone and Plaquenil, DM2 on insulin, COPD, hypothyroidism, tobacco use (40 pack years) and chronic methadone use, neuropathy and unhealed diabetic foot ulcers (s/p right foot partial amputation ~7 months ago) and follows with the wound clinic as outpatient. The patient stated he has had intermittent chest pain worse with exertion over the past month. He also reports worsening CALDERON. Upon admission to the ED, he was diagnosed with an NSTEMI. Loaded with ASA. C with 3vCAD. TTE EF 40-45%.      Cardiac surgery consulted for a CABG evaluation.       Subjective   Past Medical History:   Diagnosis Date    Abnormal result of other cardiovascular function study 06/28/2018    Abnormal stress test    Atherosclerotic heart disease of native coronary artery with unstable angina pectoris (Multi) 06/28/2018    Unstable angina pectoris due to coronary arteriosclerosis    Fibromyalgia     Fibromyalgia    Personal history of other diseases of the circulatory system     History of hypertension    Personal history of other diseases of the musculoskeletal system and connective tissue     History of rheumatoid arthritis    Personal history of other diseases of the musculoskeletal system and connective tissue     History of degenerative disc disease    Personal history of other diseases of the musculoskeletal system and connective tissue     History of osteoporosis    Personal history of other endocrine, nutritional and metabolic disease     History of hyperlipidemia    Personal history of other endocrine, nutritional and metabolic disease     History of hypothyroidism    Type 2 diabetes mellitus without complications (Multi) 02/25/2022    Type 2 diabetes mellitus     Past Surgical History:   Procedure Laterality Date    BACK SURGERY  06/28/2018    Back Surgery    CARDIAC  "CATHETERIZATION N/A 7/1/2024    Procedure: Left Heart Cath;  Surgeon: Yao Calvin MD;  Location: [facility];  Service: Cardiovascular;  Laterality: N/A;    SEPTOPLASTY  06/28/2018    Septoplasty     Social History     Tobacco Use    Smoking status: Every Day     Current packs/day: 1.00     Average packs/day: 1 pack/day for 40.0 years (40.0 ttl pk-yrs)     Types: Cigarettes    Smokeless tobacco: Never   Substance Use Topics    Alcohol use: Not Currently    Drug use: Never     No family history on file.    Patient has no known allergies.    Prior to Admission medications    Medication Sig Start Date End Date Taking? Authorizing Provider   albuterol 90 mcg/actuation inhaler Inhale 2 puffs every 6 hours if needed for shortness of breath. 6/28/18  Yes Historical Provider, MD   aspirin 81 mg EC tablet Take 1 tablet (81 mg) by mouth once daily.   Yes Historical Provider, MD   atorvastatin (Lipitor) 40 mg tablet Take 1 tablet (40 mg) by mouth once daily. 1/3/17  Yes Historical Provider, MD   BD Lois 2nd Gen Pen Needle 32 gauge x 5/32\" needle USE 1 NEEDLE ONCE DAILY 5/15/24  Yes Historical Provider, MD   celecoxib (CeleBREX) 200 mg capsule Take 1 capsule (200 mg) by mouth twice a day. 3/4/24 8/31/24 Yes Historical Provider, MD   DULoxetine (Cymbalta) 60 mg DR capsule 1 capsule (60 mg) once every 24 hours.   Yes Historical Provider, MD   furosemide (Lasix) 40 mg tablet Take 1 tablet (40 mg) by mouth once daily.   Yes Historical Provider, MD   hydroxychloroquine (Plaquenil) 200 mg tablet Take 1 tablet (200 mg) by mouth 2 times a day.   Yes Historical Provider, MD   ibuprofen 800 mg tablet Take 1 tablet (800 mg) by mouth every 8 hours if needed for pain. 2/12/24  Yes Historical Provider, MD   insulin lispro (HumaLOG) 100 unit/mL injection Inject 10 Units under the skin. 5/16/24 5/16/25 Yes Historical Provider, MD   Levemir FlexPen 100 unit/mL (3 mL) pen Inject 50 Units under the skin once daily at bedtime. 12/30/14 "  Yes Historical Provider, MD   levothyroxine (Synthroid, Levoxyl) 50 mcg tablet Take 1 tablet (50 mcg) by mouth once daily.   Yes Historical Provider, MD   lisinopril 20 mg tablet Take 1 tablet (20 mg) by mouth once daily.   Yes Historical Provider, MD   metFORMIN (Glucophage) 500 mg tablet Take 1 tablet (500 mg) by mouth 2 times daily (morning and late afternoon). 2/12/24  Yes Historical Provider, MD   methadone (Dolophine) 10 mg/mL solution Take 7.7 mL (77 mg) by mouth. IN THE MORNING THEN 78 MG IN THE EVENING   Yes Historical Provider, MD   omeprazole (PriLOSEC) 40 mg DR capsule Take 1 capsule (40 mg) by mouth once daily.   Yes Historical Provider, MD   predniSONE (Deltasone) 2.5 mg tablet Take 2 tablets (5 mg) by mouth once daily.   Yes Historical Provider, MD   pregabalin (Lyrica) 200 mg capsule Take 1 capsule (200 mg) by mouth 3 times a day. 4/4/24  Yes Historical Provider, MD   Spiriva Respimat 2.5 mcg/actuation inhaler Inhale 2 puffs once daily. 5/16/24  Yes Historical Provider, MD Patel Inhub 100-50 mcg/dose diskus inhaler INHALE 1 PUFF AS INSTRUCTED TWICE DAILY. RINSE AND GARGLE MOUTH WITH WATER AFTER EACH USE. 6/29/23  Yes Historical Provider, MD   ammonium lactate (Amlactin) 12 % cream Apply topically. 11/8/22   Historical Provider, MD   riTUXimab-abbs (Truxima) 10 mg/mL solution 100 mL (1,000 mg) every 6 months.    Historical Provider, MD   FreeStyle Mikala 3 Sensor device  12/8/23 6/30/24  Historical Provider, MD   pantoprazole (ProtoNix) 40 mg EC tablet 1 tablet (40 mg).  6/30/24  Historical Provider, MD   predniSONE (Deltasone) 5 mg tablet Take 1 tablet (5 mg) by mouth once daily. 4/1/24 6/30/24  Historical Provider, MD   sulfaSALAzine (Azulfidine) 500 mg tablet Take 3 tablets (1,500 mg) by mouth 2 times a day.  6/30/24  Historical Provider, MD       Review of Systems  Review of Systems   Constitutional:  Positive for activity change.   HENT: Negative.     Eyes: Negative.    Respiratory: Negative.   "   Cardiovascular:  Positive for chest pain.   Gastrointestinal: Negative.    Endocrine: Negative.    Genitourinary: Negative.    Musculoskeletal: Negative.    Neurological: Negative.    Hematological: Negative.            Objective   /58   Pulse 74   Temp 36.5 °C (97.7 °F)   Resp 18   Ht 1.676 m (5' 6\")   Wt 85.3 kg (188 lb)   SpO2 98%   BMI 30.34 kg/m²   0-10 (Numeric) Pain Score: 7   Vitals:    07/01/24 2046   Weight: 85.3 kg (188 lb)          Intake/Output Summary (Last 24 hours) at 7/2/2024 1420  Last data filed at 7/2/2024 1142  Gross per 24 hour   Intake 154.87 ml   Output --   Net 154.87 ml       Physical Exam  Physical Exam  Constitutional:       General: He is not in acute distress.     Appearance: He is obese. He is not ill-appearing.   HENT:      Head: Normocephalic.      Mouth/Throat:      Mouth: Mucous membranes are moist.   Cardiovascular:      Rate and Rhythm: Normal rate and regular rhythm.      Heart sounds: No murmur heard.  Pulmonary:      Effort: Pulmonary effort is normal.      Breath sounds: Normal breath sounds.      Comments: On supplemental oxygen  Abdominal:      General: Bowel sounds are normal.   Musculoskeletal:         General: Normal range of motion.      Cervical back: Neck supple.      Right lower leg: No edema.      Left lower leg: No edema.   Skin:     General: Skin is warm and dry.      Comments: Bilateral unhealed pedal ulcers wrapped in dressing    Neurological:      General: No focal deficit present.      Mental Status: He is alert and oriented to person, place, and time.   Psychiatric:         Mood and Affect: Mood normal.         Behavior: Behavior normal.         Medications  Scheduled medications  aspirin, 81 mg, oral, Daily  [START ON 7/3/2024] atorvastatin, 80 mg, oral, Nightly  DULoxetine, 60 mg, oral, q24h  hydroxychloroquine, 200 mg, oral, BID  insulin detemir, 50 Units, subcutaneous, Nightly  insulin lispro, 0-15 Units, subcutaneous, TID  isosorbide " dinitrate, 10 mg, oral, TID  levothyroxine, 50 mcg, oral, Daily  methadone, 77.5 mg, oral, q PM  methadone, 77.5 mg, oral, Daily  metoprolol succinate XL, 50 mg, oral, Daily  mupirocin, , Topical, BID  pantoprazole, 40 mg, oral, Daily before breakfast  polyethylene glycol, 17 g, oral, Daily  predniSONE, 5 mg, oral, Daily  pregabalin, 200 mg, oral, TID  tiotropium, 2 puff, inhalation, Daily    Continuous medications  heparin, 0-4,000 Units/hr, Last Rate: 1,400 Units/hr (07/02/24 1203)    PRN medications  PRN medications: acetaminophen **OR** acetaminophen **OR** acetaminophen, albuterol, dextrose, dextrose, dextrose, dextrose, glucagon, glucagon, glucagon, glucagon, heparin    Labs  Results for orders placed or performed during the hospital encounter of 07/01/24 (from the past 24 hour(s))   POCT GLUCOSE   Result Value Ref Range    POCT Glucose 128 (H) 74 - 99 mg/dL   aPTT - baseline   Result Value Ref Range    aPTT 28 27 - 38 seconds   CBC   Result Value Ref Range    WBC 8.3 4.4 - 11.3 x10*3/uL    nRBC 0.0 0.0 - 0.0 /100 WBCs    RBC 4.34 (L) 4.50 - 5.90 x10*6/uL    Hemoglobin 10.4 (L) 13.5 - 17.5 g/dL    Hematocrit 33.4 (L) 41.0 - 52.0 %    MCV 77 (L) 80 - 100 fL    MCH 24.0 (L) 26.0 - 34.0 pg    MCHC 31.1 (L) 32.0 - 36.0 g/dL    RDW 16.6 (H) 11.5 - 14.5 %    Platelets 432 150 - 450 x10*3/uL   Heparin Assay, UFH   Result Value Ref Range    Heparin Unfractionated 0.2 See Comment Below for Therapeutic Ranges IU/mL   Heparin Assay, UFH   Result Value Ref Range    Heparin Unfractionated 0.3 See Comment Below for Therapeutic Ranges IU/mL   POCT GLUCOSE   Result Value Ref Range    POCT Glucose 67 (L) 74 - 99 mg/dL   Electrocardiogram, 12-lead PRN ACS symptoms   Result Value Ref Range    Ventricular Rate 82 BPM    Atrial Rate 82 BPM    NY Interval 190 ms    QRS Duration 98 ms    QT Interval 438 ms    QTC Calculation(Bazett) 511 ms    P Axis 46 degrees    R Axis 54 degrees    T Axis 2 degrees    QRS Count 14 beats    Q Onset  220 ms    P Onset 125 ms    P Offset 176 ms    T Offset 439 ms    QTC Fredericia 486 ms   CBC   Result Value Ref Range    WBC 8.9 4.4 - 11.3 x10*3/uL    nRBC 0.0 0.0 - 0.0 /100 WBCs    RBC 4.55 4.50 - 5.90 x10*6/uL    Hemoglobin 11.1 (L) 13.5 - 17.5 g/dL    Hematocrit 35.9 (L) 41.0 - 52.0 %    MCV 79 (L) 80 - 100 fL    MCH 24.4 (L) 26.0 - 34.0 pg    MCHC 30.9 (L) 32.0 - 36.0 g/dL    RDW 17.0 (H) 11.5 - 14.5 %    Platelets 452 (H) 150 - 450 x10*3/uL   Basic metabolic panel   Result Value Ref Range    Glucose 157 (H) 74 - 99 mg/dL    Sodium 138 136 - 145 mmol/L    Potassium 3.6 3.5 - 5.3 mmol/L    Chloride 101 98 - 107 mmol/L    Bicarbonate 27 21 - 32 mmol/L    Anion Gap 14 10 - 20 mmol/L    Urea Nitrogen 14 6 - 23 mg/dL    Creatinine 0.77 0.50 - 1.30 mg/dL    eGFR >90 >60 mL/min/1.73m*2    Calcium 9.1 8.6 - 10.6 mg/dL   B-type natriuretic peptide   Result Value Ref Range     (H) 0 - 99 pg/mL   Heparin Assay, UFH   Result Value Ref Range    Heparin Unfractionated 0.2 See Comment Below for Therapeutic Ranges IU/mL   Vascular US Lower Extremity Vein Mapping Bilateral   Result Value Ref Range    BSA 1.99 m2   Vascular US Ankle Brachial Index (EBEN) Without Exercise   Result Value Ref Range    BSA 1.99 m2       Pertinent Imaging  LHC: 7/1  Coronary Lesion Summary:  Vessel   Stenosis    Vessel Segment  LAD    80% stenosis  proximal to mid  OM 1   70% stenosis     proximal  RCA    100% stenosis       mid    Echo: 7/1  CONCLUSIONS:   1. The left ventricular systolic function is mildly decreased, with a visually estimated ejection fraction of 40-45%.   2. Spectral Doppler shows a pseudonormal pattern of left ventricular diastolic filling.   3. Akinetic inferior wall and inferolateral wall.   4. There is normal right ventricular global systolic function.   5. Mild aortic valve regurgitation.              ASSESSMENT & PLAN  Mr. Zepeda is a 57yo male with a history of RA on chronic prednisone and Plaquenil, DM2 on  insulin, COPD, hypothyroidism, tobacco use (40 pack years) and chronic methadone use, neuropathy and unhealed diabetic foot ulcers (s/p right foot partial amputation ~7 months ago) and follows with the wound clinic as outpatient. The patient stated he has had intermittent chest pain worse with exertion over the past month. He also reports worsening CALDERON. Upon admission to the ED, he was diagnosed with an NSTEMI. Loaded with ASA. LHC with 3vCAD. TTE EF 40-45%.      Cardiac surgery consulted for a CABG evaluation.       RECOMMENDATIONS/PLAN  Dr. Carey aware of patient, currently reviewing case and available imaging.   Surgical plan pending the below workup obtained and surgeon review.     - Appreciate podiatry consult given history of R great toe amputation and non healing foot ulcers   - LHC and TTE in EMR  - Medical optimization per primary team  - Preop risk stratification studies/labs ordered in EMR by our team  --- US Carotids/Vein Mapping/ LE US EBEN  --- PFTs (spirometry and room air ABG)  --- 2 view CXR  --- MRSA, UA/Culture, LFTs, HgbA1c, TSH/T4, Lipid panel  - Dental evaluation not indicated for isolated CAB surgeries     When/if goes to surgery:  - Continue ASA, high-intensity statin, and BB (or document contraindications for use)  - No ACEi/ARBs in the pre-op period (at least 48 hours)  - Hold any SGLT2 inhibitors (Farxiga, Jardiance, etc.) for at least 3 days prior to cardiac surgery to prevent euglycemic DKA  - No antiplatelets other than ASA, no anticoagulants other than Heparin  - NPO after midnight, blood on hold/T&S, and preop scrubs only to be ordered once OR date is established    Will continue to follow along.  Thank you for the consultation.   Patient educated and all questions answered.  Please page the cardiac surgery consult pager [phone] with any questions or changes in patient condition.    Liz Eric PA-C  Cardiac Surgery Consult [facility]  [facility]  Cardiac Surgery  Consult Pager [phone]     7/2/2024  2:20 PM

## 2024-07-02 NOTE — PROGRESS NOTES
Subjective Data:  Patient transferred from OSH for CABG eval. Seen this morning sitting on side of bed reporting chest pains that come and go, sharp in nature and feel similar to what brought him in. Consult CT surgery, workup ordered. Patient with nonhealing foot ulcers that have been present 6-7 months, will consult podiatry in prep for CT surgery as well; osteomyelitis previously charted as well but patient denies ever hearing of this.     - stat isordil 10mg TID for soft pressures to help with chest pain  - CT surgery consult, CABG workup ordered  - podiatry consult for nonhealing foot ulcers that have been present 6-7 months, osteomyelitis previously charted  - cont heparin gtt  - cont home methadone, will discuss with pain service about ; has been on it for 15 years     Overnight Events:    None     Objective Data:  Last Recorded Vitals:  Vitals:    07/02/24 0007 07/02/24 0617 07/02/24 0738 07/02/24 1131   BP: 123/67  103/64 106/58   BP Location: Left arm      Pulse: 83  88 74   Resp: 15  18 18   Temp:   36 °C (96.8 °F) 36.5 °C (97.7 °F)   TempSrc:       SpO2: 95% 97% 97% 98%   Weight:       Height:           Last Labs:  CBC - 7/2/2024:  9:00 AM  8.9 11.1 452    35.9      CMP - 7/2/2024:  9:00 AM  9.1 7.3 11 --- 0.4   4.6 3.7 10 143      PTT - 7/1/2024:  9:37 PM  _   _ 28     TROPHS   Date/Time Value Ref Range Status   06/30/2024 02:36  0 - 20 ng/L Final     Comment:     Previous result verified on 6/30/2024 0210 on specimen/case [id number] called with component [facility] for procedure Troponin I, High Sensitivity, Initial with value 146 ng/L.   06/30/2024 01:40  0 - 20 ng/L Final     HGBA1C   Date/Time Value Ref Range Status   05/15/2024 10:26 AM 8.5 4.3 - 5.6 % Final     Comment:     American Diabetes Association guidelines indicate that patients with HgbA1c in the range 5.7-6.4% are at increased risk for development of diabetes, and intervention by lifestyle modification may be  beneficial. HgbA1c greater or equal to 6.5% is considered diagnostic of diabetes.   02/20/2024 08:51 AM 9.4 4.3 - 5.6 % Final     Comment:     American Diabetes Association guidelines indicate that patients with HgbA1c in the range 5.7-6.4% are at increased risk for development of diabetes, and intervention by lifestyle modification may be beneficial. HgbA1c greater or equal to 6.5% is considered diagnostic of diabetes.   10/12/2023 03:27 AM 9.6 see below % Final     VLDL   Date/Time Value Ref Range Status   01/20/2021 01:08 PM 66 0 - 40 mg/dL Final   10/06/2020 11:50 AM 67 0 - 40 mg/dL Final   12/17/2019 02:57 PM 57 0 - 40 mg/dL Final      Last I/O:  No intake/output data recorded.    Past Cardiology Tests (Last 3 Years):  EKG:  Electrocardiogram, 12-lead PRN ACS symptoms 07/02/2024      ECG 12 lead 06/30/2024      ECG 12 lead 06/30/2024 (Preliminary)      ECG 12 lead 06/30/2024 (Preliminary)    Echo:  Transthoracic Echo (TTE) Complete 07/01/2024    Ejection Fractions:  EF   Date/Time Value Ref Range Status   07/01/2024 07:49 AM 43 %      Cath:  Cardiac Catheterization Procedure 07/01/2024    Stress Test:  No results found for this or any previous visit from the past 1095 days.    Cardiac Imaging:  No results found for this or any previous visit from the past 1095 days.      Inpatient Medications:  Scheduled medications   Medication Dose Route Frequency    aspirin  81 mg oral Daily    [START ON 7/3/2024] atorvastatin  80 mg oral Nightly    DULoxetine  60 mg oral q24h    hydroxychloroquine  200 mg oral BID    insulin detemir  50 Units subcutaneous Nightly    insulin lispro  0-15 Units subcutaneous TID    levothyroxine  50 mcg oral Daily    methadone  77.5 mg oral q PM    methadone  77.5 mg oral Daily    metoprolol succinate XL  50 mg oral Daily    mupirocin   Topical BID    pantoprazole  40 mg oral Daily before breakfast    polyethylene glycol  17 g oral Daily    predniSONE  5 mg oral Daily    pregabalin  200 mg  oral TID    tiotropium  2 puff inhalation Daily     PRN medications   Medication    acetaminophen    Or    acetaminophen    Or    acetaminophen    albuterol    dextrose    dextrose    dextrose    dextrose    glucagon    glucagon    glucagon    glucagon    heparin     Continuous Medications   Medication Dose Last Rate    heparin  0-4,000 Units/hr 1,200 Units/hr (07/02/24 0938)       Physical Exam:  General: NAD, sitting on edge of bed  Skin: warm and dry   Head/ neck: no JVD seen at 90 degrees  Cardiac: RRR, S1, S2   Pulm: CTAB, 1.5L O2 via nc   GI: soft, nontender   Extremities: no LE edema; bilateral feet with nonhealing ulcers of varying depths (see Media tab)  Neuro: no focal neuro deficits   Psych: appropriate mood and behavior       Assessment/Plan     Kael Zepeda is a 58 y.o. male with PMHx s/f RA, IDDM2, COPD, hypothyroidism, chronic tobacco abuse, chronic methadone dependency, neuropathy/diabetic foot ulcer with ?osteomyelitis. Patient presented to [place] on 6/30 with intermittent chest pain, now s/p LHC showing multi-vessels CAD, [place] [facility] [place].      Multivessel CAD with proximal LAD involvement  NSTEMI  Dyslipidemia   - Intermittent chest pain began about 6/23/24, worsened in the last 3 days prior to admission  - ECG showing sinus rhythm with q waves in inferior leads.  - Trop of 146->135 -Patient started on a heparin infusion, continue    - S/p ASA load, and was given Ticagrelor dose on 6/30 PM and 7/1 AM  - C 7/1/24 at [facility]: multivessel disease with 100% RCA occlusion, left circumflex lesion and proximal LAD lesion   - TTE with EF 40-45%, see below for further details   - Continue ASA, heparin gtt, high intensity statin and BB  - CTS aware of patient, cath images available on syngo; workup ordered   - intermittent chest pain this AM; start low dose isordil     Newly diagnosed ICM  HFmrEF (EF 40-45%), ACC/AHA class C  - No prior cardiac history  - TTE 7/1/24 with  EF 40-45%, akinetic inferior and inferolateral walls  - CXR c/w pulm congestion   - BNP pending, feels mildly SOB but unsure if related to ongoing angina  - appears euvolemic on exam; wearing O2 for comfort  - switch metoprolol tart 25mg BID to succinate 50mg daily   - consider adding ARNI/ MRA/ SGLT2i post revascularization  - Not currently on diuretics, add as clinically indicated   - Daily standing weights, strict I&O's, 2g sodium diet, 2L fluid restriction      Hx Hypertension  -120/80s on admission  - Continue metoprolol succinate as above     Diabetes mellitus  - Not well controlled, A1C 8-9.5  - DM education consult placed  - Insulin Detemir 50u qpm  - Insulin sliding scale TID    Neuropathy with Nonhealing ulcerations/wounds  - follows weekly with [facility]  - reports had right great toe amputation 6-7 months ago and hasnt healed since; also reports he will just wake up with new ulcers on his left foot  - osteomyelitis was in chart but patient denies this diagnosis  - reports being prescribed chronic antibiotic for his feet however due to an interaction with hydroxychloroquine he takes it every three days instead of daily; called home pharmacy which they believe the antibiotic is sulfasalazine   - Cont home Lyrica 200mg TID  - podiatry consult; foot images in Media tab    Methadone Dependence   - has been on it for 15 years, goes to the [facility] in [place] for methadone weekly; reports total home dose 165mg divided   - currently on 77.5mg BID  - Qtc this ; has not had other EKG in system for some time; liekly slightly prolonged 2/2 chronic use; will continue for now but will discuss with pain team     COPD/tobacco use  - Continue home inhalers  - Discussed in length with him the importance of smoking cessation as well as several strategies to assist him in quitting smoking.  Patient states he plans to quit after this hospitalization.     RA  - Home Plaquenil 200mg BID and  Prednisone 5mg daily     Hypothyroidism  - Home Synthroid 50mcg daily     Code Status:  Full Code    I spent 30 minutes in the professional and overall care of this patient.    Dispo:  - pending CABG eval     Seen and discussed with Dr Alireza Jones, CHACE-CNP

## 2024-07-02 NOTE — CONSULTS
Inpatient consult to Podiatry  Consult performed by: Krish Lutz DPM  Consult ordered by: Yasmeen Jones, APRN-CNP          Reason For Consult  B/L foot wounds    History Of Present Illness  Kael Zepeda is a 58 y.o. male presenting with NSTEMI. Podiatry was consulted for non-healing foot wounds bilaterally. Patient was seen at bedside today. He states that he had a right foot amputation for infection and the site never healed. He admits to diabetes and significant neuropathy. He denies any constitutional symptoms or other pedal complaints today.     Past Medical History  He has a past medical history of Abnormal result of other cardiovascular function study (06/28/2018), Atherosclerotic heart disease of native coronary artery with unstable angina pectoris (Multi) (06/28/2018), Fibromyalgia, Personal history of other diseases of the circulatory system, Personal history of other diseases of the musculoskeletal system and connective tissue, Personal history of other diseases of the musculoskeletal system and connective tissue, Personal history of other diseases of the musculoskeletal system and connective tissue, Personal history of other endocrine, nutritional and metabolic disease, Personal history of other endocrine, nutritional and metabolic disease, and Type 2 diabetes mellitus without complications (Multi) (02/25/2022).    Surgical History  He has a past surgical history that includes Septoplasty (06/28/2018); Back surgery (06/28/2018); and Cardiac catheterization (N/A, 7/1/2024).     Social History  He reports that he has been smoking cigarettes. He has a 40 pack-year smoking history. He has never used smokeless tobacco. He reports that he does not currently use alcohol. He reports that he does not use drugs.    Family History  No family history on file.     Allergies  Patient has no known allergies.    Review of Systems   Constitutional: Negative.    Gastrointestinal: Negative.   "  Musculoskeletal: Negative.    Skin:  Positive for wound.   Allergic/Immunologic: Negative.    Psychiatric/Behavioral: Negative.         Objective     Physical Exam:   General: AAOx3, no acute distress, right foot dressing intact    Vasc: Dorsalis pedis and posterior tibial pulses are non-palpable bilateral.  Capillary refill time is delayed to the hallux bilateral. Skin temperature is warm to cool from proximal tibia to distal digits bilateral. There is rosy-wound erythema bilateral. There is mild pitting edema in the BLE.     Neuro: Light touch sensation is diminished to the foot bilateral.     Derm: Skin is xerotic. Nails bilateral are dystrophic. There are multiple full thickness ulcerations bilaterally. Right foot fourth interspace, right dorsal foot, right second toe, right plantar foot, left medial first toe and medial 1st MTPJ. Ulceration bases are granular. Scant serous drainage. Wound edges are hyperkeratotic. No crepitus, fluctuance, or purulence noted at any of the ulcerations.    Ortho: Muscle strength is +5/5 for all four pedal muscle groups bilateral. S/P right partial first ray resection.    Last Recorded Vitals  Blood pressure 106/58, pulse 74, temperature 36.5 °C (97.7 °F), resp. rate 18, height 1.676 m (5' 6\"), weight 85.3 kg (188 lb), SpO2 98%.    Relevant Results  Results for orders placed or performed during the hospital encounter of 07/01/24 (from the past 24 hour(s))   POCT GLUCOSE   Result Value Ref Range    POCT Glucose 128 (H) 74 - 99 mg/dL   aPTT - baseline   Result Value Ref Range    aPTT 28 27 - 38 seconds   CBC   Result Value Ref Range    WBC 8.3 4.4 - 11.3 x10*3/uL    nRBC 0.0 0.0 - 0.0 /100 WBCs    RBC 4.34 (L) 4.50 - 5.90 x10*6/uL    Hemoglobin 10.4 (L) 13.5 - 17.5 g/dL    Hematocrit 33.4 (L) 41.0 - 52.0 %    MCV 77 (L) 80 - 100 fL    MCH 24.0 (L) 26.0 - 34.0 pg    MCHC 31.1 (L) 32.0 - 36.0 g/dL    RDW 16.6 (H) 11.5 - 14.5 %    Platelets 432 150 - 450 x10*3/uL   Heparin Assay, UFH "   Result Value Ref Range    Heparin Unfractionated 0.2 See Comment Below for Therapeutic Ranges IU/mL   Heparin Assay, UFH   Result Value Ref Range    Heparin Unfractionated 0.3 See Comment Below for Therapeutic Ranges IU/mL   POCT GLUCOSE   Result Value Ref Range    POCT Glucose 67 (L) 74 - 99 mg/dL   Electrocardiogram, 12-lead PRN ACS symptoms   Result Value Ref Range    Ventricular Rate 82 BPM    Atrial Rate 82 BPM    ID Interval 190 ms    QRS Duration 98 ms    QT Interval 438 ms    QTC Calculation(Bazett) 511 ms    P Axis 46 degrees    R Axis 54 degrees    T Axis 2 degrees    QRS Count 14 beats    Q Onset 220 ms    P Onset 125 ms    P Offset 176 ms    T Offset 439 ms    QTC Fredericia 486 ms   CBC   Result Value Ref Range    WBC 8.9 4.4 - 11.3 x10*3/uL    nRBC 0.0 0.0 - 0.0 /100 WBCs    RBC 4.55 4.50 - 5.90 x10*6/uL    Hemoglobin 11.1 (L) 13.5 - 17.5 g/dL    Hematocrit 35.9 (L) 41.0 - 52.0 %    MCV 79 (L) 80 - 100 fL    MCH 24.4 (L) 26.0 - 34.0 pg    MCHC 30.9 (L) 32.0 - 36.0 g/dL    RDW 17.0 (H) 11.5 - 14.5 %    Platelets 452 (H) 150 - 450 x10*3/uL   Basic metabolic panel   Result Value Ref Range    Glucose 157 (H) 74 - 99 mg/dL    Sodium 138 136 - 145 mmol/L    Potassium 3.6 3.5 - 5.3 mmol/L    Chloride 101 98 - 107 mmol/L    Bicarbonate 27 21 - 32 mmol/L    Anion Gap 14 10 - 20 mmol/L    Urea Nitrogen 14 6 - 23 mg/dL    Creatinine 0.77 0.50 - 1.30 mg/dL    eGFR >90 >60 mL/min/1.73m*2    Calcium 9.1 8.6 - 10.6 mg/dL   Heparin Assay, UFH   Result Value Ref Range    Heparin Unfractionated 0.2 See Comment Below for Therapeutic Ranges IU/mL     Electrocardiogram, 12-lead PRN ACS symptoms    Result Date: 7/2/2024  Normal sinus rhythm Cannot rule out Inferior infarct (cited on or before 30-JUN-2024) Abnormal ECG When compared with ECG of 30-JUN-2024 09:08, No significant change was found Confirmed by Raúl Ortiz ([id number]) on 7/2/2024 9:16:52 AM    Transthoracic Echo (TTE) Complete    Result Date: 7/1/2024               [place] [id number]      Phone [phone] Fax [phone] TRANSTHORACIC ECHOCARDIOGRAM REPORT Patient Name:      ANDRE Vidales Physician:    [id number] Macrina Mcfadden MD Study Date:        7/1/2024              Ordering Provider:    [id number] XIMENA PEARSON                                                                JULIA MRN/PID:           [id number]              Fellow: Accession#:        [id number]          Nurse: Date of Birth/Age: 1965 / 58 years   Sonographer:          Jovita De La Torre RDCS Gender:            M                     Additional Staff: Height:            170.18 cm             Admit Date:           6/30/2024 Weight:            89.81 kg              Admission Status:     Inpatient -                                                                Routine BSA / BMI:         2.01 m2 / 31.01 kg/m2 Department Location:  [facility] Blood Pressure: 105 /57 mmHg Study Type:    TRANSTHORACIC ECHO (TTE) COMPLETE Diagnosis/ICD: Other forms of dyspnea-R06.09 Indication:    NSTEMI CPT Codes:     Echo Complete w Full Doppler-[id number] Patient History: Pertinent History: HTN and Hyperlipidemia. Study Detail: The following Echo studies were performed: 2D, M-Mode, Doppler and               color flow.  PHYSICIAN INTERPRETATION: Left Ventricle: The left ventricular systolic function is mildly decreased, with a visually estimated ejection fraction of 40-45%. Wall motion is abnormal. The left ventricular cavity size is normal. Spectral Doppler shows a pseudonormal pattern of left ventricular diastolic filling. Akinetic inferior wall and inferolateral wall. Left Atrium: The left atrium is upper limits of normal in size. Right Ventricle: The right ventricle is normal in size. There is normal right ventricular global systolic function. Right Atrium: The right atrium is mildly dilated. Aortic Valve: The  aortic valve is trileaflet. The aortic valve dimensionless index is 0.71. There is mild aortic valve regurgitation. The peak instantaneous gradient of the aortic valve is 8.2 mmHg. The mean gradient of the aortic valve is 4.1 mmHg. Mitral Valve: The mitral valve is normal in structure. There is trace to mild mitral valve regurgitation. Tricuspid Valve: The tricuspid valve is structurally normal. There is trace to mild tricuspid regurgitation. Pulmonic Valve: The pulmonic valve is structurally normal. There is no indication of pulmonic valve regurgitation. Pericardium: There is a trivial pericardial effusion. There is a pericardial fat pad present. Aorta: The aortic root is normal.  CONCLUSIONS:  1. The left ventricular systolic function is mildly decreased, with a visually estimated ejection fraction of 40-45%.  2. Spectral Doppler shows a pseudonormal pattern of left ventricular diastolic filling.  3. Akinetic inferior wall and inferolateral wall.  4. There is normal right ventricular global systolic function.  5. Mild aortic valve regurgitation. QUANTITATIVE DATA SUMMARY: 2D MEASUREMENTS:                          Normal Ranges: LAs:           4.44 cm   (2.7-4.0cm) IVSd:          0.85 cm   (0.6-1.1cm) LVPWd:         0.78 cm   (0.6-1.1cm) LVIDd:         5.30 cm   (3.9-5.9cm) LVIDs:         4.34 cm LV Mass Index: 76.2 g/m2 LV % FS        18.0 % LA VOLUME:                               Normal Ranges: LA Vol A4C:        57.6 ml    (22+/-6mL/m2) LA Vol A2C:        62.5 ml LA Vol BP:         60.5 ml LA Vol Index A4C:  28.6 ml/m2 LA Vol Index A2C:  31.0 ml/m2 LA Vol Index BP:   30.1 ml/m2 LA Area A4C:       20.0 cm2 LA Area A2C:       21.0 cm2 LA Major Axis A4C: 5.9 cm LA Major Axis A2C: 6.0 cm LA Volume Index:   30.1 ml/m2 LA Vol A4C:        53.0 ml LA Vol A2C:        57.3 ml RA VOLUME BY A/L METHOD:                       Normal Ranges: RA Area A4C: 22.7 cm2 AORTA MEASUREMENTS:                      Normal Ranges: Ao  Sinus, d: 3.00 cm (2.1-3.5cm) Ao STJ, d:   2.50 cm (1.7-3.4cm) Asc Ao, d:   3.10 cm (2.1-3.4cm) LV SYSTOLIC FUNCTION BY 2D PLANIMETRY (MOD):                      Normal Ranges: EF-A4C View:    49 % (>=55%) EF-A2C View:    54 % EF-Biplane:     52 % EF-Visual:      43 % LV EF Reported: 43 % LV DIASTOLIC FUNCTION:                         Normal Ranges: MV Peak E:    0.90 m/s  (0.7-1.2 m/s) MV Peak A:    0.81 m/s  (0.42-0.7 m/s) E/A Ratio:    1.11      (1.0-2.2) MV e'         0.080 m/s (>8.0) MV lateral e' 0.09 m/s MV medial e'  0.06 m/s E/e' Ratio:   11.33     (<8.0) MITRAL VALVE:                 Normal Ranges: MV DT: 206 msec (150-240msec) MITRAL INSUFFICIENCY:                      Normal Ranges: MR VTI:  190.56 cm MR Vmax: 519.93 cm/s AORTIC VALVE:                                   Normal Ranges: AoV Vmax:                1.43 m/s (<=1.7m/s) AoV Peak P.2 mmHg (<20mmHg) AoV Mean P.1 mmHg (1.7-11.5mmHg) LVOT Max Dereck:            0.97 m/s (<=1.1m/s) AoV VTI:                 31.29 cm (18-25cm) LVOT VTI:                22.27 cm LVOT Diameter:           2.04 cm  (1.8-2.4cm) AoV Area, VTI:           2.33 cm2 (2.5-5.5cm2) AoV Area,Vmax:           2.22 cm2 (2.5-4.5cm2) AoV Dimensionless Index: 0.71 AORTIC INSUFFICIENCY: AI Vmax:       3.96 m/s AI Half-time:  424 msec AI Decel Time: 1461 msec AI Decel Rate: 273.59 cm/s2  RIGHT VENTRICLE: RV Basal 3.80 cm RV Mid   2.90 cm RV Major 8.1 cm TAPSE:   20.6 mm RV s'    0.13 m/s TRICUSPID VALVE/RVSP:                             Normal Ranges: Peak TR Velocity: 2.55 m/s RV Syst Pressure: 29.1 mmHg (< 30mmHg) IVC Diam:         1.66 cm AORTA: Asc Ao Diam 3.15 cm  [id number] Macrina Mcfadden MD Electronically signed on 2024 at 5:23:53 PM  ** Final **     ECG 12 lead    Result Date: 2024  Normal sinus rhythm Cannot rule out Inferior infarct , age undetermined Prolonged QT Abnormal ECG When compared with ECG of 2024 02:37, PREVIOUS ECG IS PRESENT  Confirmed by Macrina Mcfadden ([id number]) on 7/1/2024 5:00:12 PM    Cardiac Catheterization Procedure    Result Date: 7/1/2024       [facility], Cath Lab [address] [place] [address]    Phone [phone] Fax [phone] Cardiovascular Catheterization Report Patient Name:     NADRE MONTOYA            Performing Physician:  [id number] Yao AQUINO MD Study Date:       7/1/2024            Verifying Physician:   [id number] Calvin MD MRN/PID:          [id number]            Cardiologist/Co-Scrub: Accession#:       [id number]        Ordering Provider:     [id number] GEORGES CHAVEZ Date of           1965 / 58 years Cardiologist: Birth/Age: Gender:           M                   Fellow: Encounter#:       [id number]          Surgeon:  Study: Left Heart Cath  Indications: ANDRE AQUINO is a 59 year old male who presents with dyslipidemia, hypertension, diabetes and a chest pain assessment of typical angina. NSTE - ACS.  Procedure Description: After infiltration with 2% Lidocaine, the right radial artery was cannulated with a modified Seldinger technique. Subsequently a 6 Spanish sheath was placed retrograde in the right radial artery. Selective coronary catheterization was performed using a 5 Fr catheter(s) exchanged over a guide wire to cannulate the coronary arteries. A 5 Fr Tiger catheter was used for left and right coronary artery injections. Multiple injections of contrast were made into the left and right coronary arteries with angiograms recorded in multiple projections. After completion of the procedure, the arterial sheath was pulled and a TR Band Radial Compression Device was utilized to obtain patent hemostasis.  Coronary Angiography: The coronary circulation is right dominant.  Left Main Coronary Artery: The left main coronary artery is a normal caliber vessel. The left main  arises normally from the left coronary sinus of Valsalva. The left main coronary artery showed no significant disease or stenosis greater than 30%.  Left Anterior Descending Coronary Artery Distribution: The left anterior descending coronary artery is a normal caliber vessel. The LAD arises normally from the left main coronary artery. The proximal to mid left anterior descending coronary artery showed 80% stenosis. The 1st diagonal branch is a normal caliber vessel. The 1st diagonal branch showed no significant disease or stenosis greater than 30%.  Circumflex Coronary Artery Distribution: The circumflex coronary artery is a normal caliber vessel. The circumflex arises normally from the left main coronary artery. The 1st obtuse marginal branch is a medium-sized caliber vessel. The proximal 1st obtuse marginal branch showed 70% stenosis.  Ramus Intermedius: The ramus intermedius is a normal caliber vessel. The ramus intermedius arises normally from the left main coronary artery. The ramus intermedius showed no significant disease or stenosis greater than 30%.  Right Coronary Artery Distribution: The right coronary artery is a normal caliber vessel. The RCA arises normally from the right sinus of Valsalva. The RCA showed chronic occlusion originating at the mid segment. The mid right coronary artery showed 100% stenosis. Distal RCA is filed by left to right collaterals.  Coronary Lesion Summary: Vessel   Stenosis    Vessel Segment LAD    80% stenosis  proximal to mid OM 1   70% stenosis     proximal RCA    100% stenosis       mid  Hemo Personnel: +-----------------+---------+ Name             Duty      +-----------------+---------+ Yao Calvin MD 1 +-----------------+---------+  Hemodynamic Pressures:  +----+------------------+---------+-------------+-------------+------+---------+ Site    Date Time       Phase  Systolic mmHg  Diastolic    ED  Mean mmHg                         Name                     mmHg      mmHg           +----+------------------+---------+-------------+-------------+------+---------+   AO  7/1/2024 2:51:16 AIR REST          121           63             88                     PM                                                   +----+------------------+---------+-------------+-------------+------+---------+   LV  7/1/2024 2:56:15 AIR REST          124            5    15                              PM                                                   +----+------------------+---------+-------------+-------------+------+---------+  LVp  7/1/2024 2:56:21 AIR REST          136            6    18                              PM                                                   +----+------------------+---------+-------------+-------------+------+---------+  AOp  7/1/2024 2:56:28 AIR REST          141           70            100                     PM                                                   +----+------------------+---------+-------------+-------------+------+---------+  Oxygen Saturation %: +-----------+------------+ Sample SiteHB (g/100ml) +-----------+------------+     SYS ART        10.7 +-----------+------------+     SYS SOTO        10.7 +-----------+------------+     PUL ART        10.7 +-----------+------------+     PUL SOTO        10.7 +-----------+------------+  Complications: No in-lab complications observed.  Cardiac Cath Post Procedure Notes: Post Procedure Diagnosis: Triple vessel disease. Blood Loss:               Estimated blood loss during the procedure was 10 mls. Specimens Removed:        Number of specimen(s) removed: none.  Recommendations: Maximize medical therapy. Agressive risk factor modification efforts. CT surgical consultation to consider possible surgical options. Telemetry monitoring. Lipid lowering agent or Statin therapy. [place] [facility] [place]. Aspirin therapy. ____________________________________________________________________________________ CONCLUSIONS:  1. Triple vessel coronary artery disease with proximal left anterior descending involvement.  2. Left Ventricular end-diastolic pressure = 18 mmHg. ICD 10 Codes: Non ST elevation (NSTEMI) myocardial infarction-I21.4  CPT Codes: Moderate Sedation Services 1st additional 15 minutes patient >5 years-22676; Moderate Sedation Services 2nd additional 15 minutes patient >5 years-92730; Left Heart Cath (visualization of coronaries) and LV-[id number]  [id number] Yao Calvin MD Performing Physician Electronically signed by [id number] Yao Calvin MD on 7/1/2024 at 4:07:49 PM  ** Final **     ECG 12 lead    Result Date: 7/1/2024  Sinus rhythm Probable inferior infarct, age indeterminate    ECG 12 lead    Result Date: 7/1/2024  Atrial fibrillation Probable inferior infarct, age indeterminate Prolonged QT interval    XR chest 1 view    Result Date: 6/30/2024  Interpreted By:  Andreea James, STUDY: XR CHEST 1 VIEW;  6/30/2024 2:34 am   INDICATION: Signs/Symptoms:Chest Pain.   COMPARISON: 06/27/2018   ACCESSION NUMBER(S): [id number]   ORDERING CLINICIAN: MILANA FISHER   FINDINGS:     CARDIOMEDIASTINAL SILHOUETTE: Cardiomediastinal silhouette is normal in size and configuration.   LUNGS: No pulmonary consolidation, pleural effusion or pneumothorax. Chronic interstitial coarsening.   ABDOMEN: No remarkable upper abdominal findings.   BONES: No acute osseous abnormality.       No acute cardiopulmonary process.   MACRO: None   Signed by: Andreea James 6/30/2024 3:27 AM Dictation workstation:   [id number]         Assessment/Plan   Principal Problem:    CAD in native artery    #absence of right great toe  #Chronic nonpressure full thickness ulcerations, BLE with skin breakdown  #PVD  #T2DM with neuropathy    -Patient was examined and findings were discussed with patient  -Chart, labs, and imaging were reviewed  and relevant results were discussed  -Labs show WBC 8.9   -Recommend ordering PVRs to assess blood flow  -No x-rays or advanced imaging at this time  -Dressed with betadine WTD dressings bilateral, nursing may change or reinforce as needed  -Patient was discussed with attending, Dr. Óscar Lutz, DPM PGY-3

## 2024-07-02 NOTE — CONSULTS
Reason for Consult: CAD    CARDIAC SURGERY CONSULT NOTE    HISTORY OF PRESENT ILLNESS  Mr. Zepeda is a 57yo male with a history of RA on chronic prednisone and Plaquenil, DM2 on insulin, COPD, hypothyroidism, tobacco use (40 pack years) and chronic methadone use, neuropathy and unhealed diabetic foot ulcers (s/p right foot partial amputation ~7 months ago) and follows with the wound clinic as outpatient. The patient stated he has had intermittent chest pain worse with exertion over the past month. He also reports worsening CALDERON. Upon admission to the ED, he was diagnosed with an NSTEMI. Loaded with ASA. C with 3vCAD. TTE EF 40-45%.      Cardiac surgery consulted for a CABG evaluation.       Subjective   Past Medical History:   Diagnosis Date    Abnormal result of other cardiovascular function study 06/28/2018    Abnormal stress test    Atherosclerotic heart disease of native coronary artery with unstable angina pectoris (Multi) 06/28/2018    Unstable angina pectoris due to coronary arteriosclerosis    Fibromyalgia     Fibromyalgia    Personal history of other diseases of the circulatory system     History of hypertension    Personal history of other diseases of the musculoskeletal system and connective tissue     History of rheumatoid arthritis    Personal history of other diseases of the musculoskeletal system and connective tissue     History of degenerative disc disease    Personal history of other diseases of the musculoskeletal system and connective tissue     History of osteoporosis    Personal history of other endocrine, nutritional and metabolic disease     History of hyperlipidemia    Personal history of other endocrine, nutritional and metabolic disease     History of hypothyroidism    Type 2 diabetes mellitus without complications (Multi) 02/25/2022    Type 2 diabetes mellitus     Past Surgical History:   Procedure Laterality Date    BACK SURGERY  06/28/2018    Back Surgery    CARDIAC  "CATHETERIZATION N/A 7/1/2024    Procedure: Left Heart Cath;  Surgeon: Yao Calvin MD;  Location: [facility];  Service: Cardiovascular;  Laterality: N/A;    SEPTOPLASTY  06/28/2018    Septoplasty     Social History     Tobacco Use    Smoking status: Every Day     Current packs/day: 1.00     Average packs/day: 1 pack/day for 40.0 years (40.0 ttl pk-yrs)     Types: Cigarettes    Smokeless tobacco: Never   Substance Use Topics    Alcohol use: Not Currently    Drug use: Never     No family history on file.    Patient has no known allergies.    Prior to Admission medications    Medication Sig Start Date End Date Taking? Authorizing Provider   albuterol 90 mcg/actuation inhaler Inhale 2 puffs every 6 hours if needed for shortness of breath. 6/28/18  Yes Historical Provider, MD   aspirin 81 mg EC tablet Take 1 tablet (81 mg) by mouth once daily.   Yes Historical Provider, MD   atorvastatin (Lipitor) 40 mg tablet Take 1 tablet (40 mg) by mouth once daily. 1/3/17  Yes Historical Provider, MD   BD Lois 2nd Gen Pen Needle 32 gauge x 5/32\" needle USE 1 NEEDLE ONCE DAILY 5/15/24  Yes Historical Provider, MD   celecoxib (CeleBREX) 200 mg capsule Take 1 capsule (200 mg) by mouth twice a day. 3/4/24 8/31/24 Yes Historical Provider, MD   DULoxetine (Cymbalta) 60 mg DR capsule 1 capsule (60 mg) once every 24 hours.   Yes Historical Provider, MD   furosemide (Lasix) 40 mg tablet Take 1 tablet (40 mg) by mouth once daily.   Yes Historical Provider, MD   hydroxychloroquine (Plaquenil) 200 mg tablet Take 1 tablet (200 mg) by mouth 2 times a day.   Yes Historical Provider, MD   ibuprofen 800 mg tablet Take 1 tablet (800 mg) by mouth every 8 hours if needed for pain. 2/12/24  Yes Historical Provider, MD   insulin lispro (HumaLOG) 100 unit/mL injection Inject 10 Units under the skin. 5/16/24 5/16/25 Yes Historical Provider, MD   Levemir FlexPen 100 unit/mL (3 mL) pen Inject 50 Units under the skin once daily at bedtime. 12/30/14 "  Yes Historical Provider, MD   levothyroxine (Synthroid, Levoxyl) 50 mcg tablet Take 1 tablet (50 mcg) by mouth once daily.   Yes Historical Provider, MD   lisinopril 20 mg tablet Take 1 tablet (20 mg) by mouth once daily.   Yes Historical Provider, MD   metFORMIN (Glucophage) 500 mg tablet Take 1 tablet (500 mg) by mouth 2 times daily (morning and late afternoon). 2/12/24  Yes Historical Provider, MD   methadone (Dolophine) 10 mg/mL solution Take 7.7 mL (77 mg) by mouth. IN THE MORNING THEN 78 MG IN THE EVENING   Yes Historical Provider, MD   omeprazole (PriLOSEC) 40 mg DR capsule Take 1 capsule (40 mg) by mouth once daily.   Yes Historical Provider, MD   predniSONE (Deltasone) 2.5 mg tablet Take 2 tablets (5 mg) by mouth once daily.   Yes Historical Provider, MD   pregabalin (Lyrica) 200 mg capsule Take 1 capsule (200 mg) by mouth 3 times a day. 4/4/24  Yes Historical Provider, MD   Spiriva Respimat 2.5 mcg/actuation inhaler Inhale 2 puffs once daily. 5/16/24  Yes Historical Provider, MD Patel Inhub 100-50 mcg/dose diskus inhaler INHALE 1 PUFF AS INSTRUCTED TWICE DAILY. RINSE AND GARGLE MOUTH WITH WATER AFTER EACH USE. 6/29/23  Yes Historical Provider, MD   ammonium lactate (Amlactin) 12 % cream Apply topically. 11/8/22   Historical Provider, MD   riTUXimab-abbs (Truxima) 10 mg/mL solution 100 mL (1,000 mg) every 6 months.    Historical Provider, MD   FreeStyle Mikala 3 Sensor device  12/8/23 6/30/24  Historical Provider, MD   pantoprazole (ProtoNix) 40 mg EC tablet 1 tablet (40 mg).  6/30/24  Historical Provider, MD   predniSONE (Deltasone) 5 mg tablet Take 1 tablet (5 mg) by mouth once daily. 4/1/24 6/30/24  Historical Provider, MD   sulfaSALAzine (Azulfidine) 500 mg tablet Take 3 tablets (1,500 mg) by mouth 2 times a day.  6/30/24  Historical Provider, MD       Review of Systems  Review of Systems   Constitutional:  Positive for activity change.   HENT: Negative.     Eyes: Negative.    Respiratory: Negative.   "   Cardiovascular:  Positive for chest pain.   Gastrointestinal: Negative.    Endocrine: Negative.    Genitourinary: Negative.    Musculoskeletal: Negative.    Neurological: Negative.    Hematological: Negative.            Objective   /58   Pulse 74   Temp 36.5 °C (97.7 °F)   Resp 18   Ht 1.676 m (5' 6\")   Wt 85.3 kg (188 lb)   SpO2 98%   BMI 30.34 kg/m²   0-10 (Numeric) Pain Score: 7   Vitals:    07/01/24 2046   Weight: 85.3 kg (188 lb)          Intake/Output Summary (Last 24 hours) at 7/2/2024 1420  Last data filed at 7/2/2024 1142  Gross per 24 hour   Intake 154.87 ml   Output --   Net 154.87 ml       Physical Exam  Physical Exam  Constitutional:       General: He is not in acute distress.     Appearance: He is obese. He is not ill-appearing.   HENT:      Head: Normocephalic.      Mouth/Throat:      Mouth: Mucous membranes are moist.   Cardiovascular:      Rate and Rhythm: Normal rate and regular rhythm.      Heart sounds: No murmur heard.  Pulmonary:      Effort: Pulmonary effort is normal.      Breath sounds: Normal breath sounds.      Comments: On supplemental oxygen  Abdominal:      General: Bowel sounds are normal.   Musculoskeletal:         General: Normal range of motion.      Cervical back: Neck supple.      Right lower leg: No edema.      Left lower leg: No edema.   Skin:     General: Skin is warm and dry.      Comments: Bilateral unhealed pedal ulcers wrapped in dressing    Neurological:      General: No focal deficit present.      Mental Status: He is alert and oriented to person, place, and time.   Psychiatric:         Mood and Affect: Mood normal.         Behavior: Behavior normal.         Medications  Scheduled medications  aspirin, 81 mg, oral, Daily  [START ON 7/3/2024] atorvastatin, 80 mg, oral, Nightly  DULoxetine, 60 mg, oral, q24h  hydroxychloroquine, 200 mg, oral, BID  insulin detemir, 50 Units, subcutaneous, Nightly  insulin lispro, 0-15 Units, subcutaneous, TID  isosorbide " dinitrate, 10 mg, oral, TID  levothyroxine, 50 mcg, oral, Daily  methadone, 77.5 mg, oral, q PM  methadone, 77.5 mg, oral, Daily  metoprolol succinate XL, 50 mg, oral, Daily  mupirocin, , Topical, BID  pantoprazole, 40 mg, oral, Daily before breakfast  polyethylene glycol, 17 g, oral, Daily  predniSONE, 5 mg, oral, Daily  pregabalin, 200 mg, oral, TID  tiotropium, 2 puff, inhalation, Daily    Continuous medications  heparin, 0-4,000 Units/hr, Last Rate: 1,400 Units/hr (07/02/24 1203)    PRN medications  PRN medications: acetaminophen **OR** acetaminophen **OR** acetaminophen, albuterol, dextrose, dextrose, dextrose, dextrose, glucagon, glucagon, glucagon, glucagon, heparin    Labs  Results for orders placed or performed during the hospital encounter of 07/01/24 (from the past 24 hour(s))   POCT GLUCOSE   Result Value Ref Range    POCT Glucose 128 (H) 74 - 99 mg/dL   aPTT - baseline   Result Value Ref Range    aPTT 28 27 - 38 seconds   CBC   Result Value Ref Range    WBC 8.3 4.4 - 11.3 x10*3/uL    nRBC 0.0 0.0 - 0.0 /100 WBCs    RBC 4.34 (L) 4.50 - 5.90 x10*6/uL    Hemoglobin 10.4 (L) 13.5 - 17.5 g/dL    Hematocrit 33.4 (L) 41.0 - 52.0 %    MCV 77 (L) 80 - 100 fL    MCH 24.0 (L) 26.0 - 34.0 pg    MCHC 31.1 (L) 32.0 - 36.0 g/dL    RDW 16.6 (H) 11.5 - 14.5 %    Platelets 432 150 - 450 x10*3/uL   Heparin Assay, UFH   Result Value Ref Range    Heparin Unfractionated 0.2 See Comment Below for Therapeutic Ranges IU/mL   Heparin Assay, UFH   Result Value Ref Range    Heparin Unfractionated 0.3 See Comment Below for Therapeutic Ranges IU/mL   POCT GLUCOSE   Result Value Ref Range    POCT Glucose 67 (L) 74 - 99 mg/dL   Electrocardiogram, 12-lead PRN ACS symptoms   Result Value Ref Range    Ventricular Rate 82 BPM    Atrial Rate 82 BPM    OK Interval 190 ms    QRS Duration 98 ms    QT Interval 438 ms    QTC Calculation(Bazett) 511 ms    P Axis 46 degrees    R Axis 54 degrees    T Axis 2 degrees    QRS Count 14 beats    Q Onset  220 ms    P Onset 125 ms    P Offset 176 ms    T Offset 439 ms    QTC Fredericia 486 ms   CBC   Result Value Ref Range    WBC 8.9 4.4 - 11.3 x10*3/uL    nRBC 0.0 0.0 - 0.0 /100 WBCs    RBC 4.55 4.50 - 5.90 x10*6/uL    Hemoglobin 11.1 (L) 13.5 - 17.5 g/dL    Hematocrit 35.9 (L) 41.0 - 52.0 %    MCV 79 (L) 80 - 100 fL    MCH 24.4 (L) 26.0 - 34.0 pg    MCHC 30.9 (L) 32.0 - 36.0 g/dL    RDW 17.0 (H) 11.5 - 14.5 %    Platelets 452 (H) 150 - 450 x10*3/uL   Basic metabolic panel   Result Value Ref Range    Glucose 157 (H) 74 - 99 mg/dL    Sodium 138 136 - 145 mmol/L    Potassium 3.6 3.5 - 5.3 mmol/L    Chloride 101 98 - 107 mmol/L    Bicarbonate 27 21 - 32 mmol/L    Anion Gap 14 10 - 20 mmol/L    Urea Nitrogen 14 6 - 23 mg/dL    Creatinine 0.77 0.50 - 1.30 mg/dL    eGFR >90 >60 mL/min/1.73m*2    Calcium 9.1 8.6 - 10.6 mg/dL   B-type natriuretic peptide   Result Value Ref Range     (H) 0 - 99 pg/mL   Heparin Assay, UFH   Result Value Ref Range    Heparin Unfractionated 0.2 See Comment Below for Therapeutic Ranges IU/mL   Vascular US Lower Extremity Vein Mapping Bilateral   Result Value Ref Range    BSA 1.99 m2   Vascular US Ankle Brachial Index (EBEN) Without Exercise   Result Value Ref Range    BSA 1.99 m2       Pertinent Imaging  LHC: 7/1  Coronary Lesion Summary:  Vessel   Stenosis    Vessel Segment  LAD    80% stenosis  proximal to mid  OM 1   70% stenosis     proximal  RCA    100% stenosis       mid    Echo: 7/1  CONCLUSIONS:   1. The left ventricular systolic function is mildly decreased, with a visually estimated ejection fraction of 40-45%.   2. Spectral Doppler shows a pseudonormal pattern of left ventricular diastolic filling.   3. Akinetic inferior wall and inferolateral wall.   4. There is normal right ventricular global systolic function.   5. Mild aortic valve regurgitation.              ASSESSMENT & PLAN  Mr. Zepeda is a 59yo male with a history of RA on chronic prednisone and Plaquenil, DM2 on  insulin, COPD, hypothyroidism, tobacco use (40 pack years) and chronic methadone use, neuropathy and unhealed diabetic foot ulcers (s/p right foot partial amputation ~7 months ago) and follows with the wound clinic as outpatient. The patient stated he has had intermittent chest pain worse with exertion over the past month. He also reports worsening CALDERON. Upon admission to the ED, he was diagnosed with an NSTEMI. Loaded with ASA. LHC with 3vCAD. TTE EF 40-45%.      Cardiac surgery consulted for a CABG evaluation.       RECOMMENDATIONS/PLAN  Dr. Carey aware of patient, currently reviewing case and available imaging.   Surgical plan pending the below workup obtained and surgeon review.     - Appreciate podiatry consult given history of R great toe amputation and non healing foot ulcers   - LHC and TTE in EMR  - Medical optimization per primary team  - Preop risk stratification studies/labs ordered in EMR by our team  --- US Carotids/Vein Mapping/ LE US EBEN  --- PFTs (spirometry and room air ABG)  --- 2 view CXR  --- MRSA, UA/Culture, LFTs, HgbA1c, TSH/T4, Lipid panel  - Dental evaluation not indicated for isolated CAB surgeries     When/if goes to surgery:  - Continue ASA, high-intensity statin, and BB (or document contraindications for use)  - No ACEi/ARBs in the pre-op period (at least 48 hours)  - Hold any SGLT2 inhibitors (Farxiga, Jardiance, etc.) for at least 3 days prior to cardiac surgery to prevent euglycemic DKA  - No antiplatelets other than ASA, no anticoagulants other than Heparin  - NPO after midnight, blood on hold/T&S, and preop scrubs only to be ordered once OR date is established    Will continue to follow along.  Thank you for the consultation.   Patient educated and all questions answered.  Please page the cardiac surgery consult pager [phone] with any questions or changes in patient condition.    Liz Eric PA-C  Cardiac Surgery Consult [facility]  [facility]  Cardiac Surgery  Consult Pager [phone]     7/2/2024  2:20 PM

## 2024-07-02 NOTE — NURSING NOTE
Patient is refusing his bed alarm. Patient educated on importance of the bed alarm however continues to refuse.

## 2024-07-03 ENCOUNTER — APPOINTMENT (OUTPATIENT)
Dept: RADIOLOGY | Facility: HOSPITAL | Age: 59
End: 2024-07-03
Payer: MEDICARE

## 2024-07-03 ENCOUNTER — APPOINTMENT (OUTPATIENT)
Dept: CARDIOLOGY | Facility: HOSPITAL | Age: 59
DRG: 235 | End: 2024-07-03
Payer: MEDICARE

## 2024-07-03 ENCOUNTER — APPOINTMENT (OUTPATIENT)
Dept: VASCULAR MEDICINE | Facility: HOSPITAL | Age: 59
End: 2024-07-03
Payer: MEDICARE

## 2024-07-03 LAB
ALBUMIN SERPL BCP-MCNC: 3.8 G/DL (ref 3.4–5)
ANION GAP SERPL CALC-SCNC: 13 MMOL/L (ref 10–20)
ATRIAL RATE: 80 BPM
BACTERIA UR CULT: NO GROWTH
BUN SERPL-MCNC: 14 MG/DL (ref 6–23)
CALCIUM SERPL-MCNC: 9 MG/DL (ref 8.6–10.6)
CHLORIDE SERPL-SCNC: 103 MMOL/L (ref 98–107)
CO2 SERPL-SCNC: 27 MMOL/L (ref 21–32)
CREAT SERPL-MCNC: 0.87 MG/DL (ref 0.5–1.3)
EGFRCR SERPLBLD CKD-EPI 2021: >90 ML/MIN/1.73M*2
ERYTHROCYTE [DISTWIDTH] IN BLOOD BY AUTOMATED COUNT: 16.9 % (ref 11.5–14.5)
FERRITIN SERPL-MCNC: 25 NG/ML (ref 20–300)
GLUCOSE BLD MANUAL STRIP-MCNC: 209 MG/DL (ref 74–99)
GLUCOSE BLD MANUAL STRIP-MCNC: 223 MG/DL (ref 74–99)
GLUCOSE BLD MANUAL STRIP-MCNC: 253 MG/DL (ref 74–99)
GLUCOSE BLD MANUAL STRIP-MCNC: 280 MG/DL (ref 74–99)
GLUCOSE BLD MANUAL STRIP-MCNC: 47 MG/DL (ref 74–99)
GLUCOSE SERPL-MCNC: 177 MG/DL (ref 74–99)
HCT VFR BLD AUTO: 32.7 % (ref 41–52)
HGB BLD-MCNC: 9.8 G/DL (ref 13.5–17.5)
HOLD SPECIMEN: NORMAL
IRON SATN MFR SERPL: 11 % (ref 25–45)
IRON SERPL-MCNC: 42 UG/DL (ref 35–150)
MAGNESIUM SERPL-MCNC: 2.1 MG/DL (ref 1.6–2.4)
MCH RBC QN AUTO: 23.5 PG (ref 26–34)
MCHC RBC AUTO-ENTMCNC: 30 G/DL (ref 32–36)
MCV RBC AUTO: 78 FL (ref 80–100)
NRBC BLD-RTO: 0 /100 WBCS (ref 0–0)
P AXIS: 61 DEGREES
P OFFSET: 164 MS
P ONSET: 134 MS
PHOSPHATE SERPL-MCNC: 3.4 MG/DL (ref 2.5–4.9)
PLATELET # BLD AUTO: 413 X10*3/UL (ref 150–450)
POTASSIUM SERPL-SCNC: 4.1 MMOL/L (ref 3.5–5.3)
PR INTERVAL: 166 MS
Q ONSET: 217 MS
QRS COUNT: 12 BEATS
QRS DURATION: 104 MS
QT INTERVAL: 434 MS
QTC CALCULATION(BAZETT): 478 MS
QTC FREDERICIA: 463 MS
R AXIS: 50 DEGREES
RBC # BLD AUTO: 4.17 X10*6/UL (ref 4.5–5.9)
SODIUM SERPL-SCNC: 139 MMOL/L (ref 136–145)
T AXIS: -31 DEGREES
T OFFSET: 434 MS
TIBC SERPL-MCNC: 399 UG/DL (ref 240–445)
UFH PPP CHRO-ACNC: 0.4 IU/ML
UIBC SERPL-MCNC: 357 UG/DL (ref 110–370)
VENTRICULAR RATE: 73 BPM
WBC # BLD AUTO: 7.8 X10*3/UL (ref 4.4–11.3)

## 2024-07-03 PROCEDURE — 71046 X-RAY EXAM CHEST 2 VIEWS: CPT

## 2024-07-03 PROCEDURE — 1200000002 HC GENERAL ROOM WITH TELEMETRY DAILY

## 2024-07-03 PROCEDURE — 93005 ELECTROCARDIOGRAM TRACING: CPT

## 2024-07-03 PROCEDURE — 2500000001 HC RX 250 WO HCPCS SELF ADMINISTERED DRUGS (ALT 637 FOR MEDICARE OP): Performed by: NURSE PRACTITIONER

## 2024-07-03 PROCEDURE — S0109 METHADONE ORAL 5MG: HCPCS | Performed by: NURSE PRACTITIONER

## 2024-07-03 PROCEDURE — 99232 SBSQ HOSP IP/OBS MODERATE 35: CPT | Performed by: INTERNAL MEDICINE

## 2024-07-03 PROCEDURE — 99232 SBSQ HOSP IP/OBS MODERATE 35: CPT | Performed by: PHYSICIAN ASSISTANT

## 2024-07-03 PROCEDURE — 2500000002 HC RX 250 W HCPCS SELF ADMINISTERED DRUGS (ALT 637 FOR MEDICARE OP, ALT 636 FOR OP/ED)

## 2024-07-03 PROCEDURE — 36415 COLL VENOUS BLD VENIPUNCTURE: CPT | Performed by: STUDENT IN AN ORGANIZED HEALTH CARE EDUCATION/TRAINING PROGRAM

## 2024-07-03 PROCEDURE — 93922 UPR/L XTREMITY ART 2 LEVELS: CPT

## 2024-07-03 PROCEDURE — 93922 UPR/L XTREMITY ART 2 LEVELS: CPT | Performed by: SURGERY

## 2024-07-03 PROCEDURE — 80069 RENAL FUNCTION PANEL: CPT | Performed by: NURSE PRACTITIONER

## 2024-07-03 PROCEDURE — 2500000002 HC RX 250 W HCPCS SELF ADMINISTERED DRUGS (ALT 637 FOR MEDICARE OP, ALT 636 FOR OP/ED): Performed by: NURSE PRACTITIONER

## 2024-07-03 PROCEDURE — 2500000004 HC RX 250 GENERAL PHARMACY W/ HCPCS (ALT 636 FOR OP/ED): Performed by: STUDENT IN AN ORGANIZED HEALTH CARE EDUCATION/TRAINING PROGRAM

## 2024-07-03 PROCEDURE — 82947 ASSAY GLUCOSE BLOOD QUANT: CPT

## 2024-07-03 PROCEDURE — 36415 COLL VENOUS BLD VENIPUNCTURE: CPT | Performed by: NURSE PRACTITIONER

## 2024-07-03 PROCEDURE — 85520 HEPARIN ASSAY: CPT | Performed by: STUDENT IN AN ORGANIZED HEALTH CARE EDUCATION/TRAINING PROGRAM

## 2024-07-03 PROCEDURE — 2500000002 HC RX 250 W HCPCS SELF ADMINISTERED DRUGS (ALT 637 FOR MEDICARE OP, ALT 636 FOR OP/ED): Performed by: STUDENT IN AN ORGANIZED HEALTH CARE EDUCATION/TRAINING PROGRAM

## 2024-07-03 PROCEDURE — 2500000001 HC RX 250 WO HCPCS SELF ADMINISTERED DRUGS (ALT 637 FOR MEDICARE OP): Performed by: STUDENT IN AN ORGANIZED HEALTH CARE EDUCATION/TRAINING PROGRAM

## 2024-07-03 PROCEDURE — 93010 ELECTROCARDIOGRAM REPORT: CPT | Performed by: INTERNAL MEDICINE

## 2024-07-03 PROCEDURE — 93923 UPR/LXTR ART STDY 3+ LVLS: CPT

## 2024-07-03 PROCEDURE — 2500000001 HC RX 250 WO HCPCS SELF ADMINISTERED DRUGS (ALT 637 FOR MEDICARE OP): Performed by: PHYSICIAN ASSISTANT

## 2024-07-03 PROCEDURE — 85027 COMPLETE CBC AUTOMATED: CPT | Performed by: NURSE PRACTITIONER

## 2024-07-03 RX ORDER — INSULIN LISPRO 100 [IU]/ML
0-10 INJECTION, SOLUTION INTRAVENOUS; SUBCUTANEOUS
Status: DISCONTINUED | OUTPATIENT
Start: 2024-07-03 | End: 2024-07-09

## 2024-07-03 ASSESSMENT — COGNITIVE AND FUNCTIONAL STATUS - GENERAL
DAILY ACTIVITIY SCORE: 24
MOBILITY SCORE: 23
MOBILITY SCORE: 23
DAILY ACTIVITIY SCORE: 24
CLIMB 3 TO 5 STEPS WITH RAILING: A LITTLE
CLIMB 3 TO 5 STEPS WITH RAILING: A LITTLE

## 2024-07-03 ASSESSMENT — PAIN - FUNCTIONAL ASSESSMENT: PAIN_FUNCTIONAL_ASSESSMENT: 0-10

## 2024-07-03 ASSESSMENT — PAIN SCALES - GENERAL
PAINLEVEL_OUTOF10: 0 - NO PAIN
PAINLEVEL_OUTOF10: 4
PAINLEVEL_OUTOF10: 0 - NO PAIN

## 2024-07-03 NOTE — NURSING NOTE
Patient refusing bed alarm. Patient educated on importance of bed alarm use for safety however still refusing.

## 2024-07-03 NOTE — CARE PLAN
The patient's goals for the shift include      The clinical goals for the shift include vitals will remain stable overnight.      Problem: Pain - Adult  Goal: Verbalizes/displays adequate comfort level or baseline comfort level  Outcome: Progressing     Problem: Safety - Adult  Goal: Free from fall injury  Outcome: Progressing     Problem: Discharge Planning  Goal: Discharge to home or other facility with appropriate resources  Outcome: Progressing     Problem: Chronic Conditions and Co-morbidities  Goal: Patient's chronic conditions and co-morbidity symptoms are monitored and maintained or improved  Outcome: Progressing

## 2024-07-03 NOTE — PROGRESS NOTES
Subjective Data:  Patient seen and assessed this morning, sitting up at edge of bed, NAD. Reports improved but still present chest heaviness, now 2/10 after starting Isordil. Hypoglycemic this morning, BG 47, per patient was symptomatic. Denies SOB, on RA. Eager to find out surgical plan.     - cont isordil 10mg TID, CP improved but still present, BP soft  - per Podiatry, PVR to assess blood flow completed this morning, pending results and further recommendations  - per Medical Toxicology, ok to continue home Methadone, monitor daily EKG's, QTc today 478  - BG 47 this morning, decrease Levemir to 40  nightly, SSI to scale #2    Overnight Events:    No acute events overnight.     Objective Data:  Last Recorded Vitals:  Vitals:    07/02/24 1952 07/02/24 2254 07/03/24 0326 07/03/24 0809   BP: 105/60 99/53 116/64 111/62   BP Location: Right arm      Patient Position: Sitting      Pulse: 62 67 81 69   Resp: 17 18 18    Temp: 36.1 °C (97 °F) 36.1 °C (97 °F) 36.6 °C (97.9 °F) 36.5 °C (97.7 °F)   TempSrc:       SpO2: 100% 96% 94% 91%   Weight:       Height:           Last Labs:  Results for orders placed or performed during the hospital encounter of 07/01/24 (from the past 24 hour(s))   Urinalysis with Reflex Culture and Microscopic   Result Value Ref Range    Color, Urine Yellow Light-Yellow, Yellow, Dark-Yellow    Appearance, Urine Clear Clear    Specific Gravity, Urine 1.024 1.005 - 1.035    pH, Urine 6.5 5.0, 5.5, 6.0, 6.5, 7.0, 7.5, 8.0    Protein, Urine 30 (1+) (A) NEGATIVE, 10 (TRACE), 20 (TRACE) mg/dL    Glucose, Urine Normal Normal mg/dL    Blood, Urine NEGATIVE NEGATIVE    Ketones, Urine NEGATIVE NEGATIVE mg/dL    Bilirubin, Urine NEGATIVE NEGATIVE    Urobilinogen, Urine Normal Normal mg/dL    Nitrite, Urine NEGATIVE NEGATIVE    Leukocyte Esterase, Urine 25 Marielena/µL (A) NEGATIVE   Extra Urine Gray Tube   Result Value Ref Range    Extra Tube Hold for add-ons.    Microscopic Only, Urine   Result Value Ref Range     WBC, Urine 1-5 1-5, NONE /HPF    RBC, Urine 3-5 NONE, 1-2, 3-5 /HPF    Mucus, Urine FEW Reference range not established. /LPF   Urine Culture    Specimen: Clean Catch/Voided; Urine   Result Value Ref Range    Urine Culture No growth    POCT GLUCOSE   Result Value Ref Range    POCT Glucose 212 (H) 74 - 99 mg/dL   Blood Gas Arterial, COOX Unsolicited   Result Value Ref Range    POCT pH, Arterial 7.37 (L) 7.38 - 7.42 pH    POCT pCO2, Arterial 46 (H) 38 - 42 mm Hg    POCT pO2, Arterial 72 (L) 85 - 95 mm Hg    POCT SO2, Arterial 95 94 - 100 %    POCT Oxy Hemoglobin, Arterial 93.6 (L) 94.0 - 98.0 %    POCT Base Excess, Arterial 0.9 -2.0 - 3.0 mmol/L    POCT HCO3 Calculated, Arterial 26.6 (H) 22.0 - 26.0 mmol/L    POCT Hemoglobin, Arterial 10.7 (L) 13.5 - 17.5 g/dL    POCT Carboxyhemoglobin, Arterial 1.9 %    POCT Methemoglobin, Arterial 0.0 0.0 - 1.5 %    POCT Deoxy Hemoglobin, Arterial 4.5 0.0 - 5.0 %    Patient Temperature 37.0 degrees Celsius   Pulmonary function testing   Result Value Ref Range    FVC - Predicted 3.71     FEV1 - Predicted 2.93     FVC - PRE 3.32     FEV1 - Pre 2.64    Hepatic Function Panel   Result Value Ref Range    Albumin 3.8 3.4 - 5.0 g/dL    Bilirubin, Total 0.4 0.0 - 1.2 mg/dL    Bilirubin, Direct 0.1 0.0 - 0.3 mg/dL    Alkaline Phosphatase 122 (H) 33 - 120 U/L    ALT 10 10 - 52 U/L    AST 19 9 - 39 U/L    Total Protein 7.0 6.4 - 8.2 g/dL   Lipid Panel   Result Value Ref Range    Cholesterol 122 0 - 199 mg/dL    HDL-Cholesterol 45.9 mg/dL    Cholesterol/HDL Ratio 2.7     LDL Calculated 60 <=99 mg/dL    VLDL 16 0 - 40 mg/dL    Triglycerides 81 0 - 149 mg/dL    Non HDL Cholesterol 76 0 - 149 mg/dL   TSH with reflex to Free T4 if abnormal   Result Value Ref Range    Thyroid Stimulating Hormone 2.67 0.44 - 3.98 mIU/L   CBC   Result Value Ref Range    WBC 8.5 4.4 - 11.3 x10*3/uL    nRBC 0.0 0.0 - 0.0 /100 WBCs    RBC 4.26 (L) 4.50 - 5.90 x10*6/uL    Hemoglobin 10.1 (L) 13.5 - 17.5 g/dL    Hematocrit  33.2 (L) 41.0 - 52.0 %    MCV 78 (L) 80 - 100 fL    MCH 23.7 (L) 26.0 - 34.0 pg    MCHC 30.4 (L) 32.0 - 36.0 g/dL    RDW 16.9 (H) 11.5 - 14.5 %    Platelets 434 150 - 450 x10*3/uL   Heparin Assay, UFH   Result Value Ref Range    Heparin Unfractionated 0.4 See Comment Below for Therapeutic Ranges IU/mL   Basic Metabolic Panel   Result Value Ref Range    Glucose 81 74 - 99 mg/dL    Sodium 138 136 - 145 mmol/L    Potassium 3.8 3.5 - 5.3 mmol/L    Chloride 102 98 - 107 mmol/L    Bicarbonate 26 21 - 32 mmol/L    Anion Gap 14 10 - 20 mmol/L    Urea Nitrogen 16 6 - 23 mg/dL    Creatinine 0.74 0.50 - 1.30 mg/dL    eGFR >90 >60 mL/min/1.73m*2    Calcium 8.9 8.6 - 10.6 mg/dL   Phosphorus   Result Value Ref Range    Phosphorus 4.7 2.5 - 4.9 mg/dL   Iron and TIBC   Result Value Ref Range    Iron 42 35 - 150 ug/dL    UIBC 357 110 - 370 ug/dL    TIBC 399 240 - 445 ug/dL    % Saturation 11 (L) 25 - 45 %   Ferritin   Result Value Ref Range    Ferritin 25 20 - 300 ng/mL   POCT GLUCOSE   Result Value Ref Range    POCT Glucose 141 (H) 74 - 99 mg/dL   POCT GLUCOSE   Result Value Ref Range    POCT Glucose 179 (H) 74 - 99 mg/dL   Heparin Assay, UFH   Result Value Ref Range    Heparin Unfractionated 0.4 See Comment Below for Therapeutic Ranges IU/mL   ECG 12 lead   Result Value Ref Range    Ventricular Rate 73 BPM    Atrial Rate 80 BPM    MN Interval 166 ms    QRS Duration 104 ms    QT Interval 434 ms    QTC Calculation(Bazett) 478 ms    P Axis 61 degrees    R Axis 50 degrees    T Axis -31 degrees    QRS Count 12 beats    Q Onset 217 ms    P Onset 134 ms    P Offset 164 ms    T Offset 434 ms    QTC Fredericia 463 ms   Heparin Assay, UFH   Result Value Ref Range    Heparin Unfractionated 0.4 See Comment Below for Therapeutic Ranges IU/mL   POCT GLUCOSE   Result Value Ref Range    POCT Glucose 47 (L) 74 - 99 mg/dL   POCT GLUCOSE   Result Value Ref Range    POCT Glucose 223 (H) 74 - 99 mg/dL        TROPHS   Date/Time Value Ref Range Status    06/30/2024 02:36  0 - 20 ng/L Final     Comment:     Previous result verified on 6/30/2024 0210 on specimen/case [id number] called with component [facility] for procedure Troponin I, High Sensitivity, Initial with value 146 ng/L.   06/30/2024 01:40  0 - 20 ng/L Final     BNP   Date/Time Value Ref Range Status   07/02/2024 09:00  0 - 99 pg/mL Final     HGBA1C   Date/Time Value Ref Range Status   05/15/2024 10:26 AM 8.5 4.3 - 5.6 % Final     Comment:     American Diabetes Association guidelines indicate that patients with HgbA1c in the range 5.7-6.4% are at increased risk for development of diabetes, and intervention by lifestyle modification may be beneficial. HgbA1c greater or equal to 6.5% is considered diagnostic of diabetes.   02/20/2024 08:51 AM 9.4 4.3 - 5.6 % Final     Comment:     American Diabetes Association guidelines indicate that patients with HgbA1c in the range 5.7-6.4% are at increased risk for development of diabetes, and intervention by lifestyle modification may be beneficial. HgbA1c greater or equal to 6.5% is considered diagnostic of diabetes.   10/12/2023 03:27 AM 9.6 see below % Final     LDLCALC   Date/Time Value Ref Range Status   07/02/2024 04:08 PM 60 <=99 mg/dL Final     Comment:                                 Near   Borderline      AGE      Desirable  Optimal    High     High     Very High     0-19 Y     0 - 109     ---    110-129   >/= 130     ----    20-24 Y     0 - 119     ---    120-159   >/= 160     ----      >24 Y     0 -  99   100-129  130-159   160-189     >/=190       VLDL   Date/Time Value Ref Range Status   07/02/2024 04:08 PM 16 0 - 40 mg/dL Final   01/20/2021 01:08 PM 66 0 - 40 mg/dL Final   10/06/2020 11:50 AM 67 0 - 40 mg/dL Final   12/17/2019 02:57 PM 57 0 - 40 mg/dL Final      Last I/O:  I/O last 3 completed shifts:  In: 713.2 (8.4 mL/kg) [P.O.:480; I.V.:233.2 (2.7 mL/kg)]  Out: 700 (8.2 mL/kg) [Urine:700 (0.2 mL/kg/hr)]  Weight: 85.3 kg     Past  Cardiology Tests (Last 3 Years):  EKG:  Electrocardiogram, 12-lead PRN ACS symptoms 07/02/2024      ECG 12 lead 06/30/2024      ECG 12 lead 06/30/2024 (Preliminary)      ECG 12 lead 06/30/2024 (Preliminary)    Echo:  Transthoracic Echo (TTE) Complete 07/01/2024    Ejection Fractions:  EF   Date/Time Value Ref Range Status   07/01/2024 07:49 AM 43 %      Cath:  Cardiac Catheterization Procedure 07/01/2024    Stress Test:  No results found for this or any previous visit from the past 1095 days.    Cardiac Imaging:  No results found for this or any previous visit from the past 1095 days.      Inpatient Medications:  Scheduled medications   Medication Dose Route Frequency    aspirin  81 mg oral Daily    atorvastatin  80 mg oral Nightly    DULoxetine  60 mg oral q24h    hydroxychloroquine  200 mg oral BID    insulin detemir  40 Units subcutaneous Nightly    insulin lispro  0-10 Units subcutaneous TID    isosorbide dinitrate  10 mg oral TID    levothyroxine  50 mcg oral Daily    methadone  77.5 mg oral q PM    methadone  77.5 mg oral Daily    metoprolol succinate XL  50 mg oral Daily    mupirocin   Topical BID    pantoprazole  40 mg oral Daily before breakfast    polyethylene glycol  17 g oral Daily    predniSONE  5 mg oral Daily    pregabalin  200 mg oral TID    tiotropium  2 puff inhalation Daily     PRN medications   Medication    acetaminophen    albuterol    dextrose    dextrose    glucagon    heparin     Continuous Medications   Medication Dose Last Rate    heparin  0-4,000 Units/hr 1,400 Units/hr (07/03/24 1043)       Physical Exam:  General: NAD, sitting on edge of bed  Skin: warm and dry   Head/ neck: no JVD seen at 90 degrees  Cardiac: RRR, S1, S2   Pulm: CTAB, on RA  GI: soft, nontender   Extremities: no LE edema; bilateral feet with nonhealing ulcers of varying depths (see Media tab)  Neuro: no focal neuro deficits   Psych: appropriate mood and behavior       Assessment/Plan   Kael Zepeda is a 58 y.o.  male with PMHx s/f RA, IDDM2, COPD, hypothyroidism, chronic tobacco abuse, chronic methadone dependency, neuropathy/diabetic foot ulcer with ?osteomyelitis. Patient presented to [place] on 6/30 with intermittent chest pain, now s/p LHC showing multi-vessels CAD, transferred to [facility] for CABG evaluation.      Multivessel CAD with proximal LAD involvement  NSTEMI  Dyslipidemia   - Intermittent chest pain began ~6/23/24, worsened in the last 3 days prior to admission on 6/30  - ECG showing sinus rhythm with q waves in inferior leads.  - HS Trop of 146->135, started on a heparin infusion, continue    - s/p ASA & Ticagrelor load 6/30, last Ticagrelor dose on 7/1 AM  - LHC 7/1/24 at [facility]: multivessel disease with 100% mid RCA, 70% Lt Circ, and 80% prox to mid LAD  - TTE 6/30: EF 40-45%, see below for further details   - Lipids 7/2: Chol 122, HDL 45.9, LDL 60, Tri 81  - CTS aware of patient, cath images available on syngo; workup ordered, images being reviewed by Dr. Carey  - PFT's: FVC 89%, FEV1 90%, FEV1/%  - cont Heparin drip  - cont isordil 10mg TID, CP improved but still present, BP soft  - cont ASA 81, heparin gtt, Atorvastatin 81, Toprol XL 50  - cardiac die    Acute Combined Systolic and Diastolic Heart Failure, ICM  HFmrEF (EF 40-45%), ACC/AHA class C  - No prior cardiac history  - TTE 7/1/24: EF 40-45%, pseudonormal DF, akinetic inferior and inferolateral walls  - CXR: no acute cardiopulmonary process, chronic interstial changes   - , mildly SOB but unsure if related to ongoing angina  - appears euvolemic on exam; weaned to RA  - consider adding ARNI/ MRA/ SGLT2i post revascularization  - Not currently on diuretics, add as clinically indicated   - switch metoprolol tart 25mg BID to Toprol XL 50 mg daily   - Daily standing weights, strict I&O's, 2g sodium diet, 2L fluid restriction      Hx Hypertension  - SBP last 24 hrs:   - Continue with meds as above      Diabetes mellitus  - most  recent A1C 8.5% on 5/15/24, was 10.4% on 2/15/23  - DM education consult placed  - home regimen:Levemir 50 units nightly, Metformin 500 mg BID  - BG 47 this morning, decrease Levemir to 40 u nightly, reduce SSI to scale #2  - diabetic diet, Accu-Cheks AC/HS, hypoglycemic protocol   - Insulin Detemir 50u qpm  - Insulin sliding scale TID    Neuropathy with Nonhealing ulcerations/wounds  - [facility]  - reports had right great toe amputation 6-7 months ago and hasnt healed since; also reports he will just wake up with new ulcers on his left foot  - osteomyelitis was in chart but patient denies this diagnosis  - reports being prescribed chronic antibiotic for his feet however due to an interaction with hydroxychloroquine he takes it every three days instead of daily; called home pharmacy which they believe the antibiotic is sulfasalazine   - Podiatry consulted; foot images in chart, appreciate recs  - per Podiatry, PVR to assess blood flow completed this morning, pending results and further recommendations  - Cont home Lyrica 200mg TID    Methadone Dependence   - has been on it for 15 years, goes to the [facility] in [place] for methadone weekly; reports total home dose 165mg divided   - currently on 77.5mg BID  - per Medical Toxicology, ok to continue home Methadone, monitor daily EKG's  - QTc today 478 (298)     COPD/tobacco use  - Continue home inhalers  - Discussed in length with him the importance of smoking cessation as well as several strategies to assist him in quitting smoking. Patient states he plans to quit after this hospitalization.  - offer NRT as indicated, pt currently declines     RA  - cont home Plaquenil 200mg BID and Prednisone 5mg daily     Hypothyroidism  - TSH 2.67  - cont home Synthroid 50mcg daily     GERD  - cont Pantoprazole 40 daily, substitute for home Omeprazole    DVT ppx: Heparin drip    Dispo: pending CABG eval     Code Status: Full Code  NOK:  Vin Zepeda (friend) [phone]      Seen and discussed with Dr Alireza Aguilar, APRN-CNP

## 2024-07-03 NOTE — HOSPITAL COURSE
Mr. Zepeda is a 59yo male with a history of RA on chronic prednisone and Plaquenil, DM2 on insulin, COPD, hypothyroidism, tobacco use (40 pack years) and chronic methadone use, neuropathy and unhealed diabetic foot ulcers (s/p right foot partial amputation ~7 months ago) and follows with the wound clinic as outpatient; who was admitted to [facility] on 7/1/2024 as a transfer from [facility] for CABG evaluation/workup.      Patient originally presented to [place] on 6/30 with intermittent chest pain.  While at [place] patient was diagnosed with NSTEMI and loaded with ASA.  A LHC was completed and showed multi-vessel CAD, and an echo that showed an EF of 40-45%.  HE was then transferred to [facility] for CABG evaluation.      OPERATION/PROCEDURE: 7/9/2024 with Yareli Carey   - Median sternotomy  - Attempt off-pump surgery  - On pump coronary artery bypass grafting x 3  --- LIMA to LAD  --- MANUELITO( Y'ed off LIMA) to obtuse marginal  --- SVG to right PLV (terminal branch of RCA)  - Endoscopic harvesting of the right greater saphenous vein  - Sternal plating    CTICU Course: Post operative course c/b acute hypoxic respiratory insufficiency requiring high flow nasal cannula, now improved.Afib with RVR to the 130s requiring bolus and amio drip. Now in NSR in 60-70's with some bradycardia to high 40's-> PO amio dc'd.  JUAN J post op with increasing CR to 2.08 (diuresis was held) now 1.25.  Pulmonary edema still noted on xray. ? Fentanyl use while inpatient brought from family members       Transfer to 3: 7/16/2024  =================    Floor Course:  - Patient was diuresed for fluid volume overload post cardiac surgery; Preop weight: 85.3kg, discharge wt: 78.6kg  --- Patient adequately diuresed while inpatient     - On ASA, statin, BB, by discharge  - Continue Plavix for 1 year for NSTEMI     AFIB RVR during hospitalization   -- treated with Amiodarone; converted back to SR   -- Amio stopped due  to bradycardia   -- Discharging patient on Metoprolol succinate 25mg daily     H/o peripheral neuropathy/chronic methadone use/Depression   -Discharging patient on home regimen:   --- Plaquenil  --- Methadone BID   NOTE: Methadone Rx will be given managed at facility of discharge with addiction specialist provider who is able to write for, dispense, and manage while inpatient at facility   --- Pregabalin 200mg daily   --- Cymbalta 60mg daily     Acute hypoxic pulmonary insufficiency requiring supplemental oxygen; - Resolved   Hx of COPD: home meds: Albuterol PRN   MARIAELENA: No home CPAP  --- Discharging patient on 3L NC during the day for SpO2 > 92%   --- Continue Nocturnal CPAP for sleep: while at Acute rehab facility   Settings:   High pressure: 10  Low Pressure: 5  RR: 14   - Patient will follow up with formal sleep study as an outpatient     JUAN J post op CR 2.08-no history of renal disease, baseline creatinine 0.7   - CRE at discharge: 0.85  - JUAN J resolved by discharge     Stress Induced Hyperglycemia 2/2 steroids Newly Diagnosed DM type II   - A1c: 8.5% (5/15/2024)   - Endocrine consulted/followed throughout hospital stay; appreciated home going recs:   --- Glargin 25 units Daily   --- Lispro 8 units TID with meals IN ADDITION to SSI   --- Lispro Premeal Sliding Scale #2 (0-10 units)   --- Metformin 500mg BID   --- Follow up with endocrinology as an outpatient     Chronic diabetic wounds to bilateral feet    - Wound care following, appreciate final recs:   ---Clean with Vashe  ---Clean feet with cleansing cloths and apply lotion to foot, not on wounds.   ---Apply Aquacel silver to wound bed, (cut to size) then cover with ABD, wrap with kerlix.   ---Change daily     - Epicardial wires CUT on 7/23/2024  - Telemetry at discharge SR 60's to 70's   - 2v CXR 7/17/24 reviewed with continued perihilar edema improving   - Postop echo 7/15/24 LVEF 40%-> similar to pre-op echo   - Cardiac rehab referral was placed  - PT recs  "moderate intensity therapy and acute rehab  - Anticipate discharge to acute rehab facility    On day of discharge, vital signs were stable and no acute distress was noted. All questions were answered. After VS and labs were reviewed it was determined the patient was stable for discharge.     Discharged to [facility] on 7/23/2024  =======================    Hospital day of discharge management- spent >30 minutes coordinating the discharge and counseling/educating patient and family regarding discharge instructions.     Past Medical History:  - Fibromyalgia    - Hypertension  - rheumatoid arthritis  - degenerative disc disease  - osteoporosis  - hyperlipidemia  - hypothyroidism   Type 2 diabetes mellitus    Past Surgical History:  - BACK SURGERY (06/28/2018)   - CARDIAC CATHETERIZATION: Left Heart Cath;  Surgeon: Yao Calvin MD;  Location: [facility] Cardiac Cath Lab (7/1/2024)   - SEPTOPLASTY (06/28/2018)     Medications Prior to Admission  - albuterol 90 mcg/actuation inhaler; Inhale 2 puffs every 6 hours if needed for shortness of breath.       - aspirin 81 mg EC tablet; Take 1 tablet (81 mg) by mouth once daily.       - atorvastatin (Lipitor) 40 mg tablet; Take 1 tablet (40 mg) by mouth once daily.       - BD Lois 2nd Gen Pen Needle 32 gauge x 5/32\" needle; USE 1 NEEDLE ONCE DAILY       - celecoxib (CeleBREX) 200 mg capsule ; Take 1 capsule (200 mg) by mouth twice a day.       - DULoxetine (Cymbalta) 60 mg DR capsule; 1 capsule (60 mg) once every 24 hours.       - furosemide (Lasix) 40 mg tablet; Take 1 tablet (40 mg) by mouth once daily.       - hydroxychloroquine (Plaquenil) 200 mg tablet; Take 1 tablet (200 mg) by mouth 2 times a day.       - ibuprofen 800 mg tablet; Take 1 tablet (800 mg) by mouth every 8 hours if needed for pain.       - insulin lispro (HumaLOG) 100 unit/mL injection; Inject 10 Units under the skin.       - Levemir FlexPen 100 unit/mL (3 mL) pen; Inject 50 Units under the " skin once daily at bedtime.       - levothyroxine (Synthroid, Levoxyl) 50 mcg tablet; Take 1 tablet (50 mcg) by mouth once daily.       - lisinopril 20 mg tablet; Take 1 tablet (20 mg) by mouth once daily.      - metFORMIN (Glucophage) 500 mg tablet; Take 1 tablet (500 mg) by mouth 2 times daily (morning and late afternoon).       - methadone (Dolophine) 10 mg/mL solution; Take 7.7 mL (77 mg) by mouth. IN THE MORNING THEN 78 MG IN THE EVENING       - omeprazole (PriLOSEC) 40 mg DR capsule; Take 1 capsule (40 mg) by mouth once daily.       - predniSONE (Deltasone) 2.5 mg tablet; Take 2 tablets (5 mg) by mouth once daily.       - pregabalin (Lyrica) 200 mg capsule; Take 1 capsule (200 mg) by mouth 3 times a day.       - Spiriva Respimat 2.5 mcg/actuation inhaler; Inhale 2 puffs once daily.       - Wixela Inhub 100-50 mcg/dose diskus inhaler; INHALE 1 PUFF AS INSTRUCTED TWICE DAILY. RINSE AND GARGLE MOUTH WITH WATER AFTER EACH USE.    - ammonium lactate (Amlactin) 12 % cream; Apply topically.     - riTUXimab-abbs (Truxima) 10 mg/mL solution; 100 mL (1,000 mg) every 6 months.      Patient has no known allergies.    Social History  - Smoking status: Every Day; 1 PPD for 40 years; Cigarettes   - Smokeless tobacco: Never  - Alcohol use: Not Currently  - Drug use: Never     Postop Images:     XR CHEST 2 VIEWS; 7/17/2024 6:54 am  CARDIOMEDIASTINAL SILHOUETTE:  Patient is status post median sternotomy.  LUNGS:  Continued perihilar interstitial edema with right basilar  atelectasis. Right-sided effusion without pneumothorax.  ABDOMEN:  No remarkable upper abdominal findings.  BONES:  No acute osseous changes.      IMPRESSION:  1.  Slight interval improvement in lung aeration status post median  sternotomy. Residual edema and right basilar atelectasis.      Signed by: Santo Goldman 7/18/2024 7:21 AM  Dictation workstation:   [id number]     Pelvis and right hip dated 7/18/2024.     FINDINGS:  No fracture or dislocation is  evident. Surgical and degenerative  changes seen of the spine. Minimal degenerative changes seen of the  hips. No soft tissue gas or radiopaque foreign body is evident.      IMPRESSION:  No osseous injury is evident.      Signed by: Sandeep Andrade 7/18/2024 1:25 PM  Dictation workstation:   [id number]

## 2024-07-03 NOTE — PROGRESS NOTES
"CARDIAC SURGERY CONSULT PROGRESS NOTE    SUBJECTIVE  Mr. Zepeda is a 57yo male with a history of RA on chronic prednisone and Plaquenil, DM2 on insulin, COPD, hypothyroidism, tobacco use (40 pack years) and chronic methadone use, neuropathy and unhealed diabetic foot ulcers (s/p right foot partial amputation ~7 months ago) and follows with the wound clinic as outpatient. The patient stated he has had intermittent chest pain worse with exertion over the past month. He also reports worsening CALDERON. Upon admission to the ED, he was diagnosed with an NSTEMI. Loaded with ASA. C with 3vCAD. TTE EF 40-45%.       Cardiac surgery consulted for a CABG evaluation.       Objective   /63   Pulse 76   Temp 36.6 °C (97.9 °F)   Resp 20   Ht 1.676 m (5' 6\")   Wt 85.3 kg (188 lb)   SpO2 98%   BMI 30.34 kg/m²   0-10 (Numeric) Pain Score: 0 - No pain   Vitals:    07/01/24 2046   Weight: 85.3 kg (188 lb)          Intake/Output Summary (Last 24 hours) at 7/3/2024 1403  Last data filed at 7/2/2024 1558  Gross per 24 hour   Intake 318.33 ml   Output 300 ml   Net 18.33 ml         Medications  Scheduled medications  aspirin, 81 mg, oral, Daily  atorvastatin, 80 mg, oral, Nightly  DULoxetine, 60 mg, oral, q24h  hydroxychloroquine, 200 mg, oral, BID  insulin detemir, 40 Units, subcutaneous, Nightly  insulin lispro, 0-10 Units, subcutaneous, TID  isosorbide dinitrate, 10 mg, oral, TID  levothyroxine, 50 mcg, oral, Daily  methadone, 77.5 mg, oral, q PM  methadone, 77.5 mg, oral, Daily  metoprolol succinate XL, 50 mg, oral, Daily  mupirocin, , Topical, BID  pantoprazole, 40 mg, oral, Daily before breakfast  polyethylene glycol, 17 g, oral, Daily  predniSONE, 5 mg, oral, Daily  pregabalin, 200 mg, oral, TID  tiotropium, 2 puff, inhalation, Daily    Continuous medications  heparin, 0-4,000 Units/hr, Last Rate: 1,400 Units/hr (07/03/24 1043)    PRN medications  PRN medications: acetaminophen **OR** [DISCONTINUED] acetaminophen " **OR** [DISCONTINUED] acetaminophen, albuterol, dextrose, dextrose, glucagon, heparin    Labs  Results for orders placed or performed during the hospital encounter of 07/01/24 (from the past 24 hour(s))   Blood Gas Arterial, COOX Unsolicited   Result Value Ref Range    POCT pH, Arterial 7.37 (L) 7.38 - 7.42 pH    POCT pCO2, Arterial 46 (H) 38 - 42 mm Hg    POCT pO2, Arterial 72 (L) 85 - 95 mm Hg    POCT SO2, Arterial 95 94 - 100 %    POCT Oxy Hemoglobin, Arterial 93.6 (L) 94.0 - 98.0 %    POCT Base Excess, Arterial 0.9 -2.0 - 3.0 mmol/L    POCT HCO3 Calculated, Arterial 26.6 (H) 22.0 - 26.0 mmol/L    POCT Hemoglobin, Arterial 10.7 (L) 13.5 - 17.5 g/dL    POCT Carboxyhemoglobin, Arterial 1.9 %    POCT Methemoglobin, Arterial 0.0 0.0 - 1.5 %    POCT Deoxy Hemoglobin, Arterial 4.5 0.0 - 5.0 %    Patient Temperature 37.0 degrees Celsius   Pulmonary function testing   Result Value Ref Range    FVC - Predicted 3.71     FEV1 - Predicted 2.93     FVC - PRE 3.32     FEV1 - Pre 2.64    Hepatic Function Panel   Result Value Ref Range    Albumin 3.8 3.4 - 5.0 g/dL    Bilirubin, Total 0.4 0.0 - 1.2 mg/dL    Bilirubin, Direct 0.1 0.0 - 0.3 mg/dL    Alkaline Phosphatase 122 (H) 33 - 120 U/L    ALT 10 10 - 52 U/L    AST 19 9 - 39 U/L    Total Protein 7.0 6.4 - 8.2 g/dL   Lipid Panel   Result Value Ref Range    Cholesterol 122 0 - 199 mg/dL    HDL-Cholesterol 45.9 mg/dL    Cholesterol/HDL Ratio 2.7     LDL Calculated 60 <=99 mg/dL    VLDL 16 0 - 40 mg/dL    Triglycerides 81 0 - 149 mg/dL    Non HDL Cholesterol 76 0 - 149 mg/dL   TSH with reflex to Free T4 if abnormal   Result Value Ref Range    Thyroid Stimulating Hormone 2.67 0.44 - 3.98 mIU/L   CBC   Result Value Ref Range    WBC 8.5 4.4 - 11.3 x10*3/uL    nRBC 0.0 0.0 - 0.0 /100 WBCs    RBC 4.26 (L) 4.50 - 5.90 x10*6/uL    Hemoglobin 10.1 (L) 13.5 - 17.5 g/dL    Hematocrit 33.2 (L) 41.0 - 52.0 %    MCV 78 (L) 80 - 100 fL    MCH 23.7 (L) 26.0 - 34.0 pg    MCHC 30.4 (L) 32.0 - 36.0  g/dL    RDW 16.9 (H) 11.5 - 14.5 %    Platelets 434 150 - 450 x10*3/uL   Heparin Assay, UFH   Result Value Ref Range    Heparin Unfractionated 0.4 See Comment Below for Therapeutic Ranges IU/mL   Basic Metabolic Panel   Result Value Ref Range    Glucose 81 74 - 99 mg/dL    Sodium 138 136 - 145 mmol/L    Potassium 3.8 3.5 - 5.3 mmol/L    Chloride 102 98 - 107 mmol/L    Bicarbonate 26 21 - 32 mmol/L    Anion Gap 14 10 - 20 mmol/L    Urea Nitrogen 16 6 - 23 mg/dL    Creatinine 0.74 0.50 - 1.30 mg/dL    eGFR >90 >60 mL/min/1.73m*2    Calcium 8.9 8.6 - 10.6 mg/dL   Phosphorus   Result Value Ref Range    Phosphorus 4.7 2.5 - 4.9 mg/dL   Iron and TIBC   Result Value Ref Range    Iron 42 35 - 150 ug/dL    UIBC 357 110 - 370 ug/dL    TIBC 399 240 - 445 ug/dL    % Saturation 11 (L) 25 - 45 %   Ferritin   Result Value Ref Range    Ferritin 25 20 - 300 ng/mL   POCT GLUCOSE   Result Value Ref Range    POCT Glucose 141 (H) 74 - 99 mg/dL   POCT GLUCOSE   Result Value Ref Range    POCT Glucose 179 (H) 74 - 99 mg/dL   Heparin Assay, UFH   Result Value Ref Range    Heparin Unfractionated 0.4 See Comment Below for Therapeutic Ranges IU/mL   ECG 12 lead   Result Value Ref Range    Ventricular Rate 73 BPM    Atrial Rate 80 BPM    NM Interval 166 ms    QRS Duration 104 ms    QT Interval 434 ms    QTC Calculation(Bazett) 478 ms    P Axis 61 degrees    R Axis 50 degrees    T Axis -31 degrees    QRS Count 12 beats    Q Onset 217 ms    P Onset 134 ms    P Offset 164 ms    T Offset 434 ms    QTC Fredericia 463 ms   Heparin Assay, UFH   Result Value Ref Range    Heparin Unfractionated 0.4 See Comment Below for Therapeutic Ranges IU/mL   POCT GLUCOSE   Result Value Ref Range    POCT Glucose 47 (L) 74 - 99 mg/dL   Vascular US Ankle Brachial Index (EBEN) Without Exercise   Result Value Ref Range    BSA 1.99 m2   POCT GLUCOSE   Result Value Ref Range    POCT Glucose 223 (H) 74 - 99 mg/dL   POCT GLUCOSE   Result Value Ref Range    POCT Glucose 253  (H) 74 - 99 mg/dL               ASSESSMENT & PLAN  Mr. Zepeda is a 57yo male with a history of RA on chronic prednisone and Plaquenil, DM2 on insulin, COPD, hypothyroidism, tobacco use (40 pack years) and chronic methadone use, neuropathy and unhealed diabetic foot ulcers (s/p right foot partial amputation ~7 months ago) and follows with the wound clinic as outpatient. The patient stated he has had intermittent chest pain worse with exertion over the past month. He also reports worsening CALDERON. Upon admission to the ED, he was diagnosed with an NSTEMI. Loaded with ASA. LHC with 3vCAD. TTE EF 40-45%.       Cardiac surgery consulted for a CABG evaluation.         RECOMMENDATIONS/PLAN  Dr. Carey aware of patient, currently reviewing case and available imaging.   Surgical plan pending the below workup obtained and surgeon review.      - Appreciate podiatry consult given history of R great toe amputation and non healing foot ulcers - debrided ulcers and re-dressed, recommend daily dressing changes, not prohibitive for surgery.   - LHC and TTE in EMR  - Medical optimization per primary team  - Preop risk stratification studies/labs ordered in EMR by our team  --- US Carotids/Vein Mapping/ LE US EBEN  --- PFTs (spirometry and room air ABG) - FVC and FEV1 89% and 90% respectively   --- 2 view CXR  --- MRSA, UA/Culture, LFTs, HgbA1c, TSH/T4, Lipid panel  - Dental evaluation not indicated for isolated CAB surgeries      When/if goes to surgery:  - Continue ASA, high-intensity statin, and BB (or document contraindications for use)  - No ACEi/ARBs in the pre-op period (at least 48 hours)  - Hold any SGLT2 inhibitors (Farxiga, Jardiance, etc.) for at least 3 days prior to cardiac surgery to prevent euglycemic DKA  - No antiplatelets other than ASA, no anticoagulants other than Heparin  - NPO after midnight, blood on hold/T&S, and preop scrubs only to be ordered once OR date is established       Will continue to follow  along.  Thank you for the consultation.   Patient educated and all questions answered.  Please page the cardiac surgery consult pager [phone] with any questions or changes in patient condition.     Liz Eric PA-C  Cardiac Surgery Consult [facility]  [facility]  Cardiac Surgery Consult Pager [phone]     7/3/2024  2:03 PM

## 2024-07-03 NOTE — PROGRESS NOTES
"Kael Zepeda is a 58 y.o. male on day 2 of admission presenting with CAD in native artery.    Subjective   Patient was seen at bedside, resting comfortably. He denies any constitutional symptoms or other pedal complaints today.       Objective     Physical Exam:   General: AAOx3, no acute distress, bilateral foot dressings intact    Vasc: Dorsalis pedis and posterior tibial pulses are non-palpable bilateral.  Capillary refill time is delayed to the hallux bilateral. Skin temperature is warm to cool from proximal tibia to distal digits bilateral. There is rosy-wound erythema bilateral. There is mild pitting edema in the BLE.      Neuro: Light touch sensation is diminished to the foot bilateral.      Derm: Skin is xerotic. Nails bilateral are dystrophic. There are multiple full thickness ulcerations bilaterally. Right foot fourth interspace, right dorsal foot, right second toe, right plantar foot, left medial first toe and medial 1st MTPJ. Ulceration bases are granular. Scant serous drainage. Wound edges are hyperkeratotic. No crepitus, fluctuance, or purulence noted at any of the ulcerations.     Ortho: Muscle strength is +5/5 for all four pedal muscle groups bilateral. S/P right partial first ray resection.    Last Recorded Vitals  Blood pressure 103/63, pulse 76, temperature 36.6 °C (97.9 °F), resp. rate 20, height 1.676 m (5' 6\"), weight 85.3 kg (188 lb), SpO2 98%.    Relevant Results    Results for orders placed or performed during the hospital encounter of 07/01/24 (from the past 24 hour(s))   Urinalysis with Reflex Culture and Microscopic   Result Value Ref Range    Color, Urine Yellow Light-Yellow, Yellow, Dark-Yellow    Appearance, Urine Clear Clear    Specific Gravity, Urine 1.024 1.005 - 1.035    pH, Urine 6.5 5.0, 5.5, 6.0, 6.5, 7.0, 7.5, 8.0    Protein, Urine 30 (1+) (A) NEGATIVE, 10 (TRACE), 20 (TRACE) mg/dL    Glucose, Urine Normal Normal mg/dL    Blood, Urine NEGATIVE NEGATIVE    Ketones, Urine " NEGATIVE NEGATIVE mg/dL    Bilirubin, Urine NEGATIVE NEGATIVE    Urobilinogen, Urine Normal Normal mg/dL    Nitrite, Urine NEGATIVE NEGATIVE    Leukocyte Esterase, Urine 25 Marielena/µL (A) NEGATIVE   Extra Urine Gray Tube   Result Value Ref Range    Extra Tube Hold for add-ons.    Microscopic Only, Urine   Result Value Ref Range    WBC, Urine 1-5 1-5, NONE /HPF    RBC, Urine 3-5 NONE, 1-2, 3-5 /HPF    Mucus, Urine FEW Reference range not established. /LPF   Urine Culture    Specimen: Clean Catch/Voided; Urine   Result Value Ref Range    Urine Culture No growth    POCT GLUCOSE   Result Value Ref Range    POCT Glucose 212 (H) 74 - 99 mg/dL   Blood Gas Arterial, COOX Unsolicited   Result Value Ref Range    POCT pH, Arterial 7.37 (L) 7.38 - 7.42 pH    POCT pCO2, Arterial 46 (H) 38 - 42 mm Hg    POCT pO2, Arterial 72 (L) 85 - 95 mm Hg    POCT SO2, Arterial 95 94 - 100 %    POCT Oxy Hemoglobin, Arterial 93.6 (L) 94.0 - 98.0 %    POCT Base Excess, Arterial 0.9 -2.0 - 3.0 mmol/L    POCT HCO3 Calculated, Arterial 26.6 (H) 22.0 - 26.0 mmol/L    POCT Hemoglobin, Arterial 10.7 (L) 13.5 - 17.5 g/dL    POCT Carboxyhemoglobin, Arterial 1.9 %    POCT Methemoglobin, Arterial 0.0 0.0 - 1.5 %    POCT Deoxy Hemoglobin, Arterial 4.5 0.0 - 5.0 %    Patient Temperature 37.0 degrees Celsius   Pulmonary function testing   Result Value Ref Range    FVC - Predicted 3.71     FEV1 - Predicted 2.93     FVC - PRE 3.32     FEV1 - Pre 2.64    Hepatic Function Panel   Result Value Ref Range    Albumin 3.8 3.4 - 5.0 g/dL    Bilirubin, Total 0.4 0.0 - 1.2 mg/dL    Bilirubin, Direct 0.1 0.0 - 0.3 mg/dL    Alkaline Phosphatase 122 (H) 33 - 120 U/L    ALT 10 10 - 52 U/L    AST 19 9 - 39 U/L    Total Protein 7.0 6.4 - 8.2 g/dL   Lipid Panel   Result Value Ref Range    Cholesterol 122 0 - 199 mg/dL    HDL-Cholesterol 45.9 mg/dL    Cholesterol/HDL Ratio 2.7     LDL Calculated 60 <=99 mg/dL    VLDL 16 0 - 40 mg/dL    Triglycerides 81 0 - 149 mg/dL    Non HDL  Cholesterol 76 0 - 149 mg/dL   TSH with reflex to Free T4 if abnormal   Result Value Ref Range    Thyroid Stimulating Hormone 2.67 0.44 - 3.98 mIU/L   CBC   Result Value Ref Range    WBC 8.5 4.4 - 11.3 x10*3/uL    nRBC 0.0 0.0 - 0.0 /100 WBCs    RBC 4.26 (L) 4.50 - 5.90 x10*6/uL    Hemoglobin 10.1 (L) 13.5 - 17.5 g/dL    Hematocrit 33.2 (L) 41.0 - 52.0 %    MCV 78 (L) 80 - 100 fL    MCH 23.7 (L) 26.0 - 34.0 pg    MCHC 30.4 (L) 32.0 - 36.0 g/dL    RDW 16.9 (H) 11.5 - 14.5 %    Platelets 434 150 - 450 x10*3/uL   Heparin Assay, UFH   Result Value Ref Range    Heparin Unfractionated 0.4 See Comment Below for Therapeutic Ranges IU/mL   Basic Metabolic Panel   Result Value Ref Range    Glucose 81 74 - 99 mg/dL    Sodium 138 136 - 145 mmol/L    Potassium 3.8 3.5 - 5.3 mmol/L    Chloride 102 98 - 107 mmol/L    Bicarbonate 26 21 - 32 mmol/L    Anion Gap 14 10 - 20 mmol/L    Urea Nitrogen 16 6 - 23 mg/dL    Creatinine 0.74 0.50 - 1.30 mg/dL    eGFR >90 >60 mL/min/1.73m*2    Calcium 8.9 8.6 - 10.6 mg/dL   Phosphorus   Result Value Ref Range    Phosphorus 4.7 2.5 - 4.9 mg/dL   Iron and TIBC   Result Value Ref Range    Iron 42 35 - 150 ug/dL    UIBC 357 110 - 370 ug/dL    TIBC 399 240 - 445 ug/dL    % Saturation 11 (L) 25 - 45 %   Ferritin   Result Value Ref Range    Ferritin 25 20 - 300 ng/mL   POCT GLUCOSE   Result Value Ref Range    POCT Glucose 141 (H) 74 - 99 mg/dL   POCT GLUCOSE   Result Value Ref Range    POCT Glucose 179 (H) 74 - 99 mg/dL   Heparin Assay, UFH   Result Value Ref Range    Heparin Unfractionated 0.4 See Comment Below for Therapeutic Ranges IU/mL   ECG 12 lead   Result Value Ref Range    Ventricular Rate 73 BPM    Atrial Rate 80 BPM    NY Interval 166 ms    QRS Duration 104 ms    QT Interval 434 ms    QTC Calculation(Bazett) 478 ms    P Axis 61 degrees    R Axis 50 degrees    T Axis -31 degrees    QRS Count 12 beats    Q Onset 217 ms    P Onset 134 ms    P Offset 164 ms    T Offset 434 ms    QTC Fredericia  463 ms   Heparin Assay, UFH   Result Value Ref Range    Heparin Unfractionated 0.4 See Comment Below for Therapeutic Ranges IU/mL   POCT GLUCOSE   Result Value Ref Range    POCT Glucose 47 (L) 74 - 99 mg/dL   Vascular US PVR without exercise   Result Value Ref Range    BSA 1.99 m2   POCT GLUCOSE   Result Value Ref Range    POCT Glucose 223 (H) 74 - 99 mg/dL   POCT GLUCOSE   Result Value Ref Range    POCT Glucose 253 (H) 74 - 99 mg/dL     ECG 12 lead    Result Date: 7/3/2024  Undetermined rhythm Inferior infarct (cited on or before 30-JUN-2024) Abnormal ECG When compared with ECG of 03-JUL-2024 03:25, Current undetermined rhythm precludes rhythm comparison, needs review Confirmed by Raúl Ortiz ([id number]) on 7/3/2024 11:47:31 AM    Vascular US Carotid Artery Duplex Bilateral    Result Date: 7/2/2024            [place] [address]   Tel [phone] and Fax [phone]  Vascular Lab Report VASC US CAROTID ARTERY DUPLEX BILATERAL  Patient Name:     ANDRE Vidales Physician: [id number] Stacey AQUINO MD Study Date:       7/2/2024            Ordering           [id number] BIPIN SANTIAGO                                       Physician: MRN/PID:          [id number]            Technologist:      Andreea Cortes RVT, RDMS Accession#:       [id number]        Technologist 2: Date of           1965 / 58 years Encounter#:        [id number] Birth/Age: Gender:           M Admission Status: Inpatient           Location           [facility]                                       Performed:  Diagnosis/ICD: Other specified symptoms and signs involving the circulatory and                respiratory systems-R09.89 CPT Codes:     90244 Cerebrovascular Carotid Duplex scan complete  CONCLUSIONS: Right Carotid: Findings are consistent with less than 50% stenosis of  the right proximal internal carotid artery. Right external carotid artery appears patent with no evidence of stenosis. The right vertebral artery is patent with antegrade flow. No evidence of hemodynamically significant stenosis in the right subclavian artery. Left Carotid: Findings are consistent with less than 50% stenosis of the left proximal internal carotid artery. There are elevated velocities in the left ECA that are suggestive of disease. The left vertebral artery is patent with antegrade flow. No evidence of hemodynamically significant stenosis in the left subclavian artery.  Imaging & Doppler Findings: Right Plaque Morph: No plaque identified in the right carotid artery. Left Plaque Morph: No plaque identified in the left carotid artery.   Right                        Left   PSV      EDV                PSV      EDV 59 cm/s            CCA P    84 cm/s 72 cm/s            CCA D    92 cm/s 69 cm/s  12 cm/s   ICA P    116 cm/s 18 cm/s 94 cm/s  27 cm/s   ICA M    128 cm/s 39 cm/s 69 cm/s  95 cm/s   ICA D    74 cm/s  19 cm/s 81 cm/s             ECA     195 cm/s 48 cm/s  12 cm/s Vertebral  44 cm/s  11 cm/s 158 cm/s         Subclavian 188 cm/s               Right Left ICA/CCA Ratio  1.0  1.3   [id number] Stacey Shelton MD Electronically signed by [id number] Stacey Shelton MD on 7/2/2024 at 9:33:55 PM  ** Final **     Vascular US Ankle Brachial Index (EBEN) Without Exercise    Result Date: 7/2/2024  Preliminary Cardiology Report           [place] [address]   Tel [phone] and Fax [phone]            Preliminary Vascular Lab Report  [facility] US ANKLE BRACHIAL INDEX (EBEN) WITHOUT EXERCISE  Patient Name:     ANDRE Vidales Physician: [id number] Stacey AQUINO MD Study Date:       7/2/2024            [facility]           [id number] BIPIN SANTIAGO                                       Physician: MRN/PID:           [id number]            Technologist:      Maude Ervin RVT Accession#:       [id number]        Technologist 2: Date of           1965            Encounter#:        [id number] Birth/Age: Gender:           M Admission Status: Inpatient           Location           [facility]                                       Performed:  Diagnosis/ICD: Encounter for other preprocedural examination-Z01.818 Procedure/CPT: 55435 Peripheral artery EBEN Only  PRELIMINARY CONCLUSIONS: Right Lower PVR: No evidence of arterial occlusive disease in the right lower extremity at rest. Multiphasic flow is noted in the right common femoral artery, right posterior tibial artery and right dorsalis pedis artery. 1st toe amp, tracing on second toe. Left Lower PVR: No evidence of arterial occlusive disease in the left lower extremity at rest. Multiphasic flow is noted in the left common femoral artery, left posterior tibial artery and left dorsalis pedis artery.  Imaging & Doppler Findings:  RIGHT Lower PVR                Pressures Ratios Right Posterior Tibial (Ankle) 136 mmHg  1.14 Right Dorsalis Pedis (Ankle)   135 mmHg  1.13   LEFT Lower PVR                Pressures Ratios Left Posterior Tibial (Ankle) 145 mmHg  1.22 Left Dorsalis Pedis (Ankle)   128 mmHg  1.08 Left Digit (Great Toe)        92 mmHg   0.77                      Right     Left Brachial Pressure 117 mmHg 119 mmHg   VASCULAR PRELIMINARY REPORT completed by Andreea Cortes RVT, JORGE on 7/2/2024 at 2:28:27 PM  ** Final **     Vascular US Lower Extremity Vein Mapping Bilateral    Result Date: 7/2/2024  Preliminary Cardiology Report           [place] [address]   Tel [phone] and Fax [phone]         Preliminary Vascular Lab Report  [facility] US LOWER EXTREMITY VEIN MAPPING BILATERAL  Patient Name:     ANDRE Vidales Physician: [id number] Stacey AQUINO MD  Study Date:       7/2/2024            Ordering           [id number] BIPIN ONEAL SANTIAGO                                       Physician: MRN/PID:          [id number]            Technologist:      Andreea Cortes RVT, RDMS Accession#:       [id number]        Technologist 2: Date of           1965            Encounter#:        [id number] Birth/Age: Gender:           M Admission Status: Inpatient           Location           [facility]                                       Performed:  Diagnosis/ICD: Encounter for other preprocedural examination-Z01.818 Procedure/CPT: 38173 Vein mapping complete  PRELIMINARY CONCLUSIONS: Right Lower Vein Mapping: Right lower extremity vein: The GSV appears patent. However, there are multiple varicosities noted in the mid-distal calf. Left Lower Vein Mapping: Left lower extremity vein: The GSV appears patent. However, there are multiple varicosities noted in the mid-distal calf.  Imaging & Doppler Findings:  Right          Compress Thrombus  Diam SFJ              Yes      None   5.2 mm Prox Thigh GSV   Yes      None   4.1 mm Mid Thigh GSV    Yes      None   3.8 mm Knee GSV         Yes      None   5.0 mm Prox Calf GSV    Yes      None   5.2 mm Mid Calf GSV     Yes      None   5.1 mm Dist Calf GSV    Yes      None   5.3 mm  Left           Compress Thrombus  Diam SFJ              Yes      None   6.1 mm Prox Thigh GSV   Yes      None   5.2 mm Mid Thigh GSV    Yes      None   3.7 mm Knee GSV         Yes      None   4.9 mm Prox Calf GSV    Yes      None   4.0 mm Mid Calf GSV     Yes      None   2.5 mm Dist Calf GSV    Yes      None   3.6 mm  VASCULAR PRELIMINARY REPORT completed by Andreea Cortes RVT, RDMS on 7/2/2024 at 2:24:23 PM  ** Final **     Electrocardiogram, 12-lead PRN ACS symptoms    Result Date: 7/2/2024  Normal sinus rhythm Cannot rule out Inferior infarct (cited on or before 30-JUN-2024) Abnormal ECG When compared with  ECG of 30-JUN-2024 09:08, No significant change was found Confirmed by Raúl Ortiz ([id number]) on 7/2/2024 9:16:52 AM    Transthoracic Echo (TTE) Complete    Result Date: 7/1/2024              [place] [address]      Phone [phone] Fax [phone] TRANSTHORACIC ECHOCARDIOGRAM REPORT Patient Name:      ANDRE AQUINO  Reading Physician:    [id number] Macrnia Mcfadden MD Study Date:        7/1/2024              Ordering Provider:    [id number] XIMENA DUMONT MRN/PID:           [id number]              Fellow: Accession#:        [id number]          Nurse: Date of Birth/Age: 1965 / 58 years   Sonographer:          Jovita De La Torre RDCS Gender:            M                     Additional Staff: Height:            170.18 cm             Admit Date:           6/30/2024 Weight:            89.81 kg              Admission Status:     Inpatient -                                                                Routine BSA / BMI:         2.01 m2 / 31.01 kg/m2 Department Location:  [place] [facility] Blood Pressure: 105 /57 mmHg Study Type:    TRANSTHORACIC ECHO (TTE) COMPLETE Diagnosis/ICD: Other forms of dyspnea-R06.09 Indication:    NSTEMI CPT Codes:     Echo Complete w Full Doppler-31285 Patient History: Pertinent History: HTN and Hyperlipidemia. Study Detail: The following Echo studies were performed: 2D, M-Mode, Doppler and               color flow.  PHYSICIAN INTERPRETATION: Left Ventricle: The left ventricular systolic function is mildly decreased, with a visually estimated ejection fraction of 40-45%. Wall motion is abnormal. The left ventricular cavity size is normal. Spectral Doppler shows a pseudonormal pattern of left ventricular diastolic filling. Akinetic inferior wall and inferolateral wall. Left Atrium: The left atrium is upper limits of normal in size.  Right Ventricle: The right ventricle is normal in size. There is normal right ventricular global systolic function. Right Atrium: The right atrium is mildly dilated. Aortic Valve: The aortic valve is trileaflet. The aortic valve dimensionless index is 0.71. There is mild aortic valve regurgitation. The peak instantaneous gradient of the aortic valve is 8.2 mmHg. The mean gradient of the aortic valve is 4.1 mmHg. Mitral Valve: The mitral valve is normal in structure. There is trace to mild mitral valve regurgitation. Tricuspid Valve: The tricuspid valve is structurally normal. There is trace to mild tricuspid regurgitation. Pulmonic Valve: The pulmonic valve is structurally normal. There is no indication of pulmonic valve regurgitation. Pericardium: There is a trivial pericardial effusion. There is a pericardial fat pad present. Aorta: The aortic root is normal.  CONCLUSIONS:  1. The left ventricular systolic function is mildly decreased, with a visually estimated ejection fraction of 40-45%.  2. Spectral Doppler shows a pseudonormal pattern of left ventricular diastolic filling.  3. Akinetic inferior wall and inferolateral wall.  4. There is normal right ventricular global systolic function.  5. Mild aortic valve regurgitation. QUANTITATIVE DATA SUMMARY: 2D MEASUREMENTS:                          Normal Ranges: LAs:           4.44 cm   (2.7-4.0cm) IVSd:          0.85 cm   (0.6-1.1cm) LVPWd:         0.78 cm   (0.6-1.1cm) LVIDd:         5.30 cm   (3.9-5.9cm) LVIDs:         4.34 cm LV Mass Index: 76.2 g/m2 LV % FS        18.0 % LA VOLUME:                               Normal Ranges: LA Vol A4C:        57.6 ml    (22+/-6mL/m2) LA Vol A2C:        62.5 ml LA Vol BP:         60.5 ml LA Vol Index A4C:  28.6 ml/m2 LA Vol Index A2C:  31.0 ml/m2 LA Vol Index BP:   30.1 ml/m2 LA Area A4C:       20.0 cm2 LA Area A2C:       21.0 cm2 LA Major Axis A4C: 5.9 cm LA Major Axis A2C: 6.0 cm LA Volume Index:   30.1 ml/m2 LA Vol A4C:         53.0 ml LA Vol A2C:        57.3 ml RA VOLUME BY A/L METHOD:                       Normal Ranges: RA Area A4C: 22.7 cm2 AORTA MEASUREMENTS:                      Normal Ranges: Ao Sinus, d: 3.00 cm (2.1-3.5cm) Ao STJ, d:   2.50 cm (1.7-3.4cm) Asc Ao, d:   3.10 cm (2.1-3.4cm) LV SYSTOLIC FUNCTION BY 2D PLANIMETRY (MOD):                      Normal Ranges: EF-A4C View:    49 % (>=55%) EF-A2C View:    54 % EF-Biplane:     52 % EF-Visual:      43 % LV EF Reported: 43 % LV DIASTOLIC FUNCTION:                         Normal Ranges: MV Peak E:    0.90 m/s  (0.7-1.2 m/s) MV Peak A:    0.81 m/s  (0.42-0.7 m/s) E/A Ratio:    1.11      (1.0-2.2) MV e'         0.080 m/s (>8.0) MV lateral e' 0.09 m/s MV medial e'  0.06 m/s E/e' Ratio:   11.33     (<8.0) MITRAL VALVE:                 Normal Ranges: MV DT: 206 msec (150-240msec) MITRAL INSUFFICIENCY:                      Normal Ranges: MR VTI:  190.56 cm MR Vmax: 519.93 cm/s AORTIC VALVE:                                   Normal Ranges: AoV Vmax:                1.43 m/s (<=1.7m/s) AoV Peak P.2 mmHg (<20mmHg) AoV Mean P.1 mmHg (1.7-11.5mmHg) LVOT Max Dereck:            0.97 m/s (<=1.1m/s) AoV VTI:                 31.29 cm (18-25cm) LVOT VTI:                22.27 cm LVOT Diameter:           2.04 cm  (1.8-2.4cm) AoV Area, VTI:           2.33 cm2 (2.5-5.5cm2) AoV Area,Vmax:           2.22 cm2 (2.5-4.5cm2) AoV Dimensionless Index: 0.71 AORTIC INSUFFICIENCY: AI Vmax:       3.96 m/s AI Half-time:  424 msec AI Decel Time: 1461 msec AI Decel Rate: 273.59 cm/s2  RIGHT VENTRICLE: RV Basal 3.80 cm RV Mid   2.90 cm RV Major 8.1 cm TAPSE:   20.6 mm RV s'    0.13 m/s TRICUSPID VALVE/RVSP:                             Normal Ranges: Peak TR Velocity: 2.55 m/s RV Syst Pressure: 29.1 mmHg (< 30mmHg) IVC Diam:         1.66 cm AORTA: Asc Ao Diam 3.15 cm  [id number] Macrina Mcfadden MD Electronically signed on 2024 at 5:23:53 PM  ** Final **     ECG 12 lead    Result Date:  7/1/2024  Normal sinus rhythm Cannot rule out Inferior infarct , age undetermined Prolonged QT Abnormal ECG When compared with ECG of 30-JUN-2024 02:37, PREVIOUS ECG IS PRESENT Confirmed by Macrina Mcfadden ([id number]) on 7/1/2024 5:00:12 PM    Cardiac Catheterization Procedure    Result Date: 7/1/2024       [facility], Cath Lab [address] [place] [address]    Phone [phone] Fax [phone] Cardiovascular Catheterization Report Patient Name:     ANDRE MONTOYA            Performing Physician:  [id number] Yao AQUINO MD Study Date:       7/1/2024            Verifying Physician:   [id number] Calvin MD MRN/PID:          [id number]            Cardiologist/Co-Scrub: Accession#:       [id number]        Ordering Provider:     [id number] GEORGES CHAVEZ Date of           1965 / 58 years Cardiologist: Birth/Age: Gender:           M                   Fellow: Encounter#:       [id number]          Surgeon:  Study: Left Heart Cath  Indications: ANDRE AQUINO is a 59 year old male who presents with dyslipidemia, hypertension, diabetes and a chest pain assessment of typical angina. NSTE - ACS.  Procedure Description: After infiltration with 2% Lidocaine, the right radial artery was cannulated with a modified Seldinger technique. Subsequently a 6 Hungarian sheath was placed retrograde in the right radial artery. Selective coronary catheterization was performed using a 5 Fr catheter(s) exchanged over a guide wire to cannulate the coronary arteries. A 5 Fr Tiger catheter was used for left and right coronary artery injections. Multiple injections of contrast were made into the left and right coronary arteries with angiograms recorded in multiple projections. After completion of the procedure, the arterial sheath was pulled and a TR Band Radial Compression Device was utilized to obtain patent  hemostasis.  Coronary Angiography: The coronary circulation is right dominant.  Left Main Coronary Artery: The left main coronary artery is a normal caliber vessel. The left main arises normally from the left coronary sinus of Valsalva. The left main coronary artery showed no significant disease or stenosis greater than 30%.  Left Anterior Descending Coronary Artery Distribution: The left anterior descending coronary artery is a normal caliber vessel. The LAD arises normally from the left main coronary artery. The proximal to mid left anterior descending coronary artery showed 80% stenosis. The 1st diagonal branch is a normal caliber vessel. The 1st diagonal branch showed no significant disease or stenosis greater than 30%.  Circumflex Coronary Artery Distribution: The circumflex coronary artery is a normal caliber vessel. The circumflex arises normally from the left main coronary artery. The 1st obtuse marginal branch is a medium-sized caliber vessel. The proximal 1st obtuse marginal branch showed 70% stenosis.  Ramus Intermedius: The ramus intermedius is a normal caliber vessel. The ramus intermedius arises normally from the left main coronary artery. The ramus intermedius showed no significant disease or stenosis greater than 30%.  Right Coronary Artery Distribution: The right coronary artery is a normal caliber vessel. The RCA arises normally from the right sinus of Valsalva. The RCA showed chronic occlusion originating at the mid segment. The mid right coronary artery showed 100% stenosis. Distal RCA is filed by left to right collaterals.  Coronary Lesion Summary: Vessel   Stenosis    Vessel Segment LAD    80% stenosis  proximal to mid OM 1   70% stenosis     proximal RCA    100% stenosis       mid  Hemo Personnel: +-----------------+---------+ Name             Duty      +-----------------+---------+ Yao Calvin MD 1 +-----------------+---------+  Hemodynamic Pressures:   +----+------------------+---------+-------------+-------------+------+---------+ Site    Date Time       Phase  Systolic mmHg  Diastolic    ED  Mean mmHg                         Name                    mmHg      mmHg           +----+------------------+---------+-------------+-------------+------+---------+   AO  7/1/2024 2:51:16 AIR REST          121           63             88                     PM                                                   +----+------------------+---------+-------------+-------------+------+---------+   LV  7/1/2024 2:56:15 AIR REST          124            5    15                              PM                                                   +----+------------------+---------+-------------+-------------+------+---------+  LVp  7/1/2024 2:56:21 AIR REST          136            6    18                              PM                                                   +----+------------------+---------+-------------+-------------+------+---------+  AOp  7/1/2024 2:56:28 AIR REST          141           70            100                     PM                                                   +----+------------------+---------+-------------+-------------+------+---------+  Oxygen Saturation %: +-----------+------------+ Sample SiteHB (g/100ml) +-----------+------------+     SYS ART        10.7 +-----------+------------+     SYS SOTO        10.7 +-----------+------------+     PUL ART        10.7 +-----------+------------+     PUL SOTO        10.7 +-----------+------------+  Complications: No in-lab complications observed.  Cardiac Cath Post Procedure Notes: Post Procedure Diagnosis: Triple vessel disease. Blood Loss:               Estimated blood loss during the procedure was 10 mls. Specimens Removed:        Number of specimen(s) removed: none.  Recommendations: Maximize medical therapy. Agressive  risk factor modification efforts. CT surgical consultation to consider possible surgical options. Telemetry monitoring. Lipid lowering agent or Statin therapy. Will recommend to transfer to [facility] for surgical evaluation. Aspirin therapy. ____________________________________________________________________________________ CONCLUSIONS:  1. Triple vessel coronary artery disease with proximal left anterior descending involvement.  2. Left Ventricular end-diastolic pressure = 18 mmHg. ICD 10 Codes: Non ST elevation (NSTEMI) myocardial infarction-I21.4  CPT Codes: Moderate Sedation Services 1st additional 15 minutes patient >5 years-[id number]; Moderate Sedation Services 2nd additional 15 minutes patient >5 years-[id number]; Left Heart Cath (visualization of coronaries) and [id number]  [id number] Yao Calvin MD Performing Physician Electronically signed by [id number] Yao Calvin MD on 7/1/2024 at 4:07:49 PM  ** Final **     ECG 12 lead    Result Date: 7/1/2024  Sinus rhythm Probable inferior infarct, age indeterminate    ECG 12 lead    Result Date: 7/1/2024  Atrial fibrillation Probable inferior infarct, age indeterminate Prolonged QT interval    XR chest 1 view    Result Date: 6/30/2024  Interpreted By:  Andreea James, STUDY: XR CHEST 1 VIEW;  6/30/2024 2:34 am   INDICATION: Signs/Symptoms:Chest Pain.   COMPARISON: 06/27/2018   ACCESSION NUMBER(S): [id number]   ORDERING CLINICIAN: MILANA FISHER   FINDINGS:     CARDIOMEDIASTINAL SILHOUETTE: Cardiomediastinal silhouette is normal in size and configuration.   LUNGS: No pulmonary consolidation, pleural effusion or pneumothorax. Chronic interstitial coarsening.   ABDOMEN: No remarkable upper abdominal findings.   BONES: No acute osseous abnormality.       No acute cardiopulmonary process.   MACRO: None   Signed by: Andreea James 6/30/2024 3:27 AM Dictation workstation:   [id number]         Assessment/Plan   Principal Problem:    CAD in native artery      #S/P right foot partial first  ray resection  #Chronic full thickness ulcerations, BLE  #PVD  #T2DM with neuropathy     -Patient was examined and findings were discussed with patient  -Chart, labs, and imaging were reviewed  -PVRs completed  -Debrided ulcers at bedside today - see procedure note  -Re-dressed with Medihoney, betadine, 4x4s, kerlix wrap  -Recommend daily dressing changes with Medihoney or betadine, 4x4s, kerlix wrap bilaterally  -No x-rays or advanced imaging at this time  -No barriers from podiatry standpoint to proceeding with cardiac surgery on Friday  -Podiatry to sign off  -Follow-up with [facility], patient is established there and has seen multiple providers there  -Patient was seen and discussed with attending, Dr. Óscar Lutz, DPM PGY-3

## 2024-07-04 LAB
ALBUMIN SERPL BCP-MCNC: 3.8 G/DL (ref 3.4–5)
ANION GAP SERPL CALC-SCNC: 12 MMOL/L (ref 10–20)
BUN SERPL-MCNC: 13 MG/DL (ref 6–23)
CALCIUM SERPL-MCNC: 8.8 MG/DL (ref 8.6–10.6)
CHLORIDE SERPL-SCNC: 101 MMOL/L (ref 98–107)
CO2 SERPL-SCNC: 28 MMOL/L (ref 21–32)
CREAT SERPL-MCNC: 0.8 MG/DL (ref 0.5–1.3)
EGFRCR SERPLBLD CKD-EPI 2021: >90 ML/MIN/1.73M*2
ERYTHROCYTE [DISTWIDTH] IN BLOOD BY AUTOMATED COUNT: 16.9 % (ref 11.5–14.5)
GLUCOSE BLD MANUAL STRIP-MCNC: 114 MG/DL (ref 74–99)
GLUCOSE BLD MANUAL STRIP-MCNC: 122 MG/DL (ref 74–99)
GLUCOSE BLD MANUAL STRIP-MCNC: 189 MG/DL (ref 74–99)
GLUCOSE BLD MANUAL STRIP-MCNC: 279 MG/DL (ref 74–99)
GLUCOSE SERPL-MCNC: 102 MG/DL (ref 74–99)
HCT VFR BLD AUTO: 30.7 % (ref 41–52)
HGB BLD-MCNC: 9.2 G/DL (ref 13.5–17.5)
MCH RBC QN AUTO: 23.5 PG (ref 26–34)
MCHC RBC AUTO-ENTMCNC: 30 G/DL (ref 32–36)
MCV RBC AUTO: 78 FL (ref 80–100)
NRBC BLD-RTO: 0 /100 WBCS (ref 0–0)
PHOSPHATE SERPL-MCNC: 3.8 MG/DL (ref 2.5–4.9)
PLATELET # BLD AUTO: 355 X10*3/UL (ref 150–450)
POTASSIUM SERPL-SCNC: 4.9 MMOL/L (ref 3.5–5.3)
RBC # BLD AUTO: 3.92 X10*6/UL (ref 4.5–5.9)
SODIUM SERPL-SCNC: 136 MMOL/L (ref 136–145)
STAPHYLOCOCCUS SPEC CULT: NORMAL
UFH PPP CHRO-ACNC: 0.3 IU/ML
UFH PPP CHRO-ACNC: 0.4 IU/ML
UFH PPP CHRO-ACNC: 0.4 IU/ML
WBC # BLD AUTO: 7.8 X10*3/UL (ref 4.4–11.3)

## 2024-07-04 PROCEDURE — 85520 HEPARIN ASSAY: CPT

## 2024-07-04 PROCEDURE — 2500000002 HC RX 250 W HCPCS SELF ADMINISTERED DRUGS (ALT 637 FOR MEDICARE OP, ALT 636 FOR OP/ED)

## 2024-07-04 PROCEDURE — 2500000001 HC RX 250 WO HCPCS SELF ADMINISTERED DRUGS (ALT 637 FOR MEDICARE OP): Performed by: NURSE PRACTITIONER

## 2024-07-04 PROCEDURE — 2500000002 HC RX 250 W HCPCS SELF ADMINISTERED DRUGS (ALT 637 FOR MEDICARE OP, ALT 636 FOR OP/ED): Performed by: NURSE PRACTITIONER

## 2024-07-04 PROCEDURE — 99232 SBSQ HOSP IP/OBS MODERATE 35: CPT | Performed by: INTERNAL MEDICINE

## 2024-07-04 PROCEDURE — 2500000001 HC RX 250 WO HCPCS SELF ADMINISTERED DRUGS (ALT 637 FOR MEDICARE OP): Performed by: STUDENT IN AN ORGANIZED HEALTH CARE EDUCATION/TRAINING PROGRAM

## 2024-07-04 PROCEDURE — 85520 HEPARIN ASSAY: CPT | Performed by: STUDENT IN AN ORGANIZED HEALTH CARE EDUCATION/TRAINING PROGRAM

## 2024-07-04 PROCEDURE — 2500000004 HC RX 250 GENERAL PHARMACY W/ HCPCS (ALT 636 FOR OP/ED): Performed by: STUDENT IN AN ORGANIZED HEALTH CARE EDUCATION/TRAINING PROGRAM

## 2024-07-04 PROCEDURE — S0109 METHADONE ORAL 5MG: HCPCS | Performed by: NURSE PRACTITIONER

## 2024-07-04 PROCEDURE — 94640 AIRWAY INHALATION TREATMENT: CPT

## 2024-07-04 PROCEDURE — 36415 COLL VENOUS BLD VENIPUNCTURE: CPT | Performed by: STUDENT IN AN ORGANIZED HEALTH CARE EDUCATION/TRAINING PROGRAM

## 2024-07-04 PROCEDURE — 85027 COMPLETE CBC AUTOMATED: CPT | Performed by: NURSE PRACTITIONER

## 2024-07-04 PROCEDURE — 93010 ELECTROCARDIOGRAM REPORT: CPT | Performed by: INTERNAL MEDICINE

## 2024-07-04 PROCEDURE — 84100 ASSAY OF PHOSPHORUS: CPT | Performed by: NURSE PRACTITIONER

## 2024-07-04 PROCEDURE — 2500000002 HC RX 250 W HCPCS SELF ADMINISTERED DRUGS (ALT 637 FOR MEDICARE OP, ALT 636 FOR OP/ED): Performed by: STUDENT IN AN ORGANIZED HEALTH CARE EDUCATION/TRAINING PROGRAM

## 2024-07-04 PROCEDURE — 82947 ASSAY GLUCOSE BLOOD QUANT: CPT

## 2024-07-04 PROCEDURE — 93005 ELECTROCARDIOGRAM TRACING: CPT

## 2024-07-04 PROCEDURE — 1200000002 HC GENERAL ROOM WITH TELEMETRY DAILY

## 2024-07-04 ASSESSMENT — PAIN SCALES - GENERAL
PAINLEVEL_OUTOF10: 5 - MODERATE PAIN
PAINLEVEL_OUTOF10: 0 - NO PAIN
PAINLEVEL_OUTOF10: 0 - NO PAIN

## 2024-07-04 ASSESSMENT — COGNITIVE AND FUNCTIONAL STATUS - GENERAL
CLIMB 3 TO 5 STEPS WITH RAILING: A LITTLE
DAILY ACTIVITIY SCORE: 24
MOBILITY SCORE: 23

## 2024-07-04 ASSESSMENT — PAIN - FUNCTIONAL ASSESSMENT: PAIN_FUNCTIONAL_ASSESSMENT: 0-10

## 2024-07-04 NOTE — CARE PLAN
Problem: Pain - Adult  Goal: Verbalizes/displays adequate comfort level or baseline comfort level  7/4/2024 0638 by Zakia Camejo RN  Outcome: Progressing  7/4/2024 0638 by Zakia Camejo RN  Outcome: Progressing  7/4/2024 0638 by Zakia Camejo RN  Outcome: Progressing     Problem: Safety - Adult  Goal: Free from fall injury  7/4/2024 0638 by Zakia Camejo RN  Outcome: Progressing  7/4/2024 0638 by Zakia Camejo RN  Outcome: Progressing  7/4/2024 0638 by Zakia Camejo RN  Outcome: Progressing     Problem: Discharge Planning  Goal: Discharge to home or other facility with appropriate resources  7/4/2024 0638 by Zakia Camejo RN  Outcome: Progressing  7/4/2024 0638 by Zakia Camejo RN  Outcome: Progressing  7/4/2024 0638 by Zakia Camejo RN  Outcome: Progressing     Problem: Chronic Conditions and Co-morbidities  Goal: Patient's chronic conditions and co-morbidity symptoms are monitored and maintained or improved  7/4/2024 0638 by Zakia Camejo RN  Outcome: Progressing  7/4/2024 0638 by Zakia Camejo RN  Outcome: Progressing  7/4/2024 0638 by Zakia Camejo RN  Outcome: Progressing     Problem: ACS/CP/NSTEMI/STEMI  Goal: Chest pain managed (free from pain or at acceptable level)  7/4/2024 0638 by Zakia Camejo RN  Outcome: Progressing  7/4/2024 0638 by Zakia Camejo RN  Outcome: Progressing  Goal: Lab values return to normal range  7/4/2024 0638 by Zakia Camejo RN  Outcome: Progressing  7/4/2024 0638 by Zakia Camejo RN  Outcome: Progressing  Goal: Promote self management  7/4/2024 0638 by Zakia Camejo RN  Outcome: Progressing  7/4/2024 0638 by Zakia Camejo RN  Outcome: Progressing  Goal: Serial ECG will return to baseline  7/4/2024 0638 by Zakia Camejo RN  Outcome: Progressing  7/4/2024 0638 by Zakia Camejo RN  Outcome: Progressing  Goal: Verbalize understanding of procedures/devices  7/4/2024 0638 by Zakia NO  TITUS Camejo  Outcome: Progressing  7/4/2024 0638 by Zakia Camejo RN  Outcome: Progressing  Goal: Wean vasopressors/achieve hemodynamic stability  7/4/2024 0638 by Zakia Camejo RN  Outcome: Progressing  7/4/2024 0638 by Zakia Camejo RN  Outcome: Progressing     Problem: Arrythmia/Dysrhythmia  Goal: Lab values return to normal range  7/4/2024 0638 by Zakia Camejo RN  Outcome: Progressing  7/4/2024 0638 by Zakia Camejo RN  Outcome: Progressing  Goal: No evidence of post procedure complications  7/4/2024 0638 by Zakia Camejo RN  Outcome: Progressing  7/4/2024 0638 by Zakia Camejo RN  Outcome: Progressing  Goal: Promote self management  7/4/2024 0638 by Zakia Camejo RN  Outcome: Progressing  7/4/2024 0638 by Zakia Camejo RN  Outcome: Progressing  Goal: Serial ECG will return to baseline  7/4/2024 0638 by Zakia Camejo RN  Outcome: Progressing  7/4/2024 0638 by Zakia Camejo RN  Outcome: Progressing  Goal: Verbalize understanding of procedures/devices  7/4/2024 0638 by Zakia Camejo RN  Outcome: Progressing  7/4/2024 0638 by Zakia Camejo RN  Outcome: Progressing  Goal: Vital signs return to baseline  7/4/2024 0638 by Zakia Camejo RN  Outcome: Progressing  7/4/2024 0638 by Zakia Camejo RN  Outcome: Progressing  Goal: Care and maintenance of device (specify)  7/4/2024 0638 by Zakia Camejo RN  Outcome: Progressing  7/4/2024 0638 by Zakia Camejo RN  Outcome: Progressing     Problem: Cardiac catheterization  Goal: Free from dysrhythmias  7/4/2024 0638 by Zakia Camejo RN  Outcome: Progressing  7/4/2024 0638 by Zakia Camejo RN  Outcome: Progressing  Goal: Free from pain  7/4/20244/2024 0638 by Zakia Camejo RN  Outcome: Progressing  7/4/2024 0638 by Zakia Camejo RN  Outcome: Progressing  Goal: No evidence of post procedure complications  7/4/2024 0638 by Zakia Camejo RN  Outcome: Progressing  7/4/2024 0638 by  Zakia Camejo RN  Outcome: Progressing  Goal: Promote self management  7/4/2024 0638 by Zakia Camejo RN  Outcome: Progressing  7/4/2024 0638 by Zakia Camejo RN  Outcome: Progressing  Goal: Verbalize understanding of procedure  7/4/2024 0638 by Zakia Camejo RN  Outcome: Progressing  7/4/2024 0638 by Zakia Camejo RN  Outcome: Progressing  Goal: Care and maintenance of device (specify)  7/4/2024 0638 by Zakia Camejo RN  Outcome: Progressing  7/4/2024 0638 by Zakia Camejo RN  Outcome: Progressing     Problem: Hypertensive Emergency/Crisis  Goal: Blood pressure gradually reduced to goal range  7/4/2024 0638 by Zakia Camejo, TITUS  Outcome: Progressing  7/4/2024 0638 by Zakia Camejo RN  Outcome: Progressing  Goal: Free from signs of organ damage  7/4/2024 0638 by Zakia Camejo RN  Outcome: Progressing  7/4/2024 0638 by Zakia Camejo RN  Outcome: Progressing  Goal: Lab values return to normal range  7/4/2024 0638 by Zakia Camejo RN  Outcome: Progressing  7/4/2024 0638 by Zakia Camejo RN  Outcome: Progressing  Goal: Promote self management  7/4/2024 0638 by Zakia Camejo RN  Outcome: Progressing  7/4/2024 0638 by Zakia Camejo RN  Outcome: Progressing   The patient's goals for the shift include      The clinical goals for the shift include pt will remain HDS throughout the shift

## 2024-07-04 NOTE — PROGRESS NOTES
Subjective Data:  Patient seen and assessed this morning, sitting up at edge of bed, NAD. Reports no change in chest heaviness/pressure. No further episodes of hypoglycemia. Report occasional SOB, on 2L O2 this morning for comfort, sat >95% on RA. Eager to find out surgical plan. Willing to be NPO at [place], just in case OR tomorrow is possible.    - remains hemodynamically stable, cont current regimen  - per Podiatry, no barriers to proceeding with cardiac surgery, f/up w/ [place] [facility], now s/o   - pending surgical plan/date from CT Surgery  - NPO at midnight for POSSIBLE OR tomorrow  - QTc today 477    Overnight Events:    No acute events overnight.     Objective Data:  Last Recorded Vitals:  Vitals:    07/03/24 2040 07/03/24 2333 07/03/24 2337 07/04/24 0414   BP: 113/65 121/62  115/65   Pulse: 69 70  70   Resp: 18 18 17    Temp: 36.4 °C (97.5 °F) 36.4 °C (97.5 °F)  36.5 °C (97.7 °F)   TempSrc:  Temporal  Temporal   SpO2: 97% 97%  93%   Weight:       Height:           Last Labs:  Results for orders placed or performed during the hospital encounter of 07/01/24 (from the past 24 hour(s))   Heparin Assay, UFH   Result Value Ref Range    Heparin Unfractionated 0.4 See Comment Below for Therapeutic Ranges IU/mL   POCT GLUCOSE   Result Value Ref Range    POCT Glucose 47 (L) 74 - 99 mg/dL   POCT GLUCOSE   Result Value Ref Range    POCT Glucose 223 (H) 74 - 99 mg/dL   POCT GLUCOSE   Result Value Ref Range    POCT Glucose 253 (H) 74 - 99 mg/dL   POCT GLUCOSE   Result Value Ref Range    POCT Glucose 280 (H) 74 - 99 mg/dL   CBC   Result Value Ref Range    WBC 7.8 4.4 - 11.3 x10*3/uL    nRBC 0.0 0.0 - 0.0 /100 WBCs    RBC 4.17 (L) 4.50 - 5.90 x10*6/uL    Hemoglobin 9.8 (L) 13.5 - 17.5 g/dL    Hematocrit 32.7 (L) 41.0 - 52.0 %    MCV 78 (L) 80 - 100 fL    MCH 23.5 (L) 26.0 - 34.0 pg    MCHC 30.0 (L) 32.0 - 36.0 g/dL    RDW 16.9 (H) 11.5 - 14.5 %    Platelets 413 150 - 450 x10*3/uL   Renal function panel   Result Value Ref  Range    Glucose 177 (H) 74 - 99 mg/dL    Sodium 139 136 - 145 mmol/L    Potassium 4.1 3.5 - 5.3 mmol/L    Chloride 103 98 - 107 mmol/L    Bicarbonate 27 21 - 32 mmol/L    Anion Gap 13 10 - 20 mmol/L    Urea Nitrogen 14 6 - 23 mg/dL    Creatinine 0.87 0.50 - 1.30 mg/dL    eGFR >90 >60 mL/min/1.73m*2    Calcium 9.0 8.6 - 10.6 mg/dL    Phosphorus 3.4 2.5 - 4.9 mg/dL    Albumin 3.8 3.4 - 5.0 g/dL   POCT GLUCOSE   Result Value Ref Range    POCT Glucose 209 (H) 74 - 99 mg/dL   Heparin Assay, UFH   Result Value Ref Range    Heparin Unfractionated 0.4 See Comment Below for Therapeutic Ranges IU/mL        TROPHS   Date/Time Value Ref Range Status   06/30/2024 02:36  0 - 20 ng/L Final     Comment:     Previous result verified on 6/30/2024 0210 on specimen/case [id number] called with component [facility] for procedure Troponin I, High Sensitivity, Initial with value 146 ng/L.   06/30/2024 01:40  0 - 20 ng/L Final     BNP   Date/Time Value Ref Range Status   07/02/2024 09:00  0 - 99 pg/mL Final     HGBA1C   Date/Time Value Ref Range Status   05/15/2024 10:26 AM 8.5 4.3 - 5.6 % Final     Comment:     American Diabetes Association guidelines indicate that patients with HgbA1c in the range 5.7-6.4% are at increased risk for development of diabetes, and intervention by lifestyle modification may be beneficial. HgbA1c greater or equal to 6.5% is considered diagnostic of diabetes.   02/20/2024 08:51 AM 9.4 4.3 - 5.6 % Final     Comment:     American Diabetes Association guidelines indicate that patients with HgbA1c in the range 5.7-6.4% are at increased risk for development of diabetes, and intervention by lifestyle modification may be beneficial. HgbA1c greater or equal to 6.5% is considered diagnostic of diabetes.   10/12/2023 03:27 AM 9.6 see below % Final     LDLCALC   Date/Time Value Ref Range Status   07/02/2024 04:08 PM 60 <=99 mg/dL Final     Comment:                                 Near   Borderline       AGE      Desirable  Optimal    High     High     Very High     0-19 Y     0 - 109     ---    110-129   >/= 130     ----    20-24 Y     0 - 119     ---    120-159   >/= 160     ----      >24 Y     0 -  99   100-129  130-159   160-189     >/=190       VLDL   Date/Time Value Ref Range Status   07/02/2024 04:08 PM 16 0 - 40 mg/dL Final   01/20/2021 01:08 PM 66 0 - 40 mg/dL Final   10/06/2020 11:50 AM 67 0 - 40 mg/dL Final   12/17/2019 02:57 PM 57 0 - 40 mg/dL Final      Last I/O:  I/O last 3 completed shifts:  In: 240 (2.8 mL/kg) [P.O.:240]  Out: - (0 mL/kg)   Weight: 85.3 kg     Past Cardiology Tests (Last 3 Years):  EKG:  Electrocardiogram, 12-lead PRN ACS symptoms 07/02/2024      ECG 12 lead 06/30/2024      ECG 12 lead 06/30/2024 (Preliminary)      ECG 12 lead 06/30/2024 (Preliminary)    Echo:  Transthoracic Echo (TTE) Complete 07/01/2024    Ejection Fractions:  EF   Date/Time Value Ref Range Status   07/01/2024 07:49 AM 43 %      Cath:  Cardiac Catheterization Procedure 07/01/2024    Stress Test:  No results found for this or any previous visit from the past 1095 days.    Cardiac Imaging:  No results found for this or any previous visit from the past 1095 days.      Inpatient Medications:  Scheduled medications   Medication Dose Route Frequency    aspirin  81 mg oral Daily    atorvastatin  80 mg oral Nightly    DULoxetine  60 mg oral q24h    hydroxychloroquine  200 mg oral BID    insulin detemir  40 Units subcutaneous Nightly    insulin lispro  0-10 Units subcutaneous TID    isosorbide dinitrate  10 mg oral TID    levothyroxine  50 mcg oral Daily    methadone  77.5 mg oral q PM    methadone  77.5 mg oral Daily    metoprolol succinate XL  50 mg oral Daily    mupirocin   Topical BID    pantoprazole  40 mg oral Daily before breakfast    polyethylene glycol  17 g oral Daily    predniSONE  5 mg oral Daily    pregabalin  200 mg oral TID    tiotropium  2 puff inhalation Daily     PRN medications   Medication     acetaminophen    albuterol    dextrose    dextrose    glucagon    heparin     Continuous Medications   Medication Dose Last Rate    heparin  0-4,000 Units/hr 1,400 Units/hr (07/04/24 0255)       Physical Exam:  General: NAD, sitting on edge of bed  Skin: warm and dry   Head/ neck: no JVD seen at 90 degrees  Cardiac: RRR, S1, S2   Pulm: CTAB, on RA  GI: soft, nontender   Extremities: no LE edema; bilateral feet with nonhealing ulcers of varying depths (see Media tab)  Neuro: no focal neuro deficits   Psych: appropriate mood and behavior       Assessment/Plan   Kael Zepeda is a 58 y.o. male with PMHx s/f RA, IDDM2, COPD, hypothyroidism, chronic tobacco abuse, chronic methadone dependency, neuropathy/diabetic foot ulcer with ?osteomyelitis. Patient presented to [place] on 6/30 with intermittent chest pain, now s/p LHC showing multi-vessels CAD, transferred to [facility] for CABG evaluation.      Multivessel CAD with proximal LAD involvement  NSTEMI  Dyslipidemia   - Intermittent chest pain began ~6/23/24, worsened in the last 3 days prior to admission on 6/30  - ECG showing sinus rhythm with q waves in inferior leads.  - HS Trop of 146->135, started on a heparin infusion, continue    - s/p ASA & Ticagrelor load 6/30, last Ticagrelor dose on 7/1 AM  - LHC 7/1/[facility]: multivessel disease with 100% mid RCA, 70% Lt Circ, and 80% prox to mid LAD  - TTE 6/30: EF 40-45%, see below for further details   - Lipids 7/2: Chol 122, HDL 45.9, LDL 60, Tri 81  - CTS aware of patient, cath images available on syngo; workup ordered, images being reviewed by Dr. Carey  - PFT's: FVC 89%, FEV1 90%, FEV1/%  - cont Heparin drip  - cont isordil 10mg TID, CP improved but still present, BP soft  - NPO at midnight for POSSIBLE OR tomorrow, pt aware date not confirmed  - cont ASA 81, heparin gtt, Atorvastatin 81, Toprol XL 50  - cardiac die    Acute Combined Systolic and Diastolic Heart Failure, ICM  HFmrEF (EF 40-45%),  ACC/AHA class C  - No prior cardiac history  - TTE 7/1/24: EF 40-45%, pseudonormal DF, akinetic inferior and inferolateral walls  - CXR: no acute cardiopulmonary process, chronic interstial changes   - , mildly SOB but unsure if related to ongoing angina  - appears euvolemic on exam; weaned to RA  - consider adding ARNI/ MRA/ SGLT2i post revascularization  - Not currently on diuretics, add as clinically indicated   - switch metoprolol tart 25mg BID to Toprol XL 50 mg daily   - Daily standing weights, strict I&O's, 2g sodium diet, 2L fluid restriction      Hx Hypertension  - SBP last 24 hrs: 103-121  - Continue with meds as above      Diabetes mellitus  - most recent A1C 8.5% on 5/15/24, was 10.4% on 2/15/23  - DM education consult placed  - home regimen:Levemir 50 units nightly, Metformin 500 mg BID  - hypoglycemic to 47 on 7/3, pt symptomatic  - cont Levemir 40 u nightly, SSI scale #2  - diabetic diet, Accu-Cheks AC/HS, hypoglycemic protocol     Neuropathy with Nonhealing ulcerations/wounds  - [facility]  - reports had right great toe amputation 6-7 months ago and hasnt healed since; also reports he will just wake up with new ulcers on his left foot  - osteomyelitis was in chart but patient denies this diagnosis  - reports being prescribed chronic antibiotic for his feet however due to an interaction with hydroxychloroquine he takes it every three days instead of daily; called home pharmacy which they believe the antibiotic is sulfasalazine   - Podiatry consulted; foot images in chart, appreciate recs  - per Podiatry, no barriers to proceeding with cardiac surgery, f/up w/ [place] [facility], now s/o   - Cont home Lyrica 200mg TID    Methadone Dependence   - has been on it for 15 years, goes to the [facility] in [place] for methadone weekly; reports total home dose 165mg divided   - currently on 77.5mg BID  - per Medical Toxicology, ok to continue home Methadone,  monitor daily EKG's  - QTc today 477 (478, 511)     COPD/tobacco use  - Continue home inhalers  - Discussed in length with him the importance of smoking cessation as well as several strategies to assist him in quitting smoking. Patient states he plans to quit after this hospitalization.  - offer NRT as indicated, pt currently declines     RA  - cont home Plaquenil 200mg BID and Prednisone 5mg daily     Hypothyroidism  - TSH 2.67  - cont home Synthroid 50mcg daily     GERD  - cont Pantoprazole 40 daily, substitute for home Omeprazole    DVT ppx: Heparin drip    Dispo: pending CABG eval     Code Status: Full Code  NOK: Vin Zepeda (friend) [phone]    Seen and discussed with Dr Alireza Aguilar, APRN-CNP

## 2024-07-04 NOTE — CARE PLAN
The patient's goals for the shift include      The clinical goals for the shift include pt will remain HDS throughout the shift    Over the shift, the patient did make progress toward the following goals.

## 2024-07-05 ENCOUNTER — APPOINTMENT (OUTPATIENT)
Dept: CARDIOLOGY | Facility: HOSPITAL | Age: 59
End: 2024-07-05
Payer: MEDICARE

## 2024-07-05 ENCOUNTER — APPOINTMENT (OUTPATIENT)
Dept: CARDIOLOGY | Facility: HOSPITAL | Age: 59
DRG: 235 | End: 2024-07-05
Payer: MEDICARE

## 2024-07-05 ENCOUNTER — APPOINTMENT (OUTPATIENT)
Dept: CARDIOLOGY | Facility: CLINIC | Age: 59
End: 2024-07-05
Payer: MEDICARE

## 2024-07-05 LAB
ALBUMIN SERPL BCP-MCNC: 3.8 G/DL (ref 3.4–5)
ANION GAP SERPL CALC-SCNC: 13 MMOL/L (ref 10–20)
ATRIAL RATE: 67 BPM
ATRIAL RATE: 75 BPM
BUN SERPL-MCNC: 15 MG/DL (ref 6–23)
CALCIUM SERPL-MCNC: 9.2 MG/DL (ref 8.6–10.6)
CHLORIDE SERPL-SCNC: 101 MMOL/L (ref 98–107)
CO2 SERPL-SCNC: 28 MMOL/L (ref 21–32)
CREAT SERPL-MCNC: 0.83 MG/DL (ref 0.5–1.3)
EGFRCR SERPLBLD CKD-EPI 2021: >90 ML/MIN/1.73M*2
ERYTHROCYTE [DISTWIDTH] IN BLOOD BY AUTOMATED COUNT: 16.9 % (ref 11.5–14.5)
GLUCOSE BLD MANUAL STRIP-MCNC: 108 MG/DL (ref 74–99)
GLUCOSE BLD MANUAL STRIP-MCNC: 146 MG/DL (ref 74–99)
GLUCOSE BLD MANUAL STRIP-MCNC: 167 MG/DL (ref 74–99)
GLUCOSE BLD MANUAL STRIP-MCNC: 243 MG/DL (ref 74–99)
GLUCOSE BLD MANUAL STRIP-MCNC: 90 MG/DL (ref 74–99)
GLUCOSE SERPL-MCNC: 199 MG/DL (ref 74–99)
HCT VFR BLD AUTO: 33.3 % (ref 41–52)
HGB BLD-MCNC: 9.9 G/DL (ref 13.5–17.5)
MCH RBC QN AUTO: 23.5 PG (ref 26–34)
MCHC RBC AUTO-ENTMCNC: 29.7 G/DL (ref 32–36)
MCV RBC AUTO: 79 FL (ref 80–100)
NRBC BLD-RTO: 0 /100 WBCS (ref 0–0)
P AXIS: 20 DEGREES
P AXIS: 48 DEGREES
P OFFSET: 170 MS
P OFFSET: 178 MS
P ONSET: 106 MS
P ONSET: 126 MS
PHOSPHATE SERPL-MCNC: 4 MG/DL (ref 2.5–4.9)
PLATELET # BLD AUTO: 380 X10*3/UL (ref 150–450)
POTASSIUM SERPL-SCNC: 4.2 MMOL/L (ref 3.5–5.3)
PR INTERVAL: 186 MS
PR INTERVAL: 210 MS
Q ONSET: 211 MS
Q ONSET: 219 MS
QRS COUNT: 11 BEATS
QRS COUNT: 12 BEATS
QRS DURATION: 102 MS
QRS DURATION: 104 MS
QT INTERVAL: 438 MS
QT INTERVAL: 452 MS
QTC CALCULATION(BAZETT): 477 MS
QTC CALCULATION(BAZETT): 489 MS
QTC FREDERICIA: 469 MS
QTC FREDERICIA: 471 MS
R AXIS: 50 DEGREES
R AXIS: 55 DEGREES
RBC # BLD AUTO: 4.22 X10*6/UL (ref 4.5–5.9)
SODIUM SERPL-SCNC: 138 MMOL/L (ref 136–145)
T AXIS: -24 DEGREES
T AXIS: -43 DEGREES
T OFFSET: 430 MS
T OFFSET: 445 MS
VENTRICULAR RATE: 67 BPM
VENTRICULAR RATE: 75 BPM
WBC # BLD AUTO: 8.9 X10*3/UL (ref 4.4–11.3)

## 2024-07-05 PROCEDURE — 99232 SBSQ HOSP IP/OBS MODERATE 35: CPT | Performed by: PHYSICIAN ASSISTANT

## 2024-07-05 PROCEDURE — 2500000001 HC RX 250 WO HCPCS SELF ADMINISTERED DRUGS (ALT 637 FOR MEDICARE OP): Performed by: STUDENT IN AN ORGANIZED HEALTH CARE EDUCATION/TRAINING PROGRAM

## 2024-07-05 PROCEDURE — 2500000001 HC RX 250 WO HCPCS SELF ADMINISTERED DRUGS (ALT 637 FOR MEDICARE OP): Performed by: NURSE PRACTITIONER

## 2024-07-05 PROCEDURE — 80069 RENAL FUNCTION PANEL: CPT | Performed by: NURSE PRACTITIONER

## 2024-07-05 PROCEDURE — 93010 ELECTROCARDIOGRAM REPORT: CPT | Performed by: INTERNAL MEDICINE

## 2024-07-05 PROCEDURE — 82947 ASSAY GLUCOSE BLOOD QUANT: CPT

## 2024-07-05 PROCEDURE — 99232 SBSQ HOSP IP/OBS MODERATE 35: CPT | Performed by: INTERNAL MEDICINE

## 2024-07-05 PROCEDURE — 1200000002 HC GENERAL ROOM WITH TELEMETRY DAILY

## 2024-07-05 PROCEDURE — 2500000002 HC RX 250 W HCPCS SELF ADMINISTERED DRUGS (ALT 637 FOR MEDICARE OP, ALT 636 FOR OP/ED): Performed by: NURSE PRACTITIONER

## 2024-07-05 PROCEDURE — 2500000004 HC RX 250 GENERAL PHARMACY W/ HCPCS (ALT 636 FOR OP/ED): Performed by: STUDENT IN AN ORGANIZED HEALTH CARE EDUCATION/TRAINING PROGRAM

## 2024-07-05 PROCEDURE — S0109 METHADONE ORAL 5MG: HCPCS | Performed by: NURSE PRACTITIONER

## 2024-07-05 PROCEDURE — 2500000002 HC RX 250 W HCPCS SELF ADMINISTERED DRUGS (ALT 637 FOR MEDICARE OP, ALT 636 FOR OP/ED): Performed by: STUDENT IN AN ORGANIZED HEALTH CARE EDUCATION/TRAINING PROGRAM

## 2024-07-05 PROCEDURE — 36415 COLL VENOUS BLD VENIPUNCTURE: CPT | Performed by: NURSE PRACTITIONER

## 2024-07-05 PROCEDURE — 93005 ELECTROCARDIOGRAM TRACING: CPT

## 2024-07-05 PROCEDURE — 94640 AIRWAY INHALATION TREATMENT: CPT

## 2024-07-05 PROCEDURE — 85027 COMPLETE CBC AUTOMATED: CPT | Performed by: NURSE PRACTITIONER

## 2024-07-05 PROCEDURE — 2500000002 HC RX 250 W HCPCS SELF ADMINISTERED DRUGS (ALT 637 FOR MEDICARE OP, ALT 636 FOR OP/ED)

## 2024-07-05 RX ORDER — CHLORHEXIDINE GLUCONATE ORAL RINSE 1.2 MG/ML
15 SOLUTION DENTAL 2 TIMES DAILY
Status: DISCONTINUED | OUTPATIENT
Start: 2024-07-08 | End: 2024-07-09

## 2024-07-05 ASSESSMENT — COGNITIVE AND FUNCTIONAL STATUS - GENERAL
DAILY ACTIVITIY SCORE: 24
MOBILITY SCORE: 23
CLIMB 3 TO 5 STEPS WITH RAILING: A LITTLE

## 2024-07-05 ASSESSMENT — PAIN SCALES - GENERAL: PAINLEVEL_OUTOF10: 0 - NO PAIN

## 2024-07-05 NOTE — PROGRESS NOTES
"CARDIAC SURGERY CONSULT PROGRESS NOTE    SUBJECTIVE  Mr. Zepeda is a 59yo male with a history of RA on chronic prednisone and Plaquenil, DM2 on insulin, COPD, hypothyroidism, tobacco use (40 pack years) and chronic methadone use, neuropathy and unhealed diabetic foot ulcers (s/p right foot partial amputation ~7 months ago) and follows with the wound clinic as outpatient. The patient stated he has had intermittent chest pain worse with exertion over the past month. He also reports worsening CALDERON. Upon admission to the ED, he was diagnosed with an NSTEMI. Loaded with ASA. C with 3vCAD. TTE EF 40-45%.       Cardiac surgery consulted for a CABG evaluation.       Objective   /50   Pulse 64   Temp 36.5 °C (97.7 °F)   Resp 18   Ht 1.676 m (5' 6\")   Wt 85.3 kg (188 lb)   SpO2 98%   BMI 30.34 kg/m²   0-10 (Numeric) Pain Score: 0 - No pain   Vitals:    07/01/24 2046   Weight: 85.3 kg (188 lb)          Intake/Output Summary (Last 24 hours) at 7/5/2024 1035  Last data filed at 7/5/2024 0907  Gross per 24 hour   Intake 350 ml   Output 480 ml   Net -130 ml       Physical Exam  Constitutional:       General: He is not in acute distress.     Appearance: He is not ill-appearing.   HENT:      Head: Normocephalic.      Mouth/Throat:      Mouth: Mucous membranes are moist.   Cardiovascular:      Rate and Rhythm: Normal rate and regular rhythm.      Heart sounds: No murmur heard.  Pulmonary:      Effort: Pulmonary effort is normal.      Breath sounds: Normal breath sounds.   Musculoskeletal:         General: Normal range of motion.      Cervical back: Neck supple.      Right lower leg: No edema.      Left lower leg: No edema.   Skin:     General: Skin is warm and dry.   Neurological:      General: No focal deficit present.      Mental Status: He is alert and oriented to person, place, and time.   Psychiatric:         Mood and Affect: Mood normal.         Behavior: Behavior normal.        Medications  Scheduled " medications  aspirin, 81 mg, oral, Daily  atorvastatin, 80 mg, oral, Nightly  [START ON 7/8/2024] chlorhexidine, 15 mL, Mouth/Throat, BID  DULoxetine, 60 mg, oral, q24h  hydroxychloroquine, 200 mg, oral, BID  insulin detemir, 40 Units, subcutaneous, Nightly  insulin lispro, 0-10 Units, subcutaneous, TID  isosorbide dinitrate, 10 mg, oral, TID  levothyroxine, 50 mcg, oral, Daily  methadone, 77.5 mg, oral, q PM  methadone, 77.5 mg, oral, Daily  metoprolol succinate XL, 50 mg, oral, Daily  mupirocin, , Topical, BID  pantoprazole, 40 mg, oral, Daily before breakfast  polyethylene glycol, 17 g, oral, Daily  predniSONE, 5 mg, oral, Daily  pregabalin, 200 mg, oral, TID  tiotropium, 2 puff, inhalation, Daily    Continuous medications  heparin, 0-4,000 Units/hr, Last Rate: 1,400 Units/hr (07/04/24 2208)    PRN medications  PRN medications: acetaminophen **OR** [DISCONTINUED] acetaminophen **OR** [DISCONTINUED] acetaminophen, albuterol, dextrose, dextrose, glucagon, heparin    Labs  Results for orders placed or performed during the hospital encounter of 07/01/24 (from the past 24 hour(s))   POCT GLUCOSE   Result Value Ref Range    POCT Glucose 279 (H) 74 - 99 mg/dL   POCT GLUCOSE   Result Value Ref Range    POCT Glucose 114 (H) 74 - 99 mg/dL   CBC   Result Value Ref Range    WBC 7.8 4.4 - 11.3 x10*3/uL    nRBC 0.0 0.0 - 0.0 /100 WBCs    RBC 3.92 (L) 4.50 - 5.90 x10*6/uL    Hemoglobin 9.2 (L) 13.5 - 17.5 g/dL    Hematocrit 30.7 (L) 41.0 - 52.0 %    MCV 78 (L) 80 - 100 fL    MCH 23.5 (L) 26.0 - 34.0 pg    MCHC 30.0 (L) 32.0 - 36.0 g/dL    RDW 16.9 (H) 11.5 - 14.5 %    Platelets 355 150 - 450 x10*3/uL   Renal function panel   Result Value Ref Range    Glucose 102 (H) 74 - 99 mg/dL    Sodium 136 136 - 145 mmol/L    Potassium 4.9 3.5 - 5.3 mmol/L    Chloride 101 98 - 107 mmol/L    Bicarbonate 28 21 - 32 mmol/L    Anion Gap 12 10 - 20 mmol/L    Urea Nitrogen 13 6 - 23 mg/dL    Creatinine 0.80 0.50 - 1.30 mg/dL    eGFR >90 >60  mL/min/1.73m*2    Calcium 8.8 8.6 - 10.6 mg/dL    Phosphorus 3.8 2.5 - 4.9 mg/dL    Albumin 3.8 3.4 - 5.0 g/dL   Heparin Assay   Result Value Ref Range    Heparin Unfractionated 0.4 See Comment Below for Therapeutic Ranges IU/mL   POCT GLUCOSE   Result Value Ref Range    POCT Glucose 189 (H) 74 - 99 mg/dL   POCT GLUCOSE   Result Value Ref Range    POCT Glucose 108 (H) 74 - 99 mg/dL   ECG 12 lead   Result Value Ref Range    Ventricular Rate 75 BPM    Atrial Rate 75 BPM    WA Interval 210 ms    QRS Duration 104 ms    QT Interval 438 ms    QTC Calculation(Bazett) 489 ms    P Axis 48 degrees    R Axis 55 degrees    T Axis -43 degrees    QRS Count 12 beats    Q Onset 211 ms    P Onset 106 ms    P Offset 170 ms    T Offset 430 ms    QTC Fredericia 471 ms   POCT GLUCOSE   Result Value Ref Range    POCT Glucose 90 74 - 99 mg/dL               ASSESSMENT & PLAN  Mr. Zepeda is a 57yo male with a history of RA on chronic prednisone and Plaquenil, DM2 on insulin, COPD, hypothyroidism, tobacco use (40 pack years) and chronic methadone use, neuropathy and unhealed diabetic foot ulcers (s/p right foot partial amputation ~7 months ago) and follows with the wound clinic as outpatient. The patient stated he has had intermittent chest pain worse with exertion over the past month. He also reports worsening CALDERON. Upon admission to the ED, he was diagnosed with an NSTEMI. Loaded with ASA. LHC with 3vCAD. TTE EF 40-45%.       Cardiac surgery consulted for a CABG evaluation.         RECOMMENDATIONS/PLAN  Dr. Carey met with patient and reviewed imaging and data  Plan for OR 7/9 Tuesday morning for CABG  - Appreciate podiatry consult given history of R great toe amputation and non healing foot ulcers - debrided ulcers and re-dressed, recommend daily dressing changes, not prohibitive for surgery.   - LHC and TTE in EMR  - Medical optimization per primary team  - Preop risk stratification studies/labs ordered in EMR by our team  --- US  Carotids/Vein Mapping/ LE US EBEN  --- PFTs (spirometry and room air ABG) - FVC and FEV1 89% and 90% respectively   --- 2 view CXR  --- MRSA, UA/Culture, LFTs, HgbA1c, TSH/T4, Lipid panel  - Dental evaluation not indicated for isolated CAB surgeries   - Continue ASA, high-intensity statin, and BB (or document contraindications for use)  - No ACEi/ARBs in the pre-op period (at least 48 hours)  - Hold any SGLT2 inhibitors (Farxiga, Jardiance, etc.) for at least 3 days prior to cardiac surgery to prevent euglycemic DKA  - No antiplatelets other than ASA, no anticoagulants other than Heparin  - NPO after midnight, blood on hold/T&S, and preop scrubs ordered for OR 7/9       Will continue to follow along.  Thank you for the consultation.   Patient educated and all questions answered.  Please page the cardiac surgery consult pager [phone] with any questions or changes in patient condition.     Liz Eric PA-C  Cardiac Surgery Consult [facility]  [facility]  Cardiac Surgery Consult Pager [phone]     7/5/2024  10:35 AM

## 2024-07-05 NOTE — PROGRESS NOTES
Subjective Data:  Patient seen and assessed this morning, sitting up at edge of bed, NAD. Reports no change in chest heaviness/pressure. Continues to endorse occasional SOB, on RA this morning. Aware of plan for OR on Tuesday 7/9, pt agreeable    - remains hemodynamically stable, cont current regimen  - per CT Surgery, plan is for 3 vessel CABG 7/9 with Dr. Carey  - QTc today 489    Overnight Events:    No acute events overnight.     Objective Data:  Last Recorded Vitals:  Vitals:    07/04/24 1949 07/04/24 2349 07/05/24 0529 07/05/24 0753   BP: 114/53 105/54 105/57 102/50   Pulse: 68 73 73 64   Resp: 17 17 17    Temp: 36.5 °C (97.7 °F) 36.1 °C (97 °F) 35.9 °C (96.6 °F) 36.5 °C (97.7 °F)   TempSrc:       SpO2: 97% 94% 100% 98%   Weight:       Height:           Last Labs:  Results for orders placed or performed during the hospital encounter of 07/01/24 (from the past 24 hour(s))   POCT GLUCOSE   Result Value Ref Range    POCT Glucose 279 (H) 74 - 99 mg/dL   POCT GLUCOSE   Result Value Ref Range    POCT Glucose 114 (H) 74 - 99 mg/dL   CBC   Result Value Ref Range    WBC 7.8 4.4 - 11.3 x10*3/uL    nRBC 0.0 0.0 - 0.0 /100 WBCs    RBC 3.92 (L) 4.50 - 5.90 x10*6/uL    Hemoglobin 9.2 (L) 13.5 - 17.5 g/dL    Hematocrit 30.7 (L) 41.0 - 52.0 %    MCV 78 (L) 80 - 100 fL    MCH 23.5 (L) 26.0 - 34.0 pg    MCHC 30.0 (L) 32.0 - 36.0 g/dL    RDW 16.9 (H) 11.5 - 14.5 %    Platelets 355 150 - 450 x10*3/uL   Renal function panel   Result Value Ref Range    Glucose 102 (H) 74 - 99 mg/dL    Sodium 136 136 - 145 mmol/L    Potassium 4.9 3.5 - 5.3 mmol/L    Chloride 101 98 - 107 mmol/L    Bicarbonate 28 21 - 32 mmol/L    Anion Gap 12 10 - 20 mmol/L    Urea Nitrogen 13 6 - 23 mg/dL    Creatinine 0.80 0.50 - 1.30 mg/dL    eGFR >90 >60 mL/min/1.73m*2    Calcium 8.8 8.6 - 10.6 mg/dL    Phosphorus 3.8 2.5 - 4.9 mg/dL    Albumin 3.8 3.4 - 5.0 g/dL   Heparin Assay   Result Value Ref Range    Heparin Unfractionated 0.4 See Comment Below for  Therapeutic Ranges IU/mL   POCT GLUCOSE   Result Value Ref Range    POCT Glucose 189 (H) 74 - 99 mg/dL   POCT GLUCOSE   Result Value Ref Range    POCT Glucose 108 (H) 74 - 99 mg/dL   POCT GLUCOSE   Result Value Ref Range    POCT Glucose 90 74 - 99 mg/dL        TROPHS   Date/Time Value Ref Range Status   06/30/2024 02:36  0 - 20 ng/L Final     Comment:     Previous result verified on 6/30/2024 0210 on specimen/case [id number] called with component [facility] for procedure Troponin I, High Sensitivity, Initial with value 146 ng/L.   06/30/2024 01:40  0 - 20 ng/L Final     BNP   Date/Time Value Ref Range Status   07/02/2024 09:00  0 - 99 pg/mL Final     HGBA1C   Date/Time Value Ref Range Status   05/15/2024 10:26 AM 8.5 4.3 - 5.6 % Final     Comment:     American Diabetes Association guidelines indicate that patients with HgbA1c in the range 5.7-6.4% are at increased risk for development of diabetes, and intervention by lifestyle modification may be beneficial. HgbA1c greater or equal to 6.5% is considered diagnostic of diabetes.   02/20/2024 08:51 AM 9.4 4.3 - 5.6 % Final     Comment:     American Diabetes Association guidelines indicate that patients with HgbA1c in the range 5.7-6.4% are at increased risk for development of diabetes, and intervention by lifestyle modification may be beneficial. HgbA1c greater or equal to 6.5% is considered diagnostic of diabetes.   10/12/2023 03:27 AM 9.6 see below % Final     LDLCALC   Date/Time Value Ref Range Status   07/02/2024 04:08 PM 60 <=99 mg/dL Final     Comment:                                 Near   Borderline      AGE      Desirable  Optimal    High     High     Very High     0-19 Y     0 - 109     ---    110-129   >/= 130     ----    20-24 Y     0 - 119     ---    120-159   >/= 160     ----      >24 Y     0 -  99   100-129  130-159   160-189     >/=190       VLDL   Date/Time Value Ref Range Status   07/02/2024 04:08 PM 16 0 - 40 mg/dL Final    01/20/2021 01:08 PM 66 0 - 40 mg/dL Final   10/06/2020 11:50 AM 67 0 - 40 mg/dL Final   12/17/2019 02:57 PM 57 0 - 40 mg/dL Final      Last I/O:  I/O last 3 completed shifts:  In: 830 (9.7 mL/kg) [P.O.:830]  Out: - (0 mL/kg)   Weight: 85.3 kg     Past Cardiology Tests (Last 3 Years):  EKG:  Electrocardiogram, 12-lead PRN ACS symptoms 07/02/2024      ECG 12 lead 06/30/2024      ECG 12 lead 06/30/2024 (Preliminary)      ECG 12 lead 06/30/2024 (Preliminary)    Echo:  Transthoracic Echo (TTE) Complete 07/01/2024    Ejection Fractions:  EF   Date/Time Value Ref Range Status   07/01/2024 07:49 AM 43 %      Cath:  Cardiac Catheterization Procedure 07/01/2024    Stress Test:  No results found for this or any previous visit from the past 1095 days.    Cardiac Imaging:  No results found for this or any previous visit from the past 1095 days.      Inpatient Medications:  Scheduled medications   Medication Dose Route Frequency    aspirin  81 mg oral Daily    atorvastatin  80 mg oral Nightly    [START ON 7/8/2024] chlorhexidine  15 mL Mouth/Throat BID    DULoxetine  60 mg oral q24h    hydroxychloroquine  200 mg oral BID    insulin detemir  40 Units subcutaneous Nightly    insulin lispro  0-10 Units subcutaneous TID    isosorbide dinitrate  10 mg oral TID    levothyroxine  50 mcg oral Daily    methadone  77.5 mg oral q PM    methadone  77.5 mg oral Daily    metoprolol succinate XL  50 mg oral Daily    mupirocin   Topical BID    pantoprazole  40 mg oral Daily before breakfast    polyethylene glycol  17 g oral Daily    predniSONE  5 mg oral Daily    pregabalin  200 mg oral TID    tiotropium  2 puff inhalation Daily     PRN medications   Medication    acetaminophen    albuterol    dextrose    dextrose    glucagon    heparin     Continuous Medications   Medication Dose Last Rate    heparin  0-4,000 Units/hr 1,400 Units/hr (07/04/24 2208)       Physical Exam:  General: NAD, sitting on edge of bed  Skin: warm and dry   Head/ neck: no  JVD seen at 90 degrees  Cardiac: RRR, S1, S2   Pulm: CTAB, on RA  GI: soft, nontender   Extremities: tr BLE edema  in feet/ankles; bilateral feet with nonhealing ulcers of varying depths (see Media tab)  Neuro: no focal neuro deficits   Psych: appropriate mood and behavior       Assessment/Plan   Kael Zepeda is a 58 y.o. male with PMHx s/f RA, IDDM2, COPD, hypothyroidism, chronic tobacco abuse, chronic methadone dependency, neuropathy/diabetic foot ulcer with ?osteomyelitis. Patient presented to [place] on 6/30 with intermittent chest pain, now s/p LHC showing multi-vessels CAD, transferred to [facility] for CABG evaluation.      Multivessel CAD with proximal LAD involvement  NSTEMI  Dyslipidemia   - Intermittent chest pain began ~6/23/24, worsened in the last 3 days prior to admission on 6/30  - ECG showing sinus rhythm with q waves in inferior leads.  - HS Trop of 146->135, started on a heparin infusion, continue    - s/p ASA & Ticagrelor load 6/30, last Ticagrelor dose on 7/1 AM  [facility] with 100% mid RCA, 70% Lt Circ, and 80% prox to mid LAD  - TTE 6/30: EF 40-45%, see below for further details   - Lipids 7/2: Chol 122, HDL 45.9, LDL 60, Tri 81  - CTS consulted, cath images available on syngo;  - PFT's: FVC 89%, FEV1 90%, FEV1/%  - cont Heparin drip  - cont isordil 10mg TID, CP improved but still present, stable  - per CT Surgery, plan is for 3 vessel CABG 7/9 with Dr. Carey  - cont ASA 81, heparin gtt, Atorvastatin 81, Toprol XL 50  - cardiac die    Acute Combined Systolic and Diastolic Heart Failure, ICM  HFmrEF (EF 40-45%), ACC/AHA class C  - No prior cardiac history  - TTE 7/1/24: EF 40-45%, pseudonormal DF, akinetic inferior and inferolateral walls  - CXR: no acute cardiopulmonary process, chronic interstial changes   - , mildly SOB but unsure if related to ongoing angina  - appears euvolemic on exam; weaned to RA  - consider adding ARNI/ MRA/ SGLT2i  post revascularization  - Not currently on diuretics, add as clinically indicated   - switch metoprolol tart 25mg BID to Toprol XL 50 mg daily   - Daily standing weights, strict I&O's, 2L fluid restriction      Hx Hypertension  - SBP last 24 hrs: 105-120  - Continue with meds as above      Diabetes mellitus  - most recent A1C 8.5% on 5/15/24, was 10.4% on 2/15/23  - DM education consult placed  - home regimen:Levemir 50 units nightly, Metformin 500 mg BID  - hypoglycemic to 47 on 7/3, pt was symptomatic  - cont Levemir 40 u nightly, SSI scale #2  - BG last 24 hrs: 108-279  - diabetic diet, Accu-Cheks AC/HS, hypoglycemic protocol     Neuropathy with Nonhealing ulcerations/wounds  - follows [facility] with [facility]  - reports had right great toe amputation 6-7 months ago and hasnt healed since; also reports he will just wake up with new ulcers on his left foot  - osteomyelitis was in chart but patient denies this diagnosis  - reports being prescribed chronic antibiotic for his feet however due to an interaction with hydroxychloroquine he takes it every three days instead of daily; called home pharmacy which they believe the antibiotic is sulfasalazine   - Podiatry consulted; foot images in chart, appreciate recs  - per Podiatry, no barriers to proceeding with cardiac surgery, f/up w/ [facility], now s/o   - Cont home Lyrica 200mg TID    Methadone Dependence   - has been on it for 15 years, goes to the [facility] in [place] for methadone weekly; reports total home dose 165mg divided   - currently on 77.5mg BID  - per Medical Toxicology, ok to continue home Methadone, monitor daily EKG's  - QTc today 489 (477, 478, 511)     COPD/tobacco use  - Continue home inhalers  - Discussed in length with him the importance of smoking cessation as well as several strategies to assist him in quitting smoking. Patient states he plans to quit after this hospitalization.  - offer NRT as indicated, pt  currently declines     RA  - cont home Plaquenil 200mg BID and Prednisone 5mg daily     Hypothyroidism  - TSH 2.67  - cont home Synthroid 50mcg daily     GERD  - cont Pantoprazole 40 daily, substitute for home Omeprazole    DVT ppx: Heparin drip    Dispo: pending CABG 7/9    Code Status: Full Code  NOK: Vin Zepeda (friend) [phone]    Seen and discussed with Dr Alireza Aguilar, APRN-CNP

## 2024-07-05 NOTE — CARE PLAN
The patient's goals for the shift include      The clinical goals for the shift include pt will remain HDs throughout the shift    Over the shift, the patient did make progress toward the following goals.    Problem: Pain - Adult  Goal: Verbalizes/displays adequate comfort level or baseline comfort level  Outcome: Progressing     Problem: Safety - Adult  Goal: Free from fall injury  Outcome: Progressing     Problem: Discharge Planning  Goal: Discharge to home or other facility with appropriate resources  Outcome: Progressing     Problem: Chronic Conditions and Co-morbidities  Goal: Patient's chronic conditions and co-morbidity symptoms are monitored and maintained or improved  Outcome: Progressing     Problem: ACS/CP/NSTEMI/STEMI  Goal: Chest pain managed (free from pain or at acceptable level)  Outcome: Progressing  Goal: Lab values return to normal range  Outcome: Progressing  Goal: Promote self management  Outcome: Progressing  Goal: Serial ECG will return to baseline  Outcome: Progressing  Goal: Verbalize understanding of procedures/devices  Outcome: Progressing  Goal: Wean vasopressors/achieve hemodynamic stability  Outcome: Progressing     Problem: Arrythmia/Dysrhythmia  Goal: Lab values return to normal range  Outcome: Progressing  Goal: No evidence of post procedure complications  Outcome: Progressing  Goal: Promote self management  Outcome: Progressing  Goal: Serial ECG will return to baseline  Outcome: Progressing  Goal: Verbalize understanding of procedures/devices  Outcome: Progressing  Goal: Vital signs return to baseline  Outcome: Progressing  Goal: Care and maintenance of device (specify)  Outcome: Progressing     Problem: Cardiac catheterization  Goal: Free from dysrhythmias  Outcome: Progressing  Goal: Free from pain  Outcome: Progressing  Goal: No evidence of post procedure complications  Outcome: Progressing  Goal: Promote self management  Outcome: Progressing  Goal: Verbalize understanding of  procedure  Outcome: Progressing  Goal: Care and maintenance of device (specify)  Outcome: Progressing     Problem: Hypertensive Emergency/Crisis  Goal: Blood pressure gradually reduced to goal range  Outcome: Progressing  Goal: Free from signs of organ damage  Outcome: Progressing  Goal: Lab values return to normal range  Outcome: Progressing  Goal: Promote self management  Outcome: Progressing

## 2024-07-05 NOTE — PROGRESS NOTES
07/05/24 1618   Discharge Planning   Living Arrangements Family members  (half-brother - Harsh)   Support Systems Family members  (half-brother - Harsh, brother - Vin)   Assistance Needed none   Type of Residence Private residence   Number of Stairs to Enter Residence 0   Number of Stairs Within Residence 0   Do you have animals or pets at home? Yes   Type of Animals or Pets x2 cats   Home or Post Acute Services None   Patient expects to be discharged to: home   Does the patient need discharge transport arranged? No     Admitted for CABG eval.    Met w/ pt to introduce myself, discuss role & discharge planning, all amicable. Admits/Denies recent falls. Is or isn't independent in ADL's. Yes/No mobility assistance (cane, walker, ect.). Equipment at home, does/does not utilize at this time. CPAP in or not in use. Diabetic, any equipment. Feels safe/unsafe at home. No/Yes SW needs at this time.     PCP: Dr. Greer  Pharmacy: [facility]  DME: N/A    This TCC will continue to follow for home going needs and safe DC plan.     Yudelka Church RN

## 2024-07-06 ENCOUNTER — APPOINTMENT (OUTPATIENT)
Dept: CARDIOLOGY | Facility: HOSPITAL | Age: 59
DRG: 235 | End: 2024-07-06
Payer: MEDICARE

## 2024-07-06 LAB
ABO GROUP (TYPE) IN BLOOD: NORMAL
ALBUMIN SERPL BCP-MCNC: 3.8 G/DL (ref 3.4–5)
ANION GAP SERPL CALC-SCNC: 13 MMOL/L (ref 10–20)
ANTIBODY SCREEN: NORMAL
BUN SERPL-MCNC: 15 MG/DL (ref 6–23)
CALCIUM SERPL-MCNC: 8.9 MG/DL (ref 8.6–10.6)
CHLORIDE SERPL-SCNC: 100 MMOL/L (ref 98–107)
CO2 SERPL-SCNC: 27 MMOL/L (ref 21–32)
CREAT SERPL-MCNC: 0.87 MG/DL (ref 0.5–1.3)
EGFRCR SERPLBLD CKD-EPI 2021: >90 ML/MIN/1.73M*2
ERYTHROCYTE [DISTWIDTH] IN BLOOD BY AUTOMATED COUNT: 17.2 % (ref 11.5–14.5)
GLUCOSE BLD MANUAL STRIP-MCNC: 116 MG/DL (ref 74–99)
GLUCOSE BLD MANUAL STRIP-MCNC: 160 MG/DL (ref 74–99)
GLUCOSE BLD MANUAL STRIP-MCNC: 241 MG/DL (ref 74–99)
GLUCOSE BLD MANUAL STRIP-MCNC: 433 MG/DL (ref 74–99)
GLUCOSE BLD MANUAL STRIP-MCNC: 46 MG/DL (ref 74–99)
GLUCOSE SERPL-MCNC: 116 MG/DL (ref 74–99)
HCT VFR BLD AUTO: 32.1 % (ref 41–52)
HGB BLD-MCNC: 9.5 G/DL (ref 13.5–17.5)
HOLD SPECIMEN: NORMAL
MCH RBC QN AUTO: 23.9 PG (ref 26–34)
MCHC RBC AUTO-ENTMCNC: 29.6 G/DL (ref 32–36)
MCV RBC AUTO: 81 FL (ref 80–100)
NRBC BLD-RTO: 0 /100 WBCS (ref 0–0)
PHOSPHATE SERPL-MCNC: 3.7 MG/DL (ref 2.5–4.9)
PLATELET # BLD AUTO: 318 X10*3/UL (ref 150–450)
POTASSIUM SERPL-SCNC: 4.3 MMOL/L (ref 3.5–5.3)
RBC # BLD AUTO: 3.98 X10*6/UL (ref 4.5–5.9)
RH FACTOR (ANTIGEN D): NORMAL
SODIUM SERPL-SCNC: 136 MMOL/L (ref 136–145)
UFH PPP CHRO-ACNC: 0.3 IU/ML
UFH PPP CHRO-ACNC: 0.3 IU/ML
WBC # BLD AUTO: 8.7 X10*3/UL (ref 4.4–11.3)

## 2024-07-06 PROCEDURE — 2500000002 HC RX 250 W HCPCS SELF ADMINISTERED DRUGS (ALT 637 FOR MEDICARE OP, ALT 636 FOR OP/ED): Performed by: STUDENT IN AN ORGANIZED HEALTH CARE EDUCATION/TRAINING PROGRAM

## 2024-07-06 PROCEDURE — 1200000002 HC GENERAL ROOM WITH TELEMETRY DAILY

## 2024-07-06 PROCEDURE — 93010 ELECTROCARDIOGRAM REPORT: CPT | Performed by: INTERNAL MEDICINE

## 2024-07-06 PROCEDURE — 85027 COMPLETE CBC AUTOMATED: CPT | Performed by: NURSE PRACTITIONER

## 2024-07-06 PROCEDURE — 99232 SBSQ HOSP IP/OBS MODERATE 35: CPT | Performed by: INTERNAL MEDICINE

## 2024-07-06 PROCEDURE — 85520 HEPARIN ASSAY: CPT | Performed by: STUDENT IN AN ORGANIZED HEALTH CARE EDUCATION/TRAINING PROGRAM

## 2024-07-06 PROCEDURE — 36415 COLL VENOUS BLD VENIPUNCTURE: CPT | Performed by: STUDENT IN AN ORGANIZED HEALTH CARE EDUCATION/TRAINING PROGRAM

## 2024-07-06 PROCEDURE — 93005 ELECTROCARDIOGRAM TRACING: CPT

## 2024-07-06 PROCEDURE — 2500000001 HC RX 250 WO HCPCS SELF ADMINISTERED DRUGS (ALT 637 FOR MEDICARE OP): Performed by: NURSE PRACTITIONER

## 2024-07-06 PROCEDURE — 2500000002 HC RX 250 W HCPCS SELF ADMINISTERED DRUGS (ALT 637 FOR MEDICARE OP, ALT 636 FOR OP/ED): Performed by: NURSE PRACTITIONER

## 2024-07-06 PROCEDURE — 82947 ASSAY GLUCOSE BLOOD QUANT: CPT

## 2024-07-06 PROCEDURE — 86901 BLOOD TYPING SEROLOGIC RH(D): CPT | Performed by: PHYSICIAN ASSISTANT

## 2024-07-06 PROCEDURE — 94640 AIRWAY INHALATION TREATMENT: CPT

## 2024-07-06 PROCEDURE — S0109 METHADONE ORAL 5MG: HCPCS | Performed by: NURSE PRACTITIONER

## 2024-07-06 PROCEDURE — 2500000002 HC RX 250 W HCPCS SELF ADMINISTERED DRUGS (ALT 637 FOR MEDICARE OP, ALT 636 FOR OP/ED)

## 2024-07-06 PROCEDURE — 86923 COMPATIBILITY TEST ELECTRIC: CPT

## 2024-07-06 PROCEDURE — 84100 ASSAY OF PHOSPHORUS: CPT | Performed by: NURSE PRACTITIONER

## 2024-07-06 PROCEDURE — 2500000001 HC RX 250 WO HCPCS SELF ADMINISTERED DRUGS (ALT 637 FOR MEDICARE OP): Performed by: STUDENT IN AN ORGANIZED HEALTH CARE EDUCATION/TRAINING PROGRAM

## 2024-07-06 PROCEDURE — 2500000004 HC RX 250 GENERAL PHARMACY W/ HCPCS (ALT 636 FOR OP/ED): Performed by: STUDENT IN AN ORGANIZED HEALTH CARE EDUCATION/TRAINING PROGRAM

## 2024-07-06 RX ORDER — BISACODYL 10 MG/1
10 SUPPOSITORY RECTAL DAILY
Status: DISCONTINUED | OUTPATIENT
Start: 2024-07-06 | End: 2024-07-09

## 2024-07-06 RX ORDER — AMOXICILLIN 250 MG
1 CAPSULE ORAL 2 TIMES DAILY
Status: DISCONTINUED | OUTPATIENT
Start: 2024-07-06 | End: 2024-07-09

## 2024-07-06 ASSESSMENT — COGNITIVE AND FUNCTIONAL STATUS - GENERAL
DAILY ACTIVITIY SCORE: 24
CLIMB 3 TO 5 STEPS WITH RAILING: A LITTLE
MOBILITY SCORE: 23

## 2024-07-06 ASSESSMENT — PAIN SCALES - GENERAL: PAINLEVEL_OUTOF10: 0 - NO PAIN

## 2024-07-06 NOTE — PROGRESS NOTES
Subjective Data:  Patient feeling well, ongoing mild chest discomfort. Constipation today, will start with suppository.     - encourage OOB and ambulation  - reporting constipation, bowel regimen   - CABG Tuesday  - QTc today 493    Overnight Events:    No acute events overnight.     Objective Data:  Last Recorded Vitals:  Vitals:    07/05/24 2036 07/05/24 2334 07/06/24 0450 07/06/24 0823   BP: 105/61 118/68 120/66 116/68   Pulse: 60 71 69 75   Resp: 18      Temp: 36.5 °C (97.7 °F) 36.5 °C (97.7 °F) 36.7 °C (98.1 °F)    TempSrc: Temporal      SpO2: 99% 97% 95% 93%   Weight:       Height:           Last Labs:  Results for orders placed or performed during the hospital encounter of 07/01/24 (from the past 24 hour(s))   POCT GLUCOSE   Result Value Ref Range    POCT Glucose 167 (H) 74 - 99 mg/dL   CBC   Result Value Ref Range    WBC 8.9 4.4 - 11.3 x10*3/uL    nRBC 0.0 0.0 - 0.0 /100 WBCs    RBC 4.22 (L) 4.50 - 5.90 x10*6/uL    Hemoglobin 9.9 (L) 13.5 - 17.5 g/dL    Hematocrit 33.3 (L) 41.0 - 52.0 %    MCV 79 (L) 80 - 100 fL    MCH 23.5 (L) 26.0 - 34.0 pg    MCHC 29.7 (L) 32.0 - 36.0 g/dL    RDW 16.9 (H) 11.5 - 14.5 %    Platelets 380 150 - 450 x10*3/uL   Renal function panel   Result Value Ref Range    Glucose 199 (H) 74 - 99 mg/dL    Sodium 138 136 - 145 mmol/L    Potassium 4.2 3.5 - 5.3 mmol/L    Chloride 101 98 - 107 mmol/L    Bicarbonate 28 21 - 32 mmol/L    Anion Gap 13 10 - 20 mmol/L    Urea Nitrogen 15 6 - 23 mg/dL    Creatinine 0.83 0.50 - 1.30 mg/dL    eGFR >90 >60 mL/min/1.73m*2    Calcium 9.2 8.6 - 10.6 mg/dL    Phosphorus 4.0 2.5 - 4.9 mg/dL    Albumin 3.8 3.4 - 5.0 g/dL   POCT GLUCOSE   Result Value Ref Range    POCT Glucose 243 (H) 74 - 99 mg/dL   POCT GLUCOSE   Result Value Ref Range    POCT Glucose 146 (H) 74 - 99 mg/dL   Type And Screen   Result Value Ref Range    ABO TYPE A     Rh TYPE POS     ANTIBODY SCREEN NEG    Heparin Assay, UFH   Result Value Ref Range    Heparin Unfractionated 0.3 See Comment  Below for Therapeutic Ranges IU/mL   POCT GLUCOSE   Result Value Ref Range    POCT Glucose 46 (L) 74 - 99 mg/dL   POCT GLUCOSE   Result Value Ref Range    POCT Glucose 116 (H) 74 - 99 mg/dL        TROPHS   Date/Time Value Ref Range Status   06/30/2024 02:36  0 - 20 ng/L Final     Comment:     Previous result verified on 6/30/2024 0210 on specimen/case [id number] called with component [facility] for procedure Troponin I, High Sensitivity, Initial with value 146 ng/L.   06/30/2024 01:40  0 - 20 ng/L Final     BNP   Date/Time Value Ref Range Status   07/02/2024 09:00  0 - 99 pg/mL Final     HGBA1C   Date/Time Value Ref Range Status   05/15/2024 10:26 AM 8.5 4.3 - 5.6 % Final     Comment:     American Diabetes Association guidelines indicate that patients with HgbA1c in the range 5.7-6.4% are at increased risk for development of diabetes, and intervention by lifestyle modification may be beneficial. HgbA1c greater or equal to 6.5% is considered diagnostic of diabetes.   02/20/2024 08:51 AM 9.4 4.3 - 5.6 % Final     Comment:     American Diabetes Association guidelines indicate that patients with HgbA1c in the range 5.7-6.4% are at increased risk for development of diabetes, and intervention by lifestyle modification may be beneficial. HgbA1c greater or equal to 6.5% is considered diagnostic of diabetes.   10/12/2023 03:27 AM 9.6 see below % Final     LDLCALC   Date/Time Value Ref Range Status   07/02/2024 04:08 PM 60 <=99 mg/dL Final     Comment:                                 Near   Borderline      AGE      Desirable  Optimal    High     High     Very High     0-19 Y     0 - 109     ---    110-129   >/= 130     ----    20-24 Y     0 - 119     ---    120-159   >/= 160     ----      >24 Y     0 -  99   100-129  130-159   160-189     >/=190       VLDL   Date/Time Value Ref Range Status   07/02/2024 04:08 PM 16 0 - 40 mg/dL Final   01/20/2021 01:08 PM 66 0 - 40 mg/dL Final   10/06/2020 11:50 AM 67 0 - 40  mg/dL Final   12/17/2019 02:57 PM 57 0 - 40 mg/dL Final      Last I/O:  I/O last 3 completed shifts:  In: 810 (9.5 mL/kg) [P.O.:810]  Out: 940 (11 mL/kg) [Urine:940 (0.3 mL/kg/hr)]  Weight: 85.3 kg     Past Cardiology Tests (Last 3 Years):  EKG:  Electrocardiogram, 12-lead PRN ACS symptoms 07/02/2024      ECG 12 lead 06/30/2024      ECG 12 lead 06/30/2024 (Preliminary)      ECG 12 lead 06/30/2024 (Preliminary)    Echo:  Transthoracic Echo (TTE) Complete 07/01/2024    Ejection Fractions:  EF   Date/Time Value Ref Range Status   07/01/2024 07:49 AM 43 %      Cath:  Cardiac Catheterization Procedure 07/01/2024    Stress Test:  No results found for this or any previous visit from the past 1095 days.    Cardiac Imaging:  No results found for this or any previous visit from the past 1095 days.      Inpatient Medications:  Scheduled medications   Medication Dose Route Frequency    aspirin  81 mg oral Daily    atorvastatin  80 mg oral Nightly    bisacodyl  10 mg rectal Daily    [START ON 7/8/2024] chlorhexidine  15 mL Mouth/Throat BID    DULoxetine  60 mg oral q24h    hydroxychloroquine  200 mg oral BID    insulin detemir  30 Units subcutaneous Nightly    insulin lispro  0-10 Units subcutaneous TID    isosorbide dinitrate  10 mg oral TID    levothyroxine  50 mcg oral Daily    methadone  77.5 mg oral q PM    methadone  77.5 mg oral Daily    metoprolol succinate XL  50 mg oral Daily    mupirocin   Topical BID    pantoprazole  40 mg oral Daily before breakfast    polyethylene glycol  17 g oral Daily    predniSONE  5 mg oral Daily    pregabalin  200 mg oral TID    sennosides-docusate sodium  1 tablet oral BID    tiotropium  2 puff inhalation Daily     PRN medications   Medication    acetaminophen    albuterol    dextrose    dextrose    glucagon    heparin     Continuous Medications   Medication Dose Last Rate    heparin  0-4,000 Units/hr 1,400 Units/hr (07/05/24 1502)       Physical Exam:  General: NAD, sitting on edge of  bed  Skin: warm and dry   Head/ neck: no JVD seen at 90 degrees  Cardiac: RRR, S1, S2   Pulm: CTAB, on RA  GI: soft, nontender   Extremities: tr BLE edema  in feet/ankles; bilateral feet with nonhealing ulcers of varying depths (see Media tab)  Neuro: no focal neuro deficits   Psych: appropriate mood and behavior       Assessment/Plan   Kael Zepeda is a 58 y.o. male with PMHx s/f RA, IDDM2, COPD, hypothyroidism, chronic tobacco abuse, chronic methadone dependency, neuropathy/diabetic foot ulcer with ?osteomyelitis. Patient presented to [facility] on 6/30 with intermittent chest pain, now s/p LHC showing multi-vessels CAD, [place] [facility] [place].      Multivessel CAD with proximal LAD involvement  NSTEMI  Dyslipidemia   - Intermittent chest pain began ~6/23/24, worsened in the last 3 days prior to admission on 6/30  - ECG showing sinus rhythm with q waves in inferior leads.  - HS Trop of 146->135, started on a heparin infusion, continue    - s/p ASA & Ticagrelor load 6/30, last Ticagrelor dose on 7/1 AM  - LHC 7/1/24 at [facility]: multivessel disease with 100% mid RCA, 70% Lt Circ, and 80% prox to mid LAD  - TTE 6/30: EF 40-45%, see below for further details   - Lipids 7/2: Chol 122, HDL 45.9, LDL 60, Tri 81  - CTS consulted, cath images available on syngo;  - PFT's: FVC 89%, FEV1 90%, FEV1/%  - cont Heparin drip  - cont isordil 10mg TID, CP improved but still present, stable  - per CT Surgery, plan is for 3 vessel CABG 7/9 with Dr. 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%), ACC/AHA class C  - No prior cardiac history  - TTE 7/1/24: EF 40-45%, pseudonormal DF, akinetic inferior and inferolateral walls  - CXR: no acute cardiopulmonary process, chronic interstial changes   - , mildly SOB but unsure if related to ongoing angina  - appears euvolemic on exam; weaned  to RA  - consider adding ARNI/ MRA/ SGLT2i post revascularization  - Not currently on diuretics, add as clinically indicated   - switch metoprolol tart 25mg BID to Toprol XL 50 mg daily   - Daily standing weights, strict I&O's, 2L fluid restriction      Hx Hypertension  - SBP last 24 hrs: 90-100s  - Continue with meds as above      Diabetes mellitus  - most recent A1C 8.5% on 5/15/24, was 10.4% on 2/15/23  - DM education consult placed  - home regimen:Levemir 50 units nightly, Metformin 500 mg BID  - 7/3 hypoglycemic to 47, pt was symptomatic  - 7/6 hypoglycemic to 46, asymptomatic  - decrease Levemir to 30 units nightly, SSI scale #2  - BG last 24 hrs:   - diabetic diet, Accu-Cheks AC/HS, hypoglycemic protocol     Neuropathy with Nonhealing ulcerations/wounds  - [facility]  - reports had right great toe amputation 6-7 months ago and hasnt healed since; also reports he will just wake up with new ulcers on his left foot  - osteomyelitis was in chart but patient denies this diagnosis  - reports being prescribed chronic antibiotic for his feet however due to an interaction with hydroxychloroquine he takes it every three days instead of daily; called home pharmacy which they believe the antibiotic is sulfasalazine   - Podiatry consulted; foot images in chart, appreciate recs  - per Podiatry, no barriers to proceeding with cardiac surgery, f/up w/ [facility] wound [facility], now s/o   - Cont home Lyrica 200mg TID    Methadone Dependence   - has been on it for 15 years, goes to the [facility] in [place] for methadone weekly; reports total home dose 165mg divided   - currently on 77.5mg BID  - per Medical Toxicology, ok to continue home Methadone, monitor daily EKG's  - QTc today 493 (489, 477, 478, 511)     COPD/tobacco use  - Continue home inhalers  - Discussed in length with him the importance of smoking cessation as well as several strategies to assist him in quitting smoking.  Patient states he plans to quit after this hospitalization.  - offer NRT as indicated, pt currently declines     RA  - cont home Plaquenil 200mg BID and Prednisone 5mg daily     Hypothyroidism  - TSH 2.67  - cont home Synthroid 50mcg daily     GERD  - cont Pantoprazole 40 daily, substitute for home Omeprazole    DVT ppx: Heparin drip    Dispo: pending CABG 7/9    Code Status: Full Code  NOK: Vin Zepeda (friend) [phone]    Seen and discussed with Dr Alireza Jones, APRN-CNP

## 2024-07-07 ENCOUNTER — APPOINTMENT (OUTPATIENT)
Dept: CARDIOLOGY | Facility: HOSPITAL | Age: 59
DRG: 235 | End: 2024-07-07
Payer: MEDICARE

## 2024-07-07 VITALS
RESPIRATION RATE: 16 BRPM | TEMPERATURE: 97.5 F | SYSTOLIC BLOOD PRESSURE: 109 MMHG | HEART RATE: 67 BPM | BODY MASS INDEX: 30.22 KG/M2 | HEIGHT: 66 IN | WEIGHT: 188 LBS | DIASTOLIC BLOOD PRESSURE: 62 MMHG | OXYGEN SATURATION: 98 %

## 2024-07-07 LAB
ALBUMIN SERPL BCP-MCNC: 3.7 G/DL (ref 3.4–5)
ANION GAP SERPL CALC-SCNC: 10 MMOL/L (ref 10–20)
ATRIAL RATE: 90 BPM
ATRIAL RATE: 90 BPM
BUN SERPL-MCNC: 15 MG/DL (ref 6–23)
CALCIUM SERPL-MCNC: 8.8 MG/DL (ref 8.6–10.6)
CHLORIDE SERPL-SCNC: 101 MMOL/L (ref 98–107)
CO2 SERPL-SCNC: 31 MMOL/L (ref 21–32)
CREAT SERPL-MCNC: 0.87 MG/DL (ref 0.5–1.3)
EGFRCR SERPLBLD CKD-EPI 2021: >90 ML/MIN/1.73M*2
ERYTHROCYTE [DISTWIDTH] IN BLOOD BY AUTOMATED COUNT: 17.4 % (ref 11.5–14.5)
GLUCOSE BLD MANUAL STRIP-MCNC: 167 MG/DL (ref 74–99)
GLUCOSE BLD MANUAL STRIP-MCNC: 180 MG/DL (ref 74–99)
GLUCOSE BLD MANUAL STRIP-MCNC: 202 MG/DL (ref 74–99)
GLUCOSE BLD MANUAL STRIP-MCNC: 277 MG/DL (ref 74–99)
GLUCOSE BLD MANUAL STRIP-MCNC: 59 MG/DL (ref 74–99)
GLUCOSE SERPL-MCNC: 200 MG/DL (ref 74–99)
HCT VFR BLD AUTO: 31.1 % (ref 41–52)
HGB BLD-MCNC: 9.1 G/DL (ref 13.5–17.5)
MCH RBC QN AUTO: 23.7 PG (ref 26–34)
MCHC RBC AUTO-ENTMCNC: 29.3 G/DL (ref 32–36)
MCV RBC AUTO: 81 FL (ref 80–100)
MGC ASCENT PFT - FEV1 - PRE: 2.64
MGC ASCENT PFT - FEV1 - PREDICTED: 2.93
MGC ASCENT PFT - FVC - PRE: 3.32
MGC ASCENT PFT - FVC - PREDICTED: 3.71
NRBC BLD-RTO: 0 /100 WBCS (ref 0–0)
P AXIS: 39 DEGREES
P AXIS: 44 DEGREES
PHOSPHATE SERPL-MCNC: 3.9 MG/DL (ref 2.5–4.9)
PLATELET # BLD AUTO: 305 X10*3/UL (ref 150–450)
POTASSIUM SERPL-SCNC: 4.4 MMOL/L (ref 3.5–5.3)
PR INTERVAL: 181 MS
PR INTERVAL: 185 MS
Q ONSET: 251 MS
Q ONSET: 251 MS
QRS COUNT: 14 BEATS
QRS COUNT: 15 BEATS
QRS DURATION: 102 MS
QRS DURATION: 109 MS
QT INTERVAL: 402 MS
QT INTERVAL: 418 MS
QTC CALCULATION(BAZETT): 495 MS
QTC CALCULATION(BAZETT): 509 MS
QTC FREDERICIA: 462 MS
QTC FREDERICIA: 476 MS
R AXIS: 39 DEGREES
R AXIS: 49 DEGREES
RBC # BLD AUTO: 3.84 X10*6/UL (ref 4.5–5.9)
SODIUM SERPL-SCNC: 138 MMOL/L (ref 136–145)
T AXIS: -42 DEGREES
T AXIS: -7 DEGREES
T OFFSET: 452 MS
T OFFSET: 460 MS
UFH PPP CHRO-ACNC: 0.3 IU/ML
VENTRICULAR RATE: 89 BPM
VENTRICULAR RATE: 91 BPM
WBC # BLD AUTO: 7.7 X10*3/UL (ref 4.4–11.3)

## 2024-07-07 PROCEDURE — S0109 METHADONE ORAL 5MG: HCPCS | Performed by: NURSE PRACTITIONER

## 2024-07-07 PROCEDURE — 84100 ASSAY OF PHOSPHORUS: CPT | Performed by: NURSE PRACTITIONER

## 2024-07-07 PROCEDURE — 93005 ELECTROCARDIOGRAM TRACING: CPT

## 2024-07-07 PROCEDURE — 36415 COLL VENOUS BLD VENIPUNCTURE: CPT | Performed by: STUDENT IN AN ORGANIZED HEALTH CARE EDUCATION/TRAINING PROGRAM

## 2024-07-07 PROCEDURE — 85027 COMPLETE CBC AUTOMATED: CPT | Performed by: NURSE PRACTITIONER

## 2024-07-07 PROCEDURE — 82947 ASSAY GLUCOSE BLOOD QUANT: CPT

## 2024-07-07 PROCEDURE — 2500000001 HC RX 250 WO HCPCS SELF ADMINISTERED DRUGS (ALT 637 FOR MEDICARE OP): Performed by: NURSE PRACTITIONER

## 2024-07-07 PROCEDURE — 93010 ELECTROCARDIOGRAM REPORT: CPT | Performed by: INTERNAL MEDICINE

## 2024-07-07 PROCEDURE — 2500000001 HC RX 250 WO HCPCS SELF ADMINISTERED DRUGS (ALT 637 FOR MEDICARE OP): Performed by: STUDENT IN AN ORGANIZED HEALTH CARE EDUCATION/TRAINING PROGRAM

## 2024-07-07 PROCEDURE — 2500000002 HC RX 250 W HCPCS SELF ADMINISTERED DRUGS (ALT 637 FOR MEDICARE OP, ALT 636 FOR OP/ED): Performed by: STUDENT IN AN ORGANIZED HEALTH CARE EDUCATION/TRAINING PROGRAM

## 2024-07-07 PROCEDURE — 2500000004 HC RX 250 GENERAL PHARMACY W/ HCPCS (ALT 636 FOR OP/ED): Performed by: STUDENT IN AN ORGANIZED HEALTH CARE EDUCATION/TRAINING PROGRAM

## 2024-07-07 PROCEDURE — 1200000002 HC GENERAL ROOM WITH TELEMETRY DAILY

## 2024-07-07 PROCEDURE — 99232 SBSQ HOSP IP/OBS MODERATE 35: CPT | Performed by: INTERNAL MEDICINE

## 2024-07-07 PROCEDURE — 85520 HEPARIN ASSAY: CPT | Performed by: STUDENT IN AN ORGANIZED HEALTH CARE EDUCATION/TRAINING PROGRAM

## 2024-07-07 PROCEDURE — 36415 COLL VENOUS BLD VENIPUNCTURE: CPT | Performed by: NURSE PRACTITIONER

## 2024-07-07 PROCEDURE — 2500000002 HC RX 250 W HCPCS SELF ADMINISTERED DRUGS (ALT 637 FOR MEDICARE OP, ALT 636 FOR OP/ED): Performed by: NURSE PRACTITIONER

## 2024-07-07 PROCEDURE — 2500000002 HC RX 250 W HCPCS SELF ADMINISTERED DRUGS (ALT 637 FOR MEDICARE OP, ALT 636 FOR OP/ED)

## 2024-07-07 ASSESSMENT — COGNITIVE AND FUNCTIONAL STATUS - GENERAL
DAILY ACTIVITIY SCORE: 24
CLIMB 3 TO 5 STEPS WITH RAILING: A LITTLE
DAILY ACTIVITIY SCORE: 24
MOBILITY SCORE: 23
MOBILITY SCORE: 23
CLIMB 3 TO 5 STEPS WITH RAILING: A LITTLE

## 2024-07-07 ASSESSMENT — PAIN - FUNCTIONAL ASSESSMENT: PAIN_FUNCTIONAL_ASSESSMENT: 0-10

## 2024-07-07 ASSESSMENT — PAIN SCALES - GENERAL: PAINLEVEL_OUTOF10: 0 - NO PAIN

## 2024-07-07 NOTE — CARE PLAN
Problem: Pain - Adult  Goal: Verbalizes/displays adequate comfort level or baseline comfort level  Outcome: Progressing     Problem: Safety - Adult  Goal: Free from fall injury  Outcome: Progressing     Problem: Discharge Planning  Goal: Discharge to home or other facility with appropriate resources  Outcome: Progressing     Problem: Chronic Conditions and Co-morbidities  Goal: Patient's chronic conditions and co-morbidity symptoms are monitored and maintained or improved  Outcome: Progressing     Problem: Cardiac catheterization  Goal: Free from dysrhythmias  Outcome: Progressing  Goal: Free from pain  Outcome: Progressing  Goal: No evidence of post procedure complications  Outcome: Progressing  Goal: Promote self management  Outcome: Progressing  Goal: Verbalize understanding of procedure  Outcome: Progressing  Goal: Care and maintenance of device (specify)  Outcome: Progressing       The clinical goals for the shift include pt will remain free of falls and injury through end of shift

## 2024-07-07 NOTE — PROGRESS NOTES
Subjective Data:  Patient feeling well, ongoing mild chest discomfort that improves with palpation.     - encourage OOB and ambulation  - CABG Tuesday  - QTc today 483    Overnight Events:    No acute events overnight.     Objective Data:  Last Recorded Vitals:  Vitals:    07/06/24 1946 07/07/24 0100 07/07/24 0437 07/07/24 0741   BP: 99/54 (!) 105/46 107/60 96/53   Pulse: 73 69 68 65   Resp: 18 18 16 18   Temp: 36.2 °C (97.2 °F) 36.9 °C (98.4 °F) 36.2 °C (97.2 °F) 36.4 °C (97.5 °F)   TempSrc: Temporal Temporal Temporal    SpO2: 94%  99% 100%   Weight:       Height:           Last Labs:  Results for orders placed or performed during the hospital encounter of 07/01/24 (from the past 24 hour(s))   Heparin Assay, UFH   Result Value Ref Range    Heparin Unfractionated 0.3 See Comment Below for Therapeutic Ranges IU/mL   POCT GLUCOSE   Result Value Ref Range    POCT Glucose 433 (H) 74 - 99 mg/dL   Light Blue Top   Result Value Ref Range    Extra Tube Hold for add-ons.    POCT GLUCOSE   Result Value Ref Range    POCT Glucose 160 (H) 74 - 99 mg/dL   CBC   Result Value Ref Range    WBC 8.7 4.4 - 11.3 x10*3/uL    nRBC 0.0 0.0 - 0.0 /100 WBCs    RBC 3.98 (L) 4.50 - 5.90 x10*6/uL    Hemoglobin 9.5 (L) 13.5 - 17.5 g/dL    Hematocrit 32.1 (L) 41.0 - 52.0 %    MCV 81 80 - 100 fL    MCH 23.9 (L) 26.0 - 34.0 pg    MCHC 29.6 (L) 32.0 - 36.0 g/dL    RDW 17.2 (H) 11.5 - 14.5 %    Platelets 318 150 - 450 x10*3/uL   Renal function panel   Result Value Ref Range    Glucose 116 (H) 74 - 99 mg/dL    Sodium 136 136 - 145 mmol/L    Potassium 4.3 3.5 - 5.3 mmol/L    Chloride 100 98 - 107 mmol/L    Bicarbonate 27 21 - 32 mmol/L    Anion Gap 13 10 - 20 mmol/L    Urea Nitrogen 15 6 - 23 mg/dL    Creatinine 0.87 0.50 - 1.30 mg/dL    eGFR >90 >60 mL/min/1.73m*2    Calcium 8.9 8.6 - 10.6 mg/dL    Phosphorus 3.7 2.5 - 4.9 mg/dL    Albumin 3.8 3.4 - 5.0 g/dL   POCT GLUCOSE   Result Value Ref Range    POCT Glucose 241 (H) 74 - 99 mg/dL   Heparin Assay,  UFH   Result Value Ref Range    Heparin Unfractionated 0.3 See Comment Below for Therapeutic Ranges IU/mL   POCT GLUCOSE   Result Value Ref Range    POCT Glucose 167 (H) 74 - 99 mg/dL        TROPHS   Date/Time Value Ref Range Status   06/30/2024 02:36  0 - 20 ng/L Final     Comment:     Previous result verified on 6/30/2024 0210 on specimen/case [id number] called with component [facility] for procedure Troponin I, High Sensitivity, Initial with value 146 ng/L.   06/30/2024 01:40  0 - 20 ng/L Final     BNP   Date/Time Value Ref Range Status   07/02/2024 09:00  0 - 99 pg/mL Final     HGBA1C   Date/Time Value Ref Range Status   05/15/2024 10:26 AM 8.5 4.3 - 5.6 % Final     Comment:     American Diabetes Association guidelines indicate that patients with HgbA1c in the range 5.7-6.4% are at increased risk for development of diabetes, and intervention by lifestyle modification may be beneficial. HgbA1c greater or equal to 6.5% is considered diagnostic of diabetes.   02/20/2024 08:51 AM 9.4 4.3 - 5.6 % Final     Comment:     American Diabetes Association guidelines indicate that patients with HgbA1c in the range 5.7-6.4% are at increased risk for development of diabetes, and intervention by lifestyle modification may be beneficial. HgbA1c greater or equal to 6.5% is considered diagnostic of diabetes.   10/12/2023 03:27 AM 9.6 see below % Final     LDLCALC   Date/Time Value Ref Range Status   07/02/2024 04:08 PM 60 <=99 mg/dL Final     Comment:                                 Near   Borderline      AGE      Desirable  Optimal    High     High     Very High     0-19 Y     0 - 109     ---    110-129   >/= 130     ----    20-24 Y     0 - 119     ---    120-159   >/= 160     ----      >24 Y     0 -  99   100-129  130-159   160-189     >/=190       VLDL   Date/Time Value Ref Range Status   07/02/2024 04:08 PM 16 0 - 40 mg/dL Final   01/20/2021 01:08 PM 66 0 - 40 mg/dL Final   10/06/2020 11:50 AM 67 0 - 40 mg/dL  Final   12/17/2019 02:57 PM 57 0 - 40 mg/dL Final      Last I/O:  I/O last 3 completed shifts:  In: 460 (5.4 mL/kg) [P.O.:460]  Out: 450 (5.3 mL/kg) [Urine:450 (0.1 mL/kg/hr)]  Weight: 85.3 kg     Past Cardiology Tests (Last 3 Years):  EKG:  Electrocardiogram, 12-lead PRN ACS symptoms 07/02/2024      ECG 12 lead 06/30/2024      ECG 12 lead 06/30/2024 (Preliminary)      ECG 12 lead 06/30/2024 (Preliminary)    Echo:  Transthoracic Echo (TTE) Complete 07/01/2024    Ejection Fractions:  EF   Date/Time Value Ref Range Status   07/01/2024 07:49 AM 43 %      Cath:  Cardiac Catheterization Procedure 07/01/2024    Stress Test:  No results found for this or any previous visit from the past 1095 days.    Cardiac Imaging:  No results found for this or any previous visit from the past 1095 days.      Inpatient Medications:  Scheduled medications   Medication Dose Route Frequency    aspirin  81 mg oral Daily    atorvastatin  80 mg oral Nightly    bisacodyl  10 mg rectal Daily    [START ON 7/8/2024] chlorhexidine  15 mL Mouth/Throat BID    DULoxetine  60 mg oral q24h    hydroxychloroquine  200 mg oral BID    insulin detemir  30 Units subcutaneous Nightly    insulin lispro  0-10 Units subcutaneous TID    isosorbide dinitrate  10 mg oral TID    levothyroxine  50 mcg oral Daily    methadone  77.5 mg oral q PM    methadone  77.5 mg oral Daily    metoprolol succinate XL  50 mg oral Daily    pantoprazole  40 mg oral Daily before breakfast    polyethylene glycol  17 g oral Daily    predniSONE  5 mg oral Daily    pregabalin  200 mg oral TID    sennosides-docusate sodium  1 tablet oral BID    tiotropium  2 puff inhalation Daily     PRN medications   Medication    acetaminophen    albuterol    dextrose    dextrose    glucagon    heparin     Continuous Medications   Medication Dose Last Rate    heparin  0-4,000 Units/hr 1,400 Units/hr (07/07/24 0614)       Physical Exam:  General: NAD, sitting on edge of bed  Skin: warm and dry   Head/ neck:  no JVD seen at 90 degrees  Cardiac: RRR, S1, S2   Pulm: CTAB, on RA  GI: soft, nontender   Extremities: tr BLE edema  in feet/ankles; bilateral feet with nonhealing ulcers of varying depths (see Media tab)  Neuro: no focal neuro deficits   Psych: appropriate mood and behavior       Assessment/Plan   Kael Zepeda is a 58 y.o. male with PMHx s/f RA, IDDM2, COPD, hypothyroidism, chronic tobacco abuse, chronic methadone dependency, neuropathy/diabetic foot ulcer with ?osteomyelitis. Patient presented to [place] on 6/30 with intermittent chest pain, now s/p LHC showing multi-vessels CAD, transferred to [facility] [place] for CABG evaluation.      Multivessel CAD with proximal LAD involvement  NSTEMI  Dyslipidemia   - Intermittent chest pain began ~6/23/24, worsened in the last 3 days prior to admission on 6/30  - ECG showing sinus rhythm with q waves in inferior leads.  - HS Trop of 146->135, started on a heparin infusion, continue    - s/p ASA & Ticagrelor load 6/30, last Ticagrelor dose on 7/1 AM  [facility] with 100% mid RCA, 70% Lt Circ, and 80% prox to mid LAD  - TTE 6/30: EF 40-45%, see below for further details   - Lipids 7/2: Chol 122, HDL 45.9, LDL 60, Tri 81  - CTS consulted, cath images available on syngo;  - PFT's: FVC 89%, FEV1 90%, FEV1/%  - cont Heparin drip  - cont isordil 10mg TID, CP improved but still present, stable  - per CT Surgery, plan is for 3 vessel CABG 7/9 with Dr. 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%), ACC/AHA class C  - No prior cardiac history  - TTE 7/1/24: EF 40-45%, pseudonormal DF, akinetic inferior and inferolateral walls  - CXR: no acute cardiopulmonary process, chronic interstial changes   - , mildly SOB but unsure if related to ongoing angina  - appears euvolemic on exam; weaned to RA  - consider adding ARNI/ MRA/  SGLT2i post revascularization  - Not currently on diuretics, add as clinically indicated   - switch metoprolol tart 25mg BID to Toprol XL 50 mg daily   - Daily standing weights, strict I&O's, 2L fluid restriction      Hx Hypertension  - SBP last 24 hrs: 90-100s  - Continue with meds as above      Diabetes mellitus  - most recent A1C 8.5% on 5/15/24, was 10.4% on 2/15/23  - DM education consult placed  - home regimen:Levemir 50 units nightly, Metformin 500 mg BID  - 7/3 hypoglycemic to 47, pt was symptomatic  - 7/6 hypoglycemic to 46, asymptomatic  - 7/6 decreased Levemir to 30 units nightly, SSI scale #2  - BG last 24 hrs: 160-241  - diabetic diet, Accu-Cheks AC/HS, hypoglycemic protocol     Neuropathy with Nonhealing ulcerations/wounds  - [facility]  - reports had right great toe amputation 6-7 months ago and hasnt healed since; also reports he will just wake up with new ulcers on his left foot  - osteomyelitis was in chart but patient denies this diagnosis  - reports being prescribed chronic antibiotic for his feet however due to an interaction with hydroxychloroquine he takes it every three days instead of daily; called home pharmacy which they believe the antibiotic is sulfasalazine   - Podiatry consulted; foot images in chart, appreciate recs  - per Podiatry, no barriers to proceeding with cardiac surgery, f/up w/ [facility], now s/o   - Cont home Lyrica 200mg TID    Methadone Dependence   - has been on it for 15 years, goes to the [facility] in [place] for methadone weekly; reports total home dose 165mg divided   - currently on 77.5mg BID  - per Medical Toxicology, ok to continue home Methadone, monitor daily EKG's  - QTc today 483 (493, 489, 477, 478, 511)     COPD/tobacco use  - Continue home inhalers  - Discussed in length with him the importance of smoking cessation as well as several strategies to assist him in quitting smoking. Patient states he plans to  quit after this hospitalization.  - offer NRT as indicated, pt currently declines     RA  - cont home Plaquenil 200mg BID and Prednisone 5mg daily     Hypothyroidism  - TSH 2.67  - cont home Synthroid 50mcg daily     GERD  - cont Pantoprazole 40 daily, substitute for home Omeprazole    DVT ppx: Heparin drip    Dispo: pending CABG 7/9    Code Status: Full Code  NOK: Vin Zepeda (friend) [phone]    Seen and discussed with Dr Alireza Jones, APRN-CNP

## 2024-07-08 ENCOUNTER — APPOINTMENT (OUTPATIENT)
Dept: CARDIOLOGY | Facility: HOSPITAL | Age: 59
End: 2024-07-08
Payer: MEDICARE

## 2024-07-08 ENCOUNTER — ANESTHESIA EVENT (OUTPATIENT)
Dept: OPERATING ROOM | Facility: HOSPITAL | Age: 59
End: 2024-07-08
Payer: MEDICARE

## 2024-07-08 LAB
ABO GROUP (TYPE) IN BLOOD: NORMAL
ALBUMIN SERPL BCP-MCNC: 3.5 G/DL (ref 3.4–5)
ANION GAP SERPL CALC-SCNC: 14 MMOL/L (ref 10–20)
ATRIAL RATE: 67 BPM
ATRIAL RATE: 73 BPM
ATRIAL RATE: 75 BPM
BUN SERPL-MCNC: 13 MG/DL (ref 6–23)
CALCIUM SERPL-MCNC: 8.7 MG/DL (ref 8.6–10.6)
CHLORIDE SERPL-SCNC: 100 MMOL/L (ref 98–107)
CO2 SERPL-SCNC: 28 MMOL/L (ref 21–32)
CREAT SERPL-MCNC: 0.76 MG/DL (ref 0.5–1.3)
EGFRCR SERPLBLD CKD-EPI 2021: >90 ML/MIN/1.73M*2
ERYTHROCYTE [DISTWIDTH] IN BLOOD BY AUTOMATED COUNT: 17.1 % (ref 11.5–14.5)
GLUCOSE BLD MANUAL STRIP-MCNC: 123 MG/DL (ref 74–99)
GLUCOSE BLD MANUAL STRIP-MCNC: 146 MG/DL (ref 74–99)
GLUCOSE BLD MANUAL STRIP-MCNC: 160 MG/DL (ref 74–99)
GLUCOSE BLD MANUAL STRIP-MCNC: 198 MG/DL (ref 74–99)
GLUCOSE BLD MANUAL STRIP-MCNC: 334 MG/DL (ref 74–99)
GLUCOSE SERPL-MCNC: 149 MG/DL (ref 74–99)
HCT VFR BLD AUTO: 27.4 % (ref 41–52)
HGB BLD-MCNC: 8.6 G/DL (ref 13.5–17.5)
MCH RBC QN AUTO: 23.8 PG (ref 26–34)
MCHC RBC AUTO-ENTMCNC: 31.4 G/DL (ref 32–36)
MCV RBC AUTO: 76 FL (ref 80–100)
NRBC BLD-RTO: 0 /100 WBCS (ref 0–0)
P AXIS: 20 DEGREES
P AXIS: 40 DEGREES
P AXIS: 45 DEGREES
P AXIS: 48 DEGREES
P AXIS: 56 DEGREES
P OFFSET: 170 MS
P OFFSET: 176 MS
P OFFSET: 178 MS
P OFFSET: 181 MS
P OFFSET: 182 MS
P ONSET: 106 MS
P ONSET: 121 MS
P ONSET: 122 MS
P ONSET: 122 MS
P ONSET: 126 MS
PHOSPHATE SERPL-MCNC: 4.3 MG/DL (ref 2.5–4.9)
PLATELET # BLD AUTO: 254 X10*3/UL (ref 150–450)
POTASSIUM SERPL-SCNC: 4.6 MMOL/L (ref 3.5–5.3)
PR INTERVAL: 186 MS
PR INTERVAL: 196 MS
PR INTERVAL: 210 MS
Q ONSET: 211 MS
Q ONSET: 219 MS
Q ONSET: 219 MS
Q ONSET: 220 MS
Q ONSET: 220 MS
QRS COUNT: 11 BEATS
QRS COUNT: 12 BEATS
QRS COUNT: 12 BEATS
QRS DURATION: 100 MS
QRS DURATION: 102 MS
QRS DURATION: 102 MS
QRS DURATION: 104 MS
QRS DURATION: 106 MS
QT INTERVAL: 438 MS
QT INTERVAL: 448 MS
QT INTERVAL: 452 MS
QT INTERVAL: 458 MS
QT INTERVAL: 458 MS
QTC CALCULATION(BAZETT): 477 MS
QTC CALCULATION(BAZETT): 483 MS
QTC CALCULATION(BAZETT): 483 MS
QTC CALCULATION(BAZETT): 489 MS
QTC CALCULATION(BAZETT): 493 MS
QTC FREDERICIA: 469 MS
QTC FREDERICIA: 471 MS
QTC FREDERICIA: 475 MS
QTC FREDERICIA: 475 MS
QTC FREDERICIA: 478 MS
R AXIS: 48 DEGREES
R AXIS: 50 DEGREES
R AXIS: 55 DEGREES
R AXIS: 57 DEGREES
R AXIS: 59 DEGREES
RBC # BLD AUTO: 3.62 X10*6/UL (ref 4.5–5.9)
RH FACTOR (ANTIGEN D): NORMAL
SODIUM SERPL-SCNC: 137 MMOL/L (ref 136–145)
T AXIS: -24 DEGREES
T AXIS: -25 DEGREES
T AXIS: -35 DEGREES
T AXIS: -38 DEGREES
T AXIS: -43 DEGREES
T OFFSET: 430 MS
T OFFSET: 444 MS
T OFFSET: 445 MS
T OFFSET: 448 MS
T OFFSET: 449 MS
VENTRICULAR RATE: 67 BPM
VENTRICULAR RATE: 73 BPM
VENTRICULAR RATE: 75 BPM
WBC # BLD AUTO: 7 X10*3/UL (ref 4.4–11.3)

## 2024-07-08 PROCEDURE — 82947 ASSAY GLUCOSE BLOOD QUANT: CPT

## 2024-07-08 PROCEDURE — 85027 COMPLETE CBC AUTOMATED: CPT | Performed by: NURSE PRACTITIONER

## 2024-07-08 PROCEDURE — 99232 SBSQ HOSP IP/OBS MODERATE 35: CPT | Performed by: PHYSICIAN ASSISTANT

## 2024-07-08 PROCEDURE — 2500000001 HC RX 250 WO HCPCS SELF ADMINISTERED DRUGS (ALT 637 FOR MEDICARE OP): Performed by: NURSE PRACTITIONER

## 2024-07-08 PROCEDURE — 2500000001 HC RX 250 WO HCPCS SELF ADMINISTERED DRUGS (ALT 637 FOR MEDICARE OP): Performed by: PHYSICIAN ASSISTANT

## 2024-07-08 PROCEDURE — 93010 ELECTROCARDIOGRAM REPORT: CPT | Performed by: INTERNAL MEDICINE

## 2024-07-08 PROCEDURE — 2500000001 HC RX 250 WO HCPCS SELF ADMINISTERED DRUGS (ALT 637 FOR MEDICARE OP): Performed by: STUDENT IN AN ORGANIZED HEALTH CARE EDUCATION/TRAINING PROGRAM

## 2024-07-08 PROCEDURE — 2500000002 HC RX 250 W HCPCS SELF ADMINISTERED DRUGS (ALT 637 FOR MEDICARE OP, ALT 636 FOR OP/ED): Performed by: NURSE PRACTITIONER

## 2024-07-08 PROCEDURE — 80069 RENAL FUNCTION PANEL: CPT | Performed by: NURSE PRACTITIONER

## 2024-07-08 PROCEDURE — S0109 METHADONE ORAL 5MG: HCPCS | Performed by: NURSE PRACTITIONER

## 2024-07-08 PROCEDURE — 2500000004 HC RX 250 GENERAL PHARMACY W/ HCPCS (ALT 636 FOR OP/ED): Performed by: STUDENT IN AN ORGANIZED HEALTH CARE EDUCATION/TRAINING PROGRAM

## 2024-07-08 PROCEDURE — 2500000002 HC RX 250 W HCPCS SELF ADMINISTERED DRUGS (ALT 637 FOR MEDICARE OP, ALT 636 FOR OP/ED): Performed by: STUDENT IN AN ORGANIZED HEALTH CARE EDUCATION/TRAINING PROGRAM

## 2024-07-08 PROCEDURE — 93005 ELECTROCARDIOGRAM TRACING: CPT

## 2024-07-08 PROCEDURE — 99233 SBSQ HOSP IP/OBS HIGH 50: CPT | Performed by: INTERNAL MEDICINE

## 2024-07-08 PROCEDURE — 36415 COLL VENOUS BLD VENIPUNCTURE: CPT | Performed by: PHYSICIAN ASSISTANT

## 2024-07-08 PROCEDURE — 2500000002 HC RX 250 W HCPCS SELF ADMINISTERED DRUGS (ALT 637 FOR MEDICARE OP, ALT 636 FOR OP/ED)

## 2024-07-08 PROCEDURE — 36415 COLL VENOUS BLD VENIPUNCTURE: CPT | Performed by: NURSE PRACTITIONER

## 2024-07-08 PROCEDURE — 1200000002 HC GENERAL ROOM WITH TELEMETRY DAILY

## 2024-07-08 ASSESSMENT — COGNITIVE AND FUNCTIONAL STATUS - GENERAL
CLIMB 3 TO 5 STEPS WITH RAILING: A LITTLE
MOBILITY SCORE: 23
DAILY ACTIVITIY SCORE: 24
MOBILITY SCORE: 23
CLIMB 3 TO 5 STEPS WITH RAILING: A LITTLE

## 2024-07-08 ASSESSMENT — PAIN - FUNCTIONAL ASSESSMENT: PAIN_FUNCTIONAL_ASSESSMENT: 0-10

## 2024-07-08 ASSESSMENT — PAIN SCALES - GENERAL: PAINLEVEL_OUTOF10: 0 - NO PAIN

## 2024-07-08 NOTE — CONSULTS
"Inpatient Diabetes Education Consult    Reason for Visit:  Kael Zepeda is a 58 y.o. male who presents for s/p NSTEMI with hx IDDMT2.      Consulting Service/Provider: Dr. Rivera    Visit Type: Initial visit    Visit Modality: In-person    Discharge Equipment/Supply Needs:       Patient has supplies at home: Blood glucose meter:  , Testing strips:  , Lancets, and Pen needles    Patient History and Assessment:  New diagnosis: Type 2  Previous diagnosis: Type 2  Patient known to Diabetes Education department: No  Treatment prior to hospital admission: Oral medications Metformin  Insulin: Rapid acting, Long acting, via pen  Blood glucose testing: Twice Daily  Complications: peripheral neuropathy, cardiovascular disease, and peripheral vascular disease  PTA Medications:    Current Outpatient Medications   Medication Instructions    albuterol 90 mcg/actuation inhaler 2 puffs, inhalation, Every 6 hours PRN    ammonium lactate (Amlactin) 12 % cream Topical    aspirin 81 mg, oral, Daily    atorvastatin (LIPITOR) 40 mg, oral, Daily RT    BD Lois 2nd Gen Pen Needle 32 gauge x 5/32\" needle USE 1 NEEDLE ONCE DAILY    celecoxib (CELEBREX) 200 mg, oral, 2 times daily    DULoxetine (CYMBALTA) 60 mg, Every 24 hours    furosemide (LASIX) 40 mg, oral, Daily    hydroxychloroquine (Plaquenil) 200 mg tablet 1 tablet, oral, 2 times daily    ibuprofen 800 mg, oral, Every 8 hours PRN    insulin lispro (HUMALOG) 10 Units, subcutaneous    Levemir FlexPen 50 Units, subcutaneous, Nightly    levothyroxine (SYNTHROID, LEVOXYL) 50 mcg, oral, Daily    lisinopril 20 mg, oral, Daily    metFORMIN (GLUCOPHAGE) 500 mg, oral, 2 times daily (morning and late afternoon)    methadone (DOLOPHINE) 77 mg, oral, IN THE MORNING THEN 78 MG IN THE EVENING    omeprazole (PRILOSEC) 40 mg, oral, Daily    predniSONE (Deltasone) 2.5 mg tablet 2 tablets, oral, Daily    pregabalin (LYRICA) 200 mg, oral, 3 times daily    riTUXimab-abbs (TRUXIMA) 1,000 mg, Every 6 " "months    Spiriva Respimat 2.5 mcg/actuation inhaler 2 puffs, inhalation, Daily    Wixela Inhub 100-50 mcg/dose diskus inhaler INHALE 1 PUFF AS INSTRUCTED TWICE DAILY. RINSE AND GARGLE MOUTH WITH WATER AFTER EACH USE.       Glucose   Date/Time Value Ref Range Status   07/07/2024 06:45  (H) 74 - 99 mg/dL Final   07/06/2024 08:04  (H) 74 - 99 mg/dL Final   07/05/2024 04:11  (H) 74 - 99 mg/dL Final   07/04/2024 05:13  (H) 74 - 99 mg/dL Final   07/03/2024 08:02  (H) 74 - 99 mg/dL Final   07/02/2024 04:08 PM 81 74 - 99 mg/dL Final   07/02/2024 09:00  (H) 74 - 99 mg/dL Final   07/01/2024 04:58  (H) 74 - 99 mg/dL Final   06/30/2024 04:47  (H) 74 - 99 mg/dL Final   06/30/2024 01:40  (H) 74 - 99 mg/dL Final     No results found for: \"CPEPTIDE\"  Hemoglobin A1C   Date Value Ref Range Status   05/15/2024 8.5 (H) 4.3 - 5.6 % Final     Comment:     American Diabetes Association guidelines indicate that patients with HgbA1c in the range 5.7-6.4% are at increased risk for development of diabetes, and intervention by lifestyle modification may be beneficial. HgbA1c greater or equal to 6.5% is considered diagnostic of diabetes.   02/20/2024 9.4 (H) 4.3 - 5.6 % Final     Comment:     American Diabetes Association guidelines indicate that patients with HgbA1c in the range 5.7-6.4% are at increased risk for development of diabetes, and intervention by lifestyle modification may be beneficial. HgbA1c greater or equal to 6.5% is considered diagnostic of diabetes.   10/12/2023 9.6 (H) see below % Final     POCT Hemoglobin A1C   Date Value Ref Range Status   02/12/2024 10.0 (A) 4.3 - 5.6 % Final     Comment:     Location:[facility], [address], [place], [place], [address]  Point of care (POC) Hemoglobin A1c (HGBA1C) testing is intended to assess  glucose control and provide a management tool for patients known to have  diabetes and their healthcare providers.  Target HGBA1C " levels may depend on  specific clinical circumstances.  POC HGBA1C is not intended for use as a  diagnostic or screening test; laboratory-based testing should be used for  diagnostic purposes.  The following information is supplemental and may not  be applicable to specific diabetes management situations:  The POC device   provides a normal range of 4.2% to 6.5% for the HGBA1C POC test.  However, the American Diabetes Association  guidelines indicate that  patients with HGBA1C in the range of 5.7% to 6.4% are at increased risk for  development of diabetes and that intervention by lifestyle modification may  be beneficial.  A HGBA1C level greater than or equal to 6.5% is considered  diagnostic of diabetes, pending confirmatory testing.  Use of HGBA1C testing  to evaluate glucose control may not be appropriate for patients with  hemoglobin variants or other conditions (e.g. anemia) that alter red blood  cell lifespan.   08/14/2023 9.5 (A) 4.2 - 5.6 % Final     Comment:     Location:[facility], [address] [place] [address], [facility]  Point of care (POC) Hemoglobin A1c (HGBA1C) testing is intended to assess  glucose control and provide a management tool for patients known to have  diabetes and their healthcare providers.  Target HGBA1C levels may depend on  specific clinical circumstances.  POC HGBA1C is not intended for use as a  diagnostic or screening test; laboratory-based testing should be used for  diagnostic purposes.  The following information is supplemental and may not  be applicable to specific diabetes management situations:  The POC device   provides a normal range of 4.2% to 6.5% for the HGBA1C POC test.  However, the American Diabetes Association  guidelines indicate that  patients with HGBA1C in the range of 5.7% to 6.4% are at increased risk for  development of diabetes and that intervention by lifestyle modification may  be beneficial.  A HGBA1C level greater than or  equal to 6.5% is considered  diagnostic of diabetes, pending confirmatory testing.  Use of HGBA1C testing  to evaluate glucose control may not be appropriate for patients with  hemoglobin variants or other conditions (e.g. anemia) that alter red blood  cell lifespan.       Patient Learning/Readiness Assessment:  Mr. Zepeda is sleepy but agreeable to visit today.      Interventions/Topics Covered:  See After Visit Summary for handouts/information sheets provided to patient.  Education Documentation  No documentation found.        Additional topics covered: Overall review of insulin plan.  He is comfortable with insulin regimen at home (basal/bolus with oral meds).  He is comfortable with insulin pen use.  Has had DM x 20+ years.    Review of A1c - aware that prior reading was 7.9%.  States he follows the med regimen as prescribed and does not miss doses of meds.  He states he is managing DM at home.  Has a brother who lives with him and they help each other.  Additional materials provided: n/a    CGM:  n/a    Education Outcome/Recommendations:        Recommendations for bedside nursing: Allow patient to self-inject insulin (supervised)    Recommendations for Providers: Follow-up w/ PCP and/or Endocrinology    Additional Comments: Mr. Zepeda states he is to have CABG surgery tomorrow.  He is agreeable to follow-up end of week.  Time spetn:  20 mins.

## 2024-07-08 NOTE — PROGRESS NOTES
Subjective Data:  - no complaints this am, laying on his side resting  - encourage OOB and ambulation  - CABG Tuesday  - QTc today 483    Overnight Events:    No acute events overnight.     Objective Data:  Last Recorded Vitals:  Vitals:    07/08/24 0447 07/08/24 0617 07/08/24 0742 07/08/24 1206   BP: 95/57  108/68    BP Location: Right arm      Patient Position: Sitting      Pulse: 67  72    Resp: 16  12    Temp: 36.2 °C (97.2 °F)  36.4 °C (97.5 °F) 36.7 °C (98.1 °F)   TempSrc: Temporal      SpO2: 95%  97%    Weight:  87.2 kg (192 lb 3.9 oz)     Height:           Last Labs:  Results for orders placed or performed during the hospital encounter of 07/01/24 (from the past 24 hour(s))   POCT GLUCOSE   Result Value Ref Range    POCT Glucose 202 (H) 74 - 99 mg/dL   POCT GLUCOSE   Result Value Ref Range    POCT Glucose 277 (H) 74 - 99 mg/dL   CBC   Result Value Ref Range    WBC 7.7 4.4 - 11.3 x10*3/uL    nRBC 0.0 0.0 - 0.0 /100 WBCs    RBC 3.84 (L) 4.50 - 5.90 x10*6/uL    Hemoglobin 9.1 (L) 13.5 - 17.5 g/dL    Hematocrit 31.1 (L) 41.0 - 52.0 %    MCV 81 80 - 100 fL    MCH 23.7 (L) 26.0 - 34.0 pg    MCHC 29.3 (L) 32.0 - 36.0 g/dL    RDW 17.4 (H) 11.5 - 14.5 %    Platelets 305 150 - 450 x10*3/uL   Renal function panel   Result Value Ref Range    Glucose 200 (H) 74 - 99 mg/dL    Sodium 138 136 - 145 mmol/L    Potassium 4.4 3.5 - 5.3 mmol/L    Chloride 101 98 - 107 mmol/L    Bicarbonate 31 21 - 32 mmol/L    Anion Gap 10 10 - 20 mmol/L    Urea Nitrogen 15 6 - 23 mg/dL    Creatinine 0.87 0.50 - 1.30 mg/dL    eGFR >90 >60 mL/min/1.73m*2    Calcium 8.8 8.6 - 10.6 mg/dL    Phosphorus 3.9 2.5 - 4.9 mg/dL    Albumin 3.7 3.4 - 5.0 g/dL   POCT GLUCOSE   Result Value Ref Range    POCT Glucose 180 (H) 74 - 99 mg/dL   POCT GLUCOSE   Result Value Ref Range    POCT Glucose 146 (H) 74 - 99 mg/dL   POCT GLUCOSE   Result Value Ref Range    POCT Glucose 123 (H) 74 - 99 mg/dL   ECG 12 lead   Result Value Ref Range    Ventricular Rate 67 BPM     Atrial Rate 67 BPM    LA Interval 196 ms    QRS Duration 106 ms    QT Interval 458 ms    QTC Calculation(Bazett) 483 ms    P Axis 56 degrees    R Axis 48 degrees    T Axis -38 degrees    QRS Count 11 beats    Q Onset 219 ms    P Onset 121 ms    P Offset 176 ms    T Offset 448 ms    QTC Fredericia 475 ms   VERIFY ABO/Rh Group Test   Result Value Ref Range    ABO TYPE A     Rh TYPE POS    POCT GLUCOSE   Result Value Ref Range    POCT Glucose 160 (H) 74 - 99 mg/dL        TROPHS   Date/Time Value Ref Range Status   06/30/2024 02:36  0 - 20 ng/L Final     Comment:     Previous result verified on 6/30/2024 0210 on specimen/case [id number] called with component [facility] for procedure Troponin I, High Sensitivity, Initial with value 146 ng/L.   06/30/2024 01:40  0 - 20 ng/L Final     BNP   Date/Time Value Ref Range Status   07/02/2024 09:00  0 - 99 pg/mL Final     HGBA1C   Date/Time Value Ref Range Status   05/15/2024 10:26 AM 8.5 4.3 - 5.6 % Final     Comment:     American Diabetes Association guidelines indicate that patients with HgbA1c in the range 5.7-6.4% are at increased risk for development of diabetes, and intervention by lifestyle modification may be beneficial. HgbA1c greater or equal to 6.5% is considered diagnostic of diabetes.   02/20/2024 08:51 AM 9.4 4.3 - 5.6 % Final     Comment:     American Diabetes Association guidelines indicate that patients with HgbA1c in the range 5.7-6.4% are at increased risk for development of diabetes, and intervention by lifestyle modification may be beneficial. HgbA1c greater or equal to 6.5% is considered diagnostic of diabetes.   10/12/2023 03:27 AM 9.6 see below % Final     LDLCALC   Date/Time Value Ref Range Status   07/02/2024 04:08 PM 60 <=99 mg/dL Final     Comment:                                 Near   Borderline      AGE      Desirable  Optimal    High     High     Very High     0-19 Y     0 - 109     ---    110-129   >/= 130     ----    20-24 Y      0 - 119     ---    120-159   >/= 160     ----      >24 Y     0 -  99   100-129  130-159   160-189     >/=190       VLDL   Date/Time Value Ref Range Status   07/02/2024 04:08 PM 16 0 - 40 mg/dL Final   01/20/2021 01:08 PM 66 0 - 40 mg/dL Final   10/06/2020 11:50 AM 67 0 - 40 mg/dL Final   12/17/2019 02:57 PM 57 0 - 40 mg/dL Final      Last I/O:  I/O last 3 completed shifts:  In: 500 (5.7 mL/kg) [P.O.:500]  Out: 225 (2.6 mL/kg) [Urine:225 (0.1 mL/kg/hr)]  Weight: 87.2 kg     Echo:  Transthoracic Echo (TTE) Complete 07/01/2024    Ejection Fractions:  EF   Date/Time Value Ref Range Status   07/01/2024 07:49 AM 43 %      Inpatient Medications:  Scheduled medications   Medication Dose Route Frequency    aspirin  81 mg oral Daily    atorvastatin  80 mg oral Nightly    bisacodyl  10 mg rectal Daily    chlorhexidine  15 mL Mouth/Throat BID    DULoxetine  60 mg oral q24h    hydroxychloroquine  200 mg oral BID    insulin detemir  30 Units subcutaneous Nightly    insulin lispro  0-10 Units subcutaneous TID    isosorbide dinitrate  10 mg oral TID    levothyroxine  50 mcg oral Daily    methadone  77.5 mg oral q PM    methadone  77.5 mg oral Daily    metoprolol succinate XL  50 mg oral Daily    pantoprazole  40 mg oral Daily before breakfast    polyethylene glycol  17 g oral Daily    predniSONE  5 mg oral Daily    pregabalin  200 mg oral TID    sennosides-docusate sodium  1 tablet oral BID    tiotropium  2 puff inhalation Daily     PRN medications   Medication    acetaminophen    albuterol    dextrose    dextrose    glucagon    heparin     Continuous Medications   Medication Dose Last Rate    heparin  0-4,000 Units/hr 1,400 Units/hr (07/08/24 0224)       Physical Exam:  General: NAD, sitting on edge of bed  Skin: warm and dry   Head/ neck: no JVD seen at 90 degrees  Cardiac: RRR, S1, S2   Pulm: CTAB, on RA  GI: soft, nontender   Extremities: tr BLE edema  in feet/ankles; bilateral feet with nonhealing ulcers of varying depths  (see Media tab)  Neuro: no focal neuro deficits   Psych: appropriate mood and behavior       Assessment/Plan   Kael Zepeda is a 58 y.o. male with PMHx s/f RA, IDDM2, COPD, hypothyroidism, chronic tobacco abuse, chronic methadone dependency, neuropathy/diabetic foot ulcer with ?osteomyelitis. Patient presented to [facility] on 6/30 with intermittent chest pain, now s/p LHC showing multi-vessels CAD, [place] [facility] [place].      Multivessel CAD with proximal LAD involvement  NSTEMI  Dyslipidemia   - Intermittent chest pain began ~6/23/24, worsened in the last 3 days prior to admission on 6/30  - ECG showing sinus rhythm with q waves in inferior leads.  - HS Trop of 146->135, started on a heparin infusion, continue    - s/p ASA & Ticagrelor load 6/30, last Ticagrelor dose on 7/1 AM  - LHC 7/1/24 at [facility]: multivessel disease with 100% mid RCA, 70% Lt Circ, and 80% prox to mid LAD  - TTE 6/30: EF 40-45%, see below for further details   - Lipids 7/2: Chol 122, HDL 45.9, LDL 60, Tri 81  - CTS consulted, cath images available on syngo;  - PFT's: FVC 89%, FEV1 90%, FEV1/%  - cont Heparin drip  - cont isordil 10mg TID, CP improved but still present, stable  - per CT Surgery, plan is for 3 vessel CABG 7/9 with Dr. Carey, NPO at midnight tonight   - cont ASA 81, heparin gtt, Atorvastatin 81, Toprol XL 50  - cardiac diet    Acute Combined Systolic and Diastolic Heart Failure, ICM  HFmrEF (EF 40-45%), ACC/AHA class C  - No prior cardiac history  - TTE 7/1/24: EF 40-45%, pseudonormal DF, akinetic inferior and inferolateral walls  - CXR: no acute cardiopulmonary process, chronic interstial changes   - , mildly SOB but unsure if related to ongoing angina  - appears euvolemic on exam; weaned to RA  - consider adding ARNI/ MRA/ SGLT2i post revascularization  - Not currently on diuretics, add as clinically indicated   - switch metoprolol tart 25mg BID to Toprol XL 50 mg daily   - Daily  standing weights, strict I&O's, 2L fluid restriction      Hx Hypertension  - SBP last 24 hrs: 90-100s  - Continue with meds as above      Diabetes mellitus  - most recent A1C 8.5% on 5/15/24, was 10.4% on 2/15/23  - DM education consult placed  - home regimen:Levemir 50 units nightly, Metformin 500 mg BID  - 7/3 hypoglycemic to 47, pt was symptomatic  - 7/6 hypoglycemic to 46, asymptomatic  - 7/6 decreased Levemir to 30 units nightly, SSI scale #2  - BG last 24 hrs: 116-200  - diabetic diet, Accu-Cheks AC/HS, hypoglycemic protocol     Neuropathy with Nonhealing ulcerations/wounds  - follows weekly with [facility]  - reports had right great toe amputation 6-7 months ago and hasnt healed since; also reports he will just wake up with new ulcers on his left foot  - osteomyelitis was in chart but patient denies this diagnosis  - reports being prescribed chronic antibiotic for his feet however due to an interaction with hydroxychloroquine he takes it every three days instead of daily; called home pharmacy which they believe the antibiotic is sulfasalazine   - Podiatry consulted; foot images in chart, appreciate recs  - per Podiatry, no barriers to proceeding with cardiac surgery, f/up w/ [facility], now s/o   - Cont home Lyrica 200mg TID    Methadone Dependence   - has been on it for 15 years, goes to the [facility] in [place] for methadone weekly; reports total home dose 165mg divided   - currently on 77.5mg BID  - per Medical Toxicology, ok to continue home Methadone, monitor daily EKG's  - QTc today 483 ([phone] [phone] 511)     COPD/tobacco use  - Continue home inhalers  - Discussed in length with him the importance of smoking cessation as well as several strategies to assist him in quitting smoking. Patient states he plans to quit after this hospitalization.  - offer NRT as indicated, pt currently declines     RA  - cont home Plaquenil 200mg BID and Prednisone 5mg daily      Hypothyroidism  - TSH 2.67  - cont home Synthroid 50mcg daily     GERD  - cont Pantoprazole 40 daily, substitute for home Omeprazole    DVT ppx: Heparin drip    Dispo: pending CABG 7/9    Code Status: Full Code  NOK: Vin Zepeda (friend) [phone]    Seen and discussed with Dr Nicole Garcia, APRN-CNP, DNP

## 2024-07-08 NOTE — PROGRESS NOTES
"CARDIAC SURGERY CONSULT PROGRESS NOTE    SUBJECTIVE  Mr. Zepeda is a 59yo male with a history of RA on chronic prednisone and Plaquenil, DM2 on insulin, COPD, hypothyroidism, tobacco use (40 pack years) and chronic methadone use, neuropathy and unhealed diabetic foot ulcers (s/p right foot partial amputation ~7 months ago) and follows with the wound clinic as outpatient. The patient stated he has had intermittent chest pain worse with exertion over the past month. He also reports worsening CALDERON. Upon admission to the ED, he was diagnosed with an NSTEMI. Loaded with ASA. C with 3vCAD. TTE EF 40-45%.       Cardiac surgery consulted for a CABG evaluation.       Objective   /68   Pulse 72   Temp 36.7 °C (98.1 °F)   Resp 12   Ht 1.676 m (5' 6\")   Wt 87.2 kg (192 lb 3.9 oz)   SpO2 97%   BMI 31.03 kg/m²       Vitals:    07/08/24 0617   Weight: 87.2 kg (192 lb 3.9 oz)          Intake/Output Summary (Last 24 hours) at 7/8/2024 1548  Last data filed at 7/7/2024 2238  Gross per 24 hour   Intake 500 ml   Output --   Net 500 ml       Physical Exam  Constitutional:       General: He is not in acute distress.     Appearance: He is not ill-appearing.   HENT:      Head: Normocephalic.      Mouth/Throat:      Mouth: Mucous membranes are moist.   Cardiovascular:      Rate and Rhythm: Normal rate and regular rhythm.      Heart sounds: No murmur heard.  Pulmonary:      Effort: Pulmonary effort is normal.      Breath sounds: Normal breath sounds.   Musculoskeletal:         General: Normal range of motion.      Cervical back: Neck supple.      Right lower leg: No edema.      Left lower leg: No edema.   Skin:     General: Skin is warm and dry.   Neurological:      General: No focal deficit present.      Mental Status: He is alert and oriented to person, place, and time.   Psychiatric:         Mood and Affect: Mood normal.         Behavior: Behavior normal.        Medications  Scheduled medications  aspirin, 81 mg, oral, " Daily  atorvastatin, 80 mg, oral, Nightly  bisacodyl, 10 mg, rectal, Daily  chlorhexidine, 15 mL, Mouth/Throat, BID  DULoxetine, 60 mg, oral, q24h  hydroxychloroquine, 200 mg, oral, BID  insulin detemir, 30 Units, subcutaneous, Nightly  insulin lispro, 0-10 Units, subcutaneous, TID  isosorbide dinitrate, 10 mg, oral, TID  levothyroxine, 50 mcg, oral, Daily  methadone, 77.5 mg, oral, q PM  methadone, 77.5 mg, oral, Daily  metoprolol succinate XL, 50 mg, oral, Daily  pantoprazole, 40 mg, oral, Daily before breakfast  polyethylene glycol, 17 g, oral, Daily  predniSONE, 5 mg, oral, Daily  pregabalin, 200 mg, oral, TID  sennosides-docusate sodium, 1 tablet, oral, BID  tiotropium, 2 puff, inhalation, Daily    Continuous medications  heparin, 0-4,000 Units/hr, Last Rate: 1,400 Units/hr (07/08/24 0224)    PRN medications  PRN medications: acetaminophen **OR** [DISCONTINUED] acetaminophen **OR** [DISCONTINUED] acetaminophen, albuterol, dextrose, dextrose, glucagon, heparin    Labs  Results for orders placed or performed during the hospital encounter of 07/01/24 (from the past 24 hour(s))   POCT GLUCOSE   Result Value Ref Range    POCT Glucose 277 (H) 74 - 99 mg/dL   CBC   Result Value Ref Range    WBC 7.7 4.4 - 11.3 x10*3/uL    nRBC 0.0 0.0 - 0.0 /100 WBCs    RBC 3.84 (L) 4.50 - 5.90 x10*6/uL    Hemoglobin 9.1 (L) 13.5 - 17.5 g/dL    Hematocrit 31.1 (L) 41.0 - 52.0 %    MCV 81 80 - 100 fL    MCH 23.7 (L) 26.0 - 34.0 pg    MCHC 29.3 (L) 32.0 - 36.0 g/dL    RDW 17.4 (H) 11.5 - 14.5 %    Platelets 305 150 - 450 x10*3/uL   Renal function panel   Result Value Ref Range    Glucose 200 (H) 74 - 99 mg/dL    Sodium 138 136 - 145 mmol/L    Potassium 4.4 3.5 - 5.3 mmol/L    Chloride 101 98 - 107 mmol/L    Bicarbonate 31 21 - 32 mmol/L    Anion Gap 10 10 - 20 mmol/L    Urea Nitrogen 15 6 - 23 mg/dL    Creatinine 0.87 0.50 - 1.30 mg/dL    eGFR >90 >60 mL/min/1.73m*2    Calcium 8.8 8.6 - 10.6 mg/dL    Phosphorus 3.9 2.5 - 4.9 mg/dL     Albumin 3.7 3.4 - 5.0 g/dL   POCT GLUCOSE   Result Value Ref Range    POCT Glucose 180 (H) 74 - 99 mg/dL   POCT GLUCOSE   Result Value Ref Range    POCT Glucose 146 (H) 74 - 99 mg/dL   POCT GLUCOSE   Result Value Ref Range    POCT Glucose 123 (H) 74 - 99 mg/dL   ECG 12 lead   Result Value Ref Range    Ventricular Rate 67 BPM    Atrial Rate 67 BPM    UT Interval 196 ms    QRS Duration 106 ms    QT Interval 458 ms    QTC Calculation(Bazett) 483 ms    P Axis 56 degrees    R Axis 48 degrees    T Axis -38 degrees    QRS Count 11 beats    Q Onset 219 ms    P Onset 121 ms    P Offset 176 ms    T Offset 448 ms    QTC Fredericia 475 ms   VERIFY ABO/Rh Group Test   Result Value Ref Range    ABO TYPE A     Rh TYPE POS    POCT GLUCOSE   Result Value Ref Range    POCT Glucose 160 (H) 74 - 99 mg/dL               ASSESSMENT & PLAN  Mr. Zepeda is a 57yo male with a history of RA on chronic prednisone and Plaquenil, DM2 on insulin, COPD, hypothyroidism, tobacco use (40 pack years) and chronic methadone use, neuropathy and unhealed diabetic foot ulcers (s/p right foot partial amputation ~7 months ago) and follows with the wound clinic as outpatient. The patient stated he has had intermittent chest pain worse with exertion over the past month. He also reports worsening CALDERON. Upon admission to the ED, he was diagnosed with an NSTEMI. Loaded with ASA. LHC with 3vCAD. TTE EF 40-45%.       Cardiac surgery consulted for a CABG evaluation.         RECOMMENDATIONS/PLAN  Dr. Carey met with patient and reviewed imaging and data  Plan for OR 7/9 Tuesday morning for CABG  - Appreciate podiatry consult given history of R great toe amputation and non healing foot ulcers - debrided ulcers and re-dressed, recommend daily dressing changes, not prohibitive for surgery.   - LHC and TTE in EMR  - Medical optimization per primary team  - Preop risk stratification studies/labs ordered in EMR by our team  --- US Carotids/Vein Mapping/ LE US EBEN  ---  PFTs (spirometry and room air ABG) - FVC and FEV1 89% and 90% respectively   --- 2 view CXR  --- MRSA, UA/Culture, LFTs, HgbA1c, TSH/T4, Lipid panel  - Dental evaluation not indicated for isolated CAB surgeries   - Continue ASA, high-intensity statin, and BB (or document contraindications for use)  - No ACEi/ARBs in the pre-op period (at least 48 hours)  - Hold any SGLT2 inhibitors (Farxiga, Jardiance, etc.) for at least 3 days prior to cardiac surgery to prevent euglycemic DKA  - No antiplatelets other than ASA, no anticoagulants other than Heparin  - NPO after midnight, blood on hold/T&S, and preop scrubs ordered for OR 7/9       Thank you for the consultation.   Patient educated and all questions answered.  Please page the cardiac surgery consult pager [phone] with any questions or changes in patient condition.     Liz Eric PA-C  Cardiac Surgery Consult [facility]  [facility]  Cardiac Surgery Consult Pager [phone]     7/8/2024  3:48 PM

## 2024-07-09 ENCOUNTER — ANESTHESIA (OUTPATIENT)
Dept: OPERATING ROOM | Facility: HOSPITAL | Age: 59
End: 2024-07-09
Payer: MEDICARE

## 2024-07-09 ENCOUNTER — HOSPITAL ENCOUNTER (OUTPATIENT)
Dept: OPERATING ROOM | Facility: HOSPITAL | Age: 59
Discharge: HOME | End: 2024-07-09
Payer: MEDICARE

## 2024-07-09 ENCOUNTER — APPOINTMENT (OUTPATIENT)
Dept: RADIOLOGY | Facility: HOSPITAL | Age: 59
End: 2024-07-09
Payer: MEDICARE

## 2024-07-09 ENCOUNTER — APPOINTMENT (OUTPATIENT)
Dept: CARDIOLOGY | Facility: HOSPITAL | Age: 59
End: 2024-07-09
Payer: MEDICARE

## 2024-07-09 PROBLEM — B19.20 HEPATITIS C: Status: ACTIVE | Noted: 2024-07-09

## 2024-07-09 LAB
ABO GROUP (TYPE) IN BLOOD: NORMAL
ACT BLD: 101 SEC (ref 82–174)
ACT BLD: 107 SEC (ref 82–174)
ACT BLD: 109 SEC (ref 82–174)
ACT BLD: 252 SEC (ref 82–174)
ACT BLD: 299 SEC (ref 82–174)
ACT BLD: 347 SEC (ref 82–174)
ACT BLD: 356 SEC (ref 82–174)
ACT BLD: 359 SEC (ref 82–174)
ACT BLD: 438 SEC (ref 82–174)
ACT BLD: 445 SEC (ref 82–174)
ACT BLD: 98 SEC (ref 82–174)
ALBUMIN SERPL BCP-MCNC: 3.5 G/DL (ref 3.4–5)
ANION GAP BLDA CALCULATED.4IONS-SCNC: 10 MMO/L (ref 10–25)
ANION GAP BLDA CALCULATED.4IONS-SCNC: 11 MMO/L (ref 10–25)
ANION GAP BLDA CALCULATED.4IONS-SCNC: 5 MMO/L (ref 10–25)
ANION GAP BLDA CALCULATED.4IONS-SCNC: 5 MMO/L (ref 10–25)
ANION GAP BLDA CALCULATED.4IONS-SCNC: 6 MMO/L (ref 10–25)
ANION GAP BLDA CALCULATED.4IONS-SCNC: 6 MMO/L (ref 10–25)
ANION GAP BLDA CALCULATED.4IONS-SCNC: 7 MMO/L (ref 10–25)
ANION GAP BLDA CALCULATED.4IONS-SCNC: 8 MMO/L (ref 10–25)
ANION GAP BLDA CALCULATED.4IONS-SCNC: 9 MMO/L (ref 10–25)
ANION GAP BLDV CALCULATED.4IONS-SCNC: 6 MMOL/L (ref 10–25)
ANION GAP SERPL CALC-SCNC: 12 MMOL/L (ref 10–20)
ANTIBODY SCREEN: NORMAL
APTT PPP: 29 SECONDS (ref 27–38)
BASE EXCESS BLDA CALC-SCNC: -0.2 MMOL/L (ref -2–3)
BASE EXCESS BLDA CALC-SCNC: -0.5 MMOL/L (ref -2–3)
BASE EXCESS BLDA CALC-SCNC: -0.6 MMOL/L (ref -2–3)
BASE EXCESS BLDA CALC-SCNC: 0.7 MMOL/L (ref -2–3)
BASE EXCESS BLDA CALC-SCNC: 1.2 MMOL/L (ref -2–3)
BASE EXCESS BLDA CALC-SCNC: 1.2 MMOL/L (ref -2–3)
BASE EXCESS BLDA CALC-SCNC: 2.3 MMOL/L (ref -2–3)
BASE EXCESS BLDA CALC-SCNC: 2.5 MMOL/L (ref -2–3)
BASE EXCESS BLDA CALC-SCNC: 3.1 MMOL/L (ref -2–3)
BASE EXCESS BLDA CALC-SCNC: 3.3 MMOL/L (ref -2–3)
BASE EXCESS BLDA CALC-SCNC: 3.9 MMOL/L (ref -2–3)
BASE EXCESS BLDA CALC-SCNC: 4 MMOL/L (ref -2–3)
BASE EXCESS BLDA CALC-SCNC: 5.6 MMOL/L (ref -2–3)
BASE EXCESS BLDV CALC-SCNC: 3.7 MMOL/L (ref -2–3)
BODY TEMPERATURE: 37 DEGREES CELSIUS
BUN SERPL-MCNC: 12 MG/DL (ref 6–23)
CA-I BLD-SCNC: 1.06 MMOL/L (ref 1.1–1.33)
CA-I BLDA-SCNC: 1.03 MMOL/L (ref 1.1–1.33)
CA-I BLDA-SCNC: 1.05 MMOL/L (ref 1.1–1.33)
CA-I BLDA-SCNC: 1.1 MMOL/L (ref 1.1–1.33)
CA-I BLDA-SCNC: 1.1 MMOL/L (ref 1.1–1.33)
CA-I BLDA-SCNC: 1.11 MMOL/L (ref 1.1–1.33)
CA-I BLDA-SCNC: 1.13 MMOL/L (ref 1.1–1.33)
CA-I BLDA-SCNC: 1.13 MMOL/L (ref 1.1–1.33)
CA-I BLDA-SCNC: 1.15 MMOL/L (ref 1.1–1.33)
CA-I BLDA-SCNC: 1.17 MMOL/L (ref 1.1–1.33)
CA-I BLDA-SCNC: 1.17 MMOL/L (ref 1.1–1.33)
CA-I BLDA-SCNC: 1.52 MMOL/L (ref 1.1–1.33)
CA-I BLDV-SCNC: 1.05 MMOL/L (ref 1.1–1.33)
CALCIUM SERPL-MCNC: 8.2 MG/DL (ref 8.6–10.6)
CHLORIDE BLDA-SCNC: 104 MMOL/L (ref 98–107)
CHLORIDE BLDA-SCNC: 105 MMOL/L (ref 98–107)
CHLORIDE BLDA-SCNC: 106 MMOL/L (ref 98–107)
CHLORIDE BLDA-SCNC: 106 MMOL/L (ref 98–107)
CHLORIDE BLDA-SCNC: 107 MMOL/L (ref 98–107)
CHLORIDE BLDV-SCNC: 104 MMOL/L (ref 98–107)
CHLORIDE SERPL-SCNC: 105 MMOL/L (ref 98–107)
CO2 SERPL-SCNC: 27 MMOL/L (ref 21–32)
COHGB MFR BLDA: 0.8 %
COHGB MFR BLDA: 1.1 %
COHGB MFR BLDA: 1.3 %
COHGB MFR BLDA: 1.4 %
COHGB MFR BLDA: 1.5 %
COHGB MFR BLDA: 1.5 %
COHGB MFR BLDA: 1.8 %
COHGB MFR BLDA: 1.9 %
COHGB MFR BLDV: 1.6 %
CREAT SERPL-MCNC: 0.71 MG/DL (ref 0.5–1.3)
DO-HGB MFR BLDA: 0 % (ref 0–5)
DO-HGB MFR BLDA: 0.3 % (ref 0–5)
DO-HGB MFR BLDA: 0.5 % (ref 0–5)
DO-HGB MFR BLDA: 0.6 % (ref 0–5)
DO-HGB MFR BLDA: 9.1 % (ref 0–5)
EGFRCR SERPLBLD CKD-EPI 2021: >90 ML/MIN/1.73M*2
ERYTHROCYTE [DISTWIDTH] IN BLOOD BY AUTOMATED COUNT: 16.7 % (ref 11.5–14.5)
FIBRINOGEN PPP-MCNC: 255 MG/DL (ref 200–400)
GLUCOSE BLD MANUAL STRIP-MCNC: 133 MG/DL (ref 74–99)
GLUCOSE BLD MANUAL STRIP-MCNC: 143 MG/DL (ref 74–99)
GLUCOSE BLD MANUAL STRIP-MCNC: 265 MG/DL (ref 74–99)
GLUCOSE BLD MANUAL STRIP-MCNC: 60 MG/DL (ref 74–99)
GLUCOSE BLDA-MCNC: 110 MG/DL (ref 74–99)
GLUCOSE BLDA-MCNC: 120 MG/DL (ref 74–99)
GLUCOSE BLDA-MCNC: 137 MG/DL (ref 74–99)
GLUCOSE BLDA-MCNC: 140 MG/DL (ref 74–99)
GLUCOSE BLDA-MCNC: 142 MG/DL (ref 74–99)
GLUCOSE BLDA-MCNC: 142 MG/DL (ref 74–99)
GLUCOSE BLDA-MCNC: 158 MG/DL (ref 74–99)
GLUCOSE BLDA-MCNC: 160 MG/DL (ref 74–99)
GLUCOSE BLDA-MCNC: 166 MG/DL (ref 74–99)
GLUCOSE BLDA-MCNC: 193 MG/DL (ref 74–99)
GLUCOSE BLDA-MCNC: 225 MG/DL (ref 74–99)
GLUCOSE BLDA-MCNC: 84 MG/DL (ref 74–99)
GLUCOSE BLDA-MCNC: 92 MG/DL (ref 74–99)
GLUCOSE BLDV-MCNC: 160 MG/DL (ref 74–99)
GLUCOSE SERPL-MCNC: 127 MG/DL (ref 74–99)
HCO3 BLDA-SCNC: 24.2 MMOL/L (ref 22–26)
HCO3 BLDA-SCNC: 24.8 MMOL/L (ref 22–26)
HCO3 BLDA-SCNC: 24.9 MMOL/L (ref 22–26)
HCO3 BLDA-SCNC: 25.4 MMOL/L (ref 22–26)
HCO3 BLDA-SCNC: 26 MMOL/L (ref 22–26)
HCO3 BLDA-SCNC: 26 MMOL/L (ref 22–26)
HCO3 BLDA-SCNC: 26.9 MMOL/L (ref 22–26)
HCO3 BLDA-SCNC: 27.2 MMOL/L (ref 22–26)
HCO3 BLDA-SCNC: 27.8 MMOL/L (ref 22–26)
HCO3 BLDA-SCNC: 27.8 MMOL/L (ref 22–26)
HCO3 BLDA-SCNC: 28.5 MMOL/L (ref 22–26)
HCO3 BLDA-SCNC: 28.5 MMOL/L (ref 22–26)
HCO3 BLDA-SCNC: 31.5 MMOL/L (ref 22–26)
HCO3 BLDV-SCNC: 29.1 MMOL/L (ref 22–26)
HCT VFR BLD AUTO: 27 % (ref 41–52)
HCT VFR BLD EST: 21 % (ref 41–52)
HCT VFR BLD EST: 21 % (ref 41–52)
HCT VFR BLD EST: 25 % (ref 41–52)
HCT VFR BLD EST: 26 % (ref 41–52)
HCT VFR BLD EST: 27 % (ref 41–52)
HCT VFR BLD EST: 28 % (ref 41–52)
HGB BLD-MCNC: 9 G/DL (ref 13.5–17.5)
HGB BLDA-MCNC: 7 G/DL (ref 13.5–17.5)
HGB BLDA-MCNC: 7 G/DL (ref 13.5–17.5)
HGB BLDA-MCNC: 8.3 G/DL (ref 13.5–17.5)
HGB BLDA-MCNC: 8.3 G/DL (ref 13.5–17.5)
HGB BLDA-MCNC: 8.8 G/DL (ref 13.5–17.5)
HGB BLDA-MCNC: 8.8 G/DL (ref 13.5–17.5)
HGB BLDA-MCNC: 9 G/DL (ref 13.5–17.5)
HGB BLDA-MCNC: 9.1 G/DL (ref 13.5–17.5)
HGB BLDA-MCNC: 9.2 G/DL (ref 13.5–17.5)
HGB BLDA-MCNC: 9.3 G/DL (ref 13.5–17.5)
HGB BLDV-MCNC: 6.9 G/DL (ref 13.5–17.5)
INHALED O2 CONCENTRATION: 100 %
INHALED O2 CONCENTRATION: 30 %
INHALED O2 CONCENTRATION: 36 %
INHALED O2 CONCENTRATION: 40 %
INHALED O2 CONCENTRATION: 40 %
INHALED O2 CONCENTRATION: 50 %
INHALED O2 CONCENTRATION: 65 %
INR PPP: 1.2 (ref 0.9–1.1)
LACTATE BLDA-SCNC: 0.5 MMOL/L (ref 0.4–2)
LACTATE BLDA-SCNC: 0.7 MMOL/L (ref 0.4–2)
LACTATE BLDA-SCNC: 0.8 MMOL/L (ref 0.4–2)
LACTATE BLDA-SCNC: 1 MMOL/L (ref 0.4–2)
LACTATE BLDA-SCNC: 1.1 MMOL/L (ref 0.4–2)
LACTATE BLDA-SCNC: 1.1 MMOL/L (ref 0.4–2)
LACTATE BLDA-SCNC: 1.2 MMOL/L (ref 0.4–2)
LACTATE BLDA-SCNC: 1.3 MMOL/L (ref 0.4–2)
LACTATE BLDA-SCNC: 2.1 MMOL/L (ref 0.4–2)
LACTATE BLDV-SCNC: 1.3 MMOL/L (ref 0.4–2)
MAGNESIUM SERPL-MCNC: 2.57 MG/DL (ref 1.6–2.4)
MCH RBC QN AUTO: 25 PG (ref 26–34)
MCHC RBC AUTO-ENTMCNC: 33.3 G/DL (ref 32–36)
MCV RBC AUTO: 75 FL (ref 80–100)
METHGB MFR BLDA: 0 % (ref 0–1.5)
METHGB MFR BLDA: 0 % (ref 0–1.5)
METHGB MFR BLDA: 0.2 % (ref 0–1.5)
METHGB MFR BLDA: 0.2 % (ref 0–1.5)
METHGB MFR BLDA: 0.3 % (ref 0–1.5)
METHGB MFR BLDA: 0.6 % (ref 0–1.5)
METHGB MFR BLDA: 0.7 % (ref 0–1.5)
METHGB MFR BLDA: 0.7 % (ref 0–1.5)
METHGB MFR BLDV: 0.7 % (ref 0–1.5)
NRBC BLD-RTO: 0 /100 WBCS (ref 0–0)
OXYHGB MFR BLDA: 88.3 % (ref 94–98)
OXYHGB MFR BLDA: 88.3 % (ref 94–98)
OXYHGB MFR BLDA: 94.2 % (ref 94–98)
OXYHGB MFR BLDA: 95.5 % (ref 94–98)
OXYHGB MFR BLDA: 95.8 % (ref 94–98)
OXYHGB MFR BLDA: 96.3 % (ref 94–98)
OXYHGB MFR BLDA: 97.5 % (ref 94–98)
OXYHGB MFR BLDA: 97.5 % (ref 94–98)
OXYHGB MFR BLDA: 97.8 % (ref 94–98)
OXYHGB MFR BLDA: 97.8 % (ref 94–98)
OXYHGB MFR BLDA: 97.9 % (ref 94–98)
OXYHGB MFR BLDA: 97.9 % (ref 94–98)
OXYHGB MFR BLDA: 98 % (ref 94–98)
OXYHGB MFR BLDA: 98.3 % (ref 94–98)
OXYHGB MFR BLDA: 98.3 % (ref 94–98)
OXYHGB MFR BLDA: 98.7 % (ref 94–98)
OXYHGB MFR BLDA: 98.7 % (ref 94–98)
OXYHGB MFR BLDV: 72.2 % (ref 45–75)
PCO2 BLDA: 37 MM HG (ref 38–42)
PCO2 BLDA: 39 MM HG (ref 38–42)
PCO2 BLDA: 40 MM HG (ref 38–42)
PCO2 BLDA: 41 MM HG (ref 38–42)
PCO2 BLDA: 42 MM HG (ref 38–42)
PCO2 BLDA: 44 MM HG (ref 38–42)
PCO2 BLDA: 47 MM HG (ref 38–42)
PCO2 BLDA: 52 MM HG (ref 38–42)
PCO2 BLDV: 48 MM HG (ref 41–51)
PH BLDA: 7.36 PH (ref 7.38–7.42)
PH BLDA: 7.38 PH (ref 7.38–7.42)
PH BLDA: 7.39 PH (ref 7.38–7.42)
PH BLDA: 7.39 PH (ref 7.38–7.42)
PH BLDA: 7.4 PH (ref 7.38–7.42)
PH BLDA: 7.41 PH (ref 7.38–7.42)
PH BLDA: 7.42 PH (ref 7.38–7.42)
PH BLDA: 7.44 PH (ref 7.38–7.42)
PH BLDA: 7.47 PH (ref 7.38–7.42)
PH BLDV: 7.39 PH (ref 7.33–7.43)
PHOSPHATE SERPL-MCNC: 3.1 MG/DL (ref 2.5–4.9)
PLATELET # BLD AUTO: 176 X10*3/UL (ref 150–450)
PO2 BLDA: 101 MM HG (ref 85–95)
PO2 BLDA: 130 MM HG (ref 85–95)
PO2 BLDA: 132 MM HG (ref 85–95)
PO2 BLDA: 132 MM HG (ref 85–95)
PO2 BLDA: 133 MM HG (ref 85–95)
PO2 BLDA: 139 MM HG (ref 85–95)
PO2 BLDA: 222 MM HG (ref 85–95)
PO2 BLDA: 446 MM HG (ref 85–95)
PO2 BLDA: 59 MM HG (ref 85–95)
PO2 BLDA: 74 MM HG (ref 85–95)
PO2 BLDA: 92 MM HG (ref 85–95)
PO2 BLDA: 92 MM HG (ref 85–95)
PO2 BLDA: 93 MM HG (ref 85–95)
PO2 BLDV: 43 MM HG (ref 35–45)
POTASSIUM BLDA-SCNC: 3.6 MMOL/L (ref 3.5–5.3)
POTASSIUM BLDA-SCNC: 3.6 MMOL/L (ref 3.5–5.3)
POTASSIUM BLDA-SCNC: 3.7 MMOL/L (ref 3.5–5.3)
POTASSIUM BLDA-SCNC: 3.8 MMOL/L (ref 3.5–5.3)
POTASSIUM BLDA-SCNC: 3.8 MMOL/L (ref 3.5–5.3)
POTASSIUM BLDA-SCNC: 4 MMOL/L (ref 3.5–5.3)
POTASSIUM BLDA-SCNC: 4.1 MMOL/L (ref 3.5–5.3)
POTASSIUM BLDA-SCNC: 4.2 MMOL/L (ref 3.5–5.3)
POTASSIUM BLDA-SCNC: 4.3 MMOL/L (ref 3.5–5.3)
POTASSIUM BLDA-SCNC: 4.6 MMOL/L (ref 3.5–5.3)
POTASSIUM BLDA-SCNC: 4.8 MMOL/L (ref 3.5–5.3)
POTASSIUM BLDV-SCNC: 4.1 MMOL/L (ref 3.5–5.3)
POTASSIUM SERPL-SCNC: 4.1 MMOL/L (ref 3.5–5.3)
PROTHROMBIN TIME: 14 SECONDS (ref 9.8–12.8)
RBC # BLD AUTO: 3.6 X10*6/UL (ref 4.5–5.9)
RH FACTOR (ANTIGEN D): NORMAL
SAO2 % BLDA: 100 % (ref 94–100)
SAO2 % BLDA: 91 % (ref 94–100)
SAO2 % BLDA: 97 % (ref 94–100)
SAO2 % BLDA: 98 % (ref 94–100)
SAO2 % BLDA: 99 % (ref 94–100)
SAO2 % BLDV: 74 % (ref 45–75)
SODIUM BLDA-SCNC: 134 MMOL/L (ref 136–145)
SODIUM BLDA-SCNC: 134 MMOL/L (ref 136–145)
SODIUM BLDA-SCNC: 135 MMOL/L (ref 136–145)
SODIUM BLDA-SCNC: 135 MMOL/L (ref 136–145)
SODIUM BLDA-SCNC: 136 MMOL/L (ref 136–145)
SODIUM BLDA-SCNC: 137 MMOL/L (ref 136–145)
SODIUM BLDV-SCNC: 135 MMOL/L (ref 136–145)
SODIUM SERPL-SCNC: 140 MMOL/L (ref 136–145)
WBC # BLD AUTO: 18.1 X10*3/UL (ref 4.4–11.3)

## 2024-07-09 PROCEDURE — 84132 ASSAY OF SERUM POTASSIUM: CPT

## 2024-07-09 PROCEDURE — C1900 LEAD, CORONARY VENOUS: HCPCS | Performed by: THORACIC SURGERY (CARDIOTHORACIC VASCULAR SURGERY)

## 2024-07-09 PROCEDURE — 85347 COAGULATION TIME ACTIVATED: CPT

## 2024-07-09 PROCEDURE — 2500000005 HC RX 250 GENERAL PHARMACY W/O HCPCS

## 2024-07-09 PROCEDURE — 3600000017 HC OR TIME - EACH INCREMENTAL 1 MINUTE - PROCEDURE LEVEL SIX: Performed by: THORACIC SURGERY (CARDIOTHORACIC VASCULAR SURGERY)

## 2024-07-09 PROCEDURE — 82947 ASSAY GLUCOSE BLOOD QUANT: CPT

## 2024-07-09 PROCEDURE — P9045 ALBUMIN (HUMAN), 5%, 250 ML: HCPCS | Mod: JZ

## 2024-07-09 PROCEDURE — S0109 METHADONE ORAL 5MG: HCPCS

## 2024-07-09 PROCEDURE — 83735 ASSAY OF MAGNESIUM: CPT

## 2024-07-09 PROCEDURE — A4312 CATH W/O BAG 2-WAY SILICONE: HCPCS | Performed by: THORACIC SURGERY (CARDIOTHORACIC VASCULAR SURGERY)

## 2024-07-09 PROCEDURE — 2500000004 HC RX 250 GENERAL PHARMACY W/ HCPCS (ALT 636 FOR OP/ED)

## 2024-07-09 PROCEDURE — 82375 ASSAY CARBOXYHB QUANT: CPT

## 2024-07-09 PROCEDURE — 3700000002 HC GENERAL ANESTHESIA TIME - EACH INCREMENTAL 1 MINUTE: Performed by: THORACIC SURGERY (CARDIOTHORACIC VASCULAR SURGERY)

## 2024-07-09 PROCEDURE — 2500000001 HC RX 250 WO HCPCS SELF ADMINISTERED DRUGS (ALT 637 FOR MEDICARE OP)

## 2024-07-09 PROCEDURE — 71045 X-RAY EXAM CHEST 1 VIEW: CPT

## 2024-07-09 PROCEDURE — C1776 JOINT DEVICE (IMPLANTABLE): HCPCS | Performed by: THORACIC SURGERY (CARDIOTHORACIC VASCULAR SURGERY)

## 2024-07-09 PROCEDURE — 02100A3 BYPASS CORONARY ARTERY, ONE ARTERY FROM CORONARY ARTERY WITH AUTOLOGOUS ARTERIAL TISSUE, OPEN APPROACH: ICD-10-PCS | Performed by: THORACIC SURGERY (CARDIOTHORACIC VASCULAR SURGERY)

## 2024-07-09 PROCEDURE — 2500000005 HC RX 250 GENERAL PHARMACY W/O HCPCS: Performed by: ANESTHESIOLOGY

## 2024-07-09 PROCEDURE — 99291 CRITICAL CARE FIRST HOUR: CPT

## 2024-07-09 PROCEDURE — 2720000007 HC OR 272 NO HCPCS: Performed by: THORACIC SURGERY (CARDIOTHORACIC VASCULAR SURGERY)

## 2024-07-09 PROCEDURE — 71045 X-RAY EXAM CHEST 1 VIEW: CPT | Performed by: RADIOLOGY

## 2024-07-09 PROCEDURE — 2500000005 HC RX 250 GENERAL PHARMACY W/O HCPCS: Performed by: STUDENT IN AN ORGANIZED HEALTH CARE EDUCATION/TRAINING PROGRAM

## 2024-07-09 PROCEDURE — 2500000002 HC RX 250 W HCPCS SELF ADMINISTERED DRUGS (ALT 637 FOR MEDICARE OP, ALT 636 FOR OP/ED)

## 2024-07-09 PROCEDURE — P9045 ALBUMIN (HUMAN), 5%, 250 ML: HCPCS | Mod: JZ | Performed by: STUDENT IN AN ORGANIZED HEALTH CARE EDUCATION/TRAINING PROGRAM

## 2024-07-09 PROCEDURE — 85384 FIBRINOGEN ACTIVITY: CPT

## 2024-07-09 PROCEDURE — 84132 ASSAY OF SERUM POTASSIUM: CPT | Performed by: THORACIC SURGERY (CARDIOTHORACIC VASCULAR SURGERY)

## 2024-07-09 PROCEDURE — 86901 BLOOD TYPING SEROLOGIC RH(D): CPT

## 2024-07-09 PROCEDURE — P9016 RBC LEUKOCYTES REDUCED: HCPCS

## 2024-07-09 PROCEDURE — 85027 COMPLETE CBC AUTOMATED: CPT

## 2024-07-09 PROCEDURE — 94002 VENT MGMT INPAT INIT DAY: CPT

## 2024-07-09 PROCEDURE — A4649 SURGICAL SUPPLIES: HCPCS | Performed by: THORACIC SURGERY (CARDIOTHORACIC VASCULAR SURGERY)

## 2024-07-09 PROCEDURE — 3600000011 HC PERFUSION TIME - INITIAL BASE CHARGE: Performed by: THORACIC SURGERY (CARDIOTHORACIC VASCULAR SURGERY)

## 2024-07-09 PROCEDURE — 33508 ENDOSCOPIC VEIN HARVEST: CPT | Performed by: THORACIC SURGERY (CARDIOTHORACIC VASCULAR SURGERY)

## 2024-07-09 PROCEDURE — 36430 TRANSFUSION BLD/BLD COMPNT: CPT

## 2024-07-09 PROCEDURE — 37799 UNLISTED PX VASCULAR SURGERY: CPT

## 2024-07-09 PROCEDURE — 2500000002 HC RX 250 W HCPCS SELF ADMINISTERED DRUGS (ALT 637 FOR MEDICARE OP, ALT 636 FOR OP/ED): Performed by: NURSE PRACTITIONER

## 2024-07-09 PROCEDURE — 2500000004 HC RX 250 GENERAL PHARMACY W/ HCPCS (ALT 636 FOR OP/ED): Performed by: STUDENT IN AN ORGANIZED HEALTH CARE EDUCATION/TRAINING PROGRAM

## 2024-07-09 PROCEDURE — C1713 ANCHOR/SCREW BN/BN,TIS/BN: HCPCS | Performed by: THORACIC SURGERY (CARDIOTHORACIC VASCULAR SURGERY)

## 2024-07-09 PROCEDURE — 02100Z9 BYPASS CORONARY ARTERY, ONE ARTERY FROM LEFT INTERNAL MAMMARY, OPEN APPROACH: ICD-10-PCS | Performed by: THORACIC SURGERY (CARDIOTHORACIC VASCULAR SURGERY)

## 2024-07-09 PROCEDURE — 021009W BYPASS CORONARY ARTERY, ONE ARTERY FROM AORTA WITH AUTOLOGOUS VENOUS TISSUE, OPEN APPROACH: ICD-10-PCS | Performed by: THORACIC SURGERY (CARDIOTHORACIC VASCULAR SURGERY)

## 2024-07-09 PROCEDURE — 87081 CULTURE SCREEN ONLY: CPT

## 2024-07-09 PROCEDURE — 2020000001 HC ICU ROOM DAILY

## 2024-07-09 PROCEDURE — 33534 CABG ARTERIAL TWO: CPT | Performed by: THORACIC SURGERY (CARDIOTHORACIC VASCULAR SURGERY)

## 2024-07-09 PROCEDURE — 5A1221Z PERFORMANCE OF CARDIAC OUTPUT, CONTINUOUS: ICD-10-PCS | Performed by: THORACIC SURGERY (CARDIOTHORACIC VASCULAR SURGERY)

## 2024-07-09 PROCEDURE — 93010 ELECTROCARDIOGRAM REPORT: CPT | Performed by: INTERNAL MEDICINE

## 2024-07-09 PROCEDURE — 85610 PROTHROMBIN TIME: CPT

## 2024-07-09 PROCEDURE — 2500000004 HC RX 250 GENERAL PHARMACY W/ HCPCS (ALT 636 FOR OP/ED): Mod: JZ | Performed by: STUDENT IN AN ORGANIZED HEALTH CARE EDUCATION/TRAINING PROGRAM

## 2024-07-09 PROCEDURE — 2500000004 HC RX 250 GENERAL PHARMACY W/ HCPCS (ALT 636 FOR OP/ED): Performed by: THORACIC SURGERY (CARDIOTHORACIC VASCULAR SURGERY)

## 2024-07-09 PROCEDURE — 3600000018 HC OR TIME - INITIAL BASE CHARGE - PROCEDURE LEVEL SIX: Performed by: THORACIC SURGERY (CARDIOTHORACIC VASCULAR SURGERY)

## 2024-07-09 PROCEDURE — 06BP4ZZ EXCISION OF RIGHT SAPHENOUS VEIN, PERCUTANEOUS ENDOSCOPIC APPROACH: ICD-10-PCS | Performed by: THORACIC SURGERY (CARDIOTHORACIC VASCULAR SURGERY)

## 2024-07-09 PROCEDURE — 82330 ASSAY OF CALCIUM: CPT

## 2024-07-09 PROCEDURE — 3700000001 HC GENERAL ANESTHESIA TIME - INITIAL BASE CHARGE: Performed by: THORACIC SURGERY (CARDIOTHORACIC VASCULAR SURGERY)

## 2024-07-09 PROCEDURE — 33517 CABG ARTERY-VEIN SINGLE: CPT | Performed by: THORACIC SURGERY (CARDIOTHORACIC VASCULAR SURGERY)

## 2024-07-09 PROCEDURE — 03B00ZZ EXCISION OF RIGHT INTERNAL MAMMARY ARTERY, OPEN APPROACH: ICD-10-PCS | Performed by: THORACIC SURGERY (CARDIOTHORACIC VASCULAR SURGERY)

## 2024-07-09 PROCEDURE — 93005 ELECTROCARDIOGRAM TRACING: CPT

## 2024-07-09 PROCEDURE — 3600000012 HC PERFUSION TIME - EACH INCREMENTAL 1 MINUTE: Performed by: THORACIC SURGERY (CARDIOTHORACIC VASCULAR SURGERY)

## 2024-07-09 PROCEDURE — 2780000003 HC OR 278 NO HCPCS: Performed by: THORACIC SURGERY (CARDIOTHORACIC VASCULAR SURGERY)

## 2024-07-09 DEVICE — PLATE, LP BOX: Type: IMPLANTABLE DEVICE | Site: STERNUM | Status: FUNCTIONAL

## 2024-07-09 DEVICE — STABILIZER TS2000 OCTOPUS EVOLUTION 16L
Type: IMPLANTABLE DEVICE | Site: HEART | Status: FUNCTIONAL
Brand: OCTOPUS®

## 2024-07-09 DEVICE — SCREW, SD LOCKING, 2.3 X 16MM: Type: IMPLANTABLE DEVICE | Site: STERNUM | Status: FUNCTIONAL

## 2024-07-09 DEVICE — SCREW, SD LOCKING, 2.3 X 14MM: Type: IMPLANTABLE DEVICE | Site: STERNUM | Status: FUNCTIONAL

## 2024-07-09 DEVICE — SCREW, SD LOCKING, 2.3 X 13MM: Type: IMPLANTABLE DEVICE | Site: STERNUM | Status: FUNCTIONAL

## 2024-07-09 DEVICE — SCREW, SD LOCKING, 2.3 X 15MM: Type: IMPLANTABLE DEVICE | Site: STERNUM | Status: FUNCTIONAL

## 2024-07-09 DEVICE — PLATE, LP MANUBRIUM: Type: IMPLANTABLE DEVICE | Site: STERNUM | Status: FUNCTIONAL

## 2024-07-09 DEVICE — X-PLATE, LOW PROFILE: Type: IMPLANTABLE DEVICE | Site: STERNUM | Status: FUNCTIONAL

## 2024-07-09 DEVICE — SCREW, SD LOCKING, 2.3 X 18MM: Type: IMPLANTABLE DEVICE | Site: STERNUM | Status: FUNCTIONAL

## 2024-07-09 RX ORDER — SODIUM CHLORIDE, SODIUM GLUCONATE, SODIUM ACETATE, POTASSIUM CHLORIDE AND MAGNESIUM CHLORIDE 30; 37; 368; 526; 502 MG/100ML; MG/100ML; MG/100ML; MG/100ML; MG/100ML
INJECTION, SOLUTION INTRAVENOUS CONTINUOUS PRN
Status: DISCONTINUED | OUTPATIENT
Start: 2024-07-09 | End: 2024-07-09

## 2024-07-09 RX ORDER — MIDAZOLAM HYDROCHLORIDE 1 MG/ML
INJECTION INTRAMUSCULAR; INTRAVENOUS AS NEEDED
Status: DISCONTINUED | OUTPATIENT
Start: 2024-07-09 | End: 2024-07-09

## 2024-07-09 RX ORDER — CALCIUM CHLORIDE INJECTION 100 MG/ML
INJECTION, SOLUTION INTRAVENOUS AS NEEDED
Status: DISCONTINUED | OUTPATIENT
Start: 2024-07-09 | End: 2024-07-09

## 2024-07-09 RX ORDER — ALBUMIN HUMAN 50 G/1000ML
SOLUTION INTRAVENOUS
Status: DISPENSED
Start: 2024-07-09 | End: 2024-07-10

## 2024-07-09 RX ORDER — ALBUMIN HUMAN 50 G/1000ML
SOLUTION INTRAVENOUS AS NEEDED
Status: DISCONTINUED | OUTPATIENT
Start: 2024-07-09 | End: 2024-07-09

## 2024-07-09 RX ORDER — SODIUM CHLORIDE, SODIUM LACTATE, POTASSIUM CHLORIDE, CALCIUM CHLORIDE 600; 310; 30; 20 MG/100ML; MG/100ML; MG/100ML; MG/100ML
INJECTION, SOLUTION INTRAVENOUS CONTINUOUS
Status: CANCELLED | OUTPATIENT
Start: 2024-07-09

## 2024-07-09 RX ORDER — CALCIUM GLUCONATE 20 MG/ML
2 INJECTION, SOLUTION INTRAVENOUS EVERY 6 HOURS PRN
Status: DISCONTINUED | OUTPATIENT
Start: 2024-07-09 | End: 2024-07-10

## 2024-07-09 RX ORDER — NOREPINEPHRINE BITARTRATE/D5W 8 MG/250ML
0-1 PLASTIC BAG, INJECTION (ML) INTRAVENOUS CONTINUOUS
Status: DISCONTINUED | OUTPATIENT
Start: 2024-07-09 | End: 2024-07-09

## 2024-07-09 RX ORDER — PANTOPRAZOLE SODIUM 40 MG/10ML
40 INJECTION, POWDER, LYOPHILIZED, FOR SOLUTION INTRAVENOUS
Status: CANCELLED | OUTPATIENT
Start: 2024-07-10

## 2024-07-09 RX ORDER — NEOSTIGMINE METHYLSULFATE 1 MG/ML
INJECTION INTRAVENOUS
Status: COMPLETED
Start: 2024-07-09 | End: 2024-07-09

## 2024-07-09 RX ORDER — INSULIN LISPRO 100 [IU]/ML
0-15 INJECTION, SOLUTION INTRAVENOUS; SUBCUTANEOUS EVERY 4 HOURS
Status: CANCELLED | OUTPATIENT
Start: 2024-07-09

## 2024-07-09 RX ORDER — OXYCODONE HYDROCHLORIDE 5 MG/1
10 TABLET ORAL EVERY 4 HOURS PRN
Status: CANCELLED | OUTPATIENT
Start: 2024-07-09

## 2024-07-09 RX ORDER — MAGNESIUM SULFATE HEPTAHYDRATE 40 MG/ML
4 INJECTION, SOLUTION INTRAVENOUS EVERY 6 HOURS PRN
Status: DISCONTINUED | OUTPATIENT
Start: 2024-07-09 | End: 2024-07-10

## 2024-07-09 RX ORDER — POTASSIUM CHLORIDE 29.8 MG/ML
40 INJECTION INTRAVENOUS EVERY 6 HOURS PRN
Status: DISCONTINUED | OUTPATIENT
Start: 2024-07-09 | End: 2024-07-10

## 2024-07-09 RX ORDER — NALOXONE HYDROCHLORIDE 0.4 MG/ML
0.2 INJECTION, SOLUTION INTRAMUSCULAR; INTRAVENOUS; SUBCUTANEOUS AS NEEDED
Status: CANCELLED | OUTPATIENT
Start: 2024-07-09

## 2024-07-09 RX ORDER — SODIUM CHLORIDE, SODIUM LACTATE, POTASSIUM CHLORIDE, CALCIUM CHLORIDE 600; 310; 30; 20 MG/100ML; MG/100ML; MG/100ML; MG/100ML
5 INJECTION, SOLUTION INTRAVENOUS CONTINUOUS
Status: DISCONTINUED | OUTPATIENT
Start: 2024-07-09 | End: 2024-07-16

## 2024-07-09 RX ORDER — HYDROMORPHONE HYDROCHLORIDE 1 MG/ML
0.2 INJECTION, SOLUTION INTRAMUSCULAR; INTRAVENOUS; SUBCUTANEOUS
Status: CANCELLED | OUTPATIENT
Start: 2024-07-09

## 2024-07-09 RX ORDER — GLYCOPYRROLATE 0.2 MG/ML
INJECTION INTRAMUSCULAR; INTRAVENOUS
Status: COMPLETED
Start: 2024-07-09 | End: 2024-07-09

## 2024-07-09 RX ORDER — MAGNESIUM SULFATE HEPTAHYDRATE 40 MG/ML
2 INJECTION, SOLUTION INTRAVENOUS EVERY 6 HOURS PRN
Status: DISCONTINUED | OUTPATIENT
Start: 2024-07-09 | End: 2024-07-10

## 2024-07-09 RX ORDER — ACETAMINOPHEN 325 MG/1
650 TABLET ORAL EVERY 6 HOURS
Status: DISCONTINUED | OUTPATIENT
Start: 2024-07-09 | End: 2024-07-12

## 2024-07-09 RX ORDER — VANCOMYCIN HYDROCHLORIDE 1 G/20ML
INJECTION, POWDER, LYOPHILIZED, FOR SOLUTION INTRAVENOUS DAILY PRN
Status: DISCONTINUED | OUTPATIENT
Start: 2024-07-09 | End: 2024-07-09

## 2024-07-09 RX ORDER — ALBUMIN HUMAN 50 G/1000ML
12.5 SOLUTION INTRAVENOUS ONCE
Status: COMPLETED | OUTPATIENT
Start: 2024-07-09 | End: 2024-07-09

## 2024-07-09 RX ORDER — PROPOFOL 10 MG/ML
0-50 INJECTION, EMULSION INTRAVENOUS CONTINUOUS
Status: CANCELLED | OUTPATIENT
Start: 2024-07-09

## 2024-07-09 RX ORDER — CEFAZOLIN 1 G/1
INJECTION, POWDER, FOR SOLUTION INTRAVENOUS AS NEEDED
Status: DISCONTINUED | OUTPATIENT
Start: 2024-07-09 | End: 2024-07-09

## 2024-07-09 RX ORDER — POTASSIUM CHLORIDE 29.8 MG/ML
40 INJECTION INTRAVENOUS EVERY 6 HOURS PRN
Status: DISCONTINUED | OUTPATIENT
Start: 2024-07-09 | End: 2024-07-16

## 2024-07-09 RX ORDER — ETOMIDATE 2 MG/ML
INJECTION INTRAVENOUS AS NEEDED
Status: DISCONTINUED | OUTPATIENT
Start: 2024-07-09 | End: 2024-07-09

## 2024-07-09 RX ORDER — NAPROXEN SODIUM 220 MG/1
81 TABLET, FILM COATED ORAL DAILY
Status: DISCONTINUED | OUTPATIENT
Start: 2024-07-09 | End: 2024-07-26 | Stop reason: HOSPADM

## 2024-07-09 RX ORDER — AMOXICILLIN 250 MG
2 CAPSULE ORAL 2 TIMES DAILY
Status: DISCONTINUED | OUTPATIENT
Start: 2024-07-09 | End: 2024-07-26 | Stop reason: HOSPADM

## 2024-07-09 RX ORDER — PROPOFOL 10 MG/ML
INJECTION, EMULSION INTRAVENOUS AS NEEDED
Status: DISCONTINUED | OUTPATIENT
Start: 2024-07-09 | End: 2024-07-09

## 2024-07-09 RX ORDER — HYDROMORPHONE HYDROCHLORIDE 1 MG/ML
0.4 INJECTION, SOLUTION INTRAMUSCULAR; INTRAVENOUS; SUBCUTANEOUS ONCE
Status: COMPLETED | OUTPATIENT
Start: 2024-07-09 | End: 2024-07-09

## 2024-07-09 RX ORDER — NALOXONE HYDROCHLORIDE 0.4 MG/ML
0.2 INJECTION, SOLUTION INTRAMUSCULAR; INTRAVENOUS; SUBCUTANEOUS EVERY 5 MIN PRN
Status: CANCELLED | OUTPATIENT
Start: 2024-07-09

## 2024-07-09 RX ORDER — PROPOFOL 10 MG/ML
0-50 INJECTION, EMULSION INTRAVENOUS CONTINUOUS
Status: DISCONTINUED | OUTPATIENT
Start: 2024-07-09 | End: 2024-07-10

## 2024-07-09 RX ORDER — NOREPINEPHRINE BITARTRATE/D5W 8 MG/250ML
0-1 PLASTIC BAG, INJECTION (ML) INTRAVENOUS CONTINUOUS
Status: DISCONTINUED | OUTPATIENT
Start: 2024-07-09 | End: 2024-07-10

## 2024-07-09 RX ORDER — DEXTROSE 50 % IN WATER (D50W) INTRAVENOUS SYRINGE
25
Status: CANCELLED | OUTPATIENT
Start: 2024-07-09

## 2024-07-09 RX ORDER — ONDANSETRON HYDROCHLORIDE 2 MG/ML
4 INJECTION, SOLUTION INTRAVENOUS EVERY 8 HOURS PRN
Status: CANCELLED | OUTPATIENT
Start: 2024-07-09

## 2024-07-09 RX ORDER — GLYCOPYRROLATE 0.2 MG/ML
1 INJECTION INTRAMUSCULAR; INTRAVENOUS ONCE
Status: COMPLETED | OUTPATIENT
Start: 2024-07-09 | End: 2024-07-09

## 2024-07-09 RX ORDER — FENTANYL CITRATE 50 UG/ML
INJECTION, SOLUTION INTRAMUSCULAR; INTRAVENOUS AS NEEDED
Status: DISCONTINUED | OUTPATIENT
Start: 2024-07-09 | End: 2024-07-09

## 2024-07-09 RX ORDER — DEXMEDETOMIDINE HYDROCHLORIDE 4 UG/ML
0-1.5 INJECTION, SOLUTION INTRAVENOUS CONTINUOUS
Status: DISCONTINUED | OUTPATIENT
Start: 2024-07-09 | End: 2024-07-10

## 2024-07-09 RX ORDER — OXYCODONE HYDROCHLORIDE 5 MG/1
5 TABLET ORAL EVERY 4 HOURS PRN
Status: CANCELLED | OUTPATIENT
Start: 2024-07-09

## 2024-07-09 RX ORDER — PANTOPRAZOLE SODIUM 40 MG/1
40 TABLET, DELAYED RELEASE ORAL
Status: DISCONTINUED | OUTPATIENT
Start: 2024-07-10 | End: 2024-07-09

## 2024-07-09 RX ORDER — VANCOMYCIN HYDROCHLORIDE 1 G/20ML
INJECTION, POWDER, LYOPHILIZED, FOR SOLUTION INTRAVENOUS AS NEEDED
Status: DISCONTINUED | OUTPATIENT
Start: 2024-07-09 | End: 2024-07-09 | Stop reason: HOSPADM

## 2024-07-09 RX ORDER — PANTOPRAZOLE SODIUM 40 MG/10ML
40 INJECTION, POWDER, LYOPHILIZED, FOR SOLUTION INTRAVENOUS
Status: DISCONTINUED | OUTPATIENT
Start: 2024-07-10 | End: 2024-07-09

## 2024-07-09 RX ORDER — HYDROMORPHONE HCL/0.9% NACL/PF 15 MG/30ML
PATIENT CONTROLLED ANALGESIA SYRINGE INTRAVENOUS CONTINUOUS
Status: CANCELLED | OUTPATIENT
Start: 2024-07-09

## 2024-07-09 RX ORDER — POTASSIUM CHLORIDE 14.9 MG/ML
20 INJECTION INTRAVENOUS EVERY 6 HOURS PRN
Status: DISCONTINUED | OUTPATIENT
Start: 2024-07-09 | End: 2024-07-10

## 2024-07-09 RX ORDER — NAPROXEN SODIUM 220 MG/1
81 TABLET, FILM COATED ORAL DAILY
Status: CANCELLED | OUTPATIENT
Start: 2024-07-09

## 2024-07-09 RX ORDER — AMOXICILLIN 250 MG
2 CAPSULE ORAL 2 TIMES DAILY
Status: CANCELLED | OUTPATIENT
Start: 2024-07-09

## 2024-07-09 RX ORDER — INSULIN LISPRO 100 [IU]/ML
0-15 INJECTION, SOLUTION INTRAVENOUS; SUBCUTANEOUS EVERY 4 HOURS
Status: DISCONTINUED | OUTPATIENT
Start: 2024-07-09 | End: 2024-07-10

## 2024-07-09 RX ORDER — HYDROMORPHONE HYDROCHLORIDE 0.2 MG/ML
0.2 INJECTION INTRAMUSCULAR; INTRAVENOUS; SUBCUTANEOUS
Status: DISCONTINUED | OUTPATIENT
Start: 2024-07-09 | End: 2024-07-09

## 2024-07-09 RX ORDER — DEXTROSE 50 % IN WATER (D50W) INTRAVENOUS SYRINGE
25
Status: DISCONTINUED | OUTPATIENT
Start: 2024-07-09 | End: 2024-07-26 | Stop reason: HOSPADM

## 2024-07-09 RX ORDER — OXYCODONE HYDROCHLORIDE 5 MG/1
10 TABLET ORAL EVERY 4 HOURS PRN
Status: DISCONTINUED | OUTPATIENT
Start: 2024-07-09 | End: 2024-07-12

## 2024-07-09 RX ORDER — ESMOLOL HYDROCHLORIDE 10 MG/ML
INJECTION INTRAVENOUS AS NEEDED
Status: DISCONTINUED | OUTPATIENT
Start: 2024-07-09 | End: 2024-07-09

## 2024-07-09 RX ORDER — PANTOPRAZOLE SODIUM 40 MG/1
40 TABLET, DELAYED RELEASE ORAL
Status: DISCONTINUED | OUTPATIENT
Start: 2024-07-10 | End: 2024-07-10

## 2024-07-09 RX ORDER — HEPARIN SODIUM 1000 [USP'U]/ML
INJECTION, SOLUTION INTRAVENOUS; SUBCUTANEOUS AS NEEDED
Status: DISCONTINUED | OUTPATIENT
Start: 2024-07-09 | End: 2024-07-09

## 2024-07-09 RX ORDER — DEXTROSE 50 % IN WATER (D50W) INTRAVENOUS SYRINGE
12.5 ONCE
Status: COMPLETED | OUTPATIENT
Start: 2024-07-09 | End: 2024-07-09

## 2024-07-09 RX ORDER — HEPARIN SODIUM 1000 [USP'U]/ML
INJECTION, SOLUTION INTRAVENOUS; SUBCUTANEOUS AS NEEDED
Status: DISCONTINUED | OUTPATIENT
Start: 2024-07-09 | End: 2024-07-09 | Stop reason: HOSPADM

## 2024-07-09 RX ORDER — CLOPIDOGREL BISULFATE 75 MG/1
75 TABLET ORAL DAILY
Status: DISCONTINUED | OUTPATIENT
Start: 2024-07-11 | End: 2024-07-11

## 2024-07-09 RX ORDER — NITROGLYCERIN 40 MG/100ML
INJECTION INTRAVENOUS AS NEEDED
Status: DISCONTINUED | OUTPATIENT
Start: 2024-07-09 | End: 2024-07-09

## 2024-07-09 RX ORDER — SODIUM CHLORIDE, SODIUM LACTATE, POTASSIUM CHLORIDE, CALCIUM CHLORIDE 600; 310; 30; 20 MG/100ML; MG/100ML; MG/100ML; MG/100ML
15 INJECTION, SOLUTION INTRAVENOUS CONTINUOUS
Status: DISCONTINUED | OUTPATIENT
Start: 2024-07-09 | End: 2024-07-16

## 2024-07-09 RX ORDER — ACETAMINOPHEN 325 MG/1
650 TABLET ORAL EVERY 6 HOURS
Status: CANCELLED | OUTPATIENT
Start: 2024-07-09

## 2024-07-09 RX ORDER — EPINEPHRINE HCL IN 0.9 % NACL 4MG/250ML
PLASTIC BAG, INJECTION (ML) INTRAVENOUS CONTINUOUS PRN
Status: DISCONTINUED | OUTPATIENT
Start: 2024-07-09 | End: 2024-07-09

## 2024-07-09 RX ORDER — ONDANSETRON HYDROCHLORIDE 2 MG/ML
4 INJECTION, SOLUTION INTRAVENOUS EVERY 8 HOURS PRN
Status: DISCONTINUED | OUTPATIENT
Start: 2024-07-09 | End: 2024-07-26 | Stop reason: HOSPADM

## 2024-07-09 RX ORDER — PANTOPRAZOLE SODIUM 40 MG/1
40 TABLET, DELAYED RELEASE ORAL
Status: CANCELLED | OUTPATIENT
Start: 2024-07-10

## 2024-07-09 RX ORDER — LIDOCAINE HCL/PF 100 MG/5ML
SYRINGE (ML) INTRAVENOUS AS NEEDED
Status: DISCONTINUED | OUTPATIENT
Start: 2024-07-09 | End: 2024-07-09

## 2024-07-09 RX ORDER — CALCIUM GLUCONATE 20 MG/ML
1 INJECTION, SOLUTION INTRAVENOUS EVERY 6 HOURS PRN
Status: DISCONTINUED | OUTPATIENT
Start: 2024-07-09 | End: 2024-07-10

## 2024-07-09 RX ORDER — DEXMEDETOMIDINE HYDROCHLORIDE 4 UG/ML
0-1.5 INJECTION, SOLUTION INTRAVENOUS CONTINUOUS
Status: CANCELLED | OUTPATIENT
Start: 2024-07-09

## 2024-07-09 RX ORDER — NALOXONE HYDROCHLORIDE 0.4 MG/ML
0.2 INJECTION, SOLUTION INTRAMUSCULAR; INTRAVENOUS; SUBCUTANEOUS EVERY 5 MIN PRN
Status: DISCONTINUED | OUTPATIENT
Start: 2024-07-09 | End: 2024-07-26 | Stop reason: HOSPADM

## 2024-07-09 RX ORDER — POTASSIUM CHLORIDE 14.9 MG/ML
20 INJECTION INTRAVENOUS EVERY 6 HOURS PRN
Status: DISCONTINUED | OUTPATIENT
Start: 2024-07-09 | End: 2024-07-16

## 2024-07-09 RX ORDER — ONDANSETRON 4 MG/1
4 TABLET, FILM COATED ORAL EVERY 8 HOURS PRN
Status: CANCELLED | OUTPATIENT
Start: 2024-07-09

## 2024-07-09 RX ORDER — CALCIUM GLUCONATE 20 MG/ML
1 INJECTION, SOLUTION INTRAVENOUS EVERY 6 HOURS PRN
Status: DISCONTINUED | OUTPATIENT
Start: 2024-07-09 | End: 2024-07-16

## 2024-07-09 RX ORDER — MAGNESIUM SULFATE HEPTAHYDRATE 40 MG/ML
2 INJECTION, SOLUTION INTRAVENOUS EVERY 6 HOURS PRN
Status: DISCONTINUED | OUTPATIENT
Start: 2024-07-09 | End: 2024-07-16

## 2024-07-09 RX ORDER — MAGNESIUM SULFATE HEPTAHYDRATE 40 MG/ML
4 INJECTION, SOLUTION INTRAVENOUS EVERY 6 HOURS PRN
Status: DISCONTINUED | OUTPATIENT
Start: 2024-07-09 | End: 2024-07-16

## 2024-07-09 RX ORDER — ROCURONIUM BROMIDE 10 MG/ML
INJECTION, SOLUTION INTRAVENOUS AS NEEDED
Status: DISCONTINUED | OUTPATIENT
Start: 2024-07-09 | End: 2024-07-09

## 2024-07-09 RX ORDER — HYDROMORPHONE HYDROCHLORIDE 1 MG/ML
0.4 INJECTION, SOLUTION INTRAMUSCULAR; INTRAVENOUS; SUBCUTANEOUS
Status: DISCONTINUED | OUTPATIENT
Start: 2024-07-09 | End: 2024-07-10

## 2024-07-09 RX ORDER — OXYCODONE HYDROCHLORIDE 5 MG/1
5 TABLET ORAL EVERY 4 HOURS PRN
Status: DISCONTINUED | OUTPATIENT
Start: 2024-07-09 | End: 2024-07-12

## 2024-07-09 RX ORDER — PHENYLEPHRINE HYDROCHLORIDE 10 MG/ML
INJECTION INTRAVENOUS AS NEEDED
Status: DISCONTINUED | OUTPATIENT
Start: 2024-07-09 | End: 2024-07-09

## 2024-07-09 RX ORDER — PROTAMINE SULFATE 10 MG/ML
INJECTION, SOLUTION INTRAVENOUS AS NEEDED
Status: DISCONTINUED | OUTPATIENT
Start: 2024-07-09 | End: 2024-07-09

## 2024-07-09 RX ORDER — POLYETHYLENE GLYCOL 3350 17 G/17G
17 POWDER, FOR SOLUTION ORAL DAILY
Status: DISCONTINUED | OUTPATIENT
Start: 2024-07-09 | End: 2024-07-26 | Stop reason: HOSPADM

## 2024-07-09 RX ORDER — CALCIUM GLUCONATE 20 MG/ML
2 INJECTION, SOLUTION INTRAVENOUS EVERY 6 HOURS PRN
Status: DISCONTINUED | OUTPATIENT
Start: 2024-07-09 | End: 2024-07-16

## 2024-07-09 RX ORDER — CEFAZOLIN SODIUM 2 G/100ML
2 INJECTION, SOLUTION INTRAVENOUS EVERY 8 HOURS
Status: DISCONTINUED | OUTPATIENT
Start: 2024-07-09 | End: 2024-07-11

## 2024-07-09 RX ORDER — VANCOMYCIN HYDROCHLORIDE 1 G/20ML
INJECTION, POWDER, LYOPHILIZED, FOR SOLUTION INTRAVENOUS AS NEEDED
Status: DISCONTINUED | OUTPATIENT
Start: 2024-07-09 | End: 2024-07-09

## 2024-07-09 RX ORDER — ATORVASTATIN CALCIUM 80 MG/1
80 TABLET, FILM COATED ORAL NIGHTLY
Status: DISCONTINUED | OUTPATIENT
Start: 2024-07-10 | End: 2024-07-26 | Stop reason: HOSPADM

## 2024-07-09 RX ORDER — NEOSTIGMINE METHYLSULFATE 1 MG/ML
5 INJECTION INTRAVENOUS ONCE
Status: COMPLETED | OUTPATIENT
Start: 2024-07-09 | End: 2024-07-09

## 2024-07-09 RX ORDER — SODIUM CHLORIDE, SODIUM LACTATE, POTASSIUM CHLORIDE, CALCIUM CHLORIDE 600; 310; 30; 20 MG/100ML; MG/100ML; MG/100ML; MG/100ML
5 INJECTION, SOLUTION INTRAVENOUS CONTINUOUS
Status: CANCELLED | OUTPATIENT
Start: 2024-07-09

## 2024-07-09 RX ORDER — PANTOPRAZOLE SODIUM 40 MG/10ML
40 INJECTION, POWDER, LYOPHILIZED, FOR SOLUTION INTRAVENOUS
Status: DISCONTINUED | OUTPATIENT
Start: 2024-07-10 | End: 2024-07-10

## 2024-07-09 RX ORDER — NOREPINEPHRINE BITARTRATE 0.03 MG/ML
INJECTION, SOLUTION INTRAVENOUS CONTINUOUS PRN
Status: DISCONTINUED | OUTPATIENT
Start: 2024-07-09 | End: 2024-07-09

## 2024-07-09 RX ORDER — PAPAVERINE HYDROCHLORIDE 30 MG/ML
INJECTION INTRAMUSCULAR; INTRAVENOUS AS NEEDED
Status: DISCONTINUED | OUTPATIENT
Start: 2024-07-09 | End: 2024-07-09 | Stop reason: HOSPADM

## 2024-07-09 RX ORDER — ONDANSETRON 4 MG/1
4 TABLET, FILM COATED ORAL EVERY 8 HOURS PRN
Status: DISCONTINUED | OUTPATIENT
Start: 2024-07-09 | End: 2024-07-16

## 2024-07-09 SDOH — HEALTH STABILITY: MENTAL HEALTH: CURRENT SMOKER: 0

## 2024-07-09 ASSESSMENT — PAIN SCALES - GENERAL
PAINLEVEL_OUTOF10: 10 - WORST POSSIBLE PAIN
PAIN_LEVEL: 0
PAINLEVEL_OUTOF10: 10 - WORST POSSIBLE PAIN
PAINLEVEL_OUTOF10: 0 - NO PAIN

## 2024-07-09 ASSESSMENT — PAIN - FUNCTIONAL ASSESSMENT
PAIN_FUNCTIONAL_ASSESSMENT: CPOT (CRITICAL CARE PAIN OBSERVATION TOOL)
PAIN_FUNCTIONAL_ASSESSMENT: CPOT (CRITICAL CARE PAIN OBSERVATION TOOL)
PAIN_FUNCTIONAL_ASSESSMENT: 0-10
PAIN_FUNCTIONAL_ASSESSMENT: CPOT (CRITICAL CARE PAIN OBSERVATION TOOL)
PAIN_FUNCTIONAL_ASSESSMENT: 0-10
PAIN_FUNCTIONAL_ASSESSMENT: 0-10
PAIN_FUNCTIONAL_ASSESSMENT: CPOT (CRITICAL CARE PAIN OBSERVATION TOOL)
PAIN_FUNCTIONAL_ASSESSMENT: CPOT (CRITICAL CARE PAIN OBSERVATION TOOL)

## 2024-07-09 ASSESSMENT — PAIN DESCRIPTION - ORIENTATION: ORIENTATION: MID

## 2024-07-09 ASSESSMENT — PAIN DESCRIPTION - LOCATION: LOCATION: CHEST

## 2024-07-09 NOTE — CARE PLAN
Problem: Pain - Adult  Goal: Verbalizes/displays adequate comfort level or baseline comfort level  Outcome: Progressing     Problem: Safety - Adult  Goal: Free from fall injury  Outcome: Progressing     Problem: Chronic Conditions and Co-morbidities  Goal: Patient's chronic conditions and co-morbidity symptoms are monitored and maintained or improved  Outcome: Progressing     Problem: ACS/CP/NSTEMI/STEMI  Goal: Wean vasopressors/achieve hemodynamic stability  Outcome: Progressing     Problem: Cardiac catheterization  Goal: Free from dysrhythmias  Outcome: Progressing

## 2024-07-09 NOTE — ANESTHESIA PROCEDURE NOTES
Airway  Date/Time: 7/9/2024 8:14 AM  Urgency: elective    Airway not difficult    Staffing  Performed: JASPREET   Authorized by: Eric Verdugo MD    Performed by: JASPREET Burris  Patient location during procedure: OR    Indications and Patient Condition  Indications for airway management: anesthesia and airway protection  Sedation level: deep  Preoxygenated: yes  Patient position: sniffing  MILS not maintained throughout  Mask difficulty assessment: 2 - vent by mask + OA or adjuvant +/- NMBA    Final Airway Details  Final airway type: endotracheal airway      Successful airway: ETT  Cuffed: yes   Successful intubation technique: direct laryngoscopy  Facilitating devices/methods: intubating stylet  Endotracheal tube insertion site: oral  Blade: Chirag  Blade size: #4  ETT size (mm): 7.5  Cormack-Lehane Classification: grade I - full view of glottis  Placement verified by: chest auscultation and capnometry   Measured from: lips  ETT to lips (cm): 22  Number of attempts at approach: 1

## 2024-07-09 NOTE — H&P
CTICU History & Physical    HPI:    Mr. Zepeda is a 58-year-old male with a PMH of RA (on chronic prednisone and hydroxychloroquine), type 2 DM (on insulin), COPD, hypothyroidism, 40-pack-year smoking history, chronic methadone use, peripheral neuropathy, and unhealed diabetic foot ulcers s/p right foot partial amputation (7 months ago - follows with wound clinic as outpatient) and most recently NSTEMI, s/p CABGx3.    Pre-op echo shows mildly decreased LV function with EF 40-45% and mild aortic regurgitation.    Cardiac Testing:     TTE (7/1/2024):    Mildly decreased LV function, EF 40-45%. Abnormal wall motion. Akinetic inferior wall and inferolateral wall.    Normal RV function.     Mild aortic regurgitation.    LHC (7/1/2024):    Triple vessel coronary disease with proximal LAD involvement     Procedure/Surgeon: Yareli Carey MD  Frontliner/Anesthesia: Eric Verdugo MD     CPB time: 34 mins  Cross clamp time: 24 mins  Echo Pre/Post:     Pre: mild LV dysfunction, mild/mod RV dilation, RV function low normal, moderate TR, mild/moderate MR, mild/moderate AI    Post: normal LV function, low normal RV function, moderate AI, mild MR, mild TR     Chest Tubes/Drains: 1 right pleural, 1 left pleural, 1 mediastinal  Temporary wires location/setting: VVI @ 50 (sensitivity 2.5 and output of 3, checked 7/9)     Fluids  Crystalloid: 2.25 L  Colloid: 1.25 L albumin  Cellsaver: 720  Products: 1 unit PRBC  EBL: 250  UOP: 890     Anesthesia  Intubation: airway not difficult; ETT via direct laryngoscopy at 22 cm, MAC4 7.5 Fr, grade I view  Intravenous Access: 14 g right hand, 20 g left brachial a line  Benzodiazepine dose/last administration: 1 mg midazolam total  Opioid dose/last administration: 500 mcg fentanyl total  NMB dose/last administration: 160 mg rocuronium total  TOF/ reversal given: not reversed  Antibiotic time: 4 g total of cefazolin; 1.5 g of vancomycin     Past Medical History:   Diagnosis Date     "Abnormal result of other cardiovascular function study 06/28/2018    Abnormal stress test    Atherosclerotic heart disease of native coronary artery with unstable angina pectoris (Multi) 06/28/2018    Unstable angina pectoris due to coronary arteriosclerosis    Fibromyalgia     Fibromyalgia    Personal history of other diseases of the circulatory system     History of hypertension    Personal history of other diseases of the musculoskeletal system and connective tissue     History of rheumatoid arthritis    Personal history of other diseases of the musculoskeletal system and connective tissue     History of degenerative disc disease    Personal history of other diseases of the musculoskeletal system and connective tissue     History of osteoporosis    Personal history of other endocrine, nutritional and metabolic disease     History of hyperlipidemia    Personal history of other endocrine, nutritional and metabolic disease     History of hypothyroidism    Type 2 diabetes mellitus without complications (Multi) 02/25/2022    Type 2 diabetes mellitus     Past Surgical History:   Procedure Laterality Date    BACK SURGERY  06/28/2018    Back Surgery    CARDIAC CATHETERIZATION N/A 7/1/2024    Procedure: Left Heart Cath;  Surgeon: Yao Calvin MD;  Location: [facility] Card[facility] Lab;  Service: Cardiovascular;  Laterality: N/A;    SEPTOPLASTY  06/28/2018    Septoplasty     Medications Prior to Admission   Medication Sig Dispense Refill Last Dose    albuterol 90 mcg/actuation inhaler Inhale 2 puffs every 6 hours if needed for shortness of breath.   Past Week at 1800    aspirin 81 mg EC tablet Take 1 tablet (81 mg) by mouth once daily.   Past Week at 1100    atorvastatin (Lipitor) 40 mg tablet Take 1 tablet (40 mg) by mouth once daily.   Past Week at 1100    BD Lois 2nd Gen Pen Needle 32 gauge x 5/32\" needle USE 1 NEEDLE ONCE DAILY   Past Week at 1100    celecoxib (CeleBREX) 200 mg capsule Take 1 capsule (200 mg) by mouth twice " a day.   Past Week at 1100    DULoxetine (Cymbalta) 60 mg DR capsule 1 capsule (60 mg) once every 24 hours.   Past Week at 1100    furosemide (Lasix) 40 mg tablet Take 1 tablet (40 mg) by mouth once daily.   Past Week at 1100    hydroxychloroquine (Plaquenil) 200 mg tablet Take 1 tablet (200 mg) by mouth 2 times a day.   Past Week at 1100    ibuprofen 800 mg tablet Take 1 tablet (800 mg) by mouth every 8 hours if needed for pain.   Past Week at 1100    insulin lispro (HumaLOG) 100 unit/mL injection Inject 10 Units under the skin.   7/1/2024 at 0800    Levemir FlexPen 100 unit/mL (3 mL) pen Inject 50 Units under the skin once daily at bedtime.   Past Week at 2000    levothyroxine (Synthroid, Levoxyl) 50 mcg tablet Take 1 tablet (50 mcg) by mouth once daily.   7/1/2024 at 0800    lisinopril 20 mg tablet Take 1 tablet (20 mg) by mouth once daily.   7/1/2024 at 0800    metFORMIN (Glucophage) 500 mg tablet Take 1 tablet (500 mg) by mouth 2 times daily (morning and late afternoon).   Past Week at 1100    methadone (Dolophine) 10 mg/mL solution Take 7.7 mL (77 mg) by mouth. IN THE MORNING THEN 78 MG IN THE EVENING   7/1/2024 at 0800    omeprazole (PriLOSEC) 40 mg DR capsule Take 1 capsule (40 mg) by mouth once daily.   Past Week at 1100    predniSONE (Deltasone) 2.5 mg tablet Take 2 tablets (5 mg) by mouth once daily.   7/1/2024 at 0800    pregabalin (Lyrica) 200 mg capsule Take 1 capsule (200 mg) by mouth 3 times a day.   7/1/2024 at 0800    Spiriva Respimat 2.5 mcg/actuation inhaler Inhale 2 puffs once daily.   Past Week at 1100    Wixela Inhub 100-50 mcg/dose diskus inhaler INHALE 1 PUFF AS INSTRUCTED TWICE DAILY. RINSE AND GARGLE MOUTH WITH WATER AFTER EACH USE.   6/30/2024 at 2100    ammonium lactate (Amlactin) 12 % cream Apply topically.   More than a month    riTUXimab-abbs (Truxima) 10 mg/mL solution 100 mL (1,000 mg) every 6 months.   More than a month     Patient has no known allergies.  Social History      Tobacco Use    Smoking status: Every Day     Current packs/day: 1.00     Average packs/day: 1 pack/day for 40.0 years (40.0 ttl pk-yrs)     Types: Cigarettes    Smokeless tobacco: Never   Substance Use Topics    Alcohol use: Not Currently    Drug use: Never     No family history on file.    Review of Systems:    Deferred until after extubation    Objective   Vitals:  Most Recent:  Vitals:    07/09/24 0645   BP: 109/56   Pulse: 72   Resp: 18   Temp: 36.3 °C (97.3 °F)   SpO2: 94%       24hr Min/Max:  Temp  Min: 36.2 °C (97.2 °F)  Max: 36.7 °C (98.1 °F)  Pulse  Min: 56  Max: 72  BP  Min: 97/52  Max: 118/60  Resp  Min: 18  Max: 18  SpO2  Min: 94 %  Max: 98 %    I/O:  I/O last 2 completed shifts:  In: 1200 (13.9 mL/kg) [P.O.:1200]  Out: - (0 mL/kg)   Weight: 86.4 kg     LDA:  CVC 07/09/24 Double lumen Right Internal jugular (Active)   Placement Date/Time: 07/09/24 (c) 0820   Hand Hygiene Performed Prior to CVC Insertion: Yes  Site Prep: Chlorhexidine   Site Prep Agent has Completely Dried Before Insertion: Yes  All 5 Sterile Barriers Used (Gloves, Gown, Cap, Mask, Large Sterile Fernanda...   Number of days: 0       Arterial Line 07/09/24 Left Brachial (Active)   Placement Date/Time: 07/09/24 (c) 0745   Size: 20 G  Orientation: Left  Location: Brachial  Securement Method: Transparent dressing  Patient Tolerance: Tolerated well   Number of days: 0       ETT  7.5 mm (Active)   Placement Date/Time: 07/09/24 (c) 0814   Mask Ventilation: Vent by mask + OA or adjuvant +/- NMBA  Technique: Direct laryngoscopy  ETT Type: ETT - single  Single Lumen Tube Size: 7.5 mm  Cuffed: Yes  Laryngoscope: Chirag  Blade Size: 4  Location: O...   Number of days: 0       Urethral Catheter Temperature probe 14 Fr. (Active)   Placement Date/Time: 07/09/24 0820   Placed by: SUKH Gleason  Hand Hygiene Completed: Yes  Catheter Type: Temperature probe  Tube Size (Fr.): 14 Fr.  Catheter Balloon Size: 10 mL  Urine Returned: Yes   Number of days: 0        Physical Exam:     Deferred until after extubation    Lab Review:  Results from last 7 days   Lab Units 07/08/24  1830   WBC AUTO x10*3/uL 7.0   HEMOGLOBIN g/dL 8.6*   HEMATOCRIT % 27.4*   PLATELETS AUTO x10*3/uL 254     Results from last 7 days   Lab Units 07/08/24  1830 07/03/24 2002 07/02/24  1608   SODIUM mmol/L 137   < > 138   POTASSIUM mmol/L 4.6   < > 3.8   CHLORIDE mmol/L 100   < > 102   CO2 mmol/L 28   < > 26   BUN mg/dL 13   < > 16   CREATININE mg/dL 0.76   < > 0.74   CALCIUM mg/dL 8.7   < > 8.9   PROTEIN TOTAL g/dL  --   --  7.0   BILIRUBIN TOTAL mg/dL  --   --  0.4   ALK PHOS U/L  --   --  122*   ALT U/L  --   --  10   AST U/L  --   --  19   GLUCOSE mg/dL 149*   < > 81    < > = values in this interval not displayed.     Results from last 7 days   Lab Units 07/02/24  1608   MAGNESIUM mg/dL 2.10     Results from last 7 days   Lab Units 07/09/24  1056   POCT PH, ARTERIAL pH 7.47*   POCT PCO2, ARTERIAL mm Hg 37*   POCT PO2, ARTERIAL mm Hg 130*   POCT HCO3 CALCULATED, ARTERIAL mmol/L 26.9*   POCT BASE EXCESS, ARTERIAL mmol/L 3.1*       Most recent labs and imaging reviewed.    Daily Risk Screen    Intubated: for mechanical ventilation  Central line: for hemodynamic monitoring  Gabe: for strict I/Os    Assessment/Plan     Assessment:    Mr. Zepeda is a 58-year-old male with a PMH of RA (on chronic prednisone and hydroxychloroquine), type 2 DM (on insulin), COPD, hypothyroidism, 40-pack-year smoking history, chronic methadone use, peripheral neuropathy, and unhealed diabetic foot ulcers s/p right foot partial amputation (7 months ago - follows with wound clinic as outpatient) and most recently NSTEMI, s/p CABGx3.     Plan:  NEURO:  PMH of chronic methadone use and peripheral neuropathy secondary to T2DM. Patient is intubated and sedated on propofol infusion. Acute post operative pain.   -->  - Serial neuro and pain assessments   - NMB reversal around 1600   - acute pain consult  - ketamine  infusion  - precedex infusion  - methadone 20 IV after extubation  - possible PCA overnight  - Scheduled Tylenol   - PRN oxycodone  - PRN dilaudid for pain   - PT Consult, OOB to chair as tolerated, chair position if not tolerated   - CAM ICU score qshift  - Sleep/wake cycle hygiene  - Continue methadone  - Hold home duloxetine and pregabalin     CV:  Patient has a history of NSTEMI. Is now status post CABGx3 Pre: Mildly decreased LV function, EF 40-45%. Post: normal LV function. Arrived to CTICU on V epicardial wires set VVI @ 50.-->  - Maintain goal MAP > 70  - Mixed venous and CI Q4H  - Volume resuscitate as clinically indicated  - Maintain epicardial wires set VVI @ 50  - CABG is 2/2 NSTEMI, will receive Plavix POD2  - Start statin POD1     PULM:  PMH of COPD.  Currently intubated on ventilator. Chest tubes L pleural, R pleural, and mediastinal .-->  - Post op CXR: OGT and ETT in place  - Once reversed, wean ventilator settings towards CPAP & extubation   - Wean FiO2 maintaining SpO2 >92%.   - IS q1h and OOB to chair when extubated  - Chest tubes to wall suction.  - Continue tiotropium    GI:  PMH unremarkable.  OG in place.-->  - Continue PPI until extubated  - NPO, will perform bedside swallow eval post extubation   - Colace/senna BID and miralax BID     :  CSA-JUAN J Risk Score 2.  No history of renal disease, baseline creatinine 0.7. Creatinine stable post-op at 0.71. Carpenter in place and making adequate UOP. -->  - Continue carpenter catheter for strict I/Os.  - Goal UOP 0.5ml/kg/hr  - RFP as clinically indicated  - Replete electrolytes per CTICU protocol     ENDO:  PMH of insulin-dependent diabetes (A1c: 8.5) and hypothyroidism-->  - Maintain BG <180, insulin per CTICU protocol  - continue levothyroxine  - on SSI     HEME:  Acute blood loss anemia and thrombocytopenia.-->    - Monitor drain output volume and characteristics  - CBC, coags, and fibrinogen post op and as clinically indicated  - Start ASA 6hrs  post-op for CABG  - CABG is 2/2 NSTEMI, will receive Plavix POD2  - SQH tomorrow   - SCDs for DVT prophylaxis.  - Last type and screen: 7/9     ID:  Afebrile, no current indications of infection. MRSA negative .-->  - Trend temp q4h  - Periop cefazolin x 48hrs     Skin:  No active skin issues.  - preventative Mepilex dressings in place on sacrum and heels  - change preventative Mepilex weekly or more frequently as indicated (when moist/soiled)   - every shift skin assessment per nursing and weekly ICU skin rounds  - moisture barrier to be applied with rosy care  - active skin problems addressed with nursing on daily rounds     Proph:  SCDs  PPI     G:  Line  Right IJ MAC w Minimac placed 7/9  Left brachial a-line placed 7/9    F: Family: will update at bedside postoperatively.     Dispo: CTICU care for now.    CTICU TEAM PHONE [phone]    Dilip Farley, PGY1    Patient discussed with Dr. Fenton

## 2024-07-09 NOTE — ANESTHESIA PROCEDURE NOTES
Central Venous Line:    Date/Time: 7/9/2024 8:20 AM    A central venous line was placed in the OR for the following indication(s): central venous access and CVP monitoring.  Staffing  Performed: JASPREET   Authorized by: Eric Verdugo MD    Performed by: JASPREET Burris    Sterility preparation included the following: provider hand hygiene performed prior to central venous catheter insertion, all 5 sterile barriers used (gloves, gown, cap, mask, large sterile drape) during central venous catheter insertion, antiseptic used during central venous catheter insertion and skin prep agent completely dried prior to procedure.  Medical reason for not performing maximal sterile barrier technique: no  The patient was placed in Trendelenburg position.    Right internal jugular vein was prepped.    The site was prepped with Chlorhexidine.  Size: 9 Fr (8 Fr)   Length: 11.5  Catheter type: introducer   Number of Lumens: double lumen    This catheter was not an oximetric catheter.    During the procedure, the following specific steps were taken: target vein identified, needle advanced into vein and blood aspirated and guidewire advanced into vein.  Seldinger technique used.  Procedure performed using ultrasound guidance.  Sterile gel and probe cover used in ultrasound-guided central venous catheter insertion.    Intravenous verification was obtained by ultrasound, venous blood return and manometry.      Post insertion care included: all ports aspirated, all ports flushed easily, guidewire removed intact, Biopatch applied, line sutured in place and dressing applied.    During the procedure the patient experienced: patient tolerated procedure well with no complications.           images stored in chart

## 2024-07-09 NOTE — CONSULTS
Vancomycin Dosing by Pharmacy- Cessation of Therapy    Consult to pharmacy for vancomycin dosing has been discontinued by the prescriber, pharmacy will sign off at this time.    Please call pharmacy if there are further questions or re-enter a consult if vancomycin is resumed.     Annika Mccabe, PharmD           Vancomycin Dosing by Pharmacy- INITIAL    Kael Zepeda is a 58 y.o. year old male who Pharmacy has been consulted for vancomycin dosing for other MRSA colonization . Based on the patient's indication and renal status this patient will be dosed based on a goal AUC of 400-600.     Renal function is currently stable.    Visit Vitals  BP 99/51   Pulse 70   Temp 36.4 °C (97.5 °F) (Skin)   Resp 14        Lab Results   Component Value Date    CREATININE 0.76 2024    CREATININE 0.87 2024    CREATININE 0.87 2024    CREATININE 0.83 2024        Patient weight is as follows:   Vitals:    24 0400   Weight: 86.4 kg (190 lb 8 oz)       Cultures:  No results found for the encounter in last 14 days.        I/O last 3 completed shifts:  In: 1880 (21.8 mL/kg) [P.O.:1880]  Out: - (0 mL/kg)   Weight: 86.4 kg   I/O during current shift:  I/O this shift:  In: 3320 [Blood:1070; IV Piggyback:2250]  Out: 1935 [Urine:1465; Blood:250; Chest Tube:220]    Temp (24hrs), Av.4 °C (97.5 °F), Min:36.2 °C (97.2 °F), Max:36.7 °C (98.1 °F)         Assessment/Plan     Patient was given 1500 mg x1 this morning.  Will initiate vancomycin maintenance,  1250 mg every 12 hours.    This dosing regimen is predicted by [employer] to result in the following pharmacokinetic parameters:  Loading dose: N/A  Regimen: 1250 mg IV every 12 hours.  Start time: 19:53 on 2024  Exposure target: AUC24 (range)400-600 mg/L.hr   AUC24,ss: 526 mg/L.hr  Probability of AUC24 > 400: 77 %  Ctrough,ss: 16.1 mg/L  Probability of Ctrough,ss > 20: 33 %  Probability of nephrotoxicity (Lodise KELLY ): 11 %      Follow-up level  will be ordered on 7/10/24 at 1st AM labs unless clinically indicated sooner.  Will continue to monitor renal function daily while on vancomycin and order serum creatinine at least every 48 hours if not already ordered.  Follow for continued vancomycin needs, clinical response, and signs/symptoms of toxicity.       Kael Enciso, PharmD

## 2024-07-09 NOTE — BRIEF OP NOTE
Date: 2024 - 2024  OR Location: [facility] OR    Name: Kael Zepeda, : 1965, Age: 58 y.o., MRN: [id number], Sex: male    Diagnosis  Pre-op Diagnosis     * CAD in native artery [I25.10]     * NSTEMI (non-ST elevated myocardial infarction) (Multi) [I21.4] Post-op Diagnosis     * CAD in native artery [I25.10]     * NSTEMI (non-ST elevated myocardial infarction) (Multi) [I21.4]     Procedures  CABG x3 MANUELITO to OM2, LIMA to LAD, and SVG to RLPV  13969 - [place] CABG W/ARTERIAL GRAFT TWO ARTERIAL GRAFTS    CABG x3 MANUELITO to OM2, LIMA to LAD, and SVG to RLPV  87741 - [place] CORONARY ARTERY BYP W/VEIN & ARTERY GRAFT 1 VEIN  Median sternotomy  2. Bilateral internal mammary takedown from the chest wall  3. Left thigh endoscopic  saphenous vein harvest  4. Off-pump CABG x 2: SVG to PDA and LIMA to LAD  5. Central cannulation for cardiopulmonary bypass  6. On-pump CABG x 1: free-MANUELITO to OM2 Y'ed off proximally from proximal LAD  7. Sternal approximation with regular wires and 3 plates     Chest Tubes/Drains: bilateral pleural / 1 mediastinal       Temporary Pacing Wires: Ventricular    -Settings:NA   -Underlying Rhythm:NSR    Permanent pacer/ICD: No   -Preoperative settings:    -Intra-op/ Postoperative settings:    Sternotomy performed by: Yareli Hilton MD    Conduit Harvested by: Frank Gleason    Sternal Wires placed by: Frank Gleason    Arm/Leg/Groin Closure/Cutdown performed by: leg incision closure: Morena Pura    Cardio Pulmonary Bypass Time: 34 minutes  Cross-clamp Time: 24 minutes  Circulatory Arrest: No Time:     Is patient candidate for Emergency Re-sternotomy? Yes   -If yes, POD #10 is -  24    Surgeons      * Yareli Carey - Primary    Resident/Fellow/Other Assistant:  Surgeons and Role:  * No surgeons found with a matching role *  Rosibel Neves SA-C   Procedure Summary  Anesthesia: General  ASA: IV  Anesthesia Staff: Anesthesiologist: Eric Verdugo MD  C-AA: JASPREET Burris  Anesthesia  Resident: Jes Guadarrama DO  Perfusionist: Negra Graf  Estimated Blood Loss: 250mL  Intra-op Medications:   Administrations occurring from 0715 to 1430 on 07/09/24:   Medication Name Total Dose   heparin 1,000 unit/mL injection 50,000 Units   papaverine injection 360 mg   vancomycin (Vancocin) vial for injection 4 g   aspirin EC tablet 81 mg Cannot be calculated   bisacodyl (Dulcolax) suppository 10 mg Cannot be calculated   hydroxychloroquine (Plaquenil) tablet 200 mg Cannot be calculated   insulin lispro (HumaLOG) injection 0-10 Units Cannot be calculated   isosorbide dinitrate (Isordil) tablet 10 mg Cannot be calculated   methadone (Dolophine) tablet 77.5 mg Cannot be calculated   metoprolol succinate XL (Toprol-XL) 24 hr tablet 50 mg Cannot be calculated   polyethylene glycol (Glycolax, Miralax) packet 17 g Cannot be calculated   predniSONE (Deltasone) tablet 5 mg Cannot be calculated   pregabalin (Lyrica) capsule 200 mg Cannot be calculated   sennosides-docusate sodium (Chelsie-Colace) 8.6-50 mg per tablet 1 tablet Cannot be calculated   tiotropium (Spiriva Respimat) 2.5 mcg/actuation inhaler 2 puff Cannot be calculated              Anesthesia Record               Intraprocedure I/O Totals          Intake    PRBC 350.00 mL    electrolyte-A (Plasmalyte-A) 1500.00 mL    Norepinephrine Drip 0.00 mL    The total shown is the total volume documented since Anesthesia Start was filed.    Tranexamic Acid 0.00 mL    The total shown is the total volume documented since Anesthesia Start was filed.    Insulin Drip 0.00 mL    The total shown is the total volume documented since Anesthesia Start was filed.    Propofol Drip 0.00 mL    The total shown is the total volume documented since Anesthesia Start was filed.    Cell Saver 720 mL    Total Intake 2570 mL       Output    Urine 890 mL    Total Output 890 mL       Net    Net Volume 1680 mL          Specimen: No specimens collected     Staff:   Circulator:  Negra  Scrub Person: Sidney Mclaughlin Scrub: Omra Mclaughlin Circulator: Leticia          Findings: CAD    Complications:  None; patient tolerated the procedure well.     Disposition: ICU - intubated and hemodynamically stable.  Condition: stable  Specimens Collected: No specimens collected  Attending Attestation: I was present for the entire procedure.    Yareli Carey  Phone Number: [phone]

## 2024-07-09 NOTE — ANESTHESIA PROCEDURE NOTES
Peripheral IV  Date/Time: 7/9/2024 8:20 AM      Placement  Needle size: 14 G  Laterality: right  Location: hand  Site prep: alcohol  Technique: anatomical landmarks  Attempts: 1

## 2024-07-09 NOTE — ANESTHESIA PROCEDURE NOTES
Arterial Line:    Date/Time: 7/9/2024 7:45 AM    Staffing  Performed: CAA   Authorized by: Eric Verdugo MD    Performed by: JASPREET Burris    An arterial line was placed. Procedure performed using ultrasound guidance.in the OR for the following indication(s): continuous blood pressure monitoring and blood sampling needed.    A 20 gauge (size), 10 cm (length), Angiocath (type) catheter was placed into the Left brachial artery, secured by Tegadejulien   Seldinger technique used.  Events:  patient tolerated procedure well with no complications.

## 2024-07-09 NOTE — OP NOTE
CABG x3 MANUELITO to OM2, LIMA to LAD, and SVG to RLPV Operative Note     Date: 2024 - 2024  OR Location: [facility] OR    Name: Kael Zepeda, : 1965, Age: 58 y.o., MRN: [id number], Sex: male    Diagnosis  Pre-op Diagnosis     * CAD in native artery [I25.10]     * NSTEMI (non-ST elevated myocardial infarction) (Multi) [I21.4] Post-op Diagnosis     * CAD in native artery [I25.10]     * NSTEMI (non-ST elevated myocardial infarction) (Multi) [I21.4]     Procedures  Median sternotomy  Attempt off-pump surgery  On pump coronary artery bypass grafting x 3  CHEN to LAD  MANUELITO( Y'ed off LIMA) to obtuse marginal  SVG to right PLV (terminal branch of RCA)  4.  Endoscopic harvesting of the right greater saphenous vein  5.  Sternal plating      Surgeons      * Yareli Carey - Primary    Resident/Fellow/Other Assistant:  Surgeons and Role:  * No surgeons found with a matching role *    Procedure Summary  Anesthesia: General  ASA: IV  Anesthesia Staff: Anesthesiologist: Eric Verdugo MD  C-AA: JASPREET Burris  Anesthesia Resident: Jes Guadarrama DO  Perfusionist: Negra Graf  Estimated Blood Loss: 500mL  Intra-op Medications:   Administrations occurring from 0715 to 1430 on 24:   Medication Name Total Dose   heparin 1,000 unit/mL injection 50,000 Units   papaverine injection 360 mg   vancomycin (Vancocin) vial for injection 4 g   aspirin EC tablet 81 mg Cannot be calculated   bisacodyl (Dulcolax) suppository 10 mg Cannot be calculated   hydroxychloroquine (Plaquenil) tablet 200 mg Cannot be calculated   insulin lispro (HumaLOG) injection 0-10 Units Cannot be calculated   isosorbide dinitrate (Isordil) tablet 10 mg Cannot be calculated   methadone (Dolophine) tablet 77.5 mg Cannot be calculated   metoprolol succinate XL (Toprol-XL) 24 hr tablet 50 mg Cannot be calculated   polyethylene glycol (Glycolax, Miralax) packet 17 g Cannot be calculated   predniSONE (Deltasone) tablet 5 mg Cannot  be calculated   pregabalin (Lyrica) capsule 200 mg Cannot be calculated   sennosides-docusate sodium (Chelsie-Colace) 8.6-50 mg per tablet 1 tablet Cannot be calculated   tiotropium (Spiriva Respimat) 2.5 mcg/actuation inhaler 2 puff Cannot be calculated              Anesthesia Record               Intraprocedure I/O Totals          Intake    PRBC 350.00 mL    Norepinephrine Drip 0.00 mL    The total shown is the total volume documented since Anesthesia Start was filed.    Tranexamic Acid 0.00 mL    The total shown is the total volume documented since Anesthesia Start was filed.    Insulin Drip 0.00 mL    The total shown is the total volume documented since Anesthesia Start was filed.    Propofol Drip 0.00 mL    The total shown is the total volume documented since Anesthesia Start was filed.    Cell Saver 720 mL    Total Intake 1070 mL       Output    Urine 745 mL    Total Output 745 mL       Net    Net Volume 325 mL          Specimen: No specimens collected     Staff:   Circulator: Negra  Scrub Person: Sidney Mclaughlin Scrub: Omar Mclaughlin Circulator: Leticia         Drains and/or Catheters:   Urethral Catheter Temperature probe 14 Fr. (Active)   Site Assessment Clean;Skin intact 07/09/24 0929   Reason for Continuing Urinary Catheterization surgical procedures: urological/gynecological, pelvic oncology, anal, prolonged surgical procedure 07/09/24 0929       Tourniquet Times:         Implants:  Implants       Type Name Action Serial No.       OCTOPUS EVOLUTION TISSUE STABILIZER  Implanted               Findings:   Normal size heart  No calcification in the aorta  Inferior wall with scar  Inflamed tissues in keeping with micro vasculopathy secondary to diabetes    DYANA:    Low normal left ventricular function  Moderate tricuspid regurgitation  Mild to moderate mitral regurgitation    Indications: Kael Zepeda is an 58 y.o. male who is having surgery for CAD in native artery [I25.10]  NSTEMI (non-ST elevated  myocardial infarction) (Multi) [I21.4].  I had the pleasure of meeting with Mr. Anderson in his room after reviewing his case for consideration of surgical revascularization.  He is quite a young man with uncontrolled diabetes, and smoking, hypertension and dyslipidemia.  He was found to have severe triple-vessel coronary artery disease with prognostic significance.  Given his age, diabetes and anatomy, cardiac surgery was involved.  I agreed patient should benefit from surgical revascularization.  I explained to him the benefits of of increasing lifespan and keeping his quality of life.  Also we discussed the risks involved including not surviving the operation, stroke, myocardial damage, kidney dysfunction, sternal wound healing issues, prolonged intubation, infection, and bleeding to name a few.  He agreed to go ahead with the operation and the consent was given.  My main concern with him is his open sores secondary to the diabetes.  He has no evidence of large vessel PAD.    Today the patient was seen in the preoperative area.  We again went over the risks and benefits and the electronic consent was performed. Preoperative antibiotics have been ordered and given within 1 hours of incision. Venous thrombosis prophylaxis are not indicated.    Procedure Details: The patient was brought to the OR trainee.  A safety checklist was performed.  The patient was put to sleep under general anesthetic.  A central venous line and arterial line were inserted by team.  Transesophageal echocardiogram showed low normal left ventricular ejection fraction with mildly reduced RV function.  Also he had some mild to moderate tricuspid and mitral regurgitation.    The patient was prepped and draped in the usual fashion.  A median sternotomy was performed.  I harvested bilaterally skeletonized internal mammary arteries.  The right greater saphenous vein was harvested endoscopically.  Once all the conduits were harvested, heparin was  given for an off-pump procedure (200u/kg).  The MANUELITO was transected proximally and distally and used as a free graft.  It was anastomosed on the side of the LIMA to create a composite arterial graft.  This anastomosis performed with 7-0 Prolene.    Then, the pericardial well was widely open and the heart was positioned to expose the anterior wall.  The LAD was dissected out.  Proximal control obtained with a Silastic tape, and in the LAD was opened.  A 2 mm shunt was inserted.  The anastomosis performed in end-to-side fashion with 7-0 Prolene.  Transient time ultrasound based flow analysis showed adequate flow and pulsatility index.  Next, I exposed the lateral wall.  I had suspicious that the obtuse marginal would be intramyocardial.  I try to localize the OM and successfully.  Then, I moved to the inferior wall where I could see the final branches of the RCA.  The inferior wall had some scar.  The right posterolateral ventricular branch was dissected and proximal control was obtained.  After opening, inserting 1.5 mm shunt, I performed the anastomosis with a 7-0 Prolene.  It was hemostatic and patent.  At that time I was unhappy as I could not find a satisfactory spot to bypass the OM.  The OM was a good-sized vessel.  Then, I decided to go on pump to arrest the heart.    Full dose heparin was given to keep an ACT above 400 seconds.  #20 EOPA arterial cannula was used in the distal ascending aorta and a double stage venous cannula appropriate to the patient's size inserted via the right atrial appendage.  Antegrade cardioplegia catheter was also inserted for cardioplegia delivery and venting purposes.  Once the ACT was confirmed above 400 seconds, we commenced cardiopulmonary bypass.  The aorta was crossclamped, and cardioplegia injection was performed antegradely.  I gave 1 L.  The heart had an uneventful arrest.    Then, I exposed the lateral wall in an arrested heart.  I was able to find the big obtuse marginal  very close to the AV groove.  It received the MANUELITO in the standard fashion.  I used a 7-0 Prolene.  Flow analysis show adequate flow and pulsatility index.  Lastly, I anastomose the saphenous vein graft to the RPL V on the ascending aorta using a 4 mm punch and 6-0 Prolene.  This graft had high pulsatility index which I suspect to be a poor runoff.      Once all the heart work was performed, the cross-clamp was removed, and the heart reperfused.  The heart resumed activity by its own.  The weaning of cardiopulmonary took place uneventfully.  The cross-clamp time was 24 minutes and the cardiopulmonary bypass was 34 minutes.  The pump blood was transfused back to the patient, cannulas were removed, and cannulation sites oversewed.  Protamine was given and hemostasis obtained.  Given the low hemoglobin level to begin with, the patient required transfusion.  The chest was closed with a combination of stainless steel wires and reinforced with plates and screws.  Subcutaneous and skin were closed in layers.    I personally talked to the patient son in the waiting area for an update.  The patient tolerated the procedure quite well, and was transferred to the intensive care unit.      Complications:  None; patient tolerated the procedure well.    Disposition: ICU - intubated and hemodynamically stable.  Condition: stable     Attending Attestation: I performed the procedure.    Yareli Carey  Phone Number: [phone]

## 2024-07-09 NOTE — ANESTHESIA POSTPROCEDURE EVALUATION
Patient: Kael Zepeda    Procedure Summary       Date: 07/09/24 Room / Location: [facility] OR 20 / [facility] OR    Anesthesia Start: 0726 Anesthesia Stop: 1407    Procedure: CABG x3 MANUELITO to OM2, LIMA to LAD, and SVG to RLPV (Chest) Diagnosis:       CAD in native artery      NSTEMI (non-ST elevated myocardial infarction) (Multi)      (CAD in native artery [I25.10])      (NSTEMI (non-ST elevated myocardial infarction) (Multi) [I21.4])    Surgeons: Yareli Carey MD Responsible Provider: Eric Verdugo MD    Anesthesia Type: general ASA Status: 4            Anesthesia Type: general    Vitals Value Taken Time   BP 99/51 07/09/24 1405   Temp 36.4 °C (97.5 °F) 07/09/24 1405   Pulse 69 07/09/24 1445   Resp 16 07/09/24 1445   SpO2 100 % 07/09/24 1445   Vitals shown include unfiled device data.    Anesthesia Post Evaluation    Patient location during evaluation: ICU  Patient participation: complete - patient cannot participate  Level of consciousness: sedated  Pain score: 0  Pain management: adequate  Airway patency: patent  Cardiovascular status: stable  Respiratory status: ETT and ventilator  Hydration status: acceptable  Postoperative Nausea and Vomiting: none    No notable events documented.

## 2024-07-09 NOTE — ANESTHESIA PREPROCEDURE EVALUATION
Patient: Kael Zepeda    Procedure Information       Anesthesia Start Date/Time: 07/09/24 0726    Procedure: CABG x 3 LIMA, radial and vein (Chest)    Location: [facility] OR 20 / [facility] OR    Surgeons: Yareli Carey MD            Relevant Problems   Cardiac   (+) Benign essential hypertension   (+) CAD in native artery   (+) Hyperlipidemia, mixed   (+) NSTEMI (non-ST elevated myocardial infarction) (Multi)      Pulmonary   (+) Pulmonary emphysema (Multi)      Neuro   (+) Depression   (+) Generalized anxiety disorder   (+) Lumbar radiculopathy   (+) Peripheral neuropathy      GI   (+) Chronic GERD   (+) Gastroesophageal reflux disease without esophagitis      Liver   (+) Hepatitis C      Endocrine   (+) Diabetes mellitus, type 2 (Multi)   (+) Hypothyroidism, acquired   (+) Morbid (severe) obesity due to excess calories (Multi)      Hematology   (+) Microcytic anemia      Musculoskeletal   (+) Degeneration of intervertebral disc of lumbar region   (+) Fibromyalgia   (+) Lumbar spondylosis   (+) Rheumatoid arthritis (Multi)   (+) Rheumatoid arthritis involving both hands with positive rheumatoid factor (Multi)   (+) Spinal stenosis of lumbosacral region      ID   (+) Acute osteomyelitis of right foot (Multi)   (+) Hepatitis C       Clinical information reviewed:   Tobacco  Allergies  Meds   Med Hx  Surg Hx   Fam Hx  Soc Hx        NPO Detail:  NPO/Void Status  Carbohydrate Drink Given Prior to Surgery? : N  Date of Last Liquid: 07/08/24  Time of Last Liquid: 2300  Date of Last Solid: 07/08/24  Time of Last Solid: 2300  Last Intake Type: Food  Time of Last Void: 0600         Physical Exam    Airway  Mallampati: II  TM distance: >3 FB  Neck ROM: full     Cardiovascular   Rhythm: regular     Dental   Comments: edentulous   Pulmonary   Breath sounds clear to auscultation     Abdominal        Anesthesia Plan    History of general anesthesia?: yes  History of complications of general anesthesia?:  pulse oximetry no    ASA 4     general     The patient is not a current smoker.    Anesthetic plan and risks discussed with patient.  Use of blood products discussed with patient who consented to blood products.    Plan discussed with CAA.

## 2024-07-10 ENCOUNTER — APPOINTMENT (OUTPATIENT)
Dept: RADIOLOGY | Facility: HOSPITAL | Age: 59
DRG: 235 | End: 2024-07-10
Payer: MEDICARE

## 2024-07-10 ENCOUNTER — APPOINTMENT (OUTPATIENT)
Dept: CARDIOLOGY | Facility: HOSPITAL | Age: 59
End: 2024-07-10
Payer: MEDICARE

## 2024-07-10 LAB
ALBUMIN SERPL BCP-MCNC: 3.7 G/DL (ref 3.4–5)
ALBUMIN SERPL BCP-MCNC: 4 G/DL (ref 3.4–5)
ANION GAP BLDA CALCULATED.4IONS-SCNC: 10 MMO/L (ref 10–25)
ANION GAP BLDA CALCULATED.4IONS-SCNC: 11 MMO/L (ref 10–25)
ANION GAP BLDA CALCULATED.4IONS-SCNC: 12 MMO/L (ref 10–25)
ANION GAP BLDA CALCULATED.4IONS-SCNC: 12 MMO/L (ref 10–25)
ANION GAP BLDA CALCULATED.4IONS-SCNC: 9 MMO/L (ref 10–25)
ANION GAP SERPL CALC-SCNC: 15 MMOL/L (ref 10–20)
ANION GAP SERPL CALC-SCNC: 18 MMOL/L
ATRIAL RATE: 68 BPM
ATRIAL RATE: 80 BPM
BASE EXCESS BLDA CALC-SCNC: -1.2 MMOL/L (ref -2–3)
BASE EXCESS BLDA CALC-SCNC: -3.8 MMOL/L (ref -2–3)
BASE EXCESS BLDA CALC-SCNC: 0.5 MMOL/L (ref -2–3)
BASE EXCESS BLDA CALC-SCNC: 1.3 MMOL/L (ref -2–3)
BASE EXCESS BLDA CALC-SCNC: 1.9 MMOL/L (ref -2–3)
BODY TEMPERATURE: 37 DEGREES CELSIUS
BUN SERPL-MCNC: 13 MG/DL (ref 6–23)
BUN SERPL-MCNC: 15 MG/DL (ref 6–23)
CA-I BLD-SCNC: 1.06 MMOL/L (ref 1.1–1.33)
CA-I BLD-SCNC: 1.08 MMOL/L (ref 1.1–1.33)
CA-I BLD-SCNC: 1.14 MMOL/L (ref 1.1–1.33)
CA-I BLDA-SCNC: 0.98 MMOL/L (ref 1.1–1.33)
CA-I BLDA-SCNC: 1.05 MMOL/L (ref 1.1–1.33)
CA-I BLDA-SCNC: 1.06 MMOL/L (ref 1.1–1.33)
CA-I BLDA-SCNC: 1.12 MMOL/L (ref 1.1–1.33)
CA-I BLDA-SCNC: 1.13 MMOL/L (ref 1.1–1.33)
CA-I BLDA-SCNC: 1.14 MMOL/L (ref 1.1–1.33)
CA-I BLDA-SCNC: 1.16 MMOL/L (ref 1.1–1.33)
CALCIUM SERPL-MCNC: 8.3 MG/DL (ref 8.6–10.6)
CALCIUM SERPL-MCNC: 8.8 MG/DL (ref 8.6–10.6)
CHLORIDE BLDA-SCNC: 103 MMOL/L (ref 98–107)
CHLORIDE BLDA-SCNC: 104 MMOL/L (ref 98–107)
CHLORIDE BLDA-SCNC: 108 MMOL/L (ref 98–107)
CHLORIDE BLDA-SCNC: 109 MMOL/L (ref 98–107)
CHLORIDE SERPL-SCNC: 101 MMOL/L (ref 98–107)
CHLORIDE SERPL-SCNC: 103 MMOL/L (ref 98–107)
CO2 SERPL-SCNC: 22 MMOL/L (ref 21–32)
CO2 SERPL-SCNC: 25 MMOL/L (ref 21–32)
CREAT SERPL-MCNC: 0.7 MG/DL (ref 0.5–1.3)
CREAT SERPL-MCNC: 0.77 MG/DL (ref 0.5–1.3)
EGFRCR SERPLBLD CKD-EPI 2021: >90 ML/MIN/1.73M*2
EGFRCR SERPLBLD CKD-EPI 2021: >90 ML/MIN/1.73M*2
ERYTHROCYTE [DISTWIDTH] IN BLOOD BY AUTOMATED COUNT: 17.1 % (ref 11.5–14.5)
ERYTHROCYTE [DISTWIDTH] IN BLOOD BY AUTOMATED COUNT: 17.8 % (ref 11.5–14.5)
GLUCOSE BLD MANUAL STRIP-MCNC: 172 MG/DL (ref 74–99)
GLUCOSE BLD MANUAL STRIP-MCNC: 183 MG/DL (ref 74–99)
GLUCOSE BLD MANUAL STRIP-MCNC: 184 MG/DL (ref 74–99)
GLUCOSE BLD MANUAL STRIP-MCNC: 187 MG/DL (ref 74–99)
GLUCOSE BLD MANUAL STRIP-MCNC: 191 MG/DL (ref 74–99)
GLUCOSE BLDA-MCNC: 130 MG/DL (ref 74–99)
GLUCOSE BLDA-MCNC: 154 MG/DL (ref 74–99)
GLUCOSE BLDA-MCNC: 160 MG/DL (ref 74–99)
GLUCOSE BLDA-MCNC: 164 MG/DL (ref 74–99)
GLUCOSE BLDA-MCNC: 166 MG/DL (ref 74–99)
GLUCOSE BLDA-MCNC: 174 MG/DL (ref 74–99)
GLUCOSE BLDA-MCNC: 193 MG/DL (ref 74–99)
GLUCOSE SERPL-MCNC: 129 MG/DL (ref 74–99)
GLUCOSE SERPL-MCNC: 176 MG/DL (ref 74–99)
HCO3 BLDA-SCNC: 19.9 MMOL/L (ref 22–26)
HCO3 BLDA-SCNC: 20.9 MMOL/L (ref 22–26)
HCO3 BLDA-SCNC: 24.3 MMOL/L (ref 22–26)
HCO3 BLDA-SCNC: 24.5 MMOL/L (ref 22–26)
HCO3 BLDA-SCNC: 25.3 MMOL/L (ref 22–26)
HCO3 BLDA-SCNC: 25.3 MMOL/L (ref 22–26)
HCO3 BLDA-SCNC: 25.9 MMOL/L (ref 22–26)
HCT VFR BLD AUTO: 27.4 % (ref 41–52)
HCT VFR BLD AUTO: 28.4 % (ref 41–52)
HCT VFR BLD EST: 25 % (ref 41–52)
HCT VFR BLD EST: 25 % (ref 41–52)
HCT VFR BLD EST: 27 % (ref 41–52)
HCT VFR BLD EST: 28 % (ref 41–52)
HCT VFR BLD EST: 29 % (ref 41–52)
HGB BLD-MCNC: 8.8 G/DL (ref 13.5–17.5)
HGB BLD-MCNC: 9 G/DL (ref 13.5–17.5)
HGB BLDA-MCNC: 8.2 G/DL (ref 13.5–17.5)
HGB BLDA-MCNC: 8.3 G/DL (ref 13.5–17.5)
HGB BLDA-MCNC: 9 G/DL (ref 13.5–17.5)
HGB BLDA-MCNC: 9.2 G/DL (ref 13.5–17.5)
HGB BLDA-MCNC: 9.4 G/DL (ref 13.5–17.5)
HGB BLDA-MCNC: 9.4 G/DL (ref 13.5–17.5)
HGB BLDA-MCNC: 9.6 G/DL (ref 13.5–17.5)
INHALED O2 CONCENTRATION: 36 %
INHALED O2 CONCENTRATION: 60 %
INHALED O2 CONCENTRATION: 90 %
INHALED O2 CONCENTRATION: 95 %
LACTATE BLDA-SCNC: 0.6 MMOL/L (ref 0.4–2)
LACTATE BLDA-SCNC: 0.8 MMOL/L (ref 0.4–2)
LACTATE BLDA-SCNC: 1.1 MMOL/L (ref 0.4–2)
LACTATE BLDA-SCNC: 1.1 MMOL/L (ref 0.4–2)
LACTATE BLDA-SCNC: 1.3 MMOL/L (ref 0.4–2)
LACTATE BLDA-SCNC: 1.3 MMOL/L (ref 0.4–2)
LACTATE BLDA-SCNC: 1.4 MMOL/L (ref 0.4–2)
MAGNESIUM SERPL-MCNC: 2.16 MG/DL (ref 1.6–2.4)
MAGNESIUM SERPL-MCNC: 2.22 MG/DL (ref 1.6–2.4)
MCH RBC QN AUTO: 24 PG (ref 26–34)
MCH RBC QN AUTO: 24.5 PG (ref 26–34)
MCHC RBC AUTO-ENTMCNC: 31.7 G/DL (ref 32–36)
MCHC RBC AUTO-ENTMCNC: 32.1 G/DL (ref 32–36)
MCV RBC AUTO: 75 FL (ref 80–100)
MCV RBC AUTO: 77 FL (ref 80–100)
NRBC BLD-RTO: 0 /100 WBCS (ref 0–0)
NRBC BLD-RTO: 0 /100 WBCS (ref 0–0)
OXYHGB MFR BLDA: 85.7 % (ref 94–98)
OXYHGB MFR BLDA: 88 % (ref 94–98)
OXYHGB MFR BLDA: 89.6 % (ref 94–98)
OXYHGB MFR BLDA: 89.8 % (ref 94–98)
OXYHGB MFR BLDA: 92.8 % (ref 94–98)
OXYHGB MFR BLDA: 93.9 % (ref 94–98)
OXYHGB MFR BLDA: 96.9 % (ref 94–98)
P AXIS: 36 DEGREES
P OFFSET: 178 MS
P ONSET: 116 MS
PCO2 BLDA: 25 MM HG (ref 38–42)
PCO2 BLDA: 30 MM HG (ref 38–42)
PCO2 BLDA: 30 MM HG (ref 38–42)
PCO2 BLDA: 34 MM HG (ref 38–42)
PCO2 BLDA: 34 MM HG (ref 38–42)
PCO2 BLDA: 35 MM HG (ref 38–42)
PCO2 BLDA: 40 MM HG (ref 38–42)
PH BLDA: 7.42 PH (ref 7.38–7.42)
PH BLDA: 7.43 PH (ref 7.38–7.42)
PH BLDA: 7.45 PH (ref 7.38–7.42)
PH BLDA: 7.48 PH (ref 7.38–7.42)
PH BLDA: 7.48 PH (ref 7.38–7.42)
PH BLDA: 7.52 PH (ref 7.38–7.42)
PH BLDA: 7.53 PH (ref 7.38–7.42)
PHOSPHATE SERPL-MCNC: 3.1 MG/DL (ref 2.5–4.9)
PHOSPHATE SERPL-MCNC: 4.3 MG/DL (ref 2.5–4.9)
PLATELET # BLD AUTO: 178 X10*3/UL (ref 150–450)
PLATELET # BLD AUTO: 187 X10*3/UL (ref 150–450)
PO2 BLDA: 102 MM HG (ref 85–95)
PO2 BLDA: 54 MM HG (ref 85–95)
PO2 BLDA: 59 MM HG (ref 85–95)
PO2 BLDA: 62 MM HG (ref 85–95)
PO2 BLDA: 68 MM HG (ref 85–95)
POTASSIUM BLDA-SCNC: 3.4 MMOL/L (ref 3.5–5.3)
POTASSIUM BLDA-SCNC: 3.6 MMOL/L (ref 3.5–5.3)
POTASSIUM BLDA-SCNC: 3.6 MMOL/L (ref 3.5–5.3)
POTASSIUM BLDA-SCNC: 3.8 MMOL/L (ref 3.5–5.3)
POTASSIUM BLDA-SCNC: 4.2 MMOL/L (ref 3.5–5.3)
POTASSIUM SERPL-SCNC: 3.7 MMOL/L (ref 3.5–5.3)
POTASSIUM SERPL-SCNC: 4.2 MMOL/L (ref 3.5–5.3)
PR INTERVAL: 204 MS
Q ONSET: 217 MS
Q ONSET: 218 MS
QRS COUNT: 11 BEATS
QRS COUNT: 17 BEATS
QRS DURATION: 102 MS
QRS DURATION: 92 MS
QT INTERVAL: 374 MS
QT INTERVAL: 476 MS
QTC CALCULATION(BAZETT): 489 MS
QTC CALCULATION(BAZETT): 506 MS
QTC FREDERICIA: 448 MS
QTC FREDERICIA: 496 MS
R AXIS: 23 DEGREES
R AXIS: 32 DEGREES
RBC # BLD AUTO: 3.66 X10*6/UL (ref 4.5–5.9)
RBC # BLD AUTO: 3.68 X10*6/UL (ref 4.5–5.9)
SAO2 % BLDA: 88 % (ref 94–100)
SAO2 % BLDA: 90 % (ref 94–100)
SAO2 % BLDA: 92 % (ref 94–100)
SAO2 % BLDA: 92 % (ref 94–100)
SAO2 % BLDA: 95 % (ref 94–100)
SAO2 % BLDA: 97 % (ref 94–100)
SAO2 % BLDA: 98 % (ref 94–100)
SODIUM BLDA-SCNC: 133 MMOL/L (ref 136–145)
SODIUM BLDA-SCNC: 135 MMOL/L (ref 136–145)
SODIUM BLDA-SCNC: 136 MMOL/L (ref 136–145)
SODIUM BLDA-SCNC: 136 MMOL/L (ref 136–145)
SODIUM BLDA-SCNC: 137 MMOL/L (ref 136–145)
SODIUM SERPL-SCNC: 137 MMOL/L (ref 136–145)
SODIUM SERPL-SCNC: 139 MMOL/L (ref 136–145)
T AXIS: -13 DEGREES
T AXIS: -36 DEGREES
T OFFSET: 404 MS
T OFFSET: 456 MS
VANCOMYCIN SERPL-MCNC: 4.6 UG/ML (ref 5–20)
VENTRICULAR RATE: 103 BPM
VENTRICULAR RATE: 68 BPM
WBC # BLD AUTO: 11.1 X10*3/UL (ref 4.4–11.3)
WBC # BLD AUTO: 13.6 X10*3/UL (ref 4.4–11.3)

## 2024-07-10 PROCEDURE — 93010 ELECTROCARDIOGRAM REPORT: CPT | Performed by: INTERNAL MEDICINE

## 2024-07-10 PROCEDURE — 85027 COMPLETE CBC AUTOMATED: CPT

## 2024-07-10 PROCEDURE — 84132 ASSAY OF SERUM POTASSIUM: CPT | Performed by: THORACIC SURGERY (CARDIOTHORACIC VASCULAR SURGERY)

## 2024-07-10 PROCEDURE — C9113 INJ PANTOPRAZOLE SODIUM, VIA: HCPCS

## 2024-07-10 PROCEDURE — 2500000004 HC RX 250 GENERAL PHARMACY W/ HCPCS (ALT 636 FOR OP/ED): Performed by: NURSE PRACTITIONER

## 2024-07-10 PROCEDURE — 71045 X-RAY EXAM CHEST 1 VIEW: CPT | Performed by: RADIOLOGY

## 2024-07-10 PROCEDURE — 2500000004 HC RX 250 GENERAL PHARMACY W/ HCPCS (ALT 636 FOR OP/ED): Mod: JZ

## 2024-07-10 PROCEDURE — 93005 ELECTROCARDIOGRAM TRACING: CPT

## 2024-07-10 PROCEDURE — 2500000001 HC RX 250 WO HCPCS SELF ADMINISTERED DRUGS (ALT 637 FOR MEDICARE OP)

## 2024-07-10 PROCEDURE — S0109 METHADONE ORAL 5MG: HCPCS

## 2024-07-10 PROCEDURE — 2500000004 HC RX 250 GENERAL PHARMACY W/ HCPCS (ALT 636 FOR OP/ED)

## 2024-07-10 PROCEDURE — 99291 CRITICAL CARE FIRST HOUR: CPT | Performed by: ANESTHESIOLOGY

## 2024-07-10 PROCEDURE — 2500000005 HC RX 250 GENERAL PHARMACY W/O HCPCS

## 2024-07-10 PROCEDURE — 80202 ASSAY OF VANCOMYCIN: CPT | Performed by: THORACIC SURGERY (CARDIOTHORACIC VASCULAR SURGERY)

## 2024-07-10 PROCEDURE — 2500000005 HC RX 250 GENERAL PHARMACY W/O HCPCS: Performed by: STUDENT IN AN ORGANIZED HEALTH CARE EDUCATION/TRAINING PROGRAM

## 2024-07-10 PROCEDURE — 2500000002 HC RX 250 W HCPCS SELF ADMINISTERED DRUGS (ALT 637 FOR MEDICARE OP, ALT 636 FOR OP/ED)

## 2024-07-10 PROCEDURE — P9045 ALBUMIN (HUMAN), 5%, 250 ML: HCPCS | Mod: JZ

## 2024-07-10 PROCEDURE — 84132 ASSAY OF SERUM POTASSIUM: CPT

## 2024-07-10 PROCEDURE — 2020000001 HC ICU ROOM DAILY

## 2024-07-10 PROCEDURE — 99231 SBSQ HOSP IP/OBS SF/LOW 25: CPT | Performed by: STUDENT IN AN ORGANIZED HEALTH CARE EDUCATION/TRAINING PROGRAM

## 2024-07-10 PROCEDURE — 71045 X-RAY EXAM CHEST 1 VIEW: CPT

## 2024-07-10 PROCEDURE — 3E033XZ INTRODUCTION OF VASOPRESSOR INTO PERIPHERAL VEIN, PERCUTANEOUS APPROACH: ICD-10-PCS | Performed by: INTERNAL MEDICINE

## 2024-07-10 PROCEDURE — 94660 CPAP INITIATION&MGMT: CPT

## 2024-07-10 PROCEDURE — 37799 UNLISTED PX VASCULAR SURGERY: CPT | Performed by: THORACIC SURGERY (CARDIOTHORACIC VASCULAR SURGERY)

## 2024-07-10 PROCEDURE — 2500000005 HC RX 250 GENERAL PHARMACY W/O HCPCS: Performed by: NURSE PRACTITIONER

## 2024-07-10 PROCEDURE — 83735 ASSAY OF MAGNESIUM: CPT

## 2024-07-10 PROCEDURE — 5A0935A ASSISTANCE WITH RESPIRATORY VENTILATION, LESS THAN 24 CONSECUTIVE HOURS, HIGH NASAL FLOW/VELOCITY: ICD-10-PCS | Performed by: INTERNAL MEDICINE

## 2024-07-10 PROCEDURE — 82330 ASSAY OF CALCIUM: CPT

## 2024-07-10 PROCEDURE — 2500000005 HC RX 250 GENERAL PHARMACY W/O HCPCS: Performed by: THORACIC SURGERY (CARDIOTHORACIC VASCULAR SURGERY)

## 2024-07-10 PROCEDURE — 76942 ECHO GUIDE FOR BIOPSY: CPT | Performed by: STUDENT IN AN ORGANIZED HEALTH CARE EDUCATION/TRAINING PROGRAM

## 2024-07-10 PROCEDURE — 82947 ASSAY GLUCOSE BLOOD QUANT: CPT

## 2024-07-10 PROCEDURE — 2500000004 HC RX 250 GENERAL PHARMACY W/ HCPCS (ALT 636 FOR OP/ED): Performed by: STUDENT IN AN ORGANIZED HEALTH CARE EDUCATION/TRAINING PROGRAM

## 2024-07-10 PROCEDURE — 37799 UNLISTED PX VASCULAR SURGERY: CPT

## 2024-07-10 RX ORDER — ACETAMINOPHEN 10 MG/ML
1000 INJECTION, SOLUTION INTRAVENOUS EVERY 6 HOURS SCHEDULED
Status: DISCONTINUED | OUTPATIENT
Start: 2024-07-10 | End: 2024-07-11

## 2024-07-10 RX ORDER — METHADONE HYDROCHLORIDE 10 MG/ML
40 INJECTION, SOLUTION INTRAMUSCULAR; INTRAVENOUS; SUBCUTANEOUS EVERY 12 HOURS SCHEDULED
Status: DISCONTINUED | OUTPATIENT
Start: 2024-07-10 | End: 2024-07-10

## 2024-07-10 RX ORDER — METHADONE IN SOD CHLOR,ISO-OSM 10 MG/ML
40 SYRINGE (ML) INTRAVENOUS EVERY 12 HOURS
Status: DISCONTINUED | OUTPATIENT
Start: 2024-07-10 | End: 2024-07-11

## 2024-07-10 RX ORDER — CELECOXIB 200 MG/1
200 CAPSULE ORAL 2 TIMES DAILY
Status: DISCONTINUED | OUTPATIENT
Start: 2024-07-10 | End: 2024-07-11

## 2024-07-10 RX ORDER — METHADONE HYDROCHLORIDE 5 MG/5ML
77.5 SOLUTION ORAL 2 TIMES DAILY
Status: DISCONTINUED | OUTPATIENT
Start: 2024-07-10 | End: 2024-07-11

## 2024-07-10 RX ORDER — ALBUMIN HUMAN 50 G/1000ML
SOLUTION INTRAVENOUS
Status: COMPLETED
Start: 2024-07-10 | End: 2024-07-10

## 2024-07-10 RX ORDER — DEXMEDETOMIDINE HYDROCHLORIDE 4 UG/ML
0-1.5 INJECTION, SOLUTION INTRAVENOUS CONTINUOUS
Status: DISCONTINUED | OUTPATIENT
Start: 2024-07-10 | End: 2024-07-11

## 2024-07-10 RX ORDER — ALBUTEROL SULFATE 0.83 MG/ML
2.5 SOLUTION RESPIRATORY (INHALATION) EVERY 6 HOURS PRN
Status: DISCONTINUED | OUTPATIENT
Start: 2024-07-10 | End: 2024-07-26 | Stop reason: HOSPADM

## 2024-07-10 RX ORDER — METHADONE IN SOD CHLOR,ISO-OSM 10 MG/ML
20 SYRINGE (ML) INTRAVENOUS ONCE
Status: COMPLETED | OUTPATIENT
Start: 2024-07-10 | End: 2024-07-10

## 2024-07-10 RX ORDER — NOREPINEPHRINE BITARTRATE/D5W 8 MG/250ML
0-.1 PLASTIC BAG, INJECTION (ML) INTRAVENOUS CONTINUOUS
Status: DISCONTINUED | OUTPATIENT
Start: 2024-07-10 | End: 2024-07-11

## 2024-07-10 RX ORDER — HYDROMORPHONE HYDROCHLORIDE 1 MG/ML
1 INJECTION, SOLUTION INTRAMUSCULAR; INTRAVENOUS; SUBCUTANEOUS
Status: DISCONTINUED | OUTPATIENT
Start: 2024-07-10 | End: 2024-07-10

## 2024-07-10 RX ORDER — METOPROLOL TARTRATE 1 MG/ML
5 INJECTION, SOLUTION INTRAVENOUS ONCE
Status: COMPLETED | OUTPATIENT
Start: 2024-07-10 | End: 2024-07-10

## 2024-07-10 RX ORDER — HYDROMORPHONE HYDROCHLORIDE 1 MG/ML
1 INJECTION, SOLUTION INTRAMUSCULAR; INTRAVENOUS; SUBCUTANEOUS EVERY 4 HOURS PRN
Status: DISCONTINUED | OUTPATIENT
Start: 2024-07-10 | End: 2024-07-11

## 2024-07-10 RX ORDER — ROPIVACAINE IN 0.9% SOD CHL/PF 0.2 %
14 PLASTIC BAG, INJECTION (ML) EPIDURAL CONTINUOUS
Status: CANCELLED | OUTPATIENT
Start: 2024-07-10

## 2024-07-10 RX ORDER — ALBUMIN HUMAN 50 G/1000ML
12.5 SOLUTION INTRAVENOUS ONCE
Status: COMPLETED | OUTPATIENT
Start: 2024-07-10 | End: 2024-07-10

## 2024-07-10 RX ORDER — METOPROLOL TARTRATE 25 MG/1
12.5 TABLET, FILM COATED ORAL 2 TIMES DAILY
Status: DISCONTINUED | OUTPATIENT
Start: 2024-07-10 | End: 2024-07-11

## 2024-07-10 RX ORDER — FUROSEMIDE 10 MG/ML
40 INJECTION INTRAMUSCULAR; INTRAVENOUS ONCE
Status: COMPLETED | OUTPATIENT
Start: 2024-07-10 | End: 2024-07-10

## 2024-07-10 RX ORDER — LORAZEPAM 1 MG/1
1 TABLET ORAL EVERY 4 HOURS PRN
Status: DISCONTINUED | OUTPATIENT
Start: 2024-07-10 | End: 2024-07-11

## 2024-07-10 RX ORDER — HEPARIN SODIUM 5000 [USP'U]/ML
5000 INJECTION, SOLUTION INTRAVENOUS; SUBCUTANEOUS EVERY 8 HOURS
Status: DISCONTINUED | OUTPATIENT
Start: 2024-07-10 | End: 2024-07-26 | Stop reason: HOSPADM

## 2024-07-10 RX ORDER — NOREPINEPHRINE BITARTRATE/D5W 8 MG/250ML
PLASTIC BAG, INJECTION (ML) INTRAVENOUS
Status: COMPLETED
Start: 2024-07-10 | End: 2024-07-10

## 2024-07-10 RX ORDER — DULOXETIN HYDROCHLORIDE 60 MG/1
60 CAPSULE, DELAYED RELEASE ORAL DAILY
Status: DISCONTINUED | OUTPATIENT
Start: 2024-07-10 | End: 2024-07-26 | Stop reason: HOSPADM

## 2024-07-10 RX ORDER — KETOROLAC TROMETHAMINE 30 MG/ML
15 INJECTION, SOLUTION INTRAMUSCULAR; INTRAVENOUS EVERY 6 HOURS
Status: DISCONTINUED | OUTPATIENT
Start: 2024-07-10 | End: 2024-07-11

## 2024-07-10 RX ORDER — METOPROLOL TARTRATE 1 MG/ML
5 INJECTION, SOLUTION INTRAVENOUS ONCE
Status: DISCONTINUED | OUTPATIENT
Start: 2024-07-10 | End: 2024-07-10

## 2024-07-10 RX ORDER — LIDOCAINE HYDROCHLORIDE ANHYDROUS AND DEXTROSE MONOHYDRATE .8; 5 G/100ML; G/100ML
3 INJECTION, SOLUTION INTRAVENOUS CONTINUOUS
Status: DISCONTINUED | OUTPATIENT
Start: 2024-07-10 | End: 2024-07-10

## 2024-07-10 RX ORDER — METOPROLOL TARTRATE 1 MG/ML
5 INJECTION, SOLUTION INTRAVENOUS EVERY 6 HOURS
Status: DISCONTINUED | OUTPATIENT
Start: 2024-07-10 | End: 2024-07-10

## 2024-07-10 RX ORDER — HYDROMORPHONE HYDROCHLORIDE 1 MG/ML
1 INJECTION, SOLUTION INTRAMUSCULAR; INTRAVENOUS; SUBCUTANEOUS ONCE
Status: COMPLETED | OUTPATIENT
Start: 2024-07-10 | End: 2024-07-10

## 2024-07-10 RX ORDER — INSULIN LISPRO 100 [IU]/ML
0-15 INJECTION, SOLUTION INTRAVENOUS; SUBCUTANEOUS
Status: DISCONTINUED | OUTPATIENT
Start: 2024-07-10 | End: 2024-07-10

## 2024-07-10 RX ORDER — INSULIN LISPRO 100 [IU]/ML
0-15 INJECTION, SOLUTION INTRAVENOUS; SUBCUTANEOUS EVERY 4 HOURS
Status: DISCONTINUED | OUTPATIENT
Start: 2024-07-11 | End: 2024-07-14

## 2024-07-10 ASSESSMENT — PAIN SCALES - GENERAL
PAINLEVEL_OUTOF10: 10 - WORST POSSIBLE PAIN
PAINLEVEL_OUTOF10: 9
PAINLEVEL_OUTOF10: 10 - WORST POSSIBLE PAIN
PAINLEVEL_OUTOF10: 10 - WORST POSSIBLE PAIN
PAINLEVEL_OUTOF10: 8
PAINLEVEL_OUTOF10: 10 - WORST POSSIBLE PAIN

## 2024-07-10 ASSESSMENT — PAIN - FUNCTIONAL ASSESSMENT
PAIN_FUNCTIONAL_ASSESSMENT: 0-10
PAIN_FUNCTIONAL_ASSESSMENT: CPOT (CRITICAL CARE PAIN OBSERVATION TOOL)
PAIN_FUNCTIONAL_ASSESSMENT: 0-10

## 2024-07-10 ASSESSMENT — COGNITIVE AND FUNCTIONAL STATUS - GENERAL
MOVING TO AND FROM BED TO CHAIR: A LOT
TOILETING: A LOT
STANDING UP FROM CHAIR USING ARMS: A LOT
PERSONAL GROOMING: A LOT
MOVING FROM LYING ON BACK TO SITTING ON SIDE OF FLAT BED WITH BEDRAILS: A LOT
DAILY ACTIVITIY SCORE: 12
DRESSING REGULAR LOWER BODY CLOTHING: A LOT
MOBILITY SCORE: 12
DRESSING REGULAR UPPER BODY CLOTHING: A LOT
CLIMB 3 TO 5 STEPS WITH RAILING: A LOT
TURNING FROM BACK TO SIDE WHILE IN FLAT BAD: A LOT
EATING MEALS: A LOT
WALKING IN HOSPITAL ROOM: A LOT
HELP NEEDED FOR BATHING: A LOT

## 2024-07-10 ASSESSMENT — PAIN DESCRIPTION - LOCATION
LOCATION: CHEST
LOCATION: STERNUM
LOCATION: STERNUM

## 2024-07-10 ASSESSMENT — PAIN DESCRIPTION - ORIENTATION
ORIENTATION: MID

## 2024-07-10 ASSESSMENT — PAIN DESCRIPTION - DESCRIPTORS
DESCRIPTORS: ACHING
DESCRIPTORS: ACHING
DESCRIPTORS: ACHING;SORE

## 2024-07-10 NOTE — PROGRESS NOTES
Physical Therapy                 Therapy Communication Note    Patient Name: Kael Zepeda  MRN: [id number]  Today's Date: 7/10/2024     Discipline: Physical Therapy    Missed Visit Reason: Missed Visit Reason:  (1215. RN hold 2/2 increased respiratory needs. Will reattempt as pt. medically appropriate and schedule permits.)    Missed Time: Attempt    Comment: Confirmed with Dr. Krish Lutz (Podiatry) via secure chat that pt is WBAT R LE in surgical shoe. Requested unit secretary to order surgical shoe.

## 2024-07-10 NOTE — PROGRESS NOTES
CTICU Progress Note  Kael Zepeda/[id number]    Admit Date: 7/1/2024  Hospital Length of Stay: 9   ICU Length of Stay: 17h   CT SURGEON: Dr. Carey    SUBJECTIVE:   Hemodynamics acceptable on provided therapy. Remains sedated with precedex and ketamine. This morning is oxygenating well on 10L HFNC and norepinephrine infusion at 0.04mcg/kg/min.    MEDICATIONS  Infusions:  dexmedeTOMIDine, Last Rate: 0.8 mcg/kg/hr (07/10/24 0550)  ketamine, Last Rate: 0.25 mg/kg/hr (07/10/24 0049)  lactated Ringer's, Last Rate: 5 mL/hr (07/09/24 1931)  lactated Ringer's, Last Rate: 30 mL/hr (07/09/24 1931)  lidocaine, Last Rate: 3 mg/min (07/10/24 0438)  norepinephrine, Last Rate: 0.04 mcg/kg/min (07/10/24 0536)  propofol, Last Rate: Stopped (07/09/24 1733)      Scheduled:  acetaminophen, 650 mg, q6h  albumin human, ,   aspirin, 81 mg, Daily  atorvastatin, 80 mg, Nightly  ceFAZolin, 2 g, q8h  [Held by provider] clopidogrel, 75 mg, Daily  hydroxychloroquine, 200 mg, BID  insulin lispro, 0-15 Units, q4h  ketamine, 0.2 mg/kg, Once  levothyroxine, 50 mcg, Daily  methadone, 77.5 mg, q PM  methadone, 77.5 mg, Daily  oxygen, , Continuous - Inhalation  pantoprazole, 40 mg, Daily before breakfast   Or  pantoprazole, 40 mg, Daily before breakfast  polyethylene glycol, 17 g, Daily  [Transfer Hold] predniSONE, 5 mg, Daily  sennosides-docusate sodium, 2 tablet, BID  tiotropium, 2 puff, Daily      PRN:  albumin human, ,   alteplase, 2 mg, PRN  calcium gluconate, 1 g, q6h PRN  calcium gluconate, 1 g, q6h PRN  calcium gluconate, 2 g, q6h PRN  calcium gluconate, 2 g, q6h PRN  dextrose, 25 g, q15 min PRN   Or  glucagon, 1 mg, q15 min PRN  HYDROmorphone, 0.4 mg, q15 min PRN  HYDROmorphone, 1 mg, q3h PRN  magnesium sulfate, 2 g, q6h PRN  magnesium sulfate, 2 g, q6h PRN  magnesium sulfate, 4 g, q6h PRN  magnesium sulfate, 4 g, q6h PRN  naloxone, 0.2 mg, q5 min PRN  ondansetron, 4 mg, q8h PRN   Or  ondansetron, 4 mg, q8h PRN  oxyCODONE, 10 mg, q4h  "PRN  oxyCODONE, 5 mg, q4h PRN  potassium chloride, 20 mEq, q6h PRN  potassium chloride, 20 mEq, q6h PRN  potassium chloride, 40 mEq, q6h PRN  potassium chloride, 40 mEq, q6h PRN      PHYSICAL EXAM:   Visit Vitals  BP 99/51   Pulse (!) 112   Temp 37.3 °C (99.1 °F) (Temporal)   Resp 21   Ht 1.676 m (5' 6\")   Wt 86.4 kg (190 lb 8 oz)   SpO2 98%   BMI 30.75 kg/m²   Smoking Status Every Day   BSA 2.01 m²     Wt Readings from Last 5 Encounters:   07/09/24 86.4 kg (190 lb 8 oz)   07/01/24 90 kg (198 lb 6.6 oz)   06/05/24 86.2 kg (190 lb)   10/13/23 84.8 kg (187 lb)   09/21/22 90.3 kg (199 lb)     INTAKE/OUTPUT:  I/O last 3 completed shifts:  In: 9568.4 (110.7 mL/kg) [P.O.:1200; I.V.:3398.4 (39.3 mL/kg); Blood:1320; IV Piggyback:3650]  Out: 3405 (39.4 mL/kg) [Urine:2325 (0.7 mL/kg/hr); Blood:250; Chest Tube:830]  Weight: 86.4 kg        Vent settings:  Vent Mode: Pressure support  FiO2 (%):  [40 %-50 %] 40 %  S RR:  [16] 16  S VT:  [510 mL] 510 mL  PEEP/CPAP (cm H2O):  [0 cm H20-8 cm H20] 0 cm H20  LA SUP:  [0 cm H20-5 cm H20] 0 cm H20  MAP (cm H2O):  [12] 12    Physical Exam:   Constitutional: Male patient lying in ICU bed, in no acute distress  Neurological: Somnolent, difficult to arouse on precedex and ketamine infusions. A+Ox2, no obvious focal deficits, moves all extremities  Eyes: Anicteric sclera, clear  Respiratory/Thorax: Diminished, clear breath sounds bilaterally, symmetric chest expansion, 2 chest tubes to -20 suction, serosanguinous drainage. V pacing wires present  Cardiovascular: NSR, no murmurs appreciated  Gastrointestinal: Abdomen soft, nontender, nondistended, bowel sounds present  Genitourinary: Bernal in place draining clear yellow urine  Extremities: Palpable radial pulses 2+, 1+ pulses to bilateral PT/DP, no pitting edema  Skin: Warm and dry throughout, midline incision stable - dressing clean, dry, and intact  Psych: Calm and cooperative    Daily Risk Screen  Central line: Hemodynamic instability " requiring parenteral medication  Bernal: Accurate I/Os in critically ill    Images: Reviewed    Invasive Hemodynamics:    Most Recent Range Past 24hrs   BP (Art) 117/65 Arterial Line BP 1  Min: 78/43  Max: 158/79   MAP(Art) 82 mmHg Arterial Line MAP 1 (mmHg)   Min: 56 mmHg  Max: 106 mmHg     Assessment/Plan   Mr. Zepdea is a 58-year-old male with a PMH of RA (on chronic prednisone and hydroxychloroquine), type 2 DM (on insulin), COPD, hypothyroidism, 40-pack-year smoking history, chronic methadone use, peripheral neuropathy, and unhealed diabetic foot ulcers s/p right foot partial amputation (7 months ago - follows with wound clinic as outpatient) and most recently NSTEMI. He is now s/p CABGx3 with Dr. Carey on 7/9.    Plan:  NEURO: PMH of chronic methadone use and peripheral neuropathy secondary to T2DM. Acute post operative pain uncontrolled on current regimen. Takes methadone at home - dosing confirmed with pharmacy. Somnolent this morning and difficult to arouse A+Ox3, moves all extremities, no obvious focal deficits. Reports numbness and tingling in bilateral hands. -->  - Serial neuro and pain assessments   - Acute pain consult, appreciate recs - plan for catheters this morning  - Discontinue ketamine infusion  - Discontinue precedex infusion  - Perform neuro exam after sedation off and patient more awake  - Send and follow up ABG to r/o hypercapnea due to somnolence  - Scheduled Tylenol   - PRN oxycodone  - PRN dilaudid for pain  - Discontinue lidocaine infusion  - Continue home plaquenil 200mg BID  - PT Consult, OOB to chair as tolerated, chair position if not tolerated   - CAM ICU score qshift  - Sleep/wake cycle hygiene  - Continue methadone 77.5mg BID  - Start home duloxetine and pregabalin    CV: Patient has a history of NSTEMI. Is now status post CABGx3 Pre: Mildly decreased LV function, EF 40-45%. Post: normal LV function. Arrived to CTICU on V epicardial wires set VVI @ 50. NSR, hypotensive on  norepinephrine 0.04mcg/kg/min. CVP ~ 18. -->  - Titrate norepinephrine to goal MAP 70 - 90  - CVP q4hr  - See  for diuresis  - Maintain epicardial wires set VVI @ 50  - Plavix POD 2 for NSTEMI  - Start atorvastatin 80mg nightly  - Hold home lisinopril and lasix    PULM: PMH of COPD. Currently on 10L HFNC. Chest tubes L pleural, R pleural, and mediastinal. Morning CXR with pulmonary edema and atelectasis. -->  - Daily CXR  - Follow up ABG results and wean FiO2 maintaining SpO2 >92%  - IS q1h and OOB to chair  - Chest tubes to wall suction - consider removal if criteria met  - Continue tiotropium  - Prn albuterol    GI: No history. Passed bedside swallow evaluation. Abdomen soft, non tender, and non distended. -->  - Discontinue PPI  - Adult regular diet  - Colace/senna BID and miralax BID    : CSA-JUAN J Risk Score 2. No history of renal disease, baseline creatinine 0.7. Creatinine stable post-op at 0.71. Carpenter in place and making adequate UOP. Net +5L in last 24 hours. Appears hypervolemic. -->  - Continue carpenter catheter for strict I/Os  - Lasix 40mg  - Goal net negative 1L  - Goal UOP 0.5ml/kg/hr  - RFP as clinically indicated  - Replete electrolytes per CTICU protocol    ENDO: PMH of insulin-dependent diabetes (A1c: 8.5) and hypothyroidism-->  - Maintain BG <180, insulin per CTICU protocol  - Continue levothyroxine  - on SSI    HEME: Acute blood loss anemia. Hgb stable at 8.8. -->    - Monitor drain output volume and characteristics  - Daily CBC and prn  - Daily aspirin  - Plavix POD 2 for NSTEMI  - Start SQH   - SCDs for DVT prophylaxis.  - Last type and screen: 7/9    ID: Afebrile, no current indications of infection. MRSA negative .-->  - Trend temp q4h  - Periop cefazolin x 48hrs    Skin: No active skin issues. -->  - preventative Mepilex dressings in place on sacrum and heels  - change preventative Mepilex weekly or more frequently as indicated (when moist/soiled)   - every shift skin assessment per nursing  and weekly ICU skin rounds  - moisture barrier to be applied with rosy care  - active skin problems addressed with nursing on daily rounds    Proph:  SCDs  SQH    G: Line  Right IJ MAC w Minimac placed 7/9  Left brachial a-line placed 7/9    F: Family: will update at bedside postoperatively.    Restraints: Not indicated.    Code status: Full Code    I personally spent 50 minutes of critical care time directly and personally managing the patient exclusive of separately billable procedures.    A,B,C,D,E,F,G: reviewed    Discussed with Dr. Alexis.  Dispo: CTICU care for now.    CTICU TEAM PHONE [id number]

## 2024-07-10 NOTE — CONSULTS
Consults  Acute Pain Service  Mr. Zepeda is a 58-year-old male with a PMH of RA (on chronic prednisone and hydroxychloroquine), type 2 DM (on insulin), COPD, hypothyroidism, 40-pack-year smoking history, chronic methadone use, peripheral neuropathy, and unhealed diabetic foot ulcers s/p right foot partial amputation (7 months ago - follows with wound clinic as outpatient) and most recently NSTEMI, s/p CABGx3. Acute Pain consulted for block for postoperative pain control.     Anticipated Postop Pain Issues -   Palliative: typically relieved with IV analgesics and regional local anesthetics  Provocative: typically with movement  Quality: typically burning and aching  Radiation: typically none  Severity: typically severe 8-10/10  Timing: typically constant    Past Medical History:   Diagnosis Date    Abnormal result of other cardiovascular function study 06/28/2018    Abnormal stress test    Atherosclerotic heart disease of native coronary artery with unstable angina pectoris (Multi) 06/28/2018    Unstable angina pectoris due to coronary arteriosclerosis    Fibromyalgia     Fibromyalgia    Personal history of other diseases of the circulatory system     History of hypertension    Personal history of other diseases of the musculoskeletal system and connective tissue     History of rheumatoid arthritis    Personal history of other diseases of the musculoskeletal system and connective tissue     History of degenerative disc disease    Personal history of other diseases of the musculoskeletal system and connective tissue     History of osteoporosis    Personal history of other endocrine, nutritional and metabolic disease     History of hyperlipidemia    Personal history of other endocrine, nutritional and metabolic disease     History of hypothyroidism    Type 2 diabetes mellitus without complications (Multi) 02/25/2022    Type 2 diabetes mellitus        Past Surgical History:   Procedure Laterality Date    BACK  SURGERY  06/28/2018    Back Surgery    CARDIAC CATHETERIZATION N/A 7/1/2024    Procedure: Left Heart Cath;  Surgeon: Yao Calvin MD;  Location: [facility];  Service: Cardiovascular;  Laterality: N/A;    SEPTOPLASTY  06/28/2018    Septoplasty        No family history on file.     Social History     Socioeconomic History    Marital status:      Spouse name: Not on file    Number of children: Not on file    Years of education: Not on file    Highest education level: Not on file   Occupational History    Not on file   Tobacco Use    Smoking status: Every Day     Current packs/day: 1.00     Average packs/day: 1 pack/day for 40.0 years (40.0 ttl pk-yrs)     Types: Cigarettes    Smokeless tobacco: Never   Vaping Use    Vaping status: Not on file   Substance and Sexual Activity    Alcohol use: Not Currently    Drug use: Never    Sexual activity: Never   Other Topics Concern    Not on file   Social History Narrative    Not on file     Social Determinants of Health     Financial Resource Strain: Low Risk  (7/1/2024)    Overall Financial Resource Strain (CARDIA)     Difficulty of Paying Living Expenses: Not hard at all   Food Insecurity: No Food Insecurity (5/15/2024)    Received from [facility]    Hunger Vital Sign     Worried About Running Out of Food in the Last Year: Never true     Ran Out of Food in the Last Year: Never true   Transportation Needs: No Transportation Needs (7/1/2024)    PRAPARE - Transportation     Lack of Transportation (Medical): No     Lack of Transportation (Non-Medical): No   Physical Activity: Not on file   Stress: Not on file   Social Connections: Not on file   Intimate Partner Violence: Not on file   Housing Stability: Low Risk  (7/1/2024)    Housing Stability Vital Sign     Unable to Pay for Housing in the Last Year: No     Number of Places Lived in the Last Year: 1     Unstable Housing in the Last Year: No   Recent Concern: Housing Stability - High Risk (6/30/2024)     Housing Stability Vital Sign     Unable to Pay for Housing in the Last Year: No     Number of Places Lived in the Last Year: 1     Unstable Housing in the Last Year: Yes        No Known Allergies      Review of Systems  Gen: No fatigue, anorexia, insomnia, fever.   Eyes: No vision loss, double vision, drainage, eye pain.   ENT: No pharyngitis, dry mouth, no hearing changes or ear discharge  Cardiac: No chest pain, palpitations, syncope, near syncope.   Pulmonary: No shortness of breath, cough, hemoptysis.   Heme/lymph: No swollen glands, fever, bleeding.   GI: No abdominal pain, change in bowel habits, melena, hematemesis, hematochezia, nausea, vomiting, diarrhea.   : No discharge, dysuria, frequency, urgency, hematuria.  Endo: No polyuria or weight loss.   Musculoskeletal: Negative for any pain or loss of ROM/weakness  Skin: No rashes or lesions  Neuro: Normal speech, no numbness or weakness. No gait difficulties  Review of systems is otherwise negative unless stated above or in history of present illness.    Physical Exam:  Constitutional:  uncomfortable but no distress, alert and cooperative  Eyes: clear sclera  Head/Neck: No apparent injury, trachea midline  Respiratory/Thorax: Patent airways, thorax symmetric, breathing comfortably. Chest tubes intact  Cardiovascular: no pitting edema  Gastrointestinal: Nondistended  Musculoskeletal: ROM intact  Extremities: no clubbing  Neurological: alert, dubon x4  Psychological: Appropriate affect    Results for orders placed or performed during the hospital encounter of 07/01/24 (from the past 24 hour(s))   Blood Gas Arterial Full Panel Unsolicited   Result Value Ref Range    POCT pH, Arterial 7.44 (H) 7.38 - 7.42 pH    POCT pCO2, Arterial 42 38 - 42 mm Hg    POCT pO2, Arterial 132 (H) 85 - 95 mm Hg    POCT SO2, Arterial 100 94 - 100 %    POCT Oxy Hemoglobin, Arterial 97.9 94.0 - 98.0 %    POCT Hematocrit Calculated, Arterial 27.0 (L) 41.0 - 52.0 %    POCT Sodium, Arterial  135 (L) 136 - 145 mmol/L    POCT Potassium, Arterial 3.6 3.5 - 5.3 mmol/L    POCT Chloride, Arterial 105 98 - 107 mmol/L    POCT Ionized Calcium, Arterial 1.11 1.10 - 1.33 mmol/L    POCT Glucose, Arterial 120 (H) 74 - 99 mg/dL    POCT Lactate, Arterial 0.8 0.4 - 2.0 mmol/L    POCT Base Excess, Arterial 4.0 (H) -2.0 - 3.0 mmol/L    POCT HCO3 Calculated, Arterial 28.5 (H) 22.0 - 26.0 mmol/L    POCT Hemoglobin, Arterial 9.0 (L) 13.5 - 17.5 g/dL    POCT Anion Gap, Arterial 5 (L) 10 - 25 mmo/L    Patient Temperature 37.0 degrees Celsius    FiO2 65 %   Coox Panel, Arterial Unsolicited   Result Value Ref Range    POCT Hemoglobin, Arterial 9.0 (L) 13.5 - 17.5 g/dL    POCT Oxy Hemoglobin, Arterial 97.9 94.0 - 98.0 %    POCT Carboxyhemoglobin, Arterial 1.5 %    POCT Methemoglobin, Arterial 0.3 0.0 - 1.5 %    POCT Deoxy Hemoglobin, Arterial 0.3 0.0 - 5.0 %   Blood Gas Arterial Full Panel Unsolicited   Result Value Ref Range    POCT pH, Arterial 7.47 (H) 7.38 - 7.42 pH    POCT pCO2, Arterial 37 (L) 38 - 42 mm Hg    POCT pO2, Arterial 130 (H) 85 - 95 mm Hg    POCT SO2, Arterial 100 94 - 100 %    POCT Oxy Hemoglobin, Arterial 98.3 (H) 94.0 - 98.0 %    POCT Hematocrit Calculated, Arterial 28.0 (L) 41.0 - 52.0 %    POCT Sodium, Arterial 136 136 - 145 mmol/L    POCT Potassium, Arterial 3.8 3.5 - 5.3 mmol/L    POCT Chloride, Arterial 104 98 - 107 mmol/L    POCT Ionized Calcium, Arterial 1.10 1.10 - 1.33 mmol/L    POCT Glucose, Arterial 142 (H) 74 - 99 mg/dL    POCT Lactate, Arterial 1.0 0.4 - 2.0 mmol/L    POCT Base Excess, Arterial 3.1 (H) -2.0 - 3.0 mmol/L    POCT HCO3 Calculated, Arterial 26.9 (H) 22.0 - 26.0 mmol/L    POCT Hemoglobin, Arterial 9.3 (L) 13.5 - 17.5 g/dL    POCT Anion Gap, Arterial 9 (L) 10 - 25 mmo/L    Patient Temperature 37.0 degrees Celsius    FiO2 65 %   Coox Panel, Arterial Unsolicited   Result Value Ref Range    POCT Hemoglobin, Arterial 9.3 (L) 13.5 - 17.5 g/dL    POCT Oxy Hemoglobin, Arterial 98.3 (H) 94.0  - 98.0 %    POCT Carboxyhemoglobin, Arterial 1.4 %    POCT Methemoglobin, Arterial 0.0 0.0 - 1.5 %    POCT Deoxy Hemoglobin, Arterial 0.3 0.0 - 5.0 %   Blood Gas Arterial Full Panel Unsolicited   Result Value Ref Range    POCT pH, Arterial 7.44 (H) 7.38 - 7.42 pH    POCT pCO2, Arterial 41 38 - 42 mm Hg    POCT pO2, Arterial 446 (H) 85 - 95 mm Hg    POCT SO2, Arterial 100 94 - 100 %    POCT Oxy Hemoglobin, Arterial 98.0 94.0 - 98.0 %    POCT Hematocrit Calculated, Arterial 21.0 (L) 41.0 - 52.0 %    POCT Sodium, Arterial 134 (L) 136 - 145 mmol/L    POCT Potassium, Arterial 4.6 3.5 - 5.3 mmol/L    POCT Chloride, Arterial 105 98 - 107 mmol/L    POCT Ionized Calcium, Arterial 1.03 (L) 1.10 - 1.33 mmol/L    POCT Glucose, Arterial 158 (H) 74 - 99 mg/dL    POCT Lactate, Arterial 1.3 0.4 - 2.0 mmol/L    POCT Base Excess, Arterial 3.3 (H) -2.0 - 3.0 mmol/L    POCT HCO3 Calculated, Arterial 27.8 (H) 22.0 - 26.0 mmol/L    POCT Hemoglobin, Arterial 7.0 (L) 13.5 - 17.5 g/dL    POCT Anion Gap, Arterial 6 (L) 10 - 25 mmo/L    Patient Temperature 37.0 degrees Celsius    FiO2 100 %   Coox Panel, Arterial Unsolicited   Result Value Ref Range    POCT Hemoglobin, Arterial 7.0 (L) 13.5 - 17.5 g/dL    POCT Oxy Hemoglobin, Arterial 98.0 94.0 - 98.0 %    POCT Carboxyhemoglobin, Arterial 1.8 %    POCT Methemoglobin, Arterial 0.2 0.0 - 1.5 %    POCT Deoxy Hemoglobin, Arterial 0.0 0.0 - 5.0 %   Blood Gas Venous Full Panel Unsolicited   Result Value Ref Range    POCT pH, Venous 7.39 7.33 - 7.43 pH    POCT pCO2, Venous 48 41 - 51 mm Hg    POCT pO2, Venous 43 35 - 45 mm Hg    POCT SO2, Venous 74 45 - 75 %    POCT Oxy Hemoglobin, Venous 72.2 45.0 - 75.0 %    POCT Hematocrit Calculated, Venous 21.0 (L) 41.0 - 52.0 %    POCT Sodium, Venous 135 (L) 136 - 145 mmol/L    POCT Potassium, Venous 4.1 3.5 - 5.3 mmol/L    POCT Chloride, Venous 104 98 - 107 mmol/L    POCT Ionized Calicum, Venous 1.05 (L) 1.10 - 1.33 mmol/L    POCT Glucose, Venous 160 (H) 74 -  99 mg/dL    POCT Lactate, Venous 1.3 0.4 - 2.0 mmol/L    POCT Base Excess, Venous 3.7 (H) -2.0 - 3.0 mmol/L    POCT HCO3 Calculated, Venous 29.1 (H) 22.0 - 26.0 mmol/L    POCT Hemoglobin, Venous 6.9 (L) 13.5 - 17.5 g/dL    POCT Anion Gap, Venous 6.0 (L) 10.0 - 25.0 mmol/L    Patient Temperature 37.0 degrees Celsius    FiO2 100 %   Coox Panel, Venous Unsolicited   Result Value Ref Range    POCT Carboxyhemoglobin, Venous 1.6 %    POCT Methemoglobin, Venous 0.7 0.0 - 1.5 %   Blood Gas Arterial Full Panel Unsolicited   Result Value Ref Range    POCT pH, Arterial 7.40 7.38 - 7.42 pH    POCT pCO2, Arterial 39 38 - 42 mm Hg    POCT pO2, Arterial 101 (H) 85 - 95 mm Hg    POCT SO2, Arterial 99 94 - 100 %    POCT Oxy Hemoglobin, Arterial 97.5 94.0 - 98.0 %    POCT Hematocrit Calculated, Arterial 25.0 (L) 41.0 - 52.0 %    POCT Sodium, Arterial 134 (L) 136 - 145 mmol/L    POCT Potassium, Arterial 4.8 3.5 - 5.3 mmol/L    POCT Chloride, Arterial 104 98 - 107 mmol/L    POCT Ionized Calcium, Arterial 1.05 (L) 1.10 - 1.33 mmol/L    POCT Glucose, Arterial 225 (H) 74 - 99 mg/dL    POCT Lactate, Arterial 2.1 (H) 0.4 - 2.0 mmol/L    POCT Base Excess, Arterial -0.5 -2.0 - 3.0 mmol/L    POCT HCO3 Calculated, Arterial 24.2 22.0 - 26.0 mmol/L    POCT Hemoglobin, Arterial 8.3 (L) 13.5 - 17.5 g/dL    POCT Anion Gap, Arterial 11 10 - 25 mmo/L    Patient Temperature 37.0 degrees Celsius    FiO2 100 %   Coox Panel, Arterial Unsolicited   Result Value Ref Range    POCT Hemoglobin, Arterial 8.3 (L) 13.5 - 17.5 g/dL    POCT Oxy Hemoglobin, Arterial 97.5 94.0 - 98.0 %    POCT Carboxyhemoglobin, Arterial 1.1 %    POCT Methemoglobin, Arterial 0.7 0.0 - 1.5 %    POCT Deoxy Hemoglobin, Arterial 0.6 0.0 - 5.0 %   Blood Gas Arterial Full Panel Unsolicited   Result Value Ref Range    POCT pH, Arterial 7.41 7.38 - 7.42 pH    POCT pCO2, Arterial 41 38 - 42 mm Hg    POCT pO2, Arterial 132 (H) 85 - 95 mm Hg    POCT SO2, Arterial 100 94 - 100 %    POCT Oxy  Hemoglobin, Arterial 98.7 (H) 94.0 - 98.0 %    POCT Hematocrit Calculated, Arterial 26.0 (L) 41.0 - 52.0 %    POCT Sodium, Arterial 135 (L) 136 - 145 mmol/L    POCT Potassium, Arterial 4.2 3.5 - 5.3 mmol/L    POCT Chloride, Arterial 107 98 - 107 mmol/L    POCT Ionized Calcium, Arterial 1.52 (H) 1.10 - 1.33 mmol/L    POCT Glucose, Arterial 193 (H) 74 - 99 mg/dL    POCT Lactate, Arterial 1.2 0.4 - 2.0 mmol/L    POCT Base Excess, Arterial 1.2 -2.0 - 3.0 mmol/L    POCT HCO3 Calculated, Arterial 26.0 22.0 - 26.0 mmol/L    POCT Hemoglobin, Arterial 8.8 (L) 13.5 - 17.5 g/dL    POCT Anion Gap, Arterial 6 (L) 10 - 25 mmo/L    Patient Temperature 37.0 degrees Celsius    FiO2 100 %   Coox Panel, Arterial Unsolicited   Result Value Ref Range    POCT Hemoglobin, Arterial 8.8 (L) 13.5 - 17.5 g/dL    POCT Oxy Hemoglobin, Arterial 98.7 (H) 94.0 - 98.0 %    POCT Carboxyhemoglobin, Arterial 0.8 %    POCT Methemoglobin, Arterial 0.0 0.0 - 1.5 %    POCT Deoxy Hemoglobin, Arterial 0.5 0.0 - 5.0 %   Blood Gas Arterial Full Panel Unsolicited   Result Value Ref Range    POCT pH, Arterial 7.41 7.38 - 7.42 pH    POCT pCO2, Arterial 41 38 - 42 mm Hg    POCT pO2, Arterial 222 (H) 85 - 95 mm Hg    POCT SO2, Arterial 100 94 - 100 %    POCT Oxy Hemoglobin, Arterial 97.8 94.0 - 98.0 %    POCT Hematocrit Calculated, Arterial 27.0 (L) 41.0 - 52.0 %    POCT Sodium, Arterial 136 136 - 145 mmol/L    POCT Potassium, Arterial 4.0 3.5 - 5.3 mmol/L    POCT Chloride, Arterial 106 98 - 107 mmol/L    POCT Ionized Calcium, Arterial 1.17 1.10 - 1.33 mmol/L    POCT Glucose, Arterial 140 (H) 74 - 99 mg/dL    POCT Lactate, Arterial 1.0 0.4 - 2.0 mmol/L    POCT Base Excess, Arterial 1.2 -2.0 - 3.0 mmol/L    POCT HCO3 Calculated, Arterial 26.0 22.0 - 26.0 mmol/L    POCT Hemoglobin, Arterial 9.1 (L) 13.5 - 17.5 g/dL    POCT Anion Gap, Arterial 8 (L) 10 - 25 mmo/L    Patient Temperature 37.0 degrees Celsius    FiO2 100 %   Coox Panel, Arterial Unsolicited   Result  Value Ref Range    POCT Hemoglobin, Arterial 9.1 (L) 13.5 - 17.5 g/dL    POCT Oxy Hemoglobin, Arterial 97.8 94.0 - 98.0 %    POCT Carboxyhemoglobin, Arterial 1.3 %    POCT Methemoglobin, Arterial 0.6 0.0 - 1.5 %    POCT Deoxy Hemoglobin, Arterial 0.3 0.0 - 5.0 %   Calcium, Ionized   Result Value Ref Range    POCT Calcium, Ionized 1.06 (L) 1.1 - 1.33 mmol/L   Magnesium   Result Value Ref Range    Magnesium 2.57 (H) 1.60 - 2.40 mg/dL   Coagulation Screen   Result Value Ref Range    Protime 14.0 (H) 9.8 - 12.8 seconds    INR 1.2 (H) 0.9 - 1.1    aPTT 29 27 - 38 seconds   Fibrinogen   Result Value Ref Range    Fibrinogen 255 200 - 400 mg/dL   CBC   Result Value Ref Range    WBC 18.1 (H) 4.4 - 11.3 x10*3/uL    nRBC 0.0 0.0 - 0.0 /100 WBCs    RBC 3.60 (L) 4.50 - 5.90 x10*6/uL    Hemoglobin 9.0 (L) 13.5 - 17.5 g/dL    Hematocrit 27.0 (L) 41.0 - 52.0 %    MCV 75 (L) 80 - 100 fL    MCH 25.0 (L) 26.0 - 34.0 pg    MCHC 33.3 32.0 - 36.0 g/dL    RDW 16.7 (H) 11.5 - 14.5 %    Platelets 176 150 - 450 x10*3/uL   Renal Function Panel   Result Value Ref Range    Glucose 127 (H) 74 - 99 mg/dL    Sodium 140 136 - 145 mmol/L    Potassium 4.1 3.5 - 5.3 mmol/L    Chloride 105 98 - 107 mmol/L    Bicarbonate 27 21 - 32 mmol/L    Anion Gap 12 10 - 20 mmol/L    Urea Nitrogen 12 6 - 23 mg/dL    Creatinine 0.71 0.50 - 1.30 mg/dL    eGFR >90 >60 mL/min/1.73m*2    Calcium 8.2 (L) 8.6 - 10.6 mg/dL    Phosphorus 3.1 2.5 - 4.9 mg/dL    Albumin 3.5 3.4 - 5.0 g/dL   BLOOD GAS ARTERIAL FULL PANEL   Result Value Ref Range    POCT pH, Arterial 7.42 7.38 - 7.42 pH    POCT pCO2, Arterial 42 38 - 42 mm Hg    POCT pO2, Arterial 92 85 - 95 mm Hg    POCT SO2, Arterial 98 94 - 100 %    POCT Oxy Hemoglobin, Arterial 96.3 94.0 - 98.0 %    POCT Hematocrit Calculated, Arterial 28.0 (L) 41.0 - 52.0 %    POCT Sodium, Arterial 137 136 - 145 mmol/L    POCT Potassium, Arterial 4.2 3.5 - 5.3 mmol/L    POCT Chloride, Arterial 105 98 - 107 mmol/L    POCT Ionized Calcium,  Arterial 1.15 1.10 - 1.33 mmol/L    POCT Glucose, Arterial 137 (H) 74 - 99 mg/dL    POCT Lactate, Arterial 1.0 0.4 - 2.0 mmol/L    POCT Base Excess, Arterial 2.5 -2.0 - 3.0 mmol/L    POCT HCO3 Calculated, Arterial 27.2 (H) 22.0 - 26.0 mmol/L    POCT Hemoglobin, Arterial 9.3 (L) 13.5 - 17.5 g/dL    POCT Anion Gap, Arterial 9 (L) 10 - 25 mmo/L    Patient Temperature 37.0 degrees Celsius    FiO2 50 %   POCT GLUCOSE   Result Value Ref Range    POCT Glucose 143 (H) 74 - 99 mg/dL   Type and screen   Result Value Ref Range    ABO TYPE A     Rh TYPE POS     ANTIBODY SCREEN NEG    BLOOD GAS ARTERIAL FULL PANEL   Result Value Ref Range    POCT pH, Arterial 7.39 7.38 - 7.42 pH    POCT pCO2, Arterial 41 38 - 42 mm Hg    POCT pO2, Arterial 92 85 - 95 mm Hg    POCT SO2, Arterial 98 94 - 100 %    POCT Oxy Hemoglobin, Arterial 95.5 94.0 - 98.0 %    POCT Hematocrit Calculated, Arterial 28.0 (L) 41.0 - 52.0 %    POCT Sodium, Arterial 136 136 - 145 mmol/L    POCT Potassium, Arterial 4.2 3.5 - 5.3 mmol/L    POCT Chloride, Arterial 104 98 - 107 mmol/L    POCT Ionized Calcium, Arterial 1.13 1.10 - 1.33 mmol/L    POCT Glucose, Arterial 166 (H) 74 - 99 mg/dL    POCT Lactate, Arterial 0.7 0.4 - 2.0 mmol/L    POCT Base Excess, Arterial -0.2 -2.0 - 3.0 mmol/L    POCT HCO3 Calculated, Arterial 24.8 22.0 - 26.0 mmol/L    POCT Hemoglobin, Arterial 9.3 (L) 13.5 - 17.5 g/dL    POCT Anion Gap, Arterial 11 10 - 25 mmo/L    Patient Temperature 37.0 degrees Celsius    FiO2 40 %   Blood Gas Arterial Full Panel   Result Value Ref Range    POCT pH, Arterial 7.36 (L) 7.38 - 7.42 pH    POCT pCO2, Arterial 44 (H) 38 - 42 mm Hg    POCT pO2, Arterial 93 85 - 95 mm Hg    POCT SO2, Arterial 98 94 - 100 %    POCT Oxy Hemoglobin, Arterial 95.8 94.0 - 98.0 %    POCT Hematocrit Calculated, Arterial 27.0 (L) 41.0 - 52.0 %    POCT Sodium, Arterial 136 136 - 145 mmol/L    POCT Potassium, Arterial 4.3 3.5 - 5.3 mmol/L    POCT Chloride, Arterial 104 98 - 107 mmol/L     POCT Ionized Calcium, Arterial 1.15 1.10 - 1.33 mmol/L    POCT Glucose, Arterial 160 (H) 74 - 99 mg/dL    POCT Lactate, Arterial 0.5 0.4 - 2.0 mmol/L    POCT Base Excess, Arterial -0.6 -2.0 - 3.0 mmol/L    POCT HCO3 Calculated, Arterial 24.9 22.0 - 26.0 mmol/L    POCT Hemoglobin, Arterial 9.1 (L) 13.5 - 17.5 g/dL    POCT Anion Gap, Arterial 11 10 - 25 mmo/L    Patient Temperature 37.0 degrees Celsius    FiO2 40 %   Blood Gas Arterial Full Panel   Result Value Ref Range    POCT pH, Arterial 7.41 7.38 - 7.42 pH    POCT pCO2, Arterial 40 38 - 42 mm Hg    POCT pO2, Arterial 74 (L) 85 - 95 mm Hg    POCT SO2, Arterial 97 94 - 100 %    POCT Oxy Hemoglobin, Arterial 94.2 94.0 - 98.0 %    POCT Hematocrit Calculated, Arterial 27.0 (L) 41.0 - 52.0 %    POCT Sodium, Arterial 137 136 - 145 mmol/L    POCT Potassium, Arterial 4.1 3.5 - 5.3 mmol/L    POCT Chloride, Arterial 106 98 - 107 mmol/L    POCT Ionized Calcium, Arterial 1.15 1.10 - 1.33 mmol/L    POCT Glucose, Arterial 142 (H) 74 - 99 mg/dL    POCT Lactate, Arterial 0.5 0.4 - 2.0 mmol/L    POCT Base Excess, Arterial 0.7 -2.0 - 3.0 mmol/L    POCT HCO3 Calculated, Arterial 25.4 22.0 - 26.0 mmol/L    POCT Hemoglobin, Arterial 9.0 (L) 13.5 - 17.5 g/dL    POCT Anion Gap, Arterial 10 10 - 25 mmo/L    Patient Temperature 37.0 degrees Celsius    FiO2 36 %   Renal Function Panel   Result Value Ref Range    Glucose 129 (H) 74 - 99 mg/dL    Sodium 139 136 - 145 mmol/L    Potassium 4.2 3.5 - 5.3 mmol/L    Chloride 103 98 - 107 mmol/L    Bicarbonate 25 21 - 32 mmol/L    Anion Gap 15 10 - 20 mmol/L    Urea Nitrogen 13 6 - 23 mg/dL    Creatinine 0.70 0.50 - 1.30 mg/dL    eGFR >90 >60 mL/min/1.73m*2    Calcium 8.3 (L) 8.6 - 10.6 mg/dL    Phosphorus 4.3 2.5 - 4.9 mg/dL    Albumin 3.7 3.4 - 5.0 g/dL   Magnesium   Result Value Ref Range    Magnesium 2.16 1.60 - 2.40 mg/dL   CBC   Result Value Ref Range    WBC 11.1 4.4 - 11.3 x10*3/uL    nRBC 0.0 0.0 - 0.0 /100 WBCs    RBC 3.66 (L) 4.50 - 5.90  x10*6/uL    Hemoglobin 8.8 (L) 13.5 - 17.5 g/dL    Hematocrit 27.4 (L) 41.0 - 52.0 %    MCV 75 (L) 80 - 100 fL    MCH 24.0 (L) 26.0 - 34.0 pg    MCHC 32.1 32.0 - 36.0 g/dL    RDW 17.1 (H) 11.5 - 14.5 %    Platelets 178 150 - 450 x10*3/uL   Calcium, Ionized   Result Value Ref Range    POCT Calcium, Ionized 1.08 (L) 1.1 - 1.33 mmol/L   BLOOD GAS ARTERIAL FULL PANEL   Result Value Ref Range    POCT pH, Arterial 7.42 7.38 - 7.42 pH    POCT pCO2, Arterial 40 38 - 42 mm Hg    POCT pO2, Arterial 68 (L) 85 - 95 mm Hg    POCT SO2, Arterial 97 94 - 100 %    POCT Oxy Hemoglobin, Arterial 93.9 (L) 94.0 - 98.0 %    POCT Hematocrit Calculated, Arterial 28.0 (L) 41.0 - 52.0 %    POCT Sodium, Arterial 136 136 - 145 mmol/L    POCT Potassium, Arterial 4.2 3.5 - 5.3 mmol/L    POCT Chloride, Arterial 104 98 - 107 mmol/L    POCT Ionized Calcium, Arterial 1.14 1.10 - 1.33 mmol/L    POCT Glucose, Arterial 130 (H) 74 - 99 mg/dL    POCT Lactate, Arterial 0.6 0.4 - 2.0 mmol/L    POCT Base Excess, Arterial 1.3 -2.0 - 3.0 mmol/L    POCT HCO3 Calculated, Arterial 25.9 22.0 - 26.0 mmol/L    POCT Hemoglobin, Arterial 9.2 (L) 13.5 - 17.5 g/dL    POCT Anion Gap, Arterial 10 10 - 25 mmo/L    Patient Temperature 37.0 degrees Celsius    FiO2 36 %   POCT GLUCOSE   Result Value Ref Range    POCT Glucose 187 (H) 74 - 99 mg/dL   Vancomycin   Result Value Ref Range    Vancomycin 4.6 (L) 5.0 - 20.0 ug/mL   POCT GLUCOSE   Result Value Ref Range    POCT Glucose 184 (H) 74 - 99 mg/dL   Blood Gas Arterial Full Panel   Result Value Ref Range    POCT pH, Arterial 7.43 (H) 7.38 - 7.42 pH    POCT pCO2, Arterial 30 (L) 38 - 42 mm Hg    POCT pO2, Arterial 102 (H) 85 - 95 mm Hg    POCT SO2, Arterial 98 94 - 100 %    POCT Oxy Hemoglobin, Arterial 96.9 94.0 - 98.0 %    POCT Hematocrit Calculated, Arterial 25.0 (L) 41.0 - 52.0 %    POCT Sodium, Arterial 137 136 - 145 mmol/L    POCT Potassium, Arterial 3.4 (L) 3.5 - 5.3 mmol/L    POCT Chloride, Arterial 109 (H) 98 - 107  mmol/L    POCT Ionized Calcium, Arterial 1.06 (L) 1.10 - 1.33 mmol/L    POCT Glucose, Arterial 154 (H) 74 - 99 mg/dL    POCT Lactate, Arterial 0.8 0.4 - 2.0 mmol/L    POCT Base Excess, Arterial -3.8 (L) -2.0 - 3.0 mmol/L    POCT HCO3 Calculated, Arterial 19.9 (L) 22.0 - 26.0 mmol/L    POCT Hemoglobin, Arterial 8.3 (L) 13.5 - 17.5 g/dL    POCT Anion Gap, Arterial 12 10 - 25 mmo/L    Patient Temperature 37.0 degrees Celsius    FiO2 60 %        Plan:  - b/l ARMIDA blocks performed on 7/10  - Ambit ball with Ropivacaine 0.2%/NaCl 0.9% 500mL, Rate 7 cc/hr bilaterally  - Ambit medication will not interfere with pain medication prescribed by the primary team.   - Please be aware of local anesthetic toxic dose and absorption variability before considering lidocaine patches  - Acute pain service will follow while catheters in place  - Rest of pain management per primary team    Acute Pain Resident  pg [phone] ph [phone]

## 2024-07-10 NOTE — PROGRESS NOTES
Occupational Therapy    Communication Note    Missed Visit: Yes  Missed Visit Reason:  (1230: hold per RN, pt with increased respiratory needs.)      07/10/24 at 12:31 PM   Celestina Posada, OT   Rehab Office: [phone]

## 2024-07-10 NOTE — PROGRESS NOTES
"CTICU Progress Note  Kael Zepeda/[id number]    Admit Date: 7/1/2024  Hospital Length of Stay: 9   ICU Length of Stay: 1d   CT SURGEON: Dr. Carey    SUBJECTIVE:   Hemodynamics acceptable on provided therapy. Remains sedated with precedex and ketamine. This morning is oxygenating well on 10L HFNC and norepinephrine infusion at 0.04mcg/kg/min.    MEDICATIONS  Infusions:  lactated Ringer's, Last Rate: 5 mL/hr (07/10/24 1300)  lactated Ringer's, Last Rate: 15 mL/hr (07/10/24 1300)      Scheduled:  acetaminophen, 650 mg, q6h  aspirin, 81 mg, Daily  atorvastatin, 80 mg, Nightly  ceFAZolin, 2 g, q8h  celecoxib, 200 mg, BID  [Held by provider] clopidogrel, 75 mg, Daily  DULoxetine, 60 mg, Daily  heparin, 5,000 Units, q8h  hydroxychloroquine, 200 mg, BID  insulin lispro, 0-15 Units, TID  levothyroxine, 50 mcg, Daily  methadone, 77.5 mg, BID  oxygen, , Continuous - Inhalation  polyethylene glycol, 17 g, Daily  [Transfer Hold] predniSONE, 5 mg, Daily  pregabalin, 200 mg, TID  sennosides-docusate sodium, 2 tablet, BID  tiotropium, 2 puff, Daily      PRN:  albuterol, 2.5 mg, q6h PRN  alteplase, 2 mg, PRN  calcium gluconate, 1 g, q6h PRN  calcium gluconate, 2 g, q6h PRN  dextrose, 25 g, q15 min PRN   Or  glucagon, 1 mg, q15 min PRN  HYDROmorphone, 1 mg, q3h PRN  magnesium sulfate, 2 g, q6h PRN  magnesium sulfate, 4 g, q6h PRN  naloxone, 0.2 mg, q5 min PRN  ondansetron, 4 mg, q8h PRN   Or  ondansetron, 4 mg, q8h PRN  oxyCODONE, 10 mg, q4h PRN  oxyCODONE, 5 mg, q4h PRN  potassium chloride, 20 mEq, q6h PRN  potassium chloride, 40 mEq, q6h PRN      PHYSICAL EXAM:   Visit Vitals  BP 99/51   Pulse 85   Temp 36 °C (96.8 °F) (Temporal)   Resp 21   Ht 1.676 m (5' 6\")   Wt 86.4 kg (190 lb 8 oz)   SpO2 96%   BMI 30.75 kg/m²   Smoking Status Every Day   BSA 2.01 m²     Wt Readings from Last 5 Encounters:   07/09/24 86.4 kg (190 lb 8 oz)   07/01/24 90 kg (198 lb 6.6 oz)   06/05/24 86.2 kg (190 lb)   10/13/23 84.8 kg (187 lb) "   09/21/22 90.3 kg (199 lb)     INTAKE/OUTPUT:  I/O last 3 completed shifts:  In: 9568.4 (110.7 mL/kg) [P.O.:1200; I.V.:3398.4 (39.3 mL/kg); Blood:1320; IV Piggyback:3650]  Out: 3405 (39.4 mL/kg) [Urine:2325 (0.7 mL/kg/hr); Blood:250; Chest Tube:830]  Weight: 86.4 kg        Vent settings:  Vent Mode: Pressure support  FiO2 (%):  [40 %-85 %] 85 %  PEEP/CPAP (cm H2O):  [0 cm H20-5 cm H20] 0 cm H20  MO SUP:  [0 cm H20-5 cm H20] 0 cm H20    Physical Exam:   Constitutional: Male patient lying in ICU bed, in no acute distress  Neurological: Somnolent, difficult to arouse on precedex and ketamine infusions. A+Ox2, no obvious focal deficits, moves all extremities  Eyes: Anicteric sclera, clear  Respiratory/Thorax: Diminished, clear breath sounds bilaterally, symmetric chest expansion, 2 chest tubes to -20 suction, serosanguinous drainage. V pacing wires present  Cardiovascular: NSR, no murmurs appreciated  Gastrointestinal: Abdomen soft, nontender, nondistended, bowel sounds present  Genitourinary: Bernal in place draining clear yellow urine  Extremities: Palpable radial pulses 2+, 1+ pulses to bilateral PT/DP, no pitting edema  Skin: Warm and dry throughout, midline incision stable - dressing clean, dry, and intact  Psych: Calm and cooperative    Daily Risk Screen  Central line: Hemodynamic instability requiring parenteral medication  Bernal: Accurate I/Os in critically ill    Images: Reviewed    Invasive Hemodynamics:    Most Recent Range Past 24hrs   BP (Art) 118/66 Arterial Line BP 1  Min: 78/43  Max: 158/79   MAP(Art) 84 mmHg Arterial Line MAP 1 (mmHg)   Min: 56 mmHg  Max: 106 mmHg     Assessment/Plan   Mr. Zepeda is a 58-year-old male with a PMH of RA (on chronic prednisone and hydroxychloroquine), type 2 DM (on insulin), COPD, hypothyroidism, 40-pack-year smoking history, chronic methadone use, peripheral neuropathy, and unhealed diabetic foot ulcers s/p right foot partial amputation (7 months ago - follows with  wound clinic as outpatient) and most recently NSTEMI. He is now s/p CABGx3 with Dr. Carey on 7/9.    Plan:  NEURO: PMH of chronic methadone use and peripheral neuropathy secondary to T2DM. Acute post operative pain uncontrolled on current regimen. Takes methadone at home - dosing confirmed with pharmacy. Somnolent this morning and difficult to arouse A+Ox3, moves all extremities, no obvious focal deficits. Reports numbness and tingling in bilateral hands. -->  - Serial neuro and pain assessments   - Acute pain consult, appreciate recs - plan for catheters this morning  - Discontinue ketamine infusion  - Discontinue precedex infusion  - Perform neuro exam after sedation off and patient more awake  - Send and follow up ABG to r/o hypercapnea due to somnolence  - Scheduled Tylenol   - PRN oxycodone  - PRN dilaudid for pain  - Discontinue lidocaine infusion  - Continue home plaquenil 200mg BID  - PT Consult, OOB to chair as tolerated, chair position if not tolerated   - CAM ICU score qshift  - Sleep/wake cycle hygiene  - Continue methadone 77.5mg BID  - Start home duloxetine and pregabalin    CV: Patient has a history of NSTEMI. Is now status post CABGx3 Pre: Mildly decreased LV function, EF 40-45%. Post: normal LV function. Arrived to CTICU on V epicardial wires set VVI @ 50. NSR, hypotensive on norepinephrine 0.04mcg/kg/min. CVP ~ 18. -->  - Titrate norepinephrine to goal MAP 70 - 90  - CVP q4hr  - See  for diuresis  - Maintain epicardial wires set VVI @ 50  - Plavix POD 2 for NSTEMI  - Start atorvastatin 80mg nightly  - Hold home lisinopril and lasix    PULM: PMH of COPD. Currently on 10L HFNC. Chest tubes L pleural, R pleural, and mediastinal. Morning CXR with pulmonary edema and atelectasis. -->  - Daily CXR  - Follow up ABG results and wean FiO2 maintaining SpO2 >92%  - IS q1h and OOB to chair  - Chest tubes to wall suction - consider removal if criteria met  - Continue tiotropium  - Prn albuterol    GI: No  history. Passed bedside swallow evaluation. Abdomen soft, non tender, and non distended. -->  - Discontinue PPI  - Adult regular diet  - Colace/senna BID and miralax BID    : CSA-JUAN J Risk Score 2. No history of renal disease, baseline creatinine 0.7. Creatinine stable post-op at 0.71. Carpenter in place and making adequate UOP. Net +5L in last 24 hours. Appears hypervolemic. -->  - Continue carpenter catheter for strict I/Os  - Lasix 40mg  - Goal net negative 1L  - Goal UOP 0.5ml/kg/hr  - RFP as clinically indicated  - Replete electrolytes per CTICU protocol    ENDO: PMH of insulin-dependent diabetes (A1c: 8.5) and hypothyroidism-->  - Maintain BG <180, insulin per CTICU protocol  - Continue levothyroxine  - on SSI    HEME: Acute blood loss anemia. Hgb stable at 8.8. -->    - Monitor drain output volume and characteristics  - Daily CBC and prn  - Daily aspirin  - Plavix POD 2 for NSTEMI  - Start SQH   - SCDs for DVT prophylaxis.  - Last type and screen: 7/9    ID: Afebrile, no current indications of infection. MRSA negative .-->  - Trend temp q4h  - Periop cefazolin x 48hrs    Skin: No active skin issues. -->  - preventative Mepilex dressings in place on sacrum and heels  - change preventative Mepilex weekly or more frequently as indicated (when moist/soiled)   - every shift skin assessment per nursing and weekly ICU skin rounds  - moisture barrier to be applied with rosy care  - active skin problems addressed with nursing on daily rounds    I have discussed the case with the resident/advanced practice provider. I have personally performed a history, physical exam, and my own medical decision making. I have reviewed the note and agree with the findings and plan with the following exceptions as identified below. I have personally provided 37 minutes of critical care time exclusive of time spent on separately billable procedures. Time includes review of laboratory data, radiology results, discussion with consultants, family  and monitoring for potential decompensation. Interventions were performed as documented above.      Family/Surrogate updated with plan of care.  Code status addressed/up to date.   CTICU TEAM PHONE [phone]

## 2024-07-11 ENCOUNTER — APPOINTMENT (OUTPATIENT)
Dept: RADIOLOGY | Facility: HOSPITAL | Age: 59
DRG: 235 | End: 2024-07-11
Payer: MEDICARE

## 2024-07-11 LAB
ALBUMIN SERPL BCP-MCNC: 3.5 G/DL (ref 3.4–5)
ALBUMIN SERPL BCP-MCNC: 3.5 G/DL (ref 3.4–5)
ANION GAP BLDA CALCULATED.4IONS-SCNC: 11 MMO/L (ref 10–25)
ANION GAP BLDA CALCULATED.4IONS-SCNC: 11 MMO/L (ref 10–25)
ANION GAP BLDA CALCULATED.4IONS-SCNC: 12 MMO/L (ref 10–25)
ANION GAP BLDA CALCULATED.4IONS-SCNC: 7 MMO/L (ref 10–25)
ANION GAP SERPL CALC-SCNC: 15 MMOL/L (ref 10–20)
ANION GAP SERPL CALC-SCNC: 17 MMOL/L (ref 10–20)
ATRIAL RATE: 115 BPM
BASE EXCESS BLDA CALC-SCNC: 0.6 MMOL/L (ref -2–3)
BASE EXCESS BLDA CALC-SCNC: 1.1 MMOL/L (ref -2–3)
BASE EXCESS BLDA CALC-SCNC: 1.7 MMOL/L (ref -2–3)
BASE EXCESS BLDA CALC-SCNC: 1.9 MMOL/L (ref -2–3)
BODY TEMPERATURE: 37 DEGREES CELSIUS
BUN SERPL-MCNC: 19 MG/DL (ref 6–23)
BUN SERPL-MCNC: 25 MG/DL (ref 6–23)
CA-I BLD-SCNC: 1.12 MMOL/L (ref 1.1–1.33)
CA-I BLD-SCNC: 1.13 MMOL/L (ref 1.1–1.33)
CA-I BLDA-SCNC: 1.08 MMOL/L (ref 1.1–1.33)
CA-I BLDA-SCNC: 1.09 MMOL/L (ref 1.1–1.33)
CA-I BLDA-SCNC: 1.12 MMOL/L (ref 1.1–1.33)
CA-I BLDA-SCNC: 1.17 MMOL/L (ref 1.1–1.33)
CALCIUM SERPL-MCNC: 8.3 MG/DL (ref 8.6–10.6)
CALCIUM SERPL-MCNC: 8.5 MG/DL (ref 8.6–10.6)
CHLORIDE BLDA-SCNC: 102 MMOL/L (ref 98–107)
CHLORIDE BLDA-SCNC: 102 MMOL/L (ref 98–107)
CHLORIDE BLDA-SCNC: 104 MMOL/L (ref 98–107)
CHLORIDE BLDA-SCNC: 105 MMOL/L (ref 98–107)
CHLORIDE SERPL-SCNC: 100 MMOL/L (ref 98–107)
CHLORIDE SERPL-SCNC: 100 MMOL/L (ref 98–107)
CO2 SERPL-SCNC: 24 MMOL/L (ref 21–32)
CO2 SERPL-SCNC: 25 MMOL/L (ref 21–32)
CREAT SERPL-MCNC: 0.99 MG/DL (ref 0.5–1.3)
CREAT SERPL-MCNC: 1.05 MG/DL (ref 0.5–1.3)
EGFRCR SERPLBLD CKD-EPI 2021: 82 ML/MIN/1.73M*2
EGFRCR SERPLBLD CKD-EPI 2021: 88 ML/MIN/1.73M*2
ERYTHROCYTE [DISTWIDTH] IN BLOOD BY AUTOMATED COUNT: 17.7 % (ref 11.5–14.5)
ERYTHROCYTE [DISTWIDTH] IN BLOOD BY AUTOMATED COUNT: 18 % (ref 11.5–14.5)
GLUCOSE BLD MANUAL STRIP-MCNC: 118 MG/DL (ref 74–99)
GLUCOSE BLD MANUAL STRIP-MCNC: 122 MG/DL (ref 74–99)
GLUCOSE BLD MANUAL STRIP-MCNC: 131 MG/DL (ref 74–99)
GLUCOSE BLD MANUAL STRIP-MCNC: 134 MG/DL (ref 74–99)
GLUCOSE BLD MANUAL STRIP-MCNC: 154 MG/DL (ref 74–99)
GLUCOSE BLD MANUAL STRIP-MCNC: 177 MG/DL (ref 74–99)
GLUCOSE BLDA-MCNC: 131 MG/DL (ref 74–99)
GLUCOSE BLDA-MCNC: 165 MG/DL (ref 74–99)
GLUCOSE BLDA-MCNC: 177 MG/DL (ref 74–99)
GLUCOSE BLDA-MCNC: 185 MG/DL (ref 74–99)
GLUCOSE SERPL-MCNC: 147 MG/DL (ref 74–99)
GLUCOSE SERPL-MCNC: 198 MG/DL (ref 74–99)
HCO3 BLDA-SCNC: 23.8 MMOL/L (ref 22–26)
HCO3 BLDA-SCNC: 24.2 MMOL/L (ref 22–26)
HCO3 BLDA-SCNC: 24.7 MMOL/L (ref 22–26)
HCO3 BLDA-SCNC: 25 MMOL/L (ref 22–26)
HCT VFR BLD AUTO: 23.7 % (ref 41–52)
HCT VFR BLD AUTO: 26.5 % (ref 41–52)
HCT VFR BLD EST: 25 % (ref 41–52)
HCT VFR BLD EST: 26 % (ref 41–52)
HCT VFR BLD EST: 27 % (ref 41–52)
HCT VFR BLD EST: ABNORMAL %
HGB BLD-MCNC: 7.8 G/DL (ref 13.5–17.5)
HGB BLD-MCNC: 8.4 G/DL (ref 13.5–17.5)
HGB BLDA-MCNC: 8.2 G/DL (ref 13.5–17.5)
HGB BLDA-MCNC: 8.7 G/DL (ref 13.5–17.5)
HGB BLDA-MCNC: 9 G/DL (ref 13.5–17.5)
HGB BLDA-MCNC: <3 G/DL (ref 13.5–17.5)
INHALED O2 CONCENTRATION: 100 %
LACTATE BLDA-SCNC: 1 MMOL/L (ref 0.4–2)
LACTATE BLDA-SCNC: 1.1 MMOL/L (ref 0.4–2)
LACTATE BLDA-SCNC: 1.3 MMOL/L (ref 0.4–2)
LACTATE BLDA-SCNC: 1.3 MMOL/L (ref 0.4–2)
MAGNESIUM SERPL-MCNC: 1.79 MG/DL (ref 1.6–2.4)
MAGNESIUM SERPL-MCNC: 2.21 MG/DL (ref 1.6–2.4)
MCH RBC QN AUTO: 24.6 PG (ref 26–34)
MCH RBC QN AUTO: 24.8 PG (ref 26–34)
MCHC RBC AUTO-ENTMCNC: 31.7 G/DL (ref 32–36)
MCHC RBC AUTO-ENTMCNC: 32.9 G/DL (ref 32–36)
MCV RBC AUTO: 76 FL (ref 80–100)
MCV RBC AUTO: 78 FL (ref 80–100)
NRBC BLD-RTO: 0 /100 WBCS (ref 0–0)
NRBC BLD-RTO: 0 /100 WBCS (ref 0–0)
OXYHGB MFR BLDA: 91.7 % (ref 94–98)
OXYHGB MFR BLDA: 95.3 % (ref 94–98)
OXYHGB MFR BLDA: 95.8 % (ref 94–98)
OXYHGB MFR BLDA: ABNORMAL %
PCO2 BLDA: 31 MM HG (ref 38–42)
PCO2 BLDA: 32 MM HG (ref 38–42)
PCO2 BLDA: 32 MM HG (ref 38–42)
PCO2 BLDA: 34 MM HG (ref 38–42)
PH BLDA: 7.47 PH (ref 7.38–7.42)
PH BLDA: 7.48 PH (ref 7.38–7.42)
PH BLDA: 7.5 PH (ref 7.38–7.42)
PH BLDA: 7.5 PH (ref 7.38–7.42)
PHOSPHATE SERPL-MCNC: 3 MG/DL (ref 2.5–4.9)
PHOSPHATE SERPL-MCNC: 3.6 MG/DL (ref 2.5–4.9)
PLATELET # BLD AUTO: 154 X10*3/UL (ref 150–450)
PLATELET # BLD AUTO: 166 X10*3/UL (ref 150–450)
PO2 BLDA: 66 MM HG (ref 85–95)
PO2 BLDA: 68 MM HG (ref 85–95)
PO2 BLDA: 76 MM HG (ref 85–95)
PO2 BLDA: 77 MM HG (ref 85–95)
POTASSIUM BLDA-SCNC: 3.5 MMOL/L (ref 3.5–5.3)
POTASSIUM BLDA-SCNC: 3.7 MMOL/L (ref 3.5–5.3)
POTASSIUM BLDA-SCNC: 3.9 MMOL/L (ref 3.5–5.3)
POTASSIUM BLDA-SCNC: 4.1 MMOL/L (ref 3.5–5.3)
POTASSIUM SERPL-SCNC: 3.7 MMOL/L (ref 3.5–5.3)
POTASSIUM SERPL-SCNC: 3.9 MMOL/L (ref 3.5–5.3)
Q ONSET: 224 MS
QRS COUNT: 18 BEATS
QRS DURATION: 94 MS
QT INTERVAL: 292 MS
QTC CALCULATION(BAZETT): 398 MS
QTC FREDERICIA: 359 MS
R AXIS: 11 DEGREES
RBC # BLD AUTO: 3.14 X10*6/UL (ref 4.5–5.9)
RBC # BLD AUTO: 3.42 X10*6/UL (ref 4.5–5.9)
SAO2 % BLDA: 93 % (ref 94–100)
SAO2 % BLDA: 97 % (ref 94–100)
SAO2 % BLDA: 97 % (ref 94–100)
SAO2 % BLDA: ABNORMAL %
SODIUM BLDA-SCNC: 133 MMOL/L (ref 136–145)
SODIUM BLDA-SCNC: 134 MMOL/L (ref 136–145)
SODIUM BLDA-SCNC: 134 MMOL/L (ref 136–145)
SODIUM BLDA-SCNC: 135 MMOL/L (ref 136–145)
SODIUM SERPL-SCNC: 135 MMOL/L (ref 136–145)
SODIUM SERPL-SCNC: 138 MMOL/L (ref 136–145)
STAPHYLOCOCCUS SPEC CULT: NORMAL
T AXIS: -44 DEGREES
T OFFSET: 370 MS
VENTRICULAR RATE: 112 BPM
WBC # BLD AUTO: 12.8 X10*3/UL (ref 4.4–11.3)
WBC # BLD AUTO: 17.7 X10*3/UL (ref 4.4–11.3)

## 2024-07-11 PROCEDURE — 97166 OT EVAL MOD COMPLEX 45 MIN: CPT | Mod: GO

## 2024-07-11 PROCEDURE — 2500000001 HC RX 250 WO HCPCS SELF ADMINISTERED DRUGS (ALT 637 FOR MEDICARE OP)

## 2024-07-11 PROCEDURE — 99231 SBSQ HOSP IP/OBS SF/LOW 25: CPT

## 2024-07-11 PROCEDURE — 84132 ASSAY OF SERUM POTASSIUM: CPT

## 2024-07-11 PROCEDURE — 94660 CPAP INITIATION&MGMT: CPT

## 2024-07-11 PROCEDURE — 2500000004 HC RX 250 GENERAL PHARMACY W/ HCPCS (ALT 636 FOR OP/ED)

## 2024-07-11 PROCEDURE — 2500000005 HC RX 250 GENERAL PHARMACY W/O HCPCS: Performed by: THORACIC SURGERY (CARDIOTHORACIC VASCULAR SURGERY)

## 2024-07-11 PROCEDURE — P9047 ALBUMIN (HUMAN), 25%, 50ML: HCPCS | Mod: JZ

## 2024-07-11 PROCEDURE — 2500000004 HC RX 250 GENERAL PHARMACY W/ HCPCS (ALT 636 FOR OP/ED): Performed by: NURSE PRACTITIONER

## 2024-07-11 PROCEDURE — 37799 UNLISTED PX VASCULAR SURGERY: CPT

## 2024-07-11 PROCEDURE — 97161 PT EVAL LOW COMPLEX 20 MIN: CPT | Mod: GP | Performed by: STUDENT IN AN ORGANIZED HEALTH CARE EDUCATION/TRAINING PROGRAM

## 2024-07-11 PROCEDURE — 2500000002 HC RX 250 W HCPCS SELF ADMINISTERED DRUGS (ALT 637 FOR MEDICARE OP, ALT 636 FOR OP/ED): Performed by: STUDENT IN AN ORGANIZED HEALTH CARE EDUCATION/TRAINING PROGRAM

## 2024-07-11 PROCEDURE — 2500000004 HC RX 250 GENERAL PHARMACY W/ HCPCS (ALT 636 FOR OP/ED): Performed by: STUDENT IN AN ORGANIZED HEALTH CARE EDUCATION/TRAINING PROGRAM

## 2024-07-11 PROCEDURE — 83735 ASSAY OF MAGNESIUM: CPT

## 2024-07-11 PROCEDURE — 2500000005 HC RX 250 GENERAL PHARMACY W/O HCPCS

## 2024-07-11 PROCEDURE — 2020000001 HC ICU ROOM DAILY

## 2024-07-11 PROCEDURE — 2500000002 HC RX 250 W HCPCS SELF ADMINISTERED DRUGS (ALT 637 FOR MEDICARE OP, ALT 636 FOR OP/ED)

## 2024-07-11 PROCEDURE — 71045 X-RAY EXAM CHEST 1 VIEW: CPT | Performed by: RADIOLOGY

## 2024-07-11 PROCEDURE — 71045 X-RAY EXAM CHEST 1 VIEW: CPT

## 2024-07-11 PROCEDURE — 85027 COMPLETE CBC AUTOMATED: CPT

## 2024-07-11 PROCEDURE — 82330 ASSAY OF CALCIUM: CPT

## 2024-07-11 PROCEDURE — S0109 METHADONE ORAL 5MG: HCPCS

## 2024-07-11 PROCEDURE — 97129 THER IVNTJ 1ST 15 MIN: CPT | Mod: GO

## 2024-07-11 PROCEDURE — 82947 ASSAY GLUCOSE BLOOD QUANT: CPT

## 2024-07-11 PROCEDURE — 94762 N-INVAS EAR/PLS OXIMTRY CONT: CPT

## 2024-07-11 RX ORDER — METOPROLOL TARTRATE 1 MG/ML
5 INJECTION, SOLUTION INTRAVENOUS ONCE
Status: COMPLETED | OUTPATIENT
Start: 2024-07-11 | End: 2024-07-11

## 2024-07-11 RX ORDER — METOPROLOL TARTRATE 25 MG/1
12.5 TABLET, FILM COATED ORAL 2 TIMES DAILY
Status: DISCONTINUED | OUTPATIENT
Start: 2024-07-11 | End: 2024-07-11

## 2024-07-11 RX ORDER — METHADONE IN SOD CHLOR,ISO-OSM 10 MG/ML
20 SYRINGE (ML) INTRAVENOUS EVERY 12 HOURS
Status: DISCONTINUED | OUTPATIENT
Start: 2024-07-11 | End: 2024-07-11

## 2024-07-11 RX ORDER — METHADONE HYDROCHLORIDE 5 MG/5ML
77.5 SOLUTION ORAL EVERY 8 HOURS
Status: DISCONTINUED | OUTPATIENT
Start: 2024-07-11 | End: 2024-07-16

## 2024-07-11 RX ORDER — METOPROLOL TARTRATE 25 MG/1
25 TABLET, FILM COATED ORAL 2 TIMES DAILY
Status: DISCONTINUED | OUTPATIENT
Start: 2024-07-12 | End: 2024-07-18

## 2024-07-11 RX ORDER — NOREPINEPHRINE BITARTRATE/D5W 8 MG/250ML
0-.5 PLASTIC BAG, INJECTION (ML) INTRAVENOUS CONTINUOUS
Status: DISCONTINUED | OUTPATIENT
Start: 2024-07-11 | End: 2024-07-11

## 2024-07-11 RX ORDER — FUROSEMIDE 10 MG/ML
20 INJECTION INTRAMUSCULAR; INTRAVENOUS ONCE
Status: COMPLETED | OUTPATIENT
Start: 2024-07-11 | End: 2024-07-11

## 2024-07-11 RX ORDER — HYDROMORPHONE HYDROCHLORIDE 1 MG/ML
1 INJECTION, SOLUTION INTRAMUSCULAR; INTRAVENOUS; SUBCUTANEOUS EVERY 2 HOUR PRN
Status: DISCONTINUED | OUTPATIENT
Start: 2024-07-11 | End: 2024-07-12

## 2024-07-11 RX ORDER — METHADONE HYDROCHLORIDE 5 MG/5ML
77.5 SOLUTION ORAL EVERY 8 HOURS
Status: DISCONTINUED | OUTPATIENT
Start: 2024-07-11 | End: 2024-07-11

## 2024-07-11 RX ORDER — ALBUMIN HUMAN 250 G/1000ML
25 SOLUTION INTRAVENOUS ONCE
Status: COMPLETED | OUTPATIENT
Start: 2024-07-11 | End: 2024-07-11

## 2024-07-11 RX ORDER — FUROSEMIDE 10 MG/ML
INJECTION INTRAMUSCULAR; INTRAVENOUS
Status: DISPENSED
Start: 2024-07-11 | End: 2024-07-11

## 2024-07-11 ASSESSMENT — COGNITIVE AND FUNCTIONAL STATUS - GENERAL
TURNING FROM BACK TO SIDE WHILE IN FLAT BAD: A LOT
WALKING IN HOSPITAL ROOM: TOTAL
STANDING UP FROM CHAIR USING ARMS: A LOT
DAILY ACTIVITIY SCORE: 12
CLIMB 3 TO 5 STEPS WITH RAILING: TOTAL
DRESSING REGULAR UPPER BODY CLOTHING: A LOT
MOBILITY SCORE: 10
EATING MEALS: A LITTLE
HELP NEEDED FOR BATHING: A LOT
MOVING TO AND FROM BED TO CHAIR: A LOT
TOILETING: TOTAL
MOVING FROM LYING ON BACK TO SITTING ON SIDE OF FLAT BED WITH BEDRAILS: A LOT
PERSONAL GROOMING: A LITTLE
DRESSING REGULAR LOWER BODY CLOTHING: TOTAL

## 2024-07-11 ASSESSMENT — PAIN - FUNCTIONAL ASSESSMENT
PAIN_FUNCTIONAL_ASSESSMENT: CPOT (CRITICAL CARE PAIN OBSERVATION TOOL)
PAIN_FUNCTIONAL_ASSESSMENT: 0-10

## 2024-07-11 ASSESSMENT — PAIN DESCRIPTION - DESCRIPTORS
DESCRIPTORS: ACHING
DESCRIPTORS: SORE

## 2024-07-11 ASSESSMENT — PAIN SCALES - GENERAL
PAINLEVEL_OUTOF10: 5 - MODERATE PAIN
PAINLEVEL_OUTOF10: 3
PAINLEVEL_OUTOF10: 0 - NO PAIN
PAINLEVEL_OUTOF10: 7
PAINLEVEL_OUTOF10: 8
PAINLEVEL_OUTOF10: 0 - NO PAIN
PAINLEVEL_OUTOF10: 7
PAINLEVEL_OUTOF10: 3
PAINLEVEL_OUTOF10: 7

## 2024-07-11 ASSESSMENT — ACTIVITIES OF DAILY LIVING (ADL): BATHING_ASSISTANCE: MAXIMAL

## 2024-07-11 NOTE — PROGRESS NOTES
Occupational Therapy    Evaluation/Treatment    Patient Name: Kael Zepeda  MRN: [id number]  : 1965  Today's Date: 24  Time Calculation  Start Time: 1309  Stop Time: 1347  Time Calculation (min): 38 min       Assessment:  OT Assessment: Patient tolerated session well with improved alertness and engagement and pt expressing gratitude at end of session. Patient reporting respiratory fatigue with answering evaluation questions with VSS. Demo AROM in all extremities and completing grooming tasks with SBA seated in bed. Demo improved comfort with repositioning provided in bed. Recommend further assessment to determine appropriate discharge recommendation and to increase functional independence and safety.  Prognosis: Good  Barriers to Discharge: Other (Comment) (Medical acuity)  Evaluation/Treatment Tolerance: Patient limited by fatigue  Medical Staff Made Aware: Yes  End of Session Communication: Bedside nurse (CNP)  End of Session Patient Position: Bed, 3 rail up  OT Assessment Results: Decreased ADL status, Decreased upper extremity strength, Decreased endurance, Decreased functional mobility  Prognosis: Good  Barriers to Discharge: Other (Comment) (Medical acuity)  Evaluation/Treatment Tolerance: Patient limited by fatigue  Medical Staff Made Aware: Yes  Strengths: Premorbid level of function  Barriers to Participation: Comorbidities  Plan:  Treatment Interventions: ADL retraining, Visual perceptual retraining, Functional transfer training, UE strengthening/ROM, Endurance training, Patient/family training, Fine motor coordination activities, Cognitive reorientation, Equipment evaluation/education, Neuromuscular reeducation, Compensatory technique education  OT Frequency: 3 times per week  OT Discharge Recommendations:  (Need further assessment to determine appropriate discharge rec.)  OT Recommended Transfer Status: Assist of 2  OT - OK to Discharge: Yes (when medically appropriate)  Treatment  Interventions: ADL retraining, Visual perceptual retraining, Functional transfer training, UE strengthening/ROM, Endurance training, Patient/family training, Fine motor coordination activities, Cognitive reorientation, Equipment evaluation/education, Neuromuscular reeducation, Compensatory technique education    Subjective   Current Problem:  1. CAD in native artery  Case Request Operating Room: CABG x 3 LIMA, radial and vein    Case Request Operating Room: CABG x 3 LIMA, radial and vein    Anesthesia Intraoperative Transesophageal Echocardiogram    Anesthesia Intraoperative Transesophageal Echocardiogram      2. NSTEMI (non-ST elevated myocardial infarction) (Multi)  Vascular US Lower Extremity Vein Mapping Bilateral    Vascular US Ankle Brachial Index (EBEN) Without Exercise    Vascular US Carotid Artery Duplex Bilateral    Vascular US Lower Extremity Vein Mapping Bilateral    Vascular US Ankle Brachial Index (EBEN) Without Exercise    Vascular US Carotid Artery Duplex Bilateral    Case Request Operating Room: CABG x 3 LIMA, radial and vein    Case Request Operating Room: CABG x 3 LIMA, radial and vein      3. Chest pain, unspecified  Vascular US Lower Extremity Vein Mapping Bilateral    Vascular US Lower Extremity Vein Mapping Bilateral      4. Other disorders of arteries, arterioles and capillaries in diseases classified elsewhere (CMS-HCC)  Vascular US Ankle Brachial Index (EBEN) Without Exercise    Vascular US Carotid Artery Duplex Bilateral    Vascular US Ankle Brachial Index (EBEN) Without Exercise    Vascular US Carotid Artery Duplex Bilateral      5. Other specified symptoms and signs involving the circulatory and respiratory systems  Vascular US Carotid Artery Duplex Bilateral      6. Encounter for other preprocedural examination  Vascular US Lower Extremity Vein Mapping Bilateral    Vascular US Ankle Brachial Index (EBEN) Without Exercise      7. Ulcer of toe of right foot, limited to breakdown of skin  (Multi)  Vascular US Ankle Brachial Index (EBEN) Without Exercise    Vascular US Ankle Brachial Index (EBEN) Without Exercise    CANCELED: Vascular US PVR without exercise    CANCELED: Vascular US PVR without exercise      8. Ulcer of both feet with fat layer exposed (Multi)  Referral to Wound Clinic      9. Peripheral vascular disease, unspecified (CMS-HCC)  Vascular US Ankle Brachial Index (EBEN) Without Exercise        General:   OT Received On: 07/11/24  General  Reason for Referral: 59 y/o M presenting to OSH with chest pain and NSTEMI; now s/p CABG x3 on 7/9/24.  Past Medical History Relevant to Rehab: Diabetes mellitus, type 2 (Multi)    Benign essential hypertension    Chest discomfort    Fibromyalgia    Generalized anxiety disorder    History of hypertension    History of hypothyroidism    Elevated troponin    Rheumatoid arthritis (Multi)    Hyperglycemia    Microcytic anemia    Cigarette nicotine dependence  Family/Caregiver Present: No  Prior to Session Communication: Bedside nurse (Per CNP, ok to see with focus on cognition and increasing alertness with concern for continued lethargy.)  Patient Position Received: Bed, 3 rail up  Preferred Learning Style: auditory, verbal, visual, written  General Comment: Agreeable to OT eval; flat affect.  Precautions:  Hearing/Visual Limitations: appears WFL  Medical Precautions: Cardiac precautions, Fall precautions, Chest tube, Oxygen therapy device and L/min (Airvo: 50L/93% FiO2)  Post-Surgical Precautions: Move in the Tube  Precautions Comment: MAP 70-90; external pacer connected but off (RN reporting it is to stay off), SpO2>92%. R foot post op shoe with OOB activity (shoe present in room).  Vital Signs:  Heart Rate: 67 (post: 74)  SpO2: 94 % (post: 91%)  BP: 104/58 (post: 131/67)  MAP (mmHg): 75 (post: 90)  Pain:  Pain Assessment  Pain Assessment: 0-10  0-10 (Numeric) Pain Score: 3  Pain Type: Surgical pain  Pain Location: Chest (and neck at line sites)  Pain  "Orientation: Mid  Pain Descriptors: Aching  Pain Frequency: Constant/continuous  Pain Onset: Ongoing  Pain Interventions: Repositioned, Distraction, Emotional support  Response to Interventions: reporting improvement with repositioning    Objective   Cognition:  Arousal/Alertness: Delayed responses to stimuli (drowsy; increased response time; reporting he was \"tired\" with continued eye closure.)  Orientation Level: Oriented X4 (v/c for exact date; aware it was daytime and July 2024.)  Following Commands: Follows one step commands with increased time  Cognition Comments: Answering all questions appropriately with increased time for answering due to feeling \"tired\" and \"difficulty breathing\" with answering evaluation questions. Alertness improving at end of session with lights and TV on.        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 Pt. reporting his brother can assist him if needed.     ADL:  Eating Assistance: Stand by  Eating Deficit: Supervision/safety  Grooming Assistance: Stand by  Grooming Deficit: Supervision/safety  Bathing Assistance: Maximal  UE Dressing Assistance: Maximal  LE Dressing Assistance: Total  Toileting Assistance with Device: Total  Activities of Daily Living: Grooming  Grooming Comments: Completing face washing and oral care with SBA and min v/c. " Patient completing at bedlevel.     Bed Mobility/Transfers: Bed Mobility  Bed Mobility:  (Patient with R lateral lean; repositioning with wedges for midline positioning and pt reporting increased comfort; deferred further OOB activity due to pt reporting respiratory fatigue/difficulty breathing following evaluation questions.)       Cognitive Skill Development:  Cognitive Skill Development Activity 1: Educated pt on benefit of engaging in ADLs and daily activity for prevention of delirium.  Cognitive Skill Development Activity 2: Active listening regarding coping with CLOF.  Cognitive Skill Development Activity 3: Brief education regarding post op precautions with handout provided for increased carryover.     Vision:Vision - Basic Assessment  Current Vision: No visual deficits  Sensation:  Sensation Comment: Baseline fibromyalgia pain in B arms and hands primarily in evenings.  Strength:  Strength Comments: BUEs at least 3+/5; demo ability to lift BLEs off bed.       Coordination:  Movements are Fluid and Coordinated: Yes   Hand Function:  Hand Function  Gross Grasp: Functional  Coordination: Functional  Extremities: RUE   RUE : Within Functional Limits and LUE   LUE: Within Functional Limits      Outcome Measures: [facility] Daily Activity  Putting on and taking off regular lower body clothing: Total  Bathing (including washing, rinsing, drying): A lot  Putting on and taking off regular upper body clothing: A lot  Toileting, which includes using toilet, bedpan or urinal: Total  Taking care of personal grooming such as brushing teeth: A little  Eating Meals: A little  Daily Activity - Total Score: 12      Education Documentation  Handouts, taught by Jennifer Hoffman OT at 7/11/2024  3:42 PM.  Learner: Patient  Readiness: Acceptance  Method: Explanation  Response: Verbalizes Understanding, Needs Reinforcement  Comment: post op precautions and delirium prevention education.    Body Mechanics, taught by Jennifer Hoffman  OT at 7/11/2024  3:42 PM.  Learner: Patient  Readiness: Acceptance  Method: Explanation  Response: Verbalizes Understanding, Needs Reinforcement  Comment: post op precautions and delirium prevention education.    Precautions, taught by Jennifer Hoffman OT at 7/11/2024  3:42 PM.  Learner: Patient  Readiness: Acceptance  Method: Explanation  Response: Verbalizes Understanding, Needs Reinforcement  Comment: post op precautions and delirium prevention education.    ADL Training, taught by Jennifer Hoffman OT at 7/11/2024  3:42 PM.  Learner: Patient  Readiness: Acceptance  Method: Explanation  Response: Verbalizes Understanding, Needs Reinforcement  Comment: post op precautions and delirium prevention education.    Education Comments  No comments found.      Goals:  Encounter Problems       Encounter Problems (Active)       ADLs       Patient will complete toileting with MIN assist and min cues.   (Progressing)       Start:  07/11/24    Expected End:  07/25/24            Patient will complete LB dressing with MIN assist and min cues.   (Progressing)       Start:  07/11/24    Expected End:  07/25/24            Patient will complete UB dressing with SBA and min cues.   (Progressing)       Start:  07/11/24    Expected End:  07/25/24            Patient will complete grooming with MOD I.   (Progressing)       Start:  07/11/24    Expected End:  07/25/24                   BALANCE       Patient will demo sitting balance with SBA for at least 8 min in prep for EOB ADLs. (Not Progressing)       Start:  07/11/24    Expected End:  07/25/24               MOBILITY       Pt. Will demo household distance functional mobility with MIN A using LRAD.   (Not Progressing)       Start:  07/11/24    Expected End:  07/25/24               TRANSFERS       Pt. Will complete stand pivot transfer with MIN assist with min cues and using LRAD.   (Not Progressing)       Start:  07/11/24    Expected End:  07/25/24 07/11/24 at 3:43  PM - Jennifer Hoffman, OT

## 2024-07-11 NOTE — PROGRESS NOTES
1/1 CTICU    CT SURGERY  @ los 8d --> 7/10 CABGx3 with Dr. Carey     DC PLAN  As per surgery [facility]; [facility] for discharge.    PAYOR   [employer]   Medicaid     PT/OT   07/11 PT/OT = Low    COMPLETED  (X) Daily ongoing review of patient via chart and/or (M-F) IDT rounds  (X) 07/11 - Attempted DC/SDOH/EPIC verify with patient but patient dosing off.   (X) 07/11 - New to unit and author - EPIC & [facility] notes/DC assessment reviewd.     Monique Adames (LSW, MSW)

## 2024-07-11 NOTE — SIGNIFICANT EVENT
I was approached by pateint's bedside nurse due to concern for hypoxia despite max Airvo settings. Patient notes he has been having severe sternotomy pain that is preventing him from taking deep breaths, however, he has been refusing elements of his care, including his PO methadone, and initial attempts at CPAP. I explained to the patient that these interventions were necessary to prevent him from getting re-intubated and its associated risks and complications. While he still refused PO meds he noted he would wear CPAP if he could be made comfortable. PO methadone was converted to IV, IV tylenol and Toradol were started after updating his surgeon, Dr. Hilton, of his clinical status. Precedex was able to facilitate the patient tolerating CPAP. Patient had improvement of oxygen sat and PaO2 on ABG.

## 2024-07-11 NOTE — PROGRESS NOTES
Physical Therapy    Physical Therapy Evaluation    Patient Name: Kael Zepeda  MRN: [id number]  Room: 17[facility]  Today's Date: 7/11/2024   Time Calculation  Start Time: 1420  Stop Time: 1445  Time Calculation (min): 25 min    Assessment/Plan   PT Assessment  PT Assessment Results: Decreased endurance, Impaired balance, Decreased mobility, Pain  Rehab Prognosis: Good  Barriers to Discharge: medical acuity  End of Session Communication: Bedside nurse  End of Session Patient Position: Up in chair  IP OR SWING BED PT PLAN  Inpatient or Swing Bed: Inpatient  PT Plan  Treatment/Interventions: Bed mobility, Transfer training, Gait training, Stair training, Balance training, Strengthening, Endurance training, Range of motion, Therapeutic activity, Therapeutic exercise  PT Plan: Ongoing PT  PT Frequency: 5 times per week  PT Discharge Recommendations: Low intensity level of continued care  Equipment Recommended upon Discharge:  (will continue to assess)  PT Recommended Transfer Status: Assist x1, Assistive device  PT - OK to Discharge: Yes    Subjective     General Visit Information:  Subjective: Patient is alert, agreeable to PT.  Discussed with RT to don NRB over Airvo throughout activity.  History taken from chart to focus on energy conservation beyond orientation questions.  Reason for Referral: 59 y/o M presenting to OSH with chest pain and NSTEMI; now s/p CABG x3 on 7/9/24.  Past Medical History Relevant to Rehab: Diabetes mellitus, type 2 (Multi)    Benign essential hypertension    Chest discomfort    Fibromyalgia    Generalized anxiety disorder    History of hypertension    History of hypothyroidism    Elevated troponin    Rheumatoid arthritis (Multi)    Hyperglycemia    Microcytic anemia    Cigarette nicotine dependence  Prior to Session Communication: Bedside nurse (Per CNP, ok to see with focus on cognition and increasing alertness with concern for continued lethargy.)  Patient Position Received: Bed, 3 rail  up  Family/Caregiver Present: No   Home Living:  Home Living  Type of Home: House (1 story side by side duplex)  Lives With: Siblings (Brother)  Home Adaptive Equipment: Cane (was not using PTA)  Home Layout: One level  Home Access: No concerns (0 REESE)  Bathroom Shower/Tub: Tub/shower unit (no grab bars or shower chair)  Prior Level of Function:  Prior Function Per Pt/Caregiver Report  Level of [place]: Independent with ADLs and functional transfers  Vocational: Retired  Precautions:  Precautions  Medical Precautions: Fall precautions, Cardiac precautions, Oxygen therapy device and L/min, Chest tube  Vital Signs:  Vital Signs  Heart Rate: 77 (post 81)  SpO2: 94 % (post 95, low 91 c transfer, donned NRB over airvo per RT at start of session)  BP: 105/60 (post 103/62)  Medical Gas Therapy: Supplemental oxygen    Lines/Tubes/Drains:  CVC 07/09/24 Double lumen Right Internal jugular (Active)   Number of days: 2       Arterial Line 07/09/24 Left Brachial (Active)   Number of days: 2       Pacer Wires (Active)   Number of days: 2       Urethral Catheter Temperature probe 14 Fr. (Active)   Number of days: 2       Chest Tube 1 Mediastinal (Active)   Number of days: 2       Chest Tube 2 Pleural (Active)   Number of days: 2     Medical Gas Therapy: Supplemental oxygen  O2 Delivery Method: High flow nasal cannula  Oxygen Dose: *50 L/min 90%.  Donned NRB 15L throughout activity  Continuous Medications/Drips:  furosemide, 20 mg/hr, Last Rate: 20 mg/hr (07/11/24 1350)  lactated Ringer's, 5 mL/hr, Last Rate: 5 mL/hr (07/11/24 0600)  lactated Ringer's, 15 mL/hr, Last Rate: 15 mL/hr (07/11/24 0600)        Objective   Pain:  Pain Assessment  0-10 (Numeric) Pain Score: 3 (post 3)  Pain Location: Chest  Pain Descriptors: Sore    Cognition:  Cognition  Orientation Level: Oriented X4    General Assessments:  Extremity/Trunk Assessments:  Tone: No abnormalities noted  Sensation  Light Touch:  (impaired to BLE, baseline  neuropath)  Coordination  Movements are Fluid and Coordinated: Yes  Upper Extremity  ROM: WFL  Strength:WFL  Lower Extremity  ROM: WFL BLE Foot ulcers noted  Strength: WFL   Sitting Static Balance Not NormalUE Support Two UE support and Assist Level Contact Guard  Sitting Dynamic Balance Not NormalUE Support Two UE support and Assist Level Max Assist  Standing Static Balance Not NormalUE Support Two UE support and Assist Level Min Assist  Standing Dynamic Balance Not NormalUE Support Two UE support and Assist Level Min Assist    Functional Assessments:  Bed Mobility  Bed Mobility: Yes  Bed Mobility 1  Bed Mobility 1: Supine to sitting  Level of Assistance 1: Maximum assistance  Bed Mobility Comments 1: HOB 30, TAPS    Transfers  Transfer: Yes  Transfer 1  Transfer From 1: Sit to  Transfer to 1: Stand  Transfer Device 1:  (2 HHA)  Transfer Level of Assistance 1: Minimum assistance  Transfers 2  Transfer From 2: Stand to  Transfer to 2: Sit  Transfer Device 2:  (2 HHA)  Transfer Level of Assistance 2: Minimum assistance  Transfers 3  Transfer From 3: Bed to  Transfer to 3: Chair with arms  Transfer Device 3:  (2 HHA)  Transfer Level of Assistance 3: Minimum assistance  Trials/Comments 3: stand turn                   Outcome Measures:  [facility] Basic Mobility  Turning from your back to your side while in a flat bed without using bedrails: A lot  Moving from lying on your back to sitting on the side of a flat bed without using bedrails: A lot  Moving to and from bed to chair (including a wheelchair): A lot  Standing up from a chair using your arms (e.g. wheelchair or bedside chair): A lot  To walk in hospital room: Total  Climbing 3-5 steps with railing: Total  Basic Mobility - Total Score: 10           FSS-ICU  Ambulation: Unable to attempt due to weakness  Rolling: Maximal assistance (performs 25% - 49% of task)  Sitting: Minimal assistance (performs 75% or more of task)  Transfer Sit-to-Stand: Maximal assistance  (performs 25% - 49% of task)  Transfer Supine-to-Sit: Maximal assistance (performs 25% - 49% of task)  Total Score: 10  ICU Mobility Screen  ICU Mobility Scale: Transferring bed to chair             Encounter Problems       Encounter Problems (Active)       Pain - Adult            Assessment: Patient presents s/p CABG.  Currently max A for mobility with endurance and respiratory status main limiting factors.  SpO2 >90% throughout all activity and returned to airvo only at end of session.  Deferred further activity for safety and medical acuity concerns.  Patient would benefit from further therapy to increase functional independence and safety.  Will continue to follow during acute stay.      Education Documentation  Precautions, taught by Tha Moore PT at 7/11/2024  3:38 PM.  Learner: Patient  Readiness: Acceptance  Method: Explanation  Response: Needs Reinforcement    Body Mechanics, taught by Tha Moore PT at 7/11/2024  3:38 PM.  Learner: Patient  Readiness: Acceptance  Method: Explanation  Response: Needs Reinforcement    Mobility Training, taught by Tha Moore PT at 7/11/2024  3:38 PM.  Learner: Patient  Readiness: Acceptance  Method: Explanation  Response: Needs Reinforcement    Education Comments  No comments found.          07/11/24 at 3:41 PM   Tha Moore PT   Rehab Office: [phone]

## 2024-07-11 NOTE — PROGRESS NOTES
Postop Pain HPI -   Palliative: relieved with IV analgesics and regional local anesthetics  Provocative: movement  Quality:  burning and aching  Radiation:  none  Severity:  2/10  Timing: constant    24-HOUR OPIOID CONSUMPTION:  Dilaudid 2mg  Oxycodone 10mg    Scheduled medications  acetaminophen, 1,000 mg, intravenous, q6h JOHNNY  [Held by provider] acetaminophen, 650 mg, oral, q6h  aspirin, 81 mg, oral, Daily  atorvastatin, 80 mg, oral, Nightly  ceFAZolin, 2 g, intravenous, q8h  celecoxib, 200 mg, oral, BID  [Held by provider] clopidogrel, 75 mg, oral, Daily  DULoxetine, 60 mg, oral, Daily  furosemide, , ,   heparin, 5,000 Units, subcutaneous, q8h  hydroxychloroquine, 200 mg, oral, BID  insulin lispro, 0-15 Units, subcutaneous, q4h  ketorolac, 15 mg, intravenous, q6h  levothyroxine, 50 mcg, oral, Daily  methadone, 40 mg, intravenous, q12h  [Held by provider] methadone, 77.5 mg, oral, BID  metoprolol tartrate, 12.5 mg, oral, BID  oxygen, , inhalation, Continuous - Inhalation  polyethylene glycol, 17 g, oral, Daily  [Transfer Hold] predniSONE, 5 mg, oral, Daily  pregabalin, 200 mg, oral, TID  sennosides-docusate sodium, 2 tablet, oral, BID  tiotropium, 2 puff, inhalation, Daily      Continuous medications  amiodarone, 0.5-1 mg/min, Last Rate: Stopped (07/11/24 0243)  dexmedeTOMIDine, 0-1.5 mcg/kg/hr, Last Rate: 0.5 mcg/kg/hr (07/11/24 0714)  lactated Ringer's, 5 mL/hr, Last Rate: 5 mL/hr (07/11/24 0600)  lactated Ringer's, 15 mL/hr, Last Rate: 15 mL/hr (07/11/24 0600)  norepinephrine, 0-0.1 mcg/kg/min, Last Rate: 0.01 mcg/kg/min (07/11/24 0719)      PRN medications  PRN medications: albuterol, alteplase, calcium gluconate, calcium gluconate, dextrose **OR** glucagon, furosemide, HYDROmorphone, LORazepam, magnesium sulfate, magnesium sulfate, naloxone, ondansetron **OR** ondansetron, oxyCODONE, oxyCODONE, potassium chloride, potassium chloride     Physical Exam:  Constitutional:  no distress, alert and  cooperative  Eyes: clear sclera  Head/Neck: No apparent injury, trachea midline  Respiratory/Thorax: Patent airways, thorax symmetric, breathing comfortably  Cardiovascular: no pitting edema  Gastrointestinal: Nondistended  Musculoskeletal: ROM intact  Extremities: no clubbing  Neurological: alert, dubon x4  Psychological: Appropriate affect    Results for orders placed or performed during the hospital encounter of 07/01/24 (from the past 24 hour(s))   Blood Gas Arterial Full Panel   Result Value Ref Range    POCT pH, Arterial 7.43 (H) 7.38 - 7.42 pH    POCT pCO2, Arterial 30 (L) 38 - 42 mm Hg    POCT pO2, Arterial 102 (H) 85 - 95 mm Hg    POCT SO2, Arterial 98 94 - 100 %    POCT Oxy Hemoglobin, Arterial 96.9 94.0 - 98.0 %    POCT Hematocrit Calculated, Arterial 25.0 (L) 41.0 - 52.0 %    POCT Sodium, Arterial 137 136 - 145 mmol/L    POCT Potassium, Arterial 3.4 (L) 3.5 - 5.3 mmol/L    POCT Chloride, Arterial 109 (H) 98 - 107 mmol/L    POCT Ionized Calcium, Arterial 1.06 (L) 1.10 - 1.33 mmol/L    POCT Glucose, Arterial 154 (H) 74 - 99 mg/dL    POCT Lactate, Arterial 0.8 0.4 - 2.0 mmol/L    POCT Base Excess, Arterial -3.8 (L) -2.0 - 3.0 mmol/L    POCT HCO3 Calculated, Arterial 19.9 (L) 22.0 - 26.0 mmol/L    POCT Hemoglobin, Arterial 8.3 (L) 13.5 - 17.5 g/dL    POCT Anion Gap, Arterial 12 10 - 25 mmo/L    Patient Temperature 37.0 degrees Celsius    FiO2 60 %   POCT GLUCOSE   Result Value Ref Range    POCT Glucose 191 (H) 74 - 99 mg/dL   Calcium, Ionized   Result Value Ref Range    POCT Calcium, Ionized 1.14 1.1 - 1.33 mmol/L   Blood Gas Arterial Full Panel   Result Value Ref Range    POCT pH, Arterial 7.45 (H) 7.38 - 7.42 pH    POCT pCO2, Arterial 35 (L) 38 - 42 mm Hg    POCT pO2, Arterial 62 (L) 85 - 95 mm Hg    POCT SO2, Arterial 92 (L) 94 - 100 %    POCT Oxy Hemoglobin, Arterial 89.8 (L) 94.0 - 98.0 %    POCT Hematocrit Calculated, Arterial 28.0 (L) 41.0 - 52.0 %    POCT Sodium, Arterial 135 (L) 136 - 145 mmol/L     POCT Potassium, Arterial 3.8 3.5 - 5.3 mmol/L    POCT Chloride, Arterial 103 98 - 107 mmol/L    POCT Ionized Calcium, Arterial 1.16 1.10 - 1.33 mmol/L    POCT Glucose, Arterial 174 (H) 74 - 99 mg/dL    POCT Lactate, Arterial 1.1 0.4 - 2.0 mmol/L    POCT Base Excess, Arterial 0.5 -2.0 - 3.0 mmol/L    POCT HCO3 Calculated, Arterial 24.3 22.0 - 26.0 mmol/L    POCT Hemoglobin, Arterial 9.4 (L) 13.5 - 17.5 g/dL    POCT Anion Gap, Arterial 12 10 - 25 mmo/L    Patient Temperature 37.0 degrees Celsius    FiO2 60 %   CBC   Result Value Ref Range    WBC 13.6 (H) 4.4 - 11.3 x10*3/uL    nRBC 0.0 0.0 - 0.0 /100 WBCs    RBC 3.68 (L) 4.50 - 5.90 x10*6/uL    Hemoglobin 9.0 (L) 13.5 - 17.5 g/dL    Hematocrit 28.4 (L) 41.0 - 52.0 %    MCV 77 (L) 80 - 100 fL    MCH 24.5 (L) 26.0 - 34.0 pg    MCHC 31.7 (L) 32.0 - 36.0 g/dL    RDW 17.8 (H) 11.5 - 14.5 %    Platelets 187 150 - 450 x10*3/uL   Magnesium   Result Value Ref Range    Magnesium 2.22 1.60 - 2.40 mg/dL   Renal Function Panel   Result Value Ref Range    Glucose 176 (H) 74 - 99 mg/dL    Sodium 137 136 - 145 mmol/L    Potassium 3.7 3.5 - 5.3 mmol/L    Chloride 101 98 - 107 mmol/L    Bicarbonate 22 21 - 32 mmol/L    Anion Gap 18 mmol/L    Urea Nitrogen 15 6 - 23 mg/dL    Creatinine 0.77 0.50 - 1.30 mg/dL    eGFR >90 >60 mL/min/1.73m*2    Calcium 8.8 8.6 - 10.6 mg/dL    Phosphorus 3.1 2.5 - 4.9 mg/dL    Albumin 4.0 3.4 - 5.0 g/dL   Blood Gas Arterial Full Panel   Result Value Ref Range    POCT pH, Arterial 7.48 (H) 7.38 - 7.42 pH    POCT pCO2, Arterial 34 (L) 38 - 42 mm Hg    POCT pO2, Arterial 59 (L) 85 - 95 mm Hg    POCT SO2, Arterial 90 (L) 94 - 100 %    POCT Oxy Hemoglobin, Arterial 88.0 (L) 94.0 - 98.0 %    POCT Hematocrit Calculated, Arterial 29.0 (L) 41.0 - 52.0 %    POCT Sodium, Arterial 135 (L) 136 - 145 mmol/L    POCT Potassium, Arterial 3.6 3.5 - 5.3 mmol/L    POCT Chloride, Arterial 103 98 - 107 mmol/L    POCT Ionized Calcium, Arterial 1.13 1.10 - 1.33 mmol/L    POCT  Glucose, Arterial 166 (H) 74 - 99 mg/dL    POCT Lactate, Arterial 1.1 0.4 - 2.0 mmol/L    POCT Base Excess, Arterial 1.9 -2.0 - 3.0 mmol/L    POCT HCO3 Calculated, Arterial 25.3 22.0 - 26.0 mmol/L    POCT Hemoglobin, Arterial 9.6 (L) 13.5 - 17.5 g/dL    POCT Anion Gap, Arterial 10 10 - 25 mmo/L    Patient Temperature 37.0 degrees Celsius    FiO2 60 %   Blood Gas Arterial Full Panel   Result Value Ref Range    POCT pH, Arterial 7.48 (H) 7.38 - 7.42 pH    POCT pCO2, Arterial 34 (L) 38 - 42 mm Hg    POCT pO2, Arterial 62 (L) 85 - 95 mm Hg    POCT SO2, Arterial 92 (L) 94 - 100 %    POCT Oxy Hemoglobin, Arterial 89.6 (L) 94.0 - 98.0 %    POCT Hematocrit Calculated, Arterial 28.0 (L) 41.0 - 52.0 %    POCT Sodium, Arterial 135 (L) 136 - 145 mmol/L    POCT Potassium, Arterial 3.6 3.5 - 5.3 mmol/L    POCT Chloride, Arterial 103 98 - 107 mmol/L    POCT Ionized Calcium, Arterial 1.12 1.10 - 1.33 mmol/L    POCT Glucose, Arterial 164 (H) 74 - 99 mg/dL    POCT Lactate, Arterial 1.3 0.4 - 2.0 mmol/L    POCT Base Excess, Arterial 1.9 -2.0 - 3.0 mmol/L    POCT HCO3 Calculated, Arterial 25.3 22.0 - 26.0 mmol/L    POCT Hemoglobin, Arterial 9.4 (L) 13.5 - 17.5 g/dL    POCT Anion Gap, Arterial 10 10 - 25 mmo/L    Patient Temperature 37.0 degrees Celsius    FiO2 60 %   POCT GLUCOSE   Result Value Ref Range    POCT Glucose 172 (H) 74 - 99 mg/dL   Electrocardiogram 12-lead PRN for arrhythmia   Result Value Ref Range    Ventricular Rate 112 BPM    Atrial Rate 115 BPM    QRS Duration 94 ms    QT Interval 292 ms    QTC Calculation(Bazett) 398 ms    R Axis 11 degrees    T Axis -44 degrees    QRS Count 18 beats    Q Onset 224 ms    T Offset 370 ms    QTC Fredericia 359 ms   Blood Gas Arterial Full Panel   Result Value Ref Range    POCT pH, Arterial 7.52 (H) 7.38 - 7.42 pH    POCT pCO2, Arterial 30 (L) 38 - 42 mm Hg    POCT pO2, Arterial 54 (L) 85 - 95 mm Hg    POCT SO2, Arterial 88 (L) 94 - 100 %    POCT Oxy Hemoglobin, Arterial 85.7 (L) 94.0 -  98.0 %    POCT Hematocrit Calculated, Arterial 27.0 (L) 41.0 - 52.0 %    POCT Sodium, Arterial 133 (L) 136 - 145 mmol/L    POCT Potassium, Arterial 3.4 (L) 3.5 - 5.3 mmol/L    POCT Chloride, Arterial 103 98 - 107 mmol/L    POCT Ionized Calcium, Arterial 1.05 (L) 1.10 - 1.33 mmol/L    POCT Glucose, Arterial 193 (H) 74 - 99 mg/dL    POCT Lactate, Arterial 1.3 0.4 - 2.0 mmol/L    POCT Base Excess, Arterial 1.9 -2.0 - 3.0 mmol/L    POCT HCO3 Calculated, Arterial 24.5 22.0 - 26.0 mmol/L    POCT Hemoglobin, Arterial 9.0 (L) 13.5 - 17.5 g/dL    POCT Anion Gap, Arterial 9 (L) 10 - 25 mmo/L    Patient Temperature 37.0 degrees Celsius    FiO2 95 %   Blood Gas Arterial Full Panel   Result Value Ref Range    POCT pH, Arterial 7.53 (H) 7.38 - 7.42 pH    POCT pCO2, Arterial 25 (L) 38 - 42 mm Hg    POCT pO2, Arterial 62 (L) 85 - 95 mm Hg    POCT SO2, Arterial 95 94 - 100 %    POCT Oxy Hemoglobin, Arterial 92.8 (L) 94.0 - 98.0 %    POCT Hematocrit Calculated, Arterial 25.0 (L) 41.0 - 52.0 %    POCT Sodium, Arterial 136 136 - 145 mmol/L    POCT Potassium, Arterial 3.4 (L) 3.5 - 5.3 mmol/L    POCT Chloride, Arterial 108 (H) 98 - 107 mmol/L    POCT Ionized Calcium, Arterial 0.98 (L) 1.10 - 1.33 mmol/L    POCT Glucose, Arterial 160 (H) 74 - 99 mg/dL    POCT Lactate, Arterial 1.4 0.4 - 2.0 mmol/L    POCT Base Excess, Arterial -1.2 -2.0 - 3.0 mmol/L    POCT HCO3 Calculated, Arterial 20.9 (L) 22.0 - 26.0 mmol/L    POCT Hemoglobin, Arterial 8.2 (L) 13.5 - 17.5 g/dL    POCT Anion Gap, Arterial 11 10 - 25 mmo/L    Patient Temperature 37.0 degrees Celsius    FiO2 90 %   Calcium, Ionized   Result Value Ref Range    POCT Calcium, Ionized 1.06 (L) 1.1 - 1.33 mmol/L   Blood Gas Arterial Full Panel   Result Value Ref Range    POCT pH, Arterial 7.48 (H) 7.38 - 7.42 pH    POCT pCO2, Arterial 32 (L) 38 - 42 mm Hg    POCT pO2, Arterial 76 (L) 85 - 95 mm Hg    POCT SO2, Arterial 97 94 - 100 %    POCT Oxy Hemoglobin, Arterial 95.3 94.0 - 98.0 %    POCT  Hematocrit Calculated, Arterial 27.0 (L) 41.0 - 52.0 %    POCT Sodium, Arterial 134 (L) 136 - 145 mmol/L    POCT Potassium, Arterial 4.1 3.5 - 5.3 mmol/L    POCT Chloride, Arterial 102 98 - 107 mmol/L    POCT Ionized Calcium, Arterial 1.09 (L) 1.10 - 1.33 mmol/L    POCT Glucose, Arterial 177 (H) 74 - 99 mg/dL    POCT Lactate, Arterial 1.3 0.4 - 2.0 mmol/L    POCT Base Excess, Arterial 0.6 -2.0 - 3.0 mmol/L    POCT HCO3 Calculated, Arterial 23.8 22.0 - 26.0 mmol/L    POCT Hemoglobin, Arterial 9.0 (L) 13.5 - 17.5 g/dL    POCT Anion Gap, Arterial 12 10 - 25 mmo/L    Patient Temperature 37.0 degrees Celsius    FiO2 100 %   POCT GLUCOSE   Result Value Ref Range    POCT Glucose 183 (H) 74 - 99 mg/dL   Blood Gas Arterial Full Panel   Result Value Ref Range    POCT pH, Arterial 7.50 (H) 7.38 - 7.42 pH    POCT pCO2, Arterial 31 (L) 38 - 42 mm Hg    POCT pO2, Arterial 68 (L) 85 - 95 mm Hg    POCT SO2, Arterial      POCT Oxy Hemoglobin, Arterial      POCT Hematocrit Calculated, Arterial      POCT Sodium, Arterial 135 (L) 136 - 145 mmol/L    POCT Potassium, Arterial 3.7 3.5 - 5.3 mmol/L    POCT Chloride, Arterial 104 98 - 107 mmol/L    POCT Ionized Calcium, Arterial 1.08 (L) 1.10 - 1.33 mmol/L    POCT Glucose, Arterial 185 (H) 74 - 99 mg/dL    POCT Lactate, Arterial 1.3 0.4 - 2.0 mmol/L    POCT Base Excess, Arterial 1.7 -2.0 - 3.0 mmol/L    POCT HCO3 Calculated, Arterial 24.2 22.0 - 26.0 mmol/L    POCT Hemoglobin, Arterial <3.0 (LL) 13.5 - 17.5 g/dL    POCT Anion Gap, Arterial 11 10 - 25 mmo/L    Patient Temperature 37.0 degrees Celsius    FiO2 100 %   Calcium, Ionized   Result Value Ref Range    POCT Calcium, Ionized 1.12 1.1 - 1.33 mmol/L   Magnesium   Result Value Ref Range    Magnesium 1.79 1.60 - 2.40 mg/dL   Renal Function Panel   Result Value Ref Range    Glucose 198 (H) 74 - 99 mg/dL    Sodium 135 (L) 136 - 145 mmol/L    Potassium 3.7 3.5 - 5.3 mmol/L    Chloride 100 98 - 107 mmol/L    Bicarbonate 24 21 - 32 mmol/L     Anion Gap 15 10 - 20 mmol/L    Urea Nitrogen 19 6 - 23 mg/dL    Creatinine 1.05 0.50 - 1.30 mg/dL    eGFR 82 >60 mL/min/1.73m*2    Calcium 8.5 (L) 8.6 - 10.6 mg/dL    Phosphorus 3.0 2.5 - 4.9 mg/dL    Albumin 3.5 3.4 - 5.0 g/dL   CBC   Result Value Ref Range    WBC 17.7 (H) 4.4 - 11.3 x10*3/uL    nRBC 0.0 0.0 - 0.0 /100 WBCs    RBC 3.42 (L) 4.50 - 5.90 x10*6/uL    Hemoglobin 8.4 (L) 13.5 - 17.5 g/dL    Hematocrit 26.5 (L) 41.0 - 52.0 %    MCV 78 (L) 80 - 100 fL    MCH 24.6 (L) 26.0 - 34.0 pg    MCHC 31.7 (L) 32.0 - 36.0 g/dL    RDW 17.7 (H) 11.5 - 14.5 %    Platelets 166 150 - 450 x10*3/uL   POCT GLUCOSE   Result Value Ref Range    POCT Glucose 177 (H) 74 - 99 mg/dL   Blood Gas Arterial Full Panel   Result Value Ref Range    POCT pH, Arterial 7.50 (H) 7.38 - 7.42 pH    POCT pCO2, Arterial 32 (L) 38 - 42 mm Hg    POCT pO2, Arterial 77 (L) 85 - 95 mm Hg    POCT SO2, Arterial 97 94 - 100 %    POCT Oxy Hemoglobin, Arterial 95.8 94.0 - 98.0 %    POCT Hematocrit Calculated, Arterial 26.0 (L) 41.0 - 52.0 %    POCT Sodium, Arterial 133 (L) 136 - 145 mmol/L    POCT Potassium, Arterial 3.5 3.5 - 5.3 mmol/L    POCT Chloride, Arterial 105 98 - 107 mmol/L    POCT Ionized Calcium, Arterial 1.12 1.10 - 1.33 mmol/L    POCT Glucose, Arterial 131 (H) 74 - 99 mg/dL    POCT Lactate, Arterial 1.1 0.4 - 2.0 mmol/L    POCT Base Excess, Arterial 1.9 -2.0 - 3.0 mmol/L    POCT HCO3 Calculated, Arterial 25.0 22.0 - 26.0 mmol/L    POCT Hemoglobin, Arterial 8.7 (L) 13.5 - 17.5 g/dL    POCT Anion Gap, Arterial 7 (L) 10 - 25 mmo/L    Patient Temperature 37.0 degrees Celsius    FiO2 100 %   POCT GLUCOSE   Result Value Ref Range    POCT Glucose 122 (H) 74 - 99 mg/dL        PLAN  - BL ARMIDA nerve blocks with catheters placed preoperatively on 07/10/2024.   - Ambit ball with Ropivacaine 0.2%/NaCl 0.9% 500mL, Rate 7cc/hr  - Ambit medication will not interfere with pain medication prescribed by the primary team.   - Please be aware of local anesthetic  toxic dose and absorption variability before considering lidocaine patches  - Acute pain service will follow while catheters in place  - Rest of pain management per primary team  - Bolus Ambit  - Pump shut off at 0500 2/2 clot in R catheter. Will leave pump off for now     Acute Pain Resident  pg [phone] ph [phone]

## 2024-07-11 NOTE — NURSING NOTE
1938 - Patient restless, agitated, verbally aggressive towards RN. Desatting into 70's. ABG obtained. PO2 52 on 60L/90% Airvo, MD GORDO Will notified of ABG results. Dr. Will at bedside to assess patient. Patient encouraged to wear bipap, patient refused. Dr. Will provided patient with explanation of importance of wearing bipap to prevent intubation. precedex increased per Dr. Will to 1mcg.   1955 - see orders for IV tylenol, Toradol, and Methadone for pain control.  2035 - Patient hypotensive, see orders for Levo.  2100 - patient placed on bipap at 100%.   2200 - Follow up ABG obtained. No urine output. Dr. Will notified of updated ABG and low urine output. See orders for LR bolus.   0243 - HR in 50's, Dr Will notified, Amio stopped.   2315 - patient pulled of bipap mask, combative, per Dr. Will ok to max out precedex for patient safety.   0400 - Dr. Will notified of no urine output for last hour, see orders for 20mg Lasix.  0500 - Dr. Will notified of pain pump alarming and not working appropriately. Pain pump turned off.   0600 - Dr. Will notified of updated ABG, urine output not responding to previous dose of lasix. See orders for 20mg Lasix.   0620 - Pain team rounding, notified of pain pump not working.

## 2024-07-11 NOTE — DOCUMENTATION CLARIFICATION NOTE
PATIENT:               ANDRE AQUINO  ACCT #:                  [id number]  MRN:                       [id number]  :                       1965  ADMIT DATE:       2024 8:27 PM  DISCH DATE:  RESPONDING PROVIDER #:        [id number]          PROVIDER RESPONSE TEXT:    Acute Pulmonary Insufficiency following surgery which was clinically significant altering or extending post-operative care    CDI QUERY TEXT:    Clarification    Instruction:  Based on your assessment of the patient and the clinical information, please provide the requested documentation by clicking on the appropriate radio button and enter any additional information if prompted.    Question: Is there a diagnosis indicative of the clinical information    When answering this query, please exercise your independent professional judgment. The fact that a question is being asked, does not imply that any particular answer is desired or expected.    The patient's clinical indicators include:  Clinical Information: 58 YOM w/PMH s/f RA, type 2 DM, COPD, hypothyroidism, 40-pack-year smoking history, chronic methadone use, peripheral neuropathy, and unhealed diabetic foot ulcers s/p right foot partial amputation, and most recently NSTEMI.  Underwent CABGx3 on     Clinical Indicators: 7/10 PN ?Currently on 10L HFNC?  ?Diminished, clear breath sounds bilaterally?  ?Morning CXR with pulmonary edema and atelectasis?    Vent Flowsheet indicates extubated ;  placed on 4L NC  7/10 02:00 SpO2 90 percent  7/10 04:00 increased to 5L NC  7/10 04:28 increased to 10L HFNC  7/10 11:40 O2 dose changed 60L HFNC    Treatment: Monitor CXR. Supplement O2. Wean FiO2 for SpO2 greater than 92 percent. IS. OOB. Continue Tiotropium. Albuterol prn.    Risk Factors: Hx COPD. Smoker. CABG surgery .  Options provided:  -- Acute Pulmonary Insufficiency following surgery which was clinically significant altering or extending post-operative care  -- Other - I will add my  own diagnosis  -- Refer to Clinical Documentation Reviewer    Query created by: Sandra Reyes on 7/10/2024 5:56 PM      Electronically signed by:  DEBRA SHEFFIELD 7/11/2024 6:26 PM

## 2024-07-11 NOTE — CONSULTS
Wound Care Consult     Visit Date: 7/11/2024      Patient Name: Kael Zepeda         MRN: [id number]           YOB: 1965     Reason for Consult: Chronic diabetic wounds to bilateral feet        Wound History: Mr. Zepeda is a 58-year-old male with a PMH of RA (on chronic prednisone and hydroxychloroquine), type 2 DM (on insulin), COPD, hypothyroidism, 40-pack-year smoking history, chronic methadone use, peripheral neuropathy, and unhealed diabetic foot ulcers s/p right foot partial amputation (7 months ago - follows with wound clinic as outpatient)      Pertinent Labs:   Albumin   Date Value Ref Range Status   07/11/2024 3.5 3.4 - 5.0 g/dL Final       Wound Assessment:  Wound 06/30/24 Diabetic Ulcer Foot Right;Plantar (Active)   Wound Image   07/11/24 1525   Site Assessment Pale;Pink 07/11/24 1525   Chelsie-Wound Assessment Calloused 07/11/24 1525   Non-staged Wound Description Full thickness 07/11/24 1525   Shape Oval 07/11/24 1525   Wound Length (cm) 2.5 cm 07/11/24 1525   Wound Width (cm) 2 cm 07/11/24 1525   Wound Surface Area (cm^2) 5 cm^2 07/11/24 1525   Wound Depth (cm) 0.8 cm 07/11/24 1525   Wound Volume (cm^3) 4 cm^3 07/11/24 1525   Wound Healing % -55 07/11/24 1525   State of Healing Non-healing 07/11/24 1525   Margins Well-defined edges 07/11/24 1525   Drainage Description Purulent 07/11/24 1525   Drainage Amount Small 07/11/24 1525   Dressing Alginate;Silver dressing;ABD 07/11/24 1525   Dressing Changed New 07/11/24 1525   Dressing Status Clean;Dry 07/11/24 1525       Wound 06/30/24 Diabetic Ulcer Foot Left;Medial (Active)   Wound Image   07/11/24 1530   Site Assessment Pink 07/11/24 1530   Chelsie-Wound Assessment Calloused 07/11/24 1530   Non-staged Wound Description Full thickness 07/11/24 1530   Shape Irreg 07/11/24 1530   Wound Length (cm) 2 cm 07/11/24 1530   Wound Width (cm) 1.5 cm 07/11/24 1530   Wound Surface Area (cm^2) 3 cm^2 07/11/24 1530   Wound Depth (cm) 0.3 cm 07/11/24  1530   Wound Volume (cm^3) 0.9 cm^3 07/11/24 1530   Wound Healing % -13 07/11/24 1530   State of Healing Early/partial granulation 07/11/24 1530   Margins Well-defined edges 07/11/24 1530   Drainage Description Serous;Yellow 07/11/24 1530   Drainage Amount Small 07/11/24 1530   Dressing ABD;Kerlix/rolled gauze 07/11/24 0400   Dressing Changed Changed 07/11/24 1530   Dressing Status Clean;Dry 07/11/24 1530       Wound 06/30/24 Diabetic Ulcer Toe (Comment  which one) Left (Active)   Wound Image   07/01/24 1329   Site Assessment Pink;White 07/11/24 1530   Chelsie-Wound Assessment Calloused 07/11/24 1530   Non-staged Wound Description Full thickness 07/11/24 1530   Shape circular 07/11/24 1530   Wound Length (cm) 0.5 cm 07/11/24 1530   Wound Width (cm) 0.5 cm 07/11/24 1530   Wound Surface Area (cm^2) 0.25 cm^2 07/11/24 1530   Wound Depth (cm) 0.2 cm 07/11/24 1530   Wound Volume (cm^3) 0.05 cm^3 07/11/24 1530   Wound Healing % 79 07/11/24 1530   Wound Bed Slough (%) 70 % 07/11/24 1530   Margins Well-defined edges 07/11/24 1530   Drainage Description Serous 07/11/24 1530   Drainage Amount Scant 07/11/24 1530   Dressing Alginate;Silver dressing;ABD 07/11/24 1530   Dressing Changed Changed 07/11/24 1530   Dressing Status Clean;Dry 07/11/24 1530       Wound 07/01/24 Diabetic Ulcer Foot Dorsal foot;Right (Active)   Wound Image   07/01/24 1332   Site Assessment Pink;Tan;Yellow;Sloughing 07/11/24 0400   Chelsie-Wound Assessment Clean;Dry;Intact 07/11/24 0400   Non-staged Wound Description Full thickness 07/11/24 0400   Wound Length (cm) 1 cm 07/01/24 1332   Wound Width (cm) 1 cm 07/01/24 1332   Wound Surface Area (cm^2) 1 cm^2 07/01/24 1332   Wound Depth (cm) 0.2 cm 07/01/24 1332   Wound Volume (cm^3) 0.2 cm^3 07/01/24 1332   Margins Well-defined edges 07/11/24 0400   Drainage Description Tan 07/11/24 0400   Drainage Amount Small 07/11/24 0400   Dressing ABD;Kerlix/rolled gauze 07/11/24 0400   Dressing Changed Reinforced 07/11/24  0400   Dressing Status Clean;Dry;Occlusive 07/11/24 0400       Wound Team Summary Assessment: Patient with chronic diabetic foot ulcer to right and left feet. Right plantar wound moist with pink wound bed. Drainage appears white/clear. Edges are calloused. Left foot wounds are also calloused on the edges with pale pink and white slough to wound beds.   Recommendations   - Right plantar wound, left medial foot and toe wound, Clean with Vashe ( #[id number]),  Clean feet with cleansing cloths and apply lotion to foot, not on wounds. Apply Aquacel silver(#[id number])  to wound bed, (cut to size) then cover with ABD, wrap with kerlix. Change daily.    Wound Team Plan: Wound team will not need to follow. Please re consult for worsening of wounds.      SUZANNE GERHARDT, RN, BSN, CWOCN  7/11/2024  5:27 PM

## 2024-07-11 NOTE — NURSING NOTE
Pain team rounding, notified that pain pump alarming and not working during shift. No changes to plan of care at this time.

## 2024-07-11 NOTE — PROGRESS NOTES
CTICU Progress Note  Kael Zepeda/[id number]    Admit Date: 7/1/2024  Hospital Length of Stay: 10   ICU Length of Stay: 1d 17h   CT SURGEON: Dr. Alexis    SUBJECTIVE:   Hemodynamics acceptable on provided therapy. Remains on CPAP 100% FiO2. Afib with RVR treated with amiodarone 150mg bolus x2. Acute post operative pain uncontrolled and treated with IV tylenol, IV methadone, and IV toradol. Diuresis with Lasix and norepinephrine infusion initiated.    MEDICATIONS  Infusions:  amiodarone, Last Rate: Stopped (07/11/24 0243)  dexmedeTOMIDine, Last Rate: 1 mcg/kg/hr (07/11/24 0621)  lactated Ringer's, Last Rate: 5 mL/hr (07/11/24 0600)  lactated Ringer's, Last Rate: 15 mL/hr (07/11/24 0600)  norepinephrine, Last Rate: 0.01 mcg/kg/min (07/11/24 0719)      Scheduled:  acetaminophen, 1,000 mg, q6h JOHNNY  [Held by provider] acetaminophen, 650 mg, q6h  aspirin, 81 mg, Daily  atorvastatin, 80 mg, Nightly  ceFAZolin, 2 g, q8h  celecoxib, 200 mg, BID  [Held by provider] clopidogrel, 75 mg, Daily  DULoxetine, 60 mg, Daily  furosemide, ,   heparin, 5,000 Units, q8h  hydroxychloroquine, 200 mg, BID  insulin lispro, 0-15 Units, q4h  ketorolac, 15 mg, q6h  levothyroxine, 50 mcg, Daily  methadone, 40 mg, q12h  [Held by provider] methadone, 77.5 mg, BID  metoprolol tartrate, 12.5 mg, BID  oxygen, , Continuous - Inhalation  polyethylene glycol, 17 g, Daily  [Transfer Hold] predniSONE, 5 mg, Daily  pregabalin, 200 mg, TID  sennosides-docusate sodium, 2 tablet, BID  tiotropium, 2 puff, Daily      PRN:  albuterol, 2.5 mg, q6h PRN  alteplase, 2 mg, PRN  calcium gluconate, 1 g, q6h PRN  calcium gluconate, 2 g, q6h PRN  dextrose, 25 g, q15 min PRN   Or  glucagon, 1 mg, q15 min PRN  furosemide, ,   HYDROmorphone, 1 mg, q4h PRN  LORazepam, 1 mg, q4h PRN  magnesium sulfate, 2 g, q6h PRN  magnesium sulfate, 4 g, q6h PRN  naloxone, 0.2 mg, q5 min PRN  ondansetron, 4 mg, q8h PRN   Or  ondansetron, 4 mg, q8h PRN  oxyCODONE, 10 mg, q4h  "PRN  oxyCODONE, 5 mg, q4h PRN  potassium chloride, 20 mEq, q6h PRN  potassium chloride, 40 mEq, q6h PRN      PHYSICAL EXAM:   Visit Vitals  BP 99/51   Pulse 56   Temp 36.4 °C (97.5 °F) (Temporal)   Resp 18   Ht 1.676 m (5' 6\")   Wt 93.1 kg (205 lb 4 oz)   SpO2 96%   BMI 33.13 kg/m²   Smoking Status Every Day   BSA 2.08 m²     Wt Readings from Last 5 Encounters:   07/11/24 93.1 kg (205 lb 4 oz)   07/01/24 90 kg (198 lb 6.6 oz)   06/05/24 86.2 kg (190 lb)   10/13/23 84.8 kg (187 lb)   09/21/22 90.3 kg (199 lb)     INTAKE/OUTPUT:  I/O last 3 completed shifts:  In: 4301.7 (46.2 mL/kg) [P.O.:240; I.V.:2211.7 (23.8 mL/kg); Blood:500; IV Piggyback:1350]  Out: 3475 (37.3 mL/kg) [Urine:2765 (0.8 mL/kg/hr); Chest Tube:710]  Weight: 93.1 kg        Vent settings:  FiO2 (%):  [85 %-100 %] 100 %    Physical Exam:   Constitutional: Male patient lying in ICU bed, in no acute distress  Neurological: Somnolent but arousable. A+Ox3, no obvious focal deficits, moves all extremities  Eyes: Anicteric sclera, clear  Respiratory/Thorax: Diminished, clear breath sounds bilaterally, symmetric chest expansion, 3 chest tubes to -20 suction, serosanguinous drainage. V pacing wires present  Cardiovascular: NSR, no murmurs appreciated  Gastrointestinal: Abdomen soft, nontender, nondistended, bowel sounds present  Genitourinary: Bernal in place draining clear yellow urine  Extremities: Palpable radial pulses 2+, 1+ pulses to bilateral PT/DP, no pitting edema  Skin: Warm and dry throughout, midline incision stable - dressing clean, dry, and intact  Psych: Calm and cooperative    Daily Risk Screen  Central line: Hemodynamic instability requiring parenteral medication  Bernal: Accurate I/Os in critically ill    Images: Reviewed    Invasive Hemodynamics:    Most Recent Range Past 24hrs   BP (Art) 87/46 Arterial Line BP 1  Min: 77/47  Max: 194/83   MAP(Art) 59 mmHg Arterial Line MAP 1 (mmHg)   Min: 56 mmHg  Max: 149 mmHg     Assessment/Plan   Mr. " Duane is a 58-year-old male with a PMH of RA (on chronic prednisone and hydroxychloroquine), type 2 DM (on insulin), COPD, hypothyroidism, 40-pack-year smoking history, chronic methadone use, peripheral neuropathy, and unhealed diabetic foot ulcers s/p right foot partial amputation (7 months ago - follows with wound clinic as outpatient) and most recently NSTEMI. He is now s/p CABGx3 with Dr. Carey on 7/9.    Plan:  NEURO: PMH of chronic methadone use and peripheral neuropathy secondary to T2DM. Acute post operative pain uncontrolled on current regimen. Takes methadone at home - dosing confirmed with pharmacy. Somnolent this morning but easy to arouse. A+Ox3, moves all extremities, no obvious focal deficits. Not yet up to chair, but tolerating chair position in bed. -->  - Serial neuro and pain assessments   - Acute pain consult, appreciate recs  - Perform neuro exam after sedation off and patient more awake  - Scheduled Tylenol   - PRN oxycodone  - PRN dilaudid for pain  - Continue home plaquenil 200mg BID  - PT Consult, OOB to chair as tolerated, chair position if not tolerated   - CAM ICU score qshift  - Sleep/wake cycle hygiene  - Discontinue prn ativan  - Continue methadone 77.5mg q8h  - Discontinue celebrex  - Continue home duloxetine and pregabalin    CV: Patient has a history of NSTEMI. Is now status post CABGx3 Pre: Mildly decreased LV function, EF 40-45%. Post: normal LV function. Arrived to CTICU on V epicardial wires set VVI @ 50. NSR, normotensive. Hypervolemia. -->  - Maintain goal MAP 70 - 90  - See  for diuresis  - Maintain epicardial wires set VVI @ 50  - Daily aspirin  - Start plavix POD 3 after ARMIDA catheter removal  - Continue atorvastatin 80mg nightly  - Hold home lisinopril    PULM: PMH of COPD and smoking. Currently on CPAP 100% FiO2. Chest tubes L pleural, R pleural, and mediastinal. Morning CXR with pulmonary edema and atelectasis. -->  - Daily CXR  - Wean FiO2 maintaining SpO2 >92%  and transition to Airvo  - IS q1h and OOB to chair  - Chest tubes to wall suction - consider removal if criteria met  - Continue tiotropium  - Prn albuterol    GI: No history. Passed bedside swallow evaluation. Abdomen soft, non tender, and non distended. -->  - Adult regular diet  - Colace/senna BID and miralax BID    : CSA-JUAN J Risk Score 2. No history of renal disease, baseline creatinine 0.7. Creatinine stable post-op at 0.71. Carpenter in place and making adequate UOP. Net +5L in last 24 hours. Appears hypervolemic. -->  - Continue carpenter catheter for strict I/Os  - Lasix 40mg  - Goal net negative 1L  - Goal UOP 0.5ml/kg/hr  - RFP as clinically indicated  - Replete electrolytes per CTICU protocol    ENDO: PMH of insulin-dependent diabetes (A1c: 8.5) and hypothyroidism-->  - Maintain BG <180, insulin per CTICU protocol  - Continue levothyroxine  - on SSI    HEME: Acute blood loss anemia. Hgb stable at 8.4. -->    - Monitor drain output volume and characteristics  - Daily CBC and prn  - Daily aspirin  - Plavix POD 3 after ARMIDA catheter removal  - Start SQH   - SCDs for DVT prophylaxis.  - Last type and screen: 7/9    ID: Afebrile, no current indications of infection. MRSA negative .-->  - Trend temp q4h  - Periop cefazolin x 48hrs    Skin: No active skin issues. -->  - preventative Mepilex dressings in place on sacrum and heels  - change preventative Mepilex weekly or more frequently as indicated (when moist/soiled)   - every shift skin assessment per nursing and weekly ICU skin rounds  - moisture barrier to be applied with rosy care  - active skin problems addressed with nursing on daily rounds    Proph:  SCDs  SQH    G: Line  Right IJ MAC w Minimac placed 7/9  Left brachial a-line placed 7/9  Carpenter placed 7/9    F: Family: will update at bedside postoperatively.    Restraints: Not indicated.    Code status: Full Code    I personally spent 40 minutes of critical care time directly and personally managing the patient  exclusive of separately billable procedures.    A,B,C,D,E,F,G: reviewed    Discussed with Dr. Alexis.  Dispo: CTICU care for now.    CTICU TEAM PHONE [phone]

## 2024-07-11 NOTE — PROGRESS NOTES
Communication Note    Patient Name: Kael Zepeda  MRN: [id number]  Today's Date: 7/11/2024   Room: 17/17-A    Discipline: Physical Therapy      Missed Visit: Yes  Missed Visit Reason:  (PT currently on 100% bipap with fluctating respiratory status.  Will monitor and follow up as appropriate.)      07/11/24 at 9:04 AM   Tha Moore, PT   Rehab Office: [phone]

## 2024-07-11 NOTE — CARE PLAN
Problem: Pain - Adult  Goal: Verbalizes/displays adequate comfort level or baseline comfort level  Outcome: Progressing     Problem: Safety - Adult  Goal: Free from fall injury  Outcome: Progressing     Problem: Skin  Goal: Decreased wound size/increased tissue granulation at next dressing change  Outcome: Progressing  Goal: Participates in plan/prevention/treatment measures  Outcome: Progressing  Flowsheets (Taken 7/11/2024 0447)  Participates in plan/prevention/treatment measures: Elevate heels  Goal: Prevent/manage excess moisture  Outcome: Progressing  Flowsheets (Taken 7/11/2024 0447)  Prevent/manage excess moisture:   Moisturize dry skin   Cleanse incontinence/protect with barrier cream   Monitor for/manage infection if present  Goal: Prevent/minimize sheer/friction injuries  Outcome: Progressing  Flowsheets (Taken 7/11/2024 0447)  Prevent/minimize sheer/friction injuries:   HOB 30 degrees or less   Turn/reposition every 2 hours/use positioning/transfer devices   Use pull sheet  Goal: Promote skin healing  Outcome: Progressing     Problem: Diabetes  Goal: Achieve decreasing blood glucose levels by end of shift  Outcome: Progressing  Goal: Increase stability of blood glucose readings by end of shift  Outcome: Progressing  Goal: Maintain electrolyte levels within acceptable range throughout shift  Outcome: Progressing  Goal: Maintain glucose levels >70mg/dl to <250mg/dl throughout shift  Outcome: Progressing  Goal: No changes in neurological exam by end of shift  Outcome: Progressing     Problem: Pain  Goal: Turns in bed with improved pain control throughout the shift  Outcome: Progressing     Problem: Fall/Injury  Goal: Not fall by end of shift  Outcome: Progressing  Goal: Be free from injury by end of the shift  Outcome: Progressing  Goal: Verbalize understanding of personal risk factors for fall in the hospital  Outcome: Progressing  Goal: Verbalize understanding of risk factor reduction measures to prevent  injury from fall in the home  Outcome: Progressing

## 2024-07-12 ENCOUNTER — ANESTHESIA EVENT (OUTPATIENT)
Dept: CARDIOVASCULAR ICU | Facility: HOSPITAL | Age: 59
End: 2024-07-12
Payer: MEDICARE

## 2024-07-12 ENCOUNTER — APPOINTMENT (OUTPATIENT)
Dept: RADIOLOGY | Facility: HOSPITAL | Age: 59
End: 2024-07-12
Payer: MEDICARE

## 2024-07-12 ENCOUNTER — ANESTHESIA (OUTPATIENT)
Dept: CARDIOVASCULAR ICU | Facility: HOSPITAL | Age: 59
End: 2024-07-12
Payer: MEDICARE

## 2024-07-12 LAB
ABO GROUP (TYPE) IN BLOOD: NORMAL
ALBUMIN SERPL BCP-MCNC: 3.6 G/DL (ref 3.4–5)
ALBUMIN SERPL BCP-MCNC: 3.8 G/DL (ref 3.4–5)
AMPHETAMINES UR QL SCN: ABNORMAL
ANION GAP BLDA CALCULATED.4IONS-SCNC: 11 MMO/L (ref 10–25)
ANION GAP BLDA CALCULATED.4IONS-SCNC: 12 MMO/L (ref 10–25)
ANION GAP BLDA CALCULATED.4IONS-SCNC: 7 MMO/L (ref 10–25)
ANION GAP BLDA CALCULATED.4IONS-SCNC: 7 MMO/L (ref 10–25)
ANION GAP SERPL CALC-SCNC: 17 MMOL/L (ref 10–20)
ANION GAP SERPL CALC-SCNC: 19 MMOL/L (ref 10–20)
ANTIBODY SCREEN: NORMAL
APAP SERPL-MCNC: <10 UG/ML
BARBITURATES UR QL SCN: ABNORMAL
BASE EXCESS BLDA CALC-SCNC: 2.5 MMOL/L (ref -2–3)
BASE EXCESS BLDA CALC-SCNC: 2.6 MMOL/L (ref -2–3)
BASE EXCESS BLDA CALC-SCNC: 4 MMOL/L (ref -2–3)
BASE EXCESS BLDA CALC-SCNC: 4 MMOL/L (ref -2–3)
BASE EXCESS BLDA CALC-SCNC: 5.2 MMOL/L (ref -2–3)
BASE EXCESS BLDA CALC-SCNC: 5.5 MMOL/L (ref -2–3)
BASE EXCESS BLDA CALC-SCNC: 7 MMOL/L (ref -2–3)
BASE EXCESS BLDA CALC-SCNC: 8.3 MMOL/L (ref -2–3)
BENZODIAZ UR QL SCN: ABNORMAL
BLOOD EXPIRATION DATE: NORMAL
BODY TEMPERATURE: 37 DEGREES CELSIUS
BUN SERPL-MCNC: 26 MG/DL (ref 6–23)
BUN SERPL-MCNC: 30 MG/DL (ref 6–23)
BZE UR QL SCN: ABNORMAL
CA-I BLD-SCNC: 1.03 MMOL/L (ref 1.1–1.33)
CA-I BLD-SCNC: 1.05 MMOL/L (ref 1.1–1.33)
CA-I BLDA-SCNC: 1 MMOL/L (ref 1.1–1.33)
CA-I BLDA-SCNC: 1.03 MMOL/L (ref 1.1–1.33)
CA-I BLDA-SCNC: 1.04 MMOL/L (ref 1.1–1.33)
CA-I BLDA-SCNC: 1.04 MMOL/L (ref 1.1–1.33)
CA-I BLDA-SCNC: 1.06 MMOL/L (ref 1.1–1.33)
CA-I BLDA-SCNC: 1.07 MMOL/L (ref 1.1–1.33)
CA-I BLDA-SCNC: 1.08 MMOL/L (ref 1.1–1.33)
CA-I BLDA-SCNC: 1.09 MMOL/L (ref 1.1–1.33)
CALCIUM SERPL-MCNC: 8.4 MG/DL (ref 8.6–10.6)
CALCIUM SERPL-MCNC: 8.6 MG/DL (ref 8.6–10.6)
CANNABINOIDS UR QL SCN: ABNORMAL
CHLORIDE BLDA-SCNC: 101 MMOL/L (ref 98–107)
CHLORIDE BLDA-SCNC: 101 MMOL/L (ref 98–107)
CHLORIDE BLDA-SCNC: 102 MMOL/L (ref 98–107)
CHLORIDE BLDA-SCNC: 104 MMOL/L (ref 98–107)
CHLORIDE BLDA-SCNC: 104 MMOL/L (ref 98–107)
CHLORIDE BLDA-SCNC: 106 MMOL/L (ref 98–107)
CHLORIDE SERPL-SCNC: 97 MMOL/L (ref 98–107)
CHLORIDE SERPL-SCNC: 99 MMOL/L (ref 98–107)
CO2 SERPL-SCNC: 24 MMOL/L (ref 21–32)
CO2 SERPL-SCNC: 29 MMOL/L (ref 21–32)
CREAT SERPL-MCNC: 0.94 MG/DL (ref 0.5–1.3)
CREAT SERPL-MCNC: 0.97 MG/DL (ref 0.5–1.3)
DISPENSE STATUS: NORMAL
EGFRCR SERPLBLD CKD-EPI 2021: 90 ML/MIN/1.73M*2
EGFRCR SERPLBLD CKD-EPI 2021: >90 ML/MIN/1.73M*2
ERYTHROCYTE [DISTWIDTH] IN BLOOD BY AUTOMATED COUNT: 18.3 % (ref 11.5–14.5)
ERYTHROCYTE [DISTWIDTH] IN BLOOD BY AUTOMATED COUNT: 18.6 % (ref 11.5–14.5)
ETHANOL SERPL-MCNC: <10 MG/DL
FENTANYL+NORFENTANYL UR QL SCN: ABNORMAL
GLUCOSE BLD MANUAL STRIP-MCNC: 131 MG/DL (ref 74–99)
GLUCOSE BLD MANUAL STRIP-MCNC: 189 MG/DL (ref 74–99)
GLUCOSE BLD MANUAL STRIP-MCNC: 227 MG/DL (ref 74–99)
GLUCOSE BLDA-MCNC: 131 MG/DL (ref 74–99)
GLUCOSE BLDA-MCNC: 140 MG/DL (ref 74–99)
GLUCOSE BLDA-MCNC: 160 MG/DL (ref 74–99)
GLUCOSE BLDA-MCNC: 162 MG/DL (ref 74–99)
GLUCOSE BLDA-MCNC: 191 MG/DL (ref 74–99)
GLUCOSE BLDA-MCNC: 199 MG/DL (ref 74–99)
GLUCOSE BLDA-MCNC: 202 MG/DL (ref 74–99)
GLUCOSE BLDA-MCNC: 232 MG/DL (ref 74–99)
GLUCOSE SERPL-MCNC: 127 MG/DL (ref 74–99)
GLUCOSE SERPL-MCNC: 211 MG/DL (ref 74–99)
HCO3 BLDA-SCNC: 25.9 MMOL/L (ref 22–26)
HCO3 BLDA-SCNC: 26.1 MMOL/L (ref 22–26)
HCO3 BLDA-SCNC: 26.9 MMOL/L (ref 22–26)
HCO3 BLDA-SCNC: 26.9 MMOL/L (ref 22–26)
HCO3 BLDA-SCNC: 27.1 MMOL/L (ref 22–26)
HCO3 BLDA-SCNC: 27.8 MMOL/L (ref 22–26)
HCO3 BLDA-SCNC: 29.5 MMOL/L (ref 22–26)
HCO3 BLDA-SCNC: 31.8 MMOL/L (ref 22–26)
HCT VFR BLD AUTO: 26.9 % (ref 41–52)
HCT VFR BLD AUTO: 28.2 % (ref 41–52)
HCT VFR BLD EST: 23 % (ref 41–52)
HCT VFR BLD EST: 27 % (ref 41–52)
HCT VFR BLD EST: 28 % (ref 41–52)
HCT VFR BLD EST: 29 % (ref 41–52)
HCT VFR BLD EST: ABNORMAL %
HGB BLD-MCNC: 8.7 G/DL (ref 13.5–17.5)
HGB BLD-MCNC: 8.8 G/DL (ref 13.5–17.5)
HGB BLDA-MCNC: 7.6 G/DL (ref 13.5–17.5)
HGB BLDA-MCNC: 9 G/DL (ref 13.5–17.5)
HGB BLDA-MCNC: 9.2 G/DL (ref 13.5–17.5)
HGB BLDA-MCNC: 9.2 G/DL (ref 13.5–17.5)
HGB BLDA-MCNC: 9.3 G/DL (ref 13.5–17.5)
HGB BLDA-MCNC: 9.3 G/DL (ref 13.5–17.5)
HGB BLDA-MCNC: 9.5 G/DL (ref 13.5–17.5)
HGB BLDA-MCNC: ABNORMAL G/DL
INHALED O2 CONCENTRATION: 100 %
INHALED O2 CONCENTRATION: 90 %
INHALED O2 CONCENTRATION: 90 %
LACTATE BLDA-SCNC: 1.1 MMOL/L (ref 0.4–2)
LACTATE BLDA-SCNC: 1.2 MMOL/L (ref 0.4–2)
LACTATE BLDA-SCNC: 1.3 MMOL/L (ref 0.4–2)
LACTATE BLDA-SCNC: 1.4 MMOL/L (ref 0.4–2)
LACTATE BLDA-SCNC: 1.5 MMOL/L (ref 0.4–2)
LACTATE BLDA-SCNC: 1.5 MMOL/L (ref 0.4–2)
LACTATE BLDA-SCNC: 1.7 MMOL/L (ref 0.4–2)
LACTATE BLDA-SCNC: 2.1 MMOL/L (ref 0.4–2)
MAGNESIUM SERPL-MCNC: 1.79 MG/DL (ref 1.6–2.4)
MAGNESIUM SERPL-MCNC: 2.73 MG/DL (ref 1.6–2.4)
MCH RBC QN AUTO: 24 PG (ref 26–34)
MCH RBC QN AUTO: 24.8 PG (ref 26–34)
MCHC RBC AUTO-ENTMCNC: 31.2 G/DL (ref 32–36)
MCHC RBC AUTO-ENTMCNC: 32.3 G/DL (ref 32–36)
MCV RBC AUTO: 77 FL (ref 80–100)
MCV RBC AUTO: 77 FL (ref 80–100)
METHADONE UR QL SCN: ABNORMAL
NRBC BLD-RTO: 0 /100 WBCS (ref 0–0)
NRBC BLD-RTO: 0.3 /100 WBCS (ref 0–0)
OPIATES UR QL SCN: ABNORMAL
OXYCODONE+OXYMORPHONE UR QL SCN: ABNORMAL
OXYHGB MFR BLDA: 81.2 % (ref 94–98)
OXYHGB MFR BLDA: 83 % (ref 94–98)
OXYHGB MFR BLDA: 86.3 % (ref 94–98)
OXYHGB MFR BLDA: 88.5 % (ref 94–98)
OXYHGB MFR BLDA: 92.3 % (ref 94–98)
OXYHGB MFR BLDA: 96.1 % (ref 94–98)
OXYHGB MFR BLDA: 96.6 % (ref 94–98)
OXYHGB MFR BLDA: ABNORMAL %
PCO2 BLDA: 31 MM HG (ref 38–42)
PCO2 BLDA: 31 MM HG (ref 38–42)
PCO2 BLDA: 33 MM HG (ref 38–42)
PCO2 BLDA: 34 MM HG (ref 38–42)
PCO2 BLDA: 35 MM HG (ref 38–42)
PCO2 BLDA: 39 MM HG (ref 38–42)
PCP UR QL SCN: ABNORMAL
PH BLDA: 7.48 PH (ref 7.38–7.42)
PH BLDA: 7.49 PH (ref 7.38–7.42)
PH BLDA: 7.52 PH (ref 7.38–7.42)
PH BLDA: 7.55 PH (ref 7.38–7.42)
PH BLDA: 7.56 PH (ref 7.38–7.42)
PH BLDA: 7.56 PH (ref 7.38–7.42)
PHOSPHATE SERPL-MCNC: 3 MG/DL (ref 2.5–4.9)
PHOSPHATE SERPL-MCNC: 4.1 MG/DL (ref 2.5–4.9)
PLATELET # BLD AUTO: 197 X10*3/UL (ref 150–450)
PLATELET # BLD AUTO: 217 X10*3/UL (ref 150–450)
PO2 BLDA: 103 MM HG (ref 85–95)
PO2 BLDA: 52 MM HG (ref 85–95)
PO2 BLDA: 53 MM HG (ref 85–95)
PO2 BLDA: 57 MM HG (ref 85–95)
PO2 BLDA: 61 MM HG (ref 85–95)
PO2 BLDA: 65 MM HG (ref 85–95)
PO2 BLDA: 66 MM HG (ref 85–95)
PO2 BLDA: 87 MM HG (ref 85–95)
POTASSIUM BLDA-SCNC: 2.9 MMOL/L (ref 3.5–5.3)
POTASSIUM BLDA-SCNC: 2.9 MMOL/L (ref 3.5–5.3)
POTASSIUM BLDA-SCNC: 3.3 MMOL/L (ref 3.5–5.3)
POTASSIUM BLDA-SCNC: 3.4 MMOL/L (ref 3.5–5.3)
POTASSIUM BLDA-SCNC: 3.6 MMOL/L (ref 3.5–5.3)
POTASSIUM SERPL-SCNC: 3.2 MMOL/L (ref 3.5–5.3)
POTASSIUM SERPL-SCNC: 3.6 MMOL/L (ref 3.5–5.3)
PRODUCT BLOOD TYPE: 6200
PRODUCT CODE: NORMAL
RBC # BLD AUTO: 3.51 X10*6/UL (ref 4.5–5.9)
RBC # BLD AUTO: 3.66 X10*6/UL (ref 4.5–5.9)
RH FACTOR (ANTIGEN D): NORMAL
SALICYLATES SERPL-MCNC: <3 MG/DL
SAO2 % BLDA: 83 % (ref 94–100)
SAO2 % BLDA: 85 % (ref 94–100)
SAO2 % BLDA: 88 % (ref 94–100)
SAO2 % BLDA: 91 % (ref 94–100)
SAO2 % BLDA: 93 % (ref 94–100)
SAO2 % BLDA: 98 % (ref 94–100)
SAO2 % BLDA: 98 % (ref 94–100)
SAO2 % BLDA: ABNORMAL %
SODIUM BLDA-SCNC: 135 MMOL/L (ref 136–145)
SODIUM BLDA-SCNC: 136 MMOL/L (ref 136–145)
SODIUM BLDA-SCNC: 137 MMOL/L (ref 136–145)
SODIUM BLDA-SCNC: 139 MMOL/L (ref 136–145)
SODIUM BLDA-SCNC: 140 MMOL/L (ref 136–145)
SODIUM SERPL-SCNC: 137 MMOL/L (ref 136–145)
SODIUM SERPL-SCNC: 141 MMOL/L (ref 136–145)
UFH PPP CHRO-ACNC: 0.3 IU/ML
UNIT ABO: NORMAL
UNIT NUMBER: NORMAL
UNIT RH: NORMAL
UNIT VOLUME: 350
WBC # BLD AUTO: 17.8 X10*3/UL (ref 4.4–11.3)
WBC # BLD AUTO: 18.5 X10*3/UL (ref 4.4–11.3)
XM INTEP: NORMAL

## 2024-07-12 PROCEDURE — 2500000001 HC RX 250 WO HCPCS SELF ADMINISTERED DRUGS (ALT 637 FOR MEDICARE OP): Performed by: STUDENT IN AN ORGANIZED HEALTH CARE EDUCATION/TRAINING PROGRAM

## 2024-07-12 PROCEDURE — 94660 CPAP INITIATION&MGMT: CPT

## 2024-07-12 PROCEDURE — 2500000001 HC RX 250 WO HCPCS SELF ADMINISTERED DRUGS (ALT 637 FOR MEDICARE OP)

## 2024-07-12 PROCEDURE — 37799 UNLISTED PX VASCULAR SURGERY: CPT

## 2024-07-12 PROCEDURE — 86901 BLOOD TYPING SEROLOGIC RH(D): CPT

## 2024-07-12 PROCEDURE — 2020000001 HC ICU ROOM DAILY

## 2024-07-12 PROCEDURE — 2500000004 HC RX 250 GENERAL PHARMACY W/ HCPCS (ALT 636 FOR OP/ED): Performed by: NURSE PRACTITIONER

## 2024-07-12 PROCEDURE — 84132 ASSAY OF SERUM POTASSIUM: CPT

## 2024-07-12 PROCEDURE — 86923 COMPATIBILITY TEST ELECTRIC: CPT

## 2024-07-12 PROCEDURE — 99291 CRITICAL CARE FIRST HOUR: CPT | Performed by: ANESTHESIOLOGY

## 2024-07-12 PROCEDURE — 2500000005 HC RX 250 GENERAL PHARMACY W/O HCPCS: Performed by: NURSE PRACTITIONER

## 2024-07-12 PROCEDURE — 97530 THERAPEUTIC ACTIVITIES: CPT | Mod: GP | Performed by: STUDENT IN AN ORGANIZED HEALTH CARE EDUCATION/TRAINING PROGRAM

## 2024-07-12 PROCEDURE — 2500000004 HC RX 250 GENERAL PHARMACY W/ HCPCS (ALT 636 FOR OP/ED)

## 2024-07-12 PROCEDURE — 85027 COMPLETE CBC AUTOMATED: CPT

## 2024-07-12 PROCEDURE — 80307 DRUG TEST PRSMV CHEM ANLYZR: CPT | Performed by: ANESTHESIOLOGY

## 2024-07-12 PROCEDURE — 71045 X-RAY EXAM CHEST 1 VIEW: CPT | Performed by: RADIOLOGY

## 2024-07-12 PROCEDURE — 80069 RENAL FUNCTION PANEL: CPT

## 2024-07-12 PROCEDURE — 2500000002 HC RX 250 W HCPCS SELF ADMINISTERED DRUGS (ALT 637 FOR MEDICARE OP, ALT 636 FOR OP/ED): Performed by: STUDENT IN AN ORGANIZED HEALTH CARE EDUCATION/TRAINING PROGRAM

## 2024-07-12 PROCEDURE — 85520 HEPARIN ASSAY: CPT | Performed by: STUDENT IN AN ORGANIZED HEALTH CARE EDUCATION/TRAINING PROGRAM

## 2024-07-12 PROCEDURE — 83735 ASSAY OF MAGNESIUM: CPT

## 2024-07-12 PROCEDURE — 80143 DRUG ASSAY ACETAMINOPHEN: CPT

## 2024-07-12 PROCEDURE — 2500000005 HC RX 250 GENERAL PHARMACY W/O HCPCS: Performed by: THORACIC SURGERY (CARDIOTHORACIC VASCULAR SURGERY)

## 2024-07-12 PROCEDURE — 82330 ASSAY OF CALCIUM: CPT

## 2024-07-12 PROCEDURE — 82947 ASSAY GLUCOSE BLOOD QUANT: CPT

## 2024-07-12 PROCEDURE — 2500000002 HC RX 250 W HCPCS SELF ADMINISTERED DRUGS (ALT 637 FOR MEDICARE OP, ALT 636 FOR OP/ED)

## 2024-07-12 PROCEDURE — 2500000005 HC RX 250 GENERAL PHARMACY W/O HCPCS: Performed by: STUDENT IN AN ORGANIZED HEALTH CARE EDUCATION/TRAINING PROGRAM

## 2024-07-12 PROCEDURE — 80321 ALCOHOLS BIOMARKERS 1OR 2: CPT

## 2024-07-12 PROCEDURE — 71045 X-RAY EXAM CHEST 1 VIEW: CPT

## 2024-07-12 PROCEDURE — S0109 METHADONE ORAL 5MG: HCPCS

## 2024-07-12 RX ORDER — MAGNESIUM SULFATE HEPTAHYDRATE 40 MG/ML
2 INJECTION, SOLUTION INTRAVENOUS ONCE
Status: COMPLETED | OUTPATIENT
Start: 2024-07-12 | End: 2024-07-12

## 2024-07-12 RX ORDER — FOLIC ACID 1 MG/1
1 TABLET ORAL DAILY
Status: DISCONTINUED | OUTPATIENT
Start: 2024-07-12 | End: 2024-07-26 | Stop reason: HOSPADM

## 2024-07-12 RX ORDER — LANOLIN ALCOHOL/MO/W.PET/CERES
100 CREAM (GRAM) TOPICAL DAILY
Status: DISCONTINUED | OUTPATIENT
Start: 2024-07-15 | End: 2024-07-26 | Stop reason: HOSPADM

## 2024-07-12 RX ORDER — POTASSIUM CHLORIDE 29.8 MG/ML
40 INJECTION INTRAVENOUS ONCE
Status: COMPLETED | OUTPATIENT
Start: 2024-07-12 | End: 2024-07-12

## 2024-07-12 RX ORDER — PREDNISONE 10 MG/1
10 TABLET ORAL DAILY
Status: DISCONTINUED | OUTPATIENT
Start: 2024-07-12 | End: 2024-07-15

## 2024-07-12 RX ORDER — DEXMEDETOMIDINE HYDROCHLORIDE 4 UG/ML
0-1.5 INJECTION, SOLUTION INTRAVENOUS CONTINUOUS
Status: DISCONTINUED | OUTPATIENT
Start: 2024-07-12 | End: 2024-07-12

## 2024-07-12 RX ORDER — ACETAMINOPHEN 325 MG/1
975 TABLET ORAL EVERY 6 HOURS
Status: DISCONTINUED | OUTPATIENT
Start: 2024-07-12 | End: 2024-07-26 | Stop reason: HOSPADM

## 2024-07-12 RX ORDER — CHLOROTHIAZIDE SODIUM 500 MG/1
500 INJECTION INTRAVENOUS ONCE
Status: COMPLETED | OUTPATIENT
Start: 2024-07-12 | End: 2024-07-12

## 2024-07-12 RX ORDER — OXYCODONE HYDROCHLORIDE 5 MG/1
5 TABLET ORAL EVERY 4 HOURS PRN
Status: DISCONTINUED | OUTPATIENT
Start: 2024-07-12 | End: 2024-07-25

## 2024-07-12 RX ORDER — POTASSIUM CHLORIDE 29.8 MG/ML
40 INJECTION INTRAVENOUS ONCE
Status: DISCONTINUED | OUTPATIENT
Start: 2024-07-12 | End: 2024-07-15

## 2024-07-12 RX ORDER — THIAMINE HYDROCHLORIDE 100 MG/ML
100 INJECTION, SOLUTION INTRAMUSCULAR; INTRAVENOUS DAILY
Status: COMPLETED | OUTPATIENT
Start: 2024-07-12 | End: 2024-07-14

## 2024-07-12 RX ORDER — DEXMEDETOMIDINE HYDROCHLORIDE 4 UG/ML
INJECTION, SOLUTION INTRAVENOUS
Status: COMPLETED
Start: 2024-07-12 | End: 2024-07-12

## 2024-07-12 RX ORDER — MULTIVIT-MIN/IRON FUM/FOLIC AC 7.5 MG-4
1 TABLET ORAL DAILY
Status: DISCONTINUED | OUTPATIENT
Start: 2024-07-12 | End: 2024-07-26 | Stop reason: HOSPADM

## 2024-07-12 RX ORDER — METOPROLOL TARTRATE 1 MG/ML
5 INJECTION, SOLUTION INTRAVENOUS ONCE
Status: COMPLETED | OUTPATIENT
Start: 2024-07-12 | End: 2024-07-12

## 2024-07-12 ASSESSMENT — PAIN SCALES - GENERAL
PAINLEVEL_OUTOF10: 0 - NO PAIN
PAINLEVEL_OUTOF10: 8
PAINLEVEL_OUTOF10: 7
PAINLEVEL_OUTOF10: 0 - NO PAIN
PAINLEVEL_OUTOF10: 8
PAINLEVEL_OUTOF10: 6
PAINLEVEL_OUTOF10: 0 - NO PAIN

## 2024-07-12 ASSESSMENT — PAIN SCALES - PAIN ASSESSMENT IN ADVANCED DEMENTIA (PAINAD)
BODYLANGUAGE: RELAXED
CONSOLABILITY: NO NEED TO CONSOLE
TOTALSCORE: 0
FACIALEXPRESSION: SMILING OR INEXPRESSIVE
BREATHING: NORMAL

## 2024-07-12 ASSESSMENT — LIFESTYLE VARIABLES
TOTAL SCORE: 0
NAUSEA AND VOMITING: NO NAUSEA AND NO VOMITING
AGITATION: NORMAL ACTIVITY
PAROXYSMAL SWEATS: NO SWEAT VISIBLE
NAUSEA AND VOMITING: NO NAUSEA AND NO VOMITING
AUDITORY DISTURBANCES: NOT PRESENT
PAROXYSMAL SWEATS: NO SWEAT VISIBLE
ORIENTATION AND CLOUDING OF SENSORIUM: ORIENTED AND CAN DO SERIAL ADDITIONS
TOTAL SCORE: 0
ORIENTATION AND CLOUDING OF SENSORIUM: ORIENTED AND CAN DO SERIAL ADDITIONS
ANXIETY: NO ANXIETY, AT EASE
HEADACHE, FULLNESS IN HEAD: NOT PRESENT
TREMOR: NO TREMOR
AGITATION: NORMAL ACTIVITY
VISUAL DISTURBANCES: NOT PRESENT
HEADACHE, FULLNESS IN HEAD: NOT PRESENT
TREMOR: NO TREMOR
AUDITORY DISTURBANCES: NOT PRESENT
VISUAL DISTURBANCES: NOT PRESENT
ANXIETY: NO ANXIETY, AT EASE

## 2024-07-12 ASSESSMENT — PAIN DESCRIPTION - ORIENTATION: ORIENTATION: MID

## 2024-07-12 ASSESSMENT — PAIN - FUNCTIONAL ASSESSMENT
PAIN_FUNCTIONAL_ASSESSMENT: 0-10

## 2024-07-12 ASSESSMENT — COGNITIVE AND FUNCTIONAL STATUS - GENERAL
MOVING TO AND FROM BED TO CHAIR: A LITTLE
WALKING IN HOSPITAL ROOM: TOTAL
TURNING FROM BACK TO SIDE WHILE IN FLAT BAD: A LOT
STANDING UP FROM CHAIR USING ARMS: A LITTLE
MOBILITY SCORE: 12
MOVING FROM LYING ON BACK TO SITTING ON SIDE OF FLAT BED WITH BEDRAILS: A LOT
CLIMB 3 TO 5 STEPS WITH RAILING: TOTAL

## 2024-07-12 ASSESSMENT — PAIN DESCRIPTION - LOCATION: LOCATION: CHEST

## 2024-07-12 NOTE — PROGRESS NOTES
Physical Therapy    Physical Therapy Treatment    Patient Name: Kael Zepeda  MRN: [id number]  Today's Date: 7/12/2024  Room: 17/17  Time Calculation  Start Time: 1210  Stop Time: 1222  Time Calculation (min): 12 min       Assessment/Plan   PT Assessment  End of Session Communication: Bedside nurse  End of Session Patient Position: Up in chair, Alarm off, caregiver present     PT Plan  Treatment/Interventions: Bed mobility, Transfer training, Gait training, Stair training, Balance training, Strengthening, Endurance training, Range of motion, Therapeutic activity, Therapeutic exercise  PT Plan: Ongoing PT  PT Frequency: 5 times per week  PT Discharge Recommendations: Low intensity level of continued care  Equipment Recommended upon Discharge:  (will continue to assess)  PT Recommended Transfer Status: Assist x1, Assistive device  PT - OK to Discharge: Yes      General Visit Information:   PT  Visit  PT Received On: 07/12/24  Prior to Session Communication: Bedside nurse  Patient Position Received: Bed, 3 rail up, Alarm off, not on at start of session     Subjective   Subjective: Patient is alert, agreeable to PT.  Cleared for transfer OOB by team.  Precautions:  Precautions  Medical Precautions: Fall precautions, Oxygen therapy device and L/min, Cardiac precautions  Post-Surgical Precautions: Move in the Tube  Vital Signs:  Vital Signs  Heart Rate: 80 (post  85)  SpO2: 91 % (desaturation to 84% c  transfer,90% in cardiac chair  end of session)  BP: 108/63 (post 138/65)    Objective   Pain:  Pain Assessment  0-10 (Numeric) Pain Score: 0 - No pain (post  0)  Cognition:  Cognition  Orientation Level: Oriented X4  Lines/Tubes/Drains:  CVC 07/09/24 Double lumen Right Internal jugular (Active)   Number of days: 3       Arterial Line 07/09/24 Left Brachial (Active)   Number of days: 3       Pacer Wires (Active)   Number of days: 3       Urethral Catheter Temperature probe 14 Fr. (Active)   Number of days: 3      Medical Gas Therapy: Supplemental oxygen  O2 Delivery Method: CPAP/Bi-PAP mask  Oxygen Dose: *100 percent  Continuous Medications/Drips:  amiodarone, 0.5-1 mg/min, Last Rate: 1 mg/min (07/12/24 1321)  furosemide, 20 mg/hr, Last Rate: 20 mg/hr (07/12/24 1300)  lactated Ringer's, 5 mL/hr, Last Rate: 5 mL/hr (07/12/24 1300)  lactated Ringer's, 15 mL/hr, Last Rate: 15 mL/hr (07/12/24 1300)        PT Treatments:           Bed Mobility 1  Bed Mobility 1: Supine to sitting  Level of Assistance 1: Moderate assistance  Bed Mobility Comments 1: HOB 30, modified roll     Transfer 1  Transfer From 1: Sit to  Transfer to 1: Stand  Transfer Device 1:  (1 HHA)  Transfer Level of Assistance 1: Minimum assistance  Trials/Comments 1: x1  Transfers 2  Transfer From 2: Stand to  Transfer to 2: Sit  Transfer Device 2:  (1 HH)  Transfer Level of Assistance 2: Minimum assistance  Trials/Comments 2: x1  Transfers 3  Transfer From 3: Bed to  Transfer to 3: Chair with arms  Transfer Device 3:  (1 HHA)  Transfer Level of Assistance 3: Minimum assistance, +1 to manage equipment  Trials/Comments 3: x1             Outcome Measures:  [facility] Basic Mobility  Turning from your back to your side while in a flat bed without using bedrails: A lot  Moving from lying on your back to sitting on the side of a flat bed without using bedrails: A lot  Moving to and from bed to chair (including a wheelchair): A little  Standing up from a chair using your arms (e.g. wheelchair or bedside chair): A little  To walk in hospital room: Total  Climbing 3-5 steps with railing: Total  Basic Mobility - Total Score: 12           FSS-ICU  Ambulation: Unable to attempt due to weakness  Rolling: Moderate assistance (performs 50 - 74% of task)  Sitting: Minimal assistance (performs 75% or more of task)  Transfer Sit-to-Stand: Minimal assistance (performs 75% or more of task)  Transfer Supine-to-Sit: Moderate assistance (performs 50 - 74% of task)  Total Score: 14  ICU  Mobility Screen  ICU Mobility Scale: Transferring bed to chair             Education Documentation  Precautions, taught by Tha Moore PT at 7/12/2024  2:00 PM.  Learner: Patient  Readiness: Acceptance  Method: Explanation  Response: Needs Reinforcement, No Evidence of Learning    Body Mechanics, taught by Tha Moore PT at 7/12/2024  2:00 PM.  Learner: Patient  Readiness: Acceptance  Method: Explanation  Response: Needs Reinforcement, No Evidence of Learning    Mobility Training, taught by Tha Moore PT at 7/12/2024  2:00 PM.  Learner: Patient  Readiness: Acceptance  Method: Explanation  Response: Needs Reinforcement, No Evidence of Learning    Education Comments  No comments found.          OP EDUCATION:       Encounter Problems       Encounter Problems (Active)       PT Problem       Patient will complete supine to sit and sit to supine Supervision  (Progressing)       Start:  07/26/24    Expected End:  07/26/24            Patient will perform sit<>stand transfer with LRAD, and Supervision  (Progressing)       Start:  07/26/24    Expected End:  07/26/24            Patient will ambulate >150' with LRAD and Supervision  (Progressing)       Start:  07/26/24    Expected End:  07/26/24            Patient will verbalize and demonstrate Move In The Tube (MITT) Precautions independently.  (Progressing)       Start:  07/26/24    Expected End:  07/26/24               Pain - Adult              Assessment: Patient is progressing fairly with therapy this date.  Patient tolerated transfer on 100% CPAP facemask.  Deferred further activity for medical acuity and safety concerns.  Will continue to follow.      07/12/24 at 2:01 PM   Tha Moore PT   Rehab Office: [phone]

## 2024-07-12 NOTE — NURSING NOTE
Visit deferred.  Mr. Zepeda is intubated at this time.  Will follow up with diabetes ed visit early next week.

## 2024-07-12 NOTE — PROGRESS NOTES
Postop Pain HPI -   Palliative: relieved with IV analgesics and regional local anesthetics  Provocative: movement  Quality:  burning and aching  Radiation:  none  Severity:  6/10  Timing: constant    24-HOUR OPIOID CONSUMPTION:  Methadone 117.5mg  Oxycodone  20mg  Hydromorphone 4mg    Scheduled medications  acetaminophen, 650 mg, oral, q6h  amiodarone, , ,   aspirin, 81 mg, oral, Daily  atorvastatin, 80 mg, oral, Nightly  DULoxetine, 60 mg, oral, Daily  heparin, 5,000 Units, subcutaneous, q8h  hydroxychloroquine, 200 mg, oral, BID  insulin lispro, 0-15 Units, subcutaneous, q4h  levothyroxine, 50 mcg, oral, Daily  methadone, 77.5 mg, oral, q8h  metoprolol tartrate, 25 mg, oral, BID  oxygen, , inhalation, Continuous - Inhalation  polyethylene glycol, 17 g, oral, Daily  pregabalin, 200 mg, oral, TID  sennosides-docusate sodium, 2 tablet, oral, BID  tiotropium, 2 puff, inhalation, Daily      Continuous medications  dexmedeTOMIDine, 0-1.5 mcg/kg/hr, Last Rate: 0.5 mcg/kg/hr (07/12/24 0828)  furosemide, 20 mg/hr, Last Rate: 20 mg/hr (07/12/24 0800)  lactated Ringer's, 5 mL/hr, Last Rate: 5 mL/hr (07/12/24 0800)  lactated Ringer's, 15 mL/hr, Last Rate: 15 mL/hr (07/12/24 0800)      PRN medications  PRN medications: albuterol, alteplase, amiodarone, calcium gluconate, calcium gluconate, dextrose **OR** glucagon, HYDROmorphone, magnesium sulfate, magnesium sulfate, naloxone, ondansetron **OR** ondansetron, oxyCODONE, oxyCODONE, potassium chloride, potassium chloride, sodium chloride     Physical Exam:  Constitutional:  no distress, alert and cooperative  Eyes: clear sclera  Head/Neck: No apparent injury, trachea midline  Respiratory/Thorax: Patent airways, thorax symmetric, breathing comfortably  Cardiovascular: no pitting edema  Gastrointestinal: Nondistended  Musculoskeletal: ROM intact  Extremities: no clubbing  Neurological: alert, dubon x4  Psychological: Appropriate affect    Results for orders placed or performed during  the hospital encounter of 07/01/24 (from the past 24 hour(s))   POCT GLUCOSE   Result Value Ref Range    POCT Glucose 118 (H) 74 - 99 mg/dL   POCT GLUCOSE   Result Value Ref Range    POCT Glucose 154 (H) 74 - 99 mg/dL   Calcium, Ionized   Result Value Ref Range    POCT Calcium, Ionized 1.13 1.1 - 1.33 mmol/L   CBC   Result Value Ref Range    WBC 12.8 (H) 4.4 - 11.3 x10*3/uL    nRBC 0.0 0.0 - 0.0 /100 WBCs    RBC 3.14 (L) 4.50 - 5.90 x10*6/uL    Hemoglobin 7.8 (L) 13.5 - 17.5 g/dL    Hematocrit 23.7 (L) 41.0 - 52.0 %    MCV 76 (L) 80 - 100 fL    MCH 24.8 (L) 26.0 - 34.0 pg    MCHC 32.9 32.0 - 36.0 g/dL    RDW 18.0 (H) 11.5 - 14.5 %    Platelets 154 150 - 450 x10*3/uL   Magnesium   Result Value Ref Range    Magnesium 2.21 1.60 - 2.40 mg/dL   Renal Function Panel   Result Value Ref Range    Glucose 147 (H) 74 - 99 mg/dL    Sodium 138 136 - 145 mmol/L    Potassium 3.9 3.5 - 5.3 mmol/L    Chloride 100 98 - 107 mmol/L    Bicarbonate 25 21 - 32 mmol/L    Anion Gap 17 10 - 20 mmol/L    Urea Nitrogen 25 (H) 6 - 23 mg/dL    Creatinine 0.99 0.50 - 1.30 mg/dL    eGFR 88 >60 mL/min/1.73m*2    Calcium 8.3 (L) 8.6 - 10.6 mg/dL    Phosphorus 3.6 2.5 - 4.9 mg/dL    Albumin 3.5 3.4 - 5.0 g/dL   Blood Gas Arterial Full Panel   Result Value Ref Range    POCT pH, Arterial 7.47 (H) 7.38 - 7.42 pH    POCT pCO2, Arterial 34 (L) 38 - 42 mm Hg    POCT pO2, Arterial 66 (L) 85 - 95 mm Hg    POCT SO2, Arterial 93 (L) 94 - 100 %    POCT Oxy Hemoglobin, Arterial 91.7 (L) 94.0 - 98.0 %    POCT Hematocrit Calculated, Arterial 25.0 (L) 41.0 - 52.0 %    POCT Sodium, Arterial 134 (L) 136 - 145 mmol/L    POCT Potassium, Arterial 3.9 3.5 - 5.3 mmol/L    POCT Chloride, Arterial 102 98 - 107 mmol/L    POCT Ionized Calcium, Arterial 1.17 1.10 - 1.33 mmol/L    POCT Glucose, Arterial 165 (H) 74 - 99 mg/dL    POCT Lactate, Arterial 1.0 0.4 - 2.0 mmol/L    POCT Base Excess, Arterial 1.1 -2.0 - 3.0 mmol/L    POCT HCO3 Calculated, Arterial 24.7 22.0 - 26.0  mmol/L    POCT Hemoglobin, Arterial 8.2 (L) 13.5 - 17.5 g/dL    POCT Anion Gap, Arterial 11 10 - 25 mmo/L    Patient Temperature 37.0 degrees Celsius    FiO2 100 %   POCT GLUCOSE   Result Value Ref Range    POCT Glucose 131 (H) 74 - 99 mg/dL   POCT GLUCOSE   Result Value Ref Range    POCT Glucose 134 (H) 74 - 99 mg/dL   Renal Function Panel   Result Value Ref Range    Glucose 211 (H) 74 - 99 mg/dL    Sodium 137 136 - 145 mmol/L    Potassium 3.2 (L) 3.5 - 5.3 mmol/L    Chloride 97 (L) 98 - 107 mmol/L    Bicarbonate 24 21 - 32 mmol/L    Anion Gap 19 10 - 20 mmol/L    Urea Nitrogen 26 (H) 6 - 23 mg/dL    Creatinine 0.94 0.50 - 1.30 mg/dL    eGFR >90 >60 mL/min/1.73m*2    Calcium 8.6 8.6 - 10.6 mg/dL    Phosphorus 4.1 2.5 - 4.9 mg/dL    Albumin 3.8 3.4 - 5.0 g/dL   Magnesium   Result Value Ref Range    Magnesium 1.79 1.60 - 2.40 mg/dL   CBC   Result Value Ref Range    WBC 18.5 (H) 4.4 - 11.3 x10*3/uL    nRBC 0.0 0.0 - 0.0 /100 WBCs    RBC 3.66 (L) 4.50 - 5.90 x10*6/uL    Hemoglobin 8.8 (L) 13.5 - 17.5 g/dL    Hematocrit 28.2 (L) 41.0 - 52.0 %    MCV 77 (L) 80 - 100 fL    MCH 24.0 (L) 26.0 - 34.0 pg    MCHC 31.2 (L) 32.0 - 36.0 g/dL    RDW 18.3 (H) 11.5 - 14.5 %    Platelets 197 150 - 450 x10*3/uL   Calcium, Ionized   Result Value Ref Range    POCT Calcium, Ionized 1.05 (L) 1.1 - 1.33 mmol/L   Blood Gas Arterial Full Panel   Result Value Ref Range    POCT pH, Arterial 7.49 (H) 7.38 - 7.42 pH    POCT pCO2, Arterial 34 (L) 38 - 42 mm Hg    POCT pO2, Arterial 61 (L) 85 - 95 mm Hg    POCT SO2, Arterial 91 (L) 94 - 100 %    POCT Oxy Hemoglobin, Arterial 88.5 (L) 94.0 - 98.0 %    POCT Hematocrit Calculated, Arterial 28.0 (L) 41.0 - 52.0 %    POCT Sodium, Arterial 135 (L) 136 - 145 mmol/L    POCT Potassium, Arterial 3.4 (L) 3.5 - 5.3 mmol/L    POCT Chloride, Arterial 101 98 - 107 mmol/L    POCT Ionized Calcium, Arterial 1.07 (L) 1.10 - 1.33 mmol/L    POCT Glucose, Arterial 232 (H) 74 - 99 mg/dL    POCT Lactate, Arterial 2.1 (H)  0.4 - 2.0 mmol/L    POCT Base Excess, Arterial 2.6 -2.0 - 3.0 mmol/L    POCT HCO3 Calculated, Arterial 25.9 22.0 - 26.0 mmol/L    POCT Hemoglobin, Arterial 9.3 (L) 13.5 - 17.5 g/dL    POCT Anion Gap, Arterial 12 10 - 25 mmo/L    Patient Temperature 37.0 degrees Celsius    FiO2 90 %   POCT GLUCOSE   Result Value Ref Range    POCT Glucose 227 (H) 74 - 99 mg/dL   POCT GLUCOSE   Result Value Ref Range    POCT Glucose 131 (H) 74 - 99 mg/dL   Blood Gas Arterial Full Panel   Result Value Ref Range    POCT pH, Arterial 7.56 (H) 7.38 - 7.42 pH    POCT pCO2, Arterial 31 (L) 38 - 42 mm Hg    POCT pO2, Arterial 52 (L) 85 - 95 mm Hg    POCT SO2, Arterial 83 (L) 94 - 100 %    POCT Oxy Hemoglobin, Arterial 81.2 (L) 94.0 - 98.0 %    POCT Hematocrit Calculated, Arterial 28.0 (L) 41.0 - 52.0 %    POCT Sodium, Arterial 137 136 - 145 mmol/L    POCT Potassium, Arterial 3.3 (L) 3.5 - 5.3 mmol/L    POCT Chloride, Arterial 102 98 - 107 mmol/L    POCT Ionized Calcium, Arterial 1.09 (L) 1.10 - 1.33 mmol/L    POCT Glucose, Arterial 162 (H) 74 - 99 mg/dL    POCT Lactate, Arterial 1.5 0.4 - 2.0 mmol/L    POCT Base Excess, Arterial 5.5 (H) -2.0 - 3.0 mmol/L    POCT HCO3 Calculated, Arterial 27.8 (H) 22.0 - 26.0 mmol/L    POCT Hemoglobin, Arterial 9.3 (L) 13.5 - 17.5 g/dL    POCT Anion Gap, Arterial 11 10 - 25 mmo/L    Patient Temperature 37.0 degrees Celsius    FiO2 90 %   Blood Gas Arterial Full Panel   Result Value Ref Range    POCT pH, Arterial 7.52 (H) 7.38 - 7.42 pH    POCT pCO2, Arterial 33 (L) 38 - 42 mm Hg    POCT pO2, Arterial 57 (L) 85 - 95 mm Hg    POCT SO2, Arterial 88 (L) 94 - 100 %    POCT Oxy Hemoglobin, Arterial 86.3 (L) 94.0 - 98.0 %    POCT Hematocrit Calculated, Arterial 28.0 (L) 41.0 - 52.0 %    POCT Sodium, Arterial 136 136 - 145 mmol/L    POCT Potassium, Arterial 3.4 (L) 3.5 - 5.3 mmol/L    POCT Chloride, Arterial 101 98 - 107 mmol/L    POCT Ionized Calcium, Arterial 1.04 (L) 1.10 - 1.33 mmol/L    POCT Glucose, Arterial 199  (H) 74 - 99 mg/dL    POCT Lactate, Arterial 1.5 0.4 - 2.0 mmol/L    POCT Base Excess, Arterial 4.0 (H) -2.0 - 3.0 mmol/L    POCT HCO3 Calculated, Arterial 26.9 (H) 22.0 - 26.0 mmol/L    POCT Hemoglobin, Arterial 9.2 (L) 13.5 - 17.5 g/dL    POCT Anion Gap, Arterial 12 10 - 25 mmo/L    Patient Temperature 37.0 degrees Celsius    FiO2 100 %        PLAN  - BL ARMIDA nerve blocks with catheters placed preoperatively on 07/10/2024  ---- Gave 10cc bolus each ARMIDA side 7/12  ---- Pump restarted 7/12, previous clot in R catheter resolved  - Ambit ball with Ropivacaine 0.2%/NaCl 0.9% 500mL, Rate 7cc/hr  - Ambit medication will not interfere with pain medication prescribed by the primary team.   - Please be aware of local anesthetic toxic dose and absorption variability before considering lidocaine patches  - Acute pain service will follow while catheters in place  - Rest of pain management per primary team     Acute Pain Resident  [phone] ph [phone]

## 2024-07-12 NOTE — PROGRESS NOTES
"CTICU Progress Note  Kael Zepeda/[id number]    Admit Date: 7/1/2024  Hospital Length of Stay: 11   ICU Length of Stay: 3d 3h   CT SURGEON:     SUBJECTIVE:   Hemodynamics acceptable on provided therapy. Remains on CPAP at 100% FiO2.    MEDICATIONS  Infusions:  amiodarone, Last Rate: 1 mg/min (07/12/24 1700)  furosemide, Last Rate: 20 mg/hr (07/12/24 1700)  lactated Ringer's, Last Rate: 5 mL/hr (07/12/24 1700)  lactated Ringer's, Last Rate: 15 mL/hr (07/12/24 1700)      Scheduled:  acetaminophen, 975 mg, q6h  aspirin, 81 mg, Daily  atorvastatin, 80 mg, Nightly  DULoxetine, 60 mg, Daily  folic acid, 1 mg, Daily  heparin, 5,000 Units, q8h  hydroxychloroquine, 200 mg, BID  insulin lispro, 0-15 Units, q4h  levothyroxine, 50 mcg, Daily  methadone, 77.5 mg, q8h  metoprolol tartrate, 25 mg, BID  multivitamin with minerals, 1 tablet, Daily  oxygen, , Continuous - Inhalation  polyethylene glycol, 17 g, Daily  predniSONE, 10 mg, Daily  pregabalin, 200 mg, TID  sennosides-docusate sodium, 2 tablet, BID  [START ON 7/15/2024] thiamine, 100 mg, Daily  thiamine, 100 mg, Daily  tiotropium, 2 puff, Daily      PRN:  albuterol, 2.5 mg, q6h PRN  alteplase, 2 mg, PRN  calcium gluconate, 1 g, q6h PRN  calcium gluconate, 2 g, q6h PRN  dextrose, 25 g, q15 min PRN   Or  glucagon, 1 mg, q15 min PRN  magnesium sulfate, 2 g, q6h PRN  magnesium sulfate, 4 g, q6h PRN  naloxone, 0.2 mg, q5 min PRN  ondansetron, 4 mg, q8h PRN   Or  ondansetron, 4 mg, q8h PRN  [Held by provider] oxyCODONE, 5 mg, q4h PRN  potassium chloride, 20 mEq, q6h PRN  potassium chloride, 40 mEq, q6h PRN  sodium chloride, 1 spray, 4x daily PRN        PHYSICAL EXAM:   Visit Vitals  /63 Comment: post 138/65   Pulse 80   Temp 36.6 °C (97.9 °F) (Temporal)   Resp 15   Ht 1.676 m (5' 6\")   Wt 93.1 kg (205 lb 4 oz)   SpO2 94%   BMI 33.13 kg/m²   Smoking Status Every Day   BSA 2.08 m²     Wt Readings from Last 5 Encounters:   07/11/24 93.1 kg (205 lb 4 oz)   07/01/24 90 kg " (198 lb 6.6 oz)   06/05/24 86.2 kg (190 lb)   10/13/23 84.8 kg (187 lb)   09/21/22 90.3 kg (199 lb)     INTAKE/OUTPUT:  I/O last 3 completed shifts:  In: 2570.1 (27.6 mL/kg) [I.V.:1470.1 (15.8 mL/kg); Blood:100; IV Piggyback:1000]  Out: 4520 (48.6 mL/kg) [Urine:4090 (1.2 mL/kg/hr); Chest Tube:430]  Weight: 93.1 kg          Vent settings:  FiO2 (%):  [90 %-100 %] 100 %    Physical Exam:   Constitutional: Male patient lying in ICU bed  Neurological: Somnolent but arousable. A+Ox3, no obvious focal deficits, moves all extremities  Eyes: Anicteric sclera, clear  Respiratory/Thorax: Diminished, clear breath sounds bilaterally, symmetric chest expansion, 3 chest tubes to -20 suction, serosanguinous drainage. V pacing wires present  Cardiovascular: NSR, no murmurs appreciated  Gastrointestinal: Abdomen soft, nontender, nondistended, bowel sounds present  Genitourinary: Bernal in place draining clear yellow urine  Extremities: Palpable radial pulses 2+, 1+ pulses to bilateral PT/DP, no pitting edema  Skin: Warm and dry throughout, midline incision stable - dressing clean, dry, and intact  Psych: Calm and intermittently uncooperative    Daily Risk Screen  Central line: Hemodynamic instability requiring parenteral medication  Bernal: Accurate I/Os in critically ill    Images: Reviewed    Invasive Hemodynamics:    Most Recent Range Past 24hrs   BP (Art) 116/57 Arterial Line BP 1  Min: 100/59  Max: 146/88   MAP(Art) 76 mmHg Arterial Line MAP 1 (mmHg)   Min: 66 mmHg  Max: 107 mmHg     Assessment/Plan   Mr. Zepeda is a 58-year-old male with a PMH of RA (on chronic prednisone and hydroxychloroquine), type 2 DM (on insulin), COPD, hypothyroidism, 40-pack-year smoking history, chronic methadone use, peripheral neuropathy, and unhealed diabetic foot ulcers s/p right foot partial amputation (7 months ago - follows with wound clinic as outpatient) and most recently NSTEMI. He is now s/p CABGx3 with Dr. Carey on  7/9.    Plan:  NEURO: PMH of chronic methadone use and peripheral neuropathy secondary to T2DM. Acute post operative pain uncontrolled on current regimen. Takes methadone at home - dosing confirmed with pharmacy. Somnolent this morning but easy to arouse. A+Ox3, moves all extremities, no obvious focal deficits. Not yet up to chair, but tolerating chair position in bed. -->  - Serial neuro and pain assessments   - Acute pain consult, appreciate recs  - Perform neuro exam after sedation off and patient more awake  - Scheduled Tylenol   - Discontinue PRN oxycodone and dilaudid  - Discontinue PRN dilaudid for pain  - Continue home plaquenil 200mg BID  - PT Consult, OOB to chair as tolerated, chair position if not tolerated   - CAM ICU score qshift  - Sleep/wake cycle hygiene  - Start CIWA monitoring and add thiamine and folic acid  - Send blood toxicology and follow up results  - Continue methadone 77.5mg q8h  - Continue home duloxetine and pregabalin    CV: Patient has a history of NSTEMI. Is now status post CABGx3 Pre: Mildly decreased LV function, EF 40-45%. Post: normal LV function. Arrived to CTICU on V epicardial wires set VVI @ 50. Afib with RVR to the 130s. Normotensive. Hypervolemia. -->  - Maintain goal MAP 70 - 90  - See  for diuresis  - Maintain epicardial wires set VVI @ 50  - Daily aspirin  - Start plavix after ARMIDA catheters removed  - Start amiodarone infusion for afib with RVR  - Continue atorvastatin 80mg nightly  - Hold home lisinopril    PULM: PMH of COPD and smoking. Currently on CPAP 100% FiO2. Chest tubes L pleural, R pleural, and mediastinal. Morning CXR with pulmonary edema and atelectasis. Acute hypoxia this morning with sats to 83% and tachypnea after transitioned from CPAP to Airvo with respiratory alkalosis. -->  - CXR daily and prn  - Wean FiO2 maintaining SpO2 >88-92%  - Continue CPAP 100%  - Follow up ABG  - IS q1h and OOB to chair  - Continue tiotropium  - Prn albuterol    GI: No  history. Passed bedside swallow evaluation. Abdomen soft, non tender, and non distended. -->  - NPO while on 100% FiO2 CPAP  - Colace/senna BID and miralax BID    : CSA-JUAN J Risk Score 2. No history of renal disease, baseline creatinine 0.7. Creatinine stable post-op at 0.71. Carpenter in place and making adequate UOP. Net -2.7L in last 24 hours. Appears hypervolemic. -->  - Continue carpenter catheter for strict I/Os  - Continue lasix infusion  - Goal net negative 1 to 2L  - Goal UOP 0.5ml/kg/hr  - RFP as clinically indicated  - Replete electrolytes per CTICU protocol    ENDO: PMH of insulin-dependent diabetes (A1c: 8.5) and hypothyroidism-->  - Maintain BG <180, insulin per CTICU protocol  - Continue levothyroxine  - SSI    HEME: Acute blood loss anemia. Hgb stable at 8.4. -->    - Monitor drain output volume and characteristics  - Daily CBC and prn  - Daily aspirin  - Plavix after ARMIDA catheter removal  - Continue SQH   - SCDs for DVT prophylaxis.  - Last type and screen: 7/12    ID: Afebrile, no current indications of infection. MRSA negative .-->  - Trend temp q4h    Skin: No active skin issues. -->  - preventative Mepilex dressings in place on sacrum and heels  - change preventative Mepilex weekly or more frequently as indicated (when moist/soiled)   - every shift skin assessment per nursing and weekly ICU skin rounds  - moisture barrier to be applied with rosy care  - active skin problems addressed with nursing on daily rounds    I have discussed the case with the resident/advanced practice provider. I have personally performed a history, physical exam, and my own medical decision making. I have reviewed the note and agree with the findings and plan with the following exceptions as identified below. I have personally provided 37 minutes of critical care time exclusive of time spent on separately billable procedures. Time includes review of laboratory data, radiology results, discussion with consultants, family and  monitoring for potential decompensation. Interventions were performed as documented above.      Family/Surrogate updated with plan of care.  Code status addressed/up to date.   Dispo: CTICU care for now.    CTICU TEAM PHONE [phone]

## 2024-07-12 NOTE — PROGRESS NOTES
Communication Note    Patient Name: Kael Zepeda  MRN: [id number]  Today's Date: 7/12/2024   Room: 17/17-A    Discipline: Physical Therapy      Missed Visit: Yes  Missed Visit Reason:  (Patient on  100% CPAP c  SpO2  87% at rest.  Will monitor  and follow up as medically appropriate for  intervention.)      07/12/24 at 10:36 AM   Tha Moore, PT   Rehab Office: [phone]

## 2024-07-12 NOTE — PROGRESS NOTES
"CTICU Progress Note  Kael Zepeda/[id number]    Admit Date: 7/1/2024  Hospital Length of Stay: 11   ICU Length of Stay: 2d 18h   CT SURGEON:     SUBJECTIVE:   Hemodynamics acceptable on provided therapy. Remains on CPAP at 100% FiO2.    MEDICATIONS  Infusions:  dexmedeTOMIDine, Last Rate: 0.2 mcg/kg/hr (07/12/24 0800)  furosemide, Last Rate: 20 mg/hr (07/12/24 0800)  lactated Ringer's, Last Rate: 5 mL/hr (07/12/24 0800)  lactated Ringer's, Last Rate: 15 mL/hr (07/12/24 0800)      Scheduled:  acetaminophen, 650 mg, q6h  amiodarone, ,   aspirin, 81 mg, Daily  atorvastatin, 80 mg, Nightly  DULoxetine, 60 mg, Daily  heparin, 5,000 Units, q8h  hydroxychloroquine, 200 mg, BID  insulin lispro, 0-15 Units, q4h  levothyroxine, 50 mcg, Daily  methadone, 77.5 mg, q8h  metoprolol tartrate, 25 mg, BID  oxygen, , Continuous - Inhalation  polyethylene glycol, 17 g, Daily  pregabalin, 200 mg, TID  sennosides-docusate sodium, 2 tablet, BID  tiotropium, 2 puff, Daily      PRN:  albuterol, 2.5 mg, q6h PRN  alteplase, 2 mg, PRN  amiodarone, ,   calcium gluconate, 1 g, q6h PRN  calcium gluconate, 2 g, q6h PRN  dextrose, 25 g, q15 min PRN   Or  glucagon, 1 mg, q15 min PRN  HYDROmorphone, 1 mg, q2h PRN  magnesium sulfate, 2 g, q6h PRN  magnesium sulfate, 4 g, q6h PRN  naloxone, 0.2 mg, q5 min PRN  ondansetron, 4 mg, q8h PRN   Or  ondansetron, 4 mg, q8h PRN  oxyCODONE, 10 mg, q4h PRN  oxyCODONE, 5 mg, q4h PRN  potassium chloride, 20 mEq, q6h PRN  potassium chloride, 40 mEq, q6h PRN  sodium chloride, 1 spray, 4x daily PRN        PHYSICAL EXAM:   Visit Vitals  /60 Comment: post 103/62   Pulse (!) 115   Temp 36.5 °C (97.7 °F) (Temporal)   Resp 22   Ht 1.676 m (5' 6\")   Wt 93.1 kg (205 lb 4 oz)   SpO2 (!) 87%   BMI 33.13 kg/m²   Smoking Status Every Day   BSA 2.08 m²     Wt Readings from Last 5 Encounters:   07/11/24 93.1 kg (205 lb 4 oz)   07/01/24 90 kg (198 lb 6.6 oz)   06/05/24 86.2 kg (190 lb)   10/13/23 84.8 kg (187 lb) "   09/21/22 90.3 kg (199 lb)     INTAKE/OUTPUT:  I/O last 3 completed shifts:  In: 2570.1 (27.6 mL/kg) [I.V.:1470.1 (15.8 mL/kg); Blood:100; IV Piggyback:1000]  Out: 4520 (48.6 mL/kg) [Urine:4090 (1.2 mL/kg/hr); Chest Tube:430]  Weight: 93.1 kg          Vent settings:  FiO2 (%):  [90 %-100 %] 100 %    Physical Exam:   Constitutional: Male patient lying in ICU bed  Neurological: Somnolent but arousable. A+Ox3, no obvious focal deficits, moves all extremities  Eyes: Anicteric sclera, clear  Respiratory/Thorax: Diminished, clear breath sounds bilaterally, symmetric chest expansion, 3 chest tubes to -20 suction, serosanguinous drainage. V pacing wires present  Cardiovascular: NSR, no murmurs appreciated  Gastrointestinal: Abdomen soft, nontender, nondistended, bowel sounds present  Genitourinary: Bernal in place draining clear yellow urine  Extremities: Palpable radial pulses 2+, 1+ pulses to bilateral PT/DP, no pitting edema  Skin: Warm and dry throughout, midline incision stable - dressing clean, dry, and intact  Psych: Calm and intermittently uncooperative    Daily Risk Screen  Central line: Hemodynamic instability requiring parenteral medication  Bernal: Accurate I/Os in critically ill    Images: Reviewed    Invasive Hemodynamics:    Most Recent Range Past 24hrs   BP (Art) 109/60 Arterial Line BP 1  Min: 100/59  Max: 146/88   MAP(Art) 76 mmHg Arterial Line MAP 1 (mmHg)   Min: 72 mmHg  Max: 107 mmHg     Assessment/Plan   Mr. Zepeda is a 58-year-old male with a PMH of RA (on chronic prednisone and hydroxychloroquine), type 2 DM (on insulin), COPD, hypothyroidism, 40-pack-year smoking history, chronic methadone use, peripheral neuropathy, and unhealed diabetic foot ulcers s/p right foot partial amputation (7 months ago - follows with wound clinic as outpatient) and most recently NSTEMI. He is now s/p CABGx3 with Dr. Carey on 7/9.    Plan:  NEURO: PMH of chronic methadone use and peripheral neuropathy secondary to  T2DM. Acute post operative pain uncontrolled on current regimen. Takes methadone at home - dosing confirmed with pharmacy. Somnolent this morning but easy to arouse. A+Ox3, moves all extremities, no obvious focal deficits. Not yet up to chair, but tolerating chair position in bed. -->  - Serial neuro and pain assessments   - Acute pain consult, appreciate recs  - Perform neuro exam after sedation off and patient more awake  - Scheduled Tylenol   - Discontinue PRN oxycodone and dilaudid  - Discontinue PRN dilaudid for pain  - Continue home plaquenil 200mg BID  - PT Consult, OOB to chair as tolerated, chair position if not tolerated   - CAM ICU score qshift  - Sleep/wake cycle hygiene  - Start CIWA monitoring and add thiamine and folic acid  - Send blood toxicology and follow up results  - Continue methadone 77.5mg q8h  - Continue home duloxetine and pregabalin    CV: Patient has a history of NSTEMI. Is now status post CABGx3 Pre: Mildly decreased LV function, EF 40-45%. Post: normal LV function. Arrived to CTICU on V epicardial wires set VVI @ 50. Afib with RVR to the 130s. Normotensive. Hypervolemia. -->  - Maintain goal MAP 70 - 90  - See  for diuresis  - Maintain epicardial wires set VVI @ 50  - Daily aspirin  - Start plavix after ARMIDA catheters removed  - Start amiodarone infusion for afib with RVR  - Continue atorvastatin 80mg nightly  - Hold home lisinopril    PULM: PMH of COPD and smoking. Currently on CPAP 100% FiO2. Chest tubes L pleural, R pleural, and mediastinal. Morning CXR with pulmonary edema and atelectasis. Acute hypoxia this morning with sats to 83% and tachypnea after transitioned from CPAP to Airvo with respiratory alkalosis. -->  - CXR daily and prn  - Wean FiO2 maintaining SpO2 >88-92%  - Continue CPAP 100%  - Follow up ABG  - IS q1h and OOB to chair  - Continue tiotropium  - Prn albuterol    GI: No history. Passed bedside swallow evaluation. Abdomen soft, non tender, and non distended. -->  -  NPO while on 100% FiO2 CPAP  - Colace/senna BID and miralax BID    : CSA-JUAN J Risk Score 2. No history of renal disease, baseline creatinine 0.7. Creatinine stable post-op at 0.71. Carpenter in place and making adequate UOP. Net -2.7L in last 24 hours. Appears hypervolemic. -->  - Continue carpenter catheter for strict I/Os  - Continue lasix infusion  - Goal net negative 1 to 2L  - Goal UOP 0.5ml/kg/hr  - RFP as clinically indicated  - Replete electrolytes per CTICU protocol    ENDO: PMH of insulin-dependent diabetes (A1c: 8.5) and hypothyroidism-->  - Maintain BG <180, insulin per CTICU protocol  - Continue levothyroxine  - SSI    HEME: Acute blood loss anemia. Hgb stable at 8.4. -->    - Monitor drain output volume and characteristics  - Daily CBC and prn  - Daily aspirin  - Plavix after ARMIDA catheter removal  - Continue SQH   - SCDs for DVT prophylaxis.  - Last type and screen: 7/12    ID: Afebrile, no current indications of infection. MRSA negative .-->  - Trend temp q4h    Skin: No active skin issues. -->  - preventative Mepilex dressings in place on sacrum and heels  - change preventative Mepilex weekly or more frequently as indicated (when moist/soiled)   - every shift skin assessment per nursing and weekly ICU skin rounds  - moisture barrier to be applied with rosy care  - active skin problems addressed with nursing on daily rounds    Proph:  SCDs  SQH    G: Line  Right IJ MAC w Minimac placed 7/9  Left brachial a-line placed 7/9  Carpenter placed 7/9    F: Family: will update at bedside postoperatively.    Restraints: Not indicated.    Code status: Full Code    I personally spent 65 minutes of critical care time directly and personally managing the patient exclusive of separately billable procedures.    A,B,C,D,E,F,G: reviewed    Discussed with Dr. Alexis.  Dispo: CTICU care for now.    CTICU TEAM PHONE [phone]

## 2024-07-13 ENCOUNTER — APPOINTMENT (OUTPATIENT)
Dept: RADIOLOGY | Facility: HOSPITAL | Age: 59
DRG: 235 | End: 2024-07-13
Payer: MEDICARE

## 2024-07-13 LAB
ALBUMIN SERPL BCP-MCNC: 3.5 G/DL (ref 3.4–5)
ALBUMIN SERPL BCP-MCNC: 3.7 G/DL (ref 3.4–5)
ALBUMIN SERPL BCP-MCNC: 3.7 G/DL (ref 3.4–5)
ANION GAP BLDA CALCULATED.4IONS-SCNC: 8 MMO/L (ref 10–25)
ANION GAP SERPL CALC-SCNC: 17 MMOL/L (ref 10–20)
ANION GAP SERPL CALC-SCNC: 18 MMOL/L (ref 10–20)
ANION GAP SERPL CALC-SCNC: 19 MMOL/L (ref 10–20)
BASE EXCESS BLDA CALC-SCNC: 8.2 MMOL/L (ref -2–3)
BODY TEMPERATURE: 37 DEGREES CELSIUS
BUN SERPL-MCNC: 35 MG/DL (ref 6–23)
BUN SERPL-MCNC: 37 MG/DL (ref 6–23)
BUN SERPL-MCNC: 39 MG/DL (ref 6–23)
CA-I BLD-SCNC: 1.08 MMOL/L (ref 1.1–1.33)
CA-I BLD-SCNC: 1.1 MMOL/L (ref 1.1–1.33)
CA-I BLDA-SCNC: 1.09 MMOL/L (ref 1.1–1.33)
CALCIUM SERPL-MCNC: 8.1 MG/DL (ref 8.6–10.6)
CALCIUM SERPL-MCNC: 8.7 MG/DL (ref 8.6–10.6)
CALCIUM SERPL-MCNC: 9 MG/DL (ref 8.6–10.6)
CHLORIDE BLDA-SCNC: 101 MMOL/L (ref 98–107)
CHLORIDE SERPL-SCNC: 95 MMOL/L (ref 98–107)
CHLORIDE SERPL-SCNC: 97 MMOL/L (ref 98–107)
CHLORIDE SERPL-SCNC: 98 MMOL/L (ref 98–107)
CO2 SERPL-SCNC: 30 MMOL/L (ref 21–32)
CO2 SERPL-SCNC: 30 MMOL/L (ref 21–32)
CO2 SERPL-SCNC: 32 MMOL/L (ref 21–32)
CREAT SERPL-MCNC: 1.04 MG/DL (ref 0.5–1.3)
CREAT SERPL-MCNC: 1.14 MG/DL (ref 0.5–1.3)
CREAT SERPL-MCNC: 1.17 MG/DL (ref 0.5–1.3)
EGFRCR SERPLBLD CKD-EPI 2021: 72 ML/MIN/1.73M*2
EGFRCR SERPLBLD CKD-EPI 2021: 74 ML/MIN/1.73M*2
EGFRCR SERPLBLD CKD-EPI 2021: 83 ML/MIN/1.73M*2
ERYTHROCYTE [DISTWIDTH] IN BLOOD BY AUTOMATED COUNT: 18.3 % (ref 11.5–14.5)
ERYTHROCYTE [DISTWIDTH] IN BLOOD BY AUTOMATED COUNT: 18.5 % (ref 11.5–14.5)
GLUCOSE BLD MANUAL STRIP-MCNC: 136 MG/DL (ref 74–99)
GLUCOSE BLD MANUAL STRIP-MCNC: 141 MG/DL (ref 74–99)
GLUCOSE BLD MANUAL STRIP-MCNC: 154 MG/DL (ref 74–99)
GLUCOSE BLD MANUAL STRIP-MCNC: 197 MG/DL (ref 74–99)
GLUCOSE BLD MANUAL STRIP-MCNC: 232 MG/DL (ref 74–99)
GLUCOSE BLDA-MCNC: 238 MG/DL (ref 74–99)
GLUCOSE SERPL-MCNC: 126 MG/DL (ref 74–99)
GLUCOSE SERPL-MCNC: 211 MG/DL (ref 74–99)
GLUCOSE SERPL-MCNC: 232 MG/DL (ref 74–99)
HCO3 BLDA-SCNC: 32.8 MMOL/L (ref 22–26)
HCT VFR BLD AUTO: 25.7 % (ref 41–52)
HCT VFR BLD AUTO: 25.8 % (ref 41–52)
HCT VFR BLD EST: 28 % (ref 41–52)
HGB BLD-MCNC: 8.5 G/DL (ref 13.5–17.5)
HGB BLD-MCNC: 8.6 G/DL (ref 13.5–17.5)
HGB BLDA-MCNC: 9.2 G/DL (ref 13.5–17.5)
INHALED O2 CONCENTRATION: 90 %
LACTATE BLDA-SCNC: 1.5 MMOL/L (ref 0.4–2)
MAGNESIUM SERPL-MCNC: 2.1 MG/DL (ref 1.6–2.4)
MAGNESIUM SERPL-MCNC: 2.27 MG/DL (ref 1.6–2.4)
MCH RBC QN AUTO: 23.8 PG (ref 26–34)
MCH RBC QN AUTO: 24.7 PG (ref 26–34)
MCHC RBC AUTO-ENTMCNC: 33.1 G/DL (ref 32–36)
MCHC RBC AUTO-ENTMCNC: 33.3 G/DL (ref 32–36)
MCV RBC AUTO: 72 FL (ref 80–100)
MCV RBC AUTO: 74 FL (ref 80–100)
NRBC BLD-RTO: 0.2 /100 WBCS (ref 0–0)
NRBC BLD-RTO: 0.2 /100 WBCS (ref 0–0)
OXYHGB MFR BLDA: 94.1 % (ref 94–98)
PCO2 BLDA: 45 MM HG (ref 38–42)
PH BLDA: 7.47 PH (ref 7.38–7.42)
PHOSPHATE SERPL-MCNC: 4 MG/DL (ref 2.5–4.9)
PHOSPHATE SERPL-MCNC: 4.7 MG/DL (ref 2.5–4.9)
PHOSPHATE SERPL-MCNC: 4.8 MG/DL (ref 2.5–4.9)
PLATELET # BLD AUTO: 236 X10*3/UL (ref 150–450)
PLATELET # BLD AUTO: 263 X10*3/UL (ref 150–450)
PO2 BLDA: 72 MM HG (ref 85–95)
POTASSIUM BLDA-SCNC: 3.8 MMOL/L (ref 3.5–5.3)
POTASSIUM SERPL-SCNC: 3.5 MMOL/L (ref 3.5–5.3)
POTASSIUM SERPL-SCNC: 3.8 MMOL/L (ref 3.5–5.3)
POTASSIUM SERPL-SCNC: 4 MMOL/L (ref 3.5–5.3)
RBC # BLD AUTO: 3.48 X10*6/UL (ref 4.5–5.9)
RBC # BLD AUTO: 3.57 X10*6/UL (ref 4.5–5.9)
SAO2 % BLDA: 96 % (ref 94–100)
SODIUM BLDA-SCNC: 138 MMOL/L (ref 136–145)
SODIUM SERPL-SCNC: 140 MMOL/L (ref 136–145)
SODIUM SERPL-SCNC: 141 MMOL/L (ref 136–145)
SODIUM SERPL-SCNC: 143 MMOL/L (ref 136–145)
TEST COMMENT: 1.1
WBC # BLD AUTO: 16.1 X10*3/UL (ref 4.4–11.3)
WBC # BLD AUTO: 16.9 X10*3/UL (ref 4.4–11.3)

## 2024-07-13 PROCEDURE — 2500000001 HC RX 250 WO HCPCS SELF ADMINISTERED DRUGS (ALT 637 FOR MEDICARE OP)

## 2024-07-13 PROCEDURE — 80069 RENAL FUNCTION PANEL: CPT

## 2024-07-13 PROCEDURE — 94660 CPAP INITIATION&MGMT: CPT

## 2024-07-13 PROCEDURE — 37799 UNLISTED PX VASCULAR SURGERY: CPT

## 2024-07-13 PROCEDURE — 2500000002 HC RX 250 W HCPCS SELF ADMINISTERED DRUGS (ALT 637 FOR MEDICARE OP, ALT 636 FOR OP/ED): Performed by: STUDENT IN AN ORGANIZED HEALTH CARE EDUCATION/TRAINING PROGRAM

## 2024-07-13 PROCEDURE — 2500000002 HC RX 250 W HCPCS SELF ADMINISTERED DRUGS (ALT 637 FOR MEDICARE OP, ALT 636 FOR OP/ED)

## 2024-07-13 PROCEDURE — 2500000004 HC RX 250 GENERAL PHARMACY W/ HCPCS (ALT 636 FOR OP/ED)

## 2024-07-13 PROCEDURE — 99231 SBSQ HOSP IP/OBS SF/LOW 25: CPT

## 2024-07-13 PROCEDURE — 2500000005 HC RX 250 GENERAL PHARMACY W/O HCPCS: Performed by: STUDENT IN AN ORGANIZED HEALTH CARE EDUCATION/TRAINING PROGRAM

## 2024-07-13 PROCEDURE — 2500000004 HC RX 250 GENERAL PHARMACY W/ HCPCS (ALT 636 FOR OP/ED): Performed by: NURSE PRACTITIONER

## 2024-07-13 PROCEDURE — S0109 METHADONE ORAL 5MG: HCPCS

## 2024-07-13 PROCEDURE — 85027 COMPLETE CBC AUTOMATED: CPT

## 2024-07-13 PROCEDURE — 2500000004 HC RX 250 GENERAL PHARMACY W/ HCPCS (ALT 636 FOR OP/ED): Mod: JZ | Performed by: STUDENT IN AN ORGANIZED HEALTH CARE EDUCATION/TRAINING PROGRAM

## 2024-07-13 PROCEDURE — 82330 ASSAY OF CALCIUM: CPT

## 2024-07-13 PROCEDURE — 2020000001 HC ICU ROOM DAILY

## 2024-07-13 PROCEDURE — 84132 ASSAY OF SERUM POTASSIUM: CPT

## 2024-07-13 PROCEDURE — 84100 ASSAY OF PHOSPHORUS: CPT

## 2024-07-13 PROCEDURE — 94762 N-INVAS EAR/PLS OXIMTRY CONT: CPT

## 2024-07-13 PROCEDURE — P9047 ALBUMIN (HUMAN), 25%, 50ML: HCPCS | Mod: JZ | Performed by: STUDENT IN AN ORGANIZED HEALTH CARE EDUCATION/TRAINING PROGRAM

## 2024-07-13 PROCEDURE — 71045 X-RAY EXAM CHEST 1 VIEW: CPT

## 2024-07-13 PROCEDURE — 82947 ASSAY GLUCOSE BLOOD QUANT: CPT

## 2024-07-13 PROCEDURE — 2500000001 HC RX 250 WO HCPCS SELF ADMINISTERED DRUGS (ALT 637 FOR MEDICARE OP): Performed by: STUDENT IN AN ORGANIZED HEALTH CARE EDUCATION/TRAINING PROGRAM

## 2024-07-13 PROCEDURE — 71045 X-RAY EXAM CHEST 1 VIEW: CPT | Performed by: STUDENT IN AN ORGANIZED HEALTH CARE EDUCATION/TRAINING PROGRAM

## 2024-07-13 PROCEDURE — 99291 CRITICAL CARE FIRST HOUR: CPT | Performed by: STUDENT IN AN ORGANIZED HEALTH CARE EDUCATION/TRAINING PROGRAM

## 2024-07-13 PROCEDURE — 83735 ASSAY OF MAGNESIUM: CPT

## 2024-07-13 RX ORDER — AMIODARONE HYDROCHLORIDE 200 MG/1
400 TABLET ORAL 2 TIMES DAILY
Status: DISCONTINUED | OUTPATIENT
Start: 2024-07-13 | End: 2024-07-16

## 2024-07-13 RX ORDER — BISACODYL 10 MG/1
10 SUPPOSITORY RECTAL ONCE
Status: DISCONTINUED | OUTPATIENT
Start: 2024-07-13 | End: 2024-07-15

## 2024-07-13 RX ORDER — CLOPIDOGREL BISULFATE 75 MG/1
75 TABLET ORAL DAILY
Status: DISCONTINUED | OUTPATIENT
Start: 2024-07-13 | End: 2024-07-26 | Stop reason: HOSPADM

## 2024-07-13 RX ORDER — ALBUMIN HUMAN 250 G/1000ML
25 SOLUTION INTRAVENOUS ONCE
Status: COMPLETED | OUTPATIENT
Start: 2024-07-13 | End: 2024-07-13

## 2024-07-13 ASSESSMENT — LIFESTYLE VARIABLES
ORIENTATION AND CLOUDING OF SENSORIUM: ORIENTED AND CAN DO SERIAL ADDITIONS
TOTAL SCORE: 0
ORIENTATION AND CLOUDING OF SENSORIUM: ORIENTED AND CAN DO SERIAL ADDITIONS
ORIENTATION AND CLOUDING OF SENSORIUM: ORIENTED AND CAN DO SERIAL ADDITIONS
NAUSEA AND VOMITING: NO NAUSEA AND NO VOMITING
HEADACHE, FULLNESS IN HEAD: NOT PRESENT
VISUAL DISTURBANCES: NOT PRESENT
HEADACHE, FULLNESS IN HEAD: NOT PRESENT
ORIENTATION AND CLOUDING OF SENSORIUM: ORIENTED AND CAN DO SERIAL ADDITIONS
TREMOR: NO TREMOR
TREMOR: NO TREMOR
PAROXYSMAL SWEATS: NO SWEAT VISIBLE
VISUAL DISTURBANCES: NOT PRESENT
TREMOR: NO TREMOR
AUDITORY DISTURBANCES: NOT PRESENT
TOTAL SCORE: 0
NAUSEA AND VOMITING: NO NAUSEA AND NO VOMITING
AGITATION: NORMAL ACTIVITY
AUDITORY DISTURBANCES: NOT PRESENT
ANXIETY: NO ANXIETY, AT EASE
ANXIETY: NO ANXIETY, AT EASE
HEADACHE, FULLNESS IN HEAD: NOT PRESENT
ANXIETY: NO ANXIETY, AT EASE
AUDITORY DISTURBANCES: NOT PRESENT
PAROXYSMAL SWEATS: NO SWEAT VISIBLE
PAROXYSMAL SWEATS: NO SWEAT VISIBLE
ORIENTATION AND CLOUDING OF SENSORIUM: ORIENTED AND CAN DO SERIAL ADDITIONS
HEADACHE, FULLNESS IN HEAD: NOT PRESENT
VISUAL DISTURBANCES: NOT PRESENT
ANXIETY: NO ANXIETY, AT EASE
HEADACHE, FULLNESS IN HEAD: NOT PRESENT
NAUSEA AND VOMITING: NO NAUSEA AND NO VOMITING
NAUSEA AND VOMITING: NO NAUSEA AND NO VOMITING
ORIENTATION AND CLOUDING OF SENSORIUM: ORIENTED AND CAN DO SERIAL ADDITIONS
TREMOR: NO TREMOR
AGITATION: NORMAL ACTIVITY
HEADACHE, FULLNESS IN HEAD: NOT PRESENT
AGITATION: NORMAL ACTIVITY
VISUAL DISTURBANCES: NOT PRESENT
VISUAL DISTURBANCES: NOT PRESENT
ANXIETY: NO ANXIETY, AT EASE
AUDITORY DISTURBANCES: NOT PRESENT
AGITATION: NORMAL ACTIVITY
PAROXYSMAL SWEATS: NO SWEAT VISIBLE
VISUAL DISTURBANCES: NOT PRESENT
AUDITORY DISTURBANCES: NOT PRESENT
TREMOR: NO TREMOR
TOTAL SCORE: 0
PAROXYSMAL SWEATS: NO SWEAT VISIBLE
AGITATION: NORMAL ACTIVITY
PAROXYSMAL SWEATS: NO SWEAT VISIBLE
TOTAL SCORE: 0
TREMOR: NO TREMOR
AUDITORY DISTURBANCES: NOT PRESENT
AGITATION: NORMAL ACTIVITY
TOTAL SCORE: 0
ANXIETY: NO ANXIETY, AT EASE
TOTAL SCORE: 0

## 2024-07-13 ASSESSMENT — PAIN - FUNCTIONAL ASSESSMENT
PAIN_FUNCTIONAL_ASSESSMENT: 0-10

## 2024-07-13 ASSESSMENT — PAIN SCALES - GENERAL
PAINLEVEL_OUTOF10: 0 - NO PAIN

## 2024-07-13 NOTE — PROGRESS NOTES
Postop Pain HPI -   Palliative: relieved with IV analgesics and regional local anesthetics  Provocative: movement  Quality:  burning and aching  Radiation:  none  Severity:  1/10  Timing: constant    24-HOUR OPIOID CONSUMPTION:  Methadone 310mg      Scheduled medications  acetaminophen, 975 mg, oral, q6h  aspirin, 81 mg, oral, Daily  atorvastatin, 80 mg, oral, Nightly  DULoxetine, 60 mg, oral, Daily  folic acid, 1 mg, oral, Daily  heparin, 5,000 Units, subcutaneous, q8h  hydroxychloroquine, 200 mg, oral, BID  insulin lispro, 0-15 Units, subcutaneous, q4h  levothyroxine, 50 mcg, oral, Daily  methadone, 77.5 mg, oral, q8h  metoprolol tartrate, 25 mg, oral, BID  multivitamin with minerals, 1 tablet, oral, Daily  oxygen, , inhalation, Continuous - Inhalation  polyethylene glycol, 17 g, oral, Daily  potassium chloride, 40 mEq, intravenous, Once  predniSONE, 10 mg, oral, Daily  pregabalin, 200 mg, oral, TID  sennosides-docusate sodium, 2 tablet, oral, BID  [START ON 7/15/2024] thiamine, 100 mg, oral, Daily  thiamine, 100 mg, intravenous, Daily  tiotropium, 2 puff, inhalation, Daily      Continuous medications  amiodarone, 0.5-1 mg/min, Last Rate: 1 mg/min (07/13/24 0700)  furosemide, 20 mg/hr, Last Rate: 20 mg/hr (07/13/24 0700)  lactated Ringer's, 5 mL/hr, Last Rate: 5 mL/hr (07/13/24 0700)  lactated Ringer's, 15 mL/hr, Last Rate: 15 mL/hr (07/13/24 0700)      PRN medications  PRN medications: albuterol, alteplase, calcium gluconate, calcium gluconate, dextrose **OR** glucagon, magnesium sulfate, magnesium sulfate, naloxone, ondansetron **OR** ondansetron, [Held by provider] oxyCODONE, oxygen, potassium chloride, potassium chloride, sodium chloride     Physical Exam:  Constitutional: no distress, alert once roused and cooperative  Eyes: clear sclera  Head/Neck: No apparent injury, trachea midline  Respiratory/Thorax: Patent airways, thorax symmetric, breathing comfortably  Cardiovascular: no pitting  edema  Gastrointestinal: Nondistended  Musculoskeletal: ROM intact  Extremities: no clubbing  Neurological: alert, dubon x4  Psychological: Appropriate affect    Results for orders placed or performed during the hospital encounter of 07/01/24 (from the past 24 hour(s))   Heparin Assay   Result Value Ref Range    Heparin Unfractionated 0.3 See Comment Below for Therapeutic Ranges IU/mL   Blood Gas Arterial Full Panel   Result Value Ref Range    POCT pH, Arterial 7.55 (H) 7.38 - 7.42 pH    POCT pCO2, Arterial 31 (L) 38 - 42 mm Hg    POCT pO2, Arterial 66 (L) 85 - 95 mm Hg    POCT SO2, Arterial      POCT Oxy Hemoglobin, Arterial      POCT Hematocrit Calculated, Arterial      POCT Sodium, Arterial 139 136 - 145 mmol/L    POCT Potassium, Arterial 2.9 (LL) 3.5 - 5.3 mmol/L    POCT Chloride, Arterial 104 98 - 107 mmol/L    POCT Ionized Calcium, Arterial 1.03 (L) 1.10 - 1.33 mmol/L    POCT Glucose, Arterial 160 (H) 74 - 99 mg/dL    POCT Lactate, Arterial 1.2 0.4 - 2.0 mmol/L    POCT Base Excess, Arterial 5.2 (H) -2.0 - 3.0 mmol/L    POCT HCO3 Calculated, Arterial 27.1 (H) 22.0 - 26.0 mmol/L    POCT Hemoglobin, Arterial      POCT Anion Gap, Arterial 11 10 - 25 mmo/L    Patient Temperature 37.0 degrees Celsius    FiO2 100 %   Calcium, Ionized   Result Value Ref Range    POCT Calcium, Ionized 1.03 (L) 1.1 - 1.33 mmol/L   Blood Gas Arterial Full Panel   Result Value Ref Range    POCT pH, Arterial 7.56 (H) 7.38 - 7.42 pH    POCT pCO2, Arterial 33 (L) 38 - 42 mm Hg    POCT pO2, Arterial 65 (L) 85 - 95 mm Hg    POCT SO2, Arterial 93 (L) 94 - 100 %    POCT Oxy Hemoglobin, Arterial 92.3 (L) 94.0 - 98.0 %    POCT Hematocrit Calculated, Arterial 27.0 (L) 41.0 - 52.0 %    POCT Sodium, Arterial 137 136 - 145 mmol/L    POCT Potassium, Arterial 3.6 3.5 - 5.3 mmol/L    POCT Chloride, Arterial 104 98 - 107 mmol/L    POCT Ionized Calcium, Arterial 1.06 (L) 1.10 - 1.33 mmol/L    POCT Glucose, Arterial 131 (H) 74 - 99 mg/dL    POCT Lactate,  Arterial 1.4 0.4 - 2.0 mmol/L    POCT Base Excess, Arterial 7.0 (H) -2.0 - 3.0 mmol/L    POCT HCO3 Calculated, Arterial 29.5 (H) 22.0 - 26.0 mmol/L    POCT Hemoglobin, Arterial 9.0 (L) 13.5 - 17.5 g/dL    POCT Anion Gap, Arterial 7 (L) 10 - 25 mmo/L    Patient Temperature 37.0 degrees Celsius    FiO2 100 %   CBC   Result Value Ref Range    WBC 17.8 (H) 4.4 - 11.3 x10*3/uL    nRBC 0.3 (H) 0.0 - 0.0 /100 WBCs    RBC 3.51 (L) 4.50 - 5.90 x10*6/uL    Hemoglobin 8.7 (L) 13.5 - 17.5 g/dL    Hematocrit 26.9 (L) 41.0 - 52.0 %    MCV 77 (L) 80 - 100 fL    MCH 24.8 (L) 26.0 - 34.0 pg    MCHC 32.3 32.0 - 36.0 g/dL    RDW 18.6 (H) 11.5 - 14.5 %    Platelets 217 150 - 450 x10*3/uL   Renal Function Panel   Result Value Ref Range    Glucose 127 (H) 74 - 99 mg/dL    Sodium 141 136 - 145 mmol/L    Potassium 3.6 3.5 - 5.3 mmol/L    Chloride 99 98 - 107 mmol/L    Bicarbonate 29 21 - 32 mmol/L    Anion Gap 17 10 - 20 mmol/L    Urea Nitrogen 30 (H) 6 - 23 mg/dL    Creatinine 0.97 0.50 - 1.30 mg/dL    eGFR 90 >60 mL/min/1.73m*2    Calcium 8.4 (L) 8.6 - 10.6 mg/dL    Phosphorus 3.0 2.5 - 4.9 mg/dL    Albumin 3.6 3.4 - 5.0 g/dL   Magnesium   Result Value Ref Range    Magnesium 2.73 (H) 1.60 - 2.40 mg/dL   BLOOD GAS ARTERIAL FULL PANEL   Result Value Ref Range    POCT pH, Arterial 7.52 (H) 7.38 - 7.42 pH    POCT pCO2, Arterial 33 (L) 38 - 42 mm Hg    POCT pO2, Arterial 53 (L) 85 - 95 mm Hg    POCT SO2, Arterial 85 (L) 94 - 100 %    POCT Oxy Hemoglobin, Arterial 83.0 (L) 94.0 - 98.0 %    POCT Hematocrit Calculated, Arterial 29.0 (L) 41.0 - 52.0 %    POCT Sodium, Arterial 137 136 - 145 mmol/L    POCT Potassium, Arterial 3.4 (L) 3.5 - 5.3 mmol/L    POCT Chloride, Arterial 102 98 - 107 mmol/L    POCT Ionized Calcium, Arterial 1.04 (L) 1.10 - 1.33 mmol/L    POCT Glucose, Arterial 202 (H) 74 - 99 mg/dL    POCT Lactate, Arterial 1.7 0.4 - 2.0 mmol/L    POCT Base Excess, Arterial 4.0 (H) -2.0 - 3.0 mmol/L    POCT HCO3 Calculated, Arterial 26.9 (H)  22.0 - 26.0 mmol/L    POCT Hemoglobin, Arterial 9.5 (L) 13.5 - 17.5 g/dL    POCT Anion Gap, Arterial 12 10 - 25 mmo/L    Patient Temperature 37.0 degrees Celsius    FiO2 100 %   Acute Toxicology Panel, Blood   Result Value Ref Range    Acetaminophen <10.0 10.0 - 30.0 ug/mL    Salicylate  <3 4 - 20 mg/dL    Alcohol <10 <=10 mg/dL   POCT GLUCOSE   Result Value Ref Range    POCT Glucose 189 (H) 74 - 99 mg/dL   Blood Gas Arterial Full Panel   Result Value Ref Range    POCT pH, Arterial 7.52 (H) 7.38 - 7.42 pH    POCT pCO2, Arterial 39 38 - 42 mm Hg    POCT pO2, Arterial 87 85 - 95 mm Hg    POCT SO2, Arterial 98 94 - 100 %    POCT Oxy Hemoglobin, Arterial 96.1 94.0 - 98.0 %    POCT Hematocrit Calculated, Arterial 28.0 (L) 41.0 - 52.0 %    POCT Sodium, Arterial 137 136 - 145 mmol/L    POCT Potassium, Arterial 3.4 (L) 3.5 - 5.3 mmol/L    POCT Chloride, Arterial 102 98 - 107 mmol/L    POCT Ionized Calcium, Arterial 1.08 (L) 1.10 - 1.33 mmol/L    POCT Glucose, Arterial 191 (H) 74 - 99 mg/dL    POCT Lactate, Arterial 1.3 0.4 - 2.0 mmol/L    POCT Base Excess, Arterial 8.3 (H) -2.0 - 3.0 mmol/L    POCT HCO3 Calculated, Arterial 31.8 (H) 22.0 - 26.0 mmol/L    POCT Hemoglobin, Arterial 9.2 (L) 13.5 - 17.5 g/dL    POCT Anion Gap, Arterial 7 (L) 10 - 25 mmo/L    Patient Temperature 37.0 degrees Celsius    FiO2 100 %   Drug Screen, Urine   Result Value Ref Range    Amphetamine Screen, Urine Presumptive Negative Presumptive Negative    Barbiturate Screen, Urine Presumptive Negative Presumptive Negative    Benzodiazepines Screen, Urine Presumptive Negative Presumptive Negative    Cannabinoid Screen, Urine Presumptive Negative Presumptive Negative    Cocaine Metabolite Screen, Urine Presumptive Negative Presumptive Negative    Fentanyl Screen, Urine Presumptive Positive (A) Presumptive Negative    Opiate Screen, Urine Presumptive Negative Presumptive Negative    Oxycodone Screen, Urine Presumptive Positive (A) Presumptive Negative     PCP Screen, Urine Presumptive Negative Presumptive Negative    Methadone Screen, Urine Presumptive Positive (A) Presumptive Negative   Blood Gas Arterial Full Panel   Result Value Ref Range    POCT pH, Arterial 7.48 (H) 7.38 - 7.42 pH    POCT pCO2, Arterial 35 (L) 38 - 42 mm Hg    POCT pO2, Arterial 103 (H) 85 - 95 mm Hg    POCT SO2, Arterial 98 94 - 100 %    POCT Oxy Hemoglobin, Arterial 96.6 94.0 - 98.0 %    POCT Hematocrit Calculated, Arterial 23.0 (L) 41.0 - 52.0 %    POCT Sodium, Arterial 140 136 - 145 mmol/L    POCT Potassium, Arterial 2.9 (LL) 3.5 - 5.3 mmol/L    POCT Chloride, Arterial 106 98 - 107 mmol/L    POCT Ionized Calcium, Arterial 1.00 (L) 1.10 - 1.33 mmol/L    POCT Glucose, Arterial 140 (H) 74 - 99 mg/dL    POCT Lactate, Arterial 1.1 0.4 - 2.0 mmol/L    POCT Base Excess, Arterial 2.5 -2.0 - 3.0 mmol/L    POCT HCO3 Calculated, Arterial 26.1 (H) 22.0 - 26.0 mmol/L    POCT Hemoglobin, Arterial 7.6 (L) 13.5 - 17.5 g/dL    POCT Anion Gap, Arterial 11 10 - 25 mmo/L    Patient Temperature 37.0 degrees Celsius    FiO2 100 %   Calcium, Ionized   Result Value Ref Range    POCT Calcium, Ionized 1.08 (L) 1.1 - 1.33 mmol/L   Magnesium   Result Value Ref Range    Magnesium 2.27 1.60 - 2.40 mg/dL   Renal Function Panel   Result Value Ref Range    Glucose 211 (H) 74 - 99 mg/dL    Sodium 141 136 - 145 mmol/L    Potassium 4.0 3.5 - 5.3 mmol/L    Chloride 97 (L) 98 - 107 mmol/L    Bicarbonate 30 21 - 32 mmol/L    Anion Gap 18 10 - 20 mmol/L    Urea Nitrogen 35 (H) 6 - 23 mg/dL    Creatinine 1.14 0.50 - 1.30 mg/dL    eGFR 74 >60 mL/min/1.73m*2    Calcium 8.7 8.6 - 10.6 mg/dL    Phosphorus 4.7 2.5 - 4.9 mg/dL    Albumin 3.7 3.4 - 5.0 g/dL   CBC   Result Value Ref Range    WBC 16.1 (H) 4.4 - 11.3 x10*3/uL    nRBC 0.2 (H) 0.0 - 0.0 /100 WBCs    RBC 3.48 (L) 4.50 - 5.90 x10*6/uL    Hemoglobin 8.6 (L) 13.5 - 17.5 g/dL    Hematocrit 25.8 (L) 41.0 - 52.0 %    MCV 74 (L) 80 - 100 fL    MCH 24.7 (L) 26.0 - 34.0 pg    MCHC 33.3  32.0 - 36.0 g/dL    RDW 18.5 (H) 11.5 - 14.5 %    Platelets 236 150 - 450 x10*3/uL   Renal Function Panel   Result Value Ref Range    Glucose 126 (H) 74 - 99 mg/dL    Sodium 143 136 - 145 mmol/L    Potassium 3.5 3.5 - 5.3 mmol/L    Chloride 98 98 - 107 mmol/L    Bicarbonate 30 21 - 32 mmol/L    Anion Gap 19 10 - 20 mmol/L    Urea Nitrogen 37 (H) 6 - 23 mg/dL    Creatinine 1.04 0.50 - 1.30 mg/dL    eGFR 83 >60 mL/min/1.73m*2    Calcium 8.1 (L) 8.6 - 10.6 mg/dL    Phosphorus 4.0 2.5 - 4.9 mg/dL    Albumin 3.5 3.4 - 5.0 g/dL   POCT GLUCOSE   Result Value Ref Range    POCT Glucose 136 (H) 74 - 99 mg/dL        PLAN  - BL ARMIDA nerve blocks with catheters placed preoperatively on 07/10/2024. Removed 7/13 ~1015 without issue.   - Rest of pain management per primary team   - Acute pain service signing off    Acute Pain Resident  pg [phone] ph [phone]

## 2024-07-13 NOTE — PROGRESS NOTES
"CTICU Progress Note  Kael Zepeda/[id number]    Admit Date: 7/1/2024  Hospital Length of Stay: 12   ICU Length of Stay: 3d 22h   CT SURGEON:     SUBJECTIVE:   Pt awake on CPAP this morning. Feels ok but having some chest pain. CPAP mask uncomfortable.     MEDICATIONS  Infusions:  amiodarone, Last Rate: Stopped (07/13/24 1130)  furosemide, Last Rate: 20 mg/hr (07/13/24 1200)  lactated Ringer's, Last Rate: 5 mL/hr (07/13/24 1200)  lactated Ringer's, Last Rate: Stopped (07/13/24 1100)      Scheduled:  acetaminophen, 975 mg, q6h  aspirin, 81 mg, Daily  atorvastatin, 80 mg, Nightly  bisacodyl, 10 mg, Once  DULoxetine, 60 mg, Daily  folic acid, 1 mg, Daily  heparin, 5,000 Units, q8h  hydroxychloroquine, 200 mg, BID  insulin lispro, 0-15 Units, q4h  levothyroxine, 50 mcg, Daily  methadone, 77.5 mg, q8h  metoprolol tartrate, 25 mg, BID  multivitamin with minerals, 1 tablet, Daily  oxygen, , Continuous - Inhalation  polyethylene glycol, 17 g, Daily  potassium chloride, 40 mEq, Once  predniSONE, 10 mg, Daily  pregabalin, 200 mg, TID  sennosides-docusate sodium, 2 tablet, BID  [START ON 7/15/2024] thiamine, 100 mg, Daily  thiamine, 100 mg, Daily  tiotropium, 2 puff, Daily      PRN:  albuterol, 2.5 mg, q6h PRN  alteplase, 2 mg, PRN  calcium gluconate, 1 g, q6h PRN  calcium gluconate, 2 g, q6h PRN  dextrose, 25 g, q15 min PRN   Or  glucagon, 1 mg, q15 min PRN  magnesium sulfate, 2 g, q6h PRN  magnesium sulfate, 4 g, q6h PRN  naloxone, 0.2 mg, q5 min PRN  ondansetron, 4 mg, q8h PRN   Or  ondansetron, 4 mg, q8h PRN  [Held by provider] oxyCODONE, 5 mg, q4h PRN  oxygen, , Continuous PRN - O2/gases  potassium chloride, 20 mEq, q6h PRN  potassium chloride, 40 mEq, q6h PRN  sodium chloride, 1 spray, 4x daily PRN        PHYSICAL EXAM:   Visit Vitals  /63 Comment: post 138/65   Pulse 80   Temp 35.8 °C (96.4 °F) (Temporal)   Resp 15   Ht 1.676 m (5' 6\")   Wt 93.1 kg (205 lb 4 oz)   SpO2 97%   BMI 33.13 kg/m²   Smoking Status " Every Day   BSA 2.08 m²     Wt Readings from Last 5 Encounters:   07/11/24 93.1 kg (205 lb 4 oz)   07/01/24 90 kg (198 lb 6.6 oz)   06/05/24 86.2 kg (190 lb)   10/13/23 84.8 kg (187 lb)   09/21/22 90.3 kg (199 lb)     INTAKE/OUTPUT:  I/O last 3 completed shifts:  In: 2993.8 (32.2 mL/kg) [I.V.:1993.8 (21.4 mL/kg); IV Piggyback:1000]  Out: 6425 (69 mL/kg) [Urine:6305 (1.9 mL/kg/hr); Chest Tube:120]  Weight: 93.1 kg          Vent settings:  FiO2 (%):  [85 %-100 %] 85 %    Physical Exam:   Constitutional: Male patient lying in ICU bed  Neurological: arousable to voice, no obvious focal deficits, moves all extremities  Eyes: Anicteric sclera, clear  Respiratory/Thorax: Diminished, clear breath sounds bilaterally, symmetric chest expansion, V pacing wires present  Cardiovascular: NSR, no murmurs appreciated  Gastrointestinal: abdomen mildly tender and firm at midline, otherwise soft   Genitourinary: Bernal in place draining clear yellow urine  Extremities: warm, bilaterally feet wrapped in bandages  Skin: Warm and dry throughout, midline incision stable - dressing clean, dry, and intact  Psych: Calm and intermittently uncooperative    Daily Risk Screen  Central line: Hemodynamic instability requiring parenteral medication  Bernal: Accurate I/Os in critically ill    Images: Reviewed    Invasive Hemodynamics:    Most Recent Range Past 24hrs   BP (Art) 123/57 Arterial Line BP 1  Min: 100/47  Max: 136/75   MAP(Art) 80 mmHg Arterial Line MAP 1 (mmHg)   Min: 66 mmHg  Max: 120 mmHg     Assessment/Plan   Mr. Zepeda is a 58-year-old male with a PMH of RA (on chronic prednisone and hydroxychloroquine), type 2 DM (on insulin), COPD, hypothyroidism, 40-pack-year smoking history, chronic methadone use, peripheral neuropathy, and unhealed diabetic foot ulcers s/p right foot partial amputation (7 months ago - follows with wound clinic as outpatient) and most recently NSTEMI. He is now s/p CABGx3 with Dr. Carey on  7/9.    Plan:  NEURO: PMH of chronic methadone use and peripheral neuropathy secondary to T2DM. Acute post operative pain uncontrolled on current regimen. Takes methadone at home - dosing confirmed with pharmacy. A+Ox3, moves all extremities, no obvious focal deficits. Not yet up to chair, but tolerating chair position in bed. Pt continued to be overly somnolent in the absence of sedatives so urine drug tox taken which came back inappropriately positive for fentanyl. Suspected family involvement so visitation no longer allowed.-->  - Serial neuro and pain assessments   - Acute pain consult: ARMIDA catheters removed today  - Scheduled Tylenol   - Discontinue PRN oxycodone and dilaudid  - Continue methadone 77.5mg q8h  - Continue home plaquenil 200mg BID  - PT Consult, OOB to chair as tolerated, chair position if not tolerated --> goal OOB today  - CAM ICU score qshift  - Sleep/wake cycle hygiene  - Start CIWA monitoring and add thiamine and folic acid  - urine drug tox: inappropriately positive for fentanyl  - Continue home duloxetine and pregabalin    CV: Patient has a history of NSTEMI. Is now status post CABGx3 Pre: Mildly decreased LV function, EF 40-45%. Post: normal LV function. Arrived to CTICU on V epicardial wires set VVI @ 50. Afib with RVR to the 130s requiring bolus and amio drip. Normotensive. Hypervolemia. -->  - Maintain goal MAP 70 - 90  - See  for diuresis  - Maintain epicardial wires set VVI @ 50  - Daily aspirin  - Start plavix   - stop amio infusion  - start amio 400mg PO BID  - Continue atorvastatin 80mg nightly  - Hold home lisinopril    PULM: PMH of COPD and smoking. Currently on CPAP 90% FiO2. Morning CXR with pulmonary edema and atelectasis.  -->  - CXR daily and prn  - Wean FiO2 maintaining SpO2 >85  - Stop CPAP  - Follow up ABG  - IS q1h and OOB to chair  - Continue tiotropium  - Prn albuterol    GI: No history. Passed bedside swallow evaluation. Abdomen soft, non tender, and non distended.  No BM yet.-->  - Colace/senna BID and miralax BID  - added suppository    : CSA-JUAN J Risk Score 2. No history of renal disease, baseline creatinine 0.7. Creatinine stable post-op at 0.71. Carpenter in place and making adequate UOP. Appears hypervolemic. -->  - Continue carpenter catheter for strict I/Os  - Continue lasix infusion   - Goal net negative 1 to 2L  - Goal UOP 0.5ml/kg/hr  - RFP as clinically indicated  - Replete electrolytes per CTICU protocol    ENDO: PMH of insulin-dependent diabetes (A1c: 8.5) and hypothyroidism. BG well controlled on SSI.-->  - Maintain BG <180, insulin per CTICU protocol  - Continue levothyroxine  - SSI    HEME: Acute blood loss anemia. Hgb stable at 8.6. -->    - Monitor drain output volume and characteristics  - Daily CBC and prn  - Daily aspirin  - start plavix today  - Continue SQH   - SCDs for DVT prophylaxis.  - Last type and screen: 7/12    ID: Afebrile, no current indications of infection. MRSA negative .-->  - Trend temp q4h    Skin: No active skin issues. -->  - preventative Mepilex dressings in place on sacrum and heels  - change preventative Mepilex weekly or more frequently as indicated (when moist/soiled)   - every shift skin assessment per nursing and weekly ICU skin rounds  - moisture barrier to be applied with rosy care  - active skin problems addressed with nursing on daily rounds    Proph:  SCDs  SQH    G: Line  Right IJ MAC w Minimac placed 7/9  Left brachial a-line placed 7/9  Carpenter placed 7/9    Restraints: Not indicated.    Code status: Full Code    A,B,C,D,E,F,G: reviewed    Discussed with Dr. Alexis.  Dispo: CTICU care for now.    CTICU TEAM PHONE [phone]

## 2024-07-14 ENCOUNTER — APPOINTMENT (OUTPATIENT)
Dept: RADIOLOGY | Facility: HOSPITAL | Age: 59
DRG: 235 | End: 2024-07-14
Payer: MEDICARE

## 2024-07-14 LAB
ALBUMIN SERPL BCP-MCNC: 3.6 G/DL (ref 3.4–5)
ALBUMIN SERPL BCP-MCNC: 3.6 G/DL (ref 3.4–5)
ANION GAP BLDA CALCULATED.4IONS-SCNC: 10 MMO/L (ref 10–25)
ANION GAP BLDA CALCULATED.4IONS-SCNC: 8 MMO/L (ref 10–25)
ANION GAP SERPL CALC-SCNC: 17 MMOL/L (ref 10–20)
ANION GAP SERPL CALC-SCNC: 18 MMOL/L (ref 10–20)
BASE EXCESS BLDA CALC-SCNC: 6.6 MMOL/L (ref -2–3)
BASE EXCESS BLDA CALC-SCNC: 7.2 MMOL/L (ref -2–3)
BODY TEMPERATURE: 37 DEGREES CELSIUS
BODY TEMPERATURE: 37 DEGREES CELSIUS
BUN SERPL-MCNC: 44 MG/DL (ref 6–23)
BUN SERPL-MCNC: 45 MG/DL (ref 6–23)
CA-I BLD-SCNC: 0.96 MMOL/L (ref 1.1–1.33)
CA-I BLD-SCNC: 1.03 MMOL/L (ref 1.1–1.33)
CA-I BLDA-SCNC: 1.02 MMOL/L (ref 1.1–1.33)
CA-I BLDA-SCNC: 1.05 MMOL/L (ref 1.1–1.33)
CALCIUM SERPL-MCNC: 8.2 MG/DL (ref 8.6–10.6)
CALCIUM SERPL-MCNC: 8.6 MG/DL (ref 8.6–10.6)
CHLORIDE BLDA-SCNC: 100 MMOL/L (ref 98–107)
CHLORIDE BLDA-SCNC: 98 MMOL/L (ref 98–107)
CHLORIDE SERPL-SCNC: 93 MMOL/L (ref 98–107)
CHLORIDE SERPL-SCNC: 94 MMOL/L (ref 98–107)
CO2 SERPL-SCNC: 30 MMOL/L (ref 21–32)
CO2 SERPL-SCNC: 32 MMOL/L (ref 21–32)
CREAT SERPL-MCNC: 1.21 MG/DL (ref 0.5–1.3)
CREAT SERPL-MCNC: 1.31 MG/DL (ref 0.5–1.3)
EGFRCR SERPLBLD CKD-EPI 2021: 63 ML/MIN/1.73M*2
EGFRCR SERPLBLD CKD-EPI 2021: 69 ML/MIN/1.73M*2
ERYTHROCYTE [DISTWIDTH] IN BLOOD BY AUTOMATED COUNT: 18.1 % (ref 11.5–14.5)
ERYTHROCYTE [DISTWIDTH] IN BLOOD BY AUTOMATED COUNT: 18.4 % (ref 11.5–14.5)
GLUCOSE BLD MANUAL STRIP-MCNC: 114 MG/DL (ref 74–99)
GLUCOSE BLD MANUAL STRIP-MCNC: 123 MG/DL (ref 74–99)
GLUCOSE BLD MANUAL STRIP-MCNC: 287 MG/DL (ref 74–99)
GLUCOSE BLD MANUAL STRIP-MCNC: 307 MG/DL (ref 74–99)
GLUCOSE BLD MANUAL STRIP-MCNC: 87 MG/DL (ref 74–99)
GLUCOSE BLDA-MCNC: 105 MG/DL (ref 74–99)
GLUCOSE BLDA-MCNC: 155 MG/DL (ref 74–99)
GLUCOSE SERPL-MCNC: 146 MG/DL (ref 74–99)
GLUCOSE SERPL-MCNC: 91 MG/DL (ref 74–99)
HCO3 BLDA-SCNC: 31.2 MMOL/L (ref 22–26)
HCO3 BLDA-SCNC: 31.3 MMOL/L (ref 22–26)
HCT VFR BLD AUTO: 22.8 % (ref 41–52)
HCT VFR BLD AUTO: 24.2 % (ref 41–52)
HCT VFR BLD EST: 25 % (ref 41–52)
HCT VFR BLD EST: 25 % (ref 41–52)
HGB BLD-MCNC: 7.5 G/DL (ref 13.5–17.5)
HGB BLD-MCNC: 8 G/DL (ref 13.5–17.5)
HGB BLDA-MCNC: 8.2 G/DL (ref 13.5–17.5)
HGB BLDA-MCNC: 8.4 G/DL (ref 13.5–17.5)
INHALED O2 CONCENTRATION: 60 %
INHALED O2 CONCENTRATION: 90 %
LACTATE BLDA-SCNC: 1 MMOL/L (ref 0.4–2)
LACTATE BLDA-SCNC: 1.3 MMOL/L (ref 0.4–2)
MAGNESIUM SERPL-MCNC: 1.86 MG/DL (ref 1.6–2.4)
MAGNESIUM SERPL-MCNC: 1.99 MG/DL (ref 1.6–2.4)
MCH RBC QN AUTO: 23.6 PG (ref 26–34)
MCH RBC QN AUTO: 24 PG (ref 26–34)
MCHC RBC AUTO-ENTMCNC: 32.9 G/DL (ref 32–36)
MCHC RBC AUTO-ENTMCNC: 33.1 G/DL (ref 32–36)
MCV RBC AUTO: 72 FL (ref 80–100)
MCV RBC AUTO: 73 FL (ref 80–100)
NRBC BLD-RTO: 0.3 /100 WBCS (ref 0–0)
NRBC BLD-RTO: 0.4 /100 WBCS (ref 0–0)
OXYHGB MFR BLDA: 94 % (ref 94–98)
OXYHGB MFR BLDA: 97.6 % (ref 94–98)
PCO2 BLDA: 41 MM HG (ref 38–42)
PCO2 BLDA: 45 MM HG (ref 38–42)
PH BLDA: 7.45 PH (ref 7.38–7.42)
PH BLDA: 7.49 PH (ref 7.38–7.42)
PHOSPHATE SERPL-MCNC: 4.7 MG/DL (ref 2.5–4.9)
PHOSPHATE SERPL-MCNC: 5.5 MG/DL (ref 2.5–4.9)
PLATELET # BLD AUTO: 274 X10*3/UL (ref 150–450)
PLATELET # BLD AUTO: 307 X10*3/UL (ref 150–450)
PO2 BLDA: 169 MM HG (ref 85–95)
PO2 BLDA: 75 MM HG (ref 85–95)
POTASSIUM BLDA-SCNC: 2.9 MMOL/L (ref 3.5–5.3)
POTASSIUM BLDA-SCNC: 3.7 MMOL/L (ref 3.5–5.3)
POTASSIUM SERPL-SCNC: 3.4 MMOL/L (ref 3.5–5.3)
POTASSIUM SERPL-SCNC: 3.7 MMOL/L (ref 3.5–5.3)
RBC # BLD AUTO: 3.18 X10*6/UL (ref 4.5–5.9)
RBC # BLD AUTO: 3.33 X10*6/UL (ref 4.5–5.9)
SAO2 % BLDA: 100 % (ref 94–100)
SAO2 % BLDA: 95 % (ref 94–100)
SODIUM BLDA-SCNC: 135 MMOL/L (ref 136–145)
SODIUM BLDA-SCNC: 136 MMOL/L (ref 136–145)
SODIUM SERPL-SCNC: 137 MMOL/L (ref 136–145)
SODIUM SERPL-SCNC: 140 MMOL/L (ref 136–145)
WBC # BLD AUTO: 12.4 X10*3/UL (ref 4.4–11.3)
WBC # BLD AUTO: 15.4 X10*3/UL (ref 4.4–11.3)

## 2024-07-14 PROCEDURE — 71045 X-RAY EXAM CHEST 1 VIEW: CPT

## 2024-07-14 PROCEDURE — 84132 ASSAY OF SERUM POTASSIUM: CPT

## 2024-07-14 PROCEDURE — 2500000004 HC RX 250 GENERAL PHARMACY W/ HCPCS (ALT 636 FOR OP/ED): Performed by: NURSE PRACTITIONER

## 2024-07-14 PROCEDURE — 2500000005 HC RX 250 GENERAL PHARMACY W/O HCPCS: Performed by: STUDENT IN AN ORGANIZED HEALTH CARE EDUCATION/TRAINING PROGRAM

## 2024-07-14 PROCEDURE — P9045 ALBUMIN (HUMAN), 5%, 250 ML: HCPCS | Mod: JZ

## 2024-07-14 PROCEDURE — 99291 CRITICAL CARE FIRST HOUR: CPT | Performed by: STUDENT IN AN ORGANIZED HEALTH CARE EDUCATION/TRAINING PROGRAM

## 2024-07-14 PROCEDURE — 2500000004 HC RX 250 GENERAL PHARMACY W/ HCPCS (ALT 636 FOR OP/ED)

## 2024-07-14 PROCEDURE — 2500000001 HC RX 250 WO HCPCS SELF ADMINISTERED DRUGS (ALT 637 FOR MEDICARE OP)

## 2024-07-14 PROCEDURE — 94640 AIRWAY INHALATION TREATMENT: CPT

## 2024-07-14 PROCEDURE — 2500000002 HC RX 250 W HCPCS SELF ADMINISTERED DRUGS (ALT 637 FOR MEDICARE OP, ALT 636 FOR OP/ED)

## 2024-07-14 PROCEDURE — 82330 ASSAY OF CALCIUM: CPT

## 2024-07-14 PROCEDURE — 83735 ASSAY OF MAGNESIUM: CPT

## 2024-07-14 PROCEDURE — 2500000002 HC RX 250 W HCPCS SELF ADMINISTERED DRUGS (ALT 637 FOR MEDICARE OP, ALT 636 FOR OP/ED): Performed by: STUDENT IN AN ORGANIZED HEALTH CARE EDUCATION/TRAINING PROGRAM

## 2024-07-14 PROCEDURE — 85027 COMPLETE CBC AUTOMATED: CPT

## 2024-07-14 PROCEDURE — 37799 UNLISTED PX VASCULAR SURGERY: CPT

## 2024-07-14 PROCEDURE — 2500000001 HC RX 250 WO HCPCS SELF ADMINISTERED DRUGS (ALT 637 FOR MEDICARE OP): Performed by: STUDENT IN AN ORGANIZED HEALTH CARE EDUCATION/TRAINING PROGRAM

## 2024-07-14 PROCEDURE — 2500000004 HC RX 250 GENERAL PHARMACY W/ HCPCS (ALT 636 FOR OP/ED): Performed by: STUDENT IN AN ORGANIZED HEALTH CARE EDUCATION/TRAINING PROGRAM

## 2024-07-14 PROCEDURE — 82947 ASSAY GLUCOSE BLOOD QUANT: CPT

## 2024-07-14 PROCEDURE — 2020000001 HC ICU ROOM DAILY

## 2024-07-14 PROCEDURE — 71045 X-RAY EXAM CHEST 1 VIEW: CPT | Performed by: STUDENT IN AN ORGANIZED HEALTH CARE EDUCATION/TRAINING PROGRAM

## 2024-07-14 PROCEDURE — S0109 METHADONE ORAL 5MG: HCPCS

## 2024-07-14 RX ORDER — DEXTROSE 50 % IN WATER (D50W) INTRAVENOUS SYRINGE
12.5
Status: DISCONTINUED | OUTPATIENT
Start: 2024-07-14 | End: 2024-07-15

## 2024-07-14 RX ORDER — DEXMEDETOMIDINE HYDROCHLORIDE 4 UG/ML
0-1.5 INJECTION, SOLUTION INTRAVENOUS CONTINUOUS
Status: DISCONTINUED | OUTPATIENT
Start: 2024-07-14 | End: 2024-07-16

## 2024-07-14 RX ORDER — DEXTROSE 50 % IN WATER (D50W) INTRAVENOUS SYRINGE
25
Status: DISCONTINUED | OUTPATIENT
Start: 2024-07-14 | End: 2024-07-15

## 2024-07-14 RX ORDER — INSULIN LISPRO 100 [IU]/ML
0-15 INJECTION, SOLUTION INTRAVENOUS; SUBCUTANEOUS EVERY 4 HOURS
Status: DISCONTINUED | OUTPATIENT
Start: 2024-07-14 | End: 2024-07-17

## 2024-07-14 RX ORDER — ALBUMIN HUMAN 50 G/1000ML
25 SOLUTION INTRAVENOUS ONCE
Status: COMPLETED | OUTPATIENT
Start: 2024-07-14 | End: 2024-07-14

## 2024-07-14 RX ORDER — ALBUMIN HUMAN 50 G/1000ML
SOLUTION INTRAVENOUS
Status: COMPLETED
Start: 2024-07-14 | End: 2024-07-14

## 2024-07-14 RX ORDER — DEXMEDETOMIDINE HYDROCHLORIDE 4 UG/ML
INJECTION, SOLUTION INTRAVENOUS
Status: COMPLETED
Start: 2024-07-14 | End: 2024-07-14

## 2024-07-14 ASSESSMENT — LIFESTYLE VARIABLES
ANXIETY: NO ANXIETY, AT EASE
ORIENTATION AND CLOUDING OF SENSORIUM: ORIENTED AND CAN DO SERIAL ADDITIONS
PAROXYSMAL SWEATS: NO SWEAT VISIBLE
ANXIETY: NO ANXIETY, AT EASE
TREMOR: NO TREMOR
TREMOR: NO TREMOR
AUDITORY DISTURBANCES: NOT PRESENT
AUDITORY DISTURBANCES: NOT PRESENT
HEADACHE, FULLNESS IN HEAD: NOT PRESENT
ORIENTATION AND CLOUDING OF SENSORIUM: ORIENTED AND CAN DO SERIAL ADDITIONS
AGITATION: NORMAL ACTIVITY
TOTAL SCORE: 0
ANXIETY: NO ANXIETY, AT EASE
VISUAL DISTURBANCES: NOT PRESENT
AUDITORY DISTURBANCES: NOT PRESENT
NAUSEA AND VOMITING: NO NAUSEA AND NO VOMITING
NAUSEA AND VOMITING: NO NAUSEA AND NO VOMITING
VISUAL DISTURBANCES: NOT PRESENT
NAUSEA AND VOMITING: NO NAUSEA AND NO VOMITING
TREMOR: NO TREMOR
ANXIETY: NO ANXIETY, AT EASE
ORIENTATION AND CLOUDING OF SENSORIUM: ORIENTED AND CAN DO SERIAL ADDITIONS
TOTAL SCORE: 0
PAROXYSMAL SWEATS: NO SWEAT VISIBLE
TREMOR: NO TREMOR
TOTAL SCORE: 0
ORIENTATION AND CLOUDING OF SENSORIUM: ORIENTED AND CAN DO SERIAL ADDITIONS
VISUAL DISTURBANCES: NOT PRESENT
ANXIETY: NO ANXIETY, AT EASE
NAUSEA AND VOMITING: NO NAUSEA AND NO VOMITING
ORIENTATION AND CLOUDING OF SENSORIUM: ORIENTED AND CAN DO SERIAL ADDITIONS
HEADACHE, FULLNESS IN HEAD: NOT PRESENT
AUDITORY DISTURBANCES: NOT PRESENT
NAUSEA AND VOMITING: NO NAUSEA AND NO VOMITING
AGITATION: NORMAL ACTIVITY
AGITATION: NORMAL ACTIVITY
TOTAL SCORE: 0
HEADACHE, FULLNESS IN HEAD: NOT PRESENT
ANXIETY: NO ANXIETY, AT EASE
PAROXYSMAL SWEATS: NO SWEAT VISIBLE
VISUAL DISTURBANCES: NOT PRESENT
AUDITORY DISTURBANCES: NOT PRESENT
VISUAL DISTURBANCES: NOT PRESENT
TOTAL SCORE: 0
AGITATION: NORMAL ACTIVITY
HEADACHE, FULLNESS IN HEAD: NOT PRESENT
PAROXYSMAL SWEATS: NO SWEAT VISIBLE
HEADACHE, FULLNESS IN HEAD: NOT PRESENT
PAROXYSMAL SWEATS: NO SWEAT VISIBLE
ORIENTATION AND CLOUDING OF SENSORIUM: ORIENTED AND CAN DO SERIAL ADDITIONS
AGITATION: NORMAL ACTIVITY
TREMOR: NO TREMOR
PAROXYSMAL SWEATS: NO SWEAT VISIBLE
AUDITORY DISTURBANCES: NOT PRESENT
NAUSEA AND VOMITING: NO NAUSEA AND NO VOMITING
HEADACHE, FULLNESS IN HEAD: NOT PRESENT
AGITATION: NORMAL ACTIVITY
VISUAL DISTURBANCES: NOT PRESENT
TOTAL SCORE: 0
TREMOR: NO TREMOR

## 2024-07-14 ASSESSMENT — PAIN - FUNCTIONAL ASSESSMENT
PAIN_FUNCTIONAL_ASSESSMENT: 0-10

## 2024-07-14 ASSESSMENT — PAIN SCALES - GENERAL
PAINLEVEL_OUTOF10: 0 - NO PAIN

## 2024-07-14 NOTE — PROGRESS NOTES
"CTICU Progress Note  Kael Zepeda/[id number]    Admit Date: 7/1/2024  Hospital Length of Stay: 13   ICU Length of Stay: 4d 23h   CT SURGEON:     SUBJECTIVE:   Pt awake on NFNC this morning. Feels ok, slept ok last night. Asking for fruit this morning.    MEDICATIONS  Infusions:  furosemide, Last Rate: 20 mg/hr (07/14/24 1200)  lactated Ringer's, Last Rate: 5 mL/hr (07/14/24 1200)  lactated Ringer's, Last Rate: Stopped (07/13/24 1100)      Scheduled:  acetaminophen, 975 mg, q6h  albumin human, 25 g, Once  amiodarone, 400 mg, BID  aspirin, 81 mg, Daily  atorvastatin, 80 mg, Nightly  bisacodyl, 10 mg, Once  clopidogrel, 75 mg, Daily  DULoxetine, 60 mg, Daily  folic acid, 1 mg, Daily  heparin, 5,000 Units, q8h  hydroxychloroquine, 200 mg, BID  insulin lispro, 0-15 Units, q4h  levothyroxine, 50 mcg, Daily  methadone, 77.5 mg, q8h  metoprolol tartrate, 25 mg, BID  multivitamin with minerals, 1 tablet, Daily  oxygen, , Continuous - Inhalation  polyethylene glycol, 17 g, Daily  potassium chloride, 40 mEq, Once  predniSONE, 10 mg, Daily  pregabalin, 200 mg, TID  sennosides-docusate sodium, 2 tablet, BID  [START ON 7/15/2024] thiamine, 100 mg, Daily  tiotropium, 2 puff, Daily      PRN:  albuterol, 2.5 mg, q6h PRN  alteplase, 2 mg, PRN  calcium gluconate, 1 g, q6h PRN  calcium gluconate, 2 g, q6h PRN  dextrose, 25 g, q15 min PRN   Or  glucagon, 1 mg, q15 min PRN  magnesium sulfate, 2 g, q6h PRN  magnesium sulfate, 4 g, q6h PRN  naloxone, 0.2 mg, q5 min PRN  ondansetron, 4 mg, q8h PRN   Or  ondansetron, 4 mg, q8h PRN  [Held by provider] oxyCODONE, 5 mg, q4h PRN  oxygen, , Continuous PRN - O2/gases  potassium chloride, 20 mEq, q6h PRN  potassium chloride, 40 mEq, q6h PRN  sodium chloride, 1 spray, 4x daily PRN        PHYSICAL EXAM:   Visit Vitals  /64   Pulse 62   Temp 36.9 °C (98.4 °F) (Temporal)   Resp 15   Ht 1.676 m (5' 6\")   Wt 90 kg (198 lb 6.6 oz)   SpO2 90%   BMI 32.02 kg/m²   Smoking Status Every Day   BSA " 2.05 m²     Wt Readings from Last 5 Encounters:   07/14/24 90 kg (198 lb 6.6 oz)   07/01/24 90 kg (198 lb 6.6 oz)   06/05/24 86.2 kg (190 lb)   10/13/23 84.8 kg (187 lb)   09/21/22 90.3 kg (199 lb)     INTAKE/OUTPUT:  I/O last 3 completed shifts:  In: 2197.8 (24.4 mL/kg) [P.O.:710; I.V.:1087.8 (12.1 mL/kg); Blood:100; IV Piggyback:300]  Out: 3550 (39.4 mL/kg) [Urine:3550 (1.1 mL/kg/hr)]  Weight: 90 kg          Vent settings:  FiO2 (%):  [60 %-90 %] 60 %    Physical Exam:   Constitutional: Male patient lying in ICU bed  Neurological: awake, alert, no obvious focal deficits  Eyes: Anicteric sclera, clear  Respiratory/Thorax: Diminished, clear breath sounds bilaterally, symmetric chest expansion, V pacing wires present  Cardiovascular: NSR, no murmurs appreciated  Gastrointestinal: abdomen mildly tender and soft   Genitourinary: Bernal in place draining clear yellow urine  Extremities: warm, bilaterally feet wrapped in bandages  Skin: Warm and dry throughout, midline incision stable - dressing clean, dry, and intact  Psych: Calm, cooperative    Daily Risk Screen  Central line: Hemodynamic instability requiring parenteral medication  Bernal: Accurate I/Os in critically ill    Images: Reviewed    Invasive Hemodynamics:    Most Recent Range Past 24hrs   BP (Art) 91/43 Arterial Line BP 1  Min: 87/45  Max: 173/140   MAP(Art) 60 mmHg Arterial Line MAP 1 (mmHg)   Min: 60 mmHg  Max: 354 mmHg     Assessment/Plan   Mr. Zepeda is a 58-year-old male with a PMH of RA (on chronic prednisone and hydroxychloroquine), type 2 DM (on insulin), COPD, hypothyroidism, 40-pack-year smoking history, chronic methadone use, peripheral neuropathy, and unhealed diabetic foot ulcers s/p right foot partial amputation (7 months ago - follows with wound clinic as outpatient) and most recently NSTEMI. He is now s/p CABGx3 with Dr. Carey on 7/9.    Plan:  NEURO: PMH of chronic methadone use and peripheral neuropathy secondary to T2DM. Acute post  operative pain uncontrolled on current regimen. Takes methadone at home - dosing confirmed with pharmacy. A+Ox3, moves all extremities, no obvious focal deficits. Not yet up to chair, but tolerating chair position in bed. Pt continued to be overly somnolent in the absence of sedatives so urine drug tox taken which came back inappropriately positive for fentanyl. Suspected family involvement so visitation no longer allowed. Pt now more awake.-->  - Serial neuro and pain assessments   - Scheduled Tylenol   - Continue methadone 77.5mg q8h  - Continue home plaquenil 200mg BID  - PT Consult, OOB to chair as tolerated, chair position if not tolerated --> goal OOB today  - CAM ICU score qshift  - Sleep/wake cycle hygiene  - Start CIWA monitoring and add thiamine and folic acid  - urine drug tox: inappropriately positive for fentanyl  - Continue home duloxetine and pregabalin    CV: Patient has a history of NSTEMI. Is now status post CABGx3 Pre: Mildly decreased LV function, EF 40-45%. Post: normal LV function. Arrived to CTICU on V epicardial wires that are capped. Afib with RVR to the 130s requiring bolus and amio drip. Now on PO amio. Normotensive.  -->  - Maintain goal MAP 70 - 90  - See  for diuresis  - Daily aspirin  - continue plavix   - continue amio 400mg PO BID  - Continue atorvastatin 80mg nightly  - Hold home lisinopril    PULM: PMH of COPD and smoking. Currently on CPAP 90% FiO2. Morning CXR with pulmonary edema and atelectasis.  -->  - CXR daily and prn  - prednisone 10mg daily  - Wean FiO2 maintaining SpO2 >85 and paO2 >60  - off HFNC to NC  - ok for intermittent positive pressure ventilation, likely needs CPAP overnight  - Follow up ABG  - IS q1h and OOB to chair  - Continue tiotropium  - Prn albuterol    GI: No history. Passed bedside swallow evaluation. Abdomen soft, non tender, and non distended. No BM yet.-->  - Colace/senna BID and miralax BID  - added suppository  - NPO removed and diabetic diet  started    : CSA-JUAN J Risk Score 2. No history of renal disease, baseline creatinine 0.7. Creatinine stable post-op at 0.71. Carpenter in place. On lasix drip for hypervolemia. UOP dropping and peripheral edema improved but CXR still wet. -->  - Continue carpenter catheter for strict I/Os  - Continue lasix infusion   - Goal net negative 500 to 1L  - fluid resuscitation as appropriate to maintain MAP's  - Goal UOP 0.5ml/kg/hr  - RFP as clinically indicated  - Replete electrolytes per CTICU protocol    ENDO: PMH of insulin-dependent diabetes (A1c: 8.5) and hypothyroidism. BG well controlled on SSI.-->  - Maintain BG <180, insulin per CTICU protocol  - Continue levothyroxine  - SSI    HEME: Acute blood loss anemia. Hgb stable at 8.6. -->    - Monitor drain output volume and characteristics  - Daily CBC and prn  - Daily aspirin  - continue plavix  - Continue SQH   - SCDs for DVT prophylaxis.  - Last type and screen: 7/12    ID: Afebrile, no current indications of infection. MRSA negative .-->  - Trend temp q4h    Skin: No active skin issues. -->  - preventative Mepilex dressings in place on sacrum and heels  - change preventative Mepilex weekly or more frequently as indicated (when moist/soiled)   - every shift skin assessment per nursing and weekly ICU skin rounds  - moisture barrier to be applied with rosy care  - active skin problems addressed with nursing on daily rounds    Proph:  SCDs  SQH    G: Line  Right IJ MAC w Minimac placed 7/9  Left brachial a-line placed 7/9  Carpenter placed 7/9    Restraints: Not indicated.    Code status: Full Code    A,B,C,D,E,F,G: reviewed    Discussed with Dr. Alexis.  Dispo: CTICU care for now.    CTICU TEAM PHONE [phone]

## 2024-07-15 ENCOUNTER — APPOINTMENT (OUTPATIENT)
Dept: RADIOLOGY | Facility: HOSPITAL | Age: 59
End: 2024-07-15
Payer: MEDICARE

## 2024-07-15 ENCOUNTER — APPOINTMENT (OUTPATIENT)
Dept: CARDIOLOGY | Facility: HOSPITAL | Age: 59
End: 2024-07-15
Payer: MEDICARE

## 2024-07-15 LAB
ABO GROUP (TYPE) IN BLOOD: NORMAL
ALBUMIN SERPL BCP-MCNC: 3.8 G/DL (ref 3.4–5)
ALBUMIN SERPL BCP-MCNC: 4.2 G/DL (ref 3.4–5)
ANION GAP BLDA CALCULATED.4IONS-SCNC: 14 MMO/L (ref 10–25)
ANION GAP SERPL CALC-SCNC: 20 MMOL/L (ref 10–20)
ANION GAP SERPL CALC-SCNC: 21 MMOL/L (ref 10–20)
ANTIBODY SCREEN: NORMAL
BASE EXCESS BLDA CALC-SCNC: 1.9 MMOL/L (ref -2–3)
BLOOD EXPIRATION DATE: NORMAL
BODY TEMPERATURE: 37 DEGREES CELSIUS
BUN SERPL-MCNC: 54 MG/DL (ref 6–23)
BUN SERPL-MCNC: 67 MG/DL (ref 6–23)
CA-I BLD-SCNC: 0.98 MMOL/L (ref 1.1–1.33)
CA-I BLD-SCNC: 1.01 MMOL/L (ref 1.1–1.33)
CA-I BLDA-SCNC: 1.04 MMOL/L (ref 1.1–1.33)
CALCIUM SERPL-MCNC: 8.2 MG/DL (ref 8.6–10.6)
CALCIUM SERPL-MCNC: 8.4 MG/DL (ref 8.6–10.6)
CHLORIDE BLDA-SCNC: 93 MMOL/L (ref 98–107)
CHLORIDE SERPL-SCNC: 91 MMOL/L (ref 98–107)
CHLORIDE SERPL-SCNC: 93 MMOL/L (ref 98–107)
CO2 SERPL-SCNC: 25 MMOL/L (ref 21–32)
CO2 SERPL-SCNC: 27 MMOL/L (ref 21–32)
CREAT SERPL-MCNC: 1.74 MG/DL (ref 0.5–1.3)
CREAT SERPL-MCNC: 2.03 MG/DL (ref 0.5–1.3)
DISPENSE STATUS: NORMAL
EGFRCR SERPLBLD CKD-EPI 2021: 37 ML/MIN/1.73M*2
EGFRCR SERPLBLD CKD-EPI 2021: 45 ML/MIN/1.73M*2
ERYTHROCYTE [DISTWIDTH] IN BLOOD BY AUTOMATED COUNT: 18.1 % (ref 11.5–14.5)
ERYTHROCYTE [DISTWIDTH] IN BLOOD BY AUTOMATED COUNT: 18.6 % (ref 11.5–14.5)
GLUCOSE BLD MANUAL STRIP-MCNC: 112 MG/DL (ref 74–99)
GLUCOSE BLD MANUAL STRIP-MCNC: 161 MG/DL (ref 74–99)
GLUCOSE BLD MANUAL STRIP-MCNC: 169 MG/DL (ref 74–99)
GLUCOSE BLD MANUAL STRIP-MCNC: 179 MG/DL (ref 74–99)
GLUCOSE BLD MANUAL STRIP-MCNC: 211 MG/DL (ref 74–99)
GLUCOSE BLDA-MCNC: 202 MG/DL (ref 74–99)
GLUCOSE SERPL-MCNC: 188 MG/DL (ref 74–99)
GLUCOSE SERPL-MCNC: 97 MG/DL (ref 74–99)
HCO3 BLDA-SCNC: 26.6 MMOL/L (ref 22–26)
HCT VFR BLD AUTO: 22.2 % (ref 41–52)
HCT VFR BLD AUTO: 26.1 % (ref 41–52)
HCT VFR BLD EST: 27 % (ref 41–52)
HGB BLD-MCNC: 7.4 G/DL (ref 13.5–17.5)
HGB BLD-MCNC: 8.5 G/DL (ref 13.5–17.5)
HGB BLDA-MCNC: 9 G/DL (ref 13.5–17.5)
INHALED O2 CONCENTRATION: 44 %
LACTATE BLDA-SCNC: 3.5 MMOL/L (ref 0.4–2)
MAGNESIUM SERPL-MCNC: 2.31 MG/DL (ref 1.6–2.4)
MAGNESIUM SERPL-MCNC: 2.39 MG/DL (ref 1.6–2.4)
MCH RBC QN AUTO: 24.4 PG (ref 26–34)
MCH RBC QN AUTO: 24.4 PG (ref 26–34)
MCHC RBC AUTO-ENTMCNC: 32.6 G/DL (ref 32–36)
MCHC RBC AUTO-ENTMCNC: 33.3 G/DL (ref 32–36)
MCV RBC AUTO: 73 FL (ref 80–100)
MCV RBC AUTO: 75 FL (ref 80–100)
NRBC BLD-RTO: 0.7 /100 WBCS (ref 0–0)
NRBC BLD-RTO: 1.1 /100 WBCS (ref 0–0)
OXYHGB MFR BLDA: 90.7 % (ref 94–98)
PCO2 BLDA: 41 MM HG (ref 38–42)
PH BLDA: 7.42 PH (ref 7.38–7.42)
PHOSPHATE SERPL-MCNC: 5.6 MG/DL (ref 2.5–4.9)
PHOSPHATE SERPL-MCNC: 5.6 MG/DL (ref 2.5–4.9)
PLATELET # BLD AUTO: 247 X10*3/UL (ref 150–450)
PLATELET # BLD AUTO: 322 X10*3/UL (ref 150–450)
PO2 BLDA: 66 MM HG (ref 85–95)
POTASSIUM BLDA-SCNC: 4.5 MMOL/L (ref 3.5–5.3)
POTASSIUM SERPL-SCNC: 4.3 MMOL/L (ref 3.5–5.3)
POTASSIUM SERPL-SCNC: 4.6 MMOL/L (ref 3.5–5.3)
PRODUCT BLOOD TYPE: 6200
PRODUCT CODE: NORMAL
RBC # BLD AUTO: 3.03 X10*6/UL (ref 4.5–5.9)
RBC # BLD AUTO: 3.48 X10*6/UL (ref 4.5–5.9)
RH FACTOR (ANTIGEN D): NORMAL
SAO2 % BLDA: 93 % (ref 94–100)
SODIUM BLDA-SCNC: 129 MMOL/L (ref 136–145)
SODIUM SERPL-SCNC: 132 MMOL/L (ref 136–145)
SODIUM SERPL-SCNC: 136 MMOL/L (ref 136–145)
UNIT ABO: NORMAL
UNIT NUMBER: NORMAL
UNIT RH: NORMAL
UNIT VOLUME: 350
WBC # BLD AUTO: 13.5 X10*3/UL (ref 4.4–11.3)
WBC # BLD AUTO: 14.9 X10*3/UL (ref 4.4–11.3)
XM INTEP: NORMAL

## 2024-07-15 PROCEDURE — 97129 THER IVNTJ 1ST 15 MIN: CPT | Mod: GO

## 2024-07-15 PROCEDURE — 2500000004 HC RX 250 GENERAL PHARMACY W/ HCPCS (ALT 636 FOR OP/ED): Performed by: STUDENT IN AN ORGANIZED HEALTH CARE EDUCATION/TRAINING PROGRAM

## 2024-07-15 PROCEDURE — 2500000001 HC RX 250 WO HCPCS SELF ADMINISTERED DRUGS (ALT 637 FOR MEDICARE OP)

## 2024-07-15 PROCEDURE — 2500000004 HC RX 250 GENERAL PHARMACY W/ HCPCS (ALT 636 FOR OP/ED): Mod: JZ | Performed by: STUDENT IN AN ORGANIZED HEALTH CARE EDUCATION/TRAINING PROGRAM

## 2024-07-15 PROCEDURE — 2500000005 HC RX 250 GENERAL PHARMACY W/O HCPCS: Performed by: STUDENT IN AN ORGANIZED HEALTH CARE EDUCATION/TRAINING PROGRAM

## 2024-07-15 PROCEDURE — 37799 UNLISTED PX VASCULAR SURGERY: CPT

## 2024-07-15 PROCEDURE — 82330 ASSAY OF CALCIUM: CPT

## 2024-07-15 PROCEDURE — 97535 SELF CARE MNGMENT TRAINING: CPT | Mod: GO

## 2024-07-15 PROCEDURE — P9047 ALBUMIN (HUMAN), 25%, 50ML: HCPCS | Mod: JZ

## 2024-07-15 PROCEDURE — 2500000004 HC RX 250 GENERAL PHARMACY W/ HCPCS (ALT 636 FOR OP/ED)

## 2024-07-15 PROCEDURE — 94640 AIRWAY INHALATION TREATMENT: CPT

## 2024-07-15 PROCEDURE — 84132 ASSAY OF SERUM POTASSIUM: CPT

## 2024-07-15 PROCEDURE — 99291 CRITICAL CARE FIRST HOUR: CPT | Performed by: STUDENT IN AN ORGANIZED HEALTH CARE EDUCATION/TRAINING PROGRAM

## 2024-07-15 PROCEDURE — 85027 COMPLETE CBC AUTOMATED: CPT

## 2024-07-15 PROCEDURE — 82947 ASSAY GLUCOSE BLOOD QUANT: CPT

## 2024-07-15 PROCEDURE — 2500000004 HC RX 250 GENERAL PHARMACY W/ HCPCS (ALT 636 FOR OP/ED): Performed by: NURSE PRACTITIONER

## 2024-07-15 PROCEDURE — P9045 ALBUMIN (HUMAN), 5%, 250 ML: HCPCS | Mod: JZ | Performed by: STUDENT IN AN ORGANIZED HEALTH CARE EDUCATION/TRAINING PROGRAM

## 2024-07-15 PROCEDURE — 2500000001 HC RX 250 WO HCPCS SELF ADMINISTERED DRUGS (ALT 637 FOR MEDICARE OP): Performed by: STUDENT IN AN ORGANIZED HEALTH CARE EDUCATION/TRAINING PROGRAM

## 2024-07-15 PROCEDURE — 36430 TRANSFUSION BLD/BLD COMPNT: CPT

## 2024-07-15 PROCEDURE — 86901 BLOOD TYPING SEROLOGIC RH(D): CPT | Performed by: STUDENT IN AN ORGANIZED HEALTH CARE EDUCATION/TRAINING PROGRAM

## 2024-07-15 PROCEDURE — 2500000004 HC RX 250 GENERAL PHARMACY W/ HCPCS (ALT 636 FOR OP/ED): Mod: JZ

## 2024-07-15 PROCEDURE — 2500000002 HC RX 250 W HCPCS SELF ADMINISTERED DRUGS (ALT 637 FOR MEDICARE OP, ALT 636 FOR OP/ED): Performed by: STUDENT IN AN ORGANIZED HEALTH CARE EDUCATION/TRAINING PROGRAM

## 2024-07-15 PROCEDURE — 83735 ASSAY OF MAGNESIUM: CPT

## 2024-07-15 PROCEDURE — 2020000001 HC ICU ROOM DAILY

## 2024-07-15 PROCEDURE — 2500000002 HC RX 250 W HCPCS SELF ADMINISTERED DRUGS (ALT 637 FOR MEDICARE OP, ALT 636 FOR OP/ED)

## 2024-07-15 PROCEDURE — 71045 X-RAY EXAM CHEST 1 VIEW: CPT

## 2024-07-15 PROCEDURE — 97530 THERAPEUTIC ACTIVITIES: CPT | Mod: GO

## 2024-07-15 PROCEDURE — S0109 METHADONE ORAL 5MG: HCPCS

## 2024-07-15 PROCEDURE — P9016 RBC LEUKOCYTES REDUCED: HCPCS

## 2024-07-15 PROCEDURE — 71045 X-RAY EXAM CHEST 1 VIEW: CPT | Performed by: RADIOLOGY

## 2024-07-15 RX ORDER — ALBUMIN HUMAN 250 G/1000ML
25 SOLUTION INTRAVENOUS EVERY 6 HOURS
Status: COMPLETED | OUTPATIENT
Start: 2024-07-15 | End: 2024-07-16

## 2024-07-15 RX ORDER — ALBUMIN HUMAN 250 G/1000ML
SOLUTION INTRAVENOUS
Status: COMPLETED
Start: 2024-07-15 | End: 2024-07-15

## 2024-07-15 RX ORDER — PREDNISONE 20 MG/1
40 TABLET ORAL DAILY
Status: DISPENSED | OUTPATIENT
Start: 2024-07-15 | End: 2024-07-20

## 2024-07-15 RX ORDER — INSULIN GLARGINE 100 [IU]/ML
10 INJECTION, SOLUTION SUBCUTANEOUS EVERY 24 HOURS
Status: DISCONTINUED | OUTPATIENT
Start: 2024-07-15 | End: 2024-07-17

## 2024-07-15 RX ORDER — ALBUMIN HUMAN 50 G/1000ML
12.5 SOLUTION INTRAVENOUS ONCE
Status: COMPLETED | OUTPATIENT
Start: 2024-07-15 | End: 2024-07-15

## 2024-07-15 RX ORDER — ALBUMIN HUMAN 250 G/1000ML
25 SOLUTION INTRAVENOUS ONCE
Status: COMPLETED | OUTPATIENT
Start: 2024-07-15 | End: 2024-07-15

## 2024-07-15 RX ORDER — METOLAZONE 5 MG/1
5 TABLET ORAL DAILY
Status: DISCONTINUED | OUTPATIENT
Start: 2024-07-15 | End: 2024-07-16

## 2024-07-15 ASSESSMENT — PAIN - FUNCTIONAL ASSESSMENT
PAIN_FUNCTIONAL_ASSESSMENT: 0-10

## 2024-07-15 ASSESSMENT — PAIN SCALES - GENERAL
PAINLEVEL_OUTOF10: 0 - NO PAIN
PAINLEVEL_OUTOF10: 5 - MODERATE PAIN
PAINLEVEL_OUTOF10: 0 - NO PAIN

## 2024-07-15 ASSESSMENT — COGNITIVE AND FUNCTIONAL STATUS - GENERAL
PERSONAL GROOMING: A LITTLE
HELP NEEDED FOR BATHING: A LOT
DRESSING REGULAR UPPER BODY CLOTHING: A LITTLE
DRESSING REGULAR LOWER BODY CLOTHING: A LOT
DAILY ACTIVITIY SCORE: 16
TOILETING: A LOT

## 2024-07-15 ASSESSMENT — ACTIVITIES OF DAILY LIVING (ADL): HOME_MANAGEMENT_TIME_ENTRY: 8

## 2024-07-15 ASSESSMENT — PAIN DESCRIPTION - DESCRIPTORS: DESCRIPTORS: ACHING

## 2024-07-15 NOTE — SIGNIFICANT EVENT
2024- called to bedside by RN.  Patient slid out of chair onto the floor.  He said he was getting to the edge of the chair when he slid to the ground.  He did not lose consciousness, did not hit his head. Patient is hemodynamically stable.  He claims he is not in any pain.  Patient placed back in bed, now on fall alert.

## 2024-07-15 NOTE — PROGRESS NOTES
Occupational Therapy    Occupational Therapy Treatment    Name: Kael Zepeda  MRN: [id number]  : 1965  Date: 07/15/24  Time Calculation  Start Time: 929  Stop Time:   Time Calculation (min): 53 min    Assessment:  OT Assessment: Patient demo observed functional tasks with MIN A to CGA. Verbalizing good safety awareness yet requiring up to MOD v/c for safe body positioning and use of WW. Difficulty coping with CLOF and understanding of respiratory issues and post op pain. Receptive to education provided this date and expressing gratitude for assistance. Recommend MOD intensity OT services; may progress to LOW pending in house progress.  Prognosis: Good  Barriers to Discharge: Other (Comment) (safety awareness)  Evaluation/Treatment Tolerance: Patient tolerated treatment well, Patient limited by fatigue (reporting SOB with transfer)  End of Session Communication: Bedside nurse  End of Session Patient Position: Up in chair (Chair alarm present; not properly alarming. RN aware and ok for pt to stay seated in chair with table present and call light present.)  Plan:  Treatment Interventions: ADL retraining, Visual perceptual retraining, Functional transfer training, UE strengthening/ROM, Endurance training, Patient/family training, Fine motor coordination activities, Cognitive reorientation, Equipment evaluation/education, Neuromuscular reeducation, Compensatory technique education  OT Frequency: 3 times per week  OT Discharge Recommendations: Moderate intensity level of continued care  OT Recommended Transfer Status: Assist of 1  OT - OK to Discharge: Yes (when medically appropriate)    Subjective   Previous Visit Info:  OT Last Visit  OT Received On: 07/15/24  General:  General  Family/Caregiver Present: No  Prior to Session Communication: Bedside nurse  Patient Position Received: Bed, 3 rail up, Alarm off, not on at start of session  Preferred Learning Style: auditory, verbal, visual  General Comment:  "\"I didn't know there was a real toilet in here!\" Educated pt on safety concerns at this time with regular toilet with pt reporting increased urgency to use toilet and pt agreeable to use bedpan in chair. RN notified and aware pt left seated on bedpan.  Precautions:  Hearing/Visual Limitations: appears WFL  Medical Precautions: Fall precautions, Oxygen therapy device and L/min, Cardiac precautions (on 8L HFNC)  Post-Surgical Precautions: Move in the Tube  Precautions Comment: MAP 70-90, SpO2>85%, PaO2>60; R post op shoe when OOB (pt declined to wear)  Vitals:  Vital Signs  Heart Rate: 65 (post: 69)  SpO2: 95 % (post: 98%)  BP: 120/55 (post: 101/49)  MAP (mmHg):  (post: 64)  Pain Assessment:  Pain Assessment  Pain Assessment: 0-10  0-10 (Numeric) Pain Score:  (did not rate level of pain)  Pain Type: Acute pain, Surgical pain  Pain Location: Chest  Pain Descriptors: Aching  Pain Frequency: Constant/continuous  Pain Onset: Ongoing  Pain Interventions: Repositioned, Distraction, Emotional support  Response to Interventions: tolerated OT     Objective   Cognition:  Arousal/Alertness: Appropriate responses to stimuli  Orientation Level: Oriented X4  Following Commands: Follows one step commands with increased time (and repetition)  Safety Judgment:  (Verbalized understanding of need for assistance; unsafe body positioning with mod v/c for sequencing with sit<>stand transfers.)  Cognition Comments: Answering all questions appropriately with increased time for answering due to feeling \"tired\" and \"difficulty breathing\" with answering evaluation questions. Alertness improving at end of session with lights and TV on.  Activities of Daily Living: Toileting  Toileting Comments: Patient left seated on bed pan at end of session with RN aware.     Bed Mobility/Transfers: Bed Mobility  Bed Mobility: Yes  Bed Mobility 1  Bed Mobility 1: Supine to sitting  Level of Assistance 1: Minimum assistance  Bed Mobility Comments 1: HOB " elevated; min v/c for sequencing and body positioning; decreased adherence to post op precautions with body positioning.    Transfers  Transfer: Yes  Transfer 1  Technique 1: Sit to stand  Transfer Device 1:  (hand held assist)  Transfer Level of Assistance 1: Minimum assistance  Trials/Comments 1: Cues for hand placement and sequencing with initial stand from EOB.  Transfers 2  Technique 2: Stand pivot  Transfer Device 2:  (arm in arm)  Transfer Level of Assistance 2: Minimum assistance, Minimal verbal cues  Trials/Comments 2: Cues for hand placement, body positioning and sequencing. One minor LOB noted and corrected with min A.  Transfers 3  Technique 3: Sit to stand, Stand to sit  Transfer Device 3: Walker  Transfer Level of Assistance 3: Contact guard, Moderate verbal cues  Trials/Comments 3: MOD v/c for safety awareness with body positioning, hand placement and use of WW. Once following cues provided completing sit<>stand with CGA.       Sitting Balance:  Static Sitting Balance  Static Sitting-Comment/Number of Minutes: SBA  Dynamic Sitting Balance  Dynamic Sitting-Comments: SBA  Standing Balance:  Static Standing Balance  Static Standing-Comment/Number of Minutes: CGA  Dynamic Standing Balance  Dynamic Standing-Comments: MIN A       Cognitive Skill Development:  Cognitive Skill Development  Cognitive Skill Development Activity 1: Reviewed orientation.  Cognitive Skill Development Activity 2: Active listening regarding coping with CLOF.  Cognitive Skill Development Activity 3: Educated pt on safety parameters in place and importance of call light use for all OOB activity.  Cognitive Skill Development Activity 4: Reviewed post op precautions for safety with OOB activity.       Other Activity:       RUE   RUE : Within Functional Limits and LUE   LUE: Within Functional Limits    Outcome Measures:  [facility] Daily Activity  Putting on and taking off regular lower body clothing: A lot  Bathing (including washing,  rinsing, drying): A lot  Putting on and taking off regular upper body clothing: A little  Toileting, which includes using toilet, bedpan or urinal: A lot  Taking care of personal grooming such as brushing teeth: A little  Eating Meals: None  Daily Activity - Total Score: 16    Education Documentation  Body Mechanics, taught by Jennifer Hoffman OT at 7/15/2024 12:04 PM.  Learner: Patient  Readiness: Acceptance  Method: Explanation  Response: Verbalizes Understanding, Needs Reinforcement  Comment: post op precautions; need review for carryover.    Precautions, taught by Jennifer Hoffman OT at 7/15/2024 12:04 PM.  Learner: Patient  Readiness: Acceptance  Method: Explanation  Response: Verbalizes Understanding, Needs Reinforcement  Comment: post op precautions; need review for carryover.    ADL Training, taught by Jennifer Hoffman OT at 7/15/2024 12:04 PM.  Learner: Patient  Readiness: Acceptance  Method: Explanation  Response: Verbalizes Understanding, Needs Reinforcement  Comment: post op precautions; need review for carryover.    Education Comments  No comments found.      Goals:  Encounter Problems       Encounter Problems (Active)       ADLs       Patient will complete toileting with MIN assist and min cues.   (Progressing)       Start:  07/11/24    Expected End:  07/25/24            Patient will complete LB dressing with MIN assist and min cues.   (Progressing)       Start:  07/11/24    Expected End:  07/25/24            Patient will complete UB dressing with SBA and min cues.   (Progressing)       Start:  07/11/24    Expected End:  07/25/24            Patient will complete grooming with MOD I.   (Progressing)       Start:  07/11/24    Expected End:  07/25/24                   BALANCE       Patient will demo sitting balance with SBA for at least 8 min in prep for EOB ADLs. (Progressing)       Start:  07/11/24    Expected End:  07/25/24               MOBILITY       Pt. Will demo household distance functional  mobility with MIN A using LRAD.   (Progressing)       Start:  07/11/24    Expected End:  07/25/24               TRANSFERS       Pt. Will complete stand pivot transfer with MIN assist with min cues and using LRAD.   (Met)       Start:  07/11/24    Expected End:  07/25/24    Resolved:  07/15/24    Updated to: Pt. Will complete stand pivot transfer with SBA with min cues and using LRAD.    Update reason: goal met         Pt. Will complete stand pivot transfer with SBA with min cues and using LRAD.       Start:  07/15/24    Expected End:  07/25/24                       07/15/24 at 12:05 PM - Jennifer Hoffman OT

## 2024-07-15 NOTE — NURSING NOTE
"Brief visit today with Mr. Zepeda.  He is up in chair and states he feel \"a little shaky\" today.  We reviewed recent BG and insulin regimen.  He is agreeable to follow up mid week when out of ICU.  He is well versed with insulin regimens and follows his med regimen at home.    "

## 2024-07-15 NOTE — PROGRESS NOTES
CTICU Progress Note  Kael Zepeda/[id number]    Admit Date: 7/1/2024  Hospital Length of Stay: 14   ICU Length of Stay: 5d 20h   CT SURGEON:     SUBJECTIVE:   Pt awake on NC. Overnight, slid off bedside chair to floor. Did not hit head or lose consciousness, was helped back to bed. This morning, slept well, no pain, thinks he needs to have a BM.    MEDICATIONS  Infusions:  dexmedeTOMIDine, Last Rate: Stopped (07/15/24 0709)  furosemide, Last Rate: 20 mg/hr (07/15/24 1000)  lactated Ringer's, Last Rate: 5 mL/hr (07/15/24 1000)  lactated Ringer's, Last Rate: Stopped (07/13/24 1100)      Scheduled:  acetaminophen, 975 mg, q6h  albumin human, 25 g, q6h  amiodarone, 400 mg, BID  aspirin, 81 mg, Daily  atorvastatin, 80 mg, Nightly  clopidogrel, 75 mg, Daily  DULoxetine, 60 mg, Daily  folic acid, 1 mg, Daily  heparin, 5,000 Units, q8h  hydroxychloroquine, 200 mg, BID  insulin glargine, 10 Units, q24h  insulin lispro, 0-15 Units, q4h  levothyroxine, 50 mcg, Daily  methadone, 77.5 mg, q8h  metOLazone, 5 mg, Daily  metoprolol tartrate, 25 mg, BID  multivitamin with minerals, 1 tablet, Daily  oxygen, , Continuous - Inhalation  polyethylene glycol, 17 g, Daily  predniSONE, 40 mg, Daily  pregabalin, 200 mg, TID  sennosides-docusate sodium, 2 tablet, BID  thiamine, 100 mg, Daily  tiotropium, 2 puff, Daily      PRN:  albuterol, 2.5 mg, q6h PRN  alteplase, 2 mg, PRN  calcium gluconate, 1 g, q6h PRN  calcium gluconate, 2 g, q6h PRN  dextrose, 25 g, q15 min PRN   Or  glucagon, 1 mg, q15 min PRN  magnesium sulfate, 2 g, q6h PRN  magnesium sulfate, 4 g, q6h PRN  naloxone, 0.2 mg, q5 min PRN  ondansetron, 4 mg, q8h PRN   Or  ondansetron, 4 mg, q8h PRN  [Held by provider] oxyCODONE, 5 mg, q4h PRN  oxygen, , Continuous PRN - O2/gases  potassium chloride, 20 mEq, q6h PRN  potassium chloride, 40 mEq, q6h PRN  sodium chloride, 1 spray, 4x daily PRN        PHYSICAL EXAM:   Visit Vitals  BP (!) 97/49 (BP Location: Right arm, Patient  "Position: Lying)   Pulse 68   Temp 37.3 °C (99.1 °F) (Bladder)   Resp 18   Ht 1.676 m (5' 6\")   Wt 90 kg (198 lb 6.6 oz)   SpO2 98%   BMI 32.02 kg/m²   Smoking Status Every Day   BSA 2.05 m²     Wt Readings from Last 5 Encounters:   07/14/24 90 kg (198 lb 6.6 oz)   07/01/24 90 kg (198 lb 6.6 oz)   06/05/24 86.2 kg (190 lb)   10/13/23 84.8 kg (187 lb)   09/21/22 90.3 kg (199 lb)     INTAKE/OUTPUT:  I/O last 3 completed shifts:  In: 2708.8 (30.1 mL/kg) [P.O.:1283; I.V.:325.8 (3.6 mL/kg); Blood:600; IV Piggyback:500]  Out: 2020 (22.4 mL/kg) [Urine:2020 (0.6 mL/kg/hr)]  Weight: 90 kg          Vent settings:  FiO2 (%):  [40 %-50 %] 40 %    Physical Exam:   Constitutional: Male patient lying in ICU bed  Neurological: awake, alert, no obvious focal deficits  Eyes: Anicteric sclera, clear  Respiratory/Thorax: Diminished, clear breath sounds bilaterally, symmetric chest expansion, V pacing wires present  Cardiovascular: NSR, no murmurs appreciated  Gastrointestinal: abdomen mildly tender and soft   Genitourinary: Bernal in place draining clear yellow urine  Extremities: warm, bilaterally feet wrapped in bandages  Skin: Warm and dry throughout, midline incision stable - dressing clean, dry, and intact with minimal strikethrough on lower border  Psych: Calm, cooperative    Daily Risk Screen  Central line: Hemodynamic instability requiring parenteral medication --> d/c today  Bernal: Accurate I/Os in critically ill    Images: Reviewed    Invasive Hemodynamics:    Most Recent Range Past 24hrs   BP (Art) 103/57 Arterial Line BP 1  Min: 75/40  Max: 112/52   MAP(Art) 76 mmHg Arterial Line MAP 1 (mmHg)   Min: 55 mmHg  Max: 180 mmHg     Assessment/Plan   Mr. Zepeda is a 58-year-old male with a PMH of RA (on chronic prednisone and hydroxychloroquine), type 2 DM (on insulin), COPD, hypothyroidism, 40-pack-year smoking history, chronic methadone use, peripheral neuropathy, and unhealed diabetic foot ulcers s/p right foot partial " amputation (7 months ago - follows with wound clinic as outpatient) and most recently NSTEMI. He is now s/p CABGx3 with Dr. Carey on 7/9.    Plan:  NEURO: PMH of chronic methadone use and peripheral neuropathy secondary to T2DM. Acute post operative pain uncontrolled on current regimen. Takes methadone at home - dosing confirmed with pharmacy. A+Ox3, moves all extremities, no obvious focal deficits. Not yet up to chair, but tolerating chair position in bed. Pt continued to be overly somnolent in the absence of sedatives so urine drug tox taken which came back inappropriately positive for fentanyl. Suspected family involvement so visitation no longer allowed. Pt now more awake.-->  - Serial neuro and pain assessments   - Scheduled Tylenol   - Continue methadone 77.5mg q8h  - Continue home plaquenil 200mg BID  - PT Consult, OOB to chair as tolerated, chair position if not tolerated --> goal OOB today  - CAM ICU score qshift  - Sleep/wake cycle hygiene  - Start CIWA monitoring and add thiamine and folic acid  - urine drug tox: inappropriately positive for fentanyl  - Continue home duloxetine  - pregabalin adjusted to home dose    CV: Patient has a history of NSTEMI. Is now status post CABGx3 Pre: Mildly decreased LV function, EF 40-45%. Post: normal LV function. Arrived to CTICU on V epicardial wires that are now connected but off. Afib with RVR to the 130s requiring bolus and amio drip. Now on PO amio. Normotensive.  -->  - Maintain goal MAP 70 - 90  - See  for diuresis  - Daily aspirin  - continue plavix   - continue amio 400mg PO BID  - Continue atorvastatin 80mg nightly  - continue metop 25mg BID  - Hold home lisinopril    PULM: PMH of COPD and smoking. Morning CXR with continued pulmonary edema and atelectasis.  -->  - CXR daily and prn  - increase prednisone to 40mg x5 days for COPD exacerbation, then decrease to home 5mg  - Wean FiO2 maintaining SpO2 >85 and paO2 >60  - off HFNC to NC  - CPAP overnight  -  Follow up ABG  - IS q1h and OOB to chair  - Continue tiotropium  - Prn albuterol    GI: No history. Passed bedside swallow evaluation. Abdomen soft, non tender, and non distended. No BM yet.-->  - Colace/senna BID and miralax BID  - added suppository  - diabetic diet     : CSA-JUAN J Risk Score 2. No history of renal disease, baseline creatinine 0.7. Creatinine stable post-op at 0.71, now increasing to 1.7; will continue to monitor. Carpenter in place. On lasix drip for hypervolemia. UOP dropping and peripheral edema improved but CXR still wet. -->  - Continue carpenter catheter for strict I/Os  - Continue lasix infusion   - Goal net negative 500 to 1L  - scheduled concentrated albumin  - start metolazone 5mg  - Goal UOP 0.5ml/kg/hr  - RFP as clinically indicated  - Replete electrolytes per CTICU protocol    ENDO: PMH of insulin-dependent diabetes (A1c: 8.5) and hypothyroidism. BG well controlled on SSI. Insulin drip started overnight 7/14 for elevated sugars after starting PO. Drip stopped the same night for glucose of 80 when patient slid out of bedside chair.-->  - Maintain BG <180, insulin per CTICU protocol  - Continue levothyroxine  - SSI  - add 10u lantus nightly    HEME: Acute blood loss anemia. Hgb stable at 8.6. -->    - 1u pRBC for hgb 7.4  - Monitor drain output volume and characteristics  - Daily CBC and prn  - Daily aspirin  - continue plavix  - Continue SQH   - SCDs for DVT prophylaxis.  - Last type and screen: 7/12    ID: Afebrile, no current indications of infection. MRSA negative .-->  - Trend temp q4h    Skin: Unhealed diabetic foot ulcers s/p R foot partial amputation. -->  - feet wrapped in loose bandages  - preventative Mepilex dressings in place on sacrum and heels  - change preventative Mepilex weekly or more frequently as indicated (when moist/soiled)   - every shift skin assessment per nursing and weekly ICU skin rounds  - moisture barrier to be applied with rosy care  - active skin problems  addressed with nursing on daily rounds    Proph:  SCDs  SQH    G: Line  Right IJ MAC w Minimac placed 7/9 --> d/c today  Left brachial a-line placed 7/9  Bernal placed 7/9    Restraints: Not indicated.    Code status: Full Code    A,B,C,D,E,F,G: reviewed    Dispo: CTICU care for now.    CTICU TEAM PHONE [phone]

## 2024-07-15 NOTE — SIGNIFICANT EVENT
Patient was up in chair during bedside report at start of shift. Pt sated he wanted to remain in chair a while longer when POC blood sugar was checked and found to be 87 @ 2031.  After leaving the room to round on adjoining room, answer monitor alarms and coming back to this room, the pt was sitting directly in front of the chair on the floor awake and alert, stating that he wanted to get up. Pt was  previously utilizing call light and with appropriate safety awareness. No evidence of hitting his head was seen. Pt denies hitting his head.  The team was called to bedside and the pt was assisted into the bed with bed alarm activated.  Full assessment completed.  Pt reassessed one hour after initial event with no adverse changes. Pt was seen pulling  at central line and reoriented.  New order given by provider for precedex.  Pt stated that he did not need a phone call to family at this time to notify them of the fall.  Pt was given his bedtime meds safely and monitored closely. Call light, bedside table close by and all alarms audible.

## 2024-07-15 NOTE — CARE PLAN
Problem: Pain - Adult  Goal: Verbalizes/displays adequate comfort level or baseline comfort level  Outcome: Progressing     Problem: Safety - Adult  Goal: Free from fall injury  Outcome: Progressing     Problem: Chronic Conditions and Co-morbidities  Goal: Patient's chronic conditions and co-morbidity symptoms are monitored and maintained or improved  Outcome: Progressing     Problem: ACS/CP/NSTEMI/STEMI  Goal: Chest pain managed (free from pain or at acceptable level)  Outcome: Progressing     Problem: Skin  Goal: Prevent/manage excess moisture  Outcome: Progressing  Goal: Prevent/minimize sheer/friction injuries  Outcome: Progressing     Problem: Diabetes  Goal: Achieve decreasing blood glucose levels by end of shift  Outcome: Progressing  Goal: Increase stability of blood glucose readings by end of shift  Outcome: Progressing     Problem: Mechanical Ventilation  Goal: Patient Will Maintain Patent Airway  Outcome: Progressing  Goal: Oral health is maintained or improved  Outcome: Progressing

## 2024-07-16 ENCOUNTER — APPOINTMENT (OUTPATIENT)
Dept: CARDIOLOGY | Facility: HOSPITAL | Age: 59
DRG: 235 | End: 2024-07-16
Payer: MEDICARE

## 2024-07-16 ENCOUNTER — APPOINTMENT (OUTPATIENT)
Dept: VASCULAR MEDICINE | Facility: HOSPITAL | Age: 59
End: 2024-07-16
Payer: MEDICARE

## 2024-07-16 ENCOUNTER — APPOINTMENT (OUTPATIENT)
Dept: RADIOLOGY | Facility: HOSPITAL | Age: 59
End: 2024-07-16
Payer: MEDICARE

## 2024-07-16 LAB
ALBUMIN SERPL BCP-MCNC: 4.4 G/DL (ref 3.4–5)
ANION GAP BLDA CALCULATED.4IONS-SCNC: 11 MMO/L (ref 10–25)
ANION GAP BLDA CALCULATED.4IONS-SCNC: 19 MMO/L (ref 10–25)
ANION GAP SERPL CALC-SCNC: 25 MMOL/L (ref 10–20)
BASE EXCESS BLDA CALC-SCNC: 1 MMOL/L (ref -2–3)
BASE EXCESS BLDA CALC-SCNC: 5.6 MMOL/L (ref -2–3)
BODY TEMPERATURE: 37 DEGREES CELSIUS
BODY TEMPERATURE: 37 DEGREES CELSIUS
BUN SERPL-MCNC: 73 MG/DL (ref 6–23)
CA-I BLD-SCNC: 1 MMOL/L (ref 1.1–1.33)
CA-I BLDA-SCNC: 0.99 MMOL/L (ref 1.1–1.33)
CA-I BLDA-SCNC: 1.08 MMOL/L (ref 1.1–1.33)
CALCIUM SERPL-MCNC: 8.9 MG/DL (ref 8.6–10.6)
CHLORIDE BLDA-SCNC: 90 MMOL/L (ref 98–107)
CHLORIDE BLDA-SCNC: 93 MMOL/L (ref 98–107)
CHLORIDE SERPL-SCNC: 88 MMOL/L (ref 98–107)
CO2 SERPL-SCNC: 24 MMOL/L (ref 21–32)
CREAT SERPL-MCNC: 2.08 MG/DL (ref 0.5–1.3)
EGFRCR SERPLBLD CKD-EPI 2021: 36 ML/MIN/1.73M*2
EJECTION FRACTION APICAL 4 CHAMBER: 40.7
EJECTION FRACTION: 39 %
ERYTHROCYTE [DISTWIDTH] IN BLOOD BY AUTOMATED COUNT: 18.3 % (ref 11.5–14.5)
GLUCOSE BLD MANUAL STRIP-MCNC: 130 MG/DL (ref 74–99)
GLUCOSE BLD MANUAL STRIP-MCNC: 150 MG/DL (ref 74–99)
GLUCOSE BLD MANUAL STRIP-MCNC: 174 MG/DL (ref 74–99)
GLUCOSE BLD MANUAL STRIP-MCNC: 179 MG/DL (ref 74–99)
GLUCOSE BLD MANUAL STRIP-MCNC: 182 MG/DL (ref 74–99)
GLUCOSE BLD MANUAL STRIP-MCNC: 258 MG/DL (ref 74–99)
GLUCOSE BLDA-MCNC: 145 MG/DL (ref 74–99)
GLUCOSE BLDA-MCNC: 166 MG/DL (ref 74–99)
GLUCOSE SERPL-MCNC: 152 MG/DL (ref 74–99)
HCO3 BLDA-SCNC: 25.1 MMOL/L (ref 22–26)
HCO3 BLDA-SCNC: 29.8 MMOL/L (ref 22–26)
HCT VFR BLD AUTO: 25 % (ref 41–52)
HCT VFR BLD EST: 25 % (ref 41–52)
HCT VFR BLD EST: 30 % (ref 41–52)
HGB BLD-MCNC: 8.4 G/DL (ref 13.5–17.5)
HGB BLDA-MCNC: 10.1 G/DL (ref 13.5–17.5)
HGB BLDA-MCNC: 8.4 G/DL (ref 13.5–17.5)
INHALED O2 CONCENTRATION: 40 %
INHALED O2 CONCENTRATION: 50 %
LABORATORY REPORT: NORMAL
LACTATE BLDA-SCNC: 1.7 MMOL/L (ref 0.4–2)
LACTATE BLDA-SCNC: 5.1 MMOL/L (ref 0.4–2)
LEFT VENTRICULAR OUTFLOW TRACT DIAMETER: 2 CM
MAGNESIUM SERPL-MCNC: 2.41 MG/DL (ref 1.6–2.4)
MCH RBC QN AUTO: 24.8 PG (ref 26–34)
MCHC RBC AUTO-ENTMCNC: 33.6 G/DL (ref 32–36)
MCV RBC AUTO: 74 FL (ref 80–100)
MITRAL VALVE E/A RATIO: 1.11
NRBC BLD-RTO: 1.2 /100 WBCS (ref 0–0)
OXYHGB MFR BLDA: 82.2 % (ref 94–98)
OXYHGB MFR BLDA: 94.9 % (ref 94–98)
PCO2 BLDA: 37 MM HG (ref 38–42)
PCO2 BLDA: 41 MM HG (ref 38–42)
PETH INTERPRETATION: NORMAL
PH BLDA: 7.44 PH (ref 7.38–7.42)
PH BLDA: 7.47 PH (ref 7.38–7.42)
PHOSPHATE SERPL-MCNC: 4.9 MG/DL (ref 2.5–4.9)
PLATELET # BLD AUTO: 345 X10*3/UL (ref 150–450)
PLPETH BLD-MCNC: <10 NG/ML
PO2 BLDA: 58 MM HG (ref 85–95)
PO2 BLDA: 80 MM HG (ref 85–95)
POPETH BLD-MCNC: <10 NG/ML
POTASSIUM BLDA-SCNC: 3.5 MMOL/L (ref 3.5–5.3)
POTASSIUM BLDA-SCNC: 4.1 MMOL/L (ref 3.5–5.3)
POTASSIUM SERPL-SCNC: 3.8 MMOL/L (ref 3.5–5.3)
RBC # BLD AUTO: 3.39 X10*6/UL (ref 4.5–5.9)
RIGHT VENTRICLE PEAK SYSTOLIC PRESSURE: 16.7 MMHG
SAO2 % BLDA: 84 % (ref 94–100)
SAO2 % BLDA: 98 % (ref 94–100)
SODIUM BLDA-SCNC: 130 MMOL/L (ref 136–145)
SODIUM BLDA-SCNC: 130 MMOL/L (ref 136–145)
SODIUM SERPL-SCNC: 133 MMOL/L (ref 136–145)
TRICUSPID ANNULAR PLANE SYSTOLIC EXCURSION: 1.9 CM
WBC # BLD AUTO: 17.2 X10*3/UL (ref 4.4–11.3)

## 2024-07-16 PROCEDURE — 83735 ASSAY OF MAGNESIUM: CPT

## 2024-07-16 PROCEDURE — S0109 METHADONE ORAL 5MG: HCPCS | Performed by: PHYSICIAN ASSISTANT

## 2024-07-16 PROCEDURE — 2500000002 HC RX 250 W HCPCS SELF ADMINISTERED DRUGS (ALT 637 FOR MEDICARE OP, ALT 636 FOR OP/ED): Performed by: STUDENT IN AN ORGANIZED HEALTH CARE EDUCATION/TRAINING PROGRAM

## 2024-07-16 PROCEDURE — 93922 UPR/L XTREMITY ART 2 LEVELS: CPT | Performed by: SURGERY

## 2024-07-16 PROCEDURE — 2500000001 HC RX 250 WO HCPCS SELF ADMINISTERED DRUGS (ALT 637 FOR MEDICARE OP): Performed by: PHYSICIAN ASSISTANT

## 2024-07-16 PROCEDURE — 2500000004 HC RX 250 GENERAL PHARMACY W/ HCPCS (ALT 636 FOR OP/ED): Performed by: PHYSICIAN ASSISTANT

## 2024-07-16 PROCEDURE — 2500000004 HC RX 250 GENERAL PHARMACY W/ HCPCS (ALT 636 FOR OP/ED): Mod: JZ

## 2024-07-16 PROCEDURE — 93308 TTE F-UP OR LMTD: CPT | Performed by: INTERNAL MEDICINE

## 2024-07-16 PROCEDURE — 2500000005 HC RX 250 GENERAL PHARMACY W/O HCPCS: Performed by: STUDENT IN AN ORGANIZED HEALTH CARE EDUCATION/TRAINING PROGRAM

## 2024-07-16 PROCEDURE — P9047 ALBUMIN (HUMAN), 25%, 50ML: HCPCS | Mod: JZ

## 2024-07-16 PROCEDURE — 99233 SBSQ HOSP IP/OBS HIGH 50: CPT | Performed by: STUDENT IN AN ORGANIZED HEALTH CARE EDUCATION/TRAINING PROGRAM

## 2024-07-16 PROCEDURE — 97530 THERAPEUTIC ACTIVITIES: CPT | Mod: GP

## 2024-07-16 PROCEDURE — 84132 ASSAY OF SERUM POTASSIUM: CPT

## 2024-07-16 PROCEDURE — 2500000002 HC RX 250 W HCPCS SELF ADMINISTERED DRUGS (ALT 637 FOR MEDICARE OP, ALT 636 FOR OP/ED): Performed by: PHYSICIAN ASSISTANT

## 2024-07-16 PROCEDURE — 82330 ASSAY OF CALCIUM: CPT

## 2024-07-16 PROCEDURE — 94660 CPAP INITIATION&MGMT: CPT

## 2024-07-16 PROCEDURE — 1200000002 HC GENERAL ROOM WITH TELEMETRY DAILY

## 2024-07-16 PROCEDURE — 82947 ASSAY GLUCOSE BLOOD QUANT: CPT

## 2024-07-16 PROCEDURE — P9045 ALBUMIN (HUMAN), 5%, 250 ML: HCPCS | Mod: JZ

## 2024-07-16 PROCEDURE — 37799 UNLISTED PX VASCULAR SURGERY: CPT

## 2024-07-16 PROCEDURE — 2500000002 HC RX 250 W HCPCS SELF ADMINISTERED DRUGS (ALT 637 FOR MEDICARE OP, ALT 636 FOR OP/ED)

## 2024-07-16 PROCEDURE — 2500000004 HC RX 250 GENERAL PHARMACY W/ HCPCS (ALT 636 FOR OP/ED): Performed by: STUDENT IN AN ORGANIZED HEALTH CARE EDUCATION/TRAINING PROGRAM

## 2024-07-16 PROCEDURE — 93308 TTE F-UP OR LMTD: CPT

## 2024-07-16 PROCEDURE — 71045 X-RAY EXAM CHEST 1 VIEW: CPT | Performed by: RADIOLOGY

## 2024-07-16 PROCEDURE — 93922 UPR/L XTREMITY ART 2 LEVELS: CPT

## 2024-07-16 PROCEDURE — S0109 METHADONE ORAL 5MG: HCPCS | Performed by: STUDENT IN AN ORGANIZED HEALTH CARE EDUCATION/TRAINING PROGRAM

## 2024-07-16 PROCEDURE — 2500000004 HC RX 250 GENERAL PHARMACY W/ HCPCS (ALT 636 FOR OP/ED)

## 2024-07-16 PROCEDURE — 94640 AIRWAY INHALATION TREATMENT: CPT

## 2024-07-16 PROCEDURE — 2500000001 HC RX 250 WO HCPCS SELF ADMINISTERED DRUGS (ALT 637 FOR MEDICARE OP)

## 2024-07-16 PROCEDURE — 71045 X-RAY EXAM CHEST 1 VIEW: CPT

## 2024-07-16 PROCEDURE — 85027 COMPLETE CBC AUTOMATED: CPT

## 2024-07-16 PROCEDURE — 2500000001 HC RX 250 WO HCPCS SELF ADMINISTERED DRUGS (ALT 637 FOR MEDICARE OP): Performed by: STUDENT IN AN ORGANIZED HEALTH CARE EDUCATION/TRAINING PROGRAM

## 2024-07-16 PROCEDURE — 1100000001 HC PRIVATE ROOM DAILY

## 2024-07-16 RX ORDER — METHADONE HYDROCHLORIDE 5 MG/5ML
77.5 SOLUTION ORAL EVERY 12 HOURS
Status: DISCONTINUED | OUTPATIENT
Start: 2024-07-16 | End: 2024-07-16

## 2024-07-16 RX ORDER — ALBUMIN HUMAN 50 G/1000ML
12.5 SOLUTION INTRAVENOUS ONCE
Status: COMPLETED | OUTPATIENT
Start: 2024-07-16 | End: 2024-07-16

## 2024-07-16 RX ORDER — ALBUMIN HUMAN 50 G/1000ML
SOLUTION INTRAVENOUS
Status: COMPLETED
Start: 2024-07-16 | End: 2024-07-16

## 2024-07-16 ASSESSMENT — PAIN SCALES - GENERAL
PAINLEVEL_OUTOF10: 4
PAINLEVEL_OUTOF10: 6
PAINLEVEL_OUTOF10: 0 - NO PAIN
PAINLEVEL_OUTOF10: 0 - NO PAIN

## 2024-07-16 ASSESSMENT — COGNITIVE AND FUNCTIONAL STATUS - GENERAL
MOVING TO AND FROM BED TO CHAIR: A LITTLE
STANDING UP FROM CHAIR USING ARMS: A LITTLE
MOBILITY SCORE: 17
WALKING IN HOSPITAL ROOM: A LITTLE
TURNING FROM BACK TO SIDE WHILE IN FLAT BAD: A LITTLE
MOVING FROM LYING ON BACK TO SITTING ON SIDE OF FLAT BED WITH BEDRAILS: A LITTLE
CLIMB 3 TO 5 STEPS WITH RAILING: A LOT

## 2024-07-16 ASSESSMENT — PAIN - FUNCTIONAL ASSESSMENT
PAIN_FUNCTIONAL_ASSESSMENT: 0-10

## 2024-07-16 ASSESSMENT — PAIN SCALES - PAIN ASSESSMENT IN ADVANCED DEMENTIA (PAINAD): TOTALSCORE: MEDICATION (SEE MAR)

## 2024-07-16 ASSESSMENT — PAIN DESCRIPTION - LOCATION: LOCATION: LEG

## 2024-07-16 NOTE — PROGRESS NOTES
"CTICU Progress Note  Kael Zepeda/[id number]    Admit Date: 7/1/2024  Hospital Length of Stay: 15   ICU Length of Stay: 6d 23h   CT SURGEON:     SUBJECTIVE:   ADDISON. This morning, is interested in sitting in bedside chair. Expressed that he thinks he is doing worse despite encouragement that he is improving. Pain well controlled.    MEDICATIONS  Infusions:       Scheduled:  acetaminophen, 975 mg, q6h  aspirin, 81 mg, Daily  atorvastatin, 80 mg, Nightly  clopidogrel, 75 mg, Daily  DULoxetine, 60 mg, Daily  folic acid, 1 mg, Daily  heparin, 5,000 Units, q8h  hydroxychloroquine, 200 mg, BID  insulin glargine, 10 Units, q24h  insulin lispro, 0-15 Units, q4h  levothyroxine, 50 mcg, Daily  methadone, 77.5 mg, q12h  metoprolol tartrate, 25 mg, BID  multivitamin with minerals, 1 tablet, Daily  oxygen, , Continuous - Inhalation  polyethylene glycol, 17 g, Daily  predniSONE, 40 mg, Daily  pregabalin, 200 mg, Daily  sennosides-docusate sodium, 2 tablet, BID  thiamine, 100 mg, Daily  tiotropium, 2 puff, Daily      PRN:  albuterol, 2.5 mg, q6h PRN  alteplase, 2 mg, PRN  dextrose, 25 g, q15 min PRN   Or  glucagon, 1 mg, q15 min PRN  naloxone, 0.2 mg, q5 min PRN  ondansetron, 4 mg, q8h PRN   Or  ondansetron, 4 mg, q8h PRN  [Held by provider] oxyCODONE, 5 mg, q4h PRN  oxygen, , Continuous PRN - O2/gases  sodium chloride, 1 spray, 4x daily PRN        PHYSICAL EXAM:   Visit Vitals  BP 98/54 (BP Location: Right arm, Patient Position: Sitting)   Pulse 69   Temp 36.2 °C (97.2 °F) (Temporal)   Resp 15   Ht 1.676 m (5' 6\")   Wt 90 kg (198 lb 6.6 oz)   SpO2 92%   BMI 32.02 kg/m²   Smoking Status Every Day   BSA 2.05 m²     Wt Readings from Last 5 Encounters:   07/14/24 90 kg (198 lb 6.6 oz)   07/01/24 90 kg (198 lb 6.6 oz)   06/05/24 86.2 kg (190 lb)   10/13/23 84.8 kg (187 lb)   09/21/22 90.3 kg (199 lb)     INTAKE/OUTPUT:  I/O last 3 completed shifts:  In: 3528 (40.8 mL/kg) [P.O.:873; I.V.:305 (3.5 mL/kg); Blood:1450; IV " Piggyback:900]  Out: 2005 (23.2 mL/kg) [Urine:2005 (0.6 mL/kg/hr)]  Dosing Weight: 86.4 kg          Vent settings:  FiO2 (%):  [40 %] 40 %    Physical Exam:   Constitutional: Male patient lying in ICU bed  Neurological: awake, alert, no obvious focal deficits  Eyes: Anicteric sclera, clear  Respiratory/Thorax: Diminished, clear breath sounds bilaterally, symmetric chest expansion, V pacing wires present  Cardiovascular: NSR, no murmurs appreciated  Gastrointestinal: abdomen mildly tender and soft   Genitourinary: no carpenter  Extremities: warm, bilaterally feet wrapped in bandages  Skin: Warm and dry throughout, midline incision stable - dressing clean, dry, and intact with minimal strikethrough  Psych: Calm, cooperative    Images: Reviewed    Invasive Hemodynamics:    Most Recent Range Past 24hrs   BP (Art) 114/96 Arterial Line BP 1  Min: 84/69  Max: 135/85   MAP(Art) 106 mmHg Arterial Line MAP 1 (mmHg)   Min: 65 mmHg  Max: 131 mmHg     Assessment/Plan   Mr. Zepeda is a 58-year-old male with a PMH of RA (on chronic prednisone and hydroxychloroquine), type 2 DM (on insulin), COPD, hypothyroidism, 40-pack-year smoking history, chronic methadone use, peripheral neuropathy, and unhealed diabetic foot ulcers s/p right foot partial amputation (7 months ago - follows with wound clinic as outpatient) and most recently NSTEMI. He is now s/p CABGx3 with Dr. Carye on 7/9.    Plan:  NEURO: PMH of chronic methadone use and peripheral neuropathy secondary to T2DM. Acute post operative pain controlled on current regimen. Takes methadone at home - dosing confirmed with pharmacy. A+Ox3, moves all extremities, no obvious focal deficits. Up to chair daily. Pt was overly somnolent for several days in the absence of sedatives so urine drug tox taken which came back inappropriately positive for fentanyl. Suspected family involvement so visitation restricted. Pt now more awake.-->  - Serial neuro and pain assessments   - Scheduled  Tylenol   - Continue methadone 77.5mg--> switch from q8 to home q12  - Continue home plaquenil 200mg BID  - PT Consult, OOB to chair as tolerated, chair position if not tolerated --> goal OOB today  - CAM ICU score qshift  - Sleep/wake cycle hygiene  - Start CIWA monitoring and add thiamine and folic acid  - urine drug tox: inappropriately positive for fentanyl  - Continue home duloxetine  - continue home pregabalin    CV: Patient has a history of NSTEMI. Is now status post CABGx3 Pre: Mildly decreased LV function, EF 40-45%. Post: normal LV function. Arrived to CTICU on V epicardial wires that are now connected but off. Afib with RVR to the 130s requiring bolus and amio drip. Now in NSR in 60-70's with some bradycardia to high 40's. Normotensive.  -->  - Maintain goal MAP 70 - 90  - pt keeps rubbing at midline incision through bandage, causing strikethrough; re-enforced that he should not irritate his incision  - See  for diuresis  - Daily aspirin  - continue plavix   - d/c amio 400mg PO BID for bradycardia  - Continue atorvastatin 80mg nightly  - continue metop 25mg BID  - Hold home lisinopril    PULM: PMH of COPD and smoking. Morning CXR with continued pulmonary edema and atelectasis. Persistent hypoxia in high 80's to low 90's after oversedation was removed. Pt required slow FiO2 wean and heavy diuresis. Likely a combination of COPD exacerbation and fluid overload, though patient likely has poor oxygenation at baseline. Reports he frequently became short of breath prior to his admission but never used inhalers or oxygen at home. Currently on 4L NC with nightly CPAP.-->  - CXR daily and prn  - continue prednisone to 40mg x5 days for COPD exacerbation, then decrease to home 5mg  - Wean FiO2 maintaining SpO2 >85 and paO2 >60  - on 4L NC  - CPAP overnight  - TTE ordered to rule out RV strain from PE causing persistent hypoxia: pending  - Follow up ABG  - IS q1h and OOB to chair  - Continue tiotropium  - Prn  albuterol    GI: No history. Passed bedside swallow evaluation. Abdomen soft, non tender, and non distended. Had a BM.-->  - Colace/senna BID and miralax BID  - suppository  - diabetic diet     : CSA-JUAN J Risk Score 2. No history of renal disease, baseline creatinine 0.7. Creatinine stable post-op at 0.71, now with JUAN J and creatinine increase to 2.08. Lasix infusion stopped. Peripheral edema improved but CXR still wet. -->  - Goal net even to negative  - scheduled concentrated albumin  - d/c metolazone 5mg  - Goal UOP 0.5ml/kg/hr  - RFP as clinically indicated  - Replete electrolytes per CTICU protocol    ENDO: PMH of insulin-dependent diabetes (A1c: 8.5) and hypothyroidism. BG well controlled on SSI. Insulin drip started overnight 7/14 for elevated sugars after starting PO. Drip stopped the same night for glucose of 80 when patient slid out of bedside chair. Now only on SSI-->  - Maintain BG <180, insulin per CTICU protocol  - Continue levothyroxine  - SSI  - continue 10u lantus nightly    HEME: Acute blood loss anemia. Hgb stable at 8.6. -->    - Daily CBC and prn  - Daily aspirin  - continue plavix  - Continue SQH   - SCDs for DVT prophylaxis.  - Last type and screen: 7/15    ID: Afebrile, no current indications of infection. MRSA negative .-->  - Trend temp q4h    Skin: Unhealed diabetic foot ulcers s/p R foot partial amputation. Wound care following.-->  - feet wrapped in loose bandages  - preventative Mepilex dressings in place on sacrum and heels  - change preventative Mepilex weekly or more frequently as indicated (when moist/soiled)   - every shift skin assessment per nursing and weekly ICU skin rounds  - moisture barrier to be applied with rosy care  - active skin problems addressed with nursing on daily rounds    Proph:  SCDs  SQH    G: Line  Left brachial a-line placed 7/9 --> remove today    Restraints: Not indicated.    Code status: Full Code    A,B,C,D,E,F,G: reviewed    Dispo: Floor    CTICU TEAM  PHONE [phone]

## 2024-07-16 NOTE — PROGRESS NOTES
Met with patient and introduced myself as Care Coordinator and member of the discharge planning team.  Patient is s/p CABG x3. He plans to return home at time of discharge with assistance from his brother if need. He has uses  Home Care in the past and would like to use  Home Care again. Care coordinator will continue to follow for home going needs. Michelle Perez RN

## 2024-07-16 NOTE — PROGRESS NOTES
Physical Therapy    Physical Therapy Treatment    Patient Name: Kael Zepeda  MRN: [id number]  Today's Date: 7/16/2024  Time Calculation  Start Time: Split session [id number]  Stop Time: Split session [id number]  Time Calculation (min): 27 min    Assessment/Plan   PT Assessment  PT Assessment Results: Decreased endurance, Impaired balance, Decreased mobility, Pain, Decreased strength, Decreased safety awareness  Evaluation/Treatment Tolerance: Patient tolerated treatment well  Medical Staff Made Aware: Yes  End of Session Communication: Bedside nurse    Assessment Comment: Pt. is progressing appropriately, continues to demo decreased functional strength, decreased activity tolerance/endurance, impaired balance, and increased difficulty with functional mobility compared to baseline. Pt. will continue to benefit from skilled PT intervention while inpatient to address deficits and maximize return to PLOF. Despite AMPAC of 17, pt demos decreased safety awareness, impulsivity, and is at increased risk for falls. Update PT rec to MOD intensity PT and PT frequency to 3x/week.     End of Session Patient Position:  (Seated in wheelchair preparing for transport to floor, RN present)     PT Plan  Treatment/Interventions: Bed mobility, Transfer training, Gait training, Stair training, Balance training, Strengthening, Endurance training, Range of motion, Therapeutic activity, Therapeutic exercise  PT Plan: Ongoing PT  PT Frequency: 3 times per week  PT Discharge Recommendations: Moderate intensity level of continued care  Equipment Recommended upon Discharge:  (will continue to assess)  PT Recommended Transfer Status: Assist x1, Assistive device  PT - OK to Discharge: Yes      General Visit Information:   PT  Visit  PT Received On: 07/16/24  Response to Previous Treatment: Patient with no complaints from previous session.  General  Reason for Referral: 57 y/o M presenting to OSH with chest pain and NSTEMI; now s/p CABG x3 on  7/9/24.  Past Medical History Relevant to Rehab: T2DM, HTN, Chest discomfort,    Fibromyalgia, Generalized anxiety disorder, hypothyroidism,    Elevated troponin, RA,   Hyperglycemia, Microcytic anemia, Cigarette nicotine dependence, Chronic full thickness BLE ulcerations, s/p right foot partial first ray resection  Family/Caregiver Present: No  Prior to Session Communication: Bedside nurse  Patient Position Received: Bed, 4 rail up, Alarm off, not on at start of session  General Comment: Pt. received supine in bed, agreeable to participate in session. Pt is impulsive and demos decreased safety awareness, requires cues for safety. Split session performed ([id number] and [id number]) as session initiated, then RN entered to remove kathia and change dressings. (4L O2 NC, external pacer (not currently pacing), telemetry, IV)    Subjective   Precautions:  Precautions  LE Weight Bearing Status: Weight Bearing as Tolerated (R LE in post op shoe)  Medical Precautions: Fall precautions, Oxygen therapy device and L/min, Cardiac precautions  Post-Surgical Precautions: Move in the Tube  Braces Applied: Donned R post op shoe prior to OOB mobility  Precautions Comment: MAP 70-90, SpO2>85%    Vital Signs:  Vital Signs  Heart Rate: 61  Heart Rate Source: Monitor  Resp: 14  SpO2: 95 %  BP: 110/62  MAP (mmHg): 76  BP Location: Right arm  BP Method: Automatic  Patient Position: Sitting    Objective   Pain:  Pain Assessment  Pain Assessment: 0-10  0-10 (Numeric) Pain Score: 0 - No pain    Cognition:  Cognition  Overall Cognitive Status: Within Functional Limits  Orientation Level: Oriented X4  Following Commands: Follows one step commands without difficulty  Cognition Comments: Pt. often chalenging/asking questions throughout session though agreeable and redirectable with explanation    Postural Control:  Static Sitting Balance  Static Sitting-Comment/Number of Minutes: SBA  Dynamic Sitting Balance  Dynamic Sitting-Comments: SBA  Static  Standing Balance  Static Standing-Comment/Number of Minutes: minAx1  Dynamic Standing Balance  Dynamic Standing-Comments: minAx1    Activity Tolerance:  Activity Tolerance  Endurance: Tolerates 10 - 20 min exercise with multiple rests  Early Mobility/Exercise Safety Screen: Proceed with mobilization - No exclusion criteria met    Treatments:  Therapeutic Exercise  Therapeutic Exercise Performed: Yes  Therapeutic Exercise Activity 1: EOB: LAQ x15 demario    Therapeutic Activity  Therapeutic Activity Performed: Yes  Therapeutic Activity 1: Increased time in long sit position in bed as pt. attempting to doff/don hospital socks. While in long sit, pt. removed demario foot dressings- notifed RN. Pt. required assist to don new socks.  Therapeutic Activity 2: Pt tolerated sitting EOB ~10 min total with SBA for static sitting and CGAx1 for dynamic activities. Emphasis on upright functional posture, pursed lip breathing and monitoring of vitals.  Therapeutic Activity 3: Pt. stood statically ~1 min with minAx1 and HHA on L. Notable instability, seated rest break.  Therapeutic Activity 4: Re-educated pt on MITT precautions, cues throughout session to increase compliance    Bed Mobility  Bed Mobility: Yes  Bed Mobility 1  Bed Mobility 1: Long sit  Level of Assistance 1: Distant supervision  Bed Mobility Comments 1: Pt. completed long sit with pulling on bed rails despite cues for proper technique. HOB elevated significantly  Bed Mobility 2  Bed Mobility  2:  (long sit>EOB sitting)  Level of Assistance 2: Close supervision  Bed Mobility Comments 2: HOB elevated significantly, use of bed rails    Ambulation/Gait Training  Ambulation/Gait Training Performed: Yes  Ambulation/Gait Training 1  Surface 1: Level tile  Device 1: Rolling walker  Gait Support Devices:  (R post op shoe)  Assistance 1: Minimum assistance, Minimal verbal cues  Quality of Gait 1: Wide base of support, Decreased step length (decreased lida, increased lateral sway  though no acute LOB)  Comments/Distance (ft) 1: 8 ft, cues for AD management and sequencing. Cues to maintain self within parameters of FWW to maximize stability and safety. (Educated pt. on utilizing shoe for L LE to improve stability though no shoes present in room)    Transfers  Transfer: Yes  Transfer 1  Transfer From 1: Bed to, Stand to  Transfer to 1: Stand, Bed  Technique 1: Sit to stand, Stand to sit  Transfer Device 1:  (HHA)  Transfer Level of Assistance 1: Minimum assistance, Minimal verbal cues  Transfers 2  Transfer From 2: Bed to  Transfer to 2: Stand  Technique 2: Sit to stand  Transfer Device 2: Walker  Transfer Level of Assistance 2: Minimum assistance, Minimal verbal cues  Trials/Comments 2: Cues for proper UE placement and technique, pt non-compliant  Transfers 3  Transfer From 3: Stand to  Transfer to 3:  (wheelchair)  Technique 3: Stand to sit  Transfer Device 3: Walker  Transfer Level of Assistance 3: Minimum assistance, Minimal verbal cues  Trials/Comments 3: Pt demos decreased eccentric control with descent    Stairs  Stairs: No  Outcome Measures:  [facility] Basic Mobility  Turning from your back to your side while in a flat bed without using bedrails: A little  Moving from lying on your back to sitting on the side of a flat bed without using bedrails: A little  Moving to and from bed to chair (including a wheelchair): A little  Standing up from a chair using your arms (e.g. wheelchair or bedside chair): A little  To walk in hospital room: A little  Climbing 3-5 steps with railing: A lot  Basic Mobility - Total Score: 17    FSS-ICU  Ambulation: Walks <50 feet with any assistance x1 or walks any distance with assistance x2 people  Rolling: Supervision or set-up only  Sitting: Supervision or set-up only  Transfer Sit-to-Stand: Minimal assistance (performs 75% or more of task)  Transfer Supine-to-Sit: Minimal assistance (performs 75% or more of task)  Total Score: 19      Early Mobility/Exercise  Safety Screen: Proceed with mobilization - No exclusion criteria met  ICU Mobility Scale: Walking with assistance of 1 person [8]  E = Exercise and Early Mobility  Early Mobility/Exercise Safety Screen: Proceed with mobilization - No exclusion criteria met  ICU Mobility Scale: Walking with assistance of 1 person    Education Documentation  Precautions, taught by Alma Owusu PT at 7/16/2024  3:32 PM.  Learner: Patient  Readiness: Acceptance  Method: Explanation, Demonstration  Response: Verbalizes Understanding, Needs Reinforcement    Body Mechanics, taught by Alma Owusu PT at 7/16/2024  3:32 PM.  Learner: Patient  Readiness: Acceptance  Method: Explanation, Demonstration  Response: Verbalizes Understanding, Needs Reinforcement    Mobility Training, taught by Alma Owusu PT at 7/16/2024  3:32 PM.  Learner: Patient  Readiness: Acceptance  Method: Explanation, Demonstration  Response: Verbalizes Understanding, Needs Reinforcement    Education Comments  No comments found.        OP EDUCATION:       Encounter Problems       Encounter Problems (Active)       PT Problem       Patient will complete supine to sit and sit to supine Supervision  (Progressing)       Start:  07/26/24    Expected End:  07/26/24            Patient will perform sit<>stand transfer with LRAD, and Supervision  (Progressing)       Start:  07/26/24    Expected End:  07/26/24            Patient will ambulate >150' with LRAD and Supervision  (Progressing)       Start:  07/26/24    Expected End:  07/26/24            Patient will verbalize and demonstrate Move In The Tube (MITT) Precautions independently.  (Progressing)       Start:  07/26/24    Expected End:  07/26/24               Pain - Adult

## 2024-07-16 NOTE — SIGNIFICANT EVENT
LEONARD   07/16/24 1309   Onset Documentation   Rapid Response Initiated By Radar auto page   Pager Time 1309   Arrival Time 1345   Event End Time 1410   Primary Reason for Call Radar auto page     RADAR page auto generated from VS -see documented VS. Radar score 6. Pt newly transferred from the ICU. Sitting in a chair- ordering lunch. Recheck of BP and pulse ox- see flow sheet.  Pt ok to remain on the floor for continued monitoring-bedside nurse updated and will call with any concerns.

## 2024-07-17 ENCOUNTER — APPOINTMENT (OUTPATIENT)
Dept: RADIOLOGY | Facility: HOSPITAL | Age: 59
End: 2024-07-17
Payer: MEDICARE

## 2024-07-17 LAB
ALBUMIN SERPL BCP-MCNC: 4.5 G/DL (ref 3.4–5)
ANION GAP SERPL CALC-SCNC: 18 MMOL/L (ref 10–20)
BUN SERPL-MCNC: 57 MG/DL (ref 6–23)
CALCIUM SERPL-MCNC: 8.7 MG/DL (ref 8.6–10.6)
CFT FORM KAOLIN IND BLD RES TEG: 0.9 MIN (ref 0.8–2.1)
CFT FORM KAOLIN IND BLD RES TEG: 1.3 MIN (ref 0.8–2.1)
CHLORIDE SERPL-SCNC: 89 MMOL/L (ref 98–107)
CLOT ANGLE.KAOLIN INDUCED BLD RES TEG: 72 DEG (ref 63–78)
CLOT ANGLE.KAOLIN INDUCED BLD RES TEG: 76 DEG (ref 63–78)
CLOT INIT KAO IND P HEP NEUT BLD RES TEG: 10.1 MIN (ref 4.6–9.1)
CLOT INIT KAO IND P HEP NEUT BLD RES TEG: 10.3 MIN (ref 4.6–9.1)
CLOT INIT KAO IND P HEP NEUT BLD RES TEG: 3.7 MIN (ref 4.3–8.3)
CLOT INIT KAO IND P HEP NEUT BLD RES TEG: 8.5 MIN (ref 4.3–8.3)
CO2 SERPL-SCNC: 30 MMOL/L (ref 21–32)
CREAT SERPL-MCNC: 1.25 MG/DL (ref 0.5–1.3)
EGFRCR SERPLBLD CKD-EPI 2021: 66 ML/MIN/1.73M*2
ERYTHROCYTE [DISTWIDTH] IN BLOOD BY AUTOMATED COUNT: 20.7 % (ref 11.5–14.5)
FIBRINOGEN BLD CALC-MCNC: 595 MG/DL (ref 278–581)
FIBRINOGEN BLD CALC-MCNC: 744 MG/DL (ref 278–581)
GLUCOSE BLD MANUAL STRIP-MCNC: 207 MG/DL (ref 74–99)
GLUCOSE BLD MANUAL STRIP-MCNC: 225 MG/DL (ref 74–99)
GLUCOSE BLD MANUAL STRIP-MCNC: 315 MG/DL (ref 74–99)
GLUCOSE BLD MANUAL STRIP-MCNC: 335 MG/DL (ref 74–99)
GLUCOSE BLD MANUAL STRIP-MCNC: 352 MG/DL (ref 74–99)
GLUCOSE SERPL-MCNC: 214 MG/DL (ref 74–99)
HCT VFR BLD AUTO: 29.8 % (ref 41–52)
HGB BLD-MCNC: 9.3 G/DL (ref 13.5–17.5)
MA KAOLIN BLD RES TEG: 69 MM (ref 52–69)
MA KAOLIN BLD RES TEG: 69 MM (ref 52–69)
MA KAOLIN+TF BLD RES TEG: 69 MM (ref 52–70)
MA KAOLIN+TF BLD RES TEG: 71 MM (ref 52–70)
MA TF IND+IIB-IIIA INH BLD RES TEG: 33 MM (ref 15–32)
MA TF IND+IIB-IIIA INH BLD RES TEG: 41 MM (ref 15–32)
MAGNESIUM SERPL-MCNC: 2.63 MG/DL (ref 1.6–2.4)
MCH RBC QN AUTO: 25.1 PG (ref 26–34)
MCHC RBC AUTO-ENTMCNC: 31.2 G/DL (ref 32–36)
MCV RBC AUTO: 81 FL (ref 80–100)
NRBC BLD-RTO: 0.6 /100 WBCS (ref 0–0)
PHOSPHATE SERPL-MCNC: 2.7 MG/DL (ref 2.5–4.9)
PLATELET # BLD AUTO: 404 X10*3/UL (ref 150–450)
POTASSIUM SERPL-SCNC: 3.6 MMOL/L (ref 3.5–5.3)
RBC # BLD AUTO: 3.7 X10*6/UL (ref 4.5–5.9)
SODIUM SERPL-SCNC: 133 MMOL/L (ref 136–145)
TEST COMMENT: ABNORMAL
TEST COMMENT: ABNORMAL
WBC # BLD AUTO: 25 X10*3/UL (ref 4.4–11.3)

## 2024-07-17 PROCEDURE — 2500000001 HC RX 250 WO HCPCS SELF ADMINISTERED DRUGS (ALT 637 FOR MEDICARE OP): Performed by: PHYSICIAN ASSISTANT

## 2024-07-17 PROCEDURE — 99222 1ST HOSP IP/OBS MODERATE 55: CPT | Performed by: NURSE PRACTITIONER

## 2024-07-17 PROCEDURE — 1100000001 HC PRIVATE ROOM DAILY

## 2024-07-17 PROCEDURE — 2500000004 HC RX 250 GENERAL PHARMACY W/ HCPCS (ALT 636 FOR OP/ED): Performed by: PHYSICIAN ASSISTANT

## 2024-07-17 PROCEDURE — 2500000002 HC RX 250 W HCPCS SELF ADMINISTERED DRUGS (ALT 637 FOR MEDICARE OP, ALT 636 FOR OP/ED): Performed by: PHYSICIAN ASSISTANT

## 2024-07-17 PROCEDURE — 2500000005 HC RX 250 GENERAL PHARMACY W/O HCPCS: Performed by: PHYSICIAN ASSISTANT

## 2024-07-17 PROCEDURE — S0109 METHADONE ORAL 5MG: HCPCS | Performed by: PHYSICIAN ASSISTANT

## 2024-07-17 PROCEDURE — 99223 1ST HOSP IP/OBS HIGH 75: CPT

## 2024-07-17 PROCEDURE — 83735 ASSAY OF MAGNESIUM: CPT | Performed by: NURSE PRACTITIONER

## 2024-07-17 PROCEDURE — 85027 COMPLETE CBC AUTOMATED: CPT | Performed by: NURSE PRACTITIONER

## 2024-07-17 PROCEDURE — 2500000002 HC RX 250 W HCPCS SELF ADMINISTERED DRUGS (ALT 637 FOR MEDICARE OP, ALT 636 FOR OP/ED): Performed by: NURSE PRACTITIONER

## 2024-07-17 PROCEDURE — 71046 X-RAY EXAM CHEST 2 VIEWS: CPT

## 2024-07-17 PROCEDURE — 84100 ASSAY OF PHOSPHORUS: CPT | Performed by: NURSE PRACTITIONER

## 2024-07-17 PROCEDURE — 2500000001 HC RX 250 WO HCPCS SELF ADMINISTERED DRUGS (ALT 637 FOR MEDICARE OP)

## 2024-07-17 PROCEDURE — 1200000002 HC GENERAL ROOM WITH TELEMETRY DAILY

## 2024-07-17 PROCEDURE — 71046 X-RAY EXAM CHEST 2 VIEWS: CPT | Performed by: RADIOLOGY

## 2024-07-17 PROCEDURE — 2500000004 HC RX 250 GENERAL PHARMACY W/ HCPCS (ALT 636 FOR OP/ED): Performed by: NURSE PRACTITIONER

## 2024-07-17 PROCEDURE — 82947 ASSAY GLUCOSE BLOOD QUANT: CPT

## 2024-07-17 PROCEDURE — 94660 CPAP INITIATION&MGMT: CPT

## 2024-07-17 RX ORDER — INSULIN LISPRO 100 [IU]/ML
8 INJECTION, SOLUTION INTRAVENOUS; SUBCUTANEOUS
Status: DISCONTINUED | OUTPATIENT
Start: 2024-07-17 | End: 2024-07-18

## 2024-07-17 RX ORDER — DEXTROSE 50 % IN WATER (D50W) INTRAVENOUS SYRINGE
25
Status: DISCONTINUED | OUTPATIENT
Start: 2024-07-17 | End: 2024-07-22

## 2024-07-17 RX ORDER — INSULIN GLARGINE 100 [IU]/ML
10 INJECTION, SOLUTION SUBCUTANEOUS ONCE
Status: COMPLETED | OUTPATIENT
Start: 2024-07-17 | End: 2024-07-17

## 2024-07-17 RX ORDER — INSULIN LISPRO 100 [IU]/ML
0-10 INJECTION, SOLUTION INTRAVENOUS; SUBCUTANEOUS
Status: DISCONTINUED | OUTPATIENT
Start: 2024-07-17 | End: 2024-07-21

## 2024-07-17 RX ORDER — INSULIN GLARGINE 100 [IU]/ML
20 INJECTION, SOLUTION SUBCUTANEOUS EVERY 24 HOURS
Status: DISCONTINUED | OUTPATIENT
Start: 2024-07-18 | End: 2024-07-18

## 2024-07-17 RX ORDER — FUROSEMIDE 10 MG/ML
40 INJECTION INTRAMUSCULAR; INTRAVENOUS ONCE
Status: COMPLETED | OUTPATIENT
Start: 2024-07-17 | End: 2024-07-17

## 2024-07-17 RX ORDER — DEXTROSE 50 % IN WATER (D50W) INTRAVENOUS SYRINGE
12.5
Status: DISCONTINUED | OUTPATIENT
Start: 2024-07-17 | End: 2024-07-26 | Stop reason: HOSPADM

## 2024-07-17 ASSESSMENT — ENCOUNTER SYMPTOMS
AGITATION: 0
NUMBNESS: 0
COUGH: 1
VOMITING: 0
PALPITATIONS: 0
NAUSEA: 0
WEAKNESS: 1
SHORTNESS OF BREATH: 0
CONFUSION: 0
FREQUENCY: 0
ABDOMINAL PAIN: 0
DIZZINESS: 0
CHEST TIGHTNESS: 0
UNEXPECTED WEIGHT CHANGE: 0
EYE PAIN: 0
DYSURIA: 0
EYE REDNESS: 0
ACTIVITY CHANGE: 1
HEADACHES: 0
POLYDIPSIA: 0
DIAPHORESIS: 0
POLYPHAGIA: 0
SORE THROAT: 0
LIGHT-HEADEDNESS: 0
APPETITE CHANGE: 1
FATIGUE: 1
DIARRHEA: 0
BRUISES/BLEEDS EASILY: 0
TROUBLE SWALLOWING: 0
JOINT SWELLING: 0

## 2024-07-17 ASSESSMENT — PAIN DESCRIPTION - DESCRIPTORS: DESCRIPTORS: ACHING

## 2024-07-17 ASSESSMENT — PAIN SCALES - GENERAL
PAINLEVEL_OUTOF10: 4

## 2024-07-17 ASSESSMENT — PAIN DESCRIPTION - LOCATION
LOCATION: CHEST
LOCATION: LEG

## 2024-07-17 ASSESSMENT — PAIN - FUNCTIONAL ASSESSMENT: PAIN_FUNCTIONAL_ASSESSMENT: 0-10

## 2024-07-17 ASSESSMENT — PAIN DESCRIPTION - ORIENTATION: ORIENTATION: MID

## 2024-07-17 NOTE — CONSULTS
"Inpatient consult to Endocrinology  Consult performed by: NETTIE Marshall  Consult ordered by: NETTIE Rodriguez  Reason for consult: post op glucose control      Reason For Consult  IDDM now s/p CABG with increasing steroids     History Of Present Illness  Kael Zepeda is a 58 y.o. male presenting with Mr. Zepeda is a 58-year-old male with a PMH of RA (on chronic prednisone and hydroxychloroquine), type 2 DM (on insulin), COPD, hypothyroidism, 40-pack-year smoking history, chronic methadone use, peripheral neuropathy, and unhealed diabetic foot ulcers s/p right foot partial amputation (7 months ago - follows with wound clinic as outpatient) and most recently NSTEMI. He is now s/p CABGx3 with Dr. Carey on 7/9.     Patient is eating well. Currently on a 3/5 day burst of 40 mg prednisone.     Diabetes History  Type 2 diabetes for greater than 10 years  Outpatient provider for endocrine care PCP, date of last visit 5/21/24  Known complications due to diabetes include: peripheral neuropathy, CAD s/p CABDx3, Non healing ulcers secondary to uncontrolled diabetes, and hyperglycemia    Home Management  Patient reports he is prescribed 40 units levemir daily, lispro with meals, and metformin. He reports he takes the levemir most days. Reports he takes the lispro with meals regularly, but when pressed for a dose, he replies \"40 units\". Later in the conversation he notes he doesn't take the lispro often.   His chronic prednisone dose is 5 mg.   Reports checking his glucoses at least weekly with numbers in the 200's.   He has experienced hypoglycemia only in the hospital, denies hypoglycemia at home. He was able to sense hypoglycemia in the 60's.   He is somewhat interested in CGM use at discharge.     Results from Most Recent A1C  Hemoglobin A1C   Date/Time Value Ref Range Status   05/15/2024 10:26 AM 8.5 (H) 4.3 - 5.6 % Final     Comment:     American Diabetes Association guidelines " indicate that patients with HgbA1c in the range 5.7-6.4% are at increased risk for development of diabetes, and intervention by lifestyle modification may be beneficial. HgbA1c greater or equal to 6.5% is considered diagnostic of diabetes.   10/12/2023 03:27 AM 9.6 (H) see below % Final     Diabetes Problem List Entries with Dates  Problem List:  2023-10: Diabetes mellitus, type 2 (Multi)  2023-10: Diabetic foot ulcer with osteomyelitis (Multi)    Past Medical History  He has a past medical history of Abnormal result of other cardiovascular function study (06/28/2018), Atherosclerotic heart disease of native coronary artery with unstable angina pectoris (Multi) (06/28/2018), Fibromyalgia, Personal history of other diseases of the circulatory system, Personal history of other diseases of the musculoskeletal system and connective tissue, Personal history of other diseases of the musculoskeletal system and connective tissue, Personal history of other diseases of the musculoskeletal system and connective tissue, Personal history of other endocrine, nutritional and metabolic disease, Personal history of other endocrine, nutritional and metabolic disease, and Type 2 diabetes mellitus without complications (Multi) (02/25/2022).    Surgical History  He has a past surgical history that includes Septoplasty (06/28/2018); Back surgery (06/28/2018); and Cardiac catheterization (N/A, 7/1/2024).     Social History  He reports that he has been smoking cigarettes. He has a 40 pack-year smoking history. He has never used smokeless tobacco. He reports that he does not currently use alcohol. He reports that he does not use drugs.    Family History  Reports his mother and sister have type 2 diabetes     Allergies  Patient has no known allergies.    Review of Systems   Constitutional:  Positive for activity change, appetite change and fatigue. Negative for diaphoresis and unexpected weight change.   HENT:  Negative for congestion, sore  throat and trouble swallowing.    Eyes:  Negative for pain, redness and visual disturbance.   Respiratory:  Positive for cough. Negative for chest tightness and shortness of breath.    Cardiovascular:  Negative for chest pain, palpitations and leg swelling.   Gastrointestinal:  Negative for abdominal pain, diarrhea, nausea and vomiting.   Endocrine: Negative for cold intolerance, heat intolerance, polydipsia, polyphagia and polyuria.   Genitourinary:  Negative for dysuria, frequency and urgency.   Musculoskeletal:  Negative for gait problem and joint swelling.   Skin:  Negative for pallor and rash.   Allergic/Immunologic: Negative for immunocompromised state.   Neurological:  Positive for weakness. Negative for dizziness, light-headedness, numbness and headaches.   Hematological:  Does not bruise/bleed easily.   Psychiatric/Behavioral:  Negative for agitation, behavioral problems and confusion.    All other systems reviewed and are negative.       Physical Exam  Vitals reviewed.   Constitutional:       General: He is not in acute distress.     Appearance: Normal appearance. He is ill-appearing.   HENT:      Head: Normocephalic and atraumatic.      Nose: Nose normal.      Mouth/Throat:      Mouth: Mucous membranes are moist.   Eyes:      Extraocular Movements: Extraocular movements intact.      Conjunctiva/sclera: Conjunctivae normal.      Pupils: Pupils are equal, round, and reactive to light.   Cardiovascular:      Pulses: Normal pulses.   Pulmonary:      Effort: Pulmonary effort is normal. No respiratory distress.      Comments: Nasal cannula in place  Abdominal:      General: Abdomen is flat. There is no distension.   Musculoskeletal:         General: Normal range of motion.   Skin:     General: Skin is warm and dry.      Findings: No rash.   Neurological:      Mental Status: He is alert and oriented to person, place, and time.   Psychiatric:         Mood and Affect: Mood normal.         Behavior: Behavior  "normal.          ROS, PMH, FH/SH, surgical history and allergies have been reviewed.    Last Recorded Vitals  Blood pressure 105/60, pulse 54, temperature 35.7 °C (96.3 °F), temperature source Temporal, resp. rate 18, height 1.676 m (5' 6\"), weight 86.3 kg (190 lb 4.8 oz), SpO2 96%.    Relevant Results  Results from last 7 days   Lab Units 07/17/24  1214 07/17/24  0939 07/17/24  0752 07/16/24  2112 07/16/24  1704 07/16/24  1353 07/16/24  0037 07/16/24  0035 07/15/24  1905 07/15/24  1543 07/15/24  0429 07/15/24  0016 07/14/24  1723 07/14/24  1112   POCT GLUCOSE mg/dL 225*  --  207* 258* 174* 182*   < >  --    < >  --    < >  --    < >  --    GLUCOSE mg/dL  --  214*  --   --   --   --   --  152*  --  188*  --  97  --  146*    < > = values in this interval not displayed.      Lab Review  Lab Results   Component Value Date    BILITOT 0.4 07/02/2024    CALCIUM 8.7 07/17/2024    CO2 30 07/17/2024    CL 89 (L) 07/17/2024    CREATININE 1.25 07/17/2024    GLUCOSE 214 (H) 07/17/2024    ALKPHOS 122 (H) 07/02/2024    K 3.6 07/17/2024    PROT 7.0 07/02/2024     (L) 07/17/2024    AST 19 07/02/2024    ALT 10 07/02/2024    BUN 57 (H) 07/17/2024    ANIONGAP 18 07/17/2024    MG 2.63 (H) 07/17/2024    PHOS 2.7 07/17/2024    ALBUMIN 4.5 07/17/2024    GFRMALE >90 02/15/2023     Lab Results   Component Value Date    TRIG 81 07/02/2024    CHOL 122 07/02/2024    LDLCALC 60 07/02/2024    HDL 45.9 07/02/2024     Lab Results   Component Value Date    HGBA1C 8.5 (H) 05/15/2024    HGBA1C 9.4 (H) 02/20/2024    HGBA1C 10.0 (A) 02/12/2024     The ASCVD Risk score (Ameena VILLEGAS, et al., 2019) failed to calculate for the following reasons:    The patient has a prior MI or stroke diagnosis    Scheduled medications  acetaminophen, 975 mg, oral, q6h  aspirin, 81 mg, oral, Daily  atorvastatin, 80 mg, oral, Nightly  clopidogrel, 75 mg, oral, Daily  DULoxetine, 60 mg, oral, Daily  folic acid, 1 mg, oral, Daily  furosemide, 40 mg, intravenous, " Once  heparin, 5,000 Units, subcutaneous, q8h  hydroxychloroquine, 200 mg, oral, BID  [START ON 7/18/2024] insulin glargine, 20 Units, subcutaneous, q24h  insulin lispro, 0-10 Units, subcutaneous, TID  insulin lispro, 8 Units, subcutaneous, TID  levothyroxine, 50 mcg, oral, Daily  methadone, 77.5 mg, oral, q12h  metoprolol tartrate, 25 mg, oral, BID  multivitamin with minerals, 1 tablet, oral, Daily  oxygen, , inhalation, Continuous - Inhalation  polyethylene glycol, 17 g, oral, Daily  predniSONE, 40 mg, oral, Daily  pregabalin, 200 mg, oral, Daily  sennosides-docusate sodium, 2 tablet, oral, BID  thiamine, 100 mg, oral, Daily  tiotropium, 2 puff, inhalation, Daily      Continuous medications     PRN medications  PRN medications: albuterol, dextrose, dextrose **OR** glucagon, dextrose, glucagon, glucagon, naloxone, [DISCONTINUED] ondansetron **OR** ondansetron, [Held by provider] oxyCODONE, oxygen, sodium chloride      Assessment/Plan   Principal Problem:    CAD in native artery  Active Problems:    NSTEMI (non-ST elevated myocardial infarction) (Multi)    Hepatitis C    Kael Zepeda is a 58 y.o. male presenting with Mr. Zepeda is a 58-year-old male with a PMH of RA (on chronic prednisone and hydroxychloroquine), type 2 DM (on insulin), COPD, hypothyroidism, 40-pack-year smoking history, chronic methadone use, peripheral neuropathy, and unhealed diabetic foot ulcers s/p right foot partial amputation (7 months ago - follows with wound clinic as outpatient) and most recently NSTEMI. He is now s/p CABGx3 with Dr. Carey on 7/9.     Patient is eating well. Currently on a 3/5 day burst of 40 mg prednisone.     Diabetes History  Type 2 diabetes for greater than 10 years  Outpatient provider for endocrine care PCP, date of last visit 5/21/24  Known complications due to diabetes include: peripheral neuropathy, CAD s/p CABDx3, Non healing ulcers secondary to uncontrolled diabetes, and hyperglycemia    Home  "Management  Patient reports he is prescribed 40 units levemir daily, lispro with meals, and metformin. He reports he takes the levemir most days. Reports he takes the lispro with meals regularly, but when pressed for a dose, he replies \"40 units\". Later in the conversation he notes he doesn't take the lispro often.   His chronic prednisone dose is 5 mg.   Reports checking his glucoses at least weekly with numbers in the 200's.   He has experienced hypoglycemia only in the hospital, denies hypoglycemia at home. He was able to sense hypoglycemia in the 60's.   He is somewhat interested in CGM use at discharge.     - Recommend 20 units of glargine QAM (please dose and additional 10 units this morning to total 20 units)       If NPO: reduce to 15 units  - start lispro 8 with meals plus scale       If NPO: hold  - reduce lispro corrective scale from the cardiac scale to scale #2 with meals, adjust to q4h if NPO   = 0u  151-200 = 2u  201-250 = 4u  251-300 = 6u  301-350 = 8u  351-400 = 10u    -Accuchecks (not BMP) TIDAC and QHS- kindly ensure QHS Accucheck is checked; it is often missed   - Goal -180  -Hypoglycemia protocol  -Diabetes Diet- low carb 60 CHO  -Will continue to follow and titrate insulin accordingly    Discharge planning:   [ ] Consider 70/30 insulin dosed BID at discharge for simplicity of regimen, will continue to titrate insulin until day of discharge   [ ] recommend [facility] Plan and [facility] at Home at discharge for outpt GLP1 and SGLT2i start   [ ] will place christal 3 sample prior to discharge pending phone compatibility      I spent 80 minutes in the professional and overall care of this patient.    BREANN Tucker, APRN-CNP    "

## 2024-07-17 NOTE — SIGNIFICANT EVENT
Rapid Response RN responding for RADAR score of 6 due to the following VS: T 36.0 °Celsius; HR 63 ; RR 18; /67; SPO2 92% .      Reviewed abnormal VS with bedside RN who expressed no concerns regarding the patient's current condition based upon these VS.  No interventions by Rapid Response team indicated at this time.

## 2024-07-17 NOTE — NURSING NOTE
"Mr. Zepeda is up in chair.  He feels anxious about getting stronger and feels he may require SNF at time of discharge.  He is aware that steroid dose increased x 5 days and voices concern about elevated BG.  Spoke with PRACHI re his concern re low dose of basal insulin.  She will enter endocrinology consult order.  States appetite is \"good\" and consuming approx 75% of his food.  He is going to ambulate with aide today and hoping he can build up endurance and strength.  He is agreeable to follow up later this week.  Time spent:  20 mins.    "

## 2024-07-17 NOTE — PROGRESS NOTES
"CARDIAC SURGERY DAILY PROGRESS NOTE    Mr. Zepeda is a 58-year-old male with a PMH of RA (on chronic prednisone and hydroxychloroquine), type 2 DM (on insulin), COPD, hypothyroidism, 40-pack-year smoking history, chronic methadone use, peripheral neuropathy, and unhealed diabetic foot ulcers s/p right foot partial amputation (7 months ago - follows with wound clinic as outpatient) and most recently NSTEMI. He is now s/p CABGx3 with Dr. Carey on 7/9.     OPERATION/PROCEDURE: CABGx3 with Dr. Carey on 7/9.     History of NSTEMI. Is now status post CABGx3 Pre: Mildly decreased LV function, EF 40-45%. Post: normal LV function.     CTICU Course: Post operative course c/b acute hypoxic respiratory insufficiency requiring high flow nasal cannula, now improved.Afib with RVR to the 130s requiring bolus and amio drip. Now in NSR in 60-70's with some bradycardia to high 40's-> PO amio dc'd.  JUAN J post op with increasing CR to 2.08 (diuresis was held) now 1.25.  Pulmonary edema still noted on xray.     Transferred to T3 on 7/16/24      SUBJECTIVE:  Feels better today still on 5L NC  SR per tele rates 60s      Objective   /70 (BP Location: Right arm, Patient Position: Lying)   Pulse 67   Temp 35.9 °C (96.6 °F) (Temporal)   Resp 17   Ht 1.676 m (5' 6\")   Wt 86.3 kg (190 lb 4.8 oz)   SpO2 99%   BMI 30.72 kg/m²   0-10 (Numeric) Pain Score: 4   3 Day Weight Change: -1.227 kg (-2 lb 11.3 oz) per day    Intake and Output    Intake/Output Summary (Last 24 hours) at 7/17/2024 0818  Last data filed at 7/17/2024 0600  Gross per 24 hour   Intake 380 ml   Output 1500 ml   Net -1120 ml       Physical Exam  Neurological: awake, alert, no obvious focal deficits  Eyes: Anicteric sclera, clear  Respiratory/Thorax: Diminished, clear breath sounds bilaterally, symmetric chest expansion, V pacing wires present  Cardiovascular: NSR, no murmurs appreciated  Gastrointestinal: abdomen mildly tender and soft   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  Extremities: warm, bilaterally feet wrapped in bandages  Skin: Warm and dry throughout, midline incision stable - dressing clean, dry, and intact with minimal strikethrough  Psych: Calm, cooperative     Medications  Scheduled medications  acetaminophen, 975 mg, oral, q6h  aspirin, 81 mg, oral, Daily  atorvastatin, 80 mg, oral, Nightly  clopidogrel, 75 mg, oral, Daily  DULoxetine, 60 mg, oral, Daily  folic acid, 1 mg, oral, Daily  heparin, 5,000 Units, subcutaneous, q8h  hydroxychloroquine, 200 mg, oral, BID  insulin glargine, 10 Units, subcutaneous, q24h  insulin lispro, 0-15 Units, subcutaneous, q4h  levothyroxine, 50 mcg, oral, Daily  methadone, 77.5 mg, oral, q12h  metoprolol tartrate, 25 mg, oral, BID  multivitamin with minerals, 1 tablet, oral, Daily  oxygen, , inhalation, Continuous - Inhalation  polyethylene glycol, 17 g, oral, Daily  predniSONE, 40 mg, oral, Daily  pregabalin, 200 mg, oral, Daily  sennosides-docusate sodium, 2 tablet, oral, BID  thiamine, 100 mg, oral, Daily  tiotropium, 2 puff, inhalation, Daily    Continuous medications   PRN medications  PRN medications: albuterol, dextrose **OR** glucagon, naloxone, [DISCONTINUED] ondansetron **OR** ondansetron, [Held by provider] oxyCODONE, oxygen, sodium chloride    Labs  Results for orders placed or performed during the hospital encounter of 07/01/24 (from the past 24 hour(s))   POCT GLUCOSE   Result Value Ref Range    POCT Glucose 179 (H) 74 - 99 mg/dL   Vascular US Ankle Brachial Index (EBEN) Without Exercise   Result Value Ref Range    BSA 2.05 m2   Transthoracic Echo (TTE) Limited   Result Value Ref Range    LVOT diam 2.00 cm    MV E/A ratio 1.11     Tricuspid annular plane systolic excursion 1.9 cm    LV EF 39 %    RVSP 16.7 mmHg    LV A4C EF 40.7    POCT GLUCOSE   Result Value Ref Range    POCT Glucose 182 (H) 74 - 99 mg/dL   POCT GLUCOSE   Result Value Ref Range    POCT Glucose 174 (H) 74 - 99 mg/dL   POCT GLUCOSE   Result Value Ref Range     POCT Glucose 258 (H) 74 - 99 mg/dL   POCT GLUCOSE   Result Value Ref Range    POCT Glucose 207 (H) 74 - 99 mg/dL             IMPRESSION & PLAN:  POD # 8 s/p CABGx3 with Dr. Carey on 7/9   - Increase activity/ ambulation; PT/OT  - Encourage IS, C/DB; respiratory therapy; wean O2 as annika   - Cardiac rehab referral   - Continue cardiac meds: ASA, BB, statin and Plavix for NSTEMI  - holding home Lisinopril   - V wires not sensing-OFF since OR  - dc'd amio 400mg BID for bradycardia   - PMH of chronic methadone use->  continue methadone 77.5mg--> q12 hrs  - 2v CXR 7/17/24  - chest tubes removed 7/13/24  - Postop echo 7/15/24 LVEF 40%-> similar to pre-op echo   - Remove epicardial wires prior to discharge   - Tele until discharge  - Optimize nutrition and electrolytes    Rhythm  - Tele: SR 60s  - Continue BB, Amio PO dc'd   - Adjust medications as tolerated    Acute Blood Loss Anemia   Recent Labs     07/16/24  0035 07/15/24  1543 07/15/24  0432 07/14/24  1112 07/14/24  0438 07/13/24  1139 07/12/24  2349   HGB 8.4* 8.5* 7.4* 8.0* 7.5* 8.5* 8.6*   HCT 25.0* 26.1* 22.2* 24.2* 22.8* 25.7* 25.8*   - MV, PO Iron x1mo  - Daily labs, transfuse as indicated    Thrombocytopenia  Recent Labs     07/16/24  0035 07/15/24  1543 07/15/24  0432 07/14/24  1112 07/14/24  0438 07/13/24  1139 07/12/24  2349    322 247 307 274 263 236   - Etiology likely postop/CPB related  - Continue to trend with daily CBCs    Volume/Electrolyte Status: Preop wt Weight: 85.3 kg (188 lb)   Vitals:    07/17/24 0241   Weight: 86.3 kg (190 lb 4.8 oz)     - Weight: 86.3kg   - Lasix infusion stopped 7/16-holding diuresis as appears intervascularly depleted   - seems to be at base weight; chest xray with congestion still   - Replete electrolytes for hypokalemia/hypomagnesemia/hypophosphatemia as needed   - Daily weights and strict I&Os  - Daily RFP while admitted    H/o peripheral neuropathy and chronic methadone use  -Acute post operative pain  currently well controlled on home methadone and scheduled Tylenol.   -c/w thiamine, folate, home pregabalin and duloxetine  -c/w home Plaquenil   -c/w methadone back to BID home dose     NSTEMI now status post CABGx3  Afib RVR post op   -Pre pump EF 40-45% TTE 7/17 40%  -PO amio dc'd bradycardia -> in NSR.   -Continue PO metoprolol 25mg BID, Plavix, ASA and Statin  -Holding home Lisinopril     Acute hypoxic pulmonary insufficiency requiring high flow nasal cannula improving on 4L  H/O COPD   -CXR with mild congestion and atelectasis-> will give 40mg Lasix IV push today   -c/w  prednisone to 40mg x5 days for COPD exacerbation treatment.   -c/w IS, bronchial hygiene  -TTE done 7/17 without RV strain ruled out    JUAN J post op CR 2.08-no history of renal disease, baseline creatinine 0.7   -BUN/CR today 57/1.25  -Lasix infusion stopped 7/16  -goal net even to negative   -will give Lasix 40gm IVP once today -> chest xray with congestion     IDDM2  -A1c: 8.5  -Endo consulted   -additional 10 units of glargine today  -start dose 20 units QAM starting tomorrow with 8 units of lispro with meals and decreasing the sliding scale to #2 with meals.    -hypoglycemia protocol  -home regime was Metformin 500mg BID currently not started    Hypothyroidism   -c/w Levothyroxine    Skin    -Unhealed diabetic foot ulcers s/p R foot partial amputation. Wound care following.    VTE Prophylaxis: SCDs/TEDs, ambulation, SQ heparin  Code Status: Full Code    Dispo  - PT/OT recs ok to discharge home   - Would benefit from homecare for cardiac surgery carepath and RN visits  - Anticipate discharge in a couple days  - Will continue to assess discharge needs      CHACE Rodriguez-CNP  Cardiac Surgery [facility]  [facility]  Team Phone [phone]    7/17/2024  8:18 AM

## 2024-07-17 NOTE — CARE PLAN
The patient's goals for the shift include      The clinical goals for the shift include Pt will maintain map>65 and oxygen sats >85% per order until end of shift 7/14/24 0730.    Problem: Pain - Adult  Goal: Verbalizes/displays adequate comfort level or baseline comfort level  Outcome: Progressing     Problem: Safety - Adult  Goal: Free from fall injury  Outcome: Progressing     Problem: Chronic Conditions and Co-morbidities  Goal: Patient's chronic conditions and co-morbidity symptoms are monitored and maintained or improved  Outcome: Progressing     Problem: ACS/CP/NSTEMI/STEMI  Goal: Chest pain managed (free from pain or at acceptable level)  Outcome: Progressing     Problem: Cardiac catheterization  Goal: Free from dysrhythmias  Outcome: Progressing   BS- 258, Remains free from falls, using front wheeled walker, Alarms used in chair and bed to remind pt to get help when standing and walking.

## 2024-07-18 ENCOUNTER — APPOINTMENT (OUTPATIENT)
Dept: RADIOLOGY | Facility: HOSPITAL | Age: 59
End: 2024-07-18
Payer: MEDICARE

## 2024-07-18 LAB
ALBUMIN SERPL BCP-MCNC: 4.4 G/DL (ref 3.4–5)
ANION GAP SERPL CALC-SCNC: 17 MMOL/L (ref 10–20)
BUN SERPL-MCNC: 68 MG/DL (ref 6–23)
CALCIUM SERPL-MCNC: 9.1 MG/DL (ref 8.6–10.6)
CHLORIDE SERPL-SCNC: 93 MMOL/L (ref 98–107)
CO2 SERPL-SCNC: 27 MMOL/L (ref 21–32)
CREAT SERPL-MCNC: 1.36 MG/DL (ref 0.5–1.3)
EGFRCR SERPLBLD CKD-EPI 2021: 60 ML/MIN/1.73M*2
ERYTHROCYTE [DISTWIDTH] IN BLOOD BY AUTOMATED COUNT: 21.7 % (ref 11.5–14.5)
GLUCOSE BLD MANUAL STRIP-MCNC: 143 MG/DL (ref 74–99)
GLUCOSE BLD MANUAL STRIP-MCNC: 154 MG/DL (ref 74–99)
GLUCOSE BLD MANUAL STRIP-MCNC: 179 MG/DL (ref 74–99)
GLUCOSE BLD MANUAL STRIP-MCNC: 215 MG/DL (ref 74–99)
GLUCOSE BLD MANUAL STRIP-MCNC: 67 MG/DL (ref 74–99)
GLUCOSE SERPL-MCNC: 182 MG/DL (ref 74–99)
HCT VFR BLD AUTO: 28.9 % (ref 41–52)
HGB BLD-MCNC: 9 G/DL (ref 13.5–17.5)
MCH RBC QN AUTO: 25.9 PG (ref 26–34)
MCHC RBC AUTO-ENTMCNC: 31.1 G/DL (ref 32–36)
MCV RBC AUTO: 83 FL (ref 80–100)
NRBC BLD-RTO: 1.1 /100 WBCS (ref 0–0)
PHOSPHATE SERPL-MCNC: 3.5 MG/DL (ref 2.5–4.9)
PLATELET # BLD AUTO: 321 X10*3/UL (ref 150–450)
POTASSIUM SERPL-SCNC: 4 MMOL/L (ref 3.5–5.3)
RBC # BLD AUTO: 3.48 X10*6/UL (ref 4.5–5.9)
SODIUM SERPL-SCNC: 133 MMOL/L (ref 136–145)
WBC # BLD AUTO: 25.7 X10*3/UL (ref 4.4–11.3)

## 2024-07-18 PROCEDURE — 94660 CPAP INITIATION&MGMT: CPT

## 2024-07-18 PROCEDURE — 1100000001 HC PRIVATE ROOM DAILY

## 2024-07-18 PROCEDURE — 2500000001 HC RX 250 WO HCPCS SELF ADMINISTERED DRUGS (ALT 637 FOR MEDICARE OP): Performed by: PHYSICIAN ASSISTANT

## 2024-07-18 PROCEDURE — 1200000002 HC GENERAL ROOM WITH TELEMETRY DAILY

## 2024-07-18 PROCEDURE — 70450 CT HEAD/BRAIN W/O DYE: CPT | Performed by: RADIOLOGY

## 2024-07-18 PROCEDURE — 70450 CT HEAD/BRAIN W/O DYE: CPT

## 2024-07-18 PROCEDURE — 2500000002 HC RX 250 W HCPCS SELF ADMINISTERED DRUGS (ALT 637 FOR MEDICARE OP, ALT 636 FOR OP/ED): Performed by: NURSE PRACTITIONER

## 2024-07-18 PROCEDURE — 85027 COMPLETE CBC AUTOMATED: CPT | Performed by: NURSE PRACTITIONER

## 2024-07-18 PROCEDURE — 2500000002 HC RX 250 W HCPCS SELF ADMINISTERED DRUGS (ALT 637 FOR MEDICARE OP, ALT 636 FOR OP/ED): Performed by: PHYSICIAN ASSISTANT

## 2024-07-18 PROCEDURE — 2500000005 HC RX 250 GENERAL PHARMACY W/O HCPCS: Performed by: PHYSICIAN ASSISTANT

## 2024-07-18 PROCEDURE — 99232 SBSQ HOSP IP/OBS MODERATE 35: CPT

## 2024-07-18 PROCEDURE — 80069 RENAL FUNCTION PANEL: CPT | Performed by: NURSE PRACTITIONER

## 2024-07-18 PROCEDURE — 99233 SBSQ HOSP IP/OBS HIGH 50: CPT

## 2024-07-18 PROCEDURE — S0109 METHADONE ORAL 5MG: HCPCS | Performed by: PHYSICIAN ASSISTANT

## 2024-07-18 PROCEDURE — 99231 SBSQ HOSP IP/OBS SF/LOW 25: CPT | Performed by: PHYSICIAN ASSISTANT

## 2024-07-18 PROCEDURE — 73502 X-RAY EXAM HIP UNI 2-3 VIEWS: CPT | Mod: RIGHT SIDE | Performed by: RADIOLOGY

## 2024-07-18 PROCEDURE — 2500000001 HC RX 250 WO HCPCS SELF ADMINISTERED DRUGS (ALT 637 FOR MEDICARE OP)

## 2024-07-18 PROCEDURE — 2500000004 HC RX 250 GENERAL PHARMACY W/ HCPCS (ALT 636 FOR OP/ED): Performed by: PHYSICIAN ASSISTANT

## 2024-07-18 PROCEDURE — 73502 X-RAY EXAM HIP UNI 2-3 VIEWS: CPT | Mod: RT

## 2024-07-18 PROCEDURE — 82947 ASSAY GLUCOSE BLOOD QUANT: CPT

## 2024-07-18 RX ORDER — FUROSEMIDE 10 MG/ML
40 INJECTION INTRAMUSCULAR; INTRAVENOUS ONCE
Status: COMPLETED | OUTPATIENT
Start: 2024-07-18 | End: 2024-07-18

## 2024-07-18 RX ORDER — INSULIN GLARGINE 100 [IU]/ML
25 INJECTION, SOLUTION SUBCUTANEOUS EVERY 24 HOURS
Status: DISCONTINUED | OUTPATIENT
Start: 2024-07-18 | End: 2024-07-26 | Stop reason: HOSPADM

## 2024-07-18 RX ORDER — INSULIN LISPRO 100 [IU]/ML
12 INJECTION, SOLUTION INTRAVENOUS; SUBCUTANEOUS
Status: DISCONTINUED | OUTPATIENT
Start: 2024-07-18 | End: 2024-07-21

## 2024-07-18 RX ORDER — INSULIN LISPRO 100 [IU]/ML
10 INJECTION, SOLUTION INTRAVENOUS; SUBCUTANEOUS
Status: DISCONTINUED | OUTPATIENT
Start: 2024-07-18 | End: 2024-07-18

## 2024-07-18 RX ORDER — INSULIN LISPRO 100 [IU]/ML
15 INJECTION, SOLUTION INTRAVENOUS; SUBCUTANEOUS
Status: DISCONTINUED | OUTPATIENT
Start: 2024-07-18 | End: 2024-07-18

## 2024-07-18 RX ORDER — METOPROLOL TARTRATE 25 MG/1
12.5 TABLET, FILM COATED ORAL 2 TIMES DAILY
Status: DISCONTINUED | OUTPATIENT
Start: 2024-07-18 | End: 2024-07-26 | Stop reason: HOSPADM

## 2024-07-18 ASSESSMENT — PAIN SCALES - GENERAL
PAINLEVEL_OUTOF10: 4
PAINLEVEL_OUTOF10: 3

## 2024-07-18 ASSESSMENT — ENCOUNTER SYMPTOMS
DYSURIA: 0
SORE THROAT: 0
POLYPHAGIA: 0
CHEST TIGHTNESS: 0
SHORTNESS OF BREATH: 0
UNEXPECTED WEIGHT CHANGE: 0
DIARRHEA: 0
NAUSEA: 0
FREQUENCY: 0
DIAPHORESIS: 0
JOINT SWELLING: 0
FATIGUE: 1
LIGHT-HEADEDNESS: 0
COUGH: 0
AGITATION: 0
POLYDIPSIA: 1
TROUBLE SWALLOWING: 0
APPETITE CHANGE: 0
ACTIVITY CHANGE: 0
PALPITATIONS: 0
NUMBNESS: 0
CONFUSION: 0
EYE REDNESS: 0
ABDOMINAL PAIN: 0
BRUISES/BLEEDS EASILY: 0
EYE PAIN: 0
DIZZINESS: 0
HEADACHES: 0
VOMITING: 0

## 2024-07-18 ASSESSMENT — PAIN DESCRIPTION - LOCATION: LOCATION: CHEST

## 2024-07-18 ASSESSMENT — PAIN DESCRIPTION - ORIENTATION: ORIENTATION: MID

## 2024-07-18 NOTE — PROGRESS NOTES
Physical Therapy                 Therapy Communication Note    Patient Name: Kael Zepeda  MRN: [id number]  Today's Date: 7/18/2024     Discipline: Physical Therapy    Missed Visit Reason: Missed Visit Reason: Other (Comment) (RN hold; pt had fall today and is currently eating and per RN hypoglycemic.)    Missed Time: Attempt    Comment:  15:30pm

## 2024-07-18 NOTE — CARE PLAN
The patient's goals for the shift include      The clinical goals for the shift include Pt will maintain map>65 and oxygen sats >85% per order until end of shift 7/14/24 0730.      Problem: Pain - Adult  Goal: Verbalizes/displays adequate comfort level or baseline comfort level  Outcome: Progressing     Problem: Safety - Adult  Goal: Free from fall injury  Outcome: Progressing     Problem: ACS/CP/NSTEMI/STEMI  Goal: Chest pain managed (free from pain or at acceptable level)  Outcome: Progressing  Goal: Lab values return to normal range  Outcome: Progressing  Goal: Promote self management  Outcome: Progressing  Goal: Verbalize understanding of procedures/devices  Outcome: Progressing  Goal: Wean vasopressors/achieve hemodynamic stability  Outcome: Progressing     Problem: Arrythmia/Dysrhythmia  Goal: Lab values return to normal range  Outcome: Progressing     Problem: Hypertensive Emergency/Crisis  Goal: Blood pressure gradually reduced to goal range  Outcome: Progressing  Goal: Free from signs of organ damage  Outcome: Progressing  Goal: Lab values return to normal range  Outcome: Progressing  Goal: Promote self management  Outcome: Progressing  Pt remains HDS and wihtout falls.  Pain is controlled and labs WNL.  Wears CPAP at night and SaO2 remains >92.

## 2024-07-18 NOTE — SIGNIFICANT EVENT
Initial responded due to code blue however on arrival was a patient fall. On arrival patient sitting upright on floor. States he was getting up to get water and noted that his oxygen tubing was not long enough to reach so he took it off. After taking it off he fell backwards and did say he hit his head and his hip. On exam he has no focal deficits without tenderness to the neck/spine/head. No notable deformity/brusing,/bleeding of the head or face. Hip exam did note focal tenderness to glute without noted deformity, moving leg 5/5 strength.   Review of telemetry without any arryhtmias remains SB in the juany 50s  BP remains appropriate on manual 120/60  BG 150s     Plan  -CT head without contrast  - Hip xrays   - Re-educated on need to call for help with ambulation and needing to mobilize

## 2024-07-18 NOTE — PROGRESS NOTES
Kael Zepeda is a 58 y.o. male on day 17 of admission presenting with CAD in native artery.    Subjective   Pt seen and examined  Discussed increasing mealtime lispro and glargine due to persistent hyperglycemia  Endorses thirst from high glucose  Discussed steroid induced hyperglycemia  All questions answered     I have reviewed histories, allergies and medications have been reviewed and there are no changes       Objective   Review of Systems   Constitutional:  Positive for fatigue. Negative for activity change, appetite change, diaphoresis and unexpected weight change.   HENT:  Negative for congestion, sore throat and trouble swallowing.    Eyes:  Negative for pain, redness and visual disturbance.   Respiratory:  Negative for cough, chest tightness and shortness of breath.    Cardiovascular:  Negative for chest pain, palpitations and leg swelling.   Gastrointestinal:  Negative for abdominal pain, diarrhea, nausea and vomiting.   Endocrine: Positive for polydipsia and polyuria. Negative for cold intolerance, heat intolerance and polyphagia.   Genitourinary:  Negative for dysuria, frequency and urgency.   Musculoskeletal:  Negative for gait problem and joint swelling.   Skin:  Negative for pallor and rash.   Allergic/Immunologic: Negative for immunocompromised state.   Neurological:  Negative for dizziness, light-headedness, numbness and headaches.   Hematological:  Does not bruise/bleed easily.   Psychiatric/Behavioral:  Negative for agitation, behavioral problems and confusion.    All other systems reviewed and are negative.    Physical Exam  Vitals reviewed.   Constitutional:       General: He is not in acute distress.     Appearance: Normal appearance. He is ill-appearing.   HENT:      Head: Normocephalic and atraumatic.      Nose: Nose normal.      Mouth/Throat:      Mouth: Mucous membranes are moist.   Eyes:      Extraocular Movements: Extraocular movements intact.      Conjunctiva/sclera:  "Conjunctivae normal.      Pupils: Pupils are equal, round, and reactive to light.   Cardiovascular:      Pulses: Normal pulses.   Pulmonary:      Effort: Pulmonary effort is normal. No respiratory distress.      Comments: Nasal cannula in place  Abdominal:      General: Abdomen is flat. There is no distension.   Musculoskeletal:         General: Normal range of motion.   Skin:     General: Skin is warm and dry.      Findings: No rash.   Neurological:      Mental Status: He is alert and oriented to person, place, and time.   Psychiatric:         Mood and Affect: Mood normal.         Behavior: Behavior normal.         Last Recorded Vitals  Blood pressure 146/83, pulse 51, temperature 36 °C (96.8 °F), temperature source Temporal, resp. rate 20, height 1.676 m (5' 6\"), weight 86 kg (189 lb 9.5 oz), SpO2 99%.  Intake/Output last 3 Shifts:  I/O last 3 completed shifts:  In: - (0 mL/kg)   Out: 1550 (17.9 mL/kg) [Urine:1550 (0.5 mL/kg/hr)]  Dosing Weight: 86.4 kg     Relevant Results  Results from last 7 days   Lab Units 07/18/24  0746 07/17/24  2157 07/17/24  1910 07/17/24  1834 07/17/24  1214 07/17/24  0939 07/16/24  0037 07/16/24  0035 07/15/24  1905 07/15/24  1543 07/15/24  0429 07/15/24  0016 07/14/24  1723 07/14/24  1112   POCT GLUCOSE mg/dL 179* 315* 352* 335* 225*  --    < >  --    < >  --    < >  --    < >  --    GLUCOSE mg/dL  --   --   --   --   --  214*  --  152*  --  188*  --  97  --  146*    < > = values in this interval not displayed.     Lab Review  Lab Results   Component Value Date    BILITOT 0.4 07/02/2024    CALCIUM 8.7 07/17/2024    CO2 30 07/17/2024    CL 89 (L) 07/17/2024    CREATININE 1.25 07/17/2024    GLUCOSE 214 (H) 07/17/2024    ALKPHOS 122 (H) 07/02/2024    K 3.6 07/17/2024    PROT 7.0 07/02/2024     (L) 07/17/2024    AST 19 07/02/2024    ALT 10 07/02/2024    BUN 57 (H) 07/17/2024    ANIONGAP 18 07/17/2024    MG 2.63 (H) 07/17/2024    PHOS 2.7 07/17/2024    ALBUMIN 4.5 07/17/2024    " GFRMALE >90 02/15/2023     Lab Results   Component Value Date    TRIG 81 07/02/2024    CHOL 122 07/02/2024    LDLCALC 60 07/02/2024    HDL 45.9 07/02/2024     Lab Results   Component Value Date    HGBA1C 8.5 (H) 05/15/2024    HGBA1C 9.4 (H) 02/20/2024    HGBA1C 10.0 (A) 02/12/2024     The ASCVD Risk score (Ameena VILLEGAS, et al., 2019) failed to calculate for the following reasons:    The patient has a prior MI or stroke diagnosis    Scheduled medications  acetaminophen, 975 mg, oral, q6h  aspirin, 81 mg, oral, Daily  atorvastatin, 80 mg, oral, Nightly  clopidogrel, 75 mg, oral, Daily  DULoxetine, 60 mg, oral, Daily  folic acid, 1 mg, oral, Daily  furosemide, 40 mg, intravenous, Once  heparin, 5,000 Units, subcutaneous, q8h  hydroxychloroquine, 200 mg, oral, BID  insulin glargine, 25 Units, subcutaneous, q24h  insulin lispro, 0-10 Units, subcutaneous, TID  insulin lispro, 15 Units, subcutaneous, TID  levothyroxine, 50 mcg, oral, Daily  methadone, 77.5 mg, oral, q12h  metoprolol tartrate, 25 mg, oral, BID  multivitamin with minerals, 1 tablet, oral, Daily  oxygen, , inhalation, Continuous - Inhalation  polyethylene glycol, 17 g, oral, Daily  predniSONE, 40 mg, oral, Daily  pregabalin, 200 mg, oral, Daily  sennosides-docusate sodium, 2 tablet, oral, BID  thiamine, 100 mg, oral, Daily  tiotropium, 2 puff, inhalation, Daily      Continuous medications     PRN medications  PRN medications: albuterol, dextrose, dextrose **OR** glucagon, dextrose, glucagon, glucagon, naloxone, [DISCONTINUED] ondansetron **OR** ondansetron, [Held by provider] oxyCODONE, oxygen, sodium chloride       Assessment/Plan   Principal Problem:    CAD in native artery  Active Problems:    NSTEMI (non-ST elevated myocardial infarction) (Multi)    Hepatitis C      Kael Zepeda is a 58 y.o. male presenting with Mr. Zepeda is a 58-year-old male with a PMH of RA (on chronic prednisone and hydroxychloroquine), type 2 DM (on insulin), COPD,  "hypothyroidism, 40-pack-year smoking history, chronic methadone use, peripheral neuropathy, and unhealed diabetic foot ulcers s/p right foot partial amputation (7 months ago - follows with wound clinic as outpatient) and most recently NSTEMI. He is now s/p CABGx3 with Dr. Carey on 7/9.      Patient is eating well. Currently on a 3/5 day burst of 40 mg prednisone.      Diabetes History  Type 2 diabetes for greater than 10 years  Outpatient provider for endocrine care PCP, date of last visit 5/21/24  Known complications due to diabetes include: peripheral neuropathy, CAD s/p CABDx3, Non healing ulcers secondary to uncontrolled diabetes, and hyperglycemia     Home Management  Patient reports he is prescribed 40 units levemir daily, lispro with meals, and metformin. He reports he takes the levemir most days. Reports he takes the lispro with meals regularly, but when pressed for a dose, he replies \"40 units\". Later in the conversation he notes he doesn't take the lispro often.   His chronic prednisone dose is 5 mg.   Reports checking his glucoses at least weekly with numbers in the 200's.   He has experienced hypoglycemia only in the hospital, denies hypoglycemia at home. He was able to sense hypoglycemia in the 60's.   He is somewhat interested in CGM use at discharge.     Steroids: predinsone 40mg x 5 days (7/15-7/19)    - recommend increasing glargine from 20u to 25u daily       If NPO: reduce to 15 units  - recommend increasing lispro from 8u to 12u with meals plus scale       If NPO: hold  - continue lispro corrective scale from the cardiac scale to scale #2 with meals, adjust to q4h if NPO   = 0u  151-200 = 2u  201-250 = 4u  251-300 = 6u  301-350 = 8u  351-400 = 10u     -Accuchecks (not BMP) TIDAC and QHS- kindly ensure QHS Accucheck is checked; it is often missed   - Goal -180  -Hypoglycemia protocol  -Diabetes Diet- low carb 60 CHO  -Will continue to follow and titrate insulin accordingly   "   Discharge planning:   [ ] Consider 70/30 insulin dosed BID at discharge for simplicity of regimen, will continue to titrate insulin until day of discharge   [ ] recommend [facility] and Healthy at Home at discharge for outpt GLP1 and SGLT2i start   [ ] will place christal 3 sample prior to discharge pending phone compatibility        I spent 50 minutes in the professional and overall care of this patient.      Padmini David PA-C

## 2024-07-18 NOTE — SIGNIFICANT EVENT
ID: 58-year-old male    Surgeon: Aurelio   DOS: 7/9/24  Procedure s/p CABG x 3     Airway: grade I - full view of glottis   EF:  40-45% (post-op TTE 7/17 40%)     FULL CODE    CALS - Yes: Exp: POD #10 is 7/19/24     HPI:  Recently NSTEMI. Kept for in-patient CABG    Post-op course:  Acute hypoxic respiratory insufficiency initially requiring high flow nasal cannula secondary to COPD and post-operative pulm edema  Afib with RVR to the 130s requiring bolus and amio drip. Now  bradycardia to high 40-50s  JUAN J post op - peak 2.08  Altered LOC (in CTICU) - ? Fentanyl use while inpatient brought from family members     PMH:  CAD  Type 2 DM (on insulin) with peripheral neuropathy, and unhealed diabetic foot ulcers s/p right foot partial amputation (7 months ago - follows with wound clinic as outpatient)  COPD  RA (on chronic prednisone and hydroxychloroquine)  Hypothyroidism  Social:   40-pack-year smoking history  chronic methadone use,     S-  Called urgently to the bedside following a fall. Patient was getting to get out of bed and self-discontinued his 02. Felt unsteady/weak and fell. Patient recalls falling directly on his backside. Did not     O-   Neuro: CAM-, RASS 0, Neuro Intact  CVS:  Visit Vitals  /83 (BP Location: Right arm, Patient Position: Sitting)   Pulse 51   Temp 36 °C (96.8 °F) (Temporal)   Resp 20   No rhythm abnormality (other than 1 degree HB) identify on telemetry before or during event  Resp: Sp02 93% on 4L via NP. Decreased bs to bases  Abdo: Soft, NT, BS+  MSK: Normal ROM, strength and sensation of right and left upper and lower limb. No pain on palpation of skull/neck/spine. No pain on palpation or with active and passive hip and knee ROM assessment. Pelvis stable and no tenderness on palpation or distraction.   Skin/Wound Issues: No new issues    A- Patient has a unprovoked fall when attempting to ambulate off O2    P-    - CT brain and plain film of pelvis and hips  - Bedrest until testing  completed and re-assess.  - Serial neuro check for next 4 hours then re-assess    I have personally identified and managed all complex care issues to prevent aforementioned clinical deterioration.  Clinical care time is spent at bedside and/or the immediate area and has included, but is not limited to, examination of the patient, review of the chart, diagnostic tests, labs, radiographs, review of consult team recommendations, discharge planning and family updates.  Time spent in procedures and teaching are reported separately.     Clinical Care Time: 34 minutes.

## 2024-07-18 NOTE — NURSING NOTE
"Mr. Zepeda is resting in bed.  States he had fallen this am and is to have x-rays.  States his morning has been \"very busy\" and he feels anxious.      We reviewed his recent BG values and insulin regimen.  We discussed insulin plan for home to be mixed insulin, twice a day.  He agrees this would be easier for him to manage at home.    Overall appetite is fair.  States he did not have much breakfast today. He is agreeable to follow up again in am.    "

## 2024-07-18 NOTE — CONSULTS
Wound Care Consult     Visit Date: 7/18/2024      Patient Name: Kael Zepeda         MRN: [id number]           YOB: 1965     Reason for Consult: RN consulted for multiple foot wounds, and did not realize the wound team had already  seen and recommendations are in.        Wound Team Summary Assessment: Spoke with RN and confirmed consult was an oversight. Wound team will defer this consult.      Wound Team Plan: Recommendations are in from 7/11/24 assessment. Wound team will not continue to follow.      SUZANNE GERHARDT, RN, BSN CWOCN  7/18/2024  2:39 PM

## 2024-07-18 NOTE — PROGRESS NOTES
"CARDIAC SURGERY DAILY PROGRESS NOTE    Mr. Zepeda is a 58-year-old male with a PMH of RA (on chronic prednisone and hydroxychloroquine), type 2 DM (on insulin), COPD, hypothyroidism, 40-pack-year smoking history, chronic methadone use, peripheral neuropathy, and unhealed diabetic foot ulcers s/p right foot partial amputation (7 months ago - follows with wound clinic as outpatient) and most recently NSTEMI. He is now s/p CABGx3 with Dr. Carey on 7/9.     OPERATION/PROCEDURE: CABGx3 with Dr. Carey on 7/9.     History of NSTEMI. Is now status post CABGx3 Pre: Mildly decreased LV function, EF 40-45%. Post: normal LV function.     CTICU Course: Post operative course c/b acute hypoxic respiratory insufficiency requiring high flow nasal cannula, now improved.Afib with RVR to the 130s requiring bolus and amio drip. Now in NSR in 60-70's with some bradycardia to high 40's-> PO amio dc'd.  JUAN J post op with increasing CR to 2.08 (diuresis was held) now 1.25.  Pulmonary edema still noted on xray. ? Fentanyl use while inpatient brought from family members     Transferred to T3 on 7/16/24      SUBJECTIVE:  Feels better today still on 4L NC  Sinus Dayday in the 50s      Objective   /83 (BP Location: Right arm, Patient Position: Sitting)   Pulse 51   Temp 36 °C (96.8 °F) (Temporal)   Resp 20   Ht 1.676 m (5' 6\")   Wt 86 kg (189 lb 9.5 oz)   SpO2 99%   BMI 30.60 kg/m²   0-10 (Numeric) Pain Score: 4   3 Day Weight Change: Unable to Calculate    Intake and Output    Intake/Output Summary (Last 24 hours) at 7/18/2024 0824  Last data filed at 7/17/2024 1600  Gross per 24 hour   Intake --   Output 675 ml   Net -675 ml       Physical Exam  Neurological: awake, alert, no obvious focal deficits  Eyes: Anicteric sclera, clear  Respiratory/Thorax: Diminished, clear breath sounds bilaterally, symmetric chest expansion, V pacing wires present  Cardiovascular: NSR, no murmurs appreciated  Gastrointestinal: abdomen soft. " Nondistended, nontednedr  Extremities: warm, bilaterally feet wrapped in bandages  Skin: Warm and dry throughout, midline incision stable - dressing clean, dry, and intact  Psych: Calm, cooperative     Medications  Scheduled medications  acetaminophen, 975 mg, oral, q6h  aspirin, 81 mg, oral, Daily  atorvastatin, 80 mg, oral, Nightly  clopidogrel, 75 mg, oral, Daily  DULoxetine, 60 mg, oral, Daily  folic acid, 1 mg, oral, Daily  furosemide, 40 mg, intravenous, Once  heparin, 5,000 Units, subcutaneous, q8h  hydroxychloroquine, 200 mg, oral, BID  insulin glargine, 25 Units, subcutaneous, q24h  insulin lispro, 0-10 Units, subcutaneous, TID  insulin lispro, 15 Units, subcutaneous, TID  levothyroxine, 50 mcg, oral, Daily  methadone, 77.5 mg, oral, q12h  metoprolol tartrate, 25 mg, oral, BID  multivitamin with minerals, 1 tablet, oral, Daily  oxygen, , inhalation, Continuous - Inhalation  polyethylene glycol, 17 g, oral, Daily  predniSONE, 40 mg, oral, Daily  pregabalin, 200 mg, oral, Daily  sennosides-docusate sodium, 2 tablet, oral, BID  thiamine, 100 mg, oral, Daily  tiotropium, 2 puff, inhalation, Daily    Continuous medications   PRN medications  PRN medications: albuterol, dextrose, dextrose **OR** glucagon, dextrose, glucagon, glucagon, naloxone, [DISCONTINUED] ondansetron **OR** ondansetron, [Held by provider] oxyCODONE, oxygen, sodium chloride    Labs  Results for orders placed or performed during the hospital encounter of 07/01/24 (from the past 24 hour(s))   Renal Function Panel   Result Value Ref Range    Glucose 214 (H) 74 - 99 mg/dL    Sodium 133 (L) 136 - 145 mmol/L    Potassium 3.6 3.5 - 5.3 mmol/L    Chloride 89 (L) 98 - 107 mmol/L    Bicarbonate 30 21 - 32 mmol/L    Anion Gap 18 10 - 20 mmol/L    Urea Nitrogen 57 (H) 6 - 23 mg/dL    Creatinine 1.25 0.50 - 1.30 mg/dL    eGFR 66 >60 mL/min/1.73m*2    Calcium 8.7 8.6 - 10.6 mg/dL    Phosphorus 2.7 2.5 - 4.9 mg/dL    Albumin 4.5 3.4 - 5.0 g/dL   CBC   Result  Value Ref Range    WBC 25.0 (H) 4.4 - 11.3 x10*3/uL    nRBC 0.6 (H) 0.0 - 0.0 /100 WBCs    RBC 3.70 (L) 4.50 - 5.90 x10*6/uL    Hemoglobin 9.3 (L) 13.5 - 17.5 g/dL    Hematocrit 29.8 (L) 41.0 - 52.0 %    MCV 81 80 - 100 fL    MCH 25.1 (L) 26.0 - 34.0 pg    MCHC 31.2 (L) 32.0 - 36.0 g/dL    RDW 20.7 (H) 11.5 - 14.5 %    Platelets 404 150 - 450 x10*3/uL   Magnesium   Result Value Ref Range    Magnesium 2.63 (H) 1.60 - 2.40 mg/dL   POCT GLUCOSE   Result Value Ref Range    POCT Glucose 225 (H) 74 - 99 mg/dL   POCT GLUCOSE   Result Value Ref Range    POCT Glucose 335 (H) 74 - 99 mg/dL   POCT GLUCOSE   Result Value Ref Range    POCT Glucose 352 (H) 74 - 99 mg/dL   POCT GLUCOSE   Result Value Ref Range    POCT Glucose 315 (H) 74 - 99 mg/dL   POCT GLUCOSE   Result Value Ref Range    POCT Glucose 179 (H) 74 - 99 mg/dL             IMPRESSION & PLAN:  POD # 9 s/p CABGx3 with Dr. Carey on 7/9   - Increase activity/ ambulation; PT/OT  - Cardiac rehab referral placed  - Continue cardiac meds: ASA, BB, statin and Plavix for NSTEMI  - Cap pacing wires today  - Chronic methadone use->  continue methadone 77.5mg q12 hrs  - 2v CXR 7/17/24 reviewed with continued perihilar edema improving  - Postop echo 7/15/24 LVEF 40%-> similar to pre-op echo   - Remove epicardial wires prior to discharge   - Tele until discharge  - Optimize nutrition and electrolytes      Acute Blood Loss Anemia - stable  Recent Labs     07/17/24  0939 07/16/24  0035 07/15/24  1543 07/15/24  0432 07/14/24  1112 07/14/24  0438 07/13/24  1139   HGB 9.3* 8.4* 8.5* 7.4* 8.0* 7.5* 8.5*   HCT 29.8* 25.0* 26.1* 22.2* 24.2* 22.8* 25.7*   - MV, PO Iron x1mo  - Daily labs, transfuse as indicated    Volume/Electrolyte Status: Preop wt Weight: 85.3 kg (188 lb)   Fluid overload by xray evaluation  Vitals:    07/18/24 0128   Weight: 86 kg (189 lb 9.5 oz)     - Weight: 86.3kg   - Lasix infusion stopped 7/16-holding diuresis as appears intervascularly depleted   - seems to be at  base weight; chest xray with congestion still   - Replete electrolytes for hypokalemia/hypomagnesemia/hypophosphatemia as needed   - Daily weights and strict I&Os  - Daily RFP while admitted  - Lasix IV today x1 to eval response    H/o peripheral neuropathy and chronic methadone use  -Acute post operative pain currently well controlled on home methadone and scheduled Tylenol.   -c/w thiamine, folate, home pregabalin and duloxetine  -c/w home Plaquenil   -c/w methadone back to BID home dose     NSTEMI now status post CABGx3  pAfib with RVR- initially on Amiodarone dc'd 7/16 due to bradycardia  -Pre pump EF 40-45% TTE 7/17 40%  -PO amio dc'd 7/16 2/2 bradycardia -> in NSR.   -Continue PO metoprolol 25mg BID, Plavix, ASA and Statin  -Holding home Lisinopril     Acute hypoxic pulmonary insufficiency requiring 4L NC  H/O COPD   -CXR with mild congestion and atelectasis-> additional IV lasix today  -c/w  prednisone to 40mg x5 days for COPD exacerbation treatment.   -c/w IS, bronchial hygiene    JUAN J post op CR 2.08-no history of renal disease, baseline creatinine 0.7   Lasix infusion stopped 7/16   -will give Lasix 40gm IVP once today -> chest xray with congestion     IDDM2  Hyperglycemia- likely partially due to steroids  -A1c: 8.5  -Endo consulted appreciate recs  -7/17 sstarted 20 units QAM starting tomorrow with 8 units of lispro with meals and decreasing the sliding scale to #2 with meals.    - given continued hypergylcemia increase Lantus to 25U today and increase mealtime lispro to 10u   -Hold home metformin    Hypothyroidism   -c/w Levothyroxine    Leukocytosis- unclear etiology should not be steroid induced at this time, however afebrile no signs of infection  - continue to monitor, if fever develops will pan culture and start emperic atbx    Skin    -Unhealed diabetic foot ulcers s/p R foot partial amputation. Wound care following.    VTE Prophylaxis: SCDs/TEDs, ambulation, SQ heparin  Code Status: Full  Code    Dispo  - PT/OT recs ok to discharge home   - Would benefit from homecare for cardiac surgery carepath and RN visits  - Anticipate discharge this weekend pending O2 improvement, discussed with patient there is likelihood he may have to go home with O2 which he understands and quite frankly thought may happen  - Will continue to assess discharge needs      Hitesh Niño PA-C  Cardiac Surgery [facility]  [facility]  Team Phone [phone]    7/18/2024  8:24 AM

## 2024-07-18 NOTE — PROGRESS NOTES
Occupational Therapy                 Therapy Communication Note    Patient Name: Kael Zpeeda  MRN: [id number]  Today's Date: 7/18/2024     Discipline: Occupational Therapy    Missed Visit Reason: Missed Visit Reason:  (Patient s/p fall this AM in room, RNs and MDs in room assessing patient; will attempt OT next visit as appropriate.)    Missed Time: Attempt    Comment:  Amanda Posey OTR/L  Inpatient Occupational Therapist   Rehab Office: [phone]

## 2024-07-19 LAB
ALBUMIN SERPL BCP-MCNC: 4.4 G/DL (ref 3.4–5)
ANION GAP SERPL CALC-SCNC: 17 MMOL/L (ref 10–20)
ATRIAL RATE: 68 BPM
BUN SERPL-MCNC: 61 MG/DL (ref 6–23)
CALCIUM SERPL-MCNC: 9.1 MG/DL (ref 8.6–10.6)
CHLORIDE SERPL-SCNC: 96 MMOL/L (ref 98–107)
CO2 SERPL-SCNC: 28 MMOL/L (ref 21–32)
CREAT SERPL-MCNC: 1.15 MG/DL (ref 0.5–1.3)
EGFRCR SERPLBLD CKD-EPI 2021: 73 ML/MIN/1.73M*2
ERYTHROCYTE [DISTWIDTH] IN BLOOD BY AUTOMATED COUNT: 22.3 % (ref 11.5–14.5)
GLUCOSE BLD MANUAL STRIP-MCNC: 135 MG/DL (ref 74–99)
GLUCOSE BLD MANUAL STRIP-MCNC: 165 MG/DL (ref 74–99)
GLUCOSE BLD MANUAL STRIP-MCNC: 181 MG/DL (ref 74–99)
GLUCOSE SERPL-MCNC: 129 MG/DL (ref 74–99)
HCT VFR BLD AUTO: 27.7 % (ref 41–52)
HGB BLD-MCNC: 8.7 G/DL (ref 13.5–17.5)
MAGNESIUM SERPL-MCNC: 3 MG/DL (ref 1.6–2.4)
MCH RBC QN AUTO: 25.4 PG (ref 26–34)
MCHC RBC AUTO-ENTMCNC: 31.4 G/DL (ref 32–36)
MCV RBC AUTO: 81 FL (ref 80–100)
NRBC BLD-RTO: 1.6 /100 WBCS (ref 0–0)
P AXIS: 36 DEGREES
P OFFSET: 178 MS
P ONSET: 116 MS
PHOSPHATE SERPL-MCNC: 2.9 MG/DL (ref 2.5–4.9)
PLATELET # BLD AUTO: 319 X10*3/UL (ref 150–450)
POTASSIUM SERPL-SCNC: 4 MMOL/L (ref 3.5–5.3)
PR INTERVAL: 204 MS
Q ONSET: 218 MS
QRS COUNT: 11 BEATS
QRS DURATION: 102 MS
QT INTERVAL: 476 MS
QTC CALCULATION(BAZETT): 506 MS
QTC FREDERICIA: 496 MS
R AXIS: 23 DEGREES
RBC # BLD AUTO: 3.43 X10*6/UL (ref 4.5–5.9)
SODIUM SERPL-SCNC: 137 MMOL/L (ref 136–145)
T AXIS: -36 DEGREES
T OFFSET: 456 MS
VENTRICULAR RATE: 68 BPM
WBC # BLD AUTO: 17.7 X10*3/UL (ref 4.4–11.3)

## 2024-07-19 PROCEDURE — 2500000001 HC RX 250 WO HCPCS SELF ADMINISTERED DRUGS (ALT 637 FOR MEDICARE OP): Performed by: PHYSICIAN ASSISTANT

## 2024-07-19 PROCEDURE — 2500000002 HC RX 250 W HCPCS SELF ADMINISTERED DRUGS (ALT 637 FOR MEDICARE OP, ALT 636 FOR OP/ED): Performed by: PHYSICIAN ASSISTANT

## 2024-07-19 PROCEDURE — 2500000002 HC RX 250 W HCPCS SELF ADMINISTERED DRUGS (ALT 637 FOR MEDICARE OP, ALT 636 FOR OP/ED): Performed by: NURSE PRACTITIONER

## 2024-07-19 PROCEDURE — 2500000004 HC RX 250 GENERAL PHARMACY W/ HCPCS (ALT 636 FOR OP/ED): Performed by: PHYSICIAN ASSISTANT

## 2024-07-19 PROCEDURE — 97116 GAIT TRAINING THERAPY: CPT | Mod: GP,CQ

## 2024-07-19 PROCEDURE — 2500000001 HC RX 250 WO HCPCS SELF ADMINISTERED DRUGS (ALT 637 FOR MEDICARE OP): Performed by: NURSE PRACTITIONER

## 2024-07-19 PROCEDURE — 85027 COMPLETE CBC AUTOMATED: CPT | Performed by: NURSE PRACTITIONER

## 2024-07-19 PROCEDURE — 97110 THERAPEUTIC EXERCISES: CPT | Mod: GP,CQ

## 2024-07-19 PROCEDURE — 1200000002 HC GENERAL ROOM WITH TELEMETRY DAILY

## 2024-07-19 PROCEDURE — 94640 AIRWAY INHALATION TREATMENT: CPT

## 2024-07-19 PROCEDURE — 2500000005 HC RX 250 GENERAL PHARMACY W/O HCPCS: Performed by: PHYSICIAN ASSISTANT

## 2024-07-19 PROCEDURE — 2500000001 HC RX 250 WO HCPCS SELF ADMINISTERED DRUGS (ALT 637 FOR MEDICARE OP)

## 2024-07-19 PROCEDURE — S0109 METHADONE ORAL 5MG: HCPCS | Performed by: PHYSICIAN ASSISTANT

## 2024-07-19 PROCEDURE — 1100000001 HC PRIVATE ROOM DAILY

## 2024-07-19 PROCEDURE — 83735 ASSAY OF MAGNESIUM: CPT | Performed by: NURSE PRACTITIONER

## 2024-07-19 PROCEDURE — 99232 SBSQ HOSP IP/OBS MODERATE 35: CPT | Performed by: NURSE PRACTITIONER

## 2024-07-19 PROCEDURE — 80069 RENAL FUNCTION PANEL: CPT | Performed by: NURSE PRACTITIONER

## 2024-07-19 PROCEDURE — 82947 ASSAY GLUCOSE BLOOD QUANT: CPT

## 2024-07-19 RX ORDER — PANTOPRAZOLE SODIUM 40 MG/1
40 TABLET, DELAYED RELEASE ORAL
Status: DISCONTINUED | OUTPATIENT
Start: 2024-07-19 | End: 2024-07-26 | Stop reason: HOSPADM

## 2024-07-19 RX ORDER — IRON POLYSACCHARIDE COMPLEX 150 MG
150 CAPSULE ORAL DAILY
Status: DISCONTINUED | OUTPATIENT
Start: 2024-07-19 | End: 2024-07-26 | Stop reason: HOSPADM

## 2024-07-19 ASSESSMENT — COGNITIVE AND FUNCTIONAL STATUS - GENERAL
HELP NEEDED FOR BATHING: A LOT
MOVING TO AND FROM BED TO CHAIR: A LITTLE
DRESSING REGULAR UPPER BODY CLOTHING: A LITTLE
TURNING FROM BACK TO SIDE WHILE IN FLAT BAD: A LITTLE
TURNING FROM BACK TO SIDE WHILE IN FLAT BAD: A LITTLE
TOILETING: A LOT
DAILY ACTIVITIY SCORE: 16
CLIMB 3 TO 5 STEPS WITH RAILING: TOTAL
STANDING UP FROM CHAIR USING ARMS: A LITTLE
CLIMB 3 TO 5 STEPS WITH RAILING: TOTAL
MOVING FROM LYING ON BACK TO SITTING ON SIDE OF FLAT BED WITH BEDRAILS: A LITTLE
MOBILITY SCORE: 16
PERSONAL GROOMING: A LITTLE
TOILETING: A LOT
CLIMB 3 TO 5 STEPS WITH RAILING: A LOT
MOVING TO AND FROM BED TO CHAIR: A LITTLE
DRESSING REGULAR UPPER BODY CLOTHING: A LITTLE
MOBILITY SCORE: 17
STANDING UP FROM CHAIR USING ARMS: A LITTLE
PERSONAL GROOMING: A LITTLE
WALKING IN HOSPITAL ROOM: A LITTLE
DRESSING REGULAR LOWER BODY CLOTHING: A LOT
STANDING UP FROM CHAIR USING ARMS: A LITTLE
DAILY ACTIVITIY SCORE: 16
HELP NEEDED FOR BATHING: A LOT
WALKING IN HOSPITAL ROOM: A LITTLE
WALKING IN HOSPITAL ROOM: A LITTLE
MOBILITY SCORE: 16
MOVING FROM LYING ON BACK TO SITTING ON SIDE OF FLAT BED WITH BEDRAILS: A LITTLE
DRESSING REGULAR LOWER BODY CLOTHING: A LOT
MOVING TO AND FROM BED TO CHAIR: A LITTLE
MOVING FROM LYING ON BACK TO SITTING ON SIDE OF FLAT BED WITH BEDRAILS: A LITTLE
TURNING FROM BACK TO SIDE WHILE IN FLAT BAD: A LITTLE

## 2024-07-19 ASSESSMENT — PAIN DESCRIPTION - ORIENTATION: ORIENTATION: MID

## 2024-07-19 ASSESSMENT — PAIN DESCRIPTION - LOCATION: LOCATION: CHEST

## 2024-07-19 ASSESSMENT — PAIN SCALES - GENERAL
PAINLEVEL_OUTOF10: 4
PAINLEVEL_OUTOF10: 5 - MODERATE PAIN

## 2024-07-19 ASSESSMENT — PAIN DESCRIPTION - DESCRIPTORS: DESCRIPTORS: ACHING

## 2024-07-19 ASSESSMENT — PAIN - FUNCTIONAL ASSESSMENT: PAIN_FUNCTIONAL_ASSESSMENT: 0-10

## 2024-07-19 NOTE — CARE PLAN
The patient's goals for the shift include      The clinical goals for the shift include Patient will remain HDS this shift, patient will remain free from falls this shift.      Problem: Pain - Adult  Goal: Verbalizes/displays adequate comfort level or baseline comfort level  Outcome: Progressing     Problem: Safety - Adult  Goal: Free from fall injury  Outcome: Progressing     Problem: Discharge Planning  Goal: Discharge to home or other facility with appropriate resources  Outcome: Progressing     Problem: Chronic Conditions and Co-morbidities  Goal: Patient's chronic conditions and co-morbidity symptoms are monitored and maintained or improved  Outcome: Progressing     Problem: ACS/CP/NSTEMI/STEMI  Goal: Chest pain managed (free from pain or at acceptable level)  Outcome: Progressing  Goal: Lab values return to normal range  Outcome: Progressing  Goal: Promote self management  Outcome: Progressing  Goal: Serial ECG will return to baseline  Outcome: Progressing  Goal: Verbalize understanding of procedures/devices  Outcome: Progressing  Goal: Wean vasopressors/achieve hemodynamic stability  Outcome: Progressing     Problem: Arrythmia/Dysrhythmia  Goal: Lab values return to normal range  Outcome: Progressing  Goal: No evidence of post procedure complications  Outcome: Progressing  Goal: Promote self management  Outcome: Progressing  Goal: Serial ECG will return to baseline  Outcome: Progressing  Goal: Verbalize understanding of procedures/devices  Outcome: Progressing  Goal: Vital signs return to baseline  Outcome: Progressing  Goal: Care and maintenance of device (specify)  Outcome: Progressing     Problem: Cardiac catheterization  Goal: Free from dysrhythmias  Outcome: Progressing  Goal: Free from pain  Outcome: Progressing  Goal: No evidence of post procedure complications  Outcome: Progressing  Goal: Promote self management  Outcome: Progressing  Goal: Verbalize understanding of procedure  Outcome:  Progressing  Goal: Care and maintenance of device (specify)  Outcome: Progressing     Problem: Hypertensive Emergency/Crisis  Goal: Blood pressure gradually reduced to goal range  Outcome: Progressing  Goal: Free from signs of organ damage  Outcome: Progressing  Goal: Lab values return to normal range  Outcome: Progressing  Goal: Promote self management  Outcome: Progressing     Problem: Skin  Goal: Decreased wound size/increased tissue granulation at next dressing change  Outcome: Progressing  Goal: Participates in plan/prevention/treatment measures  Outcome: Progressing  Goal: Prevent/manage excess moisture  Outcome: Progressing  Goal: Prevent/minimize sheer/friction injuries  Outcome: Progressing  Goal: Promote/optimize nutrition  Outcome: Progressing  Goal: Promote skin healing  Outcome: Progressing     Problem: Diabetes  Goal: Achieve decreasing blood glucose levels by end of shift  Outcome: Progressing  Goal: Increase stability of blood glucose readings by end of shift  Outcome: Progressing  Goal: Decrease in ketones present in urine by end of shift  Outcome: Progressing  Goal: Maintain electrolyte levels within acceptable range throughout shift  Outcome: Progressing  Goal: Maintain glucose levels >70mg/dl to <250mg/dl throughout shift  Outcome: Progressing  Goal: No changes in neurological exam by end of shift  Outcome: Progressing  Goal: Learn about and adhere to nutrition recommendations by end of shift  Outcome: Progressing  Goal: Vital signs within normal range for age by end of shift  Outcome: Progressing  Goal: Increase self care and/or family involovement by end of shift  Outcome: Progressing  Goal: Receive DSME education by end of shift  Outcome: Progressing     Problem: Knowledge Deficit  Goal: Patient/family/caregiver demonstrates understanding of disease process, treatment plan, medications, and discharge instructions  Outcome: Progressing     Problem: Mechanical Ventilation  Goal: Patient Will  Maintain Patent Airway  Outcome: Progressing  Goal: Oral health is maintained or improved  Outcome: Progressing  Goal: Ability to express needs and understand communication  Outcome: Progressing  Goal: Mobility/activity is maintained at optimum level for patient  Outcome: Progressing     Problem: Pain  Goal: Takes deep breaths with improved pain control throughout the shift  Outcome: Progressing  Goal: Turns in bed with improved pain control throughout the shift  Outcome: Progressing  Goal: Walks with improved pain control throughout the shift  Outcome: Progressing  Goal: Performs ADL's with improved pain control throughout shift  Outcome: Progressing  Goal: Participates in PT with improved pain control throughout the shift  Outcome: Progressing  Goal: Free from opioid side effects throughout the shift  Outcome: Progressing  Goal: Free from acute confusion related to pain meds throughout the shift  Outcome: Progressing     Problem: Fall/Injury  Goal: Not fall by end of shift  Outcome: Progressing  Goal: Be free from injury by end of the shift  Outcome: Progressing  Goal: Verbalize understanding of personal risk factors for fall in the hospital  Outcome: Progressing  Goal: Verbalize understanding of risk factor reduction measures to prevent injury from fall in the home  Outcome: Progressing  Goal: Use assistive devices by end of the shift  Outcome: Progressing  Goal: Pace activities to prevent fatigue by end of the shift  Outcome: Progressing

## 2024-07-19 NOTE — PROGRESS NOTES
Occupational Therapy                 Therapy Communication Note    Patient Name: Kael Zepeda  MRN: [id number]  Today's Date: 7/19/2024     Discipline: Occupational Therapy    Missed Visit Reason: Missed Visit Reason:  (PT reports just finishing with patient and patient with min agitation during PT session, only able to get OOB to chair; volunteers currently in room attempting to engage patient in activities; will attempt OT next visit as appropriate.)    Missed Time: Attempt    Comment:  Amanda Posey OTR/L  Inpatient Occupational Therapist   Rehab Office: [phone]

## 2024-07-19 NOTE — NURSING NOTE
"During his shift assessment; Patient told this nurse confidentially that he has been experiencing visual hallucinations since being admitted to the hospital. Patient says while he was talking to this nurse he sees a cat out of the corner of his eye. in the CTICU he reports thinking he \"saw a ghost\" when he fell on their unit. Patient told nurse to not tell anyone including physicians because \"when I get out I will get it fixed\" and \"doesn't want to see several other doctors\". Patient denies auditory/command hallucinations.  "

## 2024-07-19 NOTE — PROGRESS NOTES
"CARDIAC SURGERY DAILY PROGRESS NOTE    Mr. Zepeda is a 58-year-old male with a PMH of RA (on chronic prednisone and hydroxychloroquine), type 2 DM (on insulin), COPD, hypothyroidism, 40-pack-year smoking history, chronic methadone use, peripheral neuropathy, and unhealed diabetic foot ulcers s/p right foot partial amputation (7 months ago - follows with wound clinic as outpatient) and most recently NSTEMI. He is now s/p CABGx3 with Dr. Carey on 7/9.     OPERATION/PROCEDURE: CABGx3 with Dr. Carey on 7/9.     History of NSTEMI. Is now status post CABGx3 Pre: Mildly decreased LV function, EF 40-45%. Post: normal LV function.     CTICU Course: Post operative course c/b acute hypoxic respiratory insufficiency requiring high flow nasal cannula, now improved.Afib with RVR to the 130s requiring bolus and amio drip. Now in NSR in 60-70's with some bradycardia to high 40's-> PO amio dc'd.  JUAN J post op with increasing CR to 2.08 (diuresis was held) now 1.25.  Pulmonary edema still noted on xray. ? Fentanyl use while inpatient brought from family members     Transferred to T3 on 7/16/24    =====================================    No events overnight    SUBJECTIVE:  Feels better today still on 2L NC from 4LNC  Sinus Dayday in the 50s    Objective   /59 (BP Location: Left arm, Patient Position: Sitting)   Pulse 54   Temp 35.8 °C (96.4 °F) (Temporal)   Resp 18   Ht 1.676 m (5' 6\")   Wt 85.3 kg (188 lb)   SpO2 92%   BMI 30.34 kg/m²   0-10 (Numeric) Pain Score: 4   3 Day Weight Change: Unable to Calculate    Intake and Output    Intake/Output Summary (Last 24 hours) at 7/19/2024 1751  Last data filed at 7/19/2024 1405  Gross per 24 hour   Intake 480 ml   Output 900 ml   Net -420 ml     Physical Exam  Vitals and nursing note reviewed.   Constitutional:       Appearance: Normal appearance.      Comments: Patient sitting up in bed.   Complains of pain 4/10.   HENT:      Head: Normocephalic.      Mouth/Throat:      " Mouth: Mucous membranes are moist.   Cardiovascular:      Rate and Rhythm: Normal rate.      Pulses: Normal pulses.      Heart sounds: Normal heart sounds.      Comments: TELE: SB with 1st degree AV Block (.21 pr), rate 59 bpm    Wires capped.   Pulmonary:      Effort: Pulmonary effort is normal.      Breath sounds: Normal breath sounds.      Comments: Clear breath sounds.  IS: 2L  Sternum stable.   Abdominal:      General: Bowel sounds are normal.      Palpations: Abdomen is soft.      Comments: BM (+) 7/17/24   Genitourinary:     Comments: Voids independently  Musculoskeletal:      Cervical back: Neck supple.      Comments: MAEW strong 5/5  Bilateral LE dressing covering DM ulcers.    Skin:     General: Skin is warm and dry.      Capillary Refill: Capillary refill takes less than 2 seconds.      Comments: midsternal chest incision C/D/I, well approximated, no s/s infection     Neurological:      General: No focal deficit present.      Mental Status: He is alert and oriented to person, place, and time.   Psychiatric:         Mood and Affect: Mood normal.         Behavior: Behavior normal.              Medications  Scheduled medications  acetaminophen, 975 mg, oral, q6h  aspirin, 81 mg, oral, Daily  atorvastatin, 80 mg, oral, Nightly  clopidogrel, 75 mg, oral, Daily  DULoxetine, 60 mg, oral, Daily  folic acid, 1 mg, oral, Daily  heparin, 5,000 Units, subcutaneous, q8h  hydroxychloroquine, 200 mg, oral, BID  insulin glargine, 25 Units, subcutaneous, q24h  insulin lispro, 0-10 Units, subcutaneous, TID  insulin lispro, 12 Units, subcutaneous, TID  levothyroxine, 50 mcg, oral, Daily  methadone, 77.5 mg, oral, q12h  metoprolol tartrate, 12.5 mg, oral, BID  multivitamin with minerals, 1 tablet, oral, Daily  oxygen, , inhalation, Continuous - Inhalation  pantoprazole, 40 mg, oral, Daily before breakfast  polyethylene glycol, 17 g, oral, Daily  predniSONE, 40 mg, oral, Daily  pregabalin, 200 mg, oral,  Daily  sennosides-docusate sodium, 2 tablet, oral, BID  thiamine, 100 mg, oral, Daily  tiotropium, 2 puff, inhalation, Daily    Continuous medications   PRN medications  PRN medications: albuterol, dextrose, dextrose **OR** glucagon, dextrose, glucagon, glucagon, naloxone, [DISCONTINUED] ondansetron **OR** ondansetron, [Held by provider] oxyCODONE, oxygen, sodium chloride    Labs  Results for orders placed or performed during the hospital encounter of 07/01/24 (from the past 24 hour(s))   POCT GLUCOSE   Result Value Ref Range    POCT Glucose 143 (H) 74 - 99 mg/dL   POCT GLUCOSE   Result Value Ref Range    POCT Glucose 165 (H) 74 - 99 mg/dL   CBC   Result Value Ref Range    WBC 17.7 (H) 4.4 - 11.3 x10*3/uL    nRBC 1.6 (H) 0.0 - 0.0 /100 WBCs    RBC 3.43 (L) 4.50 - 5.90 x10*6/uL    Hemoglobin 8.7 (L) 13.5 - 17.5 g/dL    Hematocrit 27.7 (L) 41.0 - 52.0 %    MCV 81 80 - 100 fL    MCH 25.4 (L) 26.0 - 34.0 pg    MCHC 31.4 (L) 32.0 - 36.0 g/dL    RDW 22.3 (H) 11.5 - 14.5 %    Platelets 319 150 - 450 x10*3/uL   Magnesium   Result Value Ref Range    Magnesium 3.00 (H) 1.60 - 2.40 mg/dL   Renal Function Panel   Result Value Ref Range    Glucose 129 (H) 74 - 99 mg/dL    Sodium 137 136 - 145 mmol/L    Potassium 4.0 3.5 - 5.3 mmol/L    Chloride 96 (L) 98 - 107 mmol/L    Bicarbonate 28 21 - 32 mmol/L    Anion Gap 17 10 - 20 mmol/L    Urea Nitrogen 61 (H) 6 - 23 mg/dL    Creatinine 1.15 0.50 - 1.30 mg/dL    eGFR 73 >60 mL/min/1.73m*2    Calcium 9.1 8.6 - 10.6 mg/dL    Phosphorus 2.9 2.5 - 4.9 mg/dL    Albumin 4.4 3.4 - 5.0 g/dL   POCT GLUCOSE   Result Value Ref Range    POCT Glucose 135 (H) 74 - 99 mg/dL         IMPRESSION & PLAN:  POD # 10 s/p CABGx3 with Dr. Carey on 7/9   - Increase activity/ ambulation; PT/OT  - Cardiac rehab referral placed  - Continue cardiac meds: ASA, BB, statin and Plavix for NSTEMI  - Capped pacing wires   - Chronic methadone use->  continue methadone 77.5mg q12 hrs  - 2v CXR 7/17/24 reviewed with  continued perihilar edema improving  - Postop echo 7/15/24 LVEF 40%-> similar to pre-op echo   - Remove epicardial wires prior to discharge   - Tele until discharge  - Optimize nutrition and electrolytes    Acute Blood Loss Anemia - stable  Recent Labs     07/19/24  1253 07/18/24  0718 07/17/24  0939 07/16/24  0035 07/15/24  1543 07/15/24  0432 07/14/24  1112   HGB 8.7* 9.0* 9.3* 8.4* 8.5* 7.4* 8.0*   HCT 27.7* 28.9* 29.8* 25.0* 26.1* 22.2* 24.2*   - MV, PO Iron x1mo  - Daily labs, transfuse as indicated    Volume/Electrolyte Status: Preop wt Weight: 85.3 kg (188 lb)   Fluid overload by xray evaluation  Vitals:    07/19/24 0537   Weight: 85.3 kg (188 lb)     - Weight: 85.3, 86.3kg   - Lasix infusion stopped 7/16-holding diuresis as appears intervascularly depleted   - seems to be at base weight; chest xray with congestion still   - Replete electrolytes for hypokalemia / hypomagnesemia / hypophosphatemia as needed   - Daily weights and strict I&Os  - Daily RFP while admitted  - 7/18 Lasix IV x1 to eval response    H/o peripheral neuropathy and chronic methadone use  - Acute post operative pain currently well controlled on home methadone and scheduled Tylenol.   - c/w thiamine, folate, home pregabalin and duloxetine  - c/w home Plaquenil   - c/w methadone back to BID home dose     NSTEMI now status post CABGx3  pAfib with RVR- initially on Amiodarone dc'd 7/16 due to bradycardia  - Pre pump EF 40-45% TTE 7/17 40%  - PO amio dc'd 7/16 2/2 bradycardia -> in NSR.   - Continue PO metoprolol 25mg BID, Plavix, ASA and Statin  - Holding home Lisinopril     Acute hypoxic pulmonary insufficiency requiring supplemental oxygen; H/O COPD   - CXR with mild congestion and atelectasis-> additional IV lasix today  - c/w  prednisone to 40mg x5 days for COPD exacerbation treatment; (of note, the patient states that he is on prednisone 5 mg for his RA), Will need to be re-started following above COPD steroid dosing  - c/w IS, bronchial  "hygiene    MARIAELENA: Patient reports having a sleep study \"many\" years ago and was told he has MARIAELENA. He did not follow up and thus does not have CPAP unit at home.  - Aggressive bronchial hygiene  - Wean O2 as tolerated  - ABGs PRN  - Continue inpatient CPAP while hospitalized; patient reports that he feels so much better with CPAP; Explained that it is likely that he will need another sleep study and will require follow up to make sure that he obtains the CPAP device.    JUAN J post op CR 2.08-no history of renal disease, baseline creatinine 0.7   - Lasix infusion stopped 7/16   - Will give Lasix 40gm IVP once 7/18 -> chest xray with congestion     IDDM2 / Stress Induced Hyperglycemia 2/2 steroids  - A1c: 8.5  - Endo consulted appreciate recs  - 7/17 sstarted 20 units QAM starting tomorrow with 8 units of lispro with meals and decreasing the sliding scale to #2 with meals.    - Given continued hypergylcemia increase Lantus to 25U today and increase mealtime lispro to 10u   - Hold home metformin    Hypothyroidism   - Continue home Levothyroxine    Leukocytosis- unclear etiology should not be steroid induced at this time, however afebrile no signs of infection  - Continue to monitor, if fever develops will pan culture and start emperic atbx => declining 7/19    Skin    - Unhealed diabetic foot ulcers s/p R foot partial amputation. Wound care following.    VTE Prophylaxis: SCDs/TEDs, ambulation, SQ heparin  Code Status: Full Code    Dispo  - PT/OT recs changed from home to moderate intensity; may require SNF ([facility] following and providing information on possible placements)  - Would benefit from homecare for cardiac surgery carepath and RN visits if home discharge becomes an option  - Anticipate discharge when placement confirmed and patient has accepted this as a necessity.   - Will continue to assess discharge needs      Romana James, APRN-CNP, APRN-CNS  Cardiac Surgery [facility]  [facility]  Team Phone " [phone]    7/19/2024  5:51 PM

## 2024-07-19 NOTE — PROGRESS NOTES
Physical Therapy    Physical Therapy    Physical Therapy Treatment    Patient Name: Kael Zepeda  MRN: [id number]  Today's Date: 7/19/2024  Time Calculation  Start Time: 1030  Stop Time: 1055  Time Calculation (min): 25 min       Assessment/Plan   PT Assessment  PT Assessment Results: Decreased endurance, Impaired balance, Decreased mobility, Pain, Decreased strength, Decreased safety awareness  Rehab Prognosis: Good  Evaluation/Treatment Tolerance: Patient limited by pain  End of Session Communication: Bedside nurse  Assessment Comment: Patient required increased encouragement to don R post op shoe (ultimately agreeable)-stating that it causes him to have increased R hip pain when he uses it. Patient ambulated 10 feet with FWW and MIN A x1-requiring increased cuing to stay inside FWW-Patient appeared unsteady. Patient remains appropriate for continued therapy upon discharge at a moderate intensity level to focus on improved functional mobility, balance and activity tolerance.  End of Session Patient Position: Up in chair, Alarm on  PT Plan  Inpatient/Swing Bed or Outpatient: Inpatient  PT Plan  Treatment/Interventions: Bed mobility, Transfer training, Gait training, Stair training, Balance training, Strengthening, Endurance training, Range of motion, Therapeutic activity, Therapeutic exercise  PT Plan: Ongoing PT  PT Frequency: 3 times per week  PT Discharge Recommendations: Moderate intensity level of continued care  Equipment Recommended upon Discharge:  (will continue to assess)  PT Recommended Transfer Status: Assist x1, Assistive device  PT - OK to Discharge: Yes    General Visit Information:   PT  Visit  PT Received On: 07/19/24  Reason for Referral: 59 y/o M presenting to OSH with chest pain and NSTEMI; now s/p CABG x3 on 7/9/2; dx: NSTEMI, acute hypoxic respiratory insufficiency, Afib, JUAN J, altered LOC, Right foot wounds, in-hospital fall 7/18  Past Medical History Relevant to Rehab: T2DM, HTN, Chest  discomfort,    Fibromyalgia, Generalized anxiety disorder, hypothyroidism,    Elevated troponin, RA,   Hyperglycemia, Microcytic anemia, Cigarette nicotine dependence, Chronic full thickness BLE ulcerations, s/p right foot partial first ray resection  Prior to Session Communication: Bedside nurse  Patient Position Received: Bed, 3 rail up, Alarm on  General Comment: RN cleared patient to work with therapy. Patient was agreeable to therapy session. Patient was initially resistive to wear R post op shoe, but was eventually agreeable.     Subjective   Precautions:  Precautions  Hearing/Visual Limitations: appears WFL  LE Weight Bearing Status: Weight Bearing as Tolerated (R LE in post op shoe)  Medical Precautions: Fall precautions, Oxygen therapy device and L/min, Cardiac precautions  Post-Surgical Precautions: Move in the Tube  Braces Applied: Donned R post op shoe prior to OOB mobility    Objective   Pain:  Pain Assessment  Pain Assessment: 0-10  0-10 (Numeric) Pain Score: 5 - Moderate pain  Pain Type: Surgical pain  Pain Location: Chest  Pain Orientation: Mid  Pain Descriptors: Aching  Pain Frequency: Constant/continuous  Cognition:  Cognition  Overall Cognitive Status: Within Functional Limits  Arousal/Alertness: Appropriate responses to stimuli  Orientation Level: Oriented X4  Cognition Comments: Patient tends to challenge precautions and instructions.  Impulsive: Moderately  Lines/Tubes/Drains:  Pacer Wires (Active)   Number of days: 10     PT Treatments:  Therapeutic Exercise  Therapeutic Exercise Performed: Yes  Therapeutic Exercise Activity 1: Seated Bilateral LE: ankle pumps, LAQ, GS x10 each        Bed Mobility  Bed Mobility: Yes  Bed Mobility 1  Bed Mobility 1: Supine to sitting  Level of Assistance 1: Close supervision, Minimal tactile cues, Minimal verbal cues  Ambulation/Gait Training  Ambulation/Gait Training Performed: Yes  Ambulation/Gait Training 1  Surface 1: Level tile  Device 1: Rolling  walker  Gait Support Devices:  (R post op shoe)  Assistance 1: Minimum assistance, Minimal verbal cues  Quality of Gait 1: Wide base of support, Decreased step length  Comments/Distance (ft) 1: 10 feet (Patient required increased cuing to stay inside FWW-appeared unsteady-complained of increased R hip pain)  Transfers  Transfer: Yes  Transfer 1  Transfer From 1: Bed to  Transfer to 1: Stand  Transfer Device 1: Walker  Transfer Level of Assistance 1: Minimum assistance, Minimal verbal cues  Trials/Comments 1: Patient required cuing for proper hand placement  Transfers 2  Transfer From 2: Stand to  Transfer to 2: Bed  Transfer Device 2: Walker  Transfer Level of Assistance 2: Minimum assistance, Minimal verbal cues  Trials/Comments 2: Patient required cuing for proper hand placement, but Patient ultimately plopped down regardless of cuing.  Stairs  Stairs: No          Outcome Measures:  [facility] Basic Mobility  Turning from your back to your side while in a flat bed without using bedrails: A little  Moving from lying on your back to sitting on the side of a flat bed without using bedrails: A little  Moving to and from bed to chair (including a wheelchair): A little  Standing up from a chair using your arms (e.g. wheelchair or bedside chair): A little  To walk in hospital room: A little  Climbing 3-5 steps with railing: Total  Basic Mobility - Total Score: 16     Encounter Problems       Encounter Problems (Active)       PT Problem       Patient will complete supine to sit and sit to supine Supervision  (Progressing)       Start:  07/26/24    Expected End:  07/26/24            Patient will perform sit<>stand transfer with LRAD, and Supervision  (Progressing)       Start:  07/26/24    Expected End:  07/26/24            Patient will ambulate >150' with LRAD and Supervision  (Progressing)       Start:  07/26/24    Expected End:  07/26/24            Patient will verbalize and demonstrate Move In The Tube (MITT) Precautions  independently.  (Progressing)       Start:  07/26/24    Expected End:  07/26/24               Pain - Adult                07/19/24 at 12:22 PM   Daja Yarbrough PTA   Rehab Office: [phone]

## 2024-07-19 NOTE — PROGRESS NOTES
Spoke to patient about Therapy recommendations for moderate intensity therapy. He prefers to discharge home with his brother. His brother was on the phone encouraging him to go for a short time. They are willing to review and consider SNF's in [facility] [place].

## 2024-07-19 NOTE — NURSING NOTE
Mr. Zepeda is feeling better today.  States he slept well and the c-pap worked well for him.  We reviewed home regimen for insulin (plan 70/30 2x day).  He is able to discuss what the plan is; retained everything we discussed yesterday.  Will follow up next week and he is agreeable to this plan.  Time spent:  20 mins.

## 2024-07-20 LAB
GLUCOSE BLD MANUAL STRIP-MCNC: 128 MG/DL (ref 74–99)
GLUCOSE BLD MANUAL STRIP-MCNC: 87 MG/DL (ref 74–99)
GLUCOSE BLD MANUAL STRIP-MCNC: 90 MG/DL (ref 74–99)

## 2024-07-20 PROCEDURE — 2500000001 HC RX 250 WO HCPCS SELF ADMINISTERED DRUGS (ALT 637 FOR MEDICARE OP): Performed by: NURSE PRACTITIONER

## 2024-07-20 PROCEDURE — 94640 AIRWAY INHALATION TREATMENT: CPT

## 2024-07-20 PROCEDURE — 94660 CPAP INITIATION&MGMT: CPT

## 2024-07-20 PROCEDURE — 1200000002 HC GENERAL ROOM WITH TELEMETRY DAILY

## 2024-07-20 PROCEDURE — 2500000004 HC RX 250 GENERAL PHARMACY W/ HCPCS (ALT 636 FOR OP/ED): Performed by: PHYSICIAN ASSISTANT

## 2024-07-20 PROCEDURE — 2500000001 HC RX 250 WO HCPCS SELF ADMINISTERED DRUGS (ALT 637 FOR MEDICARE OP): Performed by: PHYSICIAN ASSISTANT

## 2024-07-20 PROCEDURE — 2500000005 HC RX 250 GENERAL PHARMACY W/O HCPCS: Performed by: PHYSICIAN ASSISTANT

## 2024-07-20 PROCEDURE — 99232 SBSQ HOSP IP/OBS MODERATE 35: CPT | Performed by: NURSE PRACTITIONER

## 2024-07-20 PROCEDURE — 82947 ASSAY GLUCOSE BLOOD QUANT: CPT

## 2024-07-20 PROCEDURE — S0109 METHADONE ORAL 5MG: HCPCS | Performed by: PHYSICIAN ASSISTANT

## 2024-07-20 PROCEDURE — 2500000001 HC RX 250 WO HCPCS SELF ADMINISTERED DRUGS (ALT 637 FOR MEDICARE OP)

## 2024-07-20 PROCEDURE — 2500000002 HC RX 250 W HCPCS SELF ADMINISTERED DRUGS (ALT 637 FOR MEDICARE OP, ALT 636 FOR OP/ED): Performed by: PHYSICIAN ASSISTANT

## 2024-07-20 ASSESSMENT — COGNITIVE AND FUNCTIONAL STATUS - GENERAL
MOVING TO AND FROM BED TO CHAIR: A LITTLE
TURNING FROM BACK TO SIDE WHILE IN FLAT BAD: A LITTLE
MOVING FROM LYING ON BACK TO SITTING ON SIDE OF FLAT BED WITH BEDRAILS: A LITTLE
DAILY ACTIVITIY SCORE: 23
WALKING IN HOSPITAL ROOM: A LITTLE
STANDING UP FROM CHAIR USING ARMS: A LITTLE
MOBILITY SCORE: 17
CLIMB 3 TO 5 STEPS WITH RAILING: A LOT
PERSONAL GROOMING: A LITTLE

## 2024-07-20 ASSESSMENT — PAIN DESCRIPTION - LOCATION: LOCATION: HEAD

## 2024-07-20 ASSESSMENT — PAIN SCALES - GENERAL
PAINLEVEL_OUTOF10: 3
PAINLEVEL_OUTOF10: 5 - MODERATE PAIN
PAINLEVEL_OUTOF10: 0 - NO PAIN

## 2024-07-20 ASSESSMENT — PAIN DESCRIPTION - ORIENTATION: ORIENTATION: RIGHT;LEFT

## 2024-07-20 NOTE — PROGRESS NOTES
"CARDIAC SURGERY DAILY PROGRESS NOTE    Mr. Zepeda is a 58-year-old male with a PMH of RA (on chronic prednisone and hydroxychloroquine), type 2 DM (on insulin), COPD, hypothyroidism, 40-pack-year smoking history, chronic methadone use, peripheral neuropathy, and unhealed diabetic foot ulcers s/p right foot partial amputation (7 months ago - follows with wound clinic as outpatient) and most recently NSTEMI. He is now s/p CABGx3 with Dr. Carey on 7/9.     OPERATION/PROCEDURE: CABGx3 with Dr. Carey on 7/9.   - Median sternotomy  - Attempt off-pump surgery  - On pump coronary artery bypass grafting x 3  --- LIMA to LAD  --- MANUELITO( Y'ed off LIMA) to obtuse marginal  --- SVG to right PLV (terminal branch of RCA)  - Endoscopic harvesting of the right greater saphenous vein  - Sternal plating    History of NSTEMI. Is now status post CABGx3 Pre: Mildly decreased LV function, EF 40-45%. Post: normal LV function.     CTICU Course: Post operative course c/b acute hypoxic respiratory insufficiency requiring high flow nasal cannula, now improved.Afib with RVR to the 130s requiring bolus and amio drip. Now in NSR in 60-70's with some bradycardia to high 40's-> PO amio dc'd.  JUAN J post op with increasing CR to 2.08 (diuresis was held) now 1.25.  Pulmonary edema still noted on xray. ? Fentanyl use while inpatient brought from family members     Transferred to T3 on 7/16/24    =====================================    No events overnight    SUBJECTIVE:  Feels better today still on 2L NC from 4LNC  Sinus Dayday in the 50s    Objective   /54 (BP Location: Left arm, Patient Position: Lying)   Pulse 54   Temp 36.2 °C (97.2 °F) (Temporal)   Resp 17   Ht 1.676 m (5' 6\")   Wt 85.3 kg (188 lb)   SpO2 94%   BMI 30.34 kg/m²   0-10 (Numeric) Pain Score: 0 - No pain   3 Day Weight Change: Unable to Calculate    Intake and Output    Intake/Output Summary (Last 24 hours) at 7/20/2024 1728  Last data filed at 7/20/2024 0900  Gross " per 24 hour   Intake --   Output 1100 ml   Net -1100 ml     Physical Exam  Vitals and nursing note reviewed.   Constitutional:       Appearance: Normal appearance.      Comments: Patient sitting up in bed.   Complains of pain 4/10.   Eyes:      Conjunctiva/sclera: Conjunctivae normal.   Neck:      Comments: Trachea midline   Cardiovascular:      Rate and Rhythm: Normal rate.      Pulses: Normal pulses.      Heart sounds: Normal heart sounds.      Comments: TELE: SB with 1st degree AV Block (.21 pr), rate 59 bpm    Wires capped.   Pulmonary:      Effort: Pulmonary effort is normal.      Breath sounds: Normal breath sounds.      Comments: Diminished breath sound bilaterally   Sternum stable.   Abdominal:      General: Bowel sounds are normal.      Palpations: Abdomen is soft.      Comments: BM (+) 7/17/24   Genitourinary:     Comments: Voids independently  Musculoskeletal:      Cervical back: Neck supple.      Comments: WATERS     Skin:     General: Skin is warm and dry.      Capillary Refill: Capillary refill takes less than 2 seconds.      Comments: midsternal chest incision C/D/I, well approximated, no s/s infection  SVG  SIMON well approx no s/sx of infections      Neurological:      General: No focal deficit present.      Mental Status: He is alert and oriented to person, place, and time.   Psychiatric:         Mood and Affect: Mood normal.         Behavior: Behavior normal.       Medications  Scheduled medications  acetaminophen, 975 mg, oral, q6h  aspirin, 81 mg, oral, Daily  atorvastatin, 80 mg, oral, Nightly  clopidogrel, 75 mg, oral, Daily  DULoxetine, 60 mg, oral, Daily  folic acid, 1 mg, oral, Daily  heparin, 5,000 Units, subcutaneous, q8h  hydroxychloroquine, 200 mg, oral, BID  insulin glargine, 25 Units, subcutaneous, q24h  insulin lispro, 0-10 Units, subcutaneous, TID  insulin lispro, 12 Units, subcutaneous, TID  iron polysaccharides, 150 mg, oral, Daily  levothyroxine, 50 mcg, oral, Daily  methadone, 77.5  mg, oral, q12h  metoprolol tartrate, 12.5 mg, oral, BID  multivitamin with minerals, 1 tablet, oral, Daily  oxygen, , inhalation, Continuous - Inhalation  pantoprazole, 40 mg, oral, Daily before breakfast  polyethylene glycol, 17 g, oral, Daily  pregabalin, 200 mg, oral, Daily  sennosides-docusate sodium, 2 tablet, oral, BID  thiamine, 100 mg, oral, Daily  tiotropium, 2 puff, inhalation, Daily    Continuous medications   PRN medications  PRN medications: albuterol, dextrose, dextrose **OR** glucagon, dextrose, glucagon, glucagon, naloxone, [DISCONTINUED] ondansetron **OR** ondansetron, [Held by provider] oxyCODONE, oxygen, sodium chloride    Labs  Results for orders placed or performed during the hospital encounter of 07/01/24 (from the past 24 hour(s))   POCT GLUCOSE   Result Value Ref Range    POCT Glucose 181 (H) 74 - 99 mg/dL   POCT GLUCOSE   Result Value Ref Range    POCT Glucose 90 74 - 99 mg/dL   POCT GLUCOSE   Result Value Ref Range    POCT Glucose 87 74 - 99 mg/dL   POCT GLUCOSE   Result Value Ref Range    POCT Glucose 128 (H) 74 - 99 mg/dL       IMAGES  I have personally reviewed the following test result(s):   XR CHEST 2 VIEWS; 7/17/2024 6:54 am      INDICATION:  Signs/Symptoms:s/p cardiac surgery.      COMPARISON:  07/16/2024.      ACCESSION NUMBER(S):  [id number]      ORDERING CLINICIAN:  RUBI THRASHER      FINDINGS:          CARDIOMEDIASTINAL SILHOUETTE:  Patient is status post median sternotomy.      LUNGS:  Continued perihilar interstitial edema with right basilar  atelectasis. Right-sided effusion without pneumothorax.      ABDOMEN:  No remarkable upper abdominal findings.      BONES:  No acute osseous changes.      IMPRESSION:  1.  Slight interval improvement in lung aeration status post median  sternotomy. Residual edema and right basilar atelectasis.          Signed by: Santo Goldman 7/18/2024 7:21 AM  Dictation workstation:   [id number]    Pelvis and right hip dated 7/18/2024.       INDICATION:  Signs/Symptoms:s/p fall stuck hip on IV poll base      COMPARISON:  Radiographs dated 10/24/2017.      ACCESSION NUMBER(S):  [id number]      ORDERING CLINICIAN:  RUBI THRASHER      TECHNIQUE:  AP pelvis and  2 right hip radiographs.      FINDINGS:  No fracture or dislocation is evident. Surgical and degenerative  changes seen of the spine. Minimal degenerative changes seen of the  hips. No soft tissue gas or radiopaque foreign body is evident.      IMPRESSION:  No osseous injury is evident.      Signed by: Sandeep Andrade 7/18/2024 1:25 PM  Dictation workstation:   [id number]    IMPRESSION & PLAN:  POD # 11 s/p CABGx3 with Dr. Carey on 7/9   - Increase activity/ ambulation; PT/OT  - Cardiac rehab referral placed  - Continue cardiac meds: ASA, BB, statin and Plavix for NSTEMI  - Capped pacing wires   - Chronic methadone use->  continue methadone 77.5mg q12 hrs  - 2v CXR 7/17/24 reviewed with continued perihilar edema improving  - Postop echo 7/15/24 LVEF 40%-> similar to pre-op echo   - Remove epicardial wires prior to discharge   - Tele until discharge  - Optimize nutrition and electrolytes    Rhythm   pAfib with RVR- initially on Amiodarone dc'd 7/16 due to bradycardia   - SB 51-58   -  Continue PO metoprolol 12.5mg BID   - continue telemetry until discharge     Acute Blood Loss Anemia - stable  Recent Labs     07/19/24  1253 07/18/24  0718 07/17/24  0939 07/16/24  0035 07/15/24  1543 07/15/24  0432 07/14/24  1112   HGB 8.7* 9.0* 9.3* 8.4* 8.5* 7.4* 8.0*   HCT 27.7* 28.9* 29.8* 25.0* 26.1* 22.2* 24.2*   - MV, PO Iron x1mo  - Daily labs, transfuse as indicated    Volume/Electrolyte Status: Preop wt Weight: 85.3 kg (188 lb)   Fluid overload by xray evaluation  Vitals:    07/19/24 0537   Weight: 85.3 kg (188 lb)   - Weight: 85.3, 86.3kg   - Lasix infusion stopped 7/16-holding diuresis as appears intervascularly depleted   - seems to be at base weight; chest xray with congestion still   - Replete  "electrolytes for hypokalemia / hypomagnesemia / hypophosphatemia as needed   - Daily weights and strict I&Os  - Daily RFP while admitted  -  Lasix IV x1 to eval response    ID: Leukocytosis  Likely reactive       Susceptibility data from last 90 days.  Collected Specimen Info Organism Clindamycin Erythromycin Oxacillin Tetracycline Trimethoprim/Sulfamethoxazole Vancomycin   24 Tissue/Biopsy from Wound/Tissue Methicillin Resistant Staphylococcus aureus (MRSA)  S  R  R  S  S  S     WBC   Date Value Ref Range Status   2024 17.7 (H) 4.4 - 11.3 x10*3/uL Final   2024 25.7 (H) 4.4 - 11.3 x10*3/uL Final     Temp (36hrs), Av °C (96.8 °F), Min:35.7 °C (96.3 °F), Max:36.2 °C (97.2 °F)   -trend temp, WBC  - Preop Antibiotics Completed     H/o peripheral neuropathy and chronic methadone use  - Acute post operative pain currently well controlled on home methadone and scheduled Tylenol.   - c/w thiamine, folate, home pregabalin and duloxetine  - c/w home Plaquenil   - c/w methadone back to BID home dose     Acute hypoxic pulmonary insufficiency requiring supplemental oxygen; H/O COPD   - CXR with mild congestion and atelectasis-> additional IV lasix today  - c/w  prednisone to 40mg x5 days for COPD exacerbation treatment; (of note, the patient states that he is on prednisone 5 mg for his RA), Will need to be re-started following above COPD steroid dosing  - c/w IS, bronchial hygiene    MARIAELENA: Patient reports having a sleep study \"many\" years ago and was told he has MARIAELENA. He did not follow up and thus does not have CPAP unit at home.  - Aggressive bronchial hygiene  - Wean O2 as tolerated  - ABGs PRN  - Continue inpatient CPAP while hospitalized; patient reports that he feels so much better with CPAP; Explained that it is likely that he will need another sleep study and will require follow up to make sure that he obtains the CPAP device.    JUAN J post op CR 2.08-no history of renal disease, baseline creatinine " 0.7      Creatinine   Date Value Ref Range Status   07/19/2024 1.15 0.50 - 1.30 mg/dL Final   07/18/2024 1.36 (H) 0.50 - 1.30 mg/dL Final   -daily RFP  -diuretics as indicated  -renally dose all medications  -avoid nephrotoxic drugs  -accurate I/Os  - Lasix infusion stopped 7/16   - Lasix 40gm IVP once 7/18 -> chest xray with congestion     Stress Induced Hyperglycemia 2/2 steroids Newly Diagnosed DM type II   Lab Results   Component Value Date    HGBA1C 8.5 (H) 05/15/2024     Results from last 7 days   Lab Units 07/20/24  1709 07/20/24  1125 07/20/24  0755 07/19/24  1840 07/19/24  1408 07/19/24  0732 07/18/24  2116   POCT GLUCOSE mg/dL 128* 87 90 181* 135* 165* 143*   - diabetic diet 60 grams CHO   - lispro premeal corrective scale  - Hypoglycemic protocol   - Endo consulted appreciate recs  - 7/17 sstarted 20 units QAM starting tomorrow with 8 units of lispro with meals and decreasing the sliding scale to #2 with meals.    - Given continued hypergylcemia increase Lantus to 25U today and increase mealtime lispro to 10u   - Hold home metformin    Hypothyroidism   Lab Results   Component Value Date    TSH 2.67 07/02/2024    -continue home levothyroxine  -follow up with PCP or endocrinology as an outpatient as scheduled    Skin    - Unhealed diabetic foot ulcers s/p R foot partial amputation. Wound care following.    VTE Prophylaxis: SCDs/TEDs, ambulation, SQ heparin  Code Status: Full Code    Dispo  - PT/OT recs changed from home to moderate intensity; may require SNF ([facility] following and providing information on possible placements)  - Anticipate discharge when placement confirmed and patient has accepted this as a necessity.   - Will continue to assess discharge needs      CHACE Lucero-CNP  Cardiac Surgery [facility]  [facility]  Team Phone [phone]    7/20/2024  5:28 PM

## 2024-07-20 NOTE — CARE PLAN
The patient's goals for the shift include      The clinical goals for the shift include Patient will remain HDS this shift, patient will remain free from falls this shift.      Problem: Pain - Adult  Goal: Verbalizes/displays adequate comfort level or baseline comfort level  7/19/2024 2253 by Jese Pitts RN  Outcome: Progressing  7/19/2024 2253 by Jese Pitts RN  Outcome: Progressing     Problem: Safety - Adult  Goal: Free from fall injury  7/19/2024 2253 by Jese Pitts RN  Outcome: Progressing  7/19/2024 2253 by Jese Pitts RN  Outcome: Progressing     Problem: Discharge Planning  Goal: Discharge to home or other facility with appropriate resources  7/19/2024 2253 by Jese Pitts RN  Outcome: Progressing  7/19/2024 2253 by Jese Pitts RN  Outcome: Progressing     Problem: Chronic Conditions and Co-morbidities  Goal: Patient's chronic conditions and co-morbidity symptoms are monitored and maintained or improved  7/19/2024 2253 by Jese Pitts RN  Outcome: Progressing  7/19/2024 2253 by Jese Pitts RN  Outcome: Progressing     Problem: ACS/CP/NSTEMI/STEMI  Goal: Chest pain managed (free from pain or at acceptable level)  7/19/2024 2253 by Jese Pitts RN  Outcome: Progressing  7/19/2024 2253 by Jese Pitts RN  Outcome: Progressing  Goal: Lab values return to normal range  7/19/2024 2253 by Jese Pitts RN  Outcome: Progressing  7/19/2024 2253 by Jese Pitts RN  Outcome: Progressing  Goal: Promote self management  7/19/2024 2253 by Jese Pitts RN  Outcome: Progressing  7/19/2024 2253 by Jese Pitts RN  Outcome: Progressing  Goal: Serial ECG will return to baseline  7/19/2024 2253 by Jese Pitts RN  Outcome: Progressing  7/19/2024 2253 by Jese Pitts RN  Outcome: Progressing  Goal: Verbalize understanding of procedures/devices  7/19/2024 2253 by Jese Pitts RN  Outcome: Progressing  7/19/2024 2253 by Jese Pitts,  RN  Outcome: Progressing  Goal: Wean vasopressors/achieve hemodynamic stability  7/19/2024 2253 by Jese Pitts RN  Outcome: Progressing  7/19/2024 2253 by Jese Pitts RN  Outcome: Progressing     Problem: Arrythmia/Dysrhythmia  Goal: Lab values return to normal range  7/19/2024 2253 by Jese Pitts RN  Outcome: Progressing  7/19/2024 2253 by Jese Pitts RN  Outcome: Progressing  Goal: No evidence of post procedure complications  7/19/2024 2253 by Jese Pitts RN  Outcome: Progressing  7/19/2024 2253 by Jese Pitts RN  Outcome: Progressing  Goal: Promote self management  7/19/2024 2253 by Jese Pitts RN  Outcome: Progressing  7/19/2024 2253 by Jese Pitts RN  Outcome: Progressing  Goal: Serial ECG will return to baseline  7/19/2024 2253 by Jese Pitts RN  Outcome: Progressing  7/19/2024 2253 by Jese Pitts RN  Outcome: Progressing  Goal: Verbalize understanding of procedures/devices  7/19/2024 2253 by Jese Pitts RN  Outcome: Progressing  7/19/2024 2253 by Jese Pitts RN  Outcome: Progressing  Goal: Vital signs return to baseline  7/19/2024 2253 by Jese Pitts RN  Outcome: Progressing  7/19/2024 2253 by Jese Pitts RN  Outcome: Progressing  Goal: Care and maintenance of device (specify)  7/19/2024 2253 by Jese Pitts RN  Outcome: Progressing  7/19/2024 2253 by Jese Pitts RN  Outcome: Progressing     Problem: Cardiac catheterization  Goal: Free from dysrhythmias  7/19/2024 2253 by Jese Pitts RN  Outcome: Progressing  7/19/2024 2253 by Jese Pitts RN  Outcome: Progressing  Goal: Free from pain  7/19/2024 2253 by Jese Pitts RN  Outcome: Progressing  7/19/2024 2253 by Jese Pitts RN  Outcome: Progressing  Goal: No evidence of post procedure complications  7/19/2024 2253 by Jese Pitts RN  Outcome: Progressing  7/19/2024 2253 by Jese Pitts RN  Outcome: Progressing  Goal: Promote self  management  7/19/2024 2253 by Jese Pitts RN  Outcome: Progressing  7/19/2024 2253 by Jese Pitts RN  Outcome: Progressing  Goal: Verbalize understanding of procedure  7/19/2024 2253 by Jese Pitts RN  Outcome: Progressing  7/19/2024 2253 by Jese Pitts RN  Outcome: Progressing  Goal: Care and maintenance of device (specify)  7/19/2024 2253 by Jese Pitts RN  Outcome: Progressing  7/19/2024 2253 by Jese Pitts RN  Outcome: Progressing     Problem: Hypertensive Emergency/Crisis  Goal: Blood pressure gradually reduced to goal range  7/19/2024 2253 by Jese Pitts RN  Outcome: Progressing  7/19/2024 2253 by Jese Pitts RN  Outcome: Progressing  Goal: Free from signs of organ damage  7/19/2024 2253 by Jese Pitts RN  Outcome: Progressing  7/19/2024 2253 by Jese Pitts RN  Outcome: Progressing  Goal: Lab values return to normal range  7/19/2024 2253 by Jese Pitts RN  Outcome: Progressing  7/19/2024 2253 by Jese Pitts RN  Outcome: Progressing  Goal: Promote self management  7/19/2024 2253 by Jese Pitts RN  Outcome: Progressing  7/19/2024 2253 by Jese Pitts RN  Outcome: Progressing     Problem: Skin  Goal: Decreased wound size/increased tissue granulation at next dressing change  7/19/2024 2253 by Jese Pitts RN  Outcome: Progressing  7/19/2024 2253 by Jese Pitts RN  Outcome: Progressing  Goal: Participates in plan/prevention/treatment measures  7/19/2024 2253 by Jese Pitts RN  Outcome: Progressing  7/19/2024 2253 by Jese Pitts RN  Outcome: Progressing  Goal: Prevent/manage excess moisture  7/19/2024 2253 by Jese Pitts RN  Outcome: Progressing  7/19/2024 2253 by Jese Pitts RN  Outcome: Progressing  Goal: Prevent/minimize sheer/friction injuries  7/19/2024 2253 by Jese Pitts RN  Outcome: Progressing  7/19/2024 2253 by Jese Pitts RN  Outcome: Progressing  Goal: Promote/optimize  nutrition  7/19/2024 2253 by Jese Pitts RN  Outcome: Progressing  7/19/2024 2253 by Jese Pitts RN  Outcome: Progressing  Goal: Promote skin healing  7/19/2024 2253 by Jese Pitts RN  Outcome: Progressing  7/19/2024 2253 by Jese Pitts RN  Outcome: Progressing     Problem: Diabetes  Goal: Achieve decreasing blood glucose levels by end of shift  7/19/2024 2253 by Jese Pitts RN  Outcome: Progressing  7/19/2024 2253 by Jese Pitts RN  Outcome: Progressing  Goal: Increase stability of blood glucose readings by end of shift  7/19/2024 2253 by Jese Pitts RN  Outcome: Progressing  7/19/2024 2253 by Jese Pitts RN  Outcome: Progressing  Goal: Decrease in ketones present in urine by end of shift  7/19/2024 2253 by Jese Pitts RN  Outcome: Progressing  7/19/2024 2253 by Jese Pitts RN  Outcome: Progressing  Goal: Maintain electrolyte levels within acceptable range throughout shift  7/19/2024 2253 by Jese Pitts RN  Outcome: Progressing  7/19/2024 2253 by Jese Pitts RN  Outcome: Progressing  Goal: Maintain glucose levels >70mg/dl to <250mg/dl throughout shift  7/19/2024 2253 by Jese Pitts RN  Outcome: Progressing  7/19/2024 2253 by Jese Pitts RN  Outcome: Progressing  Goal: No changes in neurological exam by end of shift  7/19/2024 2253 by Jese Pitts RN  Outcome: Progressing  7/19/2024 2253 by Jese Pitts RN  Outcome: Progressing  Goal: Learn about and adhere to nutrition recommendations by end of shift  7/19/2024 2253 by Jese Pitts RN  Outcome: Progressing  7/19/2024 2253 by Jese Pitts RN  Outcome: Progressing  Goal: Vital signs within normal range for age by end of shift  7/19/2024 2253 by Jese Pitts RN  Outcome: Progressing  7/19/2024 2253 by Jese Pitts RN  Outcome: Progressing  Goal: Increase self care and/or family involovement by end of shift  7/19/2024 2253 by Jese Pitts, TITUS  Outcome:  Progressing  7/19/2024 2253 by Jese Pitts RN  Outcome: Progressing  Goal: Receive DSME education by end of shift  7/19/2024 2253 by Jese Pitts RN  Outcome: Progressing  7/19/2024 2253 by Jese Pitts RN  Outcome: Progressing     Problem: Knowledge Deficit  Goal: Patient/family/caregiver demonstrates understanding of disease process, treatment plan, medications, and discharge instructions  7/19/2024 2253 by Jese Pitts RN  Outcome: Progressing  7/19/2024 2253 by Jese Pitts RN  Outcome: Progressing     Problem: Mechanical Ventilation  Goal: Patient Will Maintain Patent Airway  7/19/2024 2253 by Jese Pitts RN  Outcome: Progressing  7/19/2024 2253 by Jese Pitts RN  Outcome: Progressing  Goal: Oral health is maintained or improved  7/19/2024 2253 by Jese Pitts RN  Outcome: Progressing  7/19/2024 2253 by Jese Pitts RN  Outcome: Progressing  Goal: Ability to express needs and understand communication  7/19/2024 2253 by Jese Pitts RN  Outcome: Progressing  7/19/2024 2253 by Jese Pitts RN  Outcome: Progressing  Goal: Mobility/activity is maintained at optimum level for patient  7/19/2024 2253 by Jese Pitts RN  Outcome: Progressing  7/19/2024 2253 by Jese Pitts RN  Outcome: Progressing     Problem: Pain  Goal: Takes deep breaths with improved pain control throughout the shift  7/19/2024 2253 by Jese Pitts RN  Outcome: Progressing  7/19/2024 2253 by Jese Pitts RN  Outcome: Progressing  Goal: Turns in bed with improved pain control throughout the shift  7/19/2024 2253 by Jese Pitts RN  Outcome: Progressing  7/19/2024 2253 by Jese Pitts RN  Outcome: Progressing  Goal: Walks with improved pain control throughout the shift  7/19/2024 2253 by Jese Pitts RN  Outcome: Progressing  7/19/2024 2253 by Jese Pitts RN  Outcome: Progressing  Goal: Performs ADL's with improved pain control throughout  shift  7/19/2024 2253 by Jese Pitts RN  Outcome: Progressing  7/19/2024 2253 by Jese Pitts RN  Outcome: Progressing  Goal: Participates in PT with improved pain control throughout the shift  Outcome: Progressing  Goal: Free from opioid side effects throughout the shift  Outcome: Progressing  Goal: Free from acute confusion related to pain meds throughout the shift  Outcome: Progressing     Problem: Fall/Injury  Goal: Not fall by end of shift  Outcome: Progressing  Goal: Be free from injury by end of the shift  Outcome: Progressing  Goal: Verbalize understanding of personal risk factors for fall in the hospital  Outcome: Progressing  Goal: Verbalize understanding of risk factor reduction measures to prevent injury from fall in the home  Outcome: Progressing  Goal: Use assistive devices by end of the shift  Outcome: Progressing  Goal: Pace activities to prevent fatigue by end of the shift  Outcome: Progressing

## 2024-07-20 NOTE — SIGNIFICANT EVENT
07/20/24 1735   Onset Documentation   Rapid Response Initiated By Radar auto page   [place] [facility] [id number])   Pager Time [phone]   Arrival Time 1735   Event End Time 1740   Level II Called No   Primary Reason for Call Radar auto page     Rapid Response Note    Radar auto-page received for a radar score of 6 with the following vital signs:  36.2, 54, 17, 99/61, 93%.  Vital signs were confirmed and reviewed with primary RN.  He is at his current baseline.  There are no indications for interventions by Rapid Response at this time.  RN to contact Rapid Response with any future concerns or signs of clinical decompensation.

## 2024-07-21 LAB
GLUCOSE BLD MANUAL STRIP-MCNC: 101 MG/DL (ref 74–99)
GLUCOSE BLD MANUAL STRIP-MCNC: 106 MG/DL (ref 74–99)
GLUCOSE BLD MANUAL STRIP-MCNC: 147 MG/DL (ref 74–99)
GLUCOSE BLD MANUAL STRIP-MCNC: 163 MG/DL (ref 74–99)

## 2024-07-21 PROCEDURE — 2500000004 HC RX 250 GENERAL PHARMACY W/ HCPCS (ALT 636 FOR OP/ED): Performed by: PHYSICIAN ASSISTANT

## 2024-07-21 PROCEDURE — 2500000005 HC RX 250 GENERAL PHARMACY W/O HCPCS: Performed by: PHYSICIAN ASSISTANT

## 2024-07-21 PROCEDURE — 2500000001 HC RX 250 WO HCPCS SELF ADMINISTERED DRUGS (ALT 637 FOR MEDICARE OP): Performed by: PHYSICIAN ASSISTANT

## 2024-07-21 PROCEDURE — 99232 SBSQ HOSP IP/OBS MODERATE 35: CPT | Performed by: NURSE PRACTITIONER

## 2024-07-21 PROCEDURE — 2500000002 HC RX 250 W HCPCS SELF ADMINISTERED DRUGS (ALT 637 FOR MEDICARE OP, ALT 636 FOR OP/ED): Performed by: PHYSICIAN ASSISTANT

## 2024-07-21 PROCEDURE — 1200000002 HC GENERAL ROOM WITH TELEMETRY DAILY

## 2024-07-21 PROCEDURE — 82947 ASSAY GLUCOSE BLOOD QUANT: CPT

## 2024-07-21 PROCEDURE — 94660 CPAP INITIATION&MGMT: CPT

## 2024-07-21 PROCEDURE — 94640 AIRWAY INHALATION TREATMENT: CPT

## 2024-07-21 PROCEDURE — 2500000001 HC RX 250 WO HCPCS SELF ADMINISTERED DRUGS (ALT 637 FOR MEDICARE OP): Performed by: NURSE PRACTITIONER

## 2024-07-21 PROCEDURE — S0109 METHADONE ORAL 5MG: HCPCS | Performed by: PHYSICIAN ASSISTANT

## 2024-07-21 PROCEDURE — 2500000001 HC RX 250 WO HCPCS SELF ADMINISTERED DRUGS (ALT 637 FOR MEDICARE OP)

## 2024-07-21 RX ORDER — INSULIN LISPRO 100 [IU]/ML
12 INJECTION, SOLUTION INTRAVENOUS; SUBCUTANEOUS
Status: DISCONTINUED | OUTPATIENT
Start: 2024-07-21 | End: 2024-07-22

## 2024-07-21 RX ORDER — INSULIN LISPRO 100 [IU]/ML
0-10 INJECTION, SOLUTION INTRAVENOUS; SUBCUTANEOUS
Status: DISCONTINUED | OUTPATIENT
Start: 2024-07-21 | End: 2024-07-26 | Stop reason: HOSPADM

## 2024-07-21 ASSESSMENT — COGNITIVE AND FUNCTIONAL STATUS - GENERAL
CLIMB 3 TO 5 STEPS WITH RAILING: A LOT
PERSONAL GROOMING: A LITTLE
WALKING IN HOSPITAL ROOM: A LITTLE
TURNING FROM BACK TO SIDE WHILE IN FLAT BAD: A LITTLE
DAILY ACTIVITIY SCORE: 23
STANDING UP FROM CHAIR USING ARMS: A LITTLE
MOVING TO AND FROM BED TO CHAIR: A LITTLE
MOVING FROM LYING ON BACK TO SITTING ON SIDE OF FLAT BED WITH BEDRAILS: A LITTLE
MOBILITY SCORE: 17

## 2024-07-21 ASSESSMENT — PAIN SCALES - GENERAL
PAINLEVEL_OUTOF10: 4
PAINLEVEL_OUTOF10: 0 - NO PAIN

## 2024-07-21 ASSESSMENT — PAIN DESCRIPTION - ORIENTATION: ORIENTATION: RIGHT;LEFT

## 2024-07-21 ASSESSMENT — PAIN DESCRIPTION - LOCATION: LOCATION: GENERALIZED

## 2024-07-21 NOTE — PROGRESS NOTES
"CARDIAC SURGERY DAILY PROGRESS NOTE    Mr. Zepeda is a 58-year-old male with a PMH of RA (on chronic prednisone and hydroxychloroquine), type 2 DM (on insulin), COPD, hypothyroidism, 40-pack-year smoking history, chronic methadone use, peripheral neuropathy, and unhealed diabetic foot ulcers s/p right foot partial amputation (7 months ago - follows with wound clinic as outpatient) and most recently NSTEMI. He is now s/p CABGx3 with Dr. Carey on 7/9.     OPERATION/PROCEDURE: CABGx3 with Dr. Carey on 7/9.   - Median sternotomy  - Attempt off-pump surgery  - On pump coronary artery bypass grafting x 3  --- LIMA to LAD  --- MANUELITO( Y'ed off LIMA) to obtuse marginal  --- SVG to right PLV (terminal branch of RCA)  - Endoscopic harvesting of the right greater saphenous vein  - Sternal plating    History of NSTEMI. Is now status post CABGx3 Pre: Mildly decreased LV function, EF 40-45%. Post: normal LV function.     CTICU Course: Post operative course c/b acute hypoxic respiratory insufficiency requiring high flow nasal cannula, now improved.Afib with RVR to the 130s requiring bolus and amio drip. Now in NSR in 60-70's with some bradycardia to high 40's-> PO amio dc'd.  JUAN J post op with increasing CR to 2.08 (diuresis was held) now 1.25.  Pulmonary edema still noted on xray. ? Fentanyl use while inpatient brought from family members     Transferred to T3 on 7/16/24    =====================================    No events overnight    SUBJECTIVE:  Feels better today still on 2L NC from 4LNC  Sinus Dayday in the 50s    Objective   /67 (BP Location: Right arm, Patient Position: Lying)   Pulse 67   Temp 36.3 °C (97.3 °F) (Temporal)   Resp 18   Ht 1.676 m (5' 6\")   Wt 83.5 kg (184 lb)   SpO2 93%   BMI 29.70 kg/m²   0-10 (Numeric) Pain Score: 0 - No pain   3 Day Weight Change: -0.846 kg (-1 lb 13.8 oz) per day    Heart Rate:  []   Temp:  [35.7 °C (96.3 °F)-36.4 °C (97.5 °F)]   Resp:  [17-20]   BP: " ()/(58-74)   Weight:  [83.5 kg (184 lb)]   SpO2:  [92 %-100 %]      Intake and Output    Intake/Output Summary (Last 24 hours) at 7/21/2024 1539  Last data filed at 7/21/2024 1200  Gross per 24 hour   Intake --   Output 1700 ml   Net -1700 ml     Physical Exam  Vitals and nursing note reviewed.   Constitutional:       Appearance: Normal appearance.      Comments: Patient sitting up in bed.   Complains of pain 4/10.   Eyes:      Conjunctiva/sclera: Conjunctivae normal.   Neck:      Comments: Trachea midline   Cardiovascular:      Rate and Rhythm: Normal rate.      Pulses: Normal pulses.      Heart sounds: Normal heart sounds.      Comments: TELE: SB with 1st degree AV Block (.21 pr), rate 59 bpm    Wires capped.   Pulmonary:      Effort: Pulmonary effort is normal.      Breath sounds: Normal breath sounds.      Comments: Diminished breath sound bilaterally   Sternum stable.   Abdominal:      General: Bowel sounds are normal.      Palpations: Abdomen is soft.      Comments: BM (+) 7/17/24   Genitourinary:     Comments: Voids independently  Musculoskeletal:      Cervical back: Neck supple.      Comments: WATERS     Skin:     General: Skin is warm and dry.      Capillary Refill: Capillary refill takes less than 2 seconds.      Comments: midsternal chest incision C/D/I, well approximated, no s/s infection  SVG  SIMON well approx no s/sx of infections      Neurological:      General: No focal deficit present.      Mental Status: He is alert and oriented to person, place, and time.   Psychiatric:         Mood and Affect: Mood normal.         Behavior: Behavior normal.       Medications  Scheduled medications  acetaminophen, 975 mg, oral, q6h  aspirin, 81 mg, oral, Daily  atorvastatin, 80 mg, oral, Nightly  clopidogrel, 75 mg, oral, Daily  DULoxetine, 60 mg, oral, Daily  folic acid, 1 mg, oral, Daily  heparin, 5,000 Units, subcutaneous, q8h  hydroxychloroquine, 200 mg, oral, BID  insulin glargine, 25 Units, subcutaneous,  q24h  insulin lispro, 0-10 Units, subcutaneous, TID AC  insulin lispro, 12 Units, subcutaneous, TID AC  iron polysaccharides, 150 mg, oral, Daily  levothyroxine, 50 mcg, oral, Daily  methadone, 77.5 mg, oral, q12h  metoprolol tartrate, 12.5 mg, oral, BID  multivitamin with minerals, 1 tablet, oral, Daily  oxygen, , inhalation, Continuous - Inhalation  pantoprazole, 40 mg, oral, Daily before breakfast  polyethylene glycol, 17 g, oral, Daily  pregabalin, 200 mg, oral, Daily  sennosides-docusate sodium, 2 tablet, oral, BID  thiamine, 100 mg, oral, Daily  tiotropium, 2 puff, inhalation, Daily    Continuous medications   PRN medications  PRN medications: albuterol, dextrose, dextrose **OR** glucagon, dextrose, glucagon, glucagon, naloxone, [DISCONTINUED] ondansetron **OR** ondansetron, [Held by provider] oxyCODONE, oxygen, sodium chloride    Labs  Results for orders placed or performed during the hospital encounter of 07/01/24 (from the past 24 hour(s))   POCT GLUCOSE   Result Value Ref Range    POCT Glucose 128 (H) 74 - 99 mg/dL   POCT GLUCOSE   Result Value Ref Range    POCT Glucose 101 (H) 74 - 99 mg/dL   POCT GLUCOSE   Result Value Ref Range    POCT Glucose 147 (H) 74 - 99 mg/dL       IMAGES  I have personally reviewed the following test result(s):   XR CHEST 2 VIEWS; 7/17/2024 6:54 am      INDICATION:  Signs/Symptoms:s/p cardiac surgery.      COMPARISON:  07/16/2024.      ACCESSION NUMBER(S):  [id number]      ORDERING CLINICIAN:  RUBI THRASHER      FINDINGS:          CARDIOMEDIASTINAL SILHOUETTE:  Patient is status post median sternotomy.      LUNGS:  Continued perihilar interstitial edema with right basilar  atelectasis. Right-sided effusion without pneumothorax.      ABDOMEN:  No remarkable upper abdominal findings.      BONES:  No acute osseous changes.      IMPRESSION:  1.  Slight interval improvement in lung aeration status post median  sternotomy. Residual edema and right basilar atelectasis.           Signed by: Santo Goldman 7/18/2024 7:21 AM  Dictation workstation:   [id number]    Pelvis and right hip dated 7/18/2024.      INDICATION:  Signs/Symptoms:s/p fall stuck hip on IV poll base      COMPARISON:  Radiographs dated 10/24/2017.      ACCESSION NUMBER(S):  [id number]      ORDERING CLINICIAN:  RUBI THRASHER      TECHNIQUE:  AP pelvis and  2 right hip radiographs.      FINDINGS:  No fracture or dislocation is evident. Surgical and degenerative  changes seen of the spine. Minimal degenerative changes seen of the  hips. No soft tissue gas or radiopaque foreign body is evident.      IMPRESSION:  No osseous injury is evident.      Signed by: Sandeep Andrade 7/18/2024 1:25 PM  Dictation workstation:   [id number]    IMPRESSION & PLAN:  POD # 12 s/p CABGx3 with Dr. Carey on 7/9   - Increase activity/ ambulation; PT/OT  - Cardiac rehab referral placed  - Continue cardiac meds: ASA, BB, statin and Plavix for NSTEMI  - Capped pacing wires   - Chronic methadone use->  continue methadone 77.5mg q12 hrs  - 2v CXR 7/17/24 reviewed with continued perihilar edema improving  - Postop echo 7/15/24 LVEF 40%-> similar to pre-op echo   - Remove epicardial wires prior to discharge   - Tele until discharge  - Optimize nutrition and electrolytes    Rhythm   pAfib with RVR- initially on Amiodarone dc'd 7/16 due to bradycardia   - SB   -  Continue PO metoprolol 12.5mg BID   - continue telemetry until discharge     Acute Blood Loss Anemia - stable  Recent Labs     07/19/24  1253 07/18/24  0718 07/17/24  0939 07/16/24  0035 07/15/24  1543 07/15/24  0432 07/14/24  1112   HGB 8.7* 9.0* 9.3* 8.4* 8.5* 7.4* 8.0*   HCT 27.7* 28.9* 29.8* 25.0* 26.1* 22.2* 24.2*   - MV, PO Iron x1mo  - Daily labs, transfuse as indicated    Volume/Electrolyte Status: Preop wt Weight: 85.3 kg (188 lb)   Fluid overload by xray evaluation  Vitals:    07/21/24 0547   Weight: 83.5 kg (184 lb)   - Weight: 83.5, 85.3, 86.3kg   - Lasix infusion stopped  "-holding diuresis as appears intervascularly depleted   - seems to be at base weight; chest xray with congestion still   - Replete electrolytes for hypokalemia / hypomagnesemia / hypophosphatemia as needed   - Daily weights and strict I&Os  - Daily RFP while admitted  -  Lasix IV x1 to eval response    ID: Leukocytosis  Likely reactive       Susceptibility data from last 90 days.  Collected Specimen Info Organism Clindamycin Erythromycin Oxacillin Tetracycline Trimethoprim/Sulfamethoxazole Vancomycin   24 Tissue/Biopsy from Wound/Tissue Methicillin Resistant Staphylococcus aureus (MRSA)  S  R  R  S  S  S     WBC   Date Value Ref Range Status   2024 17.7 (H) 4.4 - 11.3 x10*3/uL Final     Temp (36hrs), Av.1 °C (97 °F), Min:35.7 °C (96.3 °F), Max:36.4 °C (97.5 °F)   -trend temp, WBC  - Preop Antibiotics Completed     H/o peripheral neuropathy and chronic methadone use  - Acute post operative pain currently well controlled on home methadone and scheduled Tylenol.   - c/w thiamine, folate, home pregabalin and duloxetine  - c/w home Plaquenil   - c/w methadone back to BID home dose     Acute hypoxic pulmonary insufficiency requiring supplemental oxygen; H/O COPD   - CXR with mild congestion and atelectasis-> additional IV lasix today  - c/w  prednisone to 40mg x5 days for COPD exacerbation treatment; (of note, the patient states that he is on prednisone 5 mg for his RA), Will need to be re-started following above COPD steroid dosing  - c/w IS, bronchial hygiene    MARIAELENA: Patient reports having a sleep study \"many\" years ago and was told he has MARIAELENA. He did not follow up and thus does not have CPAP unit at home.  - Aggressive bronchial hygiene  - Wean O2 as tolerated  - ABGs PRN  - Continue inpatient CPAP while hospitalized; patient reports that he feels so much better with CPAP; Explained that it is likely that he will need another sleep study and will require follow up to make sure that he obtains " the CPAP device.    JUAN J post op CR 2.08-no history of renal disease, baseline creatinine 0.7      Creatinine   Date Value Ref Range Status   07/19/2024 1.15 0.50 - 1.30 mg/dL Final   -daily RFP  -diuretics as indicated  -renally dose all medications  -avoid nephrotoxic drugs  -accurate I/Os  - Lasix infusion stopped 7/16   - Lasix 40gm IVP once 7/18 -> chest xray with congestion     Stress Induced Hyperglycemia 2/2 steroids Newly Diagnosed DM type II   Lab Results   Component Value Date    HGBA1C 8.5 (H) 05/15/2024     Results from last 7 days   Lab Units 07/21/24  1127 07/21/24  0749 07/20/24  1709 07/20/24  1125 07/20/24  0755 07/19/24  1840 07/19/24  1408   POCT GLUCOSE mg/dL 147* 101* 128* 87 90 181* 135*   - diabetic diet 60 grams CHO   - lispro premeal corrective scale  - Hypoglycemic protocol   - Endo consulted appreciate recs  - 7/17 sstarted 20 units QAM starting tomorrow with 8 units of lispro with meals and decreasing the sliding scale to #2 with meals.    - Given continued hypergylcemia increase Lantus to 25U today and increase mealtime lispro to 10u   - Hold home metformin    Hypothyroidism   Lab Results   Component Value Date    TSH 2.67 07/02/2024    -continue home levothyroxine  -follow up with PCP or endocrinology as an outpatient as scheduled    Skin    - Unhealed diabetic foot ulcers s/p R foot partial amputation. Wound care following.    VTE Prophylaxis: SCDs/TEDs, ambulation, SQ heparin  Code Status: Full Code    Dispo  - PT/OT recs changed from home to moderate intensity; may require SNF ([facility] following and providing information on possible placements)  - Anticipate discharge when placement confirmed and patient has accepted this as a necessity.   - Will continue to assess discharge needs      CHACE Lucero-CNP  Cardiac Surgery [facility]  [facility]  Team Phone [phone]    7/21/2024  3:39 PM

## 2024-07-21 NOTE — CARE PLAN
The patient's goals for the shift include      The clinical goals for the shift include pt will remain HDS

## 2024-07-21 NOTE — PROGRESS NOTES
Transitional Care Coordinator Progress Note:      Spoke with Kael Zepeda is a 58 y.o. male on day 20 of admission presenting with CAD in native artery who agrees to a SNF only if the facility is one mile from the home. SNF referrals sent to facilities. PT would like to be near the home. Client would like a referral to the Community Health Worker.       Manjit Rodriguez RN BSN [facility]

## 2024-07-22 ENCOUNTER — DOCUMENTATION (OUTPATIENT)
Dept: HOME HEALTH SERVICES | Facility: HOME HEALTH | Age: 59
End: 2024-07-22
Payer: MEDICARE

## 2024-07-22 LAB
ALBUMIN SERPL BCP-MCNC: 4 G/DL (ref 3.4–5)
ANION GAP SERPL CALC-SCNC: 16 MMOL/L (ref 10–20)
BUN SERPL-MCNC: 27 MG/DL (ref 6–23)
CALCIUM SERPL-MCNC: 8.7 MG/DL (ref 8.6–10.6)
CHLORIDE SERPL-SCNC: 98 MMOL/L (ref 98–107)
CO2 SERPL-SCNC: 30 MMOL/L (ref 21–32)
CREAT SERPL-MCNC: 0.84 MG/DL (ref 0.5–1.3)
EGFRCR SERPLBLD CKD-EPI 2021: >90 ML/MIN/1.73M*2
ERYTHROCYTE [DISTWIDTH] IN BLOOD BY AUTOMATED COUNT: 23.2 % (ref 11.5–14.5)
GLUCOSE BLD MANUAL STRIP-MCNC: 144 MG/DL (ref 74–99)
GLUCOSE BLD MANUAL STRIP-MCNC: 168 MG/DL (ref 74–99)
GLUCOSE BLD MANUAL STRIP-MCNC: 188 MG/DL (ref 74–99)
GLUCOSE SERPL-MCNC: 135 MG/DL (ref 74–99)
HCT VFR BLD AUTO: 31.8 % (ref 41–52)
HGB BLD-MCNC: 9.5 G/DL (ref 13.5–17.5)
MAGNESIUM SERPL-MCNC: 2.72 MG/DL (ref 1.6–2.4)
MCH RBC QN AUTO: 25.4 PG (ref 26–34)
MCHC RBC AUTO-ENTMCNC: 29.9 G/DL (ref 32–36)
MCV RBC AUTO: 85 FL (ref 80–100)
NRBC BLD-RTO: 0 /100 WBCS (ref 0–0)
PHOSPHATE SERPL-MCNC: 3.7 MG/DL (ref 2.5–4.9)
PLATELET # BLD AUTO: 314 X10*3/UL (ref 150–450)
POTASSIUM SERPL-SCNC: 4.2 MMOL/L (ref 3.5–5.3)
RBC # BLD AUTO: 3.74 X10*6/UL (ref 4.5–5.9)
SODIUM SERPL-SCNC: 140 MMOL/L (ref 136–145)
WBC # BLD AUTO: 10.4 X10*3/UL (ref 4.4–11.3)

## 2024-07-22 PROCEDURE — 84100 ASSAY OF PHOSPHORUS: CPT

## 2024-07-22 PROCEDURE — 2500000001 HC RX 250 WO HCPCS SELF ADMINISTERED DRUGS (ALT 637 FOR MEDICARE OP): Performed by: PHYSICIAN ASSISTANT

## 2024-07-22 PROCEDURE — 2500000001 HC RX 250 WO HCPCS SELF ADMINISTERED DRUGS (ALT 637 FOR MEDICARE OP)

## 2024-07-22 PROCEDURE — 2500000005 HC RX 250 GENERAL PHARMACY W/O HCPCS: Performed by: PHYSICIAN ASSISTANT

## 2024-07-22 PROCEDURE — 2500000004 HC RX 250 GENERAL PHARMACY W/ HCPCS (ALT 636 FOR OP/ED): Performed by: PHYSICIAN ASSISTANT

## 2024-07-22 PROCEDURE — 97535 SELF CARE MNGMENT TRAINING: CPT | Mod: GO

## 2024-07-22 PROCEDURE — 94640 AIRWAY INHALATION TREATMENT: CPT

## 2024-07-22 PROCEDURE — S0109 METHADONE ORAL 5MG: HCPCS | Performed by: PHYSICIAN ASSISTANT

## 2024-07-22 PROCEDURE — 2500000002 HC RX 250 W HCPCS SELF ADMINISTERED DRUGS (ALT 637 FOR MEDICARE OP, ALT 636 FOR OP/ED): Performed by: PHYSICIAN ASSISTANT

## 2024-07-22 PROCEDURE — 1200000002 HC GENERAL ROOM WITH TELEMETRY DAILY

## 2024-07-22 PROCEDURE — 83735 ASSAY OF MAGNESIUM: CPT

## 2024-07-22 PROCEDURE — 2500000002 HC RX 250 W HCPCS SELF ADMINISTERED DRUGS (ALT 637 FOR MEDICARE OP, ALT 636 FOR OP/ED)

## 2024-07-22 PROCEDURE — 99232 SBSQ HOSP IP/OBS MODERATE 35: CPT

## 2024-07-22 PROCEDURE — 99233 SBSQ HOSP IP/OBS HIGH 50: CPT

## 2024-07-22 PROCEDURE — 85027 COMPLETE CBC AUTOMATED: CPT

## 2024-07-22 PROCEDURE — 2500000001 HC RX 250 WO HCPCS SELF ADMINISTERED DRUGS (ALT 637 FOR MEDICARE OP): Performed by: NURSE PRACTITIONER

## 2024-07-22 PROCEDURE — 97530 THERAPEUTIC ACTIVITIES: CPT | Mod: GP | Performed by: PHYSICAL THERAPIST

## 2024-07-22 PROCEDURE — 97116 GAIT TRAINING THERAPY: CPT | Mod: GP | Performed by: PHYSICAL THERAPIST

## 2024-07-22 PROCEDURE — 82947 ASSAY GLUCOSE BLOOD QUANT: CPT

## 2024-07-22 PROCEDURE — 97530 THERAPEUTIC ACTIVITIES: CPT | Mod: GO

## 2024-07-22 RX ORDER — METFORMIN HYDROCHLORIDE 500 MG/1
500 TABLET ORAL
Status: DISCONTINUED | OUTPATIENT
Start: 2024-07-22 | End: 2024-07-26 | Stop reason: HOSPADM

## 2024-07-22 RX ORDER — INSULIN LISPRO 100 [IU]/ML
8 INJECTION, SOLUTION INTRAVENOUS; SUBCUTANEOUS
Status: DISCONTINUED | OUTPATIENT
Start: 2024-07-22 | End: 2024-07-26 | Stop reason: HOSPADM

## 2024-07-22 ASSESSMENT — COGNITIVE AND FUNCTIONAL STATUS - GENERAL
MOVING TO AND FROM BED TO CHAIR: A LITTLE
DRESSING REGULAR UPPER BODY CLOTHING: A LITTLE
TURNING FROM BACK TO SIDE WHILE IN FLAT BAD: A LITTLE
WALKING IN HOSPITAL ROOM: A LITTLE
TURNING FROM BACK TO SIDE WHILE IN FLAT BAD: A LITTLE
DAILY ACTIVITIY SCORE: 19
DRESSING REGULAR UPPER BODY CLOTHING: A LITTLE
MOVING FROM LYING ON BACK TO SITTING ON SIDE OF FLAT BED WITH BEDRAILS: A LITTLE
HELP NEEDED FOR BATHING: A LITTLE
MOVING TO AND FROM BED TO CHAIR: A LITTLE
CLIMB 3 TO 5 STEPS WITH RAILING: TOTAL
MOBILITY SCORE: 15
STANDING UP FROM CHAIR USING ARMS: A LOT
MOBILITY SCORE: 18
HELP NEEDED FOR BATHING: A LITTLE
TOILETING: A LITTLE
PERSONAL GROOMING: A LITTLE
DRESSING REGULAR LOWER BODY CLOTHING: A LITTLE
STANDING UP FROM CHAIR USING ARMS: A LITTLE
DAILY ACTIVITIY SCORE: 21
CLIMB 3 TO 5 STEPS WITH RAILING: A LOT
WALKING IN HOSPITAL ROOM: A LITTLE
TOILETING: A LITTLE

## 2024-07-22 ASSESSMENT — ENCOUNTER SYMPTOMS
DIARRHEA: 0
ABDOMINAL PAIN: 0
POLYDIPSIA: 1
UNEXPECTED WEIGHT CHANGE: 0
CHEST TIGHTNESS: 0
CONFUSION: 0
SHORTNESS OF BREATH: 0
DYSURIA: 0
COUGH: 0
EYE REDNESS: 0
ACTIVITY CHANGE: 0
HEADACHES: 0
LIGHT-HEADEDNESS: 0
AGITATION: 0
FATIGUE: 1
VOMITING: 0
POLYPHAGIA: 0
APPETITE CHANGE: 0
PALPITATIONS: 0
DIZZINESS: 0
TROUBLE SWALLOWING: 0
DIAPHORESIS: 0
EYE PAIN: 0
BRUISES/BLEEDS EASILY: 0
SORE THROAT: 0
NUMBNESS: 0
FREQUENCY: 0
NAUSEA: 0
JOINT SWELLING: 0

## 2024-07-22 ASSESSMENT — PAIN - FUNCTIONAL ASSESSMENT: PAIN_FUNCTIONAL_ASSESSMENT: 0-10

## 2024-07-22 ASSESSMENT — PAIN SCALES - GENERAL: PAINLEVEL_OUTOF10: 0 - NO PAIN

## 2024-07-22 ASSESSMENT — ACTIVITIES OF DAILY LIVING (ADL): HOME_MANAGEMENT_TIME_ENTRY: 19

## 2024-07-22 NOTE — PROGRESS NOTES
Occupational Therapy    Occupational Therapy Treatment    Name: Kael Zepeda  MRN: [id number]  : 1965  Date: 24  Time Calculation  Start Time: 1110  Stop Time: 1139  Time Calculation (min): 29 min  OT Student under direct supervision of OTR/L  Assessment:  Evaluation/Treatment Tolerance: Patient tolerated treatment well  Medical Staff Made Aware: Yes  End of Session Communication: Bedside nurse  End of Session Patient Position: Up in chair (Alarm off PT in the room)  Plan:  Treatment Interventions: ADL retraining, Visual perceptual retraining, Functional transfer training, UE strengthening/ROM, Endurance training, Patient/family training, Fine motor coordination activities, Cognitive reorientation, Equipment evaluation/education, Neuromuscular reeducation, Compensatory technique education  OT Frequency: 3 times per week  OT Discharge Recommendations: Moderate intensity level of continued care  OT Recommended Transfer Status: Assist of 1  OT - OK to Discharge: Yes    Subjective   Previous Visit Info:  OT Last Visit  OT Received On: 24  General:  General  Family/Caregiver Present: No  Prior to Session Communication: Bedside nurse  Patient Position Received: Bed, 3 rail up, Alarm off, not on at start of session  General Comment: patient agreeable to OT session, upon arrival patient on tele and O2 2L, patient required mod VCs and encouragment to keep O2 on throughout session  Precautions:  Medical Precautions: Fall precautions, Oxygen therapy device and L/min  Post-Surgical Precautions: Move in the Tube  Braces Applied: Donned R post op shoe prior to OOB mobility  Precautions Comment: MAP 70-90, WBAT RLE    Pain Assessment:  Pain Assessment  Pain Assessment: 0-10  0-10 (Numeric) Pain Score: 0 - No pain     Objective     Activities of Daily Living: Grooming  Grooming Comments: Patient stood at sink in bathroom and washed face and hair with SBA to CGA to maintain balance, patient required  additional support when he had his eyes closed to wash face, patient required mod VCs to maintain safety while standing, in sitting patient applied deoderant with supervision    UE Dressing  UE Dressing Comments: In sitting, patient doffed and donned hospital gown with min A to manage lines, min VCs for sequencing to complete task    LE Dressing  LE Dressing: Yes  LE Dressing Comments: In sitting at EOB patient donned R post/op shoe with supervision and mod VCs    Bed Mobility/Transfers: Bed Mobility  Bed Mobility: Yes  Bed Mobility 1  Bed Mobility 1: Supine to sitting  Level of Assistance 1: Moderate verbal cues, Close supervision    Transfers  Transfer: Yes  Transfer 1  Transfer From 1: Sit to, Stand to  Transfer to 1: Sit, Stand  Technique 1: Sit to stand, Stand to sit  Transfer Device 1: Walker  Transfer Level of Assistance 1: Contact guard, Minimal verbal cues  Trials/Comments 1: x2 trials    Functional Mobility:  Functional Mobility  Functional Mobility Performed: Yes  Functional Mobility 1  Comments 1: Patient ambulated household distances with FWW and SBA with min VCs to maintain safety, patient denied any dizziness during ambulation    Outcome Measures:  [facility] Daily Activity  Putting on and taking off regular lower body clothing: A little  Bathing (including washing, rinsing, drying): A little  Putting on and taking off regular upper body clothing: A little  Toileting, which includes using toilet, bedpan or urinal: A little  Taking care of personal grooming such as brushing teeth: A little  Eating Meals: None  Daily Activity - Total Score: 19    , E = Exercise and Early Mobility  Early Mobility/Exercise Safety Screen: Proceed with mobilization - No exclusion criteria met  ICU Mobility Scale: Walking with assistance of 1 person, and OT Adult Other Outcome Measures  4AT: Negative    Education Documentation  Body Mechanics, taught by FAITH Newberry at 7/22/2024 12:22 PM.  Learner:  Patient  Readiness: Acceptance  Method: Explanation  Response: Needs Reinforcement    Precautions, taught by FAITH Newberry at 7/22/2024 12:22 PM.  Learner: Patient  Readiness: Acceptance  Method: Explanation  Response: Needs Reinforcement    ADL Training, taught by FAITH Newberry at 7/22/2024 12:22 PM.  Learner: Patient  Readiness: Acceptance  Method: Explanation  Response: Needs Reinforcement    Education Comments  No comments found.      Goals:  Encounter Problems       Encounter Problems (Active)       ADLs       Patient will complete toileting with MIN assist and min cues.   (Not Progressing)       Start:  07/11/24    Expected End:  07/25/24            Patient will complete LB dressing with MIN assist and min cues.   (Progressing)       Start:  07/11/24    Expected End:  07/25/24            Patient will complete UB dressing with SBA and min cues.   (Progressing)       Start:  07/11/24    Expected End:  07/25/24            Patient will complete grooming with MOD I.   (Progressing)       Start:  07/11/24    Expected End:  07/25/24                   BALANCE       Patient will demo sitting balance with SBA for at least 8 min in prep for EOB ADLs. (Progressing)       Start:  07/11/24    Expected End:  07/25/24               MOBILITY       Pt. Will demo household distance functional mobility with MIN A using LRAD.   (Progressing)       Start:  07/11/24    Expected End:  07/25/24               TRANSFERS       Pt. Will complete stand pivot transfer with MIN assist with min cues and using LRAD.   (Met)       Start:  07/11/24    Expected End:  07/25/24    Resolved:  07/15/24    Updated to: Pt. Will complete stand pivot transfer with SBA with min cues and using LRAD.    Update reason: goal met         Pt. Will complete stand pivot transfer with SBA with min cues and using LRAD. (Progressing)       Start:  07/15/24    Expected End:  07/25/24                   Ina COLE

## 2024-07-22 NOTE — PROGRESS NOTES
"CARDIAC SURGERY DAILY PROGRESS NOTE    Mr. Zepeda is a 58-year-old male with a PMH of RA (on chronic prednisone and hydroxychloroquine), type 2 DM (on insulin), COPD, hypothyroidism, 40-pack-year smoking history, chronic methadone use, peripheral neuropathy, and unhealed diabetic foot ulcers s/p right foot partial amputation (7 months ago - follows with wound clinic as outpatient) and most recently NSTEMI. He is now s/p CABGx3 with Dr. Carey on 7/9.     OPERATION/PROCEDURE: CABGx3 with Dr. Carey on 7/9.   - Median sternotomy  - Attempt off-pump surgery  - On pump coronary artery bypass grafting x 3  --- LIMA to LAD  --- MANUELITO( Y'ed off LIMA) to obtuse marginal  --- SVG to right PLV (terminal branch of RCA)  - Endoscopic harvesting of the right greater saphenous vein  - Sternal plating    History of NSTEMI. Is now status post CABGx3 Pre: Mildly decreased LV function, EF 40-45%. Post: normal LV function.     CTICU Course: Post operative course c/b acute hypoxic respiratory insufficiency requiring high flow nasal cannula, now improved.Afib with RVR to the 130s requiring bolus and amio drip. Now in NSR in 60-70's with some bradycardia to high 40's-> PO amio dc'd.  JUAN J post op with increasing CR to 2.08 (diuresis was held) now 1.25.  Pulmonary edema still noted on xray. ? Fentanyl use while inpatient brought from family members     Transferred to T3 on 7/16/24    =====================================    Objective:   - No acute events overnight.     SUBJECTIVE:  No complaints.       Objective   /62 (BP Location: Left arm, Patient Position: Sitting)   Pulse 64   Temp 36.5 °C (97.7 °F) (Temporal)   Resp 18   Ht 1.676 m (5' 6\")   Wt 82.6 kg (182 lb 1.6 oz)   SpO2 97%   BMI 29.39 kg/m²   0-10 (Numeric) Pain Score: 0 - No pain   3 Day Weight Change: -0.892 kg (-1 lb 15.5 oz) per day    Heart Rate:  [64-68]   Temp:  [36 °C (96.8 °F)-36.5 °C (97.7 °F)]   Resp:  [17-18]   BP: (106-126)/(58-70)   Weight:  " [82.6 kg (182 lb 1.6 oz)]   SpO2:  [93 %-100 %]      Intake and Output    Intake/Output Summary (Last 24 hours) at 7/22/2024 1510  Last data filed at 7/22/2024 1228  Gross per 24 hour   Intake 360 ml   Output 200 ml   Net 160 ml     Physical Exam  Vitals and nursing note reviewed.   Constitutional:       Appearance: Normal appearance.      Comments: Patient alert and awake sitting up in bed, in no acute distress.    HENT:      Head: Atraumatic.      Mouth/Throat:      Pharynx: Oropharynx is clear.   Eyes:      Conjunctiva/sclera: Conjunctivae normal.      Pupils: Pupils are equal, round, and reactive to light.   Neck:      Comments: Trachea midline   Cardiovascular:      Rate and Rhythm: Normal rate. Rhythm irregular.      Pulses: Normal pulses.      Heart sounds: Normal heart sounds. No murmur heard.     No gallop.      Comments: TELE: SB with 1st degree AV Block 60's ot 70's   +2 Equal and even pulses in all extremities   Wires capped.   Pulmonary:      Effort: Pulmonary effort is normal. No respiratory distress.      Breath sounds: No stridor. Wheezing present.      Comments: Diminished breath sound bilaterally with occasional expiratory wheezes   Sternum stable.   Abdominal:      General: Bowel sounds are normal. There is no distension.      Palpations: Abdomen is soft.      Tenderness: There is no abdominal tenderness.      Comments: BM (+) 7/17/24   Genitourinary:     Comments: Voids independently  Musculoskeletal:      Cervical back: Neck supple.      Right lower leg: No edema.      Left lower leg: No edema.      Comments: WATERS     Skin:     General: Skin is warm and dry.      Capillary Refill: Capillary refill takes less than 2 seconds.      Coloration: Skin is not jaundiced or pale.      Findings: No bruising.      Comments: midsternal chest incision C/D/I, well approximated, no s/s infection  SVG  SIMON well approx no s/sx of infections      Neurological:      General: No focal deficit present.      Mental  Status: He is alert and oriented to person, place, and time.   Psychiatric:         Mood and Affect: Mood normal.         Behavior: Behavior normal.       Medications  Scheduled medications  acetaminophen, 975 mg, oral, q6h  aspirin, 81 mg, oral, Daily  atorvastatin, 80 mg, oral, Nightly  clopidogrel, 75 mg, oral, Daily  DULoxetine, 60 mg, oral, Daily  folic acid, 1 mg, oral, Daily  heparin, 5,000 Units, subcutaneous, q8h  hydroxychloroquine, 200 mg, oral, BID  insulin glargine, 25 Units, subcutaneous, q24h  insulin lispro, 0-10 Units, subcutaneous, TID AC  insulin lispro, 8 Units, subcutaneous, TID AC  iron polysaccharides, 150 mg, oral, Daily  levothyroxine, 50 mcg, oral, Daily  metFORMIN, 500 mg, oral, BID  methadone, 77.5 mg, oral, q12h  metoprolol tartrate, 12.5 mg, oral, BID  multivitamin with minerals, 1 tablet, oral, Daily  oxygen, , inhalation, Continuous - Inhalation  pantoprazole, 40 mg, oral, Daily before breakfast  polyethylene glycol, 17 g, oral, Daily  pregabalin, 200 mg, oral, Daily  sennosides-docusate sodium, 2 tablet, oral, BID  thiamine, 100 mg, oral, Daily  tiotropium, 2 puff, inhalation, Daily    Continuous medications   PRN medications  PRN medications: albuterol, benzocaine-menthol, dextrose, dextrose **OR** glucagon, naloxone, [DISCONTINUED] ondansetron **OR** ondansetron, [Held by provider] oxyCODONE, oxygen, sodium chloride    Labs  Results for orders placed or performed during the hospital encounter of 07/01/24 (from the past 24 hour(s))   POCT GLUCOSE   Result Value Ref Range    POCT Glucose 163 (H) 74 - 99 mg/dL   POCT GLUCOSE   Result Value Ref Range    POCT Glucose 106 (H) 74 - 99 mg/dL   POCT GLUCOSE   Result Value Ref Range    POCT Glucose 168 (H) 74 - 99 mg/dL   CBC   Result Value Ref Range    WBC 10.4 4.4 - 11.3 x10*3/uL    nRBC 0.0 0.0 - 0.0 /100 WBCs    RBC 3.74 (L) 4.50 - 5.90 x10*6/uL    Hemoglobin 9.5 (L) 13.5 - 17.5 g/dL    Hematocrit 31.8 (L) 41.0 - 52.0 %    MCV 85 80 -  100 fL    MCH 25.4 (L) 26.0 - 34.0 pg    MCHC 29.9 (L) 32.0 - 36.0 g/dL    RDW 23.2 (H) 11.5 - 14.5 %    Platelets 314 150 - 450 x10*3/uL   Renal Function Panel   Result Value Ref Range    Glucose 135 (H) 74 - 99 mg/dL    Sodium 140 136 - 145 mmol/L    Potassium 4.2 3.5 - 5.3 mmol/L    Chloride 98 98 - 107 mmol/L    Bicarbonate 30 21 - 32 mmol/L    Anion Gap 16 10 - 20 mmol/L    Urea Nitrogen 27 (H) 6 - 23 mg/dL    Creatinine 0.84 0.50 - 1.30 mg/dL    eGFR >90 >60 mL/min/1.73m*2    Calcium 8.7 8.6 - 10.6 mg/dL    Phosphorus 3.7 2.5 - 4.9 mg/dL    Albumin 4.0 3.4 - 5.0 g/dL   Magnesium   Result Value Ref Range    Magnesium 2.72 (H) 1.60 - 2.40 mg/dL   POCT GLUCOSE   Result Value Ref Range    POCT Glucose 144 (H) 74 - 99 mg/dL       IMAGES  I have personally reviewed the following test result(s):   XR CHEST 2 VIEWS; 7/17/2024 6:54 am  CARDIOMEDIASTINAL SILHOUETTE:  Patient is status post median sternotomy.  LUNGS:  Continued perihilar interstitial edema with right basilar  atelectasis. Right-sided effusion without pneumothorax.  ABDOMEN:  No remarkable upper abdominal findings.  BONES:  No acute osseous changes.      IMPRESSION:  1.  Slight interval improvement in lung aeration status post median  sternotomy. Residual edema and right basilar atelectasis.      Signed by: Santo Goldman 7/18/2024 7:21 AM  Dictation workstation:   [id number]    Pelvis and right hip dated 7/18/2024.     FINDINGS:  No fracture or dislocation is evident. Surgical and degenerative  changes seen of the spine. Minimal degenerative changes seen of the  hips. No soft tissue gas or radiopaque foreign body is evident.      IMPRESSION:  No osseous injury is evident.      Signed by: Sandeep Andrade 7/18/2024 1:25 PM  Dictation workstation:   [id number]    IMPRESSION & PLAN:  POD # 13 s/p CABGx3 with Dr. Carey on 7/9   - Increase activity/ ambulation; PT/OT  - Cardiac rehab referral placed  - Continue cardiac meds: ASA, BB, statin and Plavix  for NSTEMI  - Capped pacing wires   - Chronic methadone use->  continue methadone 77.5mg q12 hrs  - 2v CXR 7/17/24 reviewed with continued perihilar edema improving  - Postop echo 7/15/24 LVEF 40%-> similar to pre-op echo   - CUT epicardial wires prior to discharge; per surgeon preference   - Tele until discharge  - Optimize nutrition and electrolytes    Rhythm   pAfib with RVR- initially on Amiodarone dc'd 7/16 due to bradycardia   - Tele: SR 60's to 70's   -  Continue BB:metoprolol [id number] BID; will convert to succinate at discharge    - continue telemetry until discharge     Acute Blood Loss Anemia - stable  Recent Labs     07/22/24  1218 07/19/24  1253 07/18/24  0718 07/17/24  0939 07/16/24  0035 07/15/24  1543 07/15/24  0432   HGB 9.5* 8.7* 9.0* 9.3* 8.4* 8.5* 7.4*   HCT 31.8* 27.7* 28.9* 29.8* 25.0* 26.1* 22.2*   - MV, PO Iron x1mo  - Daily labs, transfuse as indicated      JUAN J post op CR 2.08-no history of renal disease, baseline creatinine 0.7 - Resolved  Creatinine   Date Value Ref Range Status   07/22/2024 0.84 0.50 - 1.30 mg/dL Final     Volume/Electrolyte Status: Preop wt Weight: 85.3 kg (188 lb)   Fluid overload by xray evaluation  Vitals:    07/22/24 0124   Weight: 82.6 kg (182 lb 1.6 oz)   - Weight: 8.6, 83.5, 85.3, 86.3kg   - Lasix infusion stopped 7/16-holding diuresis as appears intervascularly depleted   - seems to be at base weight; chest xray with congestion still   - 7/18 Lasix IV x1 to eval response  - Replete electrolytes for hypokalemia / hypomagnesemia / hypophosphatemia as needed   -renally dose all medications  -avoid nephrotoxic drugs  - Daily weights/strict I&Os  - Daily RFP      H/o peripheral neuropathy and chronic methadone use  - Acute post operative pain currently well controlled on home methadone and scheduled Tylenol.   - c/w thiamine, folate, home pregabalin and duloxetine  - c/w home Plaquenil   - c/w methadone back to BID home dose     Acute hypoxic pulmonary  "insufficiency requiring supplemental oxygen; H/O COPD   - CXR with mild congestion and atelectasis-> additional IV lasix today  - c/w  prednisone to 40mg x5 days for COPD exacerbation treatment; (of note, the patient states that he is on prednisone 5 mg for his RA), Will need to be re-started following above COPD steroid dosing  - c/w IS, bronchial hygiene    MARIAELENA: Patient reports having a sleep study \"many\" years ago and was told he has MARIAELENA. He did not follow up and thus does not have CPAP unit at home.  - Aggressive bronchial hygiene  - Wean O2 as tolerated - 2L NC with nocturnal CPAP   - Continue inpatient CPAP while hospitalized; patient reports that he feels so much better with CPAP; Explained that it is likely that he will need another sleep study and will require follow up to make sure that he obtains the CPAP device.    Stress Induced Hyperglycemia 2/2 steroids Newly Diagnosed DM type II   Lab Results   Component Value Date    HGBA1C 8.5 (H) 05/15/2024     Results from last 7 days   Lab Units 07/22/24  1232 07/22/24  0736 07/21/24  2134 07/21/24  1616 07/21/24  1127 07/21/24  0749 07/20/24  1709   POCT GLUCOSE mg/dL 144* 168* 106* 163* 147* 101* 128*   - diabetic diet 60 grams CHO   - lispro premeal corrective scale  - Hypoglycemic protocol   - Endo consulted/following, appreciate final recs (7/22):   --- Continue Insuline Glargine 25 units daily   --- Continue Lispro 8 units TID with meals   --- Continue Lispro premeal corrective sliding scale #2  --- Metformin 500 mg BID   --- Follow up with endocrine as an outpatient     Hypothyroidism   Lab Results   Component Value Date    TSH 2.67 07/02/2024   -continue home levothyroxine  -follow up with PCP or endocrinology as an outpatient as scheduled    Skin    - Unhealed diabetic foot ulcers s/p R foot partial amputation. Wound care following.    VTE Prophylaxis: SCDs/TEDs, ambulation, SQ heparin  Code Status: Full Code    Dispo  - PT/OT recs changed from home to " moderate intensity; may require SNF (TCC following and providing information on possible placements)  - Anticipate discharge when placement confirmed and patient has accepted this as a necessity.   - Will continue to assess discharge needs      CHACE Graham-CNP  Cardiac Surgery [facility]  [facility]  Team Phone [phone]    7/22/2024  3:10 PM

## 2024-07-22 NOTE — NURSING NOTE
L Leg SVG sight noted to be swollen and draining large serosang drainage. Covering MD Alba Curry notified. No new orders at this time.

## 2024-07-22 NOTE — PROGRESS NOTES
Kael Zepeda is a 58 y.o. male on day 21 of admission presenting with CAD in native artery.    Subjective   Pt seen and examined  Discussed insulin plan for SNF  Glucose much improved  No longer on pred 40mg, may return to home dose of pred 5mg at discharge  All questions answered     I have reviewed histories, allergies and medications have been reviewed and there are no changes       Objective   Review of Systems   Constitutional:  Positive for fatigue. Negative for activity change, appetite change, diaphoresis and unexpected weight change.   HENT:  Negative for congestion, sore throat and trouble swallowing.    Eyes:  Negative for pain, redness and visual disturbance.   Respiratory:  Negative for cough, chest tightness and shortness of breath.    Cardiovascular:  Negative for chest pain, palpitations and leg swelling.   Gastrointestinal:  Negative for abdominal pain, diarrhea, nausea and vomiting.   Endocrine: Positive for polydipsia and polyuria. Negative for cold intolerance, heat intolerance and polyphagia.   Genitourinary:  Negative for dysuria, frequency and urgency.   Musculoskeletal:  Negative for gait problem and joint swelling.   Skin:  Negative for pallor and rash.   Allergic/Immunologic: Negative for immunocompromised state.   Neurological:  Negative for dizziness, light-headedness, numbness and headaches.   Hematological:  Does not bruise/bleed easily.   Psychiatric/Behavioral:  Negative for agitation, behavioral problems and confusion.    All other systems reviewed and are negative.    Physical Exam  Vitals reviewed.   Constitutional:       General: He is not in acute distress.     Appearance: Normal appearance. He is ill-appearing.   HENT:      Head: Normocephalic and atraumatic.      Nose: Nose normal.      Mouth/Throat:      Mouth: Mucous membranes are moist.   Eyes:      Extraocular Movements: Extraocular movements intact.      Conjunctiva/sclera: Conjunctivae normal.      Pupils: Pupils  "are equal, round, and reactive to light.   Cardiovascular:      Pulses: Normal pulses.   Pulmonary:      Effort: Pulmonary effort is normal. No respiratory distress.      Comments: Nasal cannula in place  Abdominal:      General: Abdomen is flat. There is no distension.   Musculoskeletal:         General: Normal range of motion.   Skin:     General: Skin is warm and dry.      Findings: No rash.   Neurological:      Mental Status: He is alert and oriented to person, place, and time.   Psychiatric:         Mood and Affect: Mood normal.         Behavior: Behavior normal.         Last Recorded Vitals  Blood pressure 107/63, pulse 67, temperature 36.4 °C (97.5 °F), temperature source Temporal, resp. rate 17, height 1.676 m (5' 6\"), weight 82.6 kg (182 lb 1.6 oz), SpO2 96%.  Intake/Output last 3 Shifts:  I/O last 3 completed shifts:  In: 480 (5.6 mL/kg) [P.O.:480]  Out: 1900 (22 mL/kg) [Urine:1900 (0.6 mL/kg/hr)]  Dosing Weight: 86.4 kg     Relevant Results  Results from last 7 days   Lab Units 07/22/24  0736 07/21/24  2134 07/21/24  1616 07/21/24  1127 07/21/24  0749 07/19/24  1408 07/19/24  1253 07/18/24  0746 07/18/24  0718 07/17/24  1214 07/17/24  0939 07/16/24  0037 07/16/24  0035 07/15/24  1905 07/15/24  1543   POCT GLUCOSE mg/dL 168* 106* 163* 147* 101*   < >  --    < >  --    < >  --    < >  --    < >  --    GLUCOSE mg/dL  --   --   --   --   --   --  129*  --  182*  --  214*  --  152*  --  188*    < > = values in this interval not displayed.     Lab Review  Lab Results   Component Value Date    BILITOT 0.4 07/02/2024    CALCIUM 9.1 07/19/2024    CO2 28 07/19/2024    CL 96 (L) 07/19/2024    CREATININE 1.15 07/19/2024    GLUCOSE 129 (H) 07/19/2024    ALKPHOS 122 (H) 07/02/2024    K 4.0 07/19/2024    PROT 7.0 07/02/2024     07/19/2024    AST 19 07/02/2024    ALT 10 07/02/2024    BUN 61 (H) 07/19/2024    ANIONGAP 17 07/19/2024    MG 3.00 (H) 07/19/2024    PHOS 2.9 07/19/2024    ALBUMIN 4.4 07/19/2024    GFRMALE " >90 02/15/2023     Lab Results   Component Value Date    TRIG 81 07/02/2024    CHOL 122 07/02/2024    LDLCALC 60 07/02/2024    HDL 45.9 07/02/2024     Lab Results   Component Value Date    HGBA1C 8.5 (H) 05/15/2024    HGBA1C 9.4 (H) 02/20/2024    HGBA1C 10.0 (A) 02/12/2024     The ASCVD Risk score (Ameena DK, et al., 2019) failed to calculate for the following reasons:    The patient has a prior MI or stroke diagnosis    Scheduled medications  acetaminophen, 975 mg, oral, q6h  aspirin, 81 mg, oral, Daily  atorvastatin, 80 mg, oral, Nightly  clopidogrel, 75 mg, oral, Daily  DULoxetine, 60 mg, oral, Daily  folic acid, 1 mg, oral, Daily  heparin, 5,000 Units, subcutaneous, q8h  hydroxychloroquine, 200 mg, oral, BID  insulin glargine, 25 Units, subcutaneous, q24h  insulin lispro, 0-10 Units, subcutaneous, TID AC  insulin lispro, 8 Units, subcutaneous, TID AC  iron polysaccharides, 150 mg, oral, Daily  levothyroxine, 50 mcg, oral, Daily  metFORMIN, 500 mg, oral, BID  methadone, 77.5 mg, oral, q12h  metoprolol tartrate, 12.5 mg, oral, BID  multivitamin with minerals, 1 tablet, oral, Daily  oxygen, , inhalation, Continuous - Inhalation  pantoprazole, 40 mg, oral, Daily before breakfast  polyethylene glycol, 17 g, oral, Daily  pregabalin, 200 mg, oral, Daily  sennosides-docusate sodium, 2 tablet, oral, BID  thiamine, 100 mg, oral, Daily  tiotropium, 2 puff, inhalation, Daily      Continuous medications     PRN medications  PRN medications: albuterol, benzocaine-menthol, dextrose, dextrose **OR** glucagon, naloxone, [DISCONTINUED] ondansetron **OR** ondansetron, [Held by provider] oxyCODONE, oxygen, sodium chloride       Assessment/Plan   Principal Problem:    CAD in native artery  Active Problems:    NSTEMI (non-ST elevated myocardial infarction) (Multi)    Hepatitis C      Kael LINDSAY Zepeda is a 58 y.o. male presenting with Mr. Zepeda is a 58-year-old male with a PMH of RA (on chronic prednisone and  "hydroxychloroquine), type 2 DM (on insulin), COPD, hypothyroidism, 40-pack-year smoking history, chronic methadone use, peripheral neuropathy, and unhealed diabetic foot ulcers s/p right foot partial amputation (7 months ago - follows with wound clinic as outpatient) and most recently NSTEMI. He is now s/p CABGx3 with Dr. Carey on 7/9.      Patient is eating well. Currently on a 3/5 day burst of 40 mg prednisone.      Diabetes History  Type 2 diabetes for greater than 10 years  Outpatient provider for endocrine care PCP, date of last visit 5/21/24  Known complications due to diabetes include: peripheral neuropathy, CAD s/p CABDx3, Non healing ulcers secondary to uncontrolled diabetes, and hyperglycemia     Home Management  Patient reports he is prescribed 40 units levemir daily, lispro with meals, and metformin. He reports he takes the levemir most days. Reports he takes the lispro with meals regularly, but when pressed for a dose, he replies \"40 units\". Later in the conversation he notes he doesn't take the lispro often.   His chronic prednisone dose is 5 mg.   Reports checking his glucoses at least weekly with numbers in the 200's.   He has experienced hypoglycemia only in the hospital, denies hypoglycemia at home. He was able to sense hypoglycemia in the 60's.   He is somewhat interested in CGM use at discharge.     Steroids:  7/15-7/19: predinsone 40mg x 5 days  7/20: steroids stopped. May resume home pred of 5mg daily at discharge.     -restart home metformin 500mg BID  - recommend continuing glargine 25u daily       If NPO: reduce to 20 units  - recommend decreasing lispro from 12u to 8u with meals plus scale       If NPO: hold  - continue lispro corrective scale #2 with meals, adjust to q4h if NPO   = 0u  151-200 = 2u  201-250 = 4u  251-300 = 6u  301-350 = 8u  351-400 = 10u     -Accuchecks (not BMP) TIDAC and QHS- kindly ensure QHS Accucheck is checked; it is often missed   - Goal BG " 140-180  -Hypoglycemia protocol  -Diabetes Diet- low carb 60 CHO  -Will continue to follow and titrate insulin accordingly     Discharge planning:   [X] pt may discharge to SNF on the above diabetes regimen, may consider 70/30 insulin at home for ease of compliance, tbd by SNF providers  [X] referred pt to  [facility] and [facility] at discharge for outpt GLP1 and SGLT2i start, as well as CGM start  [X] referred pt to endocrinology follow up close to home     Final discharge recs given, endo team to sign off, please contact us via secure chat or at pager [phone] with any questions.       I spent 50 minutes in the professional and overall care of this patient.      YAMILETH TuckerC

## 2024-07-22 NOTE — PROGRESS NOTES
Occupational Therapy                 Therapy Communication Note    Patient Name: Kael Zepeda  MRN: [id number]  Today's Date: 7/22/2024     Discipline: Occupational Therapy    Missed Visit Reason: Missed Visit Reason:  (Patient currently on CPAP, per RN patient did not sleep overnight and currently trying to sleep; will attempt OT next visit as appropriate.)    Missed Time: Attempt    Comment:  Amanda Posey OTR/L  Inpatient Occupational Therapist   Rehab Office: [phone]

## 2024-07-22 NOTE — PROGRESS NOTES
Precert complete by team. Patient approved for the [facility]. Please see authorization below. Facility updated in [facility].      Precert Status: Approved  Authorization Number:[id number]  Dates: 2024 - 2024    [facility]  [id number] Zepeda MRN [id number]   1965  [place] # [id number]  [facility]     Pam Cota RN, LISW-S, TCC.

## 2024-07-22 NOTE — NURSING NOTE
"Mr. Zepeda is resting in bed.  He feels tired 2' not sleeping well last night.  States he had drainage from a graft site last night and has discomfort at that site.  We reviewed change to insulin regimen (similar to his home regimen PTA) of basal, bolus, SS.  Will provide updated chart for him to follow.  Aware that after SNF discharge he may be ordered 70/30 twice a day.  Visit kept brief as he is feeling \"very tired\" today.  Will follow up in am.  Time spent:  15 mins.  "

## 2024-07-22 NOTE — HH CARE COORDINATION
Home Care received a referral for Nursing. Unfortunately, we are unable to accept and process the referral at this time.    Patients, please reach out to the referring provider or your PCP to assist in obtaining an alternative home care agency and/or guidance to meet your needs.    Providers, please reach out to  Home Care with any questions regarding the declined referral.

## 2024-07-22 NOTE — PROGRESS NOTES
Physical Therapy    Physical Therapy Treatment    Patient Name: Kael Zepeda  MRN: [id number]  Today's Date: 7/22/2024  Time Calculation  Start Time: 1140  Stop Time: 1208  Time Calculation (min): 28 min    Assessment/Plan   PT Assessment  PT Assessment Results: Decreased endurance, Impaired balance, Decreased mobility, Pain, Decreased strength, Decreased safety awareness, Decreased cognition, Decreased skin integrity, Orthopedic restrictions  Rehab Prognosis: Good  Barriers to Discharge: medical acuity  Evaluation/Treatment Tolerance: Patient tolerated treatment well  Medical Staff Made Aware: Yes  Strengths: Ability to acquire knowledge, Attitude of self, Premorbid level of function  Barriers to Participation: Other (Comment) (weakness, dyspnea, pain)  End of Session Communication: Bedside nurse (OT)  Assessment Comment: 57 yo male presents with STEMI s/p CABG x 3, Afib, JUAN J, bilateral foot wounds (R > L, Right post-op shoe and WBAT) and mild confusion presents with significant deconditiong, weakness, balance deficits, high fall risk and complext medical and rehab needs. Recommend moderate intensity therapy as pt would likely benefit from all 3 therapies, isn't at baseline and has had prolonged complicated hospitalization.  End of Session Patient Position: Up in chair, Alarm on  PT Plan  Inpatient/Swing Bed or Outpatient: Inpatient  PT Plan  Treatment/Interventions: Bed mobility, Transfer training, Gait training, Balance training, Neuromuscular re-education, Endurance training, Strengthening, Range of motion, Therapeutic exercise, Home exercise program, Therapeutic activity, Postural re-education, Positioning  PT Plan: Ongoing PT  PT Frequency: 3 times per week  PT Discharge Recommendations: Moderate intensity level of continued care  Equipment Recommended upon Discharge: Other (comment) (TBD at rehab)  PT Recommended Transfer Status: Assist x1, Assistive device (WW with CG/min A, RIght post-op shoe  (WBAT))  PT - OK to Discharge: Yes (PT evaluation completed & DC recs made)      General Visit Information:   PT  Visit  PT Received On: 24  Response to Previous Treatment: Patient with no complaints from previous session.  General  Reason for Referral: 57 y/o M presenting to OSH with chest pain and NSTEMI; now s/p CABG x3 on ; dx: NSTEMI, acute hypoxic respiratory insufficiency, Afib, JUAN J, altered LOC, Right foot wounds, in-hospital fall   Past Medical History Relevant to Rehab: DM, HTN, Chest discomfort, Fibromyalgia, Generalized anxiety disorder, hypothyroidism, elevated troponin, RA, anemia, Cigarette nicotine dependence  Missed Visit: No  Family/Caregiver Present: No  Co-Treatment:  (N/A)  Prior to Session Communication: Bedside nurse  Patient Position Received: Bed, 3 rail up, Alarm off, not on at start of session  Preferred Learning Style: auditory, verbal  General Comment: Pt up in chair finishing OT session, just washed up. Pt oriented x 3, followed 100% of simple 1 step commands with repetition. Pt able to ambulate short distance with WW as noted. Pt reports hip pain and foot pain, ulcer on both feet (Right > Left), RN to order smaller Medium post-op shoe.    Subjective   Precautions:  Precautions  LE Weight Bearing Status:  (WBAT Right LE)  Medical Precautions: Fall precautions, Oxygen therapy device and L/min, Cardiac precautions (WBAT R LE in surgical shoe, cardiac, mild confusion/decreased safety awareness, DM, neuropathy, RA)  Post-Surgical Precautions: Move in the Tube  Braces Applied:  (Right post-op shoe (RN to order smaller medium size))  Vital Signs:  Vital Signs  Heart Rate:  (sitting post gait: /57    O2 99%)    Objective   Pain:  Pain Assessment  Pain Assessment:  (pre: 3-4/10 bilateral feet and chest incision area; post sittin/10 bilateral feet)  Cognition:  Cognition  Overall Cognitive Status: Impaired  Arousal/Alertness: Delayed responses to stimuli  Following  Commands: Follows one step commands with repetition  Safety Judgment: Decreased awareness of need for assistance  Problem Solving: Assistance required to identify errors made  Cognition Comments: alert, engaged, distracted at times, requires frequent repetition regarding precautions  Coordination:     Postural Control:  Postural Control  Postural Control: Impaired  Posture Comment: rounded shoulders, mild upper thoracic kyphosis, slightly wider LOU, mild hip/knee flexion  Static Sitting Balance  Static Sitting-Balance Support: Feet supported, Bilateral upper extremity supported  Static Sitting-Level of Assistance: Close supervision  Static Standing Balance  Static Standing-Balance Support: Bilateral upper extremity supported (on WW)  Static Standing-Level of Assistance: Contact guard (CG/min A)  Dynamic Standing Balance  Dynamic Standing-Balance Support: Bilateral upper extremity supported (on WW)  Dynamic Standing-Balance:  (gait including turns)  Dynamic Standing-Comments: CG/min A, cues for sequencing    Activity Tolerance:  Activity Tolerance  Endurance: Tolerates 10 - 20 min exercise with multiple rests (fatigued from OT session)  Treatments:  Therapeutic Activity  Therapeutic Activity Performed: Yes  Therapeutic Activity 1: transfer training with focus on UE placement and review of MITT precautions related to transfers         Ambulation/Gait Training  Ambulation/Gait Training Performed: Yes  Ambulation/Gait Training 1  Surface 1: Level tile  Device 1: Rolling walker  Gait Support Devices:  (Right post-op shoe)  Assistance 1: Minimum assistance, Minimal verbal cues, Minimal tactile cues  Quality of Gait 1:  (flexed upper trunk, wide LOU, slow, walker a little too far forward, cues for sequencing)  Comments/Distance (ft) 1: 40  Transfers  Transfer: Yes  Transfer 1  Transfer From 1: Sit to, Stand to  Transfer to 1: Sit, Stand  Technique 1: Sit to stand, Stand to sit  Transfer Device 1: Walker  Transfer Level of  Assistance 1: Minimum assistance, Moderate verbal cues, Minimal tactile cues  Trials/Comments 1: cues for MITT/hand placement (pt had some difficulty following instructions/maintaining MITT preautions)    Stairs  Stairs: No (pt too weak & fatigued to do safely on this date)    Outcome Measures:  [facility] Basic Mobility  Turning from your back to your side while in a flat bed without using bedrails: A little  Moving from lying on your back to sitting on the side of a flat bed without using bedrails: A little  Moving to and from bed to chair (including a wheelchair): A little  Standing up from a chair using your arms (e.g. wheelchair or bedside chair): A lot  To walk in hospital room: A little  Climbing 3-5 steps with railing: Total  Basic Mobility - Total Score: 15    Education Documentation  Precautions, taught by Verito De La Rosa PT at 7/22/2024 12:14 PM.  Learner: Patient  Readiness: Acceptance  Method: Explanation, Demonstration, Teach-back  Response: Needs Reinforcement, Demonstrated Understanding, Verbalizes Understanding  Comment: PT purpose/POC, vitals, safe transfers/ambulation, progress, need for smaller Right post-op shoe (RN ordering)    Body Mechanics, taught by Verito De La Rosa PT at 7/22/2024 12:14 PM.  Learner: Patient  Readiness: Acceptance  Method: Explanation, Demonstration, Teach-back  Response: Needs Reinforcement, Demonstrated Understanding, Verbalizes Understanding  Comment: PT purpose/POC, vitals, safe transfers/ambulation, progress, need for smaller Right post-op shoe (RN ordering)    Mobility Training, taught by Verito De La Rosa PT at 7/22/2024 12:14 PM.  Learner: Patient  Readiness: Acceptance  Method: Explanation, Demonstration, Teach-back  Response: Needs Reinforcement, Demonstrated Understanding, Verbalizes Understanding  Comment: PT purpose/POC, vitals, safe transfers/ambulation, progress, need for smaller Right post-op shoe (RN ordering)    Education Comments  No comments  found.             Encounter Problems       Encounter Problems (Active)       PT Problem       Patient will complete supine to sit and sit to supine Supervision  (Not met)       Start:  07/22/24    Expected End:  08/05/24    Resolved:  07/22/24    Updated to: Patient will complete supine to/from sit with supervision (following MITT precautions without cues) using rail, HOB elevated 45 degrees    Update reason: update         Patient will perform sit<>stand transfer with LRAD, and Supervision  (Not met)       Start:  07/22/24    Expected End:  08/05/24    Resolved:  07/22/24    Updated to: Patient will perform sit<>stand and bed to/from chair transfer with WW and Right post-op shoe and supervision, no LOB, stable vitals    Update reason: update         Patient will ambulate >150' with LRAD and Supervision  (Not met)       Start:  07/22/24    Expected End:  07/26/24    Resolved:  07/22/24    Updated to: Patient will ambulate >250' with WW with SBA, stable vitals, no LOB    Update reason: update         Patient will verbalize and demonstrate Move In The Tube (MITT) Precautions independently.  (Progressing)       Start:  07/26/24    Expected End:  08/05/24            Patient will perform sit<>stand and bed to/from chair transfer with WW and Right post-op shoe and supervision, no LOB, stable vitals (Progressing)       Start:  07/26/24    Expected End:  08/05/24                Patient will complete supine to/from sit with supervision (following MITT precautions without cues) using rail, HOB elevated 45 degrees (Progressing)       Start:  07/26/24    Expected End:  08/05/24                Patient will ambulate >250' with WW with SBA, stable vitals, no LOB (Progressing)       Start:  07/26/24    Expected End:  08/05/24                   Pain - Adult

## 2024-07-23 DIAGNOSIS — Z95.1 S/P CABG X 3: ICD-10-CM

## 2024-07-23 LAB
ALBUMIN SERPL BCP-MCNC: 3.9 G/DL (ref 3.4–5)
ANION GAP SERPL CALC-SCNC: 16 MMOL/L (ref 10–20)
BUN SERPL-MCNC: 22 MG/DL (ref 6–23)
CALCIUM SERPL-MCNC: 8.4 MG/DL (ref 8.6–10.6)
CHLORIDE SERPL-SCNC: 100 MMOL/L (ref 98–107)
CO2 SERPL-SCNC: 27 MMOL/L (ref 21–32)
CREAT SERPL-MCNC: 0.85 MG/DL (ref 0.5–1.3)
EGFRCR SERPLBLD CKD-EPI 2021: >90 ML/MIN/1.73M*2
ERYTHROCYTE [DISTWIDTH] IN BLOOD BY AUTOMATED COUNT: 22.6 % (ref 11.5–14.5)
GLUCOSE BLD MANUAL STRIP-MCNC: 104 MG/DL (ref 74–99)
GLUCOSE BLD MANUAL STRIP-MCNC: 80 MG/DL (ref 74–99)
GLUCOSE BLD MANUAL STRIP-MCNC: 94 MG/DL (ref 74–99)
GLUCOSE SERPL-MCNC: 103 MG/DL (ref 74–99)
HCT VFR BLD AUTO: 31.7 % (ref 41–52)
HGB BLD-MCNC: 9.5 G/DL (ref 13.5–17.5)
MAGNESIUM SERPL-MCNC: 2.58 MG/DL (ref 1.6–2.4)
MCH RBC QN AUTO: 25.3 PG (ref 26–34)
MCHC RBC AUTO-ENTMCNC: 30 G/DL (ref 32–36)
MCV RBC AUTO: 84 FL (ref 80–100)
NRBC BLD-RTO: 0 /100 WBCS (ref 0–0)
PHOSPHATE SERPL-MCNC: 3.3 MG/DL (ref 2.5–4.9)
PLATELET # BLD AUTO: 309 X10*3/UL (ref 150–450)
POTASSIUM SERPL-SCNC: 3.7 MMOL/L (ref 3.5–5.3)
RBC # BLD AUTO: 3.76 X10*6/UL (ref 4.5–5.9)
SODIUM SERPL-SCNC: 139 MMOL/L (ref 136–145)
WBC # BLD AUTO: 10.1 X10*3/UL (ref 4.4–11.3)

## 2024-07-23 PROCEDURE — 2500000005 HC RX 250 GENERAL PHARMACY W/O HCPCS: Performed by: PHYSICIAN ASSISTANT

## 2024-07-23 PROCEDURE — 2500000004 HC RX 250 GENERAL PHARMACY W/ HCPCS (ALT 636 FOR OP/ED): Performed by: PHYSICIAN ASSISTANT

## 2024-07-23 PROCEDURE — 82947 ASSAY GLUCOSE BLOOD QUANT: CPT

## 2024-07-23 PROCEDURE — 2500000001 HC RX 250 WO HCPCS SELF ADMINISTERED DRUGS (ALT 637 FOR MEDICARE OP)

## 2024-07-23 PROCEDURE — 2500000002 HC RX 250 W HCPCS SELF ADMINISTERED DRUGS (ALT 637 FOR MEDICARE OP, ALT 636 FOR OP/ED)

## 2024-07-23 PROCEDURE — 2500000001 HC RX 250 WO HCPCS SELF ADMINISTERED DRUGS (ALT 637 FOR MEDICARE OP): Performed by: NURSE PRACTITIONER

## 2024-07-23 PROCEDURE — 84100 ASSAY OF PHOSPHORUS: CPT

## 2024-07-23 PROCEDURE — 2500000001 HC RX 250 WO HCPCS SELF ADMINISTERED DRUGS (ALT 637 FOR MEDICARE OP): Performed by: PHYSICIAN ASSISTANT

## 2024-07-23 PROCEDURE — 85027 COMPLETE CBC AUTOMATED: CPT

## 2024-07-23 PROCEDURE — 99232 SBSQ HOSP IP/OBS MODERATE 35: CPT

## 2024-07-23 PROCEDURE — 83735 ASSAY OF MAGNESIUM: CPT

## 2024-07-23 PROCEDURE — 1200000002 HC GENERAL ROOM WITH TELEMETRY DAILY

## 2024-07-23 PROCEDURE — 94660 CPAP INITIATION&MGMT: CPT

## 2024-07-23 PROCEDURE — 94640 AIRWAY INHALATION TREATMENT: CPT

## 2024-07-23 PROCEDURE — 2500000002 HC RX 250 W HCPCS SELF ADMINISTERED DRUGS (ALT 637 FOR MEDICARE OP, ALT 636 FOR OP/ED): Performed by: PHYSICIAN ASSISTANT

## 2024-07-23 PROCEDURE — S0109 METHADONE ORAL 5MG: HCPCS | Performed by: PHYSICIAN ASSISTANT

## 2024-07-23 RX ORDER — MULTIVIT-MIN/IRON FUM/FOLIC AC 7.5 MG-4
1 TABLET ORAL DAILY
Start: 2024-07-23 | End: 2024-07-26

## 2024-07-23 RX ORDER — POLYETHYLENE GLYCOL 3350 17 G/17G
17 POWDER, FOR SOLUTION ORAL DAILY PRN
Start: 2024-07-23 | End: 2024-07-26

## 2024-07-23 RX ORDER — ACETAMINOPHEN 325 MG/1
975 TABLET ORAL EVERY 6 HOURS PRN
Start: 2024-07-23 | End: 2024-07-26

## 2024-07-23 RX ORDER — METHADONE HYDROCHLORIDE 10 MG/1
77.5 TABLET ORAL EVERY 12 HOURS
Status: CANCELLED
Start: 2024-07-23

## 2024-07-23 RX ORDER — PANTOPRAZOLE SODIUM 40 MG/1
40 TABLET, DELAYED RELEASE ORAL
Start: 2024-07-24 | End: 2024-07-26

## 2024-07-23 RX ORDER — FOLIC ACID 1 MG/1
1 TABLET ORAL DAILY
Start: 2024-07-23 | End: 2024-07-26

## 2024-07-23 RX ORDER — INSULIN LISPRO 100 [IU]/ML
8 INJECTION, SOLUTION INTRAVENOUS; SUBCUTANEOUS
Start: 2024-07-23 | End: 2024-07-26

## 2024-07-23 RX ORDER — INSULIN GLARGINE 100 [IU]/ML
25 INJECTION, SOLUTION SUBCUTANEOUS EVERY 24 HOURS
Start: 2024-07-23 | End: 2024-07-26

## 2024-07-23 RX ORDER — LANOLIN ALCOHOL/MO/W.PET/CERES
100 CREAM (GRAM) TOPICAL DAILY
Start: 2024-07-23 | End: 2024-07-26

## 2024-07-23 RX ORDER — ONDANSETRON HYDROCHLORIDE 2 MG/ML
4 INJECTION, SOLUTION INTRAVENOUS EVERY 8 HOURS PRN
Status: CANCELLED
Start: 2024-07-23

## 2024-07-23 RX ORDER — ATORVASTATIN CALCIUM 80 MG/1
80 TABLET, FILM COATED ORAL NIGHTLY
Start: 2024-07-23 | End: 2024-07-26

## 2024-07-23 RX ORDER — CLOPIDOGREL BISULFATE 75 MG/1
75 TABLET ORAL DAILY
Start: 2024-07-23 | End: 2024-07-26

## 2024-07-23 RX ORDER — INSULIN LISPRO 100 [IU]/ML
0-10 INJECTION, SOLUTION INTRAVENOUS; SUBCUTANEOUS
Start: 2024-07-23 | End: 2024-07-26

## 2024-07-23 RX ORDER — DOCUSATE SODIUM 100 MG/1
100 CAPSULE, LIQUID FILLED ORAL 2 TIMES DAILY PRN
Start: 2024-07-23 | End: 2024-07-26

## 2024-07-23 RX ORDER — IRON POLYSACCHARIDE COMPLEX 150 MG
150 CAPSULE ORAL DAILY
Start: 2024-07-23 | End: 2024-07-26

## 2024-07-23 RX ORDER — METOPROLOL SUCCINATE 25 MG/1
25 TABLET, EXTENDED RELEASE ORAL DAILY
Start: 2024-07-23 | End: 2024-07-26

## 2024-07-23 ASSESSMENT — PAIN - FUNCTIONAL ASSESSMENT
PAIN_FUNCTIONAL_ASSESSMENT: 0-10
PAIN_FUNCTIONAL_ASSESSMENT: 0-10

## 2024-07-23 ASSESSMENT — PAIN SCALES - GENERAL
PAINLEVEL_OUTOF10: 5 - MODERATE PAIN
PAINLEVEL_OUTOF10: 2

## 2024-07-23 ASSESSMENT — PAIN DESCRIPTION - DESCRIPTORS: DESCRIPTORS: SORE

## 2024-07-23 NOTE — SIGNIFICANT EVENT
Rapid Response RN Note    RADAR score 7 due to the following VS: T 35.2 °C; ; RR 18; BP 98/61; SPO2 95%.     Reviewed above VS with bedside RN via phone.  Vital signs within patient's current trends.  No acute change in condition.  No interventions by rapid response team indicated at this time.    Staff to page rapid response for any concerns or acute change in condition/VS. Ankur Sandoval RN.

## 2024-07-23 NOTE — CARE PLAN
Patient authorized for admission today to [facility].  Transport set for 2:00 pm via [facility].  Nurse to nurse report number is [phone].  Aundrea Dailey RN, BSN, [facility]

## 2024-07-23 NOTE — NURSING NOTE
Mr. Zepeda s/p CABG x3 on 7/9 ready for discharge to facility today. He is anxious about facility having both methadone and CPAP ready for him upon arrival.     Insulin regimen will be be basal, bolus and SSI as outlined below. He has no issues or questions with insulin administration at this time.    restart home metformin 500mg BID  - recommend continuing glargine 25u daily       If NPO: reduce to 20 units  - recommend decreasing lispro from 12u to 8u with meals plus scale       If NPO: hold  - continue lispro corrective scale #2 with meals, adjust to q4h if NPO   = 0u  151-200 = 2u  201-250 = 4u  251-300 = 6u  301-350 = 8u  351-400 = 10u    Time spent: 15 minutes

## 2024-07-23 NOTE — SIGNIFICANT EVENT
One Ventricular wires cut at skin level due to surgeon preference.  Patient instructed to notify radiology of retained epicardial wires prior to any MRI procedure, and to notify Dr Carey of any visible wires or s/s infection.

## 2024-07-23 NOTE — PROGRESS NOTES
"CARDIAC SURGERY DAILY PROGRESS NOTE    Mr. Zepeda is a 58-year-old male with a PMH of RA (on chronic prednisone and hydroxychloroquine), type 2 DM (on insulin), COPD, hypothyroidism, 40-pack-year smoking history, chronic methadone use, peripheral neuropathy, and unhealed diabetic foot ulcers s/p right foot partial amputation (7 months ago - follows with wound clinic as outpatient) and most recently NSTEMI. He is now s/p CABGx3 with Dr. Carey on 7/9.     OPERATION/PROCEDURE: CABGx3 with Dr. Carey on 7/9.   - Median sternotomy  - Attempt off-pump surgery  - On pump coronary artery bypass grafting x 3  --- LIMA to LAD  --- MANUELITO( Y'ed off LIMA) to obtuse marginal  --- SVG to right PLV (terminal branch of RCA)  - Endoscopic harvesting of the right greater saphenous vein  - Sternal plating    History of NSTEMI. Is now status post CABGx3 Pre: Mildly decreased LV function, EF 40-45%. Post: normal LV function.     CTICU Course: Post operative course c/b acute hypoxic respiratory insufficiency requiring high flow nasal cannula, now improved.Afib with RVR to the 130s requiring bolus and amio drip. Now in NSR in 60-70's with some bradycardia to high 40's-> PO amio dc'd.  JUAN J post op with increasing CR to 2.08 (diuresis was held) now 1.25.  Pulmonary edema still noted on xray. ? Fentanyl use while inpatient brought from family members     Transferred to T3 on 7/16/24    =====================================    Objective:   - No acute events overnight.     SUBJECTIVE:  No complaints.       Objective   /62 (BP Location: Left arm, Patient Position: Lying)   Pulse 67   Temp 36 °C (96.8 °F) (Temporal)   Resp 18   Ht 1.676 m (5' 6\")   Wt 78.6 kg (173 lb 3.2 oz)   SpO2 100%   BMI 27.96 kg/m²   0-10 (Numeric) Pain Score: 0 - No pain   3 Day Weight Change: Unable to Calculate    Heart Rate:  []   Temp:  [35.2 °C (95.4 °F)-36.6 °C (97.9 °F)]   Resp:  [16-19]   BP: ()/(57-69)   Weight:  [78.6 kg (173 lb " 3.2 oz)]   SpO2:  [93 %-100 %]      Intake and Output    Intake/Output Summary (Last 24 hours) at 7/23/2024 0959  Last data filed at 7/23/2024 0903  Gross per 24 hour   Intake 360 ml   Output 550 ml   Net -190 ml     Physical Exam  Vitals and nursing note reviewed.   Constitutional:       General: He is not in acute distress.     Appearance: Normal appearance. He is not ill-appearing.      Comments: Patient alert and awake sitting up in bed, in no acute distress.    HENT:      Head: Atraumatic.      Mouth/Throat:      Pharynx: Oropharynx is clear.   Eyes:      Conjunctiva/sclera: Conjunctivae normal.      Pupils: Pupils are equal, round, and reactive to light.   Neck:      Comments: Trachea midline   Cardiovascular:      Rate and Rhythm: Normal rate. Rhythm irregular.      Pulses: Normal pulses.      Heart sounds: Normal heart sounds. No murmur heard.     No gallop.      Comments: TELE: SB with 1st degree AV Block 60's ot 70's   +2 Equal and even pulses in all extremities   Wires CUT 7/23.   Pulmonary:      Effort: Pulmonary effort is normal. No respiratory distress.      Breath sounds: No stridor. Wheezing present.      Comments: Diminished breath sound bilaterally with occasional expiratory wheezes   Sternum stable.   Abdominal:      General: Bowel sounds are normal. There is no distension.      Palpations: Abdomen is soft.      Tenderness: There is no abdominal tenderness.      Comments: BM (+) 7/17/24   Genitourinary:     Comments: Voids independently  Musculoskeletal:      Cervical back: Neck supple.      Right lower leg: No edema.      Left lower leg: No edema.      Comments: WATERS     Skin:     General: Skin is warm and dry.      Capillary Refill: Capillary refill takes less than 2 seconds.      Coloration: Skin is not jaundiced or pale.      Findings: No bruising.      Comments: midsternal chest incision C/D/I, well approximated, no s/s infection  SVG  SIMON well approx no s/sx of infections      Neurological:       General: No focal deficit present.      Mental Status: He is alert and oriented to person, place, and time.   Psychiatric:         Mood and Affect: Mood normal.         Behavior: Behavior normal.       Medications  Scheduled medications  acetaminophen, 975 mg, oral, q6h  aspirin, 81 mg, oral, Daily  atorvastatin, 80 mg, oral, Nightly  clopidogrel, 75 mg, oral, Daily  DULoxetine, 60 mg, oral, Daily  folic acid, 1 mg, oral, Daily  heparin, 5,000 Units, subcutaneous, q8h  hydroxychloroquine, 200 mg, oral, BID  insulin glargine, 25 Units, subcutaneous, q24h  insulin lispro, 0-10 Units, subcutaneous, TID AC  insulin lispro, 8 Units, subcutaneous, TID AC  iron polysaccharides, 150 mg, oral, Daily  levothyroxine, 50 mcg, oral, Daily  metFORMIN, 500 mg, oral, BID  methadone, 77.5 mg, oral, q12h  metoprolol tartrate, 12.5 mg, oral, BID  multivitamin with minerals, 1 tablet, oral, Daily  oxygen, , inhalation, Continuous - Inhalation  pantoprazole, 40 mg, oral, Daily before breakfast  polyethylene glycol, 17 g, oral, Daily  pregabalin, 200 mg, oral, Daily  sennosides-docusate sodium, 2 tablet, oral, BID  thiamine, 100 mg, oral, Daily  tiotropium, 2 puff, inhalation, Daily    Continuous medications   PRN medications  PRN medications: albuterol, benzocaine-menthol, dextrose, dextrose **OR** glucagon, naloxone, [DISCONTINUED] ondansetron **OR** ondansetron, [Held by provider] oxyCODONE, oxygen, sodium chloride    Labs  Results for orders placed or performed during the hospital encounter of 07/01/24 (from the past 24 hour(s))   CBC   Result Value Ref Range    WBC 10.4 4.4 - 11.3 x10*3/uL    nRBC 0.0 0.0 - 0.0 /100 WBCs    RBC 3.74 (L) 4.50 - 5.90 x10*6/uL    Hemoglobin 9.5 (L) 13.5 - 17.5 g/dL    Hematocrit 31.8 (L) 41.0 - 52.0 %    MCV 85 80 - 100 fL    MCH 25.4 (L) 26.0 - 34.0 pg    MCHC 29.9 (L) 32.0 - 36.0 g/dL    RDW 23.2 (H) 11.5 - 14.5 %    Platelets 314 150 - 450 x10*3/uL   Renal Function Panel   Result Value Ref  Range    Glucose 135 (H) 74 - 99 mg/dL    Sodium 140 136 - 145 mmol/L    Potassium 4.2 3.5 - 5.3 mmol/L    Chloride 98 98 - 107 mmol/L    Bicarbonate 30 21 - 32 mmol/L    Anion Gap 16 10 - 20 mmol/L    Urea Nitrogen 27 (H) 6 - 23 mg/dL    Creatinine 0.84 0.50 - 1.30 mg/dL    eGFR >90 >60 mL/min/1.73m*2    Calcium 8.7 8.6 - 10.6 mg/dL    Phosphorus 3.7 2.5 - 4.9 mg/dL    Albumin 4.0 3.4 - 5.0 g/dL   Magnesium   Result Value Ref Range    Magnesium 2.72 (H) 1.60 - 2.40 mg/dL   POCT GLUCOSE   Result Value Ref Range    POCT Glucose 144 (H) 74 - 99 mg/dL   POCT GLUCOSE   Result Value Ref Range    POCT Glucose 188 (H) 74 - 99 mg/dL   POCT GLUCOSE   Result Value Ref Range    POCT Glucose 104 (H) 74 - 99 mg/dL       IMAGES  I have personally reviewed the following test result(s):   XR CHEST 2 VIEWS; 7/17/2024 6:54 am  CARDIOMEDIASTINAL SILHOUETTE:  Patient is status post median sternotomy.  LUNGS:  Continued perihilar interstitial edema with right basilar  atelectasis. Right-sided effusion without pneumothorax.  ABDOMEN:  No remarkable upper abdominal findings.  BONES:  No acute osseous changes.      IMPRESSION:  1.  Slight interval improvement in lung aeration status post median  sternotomy. Residual edema and right basilar atelectasis.      Signed by: Santo Goldman 7/18/2024 7:21 AM  Dictation workstation:   [id number]    Pelvis and right hip dated 7/18/2024.     FINDINGS:  No fracture or dislocation is evident. Surgical and degenerative  changes seen of the spine. Minimal degenerative changes seen of the  hips. No soft tissue gas or radiopaque foreign body is evident.      IMPRESSION:  No osseous injury is evident.      Signed by: Sandeep Andrade 7/18/2024 1:25 PM  Dictation workstation:   [id number]    IMPRESSION & PLAN:  POD # 14 s/p CABGx3 with Dr. Carey on 7/9   - Increase activity/ ambulation; PT/OT  - Cardiac rehab referral placed  - Continue cardiac meds: ASA, BB, statin and Plavix for NSTEMI  - Chronic  methadone use->  continue methadone 77.5mg q12 hrs  --- Patient will be discharged on home dose of methadone as per patient's PCP  - 2v CXR 7/17/24 reviewed with continued perihilar edema improving  - Postop echo 7/15/24 LVEF 40%-> similar to pre-op echo   - 7/23: Epicardial wires CUT; per surgeon preference   - Tele until discharge  - Optimize nutrition and electrolytes    Rhythm   pAfib with RVR- initially on Amiodarone dc'd 7/16 due to bradycardia   - Tele: SR 60's to 70's   -  Continue BB:metoprolol [id number] BID; will convert to succinate at discharge    - continue telemetry until discharge     Acute Blood Loss Anemia - stable  Recent Labs     07/22/24  1218 07/19/24  1253 07/18/24  0718 07/17/24  0939 07/16/24  0035 07/15/24  1543 07/15/24  0432   HGB 9.5* 8.7* 9.0* 9.3* 8.4* 8.5* 7.4*   HCT 31.8* 27.7* 28.9* 29.8* 25.0* 26.1* 22.2*   - MV, PO Iron x1mo  - Daily labs, transfuse as indicated      JUAN J post op CR 2.08-no history of renal disease, baseline creatinine 0.7 - Resolved  Creatinine   Date Value Ref Range Status   07/22/2024 0.84 0.50 - 1.30 mg/dL Final     Volume/Electrolyte Status: Preop wt Weight: 85.3 kg (188 lb)   Fluid overload by xray evaluation  Vitals:    07/23/24 0629   Weight: 78.6 kg (173 lb 3.2 oz)   - Weight: 78.6, 86, 83.5, 85.3, 86.3kg   - Lasix infusion stopped 7/16-holding diuresis as appears intervascularly depleted   - seems to be at base weight; chest xray with congestion still   - 7/18 Lasix IV x1 to eval response  - Replete electrolytes for hypokalemia / hypomagnesemia / hypophosphatemia as needed   -renally dose all medications  -avoid nephrotoxic drugs  - Daily weights/strict I&Os  - Daily RFP    H/o peripheral neuropathy and chronic methadone use  - Acute post operative pain currently well controlled on home methadone and scheduled Tylenol.   - c/w thiamine, folate, home pregabalin and duloxetine  - c/w home Plaquenil   - c/w methadone back to BID home dose     Acute  "hypoxic pulmonary insufficiency requiring supplemental oxygen; - Resolved   Hx of COPD: home meds:  Albuterol PRN  - patient completed stress dose steroids for COPD exacerbation   - c/w IS, bronchial hygiene: IS & Ez Pap   - continue with albuterol PRN     MARIAELENA: Patient reports having a sleep study \"many\" years ago and was told he has MARIAELENA. He did not follow up and thus does not have CPAP unit at home.  - Wean O2 as tolerated - 3L NC with nocturnal CPAP   - Continue inpatient CPAP while hospitalized; patient reports that he feels so much better with CPAP  - Follow up as outpatient for formal sleep study       Stress Induced Hyperglycemia 2/2 steroids Newly Diagnosed DM type II   Lab Results   Component Value Date    HGBA1C 8.5 (H) 05/15/2024     Results from last 7 days   Lab Units 07/23/24  0735 07/22/24  1812 07/22/24  1232 07/22/24  0736 07/21/24  2134 07/21/24  1616 07/21/24  1127   POCT GLUCOSE mg/dL 104* 188* 144* 168* 106* 163* 147*   - diabetic diet 60 grams CHO   - Hypoglycemic protocol   - Endo consulted/following, appreciate final recs (7/22):   --- Continue Insuline Glargine 25 units daily   --- Continue Lispro 8 units TID with meals   --- Continue Lispro premeal corrective sliding scale #2  --- Metformin 500 mg BID   --- Follow up with endocrine as an outpatient     Hypothyroidism: Home meds: Synthroid 50mcg daily   Lab Results   Component Value Date    TSH 2.67 07/02/2024   - continue home levothyroxine  - follow up with PCP or endocrinology as an outpatient as scheduled    Skin    - Unhealed diabetic foot ulcers s/p R foot partial amputation. Wound care following.    VTE Prophylaxis: SCDs/TEDs, ambulation, SQ heparin  Code Status: Full Code    Dispo  - PT/OT recs changed from home to moderate intensity; SNF vs. Acute Rehab (TCC following and providing information on possible placements)  - Anticipate discharge today (7/23) to [facility] pending transport.   - Will continue to assess discharge " rosalia Hanson, APRN-CNP  Cardiac Surgery [facility]  [facility]  Team Phone [phone]    7/23/2024  9:59 AM

## 2024-07-24 LAB
ALBUMIN SERPL BCP-MCNC: 4.1 G/DL (ref 3.4–5)
ANION GAP SERPL CALC-SCNC: 12 MMOL/L (ref 10–20)
BUN SERPL-MCNC: 21 MG/DL (ref 6–23)
CALCIUM SERPL-MCNC: 9.1 MG/DL (ref 8.6–10.6)
CHLORIDE SERPL-SCNC: 102 MMOL/L (ref 98–107)
CO2 SERPL-SCNC: 28 MMOL/L (ref 21–32)
CREAT SERPL-MCNC: 0.93 MG/DL (ref 0.5–1.3)
EGFRCR SERPLBLD CKD-EPI 2021: >90 ML/MIN/1.73M*2
ERYTHROCYTE [DISTWIDTH] IN BLOOD BY AUTOMATED COUNT: 22.8 % (ref 11.5–14.5)
GLUCOSE BLD MANUAL STRIP-MCNC: 100 MG/DL (ref 74–99)
GLUCOSE BLD MANUAL STRIP-MCNC: 112 MG/DL (ref 74–99)
GLUCOSE BLD MANUAL STRIP-MCNC: 148 MG/DL (ref 74–99)
GLUCOSE BLD MANUAL STRIP-MCNC: 98 MG/DL (ref 74–99)
GLUCOSE SERPL-MCNC: 112 MG/DL (ref 74–99)
HCT VFR BLD AUTO: 31.8 % (ref 41–52)
HGB BLD-MCNC: 9.4 G/DL (ref 13.5–17.5)
MAGNESIUM SERPL-MCNC: 2.51 MG/DL (ref 1.6–2.4)
MCH RBC QN AUTO: 24.9 PG (ref 26–34)
MCHC RBC AUTO-ENTMCNC: 29.6 G/DL (ref 32–36)
MCV RBC AUTO: 84 FL (ref 80–100)
NRBC BLD-RTO: 0 /100 WBCS (ref 0–0)
PHOSPHATE SERPL-MCNC: 3.7 MG/DL (ref 2.5–4.9)
PLATELET # BLD AUTO: 333 X10*3/UL (ref 150–450)
POTASSIUM SERPL-SCNC: 4.2 MMOL/L (ref 3.5–5.3)
RBC # BLD AUTO: 3.77 X10*6/UL (ref 4.5–5.9)
SODIUM SERPL-SCNC: 138 MMOL/L (ref 136–145)
WBC # BLD AUTO: 9.3 X10*3/UL (ref 4.4–11.3)

## 2024-07-24 PROCEDURE — 2500000001 HC RX 250 WO HCPCS SELF ADMINISTERED DRUGS (ALT 637 FOR MEDICARE OP)

## 2024-07-24 PROCEDURE — 2500000001 HC RX 250 WO HCPCS SELF ADMINISTERED DRUGS (ALT 637 FOR MEDICARE OP): Performed by: PHYSICIAN ASSISTANT

## 2024-07-24 PROCEDURE — S0109 METHADONE ORAL 5MG: HCPCS | Performed by: PHYSICIAN ASSISTANT

## 2024-07-24 PROCEDURE — 84100 ASSAY OF PHOSPHORUS: CPT

## 2024-07-24 PROCEDURE — 2500000002 HC RX 250 W HCPCS SELF ADMINISTERED DRUGS (ALT 637 FOR MEDICARE OP, ALT 636 FOR OP/ED): Performed by: PHYSICIAN ASSISTANT

## 2024-07-24 PROCEDURE — 2500000002 HC RX 250 W HCPCS SELF ADMINISTERED DRUGS (ALT 637 FOR MEDICARE OP, ALT 636 FOR OP/ED)

## 2024-07-24 PROCEDURE — 1200000002 HC GENERAL ROOM WITH TELEMETRY DAILY

## 2024-07-24 PROCEDURE — 2500000004 HC RX 250 GENERAL PHARMACY W/ HCPCS (ALT 636 FOR OP/ED)

## 2024-07-24 PROCEDURE — 85027 COMPLETE CBC AUTOMATED: CPT

## 2024-07-24 PROCEDURE — 99232 SBSQ HOSP IP/OBS MODERATE 35: CPT

## 2024-07-24 PROCEDURE — 82947 ASSAY GLUCOSE BLOOD QUANT: CPT

## 2024-07-24 PROCEDURE — 2500000001 HC RX 250 WO HCPCS SELF ADMINISTERED DRUGS (ALT 637 FOR MEDICARE OP): Performed by: NURSE PRACTITIONER

## 2024-07-24 PROCEDURE — 83735 ASSAY OF MAGNESIUM: CPT

## 2024-07-24 PROCEDURE — 2500000004 HC RX 250 GENERAL PHARMACY W/ HCPCS (ALT 636 FOR OP/ED): Performed by: PHYSICIAN ASSISTANT

## 2024-07-24 PROCEDURE — 94640 AIRWAY INHALATION TREATMENT: CPT

## 2024-07-24 PROCEDURE — 36415 COLL VENOUS BLD VENIPUNCTURE: CPT

## 2024-07-24 PROCEDURE — 2500000005 HC RX 250 GENERAL PHARMACY W/O HCPCS: Performed by: PHYSICIAN ASSISTANT

## 2024-07-24 RX ORDER — FUROSEMIDE 10 MG/ML
20 INJECTION INTRAMUSCULAR; INTRAVENOUS ONCE
Status: COMPLETED | OUTPATIENT
Start: 2024-07-24 | End: 2024-07-24

## 2024-07-24 ASSESSMENT — PAIN - FUNCTIONAL ASSESSMENT
PAIN_FUNCTIONAL_ASSESSMENT: 0-10
PAIN_FUNCTIONAL_ASSESSMENT: 0-10

## 2024-07-24 ASSESSMENT — PAIN SCALES - GENERAL
PAINLEVEL_OUTOF10: 5 - MODERATE PAIN
PAINLEVEL_OUTOF10: 4
PAINLEVEL_OUTOF10: 2

## 2024-07-24 ASSESSMENT — PAIN DESCRIPTION - DESCRIPTORS: DESCRIPTORS: SORE

## 2024-07-24 ASSESSMENT — PAIN DESCRIPTION - ORIENTATION: ORIENTATION: RIGHT;LEFT

## 2024-07-24 NOTE — PROGRESS NOTES
Spoke to patient about the need for a prescription for Methadone for [facility]. Informed patient of 3 other SNF's near him that could provide the Methadone he needs but 2 are unable to accept. Awaiting response from [facility]. Pt would like to discharge to a facility near his home. Pt has concerns about discharging to home including his recent fall, his inability to get his medications, and his need for a new CPAP machine. I will convey those to the cardiac surgery team.  Michelle Perez RN

## 2024-07-24 NOTE — SIGNIFICANT EVENT
Received STAT consult for opioid addiction    Spoke with team, pt has been on methadone through a methadone clinic where he is dosed 2x/day (confirmed by clinic). Pt s/p CABG and plan for discharge to SNF, however, SNF stating need rx for methadone. Since methadone is for OUD, it can only be dispensed through the methadone clinic and it unfortunately cannot legally be written for outpatient rx by any speciality.     Agreed for consult to be cancelled, please call back if further questions/concerns [phone]    Vera Rothman MD  [facility] Psych[facility] Attending

## 2024-07-24 NOTE — CARE PLAN
Problem: Pain - Adult  Goal: Verbalizes/displays adequate comfort level or baseline comfort level  Outcome: Progressing     Problem: Safety - Adult  Goal: Free from fall injury  Outcome: Progressing     Problem: Discharge Planning  Goal: Discharge to home or other facility with appropriate resources  Outcome: Progressing     Problem: Chronic Conditions and Co-morbidities  Goal: Patient's chronic conditions and co-morbidity symptoms are monitored and maintained or improved  Outcome: Progressing     Problem: ACS/CP/NSTEMI/STEMI  Goal: Chest pain managed (free from pain or at acceptable level)  Outcome: Progressing  Goal: Lab values return to normal range  Outcome: Progressing  Goal: Promote self management  Outcome: Progressing  Goal: Serial ECG will return to baseline  Outcome: Progressing  Goal: Verbalize understanding of procedures/devices  Outcome: Progressing  Goal: Wean vasopressors/achieve hemodynamic stability  Outcome: Progressing     Problem: Arrythmia/Dysrhythmia  Goal: Lab values return to normal range  Outcome: Progressing  Goal: No evidence of post procedure complications  Outcome: Progressing  Goal: Promote self management  Outcome: Progressing  Goal: Serial ECG will return to baseline  Outcome: Progressing  Goal: Verbalize understanding of procedures/devices  Outcome: Progressing  Goal: Vital signs return to baseline  Outcome: Progressing  Goal: Care and maintenance of device (specify)  Outcome: Progressing     Problem: Cardiac catheterization  Goal: Free from dysrhythmias  Outcome: Progressing  Goal: Free from pain  Outcome: Progressing  Goal: No evidence of post procedure complications  Outcome: Progressing  Goal: Promote self management  Outcome: Progressing  Goal: Verbalize understanding of procedure  Outcome: Progressing  Goal: Care and maintenance of device (specify)  Outcome: Progressing     Problem: Hypertensive Emergency/Crisis  Goal: Blood pressure gradually reduced to goal range  Outcome:  Progressing  Goal: Free from signs of organ damage  Outcome: Progressing  Goal: Lab values return to normal range  Outcome: Progressing  Goal: Promote self management  Outcome: Progressing     Problem: Skin  Goal: Decreased wound size/increased tissue granulation at next dressing change  Outcome: Progressing  Goal: Participates in plan/prevention/treatment measures  Outcome: Progressing  Goal: Prevent/manage excess moisture  Outcome: Progressing  Goal: Prevent/minimize sheer/friction injuries  Outcome: Progressing  Goal: Promote/optimize nutrition  Outcome: Progressing  Goal: Promote skin healing  Outcome: Progressing     Problem: Diabetes  Goal: Achieve decreasing blood glucose levels by end of shift  Outcome: Progressing  Goal: Increase stability of blood glucose readings by end of shift  Outcome: Progressing  Goal: Decrease in ketones present in urine by end of shift  Outcome: Progressing  Goal: Maintain electrolyte levels within acceptable range throughout shift  Outcome: Progressing  Goal: Maintain glucose levels >70mg/dl to <250mg/dl throughout shift  Outcome: Progressing  Goal: No changes in neurological exam by end of shift  Outcome: Progressing  Goal: Learn about and adhere to nutrition recommendations by end of shift  Outcome: Progressing  Goal: Vital signs within normal range for age by end of shift  Outcome: Progressing  Goal: Increase self care and/or family involovement by end of shift  Outcome: Progressing  Goal: Receive DSME education by end of shift  Outcome: Progressing     Problem: Knowledge Deficit  Goal: Patient/family/caregiver demonstrates understanding of disease process, treatment plan, medications, and discharge instructions  Outcome: Progressing     Problem: Mechanical Ventilation  Goal: Patient Will Maintain Patent Airway  Outcome: Progressing  Goal: Oral health is maintained or improved  Outcome: Progressing  Goal: Ability to express needs and understand communication  Outcome:  Progressing  Goal: Mobility/activity is maintained at optimum level for patient  Outcome: Progressing     Problem: Pain  Goal: Takes deep breaths with improved pain control throughout the shift  Outcome: Progressing  Goal: Turns in bed with improved pain control throughout the shift  Outcome: Progressing  Goal: Walks with improved pain control throughout the shift  Outcome: Progressing  Goal: Performs ADL's with improved pain control throughout shift  Outcome: Progressing  Goal: Participates in PT with improved pain control throughout the shift  Outcome: Progressing  Goal: Free from opioid side effects throughout the shift  Outcome: Progressing  Goal: Free from acute confusion related to pain meds throughout the shift  Outcome: Progressing     Problem: Fall/Injury  Goal: Not fall by end of shift  Outcome: Progressing  Goal: Be free from injury by end of the shift  Outcome: Progressing  Goal: Verbalize understanding of personal risk factors for fall in the hospital  Outcome: Progressing  Goal: Verbalize understanding of risk factor reduction measures to prevent injury from fall in the home  Outcome: Progressing  Goal: Use assistive devices by end of the shift  Outcome: Progressing  Goal: Pace activities to prevent fatigue by end of the shift  Outcome: Progressing

## 2024-07-24 NOTE — PROGRESS NOTES
Physical Therapy                 Therapy Communication Note    Patient Name: Kael Zepeda  MRN: [id number]  Today's Date: 7/24/2024     Discipline: Physical Therapy    Missed Visit Reason: Missed Visit Reason: Patient refused (Pt stated he didn't sleep well and had a sore throat, stated he's overall not in a good mood. (RN Shannan shahid))    Missed Time: Attempt    Comment: 1227 pm

## 2024-07-24 NOTE — PROGRESS NOTES
"CARDIAC SURGERY DAILY PROGRESS NOTE    Mr. Zepeda is a 58-year-old male with a PMH of RA (on chronic prednisone and hydroxychloroquine), type 2 DM (on insulin), COPD, hypothyroidism, 40-pack-year smoking history, chronic methadone use, peripheral neuropathy, and unhealed diabetic foot ulcers s/p right foot partial amputation (7 months ago - follows with wound clinic as outpatient) and most recently NSTEMI. He is now s/p CABGx3 with Dr. Carey on 7/9.     OPERATION/PROCEDURE: CABGx3 with Dr. Carey on 7/9.   - Median sternotomy  - Attempt off-pump surgery  - On pump coronary artery bypass grafting x 3  --- LIMA to LAD  --- MANUELITO( Y'ed off LIMA) to obtuse marginal  --- SVG to right PLV (terminal branch of RCA)  - Endoscopic harvesting of the right greater saphenous vein  - Sternal plating    History of NSTEMI. Is now status post CABGx3 Pre: Mildly decreased LV function, EF 40-45%. Post: normal LV function.     CTICU Course: Post operative course c/b acute hypoxic respiratory insufficiency requiring high flow nasal cannula, now improved.Afib with RVR to the 130s requiring bolus and amio drip. Now in NSR in 60-70's with some bradycardia to high 40's-> PO amio dc'd.  JUAN J post op with increasing CR to 2.08 (diuresis was held) now 1.25.  Pulmonary edema still noted on xray. ? Fentanyl use while inpatient brought from family members     Transferred to T3 on 7/16/24    =====================================    Objective:   - No acute events overnight.   - Problems with discharge due to difficulty finding facility that will write for an manage methadone for opioid addiction recovery.      SUBJECTIVE:  No complaints.       Objective   /63 (BP Location: Right arm, Patient Position: Lying)   Pulse 65   Temp 35.9 °C (96.6 °F) (Temporal)   Resp 18   Ht 1.676 m (5' 6\")   Wt 83 kg (182 lb 14.4 oz)   SpO2 98%   BMI 29.52 kg/m²   0-10 (Numeric) Pain Score: 2   3 Day Weight Change: -0.166 kg (-5.9 oz) per day    Heart " Rate:  [62-72]   Temp:  [35.9 °C (96.6 °F)-36.4 °C (97.5 °F)]   Resp:  [17-18]   BP: (107-119)/(62-68)   Weight:  [83 kg (182 lb 14.4 oz)]   SpO2:  [96 %-100 %]      Intake and Output  No intake or output data in the 24 hours ending 07/24/24 1334    Physical Exam  Vitals and nursing note reviewed.   Constitutional:       General: He is not in acute distress.     Appearance: Normal appearance. He is not ill-appearing.      Comments: Patient alert and awake sitting up in bed, in no acute distress.    HENT:      Head: Atraumatic.      Mouth/Throat:      Pharynx: Oropharynx is clear.   Eyes:      Conjunctiva/sclera: Conjunctivae normal.      Pupils: Pupils are equal, round, and reactive to light.   Neck:      Comments: Trachea midline   Cardiovascular:      Rate and Rhythm: Normal rate. Rhythm irregular.      Pulses: Normal pulses.      Heart sounds: Normal heart sounds. No murmur heard.     No gallop.      Comments: TELE: SB with 1st degree AV Block 60's ot 70's   +2 Equal and even pulses in all extremities   Wires CUT 7/23.   Pulmonary:      Effort: Pulmonary effort is normal. No respiratory distress.      Breath sounds: No stridor. Wheezing present.      Comments: Diminished breath sound bilaterally with occasional expiratory wheezes   Sternum stable.   Abdominal:      General: Bowel sounds are normal. There is no distension.      Palpations: Abdomen is soft.      Tenderness: There is no abdominal tenderness.      Comments: BM (+) 7/17/24   Genitourinary:     Comments: Voids independently  Musculoskeletal:      Cervical back: Neck supple.      Right lower leg: No edema.      Left lower leg: No edema.      Comments: WATERS     Skin:     General: Skin is warm and dry.      Capillary Refill: Capillary refill takes less than 2 seconds.      Coloration: Skin is not jaundiced or pale.      Findings: No bruising.      Comments: midsternal chest incision C/D/I, well approximated, no s/s infection  SVG  SIMON well approx no s/sx  of infections      Neurological:      General: No focal deficit present.      Mental Status: He is alert and oriented to person, place, and time.   Psychiatric:         Mood and Affect: Mood normal.         Behavior: Behavior normal.       Medications  Scheduled medications  acetaminophen, 975 mg, oral, q6h  aspirin, 81 mg, oral, Daily  atorvastatin, 80 mg, oral, Nightly  clopidogrel, 75 mg, oral, Daily  DULoxetine, 60 mg, oral, Daily  folic acid, 1 mg, oral, Daily  heparin, 5,000 Units, subcutaneous, q8h  hydroxychloroquine, 200 mg, oral, BID  insulin glargine, 25 Units, subcutaneous, q24h  insulin lispro, 0-10 Units, subcutaneous, TID AC  insulin lispro, 8 Units, subcutaneous, TID AC  iron polysaccharides, 150 mg, oral, Daily  levothyroxine, 50 mcg, oral, Daily  metFORMIN, 500 mg, oral, BID  methadone, 77.5 mg, oral, q12h  metoprolol tartrate, 12.5 mg, oral, BID  multivitamin with minerals, 1 tablet, oral, Daily  oxygen, , inhalation, Continuous - Inhalation  pantoprazole, 40 mg, oral, Daily before breakfast  polyethylene glycol, 17 g, oral, Daily  pregabalin, 200 mg, oral, Daily  sennosides-docusate sodium, 2 tablet, oral, BID  thiamine, 100 mg, oral, Daily  tiotropium, 2 puff, inhalation, Daily    Continuous medications   PRN medications  PRN medications: albuterol, benzocaine-menthol, dextrose, dextrose **OR** glucagon, naloxone, [DISCONTINUED] ondansetron **OR** ondansetron, [Held by provider] oxyCODONE, oxygen, sodium chloride    Labs  Results for orders placed or performed during the hospital encounter of 07/01/24 (from the past 24 hour(s))   POCT GLUCOSE   Result Value Ref Range    POCT Glucose 80 74 - 99 mg/dL   CBC   Result Value Ref Range    WBC 9.3 4.4 - 11.3 x10*3/uL    nRBC 0.0 0.0 - 0.0 /100 WBCs    RBC 3.77 (L) 4.50 - 5.90 x10*6/uL    Hemoglobin 9.4 (L) 13.5 - 17.5 g/dL    Hematocrit 31.8 (L) 41.0 - 52.0 %    MCV 84 80 - 100 fL    MCH 24.9 (L) 26.0 - 34.0 pg    MCHC 29.6 (L) 32.0 - 36.0 g/dL    RDW  22.8 (H) 11.5 - 14.5 %    Platelets 333 150 - 450 x10*3/uL   Renal function panel   Result Value Ref Range    Glucose 112 (H) 74 - 99 mg/dL    Sodium 138 136 - 145 mmol/L    Potassium 4.2 3.5 - 5.3 mmol/L    Chloride 102 98 - 107 mmol/L    Bicarbonate 28 21 - 32 mmol/L    Anion Gap 12 10 - 20 mmol/L    Urea Nitrogen 21 6 - 23 mg/dL    Creatinine 0.93 0.50 - 1.30 mg/dL    eGFR >90 >60 mL/min/1.73m*2    Calcium 9.1 8.6 - 10.6 mg/dL    Phosphorus 3.7 2.5 - 4.9 mg/dL    Albumin 4.1 3.4 - 5.0 g/dL   Magnesium   Result Value Ref Range    Magnesium 2.51 (H) 1.60 - 2.40 mg/dL   POCT GLUCOSE   Result Value Ref Range    POCT Glucose 112 (H) 74 - 99 mg/dL       IMAGES  I have personally reviewed the following test result(s):   XR CHEST 2 VIEWS; 7/17/2024 6:54 am  CARDIOMEDIASTINAL SILHOUETTE:  Patient is status post median sternotomy.  LUNGS:  Continued perihilar interstitial edema with right basilar  atelectasis. Right-sided effusion without pneumothorax.  ABDOMEN:  No remarkable upper abdominal findings.  BONES:  No acute osseous changes.      IMPRESSION:  1.  Slight interval improvement in lung aeration status post median  sternotomy. Residual edema and right basilar atelectasis.      Signed by: Santo Goldman 7/18/2024 7:21 AM  Dictation workstation:   [id number]    Pelvis and right hip dated 7/18/2024.     FINDINGS:  No fracture or dislocation is evident. Surgical and degenerative  changes seen of the spine. Minimal degenerative changes seen of the  hips. No soft tissue gas or radiopaque foreign body is evident.      IMPRESSION:  No osseous injury is evident.      Signed by: Sandeep Andrade 7/18/2024 1:25 PM  Dictation workstation:   [id number]    IMPRESSION & PLAN:  POD # 15 s/p CABGx3 with Dr. Carey on 7/9   - Increase activity/ ambulation; PT/OT  - Cardiac rehab referral placed  - Continue cardiac meds: ASA, BB, statin and Plavix for NSTEMI  - Chronic methadone use->  continue methadone 77.5mg q12 hrs  ---  Patient will be discharged on home dose of methadone as per patient's Methadone Clinic   - 2v CXR 7/17/24 reviewed with continued perihilar edema improving  - Postop echo 7/15/24 LVEF 40%-> similar to pre-op echo   - 7/23: Epicardial wires CUT; per surgeon preference   - Tele until discharge  - Optimize nutrition and electrolytes    Rhythm   pAfib with RVR- initially on Amiodarone dc'd 7/16 due to bradycardia   - Tele: SR 60's to 70's   -  Continue BB:metoprolol [id number] BID; will convert to succinate at discharge    - continue telemetry until discharge     Acute Blood Loss Anemia - stable  Recent Labs     07/24/24  0818 07/23/24  0917 07/22/24  1218 07/19/24  1253 07/18/24  0718 07/17/24  0939 07/16/24  0035   HGB 9.4* 9.5* 9.5* 8.7* 9.0* 9.3* 8.4*   HCT 31.8* 31.7* 31.8* 27.7* 28.9* 29.8* 25.0*   - MV, PO Iron x1mo  - Daily labs, transfuse as indicated      JUAN J post op CR 2.08-no history of renal disease, baseline creatinine 0.7 - Resolved  Creatinine   Date Value Ref Range Status   07/24/2024 0.93 0.50 - 1.30 mg/dL Final   07/23/2024 0.85 0.50 - 1.30 mg/dL Final   07/22/2024 0.84 0.50 - 1.30 mg/dL Final     Volume/Electrolyte Status: Preop wt Weight: 85.3 kg (188 lb)   Vitals:    07/24/24 0525   Weight: 83 kg (182 lb 14.4 oz)   - Weight: 83, 78.6, 86, 83.5, 85.3, 86.3kg   - Lasix infusion stopped 7/16-holding diuresis as appears intervascularly depleted   - seems to be at base weight; chest xray with congestion still   - 7/18 Lasix IV x1 to eval response  - 7/24: Lasix 20mg IV x1   - Replete electrolytes for hypokalemia / hypomagnesemia / hypophosphatemia as needed   -renally dose all medications  -avoid nephrotoxic drugs  - Daily weights/strict I&Os  - Daily RFP    H/o peripheral neuropathy and chronic methadone use  - Acute post operative pain currently well controlled on home methadone and scheduled Tylenol.   - c/w thiamine, folate, home pregabalin and duloxetine  - c/w home Plaquenil   - c/w methadone  "back to BID home dose     Acute hypoxic pulmonary insufficiency requiring supplemental oxygen; - Resolved   Hx of COPD: home meds:  Albuterol PRN  - patient completed stress dose steroids for COPD exacerbation   - c/w IS, bronchial hygiene: IS & Ez Pap   - continue with albuterol PRN     MARIAELENA: Patient reports having a sleep study \"many\" years ago and was told he has MARIAELENA. He did not follow up and thus does not have CPAP unit at home.  - Wean O2 as tolerated - 3L NC with nocturnal CPAP   - Continue inpatient CPAP while hospitalized; patient reports that he feels so much better with CPAP  - Follow up as outpatient for formal sleep study       Stress Induced Hyperglycemia 2/2 steroids Newly Diagnosed DM type II   Lab Results   Component Value Date    HGBA1C 8.5 (H) 05/15/2024     Results from last 7 days   Lab Units 07/24/24  0829 07/23/24  1750 07/23/24  1319 07/23/24  0735 07/22/24  1812 07/22/24  1232 07/22/24  0736   POCT GLUCOSE mg/dL 112* 80 94 104* 188* 144* 168*   - diabetic diet 60 grams CHO   - Hypoglycemic protocol   - Endo consulted/following, appreciate final recs (7/22):   --- Continue Insuline Glargine 25 units daily   --- Continue Lispro 8 units TID with meals   --- Continue Lispro premeal corrective sliding scale #2  --- Metformin 500 mg BID   --- Follow up with endocrine as an outpatient     Hypothyroidism: Home meds: Synthroid 50mcg daily   Lab Results   Component Value Date    TSH 2.67 07/02/2024   - continue home levothyroxine  - follow up with PCP or endocrinology as an outpatient as scheduled    Skin    - Unhealed diabetic foot ulcers s/p R foot partial amputation. Wound care following.  - Wound care following, appreciate final recs:   ---Clean with Vashe  ---Clean feet with cleansing cloths and apply lotion to foot, not on wounds.   ---Apply Aquacel silver to wound bed, (cut to size) then cover with ABD, wrap with kerlix.   ---Change daily     VTE Prophylaxis: SCDs/TEDs, ambulation, SQ " heparin  Code Status: Full Code    Dispo  - PT/OT recs changed from home to moderate intensity; SNF vs. Acute Rehab ([facility] following and providing information on possible placements)  - Anticipate discharge once facility that has provider who is able to write for and manage methadone until he is able to be discharged to home (back to home methadone clinic) is willing to accept the patient.   --- Previously accepted at [facility] facility (no longer a candidate for this facility due to unable to provide needed RX for methadone and unable to transport patient to methadone clinic while receiving care at [facility])   - Will continue to assess discharge needs    CHACE Graham-CNP  Cardiac Surgery [facility]  [facility]  Team Phone [phone]    7/24/2024  1:34 PM

## 2024-07-25 LAB
ALBUMIN SERPL BCP-MCNC: 3.9 G/DL (ref 3.4–5)
ANION GAP SERPL CALC-SCNC: 13 MMOL/L (ref 10–20)
BUN SERPL-MCNC: 21 MG/DL (ref 6–23)
CALCIUM SERPL-MCNC: 8.7 MG/DL (ref 8.6–10.6)
CHLORIDE SERPL-SCNC: 99 MMOL/L (ref 98–107)
CO2 SERPL-SCNC: 30 MMOL/L (ref 21–32)
CREAT SERPL-MCNC: 0.98 MG/DL (ref 0.5–1.3)
EGFRCR SERPLBLD CKD-EPI 2021: 89 ML/MIN/1.73M*2
ERYTHROCYTE [DISTWIDTH] IN BLOOD BY AUTOMATED COUNT: 22 % (ref 11.5–14.5)
GLUCOSE BLD MANUAL STRIP-MCNC: 110 MG/DL (ref 74–99)
GLUCOSE BLD MANUAL STRIP-MCNC: 114 MG/DL (ref 74–99)
GLUCOSE BLD MANUAL STRIP-MCNC: 155 MG/DL (ref 74–99)
GLUCOSE BLD MANUAL STRIP-MCNC: 60 MG/DL (ref 74–99)
GLUCOSE BLD MANUAL STRIP-MCNC: 94 MG/DL (ref 74–99)
GLUCOSE SERPL-MCNC: 77 MG/DL (ref 74–99)
HCT VFR BLD AUTO: 30.3 % (ref 41–52)
HGB BLD-MCNC: 9.1 G/DL (ref 13.5–17.5)
MAGNESIUM SERPL-MCNC: 2.21 MG/DL (ref 1.6–2.4)
MCH RBC QN AUTO: 24.5 PG (ref 26–34)
MCHC RBC AUTO-ENTMCNC: 30 G/DL (ref 32–36)
MCV RBC AUTO: 82 FL (ref 80–100)
NRBC BLD-RTO: 0 /100 WBCS (ref 0–0)
PHOSPHATE SERPL-MCNC: 4.1 MG/DL (ref 2.5–4.9)
PLATELET # BLD AUTO: 351 X10*3/UL (ref 150–450)
POTASSIUM SERPL-SCNC: 4 MMOL/L (ref 3.5–5.3)
RBC # BLD AUTO: 3.71 X10*6/UL (ref 4.5–5.9)
SODIUM SERPL-SCNC: 138 MMOL/L (ref 136–145)
WBC # BLD AUTO: 7.4 X10*3/UL (ref 4.4–11.3)

## 2024-07-25 PROCEDURE — 2500000005 HC RX 250 GENERAL PHARMACY W/O HCPCS: Performed by: NURSE PRACTITIONER

## 2024-07-25 PROCEDURE — 84100 ASSAY OF PHOSPHORUS: CPT

## 2024-07-25 PROCEDURE — 2500000005 HC RX 250 GENERAL PHARMACY W/O HCPCS

## 2024-07-25 PROCEDURE — 94660 CPAP INITIATION&MGMT: CPT

## 2024-07-25 PROCEDURE — 2500000001 HC RX 250 WO HCPCS SELF ADMINISTERED DRUGS (ALT 637 FOR MEDICARE OP)

## 2024-07-25 PROCEDURE — 1200000002 HC GENERAL ROOM WITH TELEMETRY DAILY

## 2024-07-25 PROCEDURE — S0109 METHADONE ORAL 5MG: HCPCS | Performed by: PHYSICIAN ASSISTANT

## 2024-07-25 PROCEDURE — 2500000002 HC RX 250 W HCPCS SELF ADMINISTERED DRUGS (ALT 637 FOR MEDICARE OP, ALT 636 FOR OP/ED): Performed by: PHYSICIAN ASSISTANT

## 2024-07-25 PROCEDURE — 2500000001 HC RX 250 WO HCPCS SELF ADMINISTERED DRUGS (ALT 637 FOR MEDICARE OP): Performed by: NURSE PRACTITIONER

## 2024-07-25 PROCEDURE — 2500000004 HC RX 250 GENERAL PHARMACY W/ HCPCS (ALT 636 FOR OP/ED): Performed by: PHYSICIAN ASSISTANT

## 2024-07-25 PROCEDURE — 2500000001 HC RX 250 WO HCPCS SELF ADMINISTERED DRUGS (ALT 637 FOR MEDICARE OP): Performed by: PHYSICIAN ASSISTANT

## 2024-07-25 PROCEDURE — 97116 GAIT TRAINING THERAPY: CPT | Mod: GP | Performed by: PHYSICAL THERAPIST

## 2024-07-25 PROCEDURE — 97112 NEUROMUSCULAR REEDUCATION: CPT | Mod: GP | Performed by: PHYSICAL THERAPIST

## 2024-07-25 PROCEDURE — 97530 THERAPEUTIC ACTIVITIES: CPT | Mod: GO

## 2024-07-25 PROCEDURE — 36415 COLL VENOUS BLD VENIPUNCTURE: CPT

## 2024-07-25 PROCEDURE — 85027 COMPLETE CBC AUTOMATED: CPT

## 2024-07-25 PROCEDURE — 82947 ASSAY GLUCOSE BLOOD QUANT: CPT

## 2024-07-25 PROCEDURE — 2500000005 HC RX 250 GENERAL PHARMACY W/O HCPCS: Performed by: PHYSICIAN ASSISTANT

## 2024-07-25 PROCEDURE — 99232 SBSQ HOSP IP/OBS MODERATE 35: CPT

## 2024-07-25 PROCEDURE — 2500000002 HC RX 250 W HCPCS SELF ADMINISTERED DRUGS (ALT 637 FOR MEDICARE OP, ALT 636 FOR OP/ED)

## 2024-07-25 PROCEDURE — 83735 ASSAY OF MAGNESIUM: CPT

## 2024-07-25 RX ORDER — DEXTROSE 50 % IN WATER (D50W) INTRAVENOUS SYRINGE
25 ONCE
Status: COMPLETED | OUTPATIENT
Start: 2024-07-25 | End: 2024-07-25

## 2024-07-25 ASSESSMENT — PAIN - FUNCTIONAL ASSESSMENT
PAIN_FUNCTIONAL_ASSESSMENT: 0-10

## 2024-07-25 ASSESSMENT — COGNITIVE AND FUNCTIONAL STATUS - GENERAL
CLIMB 3 TO 5 STEPS WITH RAILING: TOTAL
MOBILITY SCORE: 16
HELP NEEDED FOR BATHING: A LITTLE
TURNING FROM BACK TO SIDE WHILE IN FLAT BAD: A LITTLE
WALKING IN HOSPITAL ROOM: A LITTLE
STANDING UP FROM CHAIR USING ARMS: A LITTLE
DRESSING REGULAR UPPER BODY CLOTHING: A LITTLE
DAILY ACTIVITIY SCORE: 20
MOVING TO AND FROM BED TO CHAIR: A LITTLE
DRESSING REGULAR LOWER BODY CLOTHING: A LITTLE
MOVING FROM LYING ON BACK TO SITTING ON SIDE OF FLAT BED WITH BEDRAILS: A LITTLE
TOILETING: A LITTLE

## 2024-07-25 ASSESSMENT — PAIN SCALES - GENERAL
PAINLEVEL_OUTOF10: 0 - NO PAIN
PAINLEVEL_OUTOF10: 3
PAINLEVEL_OUTOF10: 0 - NO PAIN
PAINLEVEL_OUTOF10: 4
PAINLEVEL_OUTOF10: 4
PAINLEVEL_OUTOF10: 5 - MODERATE PAIN
PAINLEVEL_OUTOF10: 4
PAINLEVEL_OUTOF10: 0 - NO PAIN
PAINLEVEL_OUTOF10: 0 - NO PAIN

## 2024-07-25 ASSESSMENT — PAIN DESCRIPTION - ORIENTATION
ORIENTATION: OTHER (COMMENT)
ORIENTATION: OTHER (COMMENT)

## 2024-07-25 ASSESSMENT — ACTIVITIES OF DAILY LIVING (ADL): HOME_MANAGEMENT_TIME_ENTRY: 5

## 2024-07-25 ASSESSMENT — PAIN DESCRIPTION - LOCATION
LOCATION: GENERALIZED
LOCATION: GENERALIZED

## 2024-07-25 NOTE — PROGRESS NOTES
"CARDIAC SURGERY DAILY PROGRESS NOTE    Mr. Zepeda is a 58-year-old male with a PMH of RA (on chronic prednisone and hydroxychloroquine), type 2 DM (on insulin), COPD, hypothyroidism, 40-pack-year smoking history, chronic methadone use, peripheral neuropathy, and unhealed diabetic foot ulcers s/p right foot partial amputation (7 months ago - follows with wound clinic as outpatient) and most recently NSTEMI. He is now s/p CABGx3 with Dr. Carey on 7/9.     OPERATION/PROCEDURE: CABGx3 with Dr. Carey on 7/9.   - Median sternotomy  - Attempt off-pump surgery  - On pump coronary artery bypass grafting x 3  --- LIMA to LAD  --- MANUELITO( Y'ed off LIMA) to obtuse marginal  --- SVG to right PLV (terminal branch of RCA)  - Endoscopic harvesting of the right greater saphenous vein  - Sternal plating    History of NSTEMI. Is now status post CABGx3 Pre: Mildly decreased LV function, EF 40-45%. Post: normal LV function.     CTICU Course: Post operative course c/b acute hypoxic respiratory insufficiency requiring high flow nasal cannula, now improved.Afib with RVR to the 130s requiring bolus and amio drip. Now in NSR in 60-70's with some bradycardia to high 40's-> PO amio dc'd.  JUAN J post op with increasing CR to 2.08 (diuresis was held) now 1.25.  Pulmonary edema still noted on xray. ? Fentanyl use while inpatient brought from family members     Transferred to T3 on 7/16/24    =====================================    Objective:   - No acute events overnight.   - Problems with discharge due to difficulty finding facility that will write for an manage methadone for opioid addiction recovery.      SUBJECTIVE:  No complaints.       Objective   /63 (BP Location: Right arm, Patient Position: Sitting)   Pulse 65   Temp 36 °C (96.8 °F) (Temporal)   Resp 20   Ht 1.676 m (5' 6\")   Wt 83 kg (182 lb 15.7 oz)   SpO2 97%   BMI 29.53 kg/m²   0-10 (Numeric) Pain Score: 4   3 Day Weight Change: 0.133 kg (4.7 oz) per day    Heart " Rate:  []   Temp:  [36 °C (96.8 °F)-36.6 °C (97.9 °F)]   Resp:  [16-22]   BP: ()/(60-73)   Weight:  [83 kg (182 lb 15.7 oz)]   SpO2:  [97 %-100 %]      Intake and Output    Intake/Output Summary (Last 24 hours) at 7/25/2024 1329  Last data filed at 7/25/2024 1314  Gross per 24 hour   Intake --   Output 1101 ml   Net -1101 ml       Physical Exam  Vitals and nursing note reviewed.   Constitutional:       General: He is not in acute distress.     Appearance: Normal appearance. He is not ill-appearing.      Comments: Patient alert and awake sitting up in bed, in no acute distress.    HENT:      Head: Atraumatic.      Mouth/Throat:      Pharynx: Oropharynx is clear.   Eyes:      Conjunctiva/sclera: Conjunctivae normal.      Pupils: Pupils are equal, round, and reactive to light.   Neck:      Comments: Trachea midline   Cardiovascular:      Rate and Rhythm: Normal rate. Rhythm irregular.      Pulses: Normal pulses.      Heart sounds: Normal heart sounds. No murmur heard.     No gallop.      Comments: TELE: SB with 1st degree AV Block 60's ot 70's   +2 Equal and even pulses in all extremities   Wires CUT 7/23.   Pulmonary:      Effort: Pulmonary effort is normal. No respiratory distress.      Breath sounds: No stridor. Wheezing present.      Comments: Diminished breath sound bilaterally with occasional expiratory wheezes   Sternum stable.   Abdominal:      General: Bowel sounds are normal. There is no distension.      Palpations: Abdomen is soft.      Tenderness: There is no abdominal tenderness.      Comments: BM (+) 7/17/24   Genitourinary:     Comments: Voids independently  Musculoskeletal:      Cervical back: Neck supple.      Right lower leg: No edema.      Left lower leg: No edema.      Comments: WATERS     Skin:     General: Skin is warm and dry.      Capillary Refill: Capillary refill takes less than 2 seconds.      Coloration: Skin is not jaundiced or pale.      Findings: No bruising.      Comments:  midsternal chest incision C/D/I, well approximated, no s/s infection  SVG  SIMON well approx no s/sx of infections      Neurological:      General: No focal deficit present.      Mental Status: He is alert and oriented to person, place, and time.   Psychiatric:         Mood and Affect: Mood normal.         Behavior: Behavior normal.     Medications  Scheduled medications  acetaminophen, 975 mg, oral, q6h  aspirin, 81 mg, oral, Daily  atorvastatin, 80 mg, oral, Nightly  clopidogrel, 75 mg, oral, Daily  DULoxetine, 60 mg, oral, Daily  folic acid, 1 mg, oral, Daily  heparin, 5,000 Units, subcutaneous, q8h  hydroxychloroquine, 200 mg, oral, BID  insulin glargine, 25 Units, subcutaneous, q24h  insulin lispro, 0-10 Units, subcutaneous, TID AC  insulin lispro, 8 Units, subcutaneous, TID AC  iron polysaccharides, 150 mg, oral, Daily  levothyroxine, 50 mcg, oral, Daily  metFORMIN, 500 mg, oral, BID  methadone, 77.5 mg, oral, q12h  metoprolol tartrate, 12.5 mg, oral, BID  multivitamin with minerals, 1 tablet, oral, Daily  oxygen, , inhalation, Continuous - Inhalation  pantoprazole, 40 mg, oral, Daily before breakfast  polyethylene glycol, 17 g, oral, Daily  pregabalin, 200 mg, oral, Daily  sennosides-docusate sodium, 2 tablet, oral, BID  thiamine, 100 mg, oral, Daily  tiotropium, 2 puff, inhalation, Daily    Continuous medications   PRN medications  PRN medications: albuterol, benzocaine-menthol, dextrose, dextrose **OR** glucagon, naloxone, [DISCONTINUED] ondansetron **OR** ondansetron, [Held by provider] oxyCODONE, oxygen, sodium chloride    Labs  Results for orders placed or performed during the hospital encounter of 07/01/24 (from the past 24 hour(s))   POCT GLUCOSE   Result Value Ref Range    POCT Glucose 148 (H) 74 - 99 mg/dL   POCT GLUCOSE   Result Value Ref Range    POCT Glucose 100 (H) 74 - 99 mg/dL   POCT GLUCOSE   Result Value Ref Range    POCT Glucose 98 74 - 99 mg/dL   CBC   Result Value Ref Range    WBC 7.4 4.4 -  11.3 x10*3/uL    nRBC 0.0 0.0 - 0.0 /100 WBCs    RBC 3.71 (L) 4.50 - 5.90 x10*6/uL    Hemoglobin 9.1 (L) 13.5 - 17.5 g/dL    Hematocrit 30.3 (L) 41.0 - 52.0 %    MCV 82 80 - 100 fL    MCH 24.5 (L) 26.0 - 34.0 pg    MCHC 30.0 (L) 32.0 - 36.0 g/dL    RDW 22.0 (H) 11.5 - 14.5 %    Platelets 351 150 - 450 x10*3/uL   Renal function panel   Result Value Ref Range    Glucose 77 74 - 99 mg/dL    Sodium 138 136 - 145 mmol/L    Potassium 4.0 3.5 - 5.3 mmol/L    Chloride 99 98 - 107 mmol/L    Bicarbonate 30 21 - 32 mmol/L    Anion Gap 13 10 - 20 mmol/L    Urea Nitrogen 21 6 - 23 mg/dL    Creatinine 0.98 0.50 - 1.30 mg/dL    eGFR 89 >60 mL/min/1.73m*2    Calcium 8.7 8.6 - 10.6 mg/dL    Phosphorus 4.1 2.5 - 4.9 mg/dL    Albumin 3.9 3.4 - 5.0 g/dL   Magnesium   Result Value Ref Range    Magnesium 2.21 1.60 - 2.40 mg/dL   POCT GLUCOSE   Result Value Ref Range    POCT Glucose 94 74 - 99 mg/dL       IMAGES  I have personally reviewed the following test result(s):   XR CHEST 2 VIEWS; 7/17/2024 6:54 am  CARDIOMEDIASTINAL SILHOUETTE:  Patient is status post median sternotomy.  LUNGS:  Continued perihilar interstitial edema with right basilar  atelectasis. Right-sided effusion without pneumothorax.  ABDOMEN:  No remarkable upper abdominal findings.  BONES:  No acute osseous changes.      IMPRESSION:  1.  Slight interval improvement in lung aeration status post median  sternotomy. Residual edema and right basilar atelectasis.      Signed by: Santo Goldman 7/18/2024 7:21 AM  Dictation workstation:   [id number]    Pelvis and right hip dated 7/18/2024.     FINDINGS:  No fracture or dislocation is evident. Surgical and degenerative  changes seen of the spine. Minimal degenerative changes seen of the  hips. No soft tissue gas or radiopaque foreign body is evident.      IMPRESSION:  No osseous injury is evident.      Signed by: Sandeep Andrade 7/18/2024 1:25 PM  Dictation workstation:   [id number]    IMPRESSION & PLAN:  POD # 16 s/p  CABGx3 with Dr. Carey on 7/9   - Increase activity/ ambulation; PT/OT  - Cardiac rehab referral placed  - Continue cardiac meds: ASA, BB, statin and Plavix for NSTEMI  - Chronic methadone use->  continue methadone 77.5mg q12 hrs  --- Patient will be discharged on home dose of methadone as per patient's Methadone Clinic   --- 7/23: Called methadone clinic and discussed home dose to confirm   - 2v CXR 7/17/24 reviewed with continued perihilar edema improving  - Postop echo 7/15/24 LVEF 40%-> similar to pre-op echo   - 7/23: Epicardial wires CUT; per surgeon preference   - Tele until discharge  - Optimize nutrition and electrolytes    Rhythm   pAfib with RVR- initially on Amiodarone dc'd 7/16 due to bradycardia   - Tele: SR 60's to 70's   -  Continue BB: metoprolol [id number] BID; will convert to succinate at discharge    - continue telemetry until discharge     Acute Blood Loss Anemia - stable  Recent Labs     07/25/24  0731 07/24/24  0818 07/23/24  0917 07/22/24  1218 07/19/24  1253 07/18/24  0718 07/17/24  0939   HGB 9.1* 9.4* 9.5* 9.5* 8.7* 9.0* 9.3*   HCT 30.3* 31.8* 31.7* 31.8* 27.7* 28.9* 29.8*   - MV  - No Need for PO Iron due to stable H&H  - Daily labs, transfuse as indicated      JUAN J post op CR 2.08-no history of renal disease, baseline creatinine 0.7 - Resolved  Creatinine   Date Value Ref Range Status   07/25/2024 0.98 0.50 - 1.30 mg/dL Final   07/24/2024 0.93 0.50 - 1.30 mg/dL Final   07/23/2024 0.85 0.50 - 1.30 mg/dL Final     Volume/Electrolyte Status: Preop wt Weight: 85.3 kg (188 lb)   Vitals:    07/25/24 0540   Weight: 83 kg (182 lb 15.7 oz)   - Weight: 83, 83, 78.6, 86, 83.5, 85.3, 86.3kg   - Lasix infusion stopped 7/16-holding diuresis as appears intervascularly depleted   - seems to be at base weight; chest xray with congestion still   - 7/18 Lasix IV x1 to eval response  - 7/24: Lasix 20mg IV x1   - 7/25: Lasix 20mg IV x1   - Replete electrolytes for hypokalemia / hypomagnesemia /  "hypophosphatemia as needed   -renally dose all medications  -avoid nephrotoxic drugs  - Daily weights/strict I&Os  - Daily RFP    H/o peripheral neuropathy and chronic methadone use  - Acute post operative pain currently well controlled on home methadone and scheduled Tylenol.   - c/w thiamine, folate, home pregabalin and duloxetine  - c/w home Plaquenil   - c/w methadone back to BID home dose     Acute hypoxic pulmonary insufficiency requiring supplemental oxygen; - Resolved   Hx of COPD: home meds:  Albuterol PRN  - patient completed stress dose steroids for COPD exacerbation   - c/w IS, bronchial hygiene: IS & Ez Pap   - continue with albuterol PRN     MARIAELENA: Patient reports having a sleep study \"many\" years ago and was told he has MARIAELENA. He did not follow up and thus does not have CPAP unit at home.  - Wean O2 as tolerated - 3L NC with nocturnal CPAP   - Continue inpatient CPAP while hospitalized; patient reports that he feels so much better with CPAP  - Follow up as outpatient for formal sleep study       Stress Induced Hyperglycemia 2/2 steroids Newly Diagnosed DM type II   Lab Results   Component Value Date    HGBA1C 8.5 (H) 05/15/2024     Results from last 7 days   Lab Units 07/25/24  0753 07/24/24  2140 07/24/24  1726 07/24/24  1527 07/24/24  0829 07/23/24  1750 07/23/24  1319   POCT GLUCOSE mg/dL 94 98 100* 148* 112* 80 94   - diabetic diet 60 grams CHO   - Hypoglycemic protocol   - Endo consulted/following, appreciate final recs (7/22):   --- Continue Insuline Glargine 25 units daily   --- Continue Lispro 8 units TID with meals   --- Continue Lispro premeal corrective sliding scale #2  --- Metformin 500 mg BID   --- Follow up with endocrine as an outpatient     Hypothyroidism: Home meds: Synthroid 50mcg daily   Lab Results   Component Value Date    TSH 2.67 07/02/2024   - continue home levothyroxine  - follow up with PCP or endocrinology as an outpatient as scheduled    Skin    - Unhealed diabetic foot ulcers " s/p R foot partial amputation. Wound care following.  - Wound care following, appreciate final recs:   ---Clean with Vashe  ---Clean feet with cleansing cloths and apply lotion to foot, not on wounds.   ---Apply Aquacel silver to wound bed, (cut to size) then cover with ABD, wrap with kerlix.   ---Change daily     VTE Prophylaxis: SCDs/TEDs, ambulation, SQ heparin  Code Status: Full Code    Dispo  - PT/OT recs changed from home to moderate intensity; SNF vs. Acute Rehab ([facility] following and providing information on possible placements)  - Anticipate discharge once facility that has provider who is able to write for and manage methadone until he is able to be discharged to home (back to home methadone clinic) is willing to accept the patient.   --- Previously accepted at [facility] acute rehab facility (no longer a candidate for this facility due to unable to provide needed RX for methadone and unable to transport patient to methadone clinic while receiving care at [facility])   - Will continue to assess discharge needs    CHACE Graham-CNP  Cardiac Surgery [facility]  [facility]  Team Phone [phone]    7/25/2024  1:29 PM

## 2024-07-25 NOTE — CARE PLAN
The patient's goals for the shift include      The clinical goals for the shift include Pt will remain safe and free from falls this shift    Over the shift, the patient did not make progress toward the following goals. Barriers to progression include still need to set up pain med sent to rehab facility. Recommendations to address these barriers include social work and doctor resolutions.

## 2024-07-25 NOTE — PROGRESS NOTES
Occupational Therapy    Occupational Therapy Treatment    Name: Kael Zepeda  MRN: [id number]  : 1965  Date: 24  Room: [address] [place] [address]    Time Calculation  Start Time: 1557  Stop Time: 1616  Time Calculation (min): 19 min    Assessment:  OT Assessment: Pt would benefit from continued skilled OT intervention maximize functional independence with ADLs/transfers/mobility & faciliate return to PLOF.  Prognosis: Good  Barriers to Discharge: None  Evaluation/Treatment Tolerance: Patient tolerated treatment well  End of Session Communication: Bedside nurse  End of Session Patient Position: Bed, 2 rail up, Alarm off, not on at start of session  Plan:  Treatment Interventions: ADL retraining, Functional transfer training, UE strengthening/ROM, Endurance training, Compensatory technique education  OT Frequency: 3 times per week  OT Discharge Recommendations: Moderate intensity level of continued care  Equipment Recommended upon Discharge: Wheeled walker  OT Recommended Transfer Status: Assist of 1  OT - OK to Discharge: Yes    Subjective   General:  OT Last Visit  OT Received On: 24  Reason for Referral: 57 y/o M presenting to OSH with chest pain and NSTEMI; now s/p CABG x3 on ; dx: NSTEMI, acute hypoxic respiratory insufficiency, Afib, JUAN J, altered LOC, Right foot wounds, in-hospital fall   Past Medical History Relevant to Rehab: DM, HTN, Chest discomfort, Fibromyalgia, Generalized anxiety disorder, hypothyroidism, elevated troponin, RA, anemia, Cigarette nicotine dependence  Prior to Session Communication: Bedside nurse  Patient Position Received: Bed, 3 rail up, Alarm off, not on at start of session  Family/Caregiver Present: No  General Comment: Pt pleasant & agreeable to OT. Motivated throuhgout session.   Precautions:  LE Weight Bearing Status: Weight Bearing as Tolerated (R LE in post op shoe)  Medical Precautions: Fall precautions, Oxygen therapy device and L/min  Post-Surgical  Precautions: Move in the Tube  Precautions Comment: R post opp show donned for functional mobility.  Vitals:  Vital Signs  Heart Rate: 79  Heart Rate Source: Monitor    Cognition:  Overall Cognitive Status: Within Functional Limits    Pain Assessment:  Pain Assessment  Pain Assessment: 0-10  0-10 (Numeric) Pain Score: 4  Pain Location: Generalized     Objective   Activities of Daily Living:     LE Dressing  LE Dressing: Yes  Shoe Level of Assistance: Close supervision, Setup  LE Dressing Where Assessed: Edge of bed  LE Dressing Comments: Pt donned/doffed post op shoes bilaterally.    Functional Standing Tolerance:  Functional Mobility  Functional Mobility Performed: Yes  Functional Mobility 1  Surface 1: Level tile  Device 1: Rolling walker  Assistance 1:  (SBA)  Comments 1: Pt performed functional mobility x min household distances (EOB>chair>bathroom>EOB). Verbal cues for safe walker management.  Bed Mobility/Transfers:   Bed Mobility  Bed Mobility: Yes  Bed Mobility 1  Bed Mobility 1: Sitting to supine  Level of Assistance 1: Distant supervision  Bed Mobility Comments 1: HOB elevated    Transfer 1  Transfer From 1: Bed to  Transfer to 1: Stand  Technique 1: Sit to stand  Transfer Device 1: Walker  Transfer Level of Assistance 1: Close supervision, Minimal verbal cues  Trials/Comments 1: verbal cues for hand placement.  Transfers 2  Transfer From 2: Sit to, Stand to  Transfer to 2: Chair with arms  Technique 2: Sit to stand, Stand to sit  Transfer Device 2: Walker  Transfer Level of Assistance 2: Close supervision, Minimal verbal cues  Trials/Comments 2: verbal cues for walker placement prior to sitting as patient tends to prematurely abandon walker.  Transfers 3  Transfer From 3: Sit to, Stand to  Transfer to 3: Toilet  Technique 3: Sit to stand, Stand to sit  Transfer Device 3: Walker (grab bar)  Transfer Level of Assistance 3: Close supervision, Minimal verbal cues    Outcome Measures:  [facility] Daily  Activity  Putting on and taking off regular lower body clothing: A little  Bathing (including washing, rinsing, drying): A little  Putting on and taking off regular upper body clothing: A little  Toileting, which includes using toilet, bedpan or urinal: A little  Taking care of personal grooming such as brushing teeth: None  Eating Meals: None  Daily Activity - Total Score: 20     Education Documentation  Body Mechanics, taught by Justine Oconnor OT at 7/25/2024  4:41 PM.  Learner: Patient  Readiness: Acceptance  Method: Explanation  Response: Verbalizes Understanding, Needs Reinforcement    Precautions, taught by Justine Oconnor OT at 7/25/2024  4:41 PM.  Learner: Patient  Readiness: Acceptance  Method: Explanation  Response: Verbalizes Understanding, Needs Reinforcement    ADL Training, taught by Justine Oconnor OT at 7/25/2024  4:41 PM.  Learner: Patient  Readiness: Acceptance  Method: Explanation  Response: Verbalizes Understanding, Needs Reinforcement    Education Comments  No comments found.        Goals:  Encounter Problems       Encounter Problems (Active)       ADLs       Patient will complete toileting with MIN assist and min cues.   (Progressing)       Start:  07/11/24    Expected End:  08/08/24            Patient will complete LB dressing with MIN assist and min cues.   (Progressing)       Start:  07/11/24    Expected End:  08/08/24            Patient will complete UB dressing with SBA and min cues.   (Progressing)       Start:  07/11/24    Expected End:  08/08/24            Patient will complete grooming with MOD I.   (Progressing)       Start:  07/11/24    Expected End:  08/08/24                   BALANCE       Patient will demo sitting balance with SBA for at least 8 min in prep for EOB ADLs. (Progressing)       Start:  07/11/24    Expected End:  08/08/24               MOBILITY       Pt. Will demo household distance functional mobility with MIN A using LRAD.   (Progressing)       Start:  07/11/24     Expected End:  08/08/24               TRANSFERS       Pt. Will complete stand pivot transfer with MIN assist with min cues and using LRAD.   (Met)       Start:  07/11/24    Expected End:  07/25/24    Resolved:  07/15/24    Updated to: Pt. Will complete stand pivot transfer with SBA with min cues and using LRAD.    Update reason: goal met         Pt. Will complete stand pivot transfer with SBA with min cues and using LRAD. (Progressing)       Start:  07/15/24    Expected End:  08/08/24 07/25/24 at 4:42 PM   Justine Oconnor, OT   [phone]

## 2024-07-25 NOTE — PROGRESS NOTES
Physical Therapy    Physical Therapy Treatment    Patient Name: Kael Zepeda  MRN: [id number]  Today's Date: 7/25/2024  Time Calculation  Start Time: 1420  Stop Time: 1445  Time Calculation (min): 25 min    Assessment/Plan   PT Assessment  PT Assessment Results: Decreased endurance, Impaired balance, Decreased mobility, Pain, Decreased strength, Decreased safety awareness, Decreased cognition, Decreased skin integrity, Orthopedic restrictions  Rehab Prognosis: Good  Barriers to Discharge: medical acuity  Evaluation/Treatment Tolerance: Patient tolerated treatment well  Medical Staff Made Aware: Yes  Strengths: Attitude of self, Housing layout, Support of extended family/friends  Barriers to Participation:  (none today)  End of Session Communication: Bedside nurse  Assessment Comment: 59 yo male presents with STEMI s/p CABG x 3, Afib, JUAN J, bilateral foot wounds (R > L, Right post-op shoe and WBAT) and mild confusion presents with weakness, impaired balance, worse on one side ( see balance note), high fall risk and complex medical and rehab needs. Recommend moderate intensity therapy as pt would likely benefit from all 3 therapies, isn't at baseline and has had prolonged complicated hospitalization.  End of Session Patient Position: Bed, 3 rail up, Alarm off, not on at start of session (RN aware)  PT Plan  Inpatient/Swing Bed or Outpatient: Inpatient  PT Plan  Treatment/Interventions: Bed mobility, Transfer training, Gait training, Stair training, Balance training, Neuromuscular re-education, Strengthening, Endurance training, Therapeutic exercise, Therapeutic activity, Range of motion, Home exercise program, Postural re-education  PT Plan: Ongoing PT  PT Frequency: 3 times per week  PT Discharge Recommendations: Moderate intensity level of continued care  Equipment Recommended upon Discharge: Other (comment) (TBD at rehab)  PT Recommended Transfer Status: Assist x1 (cga/min with WW)  PT - OK to Discharge: Yes  (PT eval complete and DC recs made)      General Visit Information:   PT  Visit  PT Received On: 07/25/24  General  Reason for Referral: 59 y/o M presenting to OSH with chest pain and NSTEMI; now s/p CABG x3 on 7/9/2; dx: NSTEMI, acute hypoxic respiratory insufficiency, Afib, JUAN J, altered LOC, Right foot wounds, in-hospital fall 7/18  Past Medical History Relevant to Rehab: DM, HTN, Chest discomfort, Fibromyalgia, Generalized anxiety disorder, hypothyroidism, elevated troponin, RA, anemia, Cigarette nicotine dependence  Missed Visit: No  Family/Caregiver Present: No  Co-Treatment:  (N/A)  Prior to Session Communication: Bedside nurse  Patient Position Received: Bed, 3 rail up, Alarm off, not on at start of session  Preferred Learning Style: auditory, verbal  General Comment: Pt sitting EOB, stated he had been up and walking three times already but was able to be motivated to walk with PT and complete balance exercises. Pt stated he is feeling really good and wanted to prove how well he can walk.    Subjective   Precautions:  Precautions  LE Weight Bearing Status:  (WBAT Right LE)  Medical Precautions: Fall precautions, Oxygen therapy device and L/min, Cardiac precautions (WBAT R LE in surgical shoe, cardiac, mild confusion/decreased safety awareness, DM, neuropathy, RA)  Post-Surgical Precautions: Move in the Tube  Braces Applied:  (did not don post op shoe for ambulation) correct size has been ordered and needs to be worn for upright activities   Vital Signs:  Vital Signs  Heart Rate:  (pre sitting: HR 68 O2 98%;  During ambulation: HR 74  O2 92%)    Objective   Pain:  Pain Assessment  Pain Assessment: 0-10  0-10 (Numeric) Pain Score: 3 (3/10 pain pre and post due to complaint of cyst in left leg behind knee)  Pain Interventions: Medication (See MAR), Therapeutic presence, Therapeutic touch, Ambulation/increased activity  Response to Interventions: Pt no complaints of increased pain during session or at end of  session    Postural Control:  Postural Control  Postural Control: Impaired  Posture Comment: rounded shoulders, mild upper thoracic kyphosis, slightly wider LOU, mild hip/knee flexion  Static Standing Balance  Static Standing-Balance Support: Bilateral upper extremity supported (on WW)  Static Standing-Level of Assistance: Contact guard (CG/min A , posterior lean)  Dynamic Standing Balance  Dynamic Standing-Balance Support: Bilateral upper extremity supported (on WW)  Dynamic Standing-Balance:  (gait including turns)      Activity Tolerance:  Activity Tolerance  Endurance: Tolerates 10 - 20 min exercise with multiple rests (one sitting rest break during ambulation to take vitals)  Rate of Perceived Dyspnea (RPD): 0 ( 0 to 10 scale) during walking  Rate of Perceived Exertion (RPE): 9 ( 6 to 20 scale) during walking  Treatments:  Balance/Neuromuscular Re-Education  Balance/Neuromuscular Re-Education Activity Performed: Yes  Balance/Neuromuscular Re-Education Activity 1: static balance training/// single leg stance Left LE: CGA 5+ sec; single leg stance right LE: min assist <2 sec;  tandem stance right LE in front: CGA 5+ sec;  tandem stance left LE in front: min assist >2 sec    Ambulation/Gait Training  Ambulation/Gait Training Performed: Yes  Ambulation/Gait Training 1  Surface 1: Level tile  Device 1: Rolling walker  Gait Support Devices:  (attempted gait belt, pt refused)  Assistance 1: Minimum assistance, Minimal verbal cues, Minimal tactile cues (mild LOB once posteror when slowing down)  Quality of Gait 1:  (flexed upper trunk, wide LOU, slow, walker a little too far forward, cues for sequencing)  Comments/Distance (ft) 1: ~340 ft  Transfers  Transfer: Yes  Transfer 1  Transfer From 1: Sit to, Stand to  Transfer to 1: Sit, Stand  Technique 1: Sit to stand, Stand to sit  Transfer Device 1: Walker  Transfer Level of Assistance 1: Minimal tactile cues, Contact guard, Minimal verbal cues    Stairs  Stairs: No (pt  stated he has no stairs, didn't want to do it)    Outcome Measures:  [facility] Basic Mobility  Turning from your back to your side while in a flat bed without using bedrails: A little  Moving from lying on your back to sitting on the side of a flat bed without using bedrails: A little  Moving to and from bed to chair (including a wheelchair): A little  Standing up from a chair using your arms (e.g. wheelchair or bedside chair): A little  To walk in hospital room: A little  Climbing 3-5 steps with railing: Total  Basic Mobility - Total Score: 16    Education Documentation  Precautions, taught by MANDEEP Montez at 7/25/2024  2:48 PM.  Learner: Patient  Readiness: Acceptance  Method: Explanation, Demonstration  Response: Verbalizes Understanding, Demonstrated Understanding  Comment: PT plan DC plans, gait training, vitals, RPE, RPD, safety, balance    Body Mechanics, taught by MANDEEP Montez at 7/25/2024  2:48 PM.  Learner: Patient  Readiness: Acceptance  Method: Explanation, Demonstration  Response: Verbalizes Understanding, Demonstrated Understanding  Comment: PT plan DC plans, gait training, vitals, RPE, RPD, safety, balance    Mobility Training, taught by MANDEEP Montez at 7/25/2024  2:48 PM.  Learner: Patient  Readiness: Acceptance  Method: Explanation, Demonstration  Response: Verbalizes Understanding, Demonstrated Understanding  Comment: PT plan DC plans, gait training, vitals, RPE, RPD, safety, balance    Education Comments  No comments found.             Encounter Problems       Encounter Problems (Active)       PT Problem       Patient will complete supine to sit and sit to supine Supervision  (Not met)       Start:  07/22/24    Expected End:  08/05/24    Resolved:  07/22/24    Updated to: Patient will complete supine to/from sit with supervision (following MITT precautions without cues) using rail, HOB elevated 45 degrees    Update reason: update         Patient will perform sit<>stand transfer  with LRAD, and Supervision  (Not met)       Start:  07/22/24    Expected End:  08/05/24    Resolved:  07/22/24    Updated to: Patient will perform sit<>stand and bed to/from chair transfer with WW and Right post-op shoe and supervision, no LOB, stable vitals    Update reason: update         Patient will ambulate >150' with LRAD and Supervision  (Not met)       Start:  07/22/24    Expected End:  07/26/24    Resolved:  07/22/24    Updated to: Patient will ambulate >250' with WW with SBA, stable vitals, no LOB    Update reason: update         Patient will verbalize and demonstrate Move In The Tube (MITT) Precautions independently.  (Progressing)       Start:  07/26/24    Expected End:  08/05/24            Patient will perform sit<>stand and bed to/from chair transfer with WW and Right post-op shoe and supervision, no LOB, stable vitals (Progressing)       Start:  07/26/24    Expected End:  08/05/24                Patient will complete supine to/from sit with supervision (following MITT precautions without cues) using rail, HOB elevated 45 degrees (Progressing)       Start:  07/26/24    Expected End:  08/05/24                Patient will ambulate >250' with WW with SBA, stable vitals, no LOB (Progressing)       Start:  07/26/24    Expected End:  08/05/24                   Pain - Adult

## 2024-07-25 NOTE — PROGRESS NOTES
"Transitional Care Coordination Progress Note:  Spoke with patient regarding [facility] inability to accept without a prescription for Methadone. Also, notified patient that [facility] is \"Interested\" and asking if patient would be able to find transportation to his Methadone Clinic. Patient states that [facility] is farther from his home and is not interested in going that far from home. Presented patient with list of facilities that provide addiction services along with Methadone maintenance and informed patient that those facilities are further away from his home than [facility]. Patient stated he would prefer to go home with enough Methadone until he is able to go to his clinic, a walker, a wheelchair, CPAP and \"I might need oxygen also\". Informed patient that CPAP needs an outpatient sleep study and we are unable to order CPAP without the sleep study being completed. Also informed patient that team may not be able to order Methadone for patient to discharge with. List of facilities that provide needed services left at bedside and requested patient review while team is updated on patients requests. Informed team of what patient is requesting at this time, team went in to speak with patient. Floor TCC Jena also updated.     Update @ 1607 Per team patient now agreeable to SNF placement. Spoke with patient who states he would be willing to go to a facility in [place]. Patient was informed that [facility] states they can accept but \"under clinical review\". Patient was also informed that [facility] and [facility] can both accept. Patient states those facilities are too far and wants to see what [facility] says. Message sent to [facility] asking if the facility is able to officially accept, awaiting response. Also received contact information for patients Outpatient Methadone Clinic, [facility] [phone]. Attempted to call to ensure patient will have no " issues receiving Methadone on discharge from facility or hospital, center closed for the day will attempt tomorrow.   Kael Benties, YOLIN, RN, Supervisor Care Transitions [phone]

## 2024-07-26 ENCOUNTER — PHARMACY VISIT (OUTPATIENT)
Dept: PHARMACY | Facility: CLINIC | Age: 59
End: 2024-07-26
Payer: COMMERCIAL

## 2024-07-26 VITALS
HEART RATE: 70 BPM | TEMPERATURE: 96.8 F | WEIGHT: 182.98 LBS | OXYGEN SATURATION: 99 % | DIASTOLIC BLOOD PRESSURE: 52 MMHG | HEIGHT: 66 IN | RESPIRATION RATE: 18 BRPM | BODY MASS INDEX: 29.41 KG/M2 | SYSTOLIC BLOOD PRESSURE: 126 MMHG

## 2024-07-26 LAB
ALBUMIN SERPL BCP-MCNC: 3.6 G/DL (ref 3.4–5)
ANION GAP SERPL CALC-SCNC: 16 MMOL/L (ref 10–20)
BUN SERPL-MCNC: 22 MG/DL (ref 6–23)
CALCIUM SERPL-MCNC: 8 MG/DL (ref 8.6–10.6)
CHLORIDE SERPL-SCNC: 99 MMOL/L (ref 98–107)
CO2 SERPL-SCNC: 27 MMOL/L (ref 21–32)
CREAT SERPL-MCNC: 1.02 MG/DL (ref 0.5–1.3)
EGFRCR SERPLBLD CKD-EPI 2021: 85 ML/MIN/1.73M*2
ERYTHROCYTE [DISTWIDTH] IN BLOOD BY AUTOMATED COUNT: 22.2 % (ref 11.5–14.5)
GLUCOSE BLD MANUAL STRIP-MCNC: 146 MG/DL (ref 74–99)
GLUCOSE BLD MANUAL STRIP-MCNC: 357 MG/DL (ref 74–99)
GLUCOSE BLD MANUAL STRIP-MCNC: 42 MG/DL (ref 74–99)
GLUCOSE BLD MANUAL STRIP-MCNC: 43 MG/DL (ref 74–99)
GLUCOSE BLD MANUAL STRIP-MCNC: 90 MG/DL (ref 74–99)
GLUCOSE BLD MANUAL STRIP-MCNC: 99 MG/DL (ref 74–99)
GLUCOSE SERPL-MCNC: 151 MG/DL (ref 74–99)
HCT VFR BLD AUTO: 30 % (ref 41–52)
HGB BLD-MCNC: 8.9 G/DL (ref 13.5–17.5)
MAGNESIUM SERPL-MCNC: 2.17 MG/DL (ref 1.6–2.4)
MCH RBC QN AUTO: 24.9 PG (ref 26–34)
MCHC RBC AUTO-ENTMCNC: 29.7 G/DL (ref 32–36)
MCV RBC AUTO: 84 FL (ref 80–100)
NRBC BLD-RTO: 0 /100 WBCS (ref 0–0)
PHOSPHATE SERPL-MCNC: 3.5 MG/DL (ref 2.5–4.9)
PLATELET # BLD AUTO: 307 X10*3/UL (ref 150–450)
POTASSIUM SERPL-SCNC: 4.2 MMOL/L (ref 3.5–5.3)
RBC # BLD AUTO: 3.58 X10*6/UL (ref 4.5–5.9)
SODIUM SERPL-SCNC: 138 MMOL/L (ref 136–145)
WBC # BLD AUTO: 6.9 X10*3/UL (ref 4.4–11.3)

## 2024-07-26 PROCEDURE — 2500000001 HC RX 250 WO HCPCS SELF ADMINISTERED DRUGS (ALT 637 FOR MEDICARE OP): Performed by: PHYSICIAN ASSISTANT

## 2024-07-26 PROCEDURE — 2500000002 HC RX 250 W HCPCS SELF ADMINISTERED DRUGS (ALT 637 FOR MEDICARE OP, ALT 636 FOR OP/ED): Performed by: PHYSICIAN ASSISTANT

## 2024-07-26 PROCEDURE — 2500000001 HC RX 250 WO HCPCS SELF ADMINISTERED DRUGS (ALT 637 FOR MEDICARE OP): Performed by: NURSE PRACTITIONER

## 2024-07-26 PROCEDURE — 80069 RENAL FUNCTION PANEL: CPT

## 2024-07-26 PROCEDURE — 82947 ASSAY GLUCOSE BLOOD QUANT: CPT

## 2024-07-26 PROCEDURE — 36415 COLL VENOUS BLD VENIPUNCTURE: CPT

## 2024-07-26 PROCEDURE — 85027 COMPLETE CBC AUTOMATED: CPT

## 2024-07-26 PROCEDURE — RXMED WILLOW AMBULATORY MEDICATION CHARGE

## 2024-07-26 PROCEDURE — S0109 METHADONE ORAL 5MG: HCPCS | Performed by: PHYSICIAN ASSISTANT

## 2024-07-26 PROCEDURE — 2500000005 HC RX 250 GENERAL PHARMACY W/O HCPCS: Performed by: NURSE PRACTITIONER

## 2024-07-26 PROCEDURE — 2500000005 HC RX 250 GENERAL PHARMACY W/O HCPCS: Performed by: PHYSICIAN ASSISTANT

## 2024-07-26 PROCEDURE — 2500000001 HC RX 250 WO HCPCS SELF ADMINISTERED DRUGS (ALT 637 FOR MEDICARE OP)

## 2024-07-26 PROCEDURE — 83735 ASSAY OF MAGNESIUM: CPT

## 2024-07-26 PROCEDURE — 2500000002 HC RX 250 W HCPCS SELF ADMINISTERED DRUGS (ALT 637 FOR MEDICARE OP, ALT 636 FOR OP/ED)

## 2024-07-26 PROCEDURE — 2500000004 HC RX 250 GENERAL PHARMACY W/ HCPCS (ALT 636 FOR OP/ED): Performed by: PHYSICIAN ASSISTANT

## 2024-07-26 PROCEDURE — 99222 1ST HOSP IP/OBS MODERATE 55: CPT | Performed by: NURSE PRACTITIONER

## 2024-07-26 PROCEDURE — 94640 AIRWAY INHALATION TREATMENT: CPT

## 2024-07-26 RX ORDER — INSULIN GLARGINE 100 [IU]/ML
25 INJECTION, SOLUTION SUBCUTANEOUS EVERY 24 HOURS
Qty: 30 ML | Refills: 3 | Status: SHIPPED | OUTPATIENT
Start: 2024-07-26

## 2024-07-26 RX ORDER — METOPROLOL SUCCINATE 25 MG/1
25 TABLET, EXTENDED RELEASE ORAL DAILY
Qty: 90 TABLET | Refills: 3 | Status: SHIPPED | OUTPATIENT
Start: 2024-07-26 | End: 2025-07-26

## 2024-07-26 RX ORDER — INSULIN LISPRO 100 [IU]/ML
8 INJECTION, SOLUTION INTRAVENOUS; SUBCUTANEOUS
Qty: 15 ML | Refills: 0 | Status: SHIPPED | OUTPATIENT
Start: 2024-07-26

## 2024-07-26 RX ORDER — LANOLIN ALCOHOL/MO/W.PET/CERES
100 CREAM (GRAM) TOPICAL DAILY
Qty: 30 TABLET | Refills: 11 | Status: SHIPPED | OUTPATIENT
Start: 2024-07-26 | End: 2025-07-26

## 2024-07-26 RX ORDER — PANTOPRAZOLE SODIUM 40 MG/1
40 TABLET, DELAYED RELEASE ORAL
Qty: 30 TABLET | Refills: 11 | Status: SHIPPED | OUTPATIENT
Start: 2024-07-26 | End: 2025-07-26

## 2024-07-26 RX ORDER — ACETAMINOPHEN 325 MG/1
975 TABLET ORAL EVERY 6 HOURS PRN
Qty: 30 TABLET | Refills: 0 | Status: SHIPPED | OUTPATIENT
Start: 2024-07-26 | End: 2024-08-03 | Stop reason: SDUPTHER

## 2024-07-26 RX ORDER — ATORVASTATIN CALCIUM 80 MG/1
80 TABLET, FILM COATED ORAL NIGHTLY
Qty: 30 TABLET | Refills: 1 | Status: SHIPPED | OUTPATIENT
Start: 2024-07-26 | End: 2024-09-24

## 2024-07-26 RX ORDER — CEPHALEXIN 500 MG/1
500 CAPSULE ORAL EVERY 12 HOURS SCHEDULED
Status: DISCONTINUED | OUTPATIENT
Start: 2024-07-26 | End: 2024-07-26

## 2024-07-26 RX ORDER — CEPHALEXIN 500 MG/1
500 CAPSULE ORAL 2 TIMES DAILY
Status: DISCONTINUED | OUTPATIENT
Start: 2024-07-26 | End: 2024-07-26 | Stop reason: HOSPADM

## 2024-07-26 RX ORDER — HYDROXYCHLOROQUINE SULFATE 200 MG/1
200 TABLET, FILM COATED ORAL 2 TIMES DAILY
Qty: 60 TABLET | Refills: 1 | Status: SHIPPED | OUTPATIENT
Start: 2024-07-26 | End: 2024-09-24

## 2024-07-26 RX ORDER — AMOXICILLIN 250 MG
2 CAPSULE ORAL 2 TIMES DAILY
Qty: 120 TABLET | Refills: 11 | Status: SHIPPED | OUTPATIENT
Start: 2024-07-26 | End: 2025-07-26

## 2024-07-26 RX ORDER — IRON POLYSACCHARIDE COMPLEX 150 MG
150 CAPSULE ORAL DAILY
Qty: 30 CAPSULE | Refills: 1 | Status: SHIPPED | OUTPATIENT
Start: 2024-07-26 | End: 2024-09-24

## 2024-07-26 RX ORDER — FOLIC ACID 1 MG/1
1 TABLET ORAL DAILY
Qty: 30 TABLET | Refills: 11 | Status: SHIPPED | OUTPATIENT
Start: 2024-07-26 | End: 2025-07-26

## 2024-07-26 RX ORDER — PREDNISONE 2.5 MG/1
5 TABLET ORAL DAILY
Qty: 42 TABLET | Refills: 2 | Status: SHIPPED | OUTPATIENT
Start: 2024-07-26 | End: 2024-09-24

## 2024-07-26 RX ORDER — POLYETHYLENE GLYCOL 3350 17 G/17G
17 POWDER, FOR SOLUTION ORAL DAILY PRN
Qty: 30 PACKET | Refills: 0 | Status: SHIPPED | OUTPATIENT
Start: 2024-07-26 | End: 2024-08-25

## 2024-07-26 RX ORDER — CLOPIDOGREL BISULFATE 75 MG/1
75 TABLET ORAL DAILY
Qty: 90 TABLET | Refills: 3 | Status: SHIPPED | OUTPATIENT
Start: 2024-07-26 | End: 2025-07-26

## 2024-07-26 RX ORDER — INSULIN LISPRO 100 [IU]/ML
0-10 INJECTION, SOLUTION INTRAVENOUS; SUBCUTANEOUS
Qty: 10 ML | Refills: 0 | Status: SHIPPED | OUTPATIENT
Start: 2024-07-26 | End: 2024-07-26 | Stop reason: HOSPADM

## 2024-07-26 RX ORDER — DOCUSATE SODIUM 100 MG/1
100 CAPSULE, LIQUID FILLED ORAL 2 TIMES DAILY PRN
Qty: 60 CAPSULE | Refills: 0 | Status: SHIPPED | OUTPATIENT
Start: 2024-07-26 | End: 2025-07-26

## 2024-07-26 RX ORDER — CEPHALEXIN 500 MG/1
500 CAPSULE ORAL EVERY 12 HOURS SCHEDULED
Qty: 20 CAPSULE | Refills: 0 | Status: SHIPPED | OUTPATIENT
Start: 2024-07-26 | End: 2024-08-05

## 2024-07-26 RX ORDER — MULTIVIT-MIN/IRON FUM/FOLIC AC 7.5 MG-4
1 TABLET ORAL DAILY
Qty: 30 TABLET | Refills: 1 | Status: SHIPPED | OUTPATIENT
Start: 2024-07-26 | End: 2024-09-24

## 2024-07-26 ASSESSMENT — COGNITIVE AND FUNCTIONAL STATUS - GENERAL
MOBILITY SCORE: 17
DAILY ACTIVITIY SCORE: 20
WALKING IN HOSPITAL ROOM: A LITTLE
DRESSING REGULAR LOWER BODY CLOTHING: A LITTLE
CLIMB 3 TO 5 STEPS WITH RAILING: A LOT
HELP NEEDED FOR BATHING: A LITTLE
STANDING UP FROM CHAIR USING ARMS: A LITTLE
TURNING FROM BACK TO SIDE WHILE IN FLAT BAD: A LITTLE
MOVING TO AND FROM BED TO CHAIR: A LITTLE
TOILETING: A LITTLE
MOVING FROM LYING ON BACK TO SITTING ON SIDE OF FLAT BED WITH BEDRAILS: A LITTLE
DRESSING REGULAR UPPER BODY CLOTHING: A LITTLE

## 2024-07-26 ASSESSMENT — PAIN - FUNCTIONAL ASSESSMENT: PAIN_FUNCTIONAL_ASSESSMENT: 0-10

## 2024-07-26 ASSESSMENT — PAIN SCALES - GENERAL: PAINLEVEL_OUTOF10: 0 - NO PAIN

## 2024-07-26 NOTE — TREATMENT PLAN
Had a detailed discussion with patient and nurse regarding discharge.  Patient claims he felt comfortable with being discharged and was able to accurately go over his medications; follow-up appointments and expressed clear understanding on what was being asked of him.  Patient educated on medications by nurse as well as discharge instructions regarding home care nurse visit tomorrow am. No barriers to discharge.

## 2024-07-26 NOTE — PROGRESS NOTES
"Transitional Care Coordination Progress Note:  Reached out to patients Methadone Clinic to inquire if any interruption will occur on discharge from hospital or facility. Spoke with Celestina who stated patient just needs to bring DC paperwork to first appointment. Celestina also stated patient does not need to schedule a new appointment and can continue his normal routine. Will update patient and speak with patient regarding decision on facility choice vs home.    Update @ 1053 Spoke with patient regarding three accepting facilites, [facility], [facility] and [facility]. Informed patient that [facility] and [facility] is able to prescribe and manage Methadone onsite and [facility] would transport patient to his Methadone clinic in [place]. Patient states again his preference is to go home. Patient states \"I'd would rather go home. Just give me a walker and oxygen and see if I can get a BiPAP or something now that I have COPD\". Informed patient again of benefits of SNF on discharge and patient continues to refuse. Team and floor ANM notified of patient continuing to refuse placement and request to go home.     Update @ 1543 Patient continued to refuse SNF placement and requesting to be discharged home. Walking Pulse Ox test completed by bedside nurse and patient does not qualify for in home oxygen. Contacted [facility] regarding possible BiPAP or Trilogy and patients ABG is within normal range as patient CO2 needs to be above 45 and last ABG test CO2 was 41. Team was notified and agree to discharge patient home with HC vs Outpatient PT pending if home care able to accept. Transport requested in [place] and claimed by [facility] by stretcher for 2100. Blue slip delivered to  and copy given to bedside nurse. Patient updated on transport time and possibility of not being able to secure home care d/t Methadone " use/drug abuse. [facility] home and patient would be willing to go there for Outpatient PT, team notified. Message sent to [facility] regarding ability to accept and possible SOC for RN, awaiting response. SW was also notified and APS consult was requested to follow up on patient on discharge.     Update @ 2327 [facility] able to accept and provided start of care 07/27, team updated.  Kael Benites, YOLIN, RN, Supervisor Care Transitions [phone]

## 2024-07-26 NOTE — PROGRESS NOTES
Social Work Note:    LISW was contacted by [facility] to call APS.  LISW called [facility] APS [phone]) nd had to leave a message. REFUGIO will continue to follow as needed    JUAN ALBERTO Romero, REFUGIO-S

## 2024-07-26 NOTE — CARE PLAN
The patient's goals for the shift include pain management.      The clinical goals for the shift include pt will remain free from injury.

## 2024-07-26 NOTE — DISCHARGE SUMMARY
Discharge Diagnosis  CAD in native artery    Hospital Course  Mr. Zepeda is a 59yo male with a history of RA on chronic prednisone and Plaquenil, DM2 on insulin, COPD, hypothyroidism, tobacco use (40 pack years) and chronic methadone use, neuropathy and unhealed diabetic foot ulcers (s/p right foot partial amputation ~7 months ago) and follows with the wound clinic as outpatient; who was admitted to [facility] on 7/1/2024 as a transfer from [facility] for CABG evaluation/workup.      Patient originally presented to [place] on 6/30 with intermittent chest pain.  While at [place] patient was diagnosed with NSTEMI and loaded with ASA.  A LHC was completed and showed multi-vessel CAD, and an echo that showed an EF of 40-45%.  HE was then transferred to [facility] for CABG evaluation.      OPERATION/PROCEDURE: 7/9/2024 with Yareli Carey   - Median sternotomy  - Attempt off-pump surgery  - On pump coronary artery bypass grafting x 3  --- LIMA to LAD  --- MANUELITO( Y'ed off LIMA) to obtuse marginal  --- SVG to right PLV (terminal branch of RCA)  - Endoscopic harvesting of the right greater saphenous vein  - Sternal plating    CTICU Course: Post operative course c/b acute hypoxic respiratory insufficiency requiring high flow nasal cannula, now improved.Afib with RVR to the 130s requiring bolus and amio drip. Now in NSR in 60-70's with some bradycardia to high 40's-> PO amio dc'd.  JUAN J post op with increasing CR to 2.08 (diuresis was held) now 1.25.  Pulmonary edema still noted on xray. ? Fentanyl use while inpatient brought from family members       Transfer to [facility]3: 7/16/2024  =================    Floor Course:  - Patient was diuresed for fluid volume overload post cardiac surgery; Preop weight: 85.3kg, discharge wt: 78.6kg  --- Patient adequately diuresed while inpatient     - On ASA, statin, BB, by discharge  - Continue Plavix for 1 year for NSTEMI     AFIB RVR during hospitalization   --  treated with Amiodarone; converted back to SR   -- Amio stopped due to bradycardia   -- Discharging patient on Metoprolol succinate 25mg daily     H/o peripheral neuropathy/chronic methadone use/Depression   -Discharging patient on home regimen:   --- Plaquenil  --- Methadone BID   NOTE: Methadone Rx will be given managed at facility of discharge with addiction specialist provider who is able to write for, dispense, and manage while inpatient at facility   --- Pregabalin 200mg daily   --- Cymbalta 60mg daily     Acute hypoxic pulmonary insufficiency requiring supplemental oxygen; - Resolved   Hx of COPD: home meds: Albuterol PRN   MARIAELENA: No home CPAP  --- Discharging patient on 3L NC during the day for SpO2 > 92%   --- Continue Nocturnal CPAP for sleep: while at Acute rehab facility   Settings:   High pressure: 10  Low Pressure: 5  RR: 14   - Patient will follow up with formal sleep study as an outpatient     JUAN J post op CR 2.08-no history of renal disease, baseline creatinine 0.7   - CRE at discharge: 0.85  - JUAN J resolved by discharge     Stress Induced Hyperglycemia 2/2 steroids Newly Diagnosed DM type II   - A1c: 8.5% (5/15/2024)   - Endocrine consulted/followed throughout hospital stay; appreciated home going recs:   --- Glargin 25 units Daily   --- Lispro 8 units TID with meals IN ADDITION to SSI   --- Lispro Premeal Sliding Scale #2 (0-10 units)   --- Metformin 500mg BID   --- Follow up with endocrinology as an outpatient     Chronic diabetic wounds to bilateral feet    - Wound care following, appreciate final recs:   ---Clean with Vashe  ---Clean feet with cleansing cloths and apply lotion to foot, not on wounds.   ---Apply Aquacel silver to wound bed, (cut to size) then cover with ABD, wrap with kerlix.   ---Change daily     - Epicardial wires CUT on 7/23/2024  - Telemetry at discharge SR 60's to 70's   - 2v CXR 7/17/24 reviewed with continued perihilar edema improving   - Postop echo 7/15/24 LVEF 40%->  "similar to pre-op echo   - Cardiac rehab referral was placed  - PT recs moderate intensity therapy and acute rehab  - Anticipate discharge to acute rehab facility    On day of discharge, vital signs were stable and no acute distress was noted. All questions were answered. After VS and labs were reviewed it was determined the patient was stable for discharge.     Discharged to [facility] on 7/23/2024  =======================    Hospital day of discharge management- spent >30 minutes coordinating the discharge and counseling/educating patient and family regarding discharge instructions.     Past Medical History:  - Fibromyalgia    - Hypertension  - rheumatoid arthritis  - degenerative disc disease  - osteoporosis  - hyperlipidemia  - hypothyroidism   Type 2 diabetes mellitus    Past Surgical History:  - BACK SURGERY (06/28/2018)   - CARDIAC CATHETERIZATION: Left Heart Cath;  Surgeon: Yao Calvin MD;  Location: [facility] Cardiac [facility] Lab (7/1/2024)   - SEPTOPLASTY (06/28/2018)     Medications Prior to Admission  - albuterol 90 mcg/actuation inhaler; Inhale 2 puffs every 6 hours if needed for shortness of breath.       - aspirin 81 mg EC tablet; Take 1 tablet (81 mg) by mouth once daily.       - atorvastatin (Lipitor) 40 mg tablet; Take 1 tablet (40 mg) by mouth once daily.       - BD Lois 2nd Gen Pen Needle 32 gauge x 5/32\" needle; USE 1 NEEDLE ONCE DAILY       - celecoxib (CeleBREX) 200 mg capsule ; Take 1 capsule (200 mg) by mouth twice a day.       - DULoxetine (Cymbalta) 60 mg DR capsule; 1 capsule (60 mg) once every 24 hours.       - furosemide (Lasix) 40 mg tablet; Take 1 tablet (40 mg) by mouth once daily.       - hydroxychloroquine (Plaquenil) 200 mg tablet; Take 1 tablet (200 mg) by mouth 2 times a day.       - ibuprofen 800 mg tablet; Take 1 tablet (800 mg) by mouth every 8 hours if needed for pain.       - insulin lispro (HumaLOG) 100 unit/mL injection; Inject 10 Units under the skin.    "    - Levemir FlexPen 100 unit/mL (3 mL) pen; Inject 50 Units under the skin once daily at bedtime.       - levothyroxine (Synthroid, Levoxyl) 50 mcg tablet; Take 1 tablet (50 mcg) by mouth once daily.       - lisinopril 20 mg tablet; Take 1 tablet (20 mg) by mouth once daily.      - metFORMIN (Glucophage) 500 mg tablet; Take 1 tablet (500 mg) by mouth 2 times daily (morning and late afternoon).       - methadone (Dolophine) 10 mg/mL solution; Take 7.7 mL (77 mg) by mouth. IN THE MORNING THEN 78 MG IN THE EVENING       - omeprazole (PriLOSEC) 40 mg DR capsule; Take 1 capsule (40 mg) by mouth once daily.       - predniSONE (Deltasone) 2.5 mg tablet; Take 2 tablets (5 mg) by mouth once daily.       - pregabalin (Lyrica) 200 mg capsule; Take 1 capsule (200 mg) by mouth 3 times a day.       - Spiriva Respimat 2.5 mcg/actuation inhaler; Inhale 2 puffs once daily.       - Wixela Inhub 100-50 mcg/dose diskus inhaler; INHALE 1 PUFF AS INSTRUCTED TWICE DAILY. RINSE AND GARGLE MOUTH WITH WATER AFTER EACH USE.    - ammonium lactate (Amlactin) 12 % cream; Apply topically.     - riTUXimab-abbs (Truxima) 10 mg/mL solution; 100 mL (1,000 mg) every 6 months.      Patient has no known allergies.    Social History  - Smoking status: Every Day; 1 PPD for 40 years; Cigarettes   - Smokeless tobacco: Never  - Alcohol use: Not Currently  - Drug use: Never     Postop Images:     XR CHEST 2 VIEWS; 7/17/2024 6:54 am  CARDIOMEDIASTINAL SILHOUETTE:  Patient is status post median sternotomy.  LUNGS:  Continued perihilar interstitial edema with right basilar  atelectasis. Right-sided effusion without pneumothorax.  ABDOMEN:  No remarkable upper abdominal findings.  BONES:  No acute osseous changes.      IMPRESSION:  1.  Slight interval improvement in lung aeration status post median  sternotomy. Residual edema and right basilar atelectasis.      Signed by: Santo Goldman 7/18/2024 7:21 AM  Dictation workstation:   [id number]     Pelvis and  right hip dated 7/18/2024.     FINDINGS:  No fracture or dislocation is evident. Surgical and degenerative  changes seen of the spine. Minimal degenerative changes seen of the  hips. No soft tissue gas or radiopaque foreign body is evident.      IMPRESSION:  No osseous injury is evident.      Signed by: Sandeep Andrade 7/18/2024 1:25 PM  Dictation workstation:   [id number]      Pertinent Physical Exam At Time of Discharge  Constitutional: Cooperative, in no acute distress, alert, appears stated age.  Skin: Skin color, texture, turgor normal. No rashes or lesions.  Neck: Neck supple, no carotid bruits, no JVD  Respiratory: Lungs clear to auscultation, no wheezing or rhonchi, no use of accessory muscles  Chest wall: No scars, normal excursion with respiration  Cardiovascular: Regular rhythm without murmur  Gastrointestinal: Abdomen soft, nontender. Bowel sounds normal.  Musculoskeletal: Strength equal in upper extremities  Extremities: No pitting edema  Neurologic: Sensation grossly intact, alert and oriented ×3    Home Medications     Medication List      START taking these medications     acetaminophen 325 mg tablet; Commonly known as: Tylenol; Take 3 tablets   (975 mg) by mouth every 6 hours if needed for mild pain (1 - 3), moderate   pain (4 - 6) or headaches.   clopidogrel 75 mg tablet; Commonly known as: Plavix; Take 1 tablet (75   mg) by mouth once daily.   docusate sodium 100 mg capsule; Commonly known as: Colace; Take 1   capsule (100 mg) by mouth 2 times a day as needed (hard stools).   folic acid 1 mg tablet; Commonly known as: Folvite; Take 1 tablet (1 mg)   by mouth once daily.   insulin glargine 100 unit/mL injection; Commonly known as: Lantus;   Inject 25 Units under the skin once every 24 hours. Take as directed per   insulin instructions.   iron polysaccharides 150 mg iron capsule; Commonly known as:   Nu-Iron,Niferex; Take 1 capsule (150 mg) by mouth once daily.   metoprolol succinate XL 25 mg 24 hr  "tablet; Commonly known as:   Toprol-XL; Take 1 tablet (25 mg) by mouth once daily. Do not crush or   chew.   multivitamin with minerals tablet; Take 1 tablet by mouth once daily.   oxygen gas therapy; Commonly known as: O2; Inhale 1 each once every 24   hours.   pantoprazole 40 mg EC tablet; Commonly known as: ProtoNix; Take 1 tablet   (40 mg) by mouth once daily in the morning. Take before meals. Do not   crush, chew, or split.   polyethylene glycol 17 gram packet; Commonly known as: Glycolax,   Miralax; Take 17 g by mouth once daily as needed (coinstipation).   thiamine 100 mg tablet; Commonly known as: Vitamin B-1; Take 1 tablet   (100 mg) by mouth once daily.     CHANGE how you take these medications     atorvastatin 80 mg tablet; Commonly known as: Lipitor; Take 1 tablet (80   mg) by mouth once daily at bedtime.; What changed: medication strength,   how much to take, when to take this   * insulin lispro 100 unit/mL injection; Commonly known as: HumaLOG;   Inject 8 Units under the skin 3 times a day before meals. Take as directed   per insulin instructions.; What changed: how much to take, when to take   this, additional instructions   * insulin lispro 100 unit/mL injection; Commonly known as: HumaLOG;   Inject 0-10 Units under the skin 3 times a day before meals. Take as   directed per insulin instructions.; What changed: You were already taking   a medication with the same name, and this prescription was added. Make   sure you understand how and when to take each.  * This list has 2 medication(s) that are the same as other medications   prescribed for you. Read the directions carefully, and ask your doctor or   other care provider to review them with you.     CONTINUE taking these medications     albuterol 90 mcg/actuation inhaler   aspirin 81 mg EC tablet   BD Lois 2nd Gen Pen Needle 32 gauge x 5/32\" needle; Generic drug: pen   needle, diabetic   DULoxetine 60 mg DR capsule; Commonly known as: Cymbalta   " hydroxychloroquine 200 mg tablet; Commonly known as: Plaquenil   levothyroxine 50 mcg tablet; Commonly known as: Synthroid, Levoxyl   metFORMIN 500 mg tablet; Commonly known as: Glucophage   predniSONE 2.5 mg tablet; Commonly known as: Deltasone   pregabalin 200 mg capsule; Commonly known as: Lyrica   Spiriva Respimat 2.5 mcg/actuation inhaler; Generic drug: tiotropium   Wixela Inhub 100-50 mcg/dose diskus inhaler; Generic drug: fluticasone   propion-salmeteroL     STOP taking these medications     ammonium lactate 12 % cream; Commonly known as: Amlactin   celecoxib 200 mg capsule; Commonly known as: CeleBREX   furosemide 40 mg tablet; Commonly known as: Lasix   ibuprofen 800 mg tablet   Levemir FlexPen 100 unit/mL (3 mL) pen; Generic drug: insulin detemir   lisinopril 20 mg tablet   methadone 10 mg/mL solution; Commonly known as: Dolophine   omeprazole 40 mg DR capsule; Commonly known as: PriLOSEC   riTUXimab-abbs 10 mg/mL solution; Commonly known as: Truxima       Outpatient Follow-Up  Future Appointments   Date Time Provider Department Center   8/9/2024  9:30 AM JOSE [id number] [place] NURSE [id number] [place]   8/27/2024  3:00 PM MD JOSE [id number] [place]       CHACE Rodriguez-CNP

## 2024-07-26 NOTE — NURSING NOTE
Discharge Note:    RN reviewed discharge instructions with patient while patient's brother was on speaker phone.   Medications reviewed with patient. Patient needs reinforcement with teaching. RN notified cardiac surgery NP of patient's knowledge deficit.   Wound care reviewed with patient.   RN reinforced all instructions with patient.   Patient has no further questions.    Helen Hudson RN.

## 2024-07-26 NOTE — NURSING NOTE
Walking Pulse Oximetry Test:    Patient SpO2 on room air sitting at edge of bed: 97%.  Patient SpO2 on room air standin%.  Patient SpO2 on room air while ambulatin%.  Patient SpO2 on room air sitting after ambulation: 95%.      Helen Hudson RN.

## 2024-07-26 NOTE — DISCHARGE INSTRUCTIONS
Wound Care Instructions:  Chest Surgical Incisions: wash with soap and water daily, pat dry, leave open to air.  Left Leg Incision: monitor for drainage, increased swelling, redness, fever/chills. If redness increases in size, any pus like drainage, or fever/chills notify cardiac surgery office.  Foot Ulcers: Betadine or wound cleanser on wounds, aquacell silver, ABD pad, wrap in gauze, change daily.    Please call the cardiac surgery office at [phone] for any questions or concerns.

## 2024-07-26 NOTE — PROGRESS NOTES
"CARDIAC SURGERY DAILY PROGRESS NOTE    Mr. Zepeda is a 58-year-old male with a PMH of RA (on chronic prednisone and hydroxychloroquine), type 2 DM (on insulin), COPD, hypothyroidism, 40-pack-year smoking history, chronic methadone use, peripheral neuropathy, and unhealed diabetic foot ulcers s/p right foot partial amputation (7 months ago - follows with wound clinic as outpatient) and most recently NSTEMI. He is now s/p CABGx3 with Dr. Carey on 7/9.     OPERATION/PROCEDURE: CABGx3 with Dr. Carey on 7/9.   - Median sternotomy  - Attempt off-pump surgery  - On pump coronary artery bypass grafting x 3  --- LIMA to LAD  --- MANUELITO( Y'ed off LIMA) to obtuse marginal  --- SVG to right PLV (terminal branch of RCA)  - Endoscopic harvesting of the right greater saphenous vein  - Sternal plating    History of NSTEMI. Is now status post CABGx3 Pre: Mildly decreased LV function, EF 40-45%. Post: normal LV function.     CTICU Course: Post operative course c/b acute hypoxic respiratory insufficiency requiring high flow nasal cannula, now improved.Afib with RVR to the 130s requiring bolus and amio drip. Now in NSR in 60-70's with some bradycardia to high 40's-> PO amio dc'd.  JUAN J post op with increasing CR to 2.08 (diuresis was held) now 1.25.  Pulmonary edema still noted on xray. ? Fentanyl use while inpatient brought from family members     Transferred to [facility] on 7/16/24    =====================================    Objective:   -c/w problems of transitioning patient home  -refusing to go to the facility   -will go home; needs walking O2 test     SUBJECTIVE:  No complaints.       Objective   /58   Pulse 70   Temp 36.3 °C (97.3 °F)   Resp 18   Ht 1.676 m (5' 6\")   Wt 83 kg (182 lb 15.7 oz)   SpO2 99%   BMI 29.53 kg/m²   0-10 (Numeric) Pain Score: 0 - No pain   3 Day Weight Change: Unable to Calculate    Heart Rate:  [68-82]   Temp:  [36 °C (96.8 °F)-36.6 °C (97.9 °F)]   Resp:  [17-18]   BP: (811-391)/(58-04) "   SpO2:  [99 %-100 %]      Intake and Output    Intake/Output Summary (Last 24 hours) at 7/26/2024 1232  Last data filed at 7/25/2024 1823  Gross per 24 hour   Intake 360 ml   Output 1 ml   Net 359 ml       Physical Exam  Vitals and nursing note reviewed.   Constitutional:       General: He is not in acute distress.     Appearance: Normal appearance. He is not ill-appearing.      Comments: Patient alert and awake sitting up in bed, in no acute distress.    HENT:      Head: Atraumatic.      Mouth/Throat:      Pharynx: Oropharynx is clear.   Eyes:      Conjunctiva/sclera: Conjunctivae normal.      Pupils: Pupils are equal, round, and reactive to light.   Neck:      Comments: Trachea midline   Cardiovascular:      Rate and Rhythm: Normal rate. Rhythm irregular.      Pulses: Normal pulses.      Heart sounds: Normal heart sounds. No murmur heard.     No gallop.      Comments: TELE: SB with 1st degree AV Block 60's ot 70's   +2 Equal and even pulses in all extremities   Wires CUT 7/23.   Pulmonary:      Effort: Pulmonary effort is normal. No respiratory distress.      Breath sounds: No stridor. Wheezing present.      Comments: Diminished breath sound bilaterally with occasional expiratory wheezes   Sternum stable.   Abdominal:      General: Bowel sounds are normal. There is no distension.      Palpations: Abdomen is soft.      Tenderness: There is no abdominal tenderness.      Comments: BM (+) 7/17/24   Genitourinary:     Comments: Voids independently  Musculoskeletal:      Cervical back: Neck supple.      Right lower leg: No edema.      Left lower leg: No edema.      Comments: WATERS     Skin:     General: Skin is warm and dry.      Capillary Refill: Capillary refill takes less than 2 seconds.      Coloration: Skin is not jaundiced or pale.      Findings: No bruising.      Comments: midsternal chest incision C/D/I, well approximated, no s/s infection  SVG  SIMON well approx no s/sx of infections      Neurological:       General: No focal deficit present.      Mental Status: He is alert and oriented to person, place, and time.   Psychiatric:         Mood and Affect: Mood normal.         Behavior: Behavior normal.       Medications  Scheduled medications  acetaminophen, 975 mg, oral, q6h  aspirin, 81 mg, oral, Daily  atorvastatin, 80 mg, oral, Nightly  clopidogrel, 75 mg, oral, Daily  DULoxetine, 60 mg, oral, Daily  folic acid, 1 mg, oral, Daily  heparin, 5,000 Units, subcutaneous, q8h  hydroxychloroquine, 200 mg, oral, BID  insulin glargine, 25 Units, subcutaneous, q24h  insulin lispro, 0-10 Units, subcutaneous, TID AC  insulin lispro, 8 Units, subcutaneous, TID AC  iron polysaccharides, 150 mg, oral, Daily  levothyroxine, 50 mcg, oral, Daily  metFORMIN, 500 mg, oral, BID  methadone, 77.5 mg, oral, q12h  metoprolol tartrate, 12.5 mg, oral, BID  multivitamin with minerals, 1 tablet, oral, Daily  oxygen, , inhalation, Continuous - Inhalation  pantoprazole, 40 mg, oral, Daily before breakfast  polyethylene glycol, 17 g, oral, Daily  pregabalin, 200 mg, oral, Daily  sennosides-docusate sodium, 2 tablet, oral, BID  thiamine, 100 mg, oral, Daily  tiotropium, 2 puff, inhalation, Daily    Continuous medications   PRN medications  PRN medications: albuterol, benzocaine-menthol, dextrose, dextrose **OR** glucagon, naloxone, [DISCONTINUED] ondansetron **OR** ondansetron, oxygen, sodium chloride    Labs  Results for orders placed or performed during the hospital encounter of 07/01/24 (from the past 24 hour(s))   POCT GLUCOSE   Result Value Ref Range    POCT Glucose 60 (L) 74 - 99 mg/dL   POCT GLUCOSE   Result Value Ref Range    POCT Glucose 110 (H) 74 - 99 mg/dL   POCT GLUCOSE   Result Value Ref Range    POCT Glucose 155 (H) 74 - 99 mg/dL   POCT GLUCOSE   Result Value Ref Range    POCT Glucose 114 (H) 74 - 99 mg/dL   CBC   Result Value Ref Range    WBC 6.9 4.4 - 11.3 x10*3/uL    nRBC 0.0 0.0 - 0.0 /100 WBCs    RBC 3.58 (L) 4.50 - 5.90 x10*6/uL     Hemoglobin 8.9 (L) 13.5 - 17.5 g/dL    Hematocrit 30.0 (L) 41.0 - 52.0 %    MCV 84 80 - 100 fL    MCH 24.9 (L) 26.0 - 34.0 pg    MCHC 29.7 (L) 32.0 - 36.0 g/dL    RDW 22.2 (H) 11.5 - 14.5 %    Platelets 307 150 - 450 x10*3/uL   Renal function panel   Result Value Ref Range    Glucose 151 (H) 74 - 99 mg/dL    Sodium 138 136 - 145 mmol/L    Potassium 4.2 3.5 - 5.3 mmol/L    Chloride 99 98 - 107 mmol/L    Bicarbonate 27 21 - 32 mmol/L    Anion Gap 16 10 - 20 mmol/L    Urea Nitrogen 22 6 - 23 mg/dL    Creatinine 1.02 0.50 - 1.30 mg/dL    eGFR 85 >60 mL/min/1.73m*2    Calcium 8.0 (L) 8.6 - 10.6 mg/dL    Phosphorus 3.5 2.5 - 4.9 mg/dL    Albumin 3.6 3.4 - 5.0 g/dL   Magnesium   Result Value Ref Range    Magnesium 2.17 1.60 - 2.40 mg/dL   POCT GLUCOSE   Result Value Ref Range    POCT Glucose 146 (H) 74 - 99 mg/dL   POCT GLUCOSE   Result Value Ref Range    POCT Glucose 357 (H) 74 - 99 mg/dL       IMAGES  I have personally reviewed the following test result(s):   XR CHEST 2 VIEWS; 7/17/2024 6:54 am  CARDIOMEDIASTINAL SILHOUETTE:  Patient is status post median sternotomy.  LUNGS:  Continued perihilar interstitial edema with right basilar  atelectasis. Right-sided effusion without pneumothorax.  ABDOMEN:  No remarkable upper abdominal findings.  BONES:  No acute osseous changes.      IMPRESSION:  1.  Slight interval improvement in lung aeration status post median  sternotomy. Residual edema and right basilar atelectasis.      Signed by: Santo Goldman 7/18/2024 7:21 AM  Dictation workstation:   [id number]    Pelvis and right hip dated 7/18/2024.     FINDINGS:  No fracture or dislocation is evident. Surgical and degenerative  changes seen of the spine. Minimal degenerative changes seen of the  hips. No soft tissue gas or radiopaque foreign body is evident.      IMPRESSION:  No osseous injury is evident.      Signed by: Sandeep Andrade 7/18/2024 1:25 PM  Dictation workstation:   [id number]    IMPRESSION & PLAN:  POD # 16  s/p CABGx3 with Dr. Carey on 7/9   - Increase activity/ ambulation; PT/OT  - Cardiac rehab referral placed  - Continue cardiac meds: ASA, BB, statin and Plavix for NSTEMI  - Chronic methadone use->  continue methadone 77.5mg q12 hrs  --- Patient will be discharged on home dose of methadone as per patient's Methadone Clinic   --- 7/23: Called methadone clinic and discussed home dose to confirm   - 2v CXR 7/17/24 reviewed with continued perihilar edema improving  - Postop echo 7/15/24 LVEF 40%-> similar to pre-op echo   - 7/23: Epicardial wires CUT; per surgeon preference   - Tele until discharge  - Optimize nutrition and electrolytes    Rhythm   pAfib with RVR- initially on Amiodarone dc'd 7/16 due to bradycardia   - Tele: SR 60's to 70's   -  Continue BB: metoprolol [id number] BID; will convert to succinate at discharge    - continue telemetry until discharge     Acute Blood Loss Anemia - stable  Recent Labs     07/26/24  0658 07/25/24  0731 07/24/24  0818 07/23/24  0917 07/22/24  1218 07/19/24  1253 07/18/24  0718   HGB 8.9* 9.1* 9.4* 9.5* 9.5* 8.7* 9.0*   HCT 30.0* 30.3* 31.8* 31.7* 31.8* 27.7* 28.9*   - MV  - No Need for PO Iron due to stable H&H  - Daily labs, transfuse as indicated      JUAN J post op CR 2.08-no history of renal disease, baseline creatinine 0.7 - Resolved  Creatinine   Date Value Ref Range Status   07/26/2024 1.02 0.50 - 1.30 mg/dL Final   07/25/2024 0.98 0.50 - 1.30 mg/dL Final   07/24/2024 0.93 0.50 - 1.30 mg/dL Final     Volume/Electrolyte Status: Preop wt Weight: 85.3 kg (188 lb)   Vitals:    07/25/24 0540   Weight: 83 kg (182 lb 15.7 oz)   - Weight: 83, 83, 78.6, 86, 83.5, 85.3, 86.3kg   - Lasix infusion stopped 7/16-holding diuresis as appears intervascularly depleted   - seems to be at base weight; chest xray with congestion still   - 7/18 Lasix IV x1 to eval response  - 7/24: Lasix 20mg IV x1   - 7/25: Lasix 20mg IV x1   - Replete electrolytes for hypokalemia / hypomagnesemia /  "hypophosphatemia as needed   -renally dose all medications  -avoid nephrotoxic drugs  - Daily weights/strict I&Os  - Daily RFP    H/o peripheral neuropathy and chronic methadone use  - Acute post operative pain currently well controlled on home methadone and scheduled Tylenol.   - c/w thiamine, folate, home pregabalin and duloxetine  - c/w home Plaquenil   - c/w methadone back to BID home dose     Acute hypoxic pulmonary insufficiency requiring supplemental oxygen; - Resolved   Hx of COPD: home meds:  Albuterol PRN  - patient completed stress dose steroids for COPD exacerbation   - c/w IS, bronchial hygiene: IS & Ez Pap   - continue with albuterol PRN     MARIAELENA: Patient reports having a sleep study \"many\" years ago and was told he has MARIAELENA. He did not follow up and thus does not have CPAP unit at home.  - Wean O2 as tolerated - 3L NC with nocturnal CPAP   - Continue inpatient CPAP while hospitalized; patient reports that he feels so much better with CPAP  - Follow up as outpatient for formal sleep study       Stress Induced Hyperglycemia 2/2 steroids Newly Diagnosed DM type II   Lab Results   Component Value Date    HGBA1C 8.5 (H) 05/15/2024     Results from last 7 days   Lab Units 07/26/24  1150 07/26/24  0824 07/25/24  2056 07/25/24  1819 07/25/24  1411 07/25/24  1331 07/25/24  0753   POCT GLUCOSE mg/dL 357* 146* 114* 155* 110* 60* 94   - diabetic diet 60 grams CHO   - Hypoglycemic protocol   - Endo consulted/following, appreciate final recs (7/22):   --- Continue Insuline Glargine 25 units daily   --- Continue Lispro 8 units TID with meals   --- Continue Lispro premeal corrective sliding scale #2  --- Metformin 500 mg BID   --- Follow up with endocrine as an outpatient     Hypothyroidism: Home meds: Synthroid 50mcg daily   Lab Results   Component Value Date    TSH 2.67 07/02/2024   - continue home levothyroxine  - follow up with PCP or endocrinology as an outpatient as scheduled    Skin    - Unhealed diabetic foot " ulcers s/p R foot partial amputation. Wound care following.  - Wound care following, appreciate final recs:   ---Clean with Vashe  ---Clean feet with cleansing cloths and apply lotion to foot, not on wounds.   ---Apply Aquacel silver to wound bed, (cut to size) then cover with ABD, wrap with kerlix.   ---Change daily   -- LLE extremity noted reddened; warm sma    VTE Prophylaxis: SCDs/TEDs, ambulation, SQ heparin  Code Status: Full Code    Dispo  - PT/OT recs changed from home to moderate intensity; SNF vs. Acute Rehab ([facility] following and providing information on possible placements)  - Anticipate discharge once facility that has provider who is able to write for and manage methadone until he is able to be discharged to home (back to home methadone clinic) is willing to accept the patient.   -Previously accepted at [facility] acute rehab facility (no longer a candidate for this facility due to unable to provide needed RX for methadone and unable to transport patient to methadone clinic while receiving care at [facility])   - Will continue to assess discharge needs-c/w problems of transitioning patient home  -refusing to go to the facility   -will go home; needs walking O2 test     CHACE Rodriguez-CNP  Cardiac Surgery [facility]  [facility]  Team Phone [phone]    7/26/2024  12:32 PM

## 2024-07-26 NOTE — CARE PLAN
Problem: Discharge Planning  Goal: Discharge to home or other facility with appropriate resources  Outcome: Progressing     Problem: Arrythmia/Dysrhythmia  Goal: Promote self management  Outcome: Progressing       Problem: Skin  Goal: Decreased wound size/increased tissue granulation at next dressing change  Outcome: Progressing  Goal: Participates in plan/prevention/treatment measures  Outcome: Progressing  Goal: Prevent/manage excess moisture  Outcome: Progressing  Goal: Prevent/minimize sheer/friction injuries  Outcome: Progressing  Goal: Promote/optimize nutrition  Outcome: Progressing  Goal: Promote skin healing  Outcome: Progressing     Problem: Diabetes  Goal: Achieve decreasing blood glucose levels by end of shift  Outcome: Progressing  Goal: Increase stability of blood glucose readings by end of shift  Outcome: Progressing  Goal: Decrease in ketones present in urine by end of shift  Outcome: Progressing  Goal: Maintain electrolyte levels within acceptable range throughout shift  Outcome: Progressing  Goal: Maintain glucose levels >70mg/dl to <250mg/dl throughout shift  Outcome: Progressing  Goal: No changes in neurological exam by end of shift  Outcome: Progressing  Goal: Learn about and adhere to nutrition recommendations by end of shift  Outcome: Progressing  Goal: Vital signs within normal range for age by end of shift  Outcome: Progressing  Goal: Increase self care and/or family involovement by end of shift  Outcome: Progressing  Goal: Receive DSME education by end of shift  Outcome: Progressing     Problem: Knowledge Deficit  Goal: Patient/family/caregiver demonstrates understanding of disease process, treatment plan, medications, and discharge instructions  Outcome: Progressing     Problem: Pain  Goal: Takes deep breaths with improved pain control throughout the shift  Outcome: Progressing  Goal: Turns in bed with improved pain control throughout the shift  Outcome: Progressing  Goal: Walks with  improved pain control throughout the shift  Outcome: Progressing  Goal: Performs ADL's with improved pain control throughout shift  Outcome: Progressing  Goal: Participates in PT with improved pain control throughout the shift  Outcome: Progressing  Goal: Free from opioid side effects throughout the shift  Outcome: Progressing  Goal: Free from acute confusion related to pain meds throughout the shift  Outcome: Progressing     Problem: Fall/Injury  Goal: Not fall by end of shift  Outcome: Progressing  Goal: Be free from injury by end of the shift  Outcome: Progressing  Goal: Verbalize understanding of personal risk factors for fall in the hospital  Outcome: Progressing  Goal: Verbalize understanding of risk factor reduction measures to prevent injury from fall in the home  Outcome: Progressing  Goal: Use assistive devices by end of the shift  Outcome: Progressing  Goal: Pace activities to prevent fatigue by end of the shift  Outcome: Progressing     Problem: DVT/VTE Prevention/Activity  Goal: No decrease in circulation/sensation  Outcome: Progressing  Goal: Prevent skin breakdown  Outcome: Progressing  Goal: Return to preop oxygenation status  Outcome: Progressing  Goal: Tolerates optimal activity  Outcome: Progressing  Goal: Increase self care and/or family involvement in 24 hrs.  Outcome: Progressing     Problem: Wound care/infection prevention  Goal: No signs of infection in 24 hrs.  Outcome: Progressing  Goal: No unexpected bleeding from incision this shift  Outcome: Progressing     Problem: Diet/fluid balance  Goal: Adequate urinary output  Outcome: Progressing  Goal: Free from nausea/vomiting  Outcome: Progressing  Goal: Return in bowel function  Outcome: Progressing  Goal: Tolerates prescribed diet  Outcome: Progressing     The clinical goals for the shift include Patient will remain free from falls throughout shift.

## 2024-07-28 ENCOUNTER — PATIENT OUTREACH (OUTPATIENT)
Dept: HOME HEALTH SERVICES | Age: 59
End: 2024-07-28
Payer: MEDICARE

## 2024-07-28 SDOH — SOCIAL STABILITY: SOCIAL INSECURITY: WITHIN THE LAST YEAR, HAVE YOU BEEN AFRAID OF YOUR PARTNER OR EX-PARTNER?: NO

## 2024-07-28 SDOH — SOCIAL STABILITY: SOCIAL INSECURITY
WITHIN THE LAST YEAR, HAVE YOU BEEN KICKED, HIT, SLAPPED, OR OTHERWISE PHYSICALLY HURT BY YOUR PARTNER OR EX-PARTNER?: NO

## 2024-07-28 SDOH — ECONOMIC STABILITY: TRANSPORTATION INSECURITY
IN THE PAST 12 MONTHS, HAS LACK OF TRANSPORTATION KEPT YOU FROM MEETINGS, WORK, OR FROM GETTING THINGS NEEDED FOR DAILY LIVING?: NO

## 2024-07-28 SDOH — SOCIAL STABILITY: SOCIAL NETWORK: ARE YOU MARRIED, WIDOWED, DIVORCED, SEPARATED, NEVER MARRIED, OR LIVING WITH A PARTNER?: DIVORCED

## 2024-07-28 SDOH — SOCIAL STABILITY: SOCIAL NETWORK: HOW OFTEN DO YOU ATTEND CHURCH OR RELIGIOUS SERVICES?: NEVER

## 2024-07-28 SDOH — SOCIAL STABILITY: SOCIAL INSECURITY
WITHIN THE LAST YEAR, HAVE TO BEEN RAPED OR FORCED TO HAVE ANY KIND OF SEXUAL ACTIVITY BY YOUR PARTNER OR EX-PARTNER?: NO

## 2024-07-28 SDOH — HEALTH STABILITY: PHYSICAL HEALTH: ON AVERAGE, HOW MANY MINUTES DO YOU ENGAGE IN EXERCISE AT THIS LEVEL?: 0 MIN

## 2024-07-28 SDOH — HEALTH STABILITY: MENTAL HEALTH
HOW OFTEN DO YOU NEED TO HAVE SOMEONE HELP YOU WHEN YOU READ INSTRUCTIONS, PAMPHLETS, OR OTHER WRITTEN MATERIAL FROM YOUR DOCTOR OR PHARMACY?: NEVER

## 2024-07-28 SDOH — ECONOMIC STABILITY: HOUSING INSECURITY: AT ANY TIME IN THE PAST 12 MONTHS, WERE YOU HOMELESS OR LIVING IN A SHELTER (INCLUDING NOW)?: NO

## 2024-07-28 SDOH — SOCIAL STABILITY: SOCIAL NETWORK
DO YOU BELONG TO ANY CLUBS OR ORGANIZATIONS SUCH AS CHURCH GROUPS UNIONS, FRATERNAL OR ATHLETIC GROUPS, OR SCHOOL GROUPS?: NO

## 2024-07-28 SDOH — ECONOMIC STABILITY: FOOD INSECURITY: WITHIN THE PAST 12 MONTHS, YOU WORRIED THAT YOUR FOOD WOULD RUN OUT BEFORE YOU GOT MONEY TO BUY MORE.: NEVER TRUE

## 2024-07-28 SDOH — ECONOMIC STABILITY: TRANSPORTATION INSECURITY
IN THE PAST 12 MONTHS, HAS THE LACK OF TRANSPORTATION KEPT YOU FROM MEDICAL APPOINTMENTS OR FROM GETTING MEDICATIONS?: NO

## 2024-07-28 SDOH — SOCIAL STABILITY: SOCIAL NETWORK: HOW OFTEN DO YOU ATTENT MEETINGS OF THE CLUB OR ORGANIZATION YOU BELONG TO?: NEVER

## 2024-07-28 SDOH — HEALTH STABILITY: MENTAL HEALTH: HOW OFTEN DO YOU HAVE 6 OR MORE DRINKS ON ONE OCCASION?: NEVER

## 2024-07-28 SDOH — ECONOMIC STABILITY: INCOME INSECURITY: IN THE LAST 12 MONTHS, WAS THERE A TIME WHEN YOU WERE NOT ABLE TO PAY THE MORTGAGE OR RENT ON TIME?: NO

## 2024-07-28 SDOH — ECONOMIC STABILITY: INCOME INSECURITY: HOW HARD IS IT FOR YOU TO PAY FOR THE VERY BASICS LIKE FOOD, HOUSING, MEDICAL CARE, AND HEATING?: NOT HARD AT ALL

## 2024-07-28 SDOH — ECONOMIC STABILITY: FOOD INSECURITY: WITHIN THE PAST 12 MONTHS, THE FOOD YOU BOUGHT JUST DIDN'T LAST AND YOU DIDN'T HAVE MONEY TO GET MORE.: NEVER TRUE

## 2024-07-28 SDOH — SOCIAL STABILITY: SOCIAL NETWORK: IN A TYPICAL WEEK, HOW MANY TIMES DO YOU TALK ON THE PHONE WITH FAMILY, FRIENDS, OR NEIGHBORS?: ONCE A WEEK

## 2024-07-28 SDOH — HEALTH STABILITY: MENTAL HEALTH: HOW OFTEN DO YOU HAVE A DRINK CONTAINING ALCOHOL?: NEVER

## 2024-07-28 SDOH — ECONOMIC STABILITY: INCOME INSECURITY: IN THE PAST 12 MONTHS, HAS THE ELECTRIC, GAS, OIL, OR WATER COMPANY THREATENED TO SHUT OFF SERVICE IN YOUR HOME?: NO

## 2024-07-28 SDOH — HEALTH STABILITY: MENTAL HEALTH
STRESS IS WHEN SOMEONE FEELS TENSE, NERVOUS, ANXIOUS, OR CAN'T SLEEP AT NIGHT BECAUSE THEIR MIND IS TROUBLED. HOW STRESSED ARE YOU?: TO SOME EXTENT

## 2024-07-28 SDOH — SOCIAL STABILITY: SOCIAL INSECURITY: WITHIN THE LAST YEAR, HAVE YOU BEEN HUMILIATED OR EMOTIONALLY ABUSED IN OTHER WAYS BY YOUR PARTNER OR EX-PARTNER?: NO

## 2024-07-28 SDOH — HEALTH STABILITY: MENTAL HEALTH: HOW MANY STANDARD DRINKS CONTAINING ALCOHOL DO YOU HAVE ON A TYPICAL DAY?: PATIENT DOES NOT DRINK

## 2024-07-28 SDOH — HEALTH STABILITY: PHYSICAL HEALTH: ON AVERAGE, HOW MANY DAYS PER WEEK DO YOU ENGAGE IN MODERATE TO STRENUOUS EXERCISE (LIKE A BRISK WALK)?: 0 DAYS

## 2024-07-28 SDOH — ECONOMIC STABILITY: HOUSING INSECURITY: IN THE PAST 12 MONTHS, HOW MANY TIMES HAVE YOU MOVED WHERE YOU WERE LIVING?: 0

## 2024-07-28 SDOH — SOCIAL STABILITY: SOCIAL NETWORK: HOW OFTEN DO YOU GET TOGETHER WITH FRIENDS OR RELATIVES?: ONCE A WEEK

## 2024-07-28 ASSESSMENT — LIFESTYLE VARIABLES
AUDIT-C TOTAL SCORE: 0
SKIP TO QUESTIONS 9-10: 1

## 2024-07-28 NOTE — PROGRESS NOTES
Enrollment call  Patient agreeable to [facility] ([facility] Enrollment call completed and program overview explained to patient, with appropriate numbers/info given. Initial visit is 8/1 @ 1000.        57yo male with a history of RA on chronic prednisone and Plaquenil, DM2 on insulin, COPD, hypothyroidism, tobacco use (40 pack years) and chronic methadone use, neuropathy and unhealed diabetic foot ulcers (s/p right foot partial amputation ~7 months ago) and follows with the wound clinic as outpatient; who was admitted to [facility] on 7/1/2024 as a transfer from [facility] for CABG evaluation/workup.  CABG x3 7/9    Follow up with [facility] (Wound Care)  Follow up with [facility] [place], operated by [facility] (Pharmacy)  Follow up with Yao Calvin MD (Cardiology)  Follow up with [facility] Radha Greer MD (Family Medicine)  [facility] able to accept and provided start of care 07/27, team updated.  SW was also notified and APS consult was requested to follow up on patient on discharge.

## 2024-07-29 ENCOUNTER — PATIENT OUTREACH (OUTPATIENT)
Dept: HOME HEALTH SERVICES | Age: 59
End: 2024-07-29
Payer: MEDICARE

## 2024-07-29 NOTE — DISCHARGE SUMMARY
"Discharge Diagnosis  NSTEMI (non-ST elevated myocardial infarction) (Multi) secondary to three-vessel heart disease  COPD  Acquired hypothyroidism  Tobacco abuse  Chronic methadone  Diabetic neuropathy  Diabetic foot ulcer status post right foot partial amputation    Issues Requiring Follow-Up  Transfer to [facility] for cardiac surgery workup    Discharge Meds     Your medication list        CONTINUE taking these medications        Instructions Last Dose Given Next Dose Due   BD Lois 2nd Gen Pen Needle 32 gauge x 5/32\" needle  Generic drug: pen needle, diabetic                  ASK your doctor about these medications        Instructions Last Dose Given Next Dose Due   albuterol 90 mcg/actuation inhaler           ammonium lactate 12 % cream  Commonly known as: Amlactin           aspirin 81 mg EC tablet           atorvastatin 40 mg tablet  Commonly known as: Lipitor           celecoxib 200 mg capsule  Commonly known as: CeleBREX           DULoxetine 60 mg DR capsule  Commonly known as: Cymbalta           furosemide 40 mg tablet  Commonly known as: Lasix           hydroxychloroquine 200 mg tablet  Commonly known as: Plaquenil           ibuprofen 800 mg tablet           insulin lispro 100 unit/mL injection  Commonly known as: HumaLOG           Levemir FlexPen 100 unit/mL (3 mL) pen  Generic drug: insulin detemir           levothyroxine 50 mcg tablet  Commonly known as: Synthroid, Levoxyl           lisinopril 20 mg tablet           metFORMIN 500 mg tablet  Commonly known as: Glucophage           methadone 10 mg/mL solution  Commonly known as: Dolophine           omeprazole 40 mg DR capsule  Commonly known as: PriLOSEC           predniSONE 2.5 mg tablet  Commonly known as: Deltasone  Ask about: Which instructions should I use?           pregabalin 200 mg capsule  Commonly known as: Lyrica           riTUXimab-abbs 10 mg/mL solution  Commonly known as: Truxima           Spiriva Respimat 2.5 mcg/actuation inhaler  Generic drug: " "tiotropium           Wixela Inhub 100-50 mcg/dose diskus inhaler  Generic drug: fluticasone propion-salmeteroL                    Test Results Pending At Discharge  Pending Labs       No current pending labs.            Hospital Course     Kael Zepeda is a 58 y.o. male with PMHx s/f RA, IDDM2, COPD, hypothyroidism, chronic tobacco abuse, chronic methadone dependency,  neuropathy/diabetic foot ulcer with osteomyelitis presenting with chest pain. Patient states for the past week he has had intermittent chest pains, but they have worsened substantially in the last 3 days.  He describes it as a left-sided pressure that radiates down his arms and last 10 to 15 minutes at times.  He also describes a full body \"flushing\" sensation that occurs when the chest pain ends.  He also admits to worsening dyspnea on exertion and overall is unable to ambulate much without becoming severely winded lately.  He is having some mild nausea with it but no vomiting.  He has no known history of cardiac disease but does have a history of insulin-dependent diabetes mellitus type 2.  He was also seen in the ER here earlier this month for calluses and ulcerations on his feet.  He has recently established with the wound center for follow-up.  Patient denies vomiting, cough,, sputum production, abdominal pain, lightheadedness, dizziness, fever or chills. He is a chronic cigarette smoker 1 ppd X 40 years.     ED Course (Summary):   Vitals on presentation: 97.2 F<, 91 bpm, 12 rr, 133/68, 100% on RA  Labs: CMP glu 192, alk phos 143  Mag 1.81  Trop 146 --> 135  CBC WBC 10.0, Hg 10.9, Plt 492  Imaging: CXR - No acute cardiopulmonary process.  EKG - Sinus rhythm at 91 bpm. No significant ST changes.  Interventions: Nitrostat 0.4 mg, aspirin 324 mg, Cardiology was consulted for his elevated troponins and recommended heparin drip to treat NSTEMI and will see pt in morning. Pt will be admitted for further care.    7/29: Patient denies any new " symptoms.  Heart cath performed on the patient has three-vessel disease.  Patient was transferred to [facility] for anticipated cardiac surgery.     Pertinent Physical Exam At Time of Discharge  Physical Exam  CONSTITUTIONAL - alert, in no acute distress, not ill-appearing  CHEST - clear to auscultation, no wheezing, no crackles and no rales, good effort  CARDIAC - regular rate and regular rhythm, no murmur  ABDOMEN - no organomegaly, soft, nontender,  NEUROLOGICAL - alert, oriented x3 and no acute focal signs  PSYCHIATRIC - alert, pleasant and cordial, age-appropriate    Outpatient Follow-Up  Future Appointments   Date Time Provider Department Center   8/1/2024 10:00 AM [id number] None   8/9/2024  9:30 AM PAR [id number] [id number] [place]   8/27/2024  3:00 PM Yareli Carey MD [address]     Discharge planning took more than 30 minutes    Mindy Duong MD

## 2024-07-29 NOTE — PROGRESS NOTES
Daily call completed patient is doing ok he is still very tired glucose levels havs been under 150 he is only using the lantus right now he has had a poor appetite. Patients brother is concerned that that he needs oxygen patient is agreeable to getting set up with [facility] to monitor. I will put him on the schedule to get set up with [facility]. No medication needs question and concerns addressed upcoming appointments reviewed

## 2024-07-30 ENCOUNTER — DOCUMENTATION (OUTPATIENT)
Dept: HOME HEALTH SERVICES | Age: 59
End: 2024-07-30
Payer: MEDICARE

## 2024-07-30 NOTE — PROGRESS NOTES
[facility] Assessment    Patient's Address:   [address] [facility] [address]  **  If this is not the address patient will receive services - alert team and address in EMR**       Patient Contacts:  Extended Emergency Contact Information  Primary Emergency Contact: Harsh Zepeda  Home Phone: [phone]  Relation: Sibling   needed? No  Secondary Emergency Contact: Vin Zepeda  Home Phone: [phone]  Relation: Sibling                                Patient's Preferred Phone: [phone]  Patient's E-mail: No e-mail address on record     Visited pt today to set up masimo and complete an assessment. Assessment shows pts lung sounds to be CTA upper and lower lobes. Picture of surgical wound   Sent, looks to be healing well with no redness or drainage. Vital signs obtained all WNL. Pt states he has no complaints at this time.  Masimo set up successfully. All findings reported to team prior to clearing scene.        It should be noted pts correct address is   [address] [place] [address]

## 2024-07-31 ENCOUNTER — APPOINTMENT (OUTPATIENT)
Dept: WOUND CARE | Facility: CLINIC | Age: 59
End: 2024-07-31
Payer: MEDICARE

## 2024-07-31 ENCOUNTER — PATIENT OUTREACH (OUTPATIENT)
Dept: HOME HEALTH SERVICES | Age: 59
End: 2024-07-31
Payer: MEDICARE

## 2024-07-31 DIAGNOSIS — E11.69 TYPE 2 DIABETES MELLITUS WITH OTHER SPECIFIED COMPLICATION, WITH LONG-TERM CURRENT USE OF INSULIN (MULTI): Primary | ICD-10-CM

## 2024-07-31 DIAGNOSIS — J43.9 PULMONARY EMPHYSEMA, UNSPECIFIED EMPHYSEMA TYPE (MULTI): ICD-10-CM

## 2024-07-31 DIAGNOSIS — I21.4 NSTEMI (NON-ST ELEVATED MYOCARDIAL INFARCTION) (MULTI): ICD-10-CM

## 2024-07-31 DIAGNOSIS — Z79.4 TYPE 2 DIABETES MELLITUS WITH OTHER SPECIFIED COMPLICATION, WITH LONG-TERM CURRENT USE OF INSULIN (MULTI): Primary | ICD-10-CM

## 2024-07-31 NOTE — PROGRESS NOTES
Daily Call Note: spoke to the patient's brother Harsh who stated the patient was sleeping and could not take the call. He stated that the patient was improving - his wounds were without redness, swelling, drainage, odor or discoloration. He reported that the patient was not having pain that was unmanageable. He said his blood sugars were running 127 to 129 to 134 (times not available) and that he has not needed to use insulin based on the sliding scale - he has only used the Glargine insulin. He will give the message to the patient that should any issues arise, he should call the nursing team at any time. He verbalized understanding.     Daily Weight:  There were no vitals filed for this visit.   Last 3 Weights:  Wt Readings from Last 7 Encounters:   07/25/24 83 kg (182 lb 15.7 oz)   07/30/24 79 kg (174 lb 3.2 oz)   07/01/24 90 kg (198 lb 6.6 oz)   06/05/24 86.2 kg (190 lb)   10/13/23 84.8 kg (187 lb)   09/21/22 90.3 kg (199 lb)   08/17/22 90.3 kg (199 lb)       Masimo Device: Yes   Masimo Clinical Impression: stable    Virtual Visits--Scheduled (Most Recent Date at Top)  Follow up Appointments  Recent Visits  No visits were found meeting these conditions.  Showing recent visits within past 30 days and meeting all other requirements  Future Appointments  No visits were found meeting these conditions.  Showing future appointments within next 90 days and meeting all other requirements       Frequency of RN Calls & Virtual Visits per Team Agreement: [facility] Home Frequency: Daily

## 2024-08-01 ENCOUNTER — APPOINTMENT (OUTPATIENT)
Dept: CARE COORDINATION | Age: 59
End: 2024-08-01
Payer: MEDICARE

## 2024-08-01 ENCOUNTER — TELEMEDICINE (OUTPATIENT)
Dept: PHARMACY | Facility: HOSPITAL | Age: 59
End: 2024-08-01
Payer: MEDICARE

## 2024-08-01 ENCOUNTER — PATIENT OUTREACH (OUTPATIENT)
Dept: HOME HEALTH SERVICES | Age: 59
End: 2024-08-01

## 2024-08-01 VITALS — BODY MASS INDEX: 28.57 KG/M2 | WEIGHT: 177 LBS | HEART RATE: 67 BPM | OXYGEN SATURATION: 95 %

## 2024-08-01 DIAGNOSIS — E11.69 TYPE 2 DIABETES MELLITUS WITH OTHER SPECIFIED COMPLICATION, WITH LONG-TERM CURRENT USE OF INSULIN (MULTI): ICD-10-CM

## 2024-08-01 DIAGNOSIS — I21.4 NSTEMI (NON-ST ELEVATED MYOCARDIAL INFARCTION) (MULTI): ICD-10-CM

## 2024-08-01 DIAGNOSIS — I25.708: ICD-10-CM

## 2024-08-01 DIAGNOSIS — Z79.4 TYPE 2 DIABETES MELLITUS WITH OTHER SPECIFIED COMPLICATION, WITH LONG-TERM CURRENT USE OF INSULIN (MULTI): ICD-10-CM

## 2024-08-01 DIAGNOSIS — I10 HYPERTENSION, UNSPECIFIED TYPE: Primary | ICD-10-CM

## 2024-08-01 DIAGNOSIS — J43.9 PULMONARY EMPHYSEMA, UNSPECIFIED EMPHYSEMA TYPE (MULTI): ICD-10-CM

## 2024-08-01 RX ORDER — TIOTROPIUM BROMIDE INHALATION SPRAY 3.12 UG/1
2 SPRAY, METERED RESPIRATORY (INHALATION) DAILY
Qty: 4 G | Refills: 11 | Status: SHIPPED | OUTPATIENT
Start: 2024-08-01

## 2024-08-01 RX ORDER — FLUTICASONE PROPIONATE AND SALMETEROL 100; 50 UG/1; UG/1
1 POWDER RESPIRATORY (INHALATION)
Qty: 60 EACH | Refills: 11 | Status: SHIPPED | OUTPATIENT
Start: 2024-08-01

## 2024-08-01 RX ORDER — METFORMIN HYDROCHLORIDE 500 MG/1
500 TABLET ORAL 2 TIMES DAILY
Qty: 60 TABLET | Refills: 2 | Status: SHIPPED | OUTPATIENT
Start: 2024-08-01

## 2024-08-01 RX ORDER — ALBUTEROL SULFATE 90 UG/1
2 AEROSOL, METERED RESPIRATORY (INHALATION) EVERY 6 HOURS PRN
Qty: 18 G | Refills: 11 | Status: SHIPPED | OUTPATIENT
Start: 2024-08-01

## 2024-08-01 NOTE — PROGRESS NOTES
Pharmacy Post-Discharge Visit    Kael Zepeda is a 58 y.o. male was referred to Clinical Pharmacy Team to complete a post-discharge medication optimization and monitoring visit.  The patient was referred for their diabetes management after recent CABG. He is now also enrolled in our [facility].     Admission Date: 6/30/24  Discharge Date: 7/26/24    Referring Provider: Sammy Dotson MD  PCP: Radha Greer MD - not with        Subjective   No Known Allergies     [facility]  [address] [facility] [address]  Phone: [phone] Fax: [phone]    [facility]  [address] [place] [address]  [facility] [address]  Phone: [phone] Fax: [phone]      Medication System Management:  Affordability/Accessibility: $0 co pays   Adherence/Organization: no issues       Social History     Social History Narrative    Not on file        Notable Medication changes following discharge:  Start: plavix, lantus, iron, metoprolol, pantoprazole, lipitor, keflex, lispro, prednisone  Stop: celebrex, lisinopril, lasix, levemir, omeprazole, trumixa  Change: none    Diabetes  He presents for his initial diabetic visit. He has type 2 diabetes mellitus. There are no hypoglycemic associated symptoms. There are no hypoglycemic complications. Diabetic complications include heart disease. Risk factors for coronary artery disease include diabetes mellitus, male sex and tobacco exposure. Current diabetic treatment includes insulin injections and oral agent (monotherapy). He is compliant with treatment all of the time. His overall blood glucose range is 130-140 mg/dl. An ACE inhibitor/angiotensin II receptor blocker is not being taken.     PULMONARY ASSESSMENT  Patient has been diagnosed with: COPD  does not see a pulmonologist  Last visit: n/a    Current Regimen:  Wixela   Spiriva   Albuterol     Appropriate Inhaler Technique? yes  How often do you use your rescue inhaler?  "Not often     Smoking history:  He has a history of 40 pack years.     Review of Systems        Objective     There were no vitals taken for this visit.   BP Readings from Last 4 Encounters:   07/26/24 126/52   07/30/24 108/86   07/01/24 119/75   06/05/24 141/66      There were no vitals filed for this visit.     LAB  Lab Results   Component Value Date    BILITOT 0.4 07/02/2024    CALCIUM 8.0 (L) 07/26/2024    CO2 27 07/26/2024    CL 99 07/26/2024    CREATININE 1.02 07/26/2024    GLUCOSE 151 (H) 07/26/2024    ALKPHOS 122 (H) 07/02/2024    K 4.2 07/26/2024    PROT 7.0 07/02/2024     07/26/2024    AST 19 07/02/2024    ALT 10 07/02/2024    BUN 22 07/26/2024    ANIONGAP 16 07/26/2024    MG 2.17 07/26/2024    PHOS 3.5 07/26/2024    ALBUMIN 3.6 07/26/2024    GFRMALE >90 02/15/2023     Lab Results   Component Value Date    TRIG 81 07/02/2024    CHOL 122 07/02/2024    LDLCALC 60 07/02/2024    HDL 45.9 07/02/2024     Lab Results   Component Value Date    HGBA1C 8.5 (H) 05/15/2024         Current Outpatient Medications   Medication Instructions    acetaminophen (TYLENOL) 975 mg, oral, Every 6 hours PRN    albuterol 90 mcg/actuation inhaler 2 puffs, inhalation, Every 6 hours PRN    aspirin 81 mg, oral, Daily    atorvastatin (LIPITOR) 80 mg, oral, Nightly    BD Lois 2nd Gen Pen Needle 32 gauge x 5/32\" needle USE 1 NEEDLE ONCE DAILY    cephalexin (KEFLEX) 500 mg, oral, Every 12 hours scheduled    clopidogrel (PLAVIX) 75 mg, oral, Daily    docusate sodium (COLACE) 100 mg, oral, 2 times daily PRN    DULoxetine (CYMBALTA) 60 mg, Every 24 hours    Ferrex 150 150 mg, oral, Daily    folic acid (FOLVITE) 1 mg, oral, Daily    hydroxychloroquine (PLAQUENIL) 200 mg, oral, 2 times daily    insulin lispro (HumaLOG) 100 unit/mL injection Inject 8 Units plus 0 - 10 units per sliding scale under the skin 3 times a day before meals. Take as directed per insulin instructions.    Lantus Solostar U-100 Insulin 25 Units, subcutaneous, Every " 24 hours    levothyroxine (SYNTHROID, LEVOXYL) 50 mcg, oral, Daily    metFORMIN (GLUCOPHAGE) 500 mg, oral, 2 times daily (morning and late afternoon)    metoprolol succinate XL (TOPROL-XL) 25 mg, oral, Daily, Do not crush or chew.    multivitamin with minerals tablet 1 tablet, oral, Daily    pantoprazole (PROTONIX) 40 mg, oral, Daily before breakfast, Do not crush, chew, or split.    polyethylene glycol (GLYCOLAX, MIRALAX) 17 g, oral, Daily PRN    predniSONE (DELTASONE) 5 mg, oral, Daily    pregabalin (LYRICA) 200 mg, oral, 3 times daily    sennosides-docusate sodium (Chelsie-Colace) 8.6-50 mg tablet 2 tablets, oral, 2 times daily    Spiriva Respimat 2.5 mcg/actuation inhaler 2 puffs, inhalation, Daily    thiamine (VITAMIN B-1) 100 mg, oral, Daily    Wixela Inhub 100-50 mcg/dose diskus inhaler INHALE 1 PUFF AS INSTRUCTED TWICE DAILY. RINSE AND GARGLE MOUTH WITH WATER AFTER EACH USE.        HISTORICAL PHARMACOTHERAPY  N/a    DRUG INTERACTIONS  None at time of review      Assessment/Plan   Problem List Items Addressed This Visit       Diabetes mellitus, type 2 (Multi)    Pulmonary emphysema (Multi)    NSTEMI (non-ST elevated myocardial infarction) (Multi)     DM  Most recent A1c was 8.5% on 5/15  Has been periodically checking sugars since being home  Discussed to at least check fasting every day and then before meals   Says sugars have been ranging though from 110-160's   Currently doing 25 units of lantus and 8 units of lispro with meals   Also still taking metformin 500mg BID   Still taking keflex for 10 days total for his legs   COPD  No oxygen   Set up on magi though and being monitored by nursing 24/7  Currently doing wixela and spiriva for his maintenance inhalers daily   Albuterol as needed   NSTEMI/CABG  Has been doing okay since being home  Seems to still be pretty tired and worn out from everything   No issues with incision  Set up with home care too   DAPT --> aspirin, lipitor and plavix   Has not smoked  since being home either, encouraged to continue to not smoke    Follow Up: 1 week     Continue all meds under the continuation of care with the referring provider and clinical pharmacy team.    Amarjit Karimi PharmD     Verbal consent to manage patient's drug therapy was obtained from the patient. They were informed they may decline to participate or withdraw from participation in pharmacy services at any time.

## 2024-08-01 NOTE — PROGRESS NOTES
Daily Call Note: [facility] COMPLETED WITH DR. HILLARY KRAUS   Mr. Zepeda was discharged from the hospital 6 days ago.   August 5th Rheumatology.    Patient does not have a cardiologist yet.   Mr. Zepeda is on Masimo.  Vitals signs stable or room air.   HR 61  R 18   oxygen 100 RA.   Patient was hospitalized for 26-27 days.   Patient had surgery on 7/1/24  Patient is being followed by home care every 3 rd day    Patient states the 1st few nights was rough with anxiety, worried about not being sob.   Weight 177 lb   Patient did not take blood sugar today   Patient did not take blood pressure today   Patient was on 3 liters of oxygen in the hospital.  Patient was not sent home with oxygen.      Patient was unsure of future appointments.  Advised patient to sign up for [employer] to keep up with appointments.   Patient agrees to plan.   [employer] linked sent to [phone]    Advised patient to monitor weight, vitals, and glucose 1st thing in the morning.     Need New Patient Endocrinology appointment, please assist with scheduling, anywhere except [facility].  A task was generated.    UPDATED:  Contacted Scheduling department:    PLEASE CONTACT CARRI JEFFERY'S OFFICE TO SCHEDULE APPT  [phone] EXT 1,      [facility] 8/9/24 @ 10:30    Pt Education:   Barriers:   Topics for Daily Review:   Pt demonstrates clear understanding:     Daily Weight:  There were no vitals filed for this visit.   Last 3 Weights:  Wt Readings from Last 7 Encounters:   07/25/24 83 kg (182 lb 15.7 oz)   07/30/24 79 kg (174 lb 3.2 oz)   07/01/24 90 kg (198 lb 6.6 oz)   06/05/24 86.2 kg (190 lb)   10/13/23 84.8 kg (187 lb)   09/21/22 90.3 kg (199 lb)   08/17/22 90.3 kg (199 lb)       Masimo Device:    Masimo Clinical Impression:     Virtual Visits--Scheduled (Most Recent Date at Top)  Follow up Appointments  Recent Visits  No visits were found meeting these conditions.  Showing recent visits within past 30 days and meeting all other  requirements  Future Appointments  No visits were found meeting these conditions.  Showing future appointments within next 90 days and meeting all other requirements       Frequency of RN Calls & Virtual Visits per Team Agreement:     Medication issues Addressed (what was done):     Follow up appointments scheduled by [facility] Staff:   Referrals made by [facility] staff:

## 2024-08-02 ENCOUNTER — PATIENT OUTREACH (OUTPATIENT)
Dept: HOME HEALTH SERVICES | Age: 59
End: 2024-08-02
Payer: MEDICARE

## 2024-08-02 NOTE — PROGRESS NOTES
Daily Call Note:   Daily call completed with brothjennifer House. States patient is doing really good today. [facility] nurse is currently there. He denies any c/o CP, SOB. States BS this morning was 179 and patient was medicated per his prescribed regimen. States wound looks goo, no redness with minimal drainage. Patient rates his pain 4/10 which is manageable for him. Patient's next [facility] reviewed as well as his future appointments.     Update: Dr. Mei Belle @ [facility] 11/27/24 @ 0900 (first available). Patient placed on waitlist for sooner appointment.     Pt Education:   Barriers: None  Topics for Daily Review:   Pt demonstrates clear understanding: Yes    Daily Weight:  There were no vitals filed for this visit.   Last 3 Weights:  Wt Readings from Last 7 Encounters:   08/01/24 80.3 kg (177 lb)   07/25/24 83 kg (182 lb 15.7 oz)   07/30/24 79 kg (174 lb 3.2 oz)   07/01/24 90 kg (198 lb 6.6 oz)   06/05/24 86.2 kg (190 lb)   10/13/23 84.8 kg (187 lb)   09/21/22 90.3 kg (199 lb)       Masimo Device: Yes   Masimo Clinical Impression: VSS    Virtual Visits--Scheduled (Most Recent Date at Top)  Follow up Appointments  Recent Visits  No visits were found meeting these conditions.  Showing recent visits within past 30 days and meeting all other requirements  Future Appointments  No visits were found meeting these conditions.  Showing future appointments within next 90 days and meeting all other requirements       Frequency of RN Calls & Virtual Visits per Team Agreement: [facility] Frequency: Daily    Medication issues Addressed (what was done): None    Follow up appointments scheduled by [facility] Staff: None  Referrals made by [facility] staff: None

## 2024-08-03 ENCOUNTER — PATIENT OUTREACH (OUTPATIENT)
Dept: HOME HEALTH SERVICES | Age: 59
End: 2024-08-03
Payer: MEDICARE

## 2024-08-03 VITALS — HEART RATE: 75 BPM | OXYGEN SATURATION: 100 %

## 2024-08-03 DIAGNOSIS — E11.69 TYPE 2 DIABETES MELLITUS WITH OTHER SPECIFIED COMPLICATION, WITH LONG-TERM CURRENT USE OF INSULIN (MULTI): Primary | ICD-10-CM

## 2024-08-03 DIAGNOSIS — I25.85 CHRONIC CORONARY MICROVASCULAR DYSFUNCTION: ICD-10-CM

## 2024-08-03 DIAGNOSIS — Z95.1 S/P CABG X 3: ICD-10-CM

## 2024-08-03 DIAGNOSIS — Z95.1 S/P CABG (CORONARY ARTERY BYPASS GRAFT): ICD-10-CM

## 2024-08-03 DIAGNOSIS — Z79.4 TYPE 2 DIABETES MELLITUS WITH OTHER SPECIFIED COMPLICATION, WITH LONG-TERM CURRENT USE OF INSULIN (MULTI): Primary | ICD-10-CM

## 2024-08-03 RX ORDER — ACETAMINOPHEN 325 MG/1
975 TABLET ORAL EVERY 6 HOURS PRN
Qty: 30 TABLET | Refills: 0 | Status: SHIPPED | OUTPATIENT
Start: 2024-08-03 | End: 2024-10-02

## 2024-08-03 RX ORDER — LANCETS 33 GAUGE
EACH MISCELLANEOUS
Qty: 200 EACH | Refills: 0 | Status: SHIPPED | OUTPATIENT
Start: 2024-08-03

## 2024-08-03 RX ORDER — CALCIUM CITRATE/VITAMIN D3 200MG-6.25
TABLET ORAL
Qty: 200 EACH | Refills: 0 | Status: SHIPPED | OUTPATIENT
Start: 2024-08-03

## 2024-08-03 NOTE — PROGRESS NOTES
Daily Call Note:   Daily call completed with brother Harsh and patient. Patient states doing very well. Reports BS today was 117. Denies any CP, SOB, wound looks good. Patient updated on Endocrine appointment that was scheduled. First available with Dr. Belle is at [facility] which they state that's top far. Doctors other location in [place] is also to far. Please assist patient with scheduling an appointment preferably in [facility]. Patient requesting refills for Tylenol, lancets and test strips to be sent to the [facility] [address] In [place]. Secure message sent to provider. Patient reminded about next [facility] 8/9 @ 1030.   Pt Education:   Barriers: None  Topics for Daily Review:   Pt demonstrates clear understanding: Yes    Daily Weight:  There were no vitals filed for this visit.   Last 3 Weights:  Wt Readings from Last 7 Encounters:   08/01/24 80.3 kg (177 lb)   07/25/24 83 kg (182 lb 15.7 oz)   07/30/24 79 kg (174 lb 3.2 oz)   07/01/24 90 kg (198 lb 6.6 oz)   06/05/24 86.2 kg (190 lb)   10/13/23 84.8 kg (187 lb)   09/21/22 90.3 kg (199 lb)       Masimo Device: Yes   Masimo Clinical Impression: VSS    Virtual Visits--Scheduled (Most Recent Date at Top)  Follow up Appointments  Recent Visits  No visits were found meeting these conditions.  Showing recent visits within past 30 days and meeting all other requirements  Future Appointments  No visits were found meeting these conditions.  Showing future appointments within next 90 days and meeting all other requirements       Frequency of RN Calls & Virtual Visits per Team Agreement: [facility] Frequency: Daily    Medication issues Addressed (what was done): None    Follow up appointments scheduled by [facility] Staff: None  Referrals made by [facility] staff: None

## 2024-08-05 ENCOUNTER — APPOINTMENT (OUTPATIENT)
Dept: WOUND CARE | Facility: CLINIC | Age: 59
End: 2024-08-05
Payer: MEDICARE

## 2024-08-07 ENCOUNTER — PATIENT OUTREACH (OUTPATIENT)
Dept: HOME HEALTH SERVICES | Age: 59
End: 2024-08-07
Payer: MEDICARE

## 2024-08-07 VITALS — OXYGEN SATURATION: 99 % | DIASTOLIC BLOOD PRESSURE: 63 MMHG | SYSTOLIC BLOOD PRESSURE: 103 MMHG | HEART RATE: 75 BPM

## 2024-08-08 ENCOUNTER — PATIENT OUTREACH (OUTPATIENT)
Dept: HOME HEALTH SERVICES | Age: 59
End: 2024-08-08
Payer: MEDICARE

## 2024-08-08 NOTE — PROGRESS NOTES
Attempted daily call, pt sleeping, spoke with son, states pt is doing well and is in need of more masimo wrist bands. Medic visit set up for tomorrow to drop off additional wrist bands. Denies any further needs/questions/concerns at this time.     Patient's Address:   [facility] [address] [facility] [address]  **  If this is not the address patient will receive services - alert team and address in EMR**       Patient Contacts:  Extended Emergency Contact Information  Primary Emergency Contact: Harsh Zepeda  Home Phone: [phone]  Relation: Sibling   needed? No  Secondary Emergency Contact: Vin Zepeda  Home Phone: [phone]  Relation: Sibling                                Patient's Preferred Phone: [phone]  Patient's E-mail: No e-mail address on record

## 2024-08-09 ENCOUNTER — APPOINTMENT (OUTPATIENT)
Dept: CARE COORDINATION | Age: 59
End: 2024-08-09
Payer: MEDICARE

## 2024-08-09 ENCOUNTER — PATIENT OUTREACH (OUTPATIENT)
Dept: CARE COORDINATION | Age: 59
End: 2024-08-09

## 2024-08-09 ENCOUNTER — APPOINTMENT (OUTPATIENT)
Dept: PHARMACY | Facility: HOSPITAL | Age: 59
End: 2024-08-09
Payer: MEDICARE

## 2024-08-09 VITALS
WEIGHT: 178 LBS | RESPIRATION RATE: 19 BRPM | BODY MASS INDEX: 28.73 KG/M2 | OXYGEN SATURATION: 99 % | HEART RATE: 76 BPM

## 2024-08-09 DIAGNOSIS — E11.69 TYPE 2 DIABETES MELLITUS WITH OTHER SPECIFIED COMPLICATION, WITH LONG-TERM CURRENT USE OF INSULIN (MULTI): Primary | ICD-10-CM

## 2024-08-09 DIAGNOSIS — G47.30 SLEEP APNEA IN ADULT: ICD-10-CM

## 2024-08-09 DIAGNOSIS — J43.9 PULMONARY EMPHYSEMA, UNSPECIFIED EMPHYSEMA TYPE (MULTI): ICD-10-CM

## 2024-08-09 DIAGNOSIS — Z79.4 TYPE 2 DIABETES MELLITUS WITH OTHER SPECIFIED COMPLICATION, WITH LONG-TERM CURRENT USE OF INSULIN (MULTI): Primary | ICD-10-CM

## 2024-08-09 DIAGNOSIS — E66.9 OBESITY, CLASS I, BMI 30-34.9: Primary | ICD-10-CM

## 2024-08-09 DIAGNOSIS — E66.01 MORBID (SEVERE) OBESITY DUE TO EXCESS CALORIES (MULTI): ICD-10-CM

## 2024-08-09 NOTE — PROGRESS NOTES
Pharmacy Post-Discharge Visit - Follow Up     Kael Zepeda is a 59 y.o. male was referred to Clinical Pharmacy Team to complete a post-discharge medication optimization and monitoring visit.  The patient was referred for their diabetes management after recent CABG procedure when he was admitted and now recently enrolled in [facility].     Referring Provider: Ann Quezada APRN*  PCP: Radha Greer MD - not with        Subjective   No Known Allergies     [facility]  [address] [facility] [address]  Phone: [phone] Fax: [phone]    [facility]  [address], [address]  [facility] [address]  Phone: [phone] Fax: [phone]    [facility] #[id number] - [place], [place] - [address] AT CORNER OF [address] [address]  Phone: [phone] Fax: [phone]      Medication System Management:  Affordability/Accessibility: $0 co pays   Adherence/Organization: no issues       Social History     Social History Narrative    Not on file        HPI  Diabetes  He presents for his initial diabetic visit. He has type 2 diabetes mellitus. There are no hypoglycemic associated symptoms. There are no hypoglycemic complications. Diabetic complications include heart disease. Risk factors for coronary artery disease include diabetes mellitus, male sex and tobacco exposure. Current diabetic treatment includes insulin injections and oral agent (monotherapy). He is compliant with treatment all of the time. His overall blood glucose range is 130-180 mg/dl. An ACE inhibitor/angiotensin II receptor blocker is not being taken.      PULMONARY ASSESSMENT  Patient has been diagnosed with: COPD  does not see a pulmonologist  Last visit: n/a     Current Regimen:  Wixela   Spiriva   Albuterol      Appropriate Inhaler Technique? yes  How often do you use your rescue inhaler? Not often      Smoking history:  He has a history of 40 pack years.  Stopped smoking  since being home.    Review of Systems        Objective     There were no vitals taken for this visit.   BP Readings from Last 4 Encounters:   08/07/24 103/63   07/26/24 126/52   07/30/24 108/86   07/01/24 119/75      There were no vitals filed for this visit.     LAB  Lab Results   Component Value Date    BILITOT 0.4 07/02/2024    CALCIUM 8.0 (L) 07/26/2024    CO2 27 07/26/2024    CL 99 07/26/2024    CREATININE 1.02 07/26/2024    GLUCOSE 151 (H) 07/26/2024    ALKPHOS 122 (H) 07/02/2024    K 4.2 07/26/2024    PROT 7.0 07/02/2024     07/26/2024    AST 19 07/02/2024    ALT 10 07/02/2024    BUN 22 07/26/2024    ANIONGAP 16 07/26/2024    MG 2.17 07/26/2024    PHOS 3.5 07/26/2024    ALBUMIN 3.6 07/26/2024    GFRMALE >90 02/15/2023     Lab Results   Component Value Date    TRIG 81 07/02/2024    CHOL 122 07/02/2024    LDLCALC 60 07/02/2024    HDL 45.9 07/02/2024     Lab Results   Component Value Date    HGBA1C 8.5 (H) 05/15/2024         Current Outpatient Medications   Medication Instructions    acetaminophen (TYLENOL) 975 mg, oral, Every 6 hours PRN    albuterol 90 mcg/actuation inhaler Inhale 2 puffs by mouth into the lungs every 6 hours if needed for shortness of breath.    aspirin 81 mg, oral, Daily    atorvastatin (LIPITOR) 80 mg, oral, Nightly    blood sugar diagnostic (True Metrix Glucose Test Strip) strip Use to check blood sugar 4 times daily    clopidogrel (PLAVIX) 75 mg, oral, Daily    docusate sodium (COLACE) 100 mg, oral, 2 times daily PRN    DULoxetine (CYMBALTA) 60 mg, Every 24 hours    Ferrex 150 150 mg, oral, Daily    fluticasone propion-salmeteroL (Wixela Inhub) 100-50 mcg/dose diskus inhaler Inhale 1 puff by mouth into the lungs 2 times a day. Rinse mouth with water after use to reduce aftertaste and incidence of candidiasis. Do not swallow.    folic acid (FOLVITE) 1 mg, oral, Daily    hydroxychloroquine (PLAQUENIL) 200 mg, oral, 2 times daily    insulin lispro (HumaLOG) 100 unit/mL injection  Inject 8 Units plus 0 - 10 units per sliding scale under the skin 3 times a day before meals. Take as directed per insulin instructions.    lancets 33 gauge misc Use to check blood sugar 4 times daily    Lantus Solostar U-100 Insulin 25 Units, subcutaneous, Every 24 hours    levothyroxine (SYNTHROID, LEVOXYL) 50 mcg, oral, Daily    metFORMIN (GLUCOPHAGE) 500 mg, oral, 2 times daily    metoprolol succinate XL (TOPROL-XL) 25 mg, oral, Daily, Do not crush or chew.    multivitamin with minerals tablet 1 tablet, oral, Daily    pantoprazole (PROTONIX) 40 mg, oral, Daily before breakfast, Do not crush, chew, or split.    polyethylene glycol (GLYCOLAX, MIRALAX) 17 g, oral, Daily PRN    predniSONE (DELTASONE) 5 mg, oral, Daily    pregabalin (LYRICA) 200 mg, oral, 3 times daily    sennosides-docusate sodium (Chelsie-Colace) 8.6-50 mg tablet 2 tablets, oral, 2 times daily    thiamine (VITAMIN B-1) 100 mg, oral, Daily    tiotropium (Spiriva Respimat) 2.5 mcg/actuation inhaler Inhale 2 puffs by mouth into the lungs once daily.            Assessment/Plan   Problem List Items Addressed This Visit    None    DM  Most recent A1c was 8.5% on 5/15  Has been periodically checking sugars since being home  Discussed to at least check fasting every day and then before meals   Fasting today was 180   Currently doing 25 units of lantus and 8 units of lispro with meals   Also still taking metformin 500mg BID   Completed 10 day course of keflex   Says the ulcers on his feet also look better too   COPD  No oxygen   Set up on magi though and being monitored by nursing 24/7  Currently 99% RA while on visit with HR 76  Currently doing wixela and spiriva for his maintenance inhalers daily   Albuterol as needed   Referral for sleep med was placed by NP today and nursing will help with scheduling this appointment   NSTEMI/CABG  Has been doing okay since being home  Seems to still be pretty tired and worn out from everything and anxious too   No  issues with incision  Set up with home care   DAPT --> aspirin, lipitor and plavix   Has not smoked since being home either, encouraged to continue to not smoke    Follow Up: 1 week     Continue all meds under the continuation of care with the referring provider and clinical pharmacy team.    Amarjit Karimi, Nichole     Verbal consent to manage patient's drug therapy was obtained from the patient. They were informed they may decline to participate or withdraw from participation in pharmacy services at any time.

## 2024-08-09 NOTE — PROGRESS NOTES
"Daily Call Note: 1030 - [facility] completed with pt, Ann Quezada CNP, Amarjit Karimi PharmD and this RN. Pt initially very gruff and rushed wanting to know,\"Do I really need to do this right now?! All I need is CPAP. Can you get me CPAP?\" Connected the call and Ann was able to enter a referral for Sleep Medicine. Pt states that his foot ulcers are healing - he is still not smoking. Positive reinforcement provided.   Sleep Medicine appointment scheduled for pt - 10/15 at 1120; pt notified and grateful.  Next [facility] scheduled 8/16 at 1230 - pt verbalized understanding.  No other questions or concerns at this time.    Pt Education: POC  Barriers: compliance; acceptance of help  Topics for Daily Review: CPAP; ulcers; smoking; sleep medicine referral  Pt demonstrates clear understanding: Yes    Daily Weight:  Vitals:    08/09/24 0900   Weight: 80.7 kg (178 lb)      Last 3 Weights:  Wt Readings from Last 7 Encounters:   08/09/24 80.7 kg (178 lb)   08/01/24 80.3 kg (177 lb)   07/25/24 83 kg (182 lb 15.7 oz)   07/30/24 79 kg (174 lb 3.2 oz)   07/01/24 90 kg (198 lb 6.6 oz)   06/05/24 86.2 kg (190 lb)   10/13/23 84.8 kg (187 lb)       Masimo Device: Yes   Masimo Clinical Impression: WNL    Virtual Visits--Scheduled (Most Recent Date at Top)  Follow up Appointments  Recent Visits  No visits were found meeting these conditions.  Showing recent visits within past 30 days and meeting all other requirements  Future Appointments  No visits were found meeting these conditions.  Showing future appointments within next 90 days and meeting all other requirements       Frequency of RN Calls & Virtual Visits per Team Agreement: Healthy at Home Frequency: Daily    Medication issues Addressed (what was done): none    Follow up appointments scheduled by [facility] Staff: sleep medicine  Referrals made by [facility] staff: Sleep medicine        "

## 2024-08-09 NOTE — PROGRESS NOTES
2239- Spoke with patient regarding loss of Masimo transmission. Patient states the battery  on his wristband & he does not have anymore at home. Already has a medic visit scheduled for tomorrow to get more batteries delivered. Patient states he is well.

## 2024-08-10 ENCOUNTER — PATIENT OUTREACH (OUTPATIENT)
Dept: HOME HEALTH SERVICES | Age: 59
End: 2024-08-10
Payer: MEDICARE

## 2024-08-10 VITALS
HEART RATE: 82 BPM | DIASTOLIC BLOOD PRESSURE: 56 MMHG | BODY MASS INDEX: 28.89 KG/M2 | WEIGHT: 179 LBS | OXYGEN SATURATION: 97 % | SYSTOLIC BLOOD PRESSURE: 107 MMHG

## 2024-08-11 NOTE — PROGRESS NOTES
Daily call completed. Pt wants a cpap now, pt advised that he needs to keep his oct appt for sleep study. Pt denies any cp and edema. Pt stated he had a little sob but it went away and it been getting better since leaving hospital. Pt states he has been having dizziness, and from what he described when he changing positions. Pt advised to bring this up on next [facility] call and to alos slowly get up, Even sit for a few seconds before jumping up and walking. Pt is advised to take bp when dizzy. Vitals in flowsheet. . Pt is also going to call wound clinic to get appt. No other concerns.

## 2024-08-14 ENCOUNTER — PATIENT OUTREACH (OUTPATIENT)
Dept: HOME HEALTH SERVICES | Age: 59
End: 2024-08-14
Payer: MEDICARE

## 2024-08-14 NOTE — PROGRESS NOTES
0247- [employer] connection interrupted. Phoned patient. [employer] battery . No more on hand. Put on medic schedule to deliver more batteries.

## 2024-08-15 ENCOUNTER — PATIENT OUTREACH (OUTPATIENT)
Dept: HOME HEALTH SERVICES | Age: 59
End: 2024-08-15
Payer: MEDICARE

## 2024-08-15 VITALS — OXYGEN SATURATION: 91 % | HEART RATE: 87 BPM

## 2024-08-16 ENCOUNTER — APPOINTMENT (OUTPATIENT)
Dept: CARE COORDINATION | Age: 59
End: 2024-08-16
Payer: MEDICARE

## 2024-08-16 ENCOUNTER — PATIENT OUTREACH (OUTPATIENT)
Dept: HOME HEALTH SERVICES | Age: 59
End: 2024-08-16

## 2024-08-16 VITALS — OXYGEN SATURATION: 99 % | HEART RATE: 87 BPM

## 2024-08-18 ENCOUNTER — PATIENT OUTREACH (OUTPATIENT)
Dept: HOME HEALTH SERVICES | Age: 59
End: 2024-08-18

## 2024-08-18 ENCOUNTER — TELEMEDICINE (OUTPATIENT)
Dept: CARE COORDINATION | Age: 59
End: 2024-08-18
Payer: MEDICARE

## 2024-08-18 DIAGNOSIS — Z95.1 S/P CABG (CORONARY ARTERY BYPASS GRAFT): ICD-10-CM

## 2024-08-18 DIAGNOSIS — I25.85 CHRONIC CORONARY MICROVASCULAR DYSFUNCTION: ICD-10-CM

## 2024-08-18 DIAGNOSIS — Z95.1 S/P CABG X 3: ICD-10-CM

## 2024-08-18 DIAGNOSIS — F51.01 PRIMARY INSOMNIA: Primary | ICD-10-CM

## 2024-08-18 PROBLEM — K21.9 CHRONIC GERD: Status: RESOLVED | Noted: 2024-06-07 | Resolved: 2024-08-18

## 2024-08-18 PROBLEM — Z86.39 HISTORY OF ELEVATED LIPIDS: Status: RESOLVED | Noted: 2024-06-07 | Resolved: 2024-08-18

## 2024-08-18 PROBLEM — N49.2 SCROTAL WALL ABSCESS: Status: RESOLVED | Noted: 2024-06-07 | Resolved: 2024-08-18

## 2024-08-18 PROBLEM — N49.2 ABSCESS OF SCROTUM: Status: RESOLVED | Noted: 2024-06-07 | Resolved: 2024-08-18

## 2024-08-18 PROBLEM — G89.18 POST-OP PAIN: Status: RESOLVED | Noted: 2024-06-07 | Resolved: 2024-08-18

## 2024-08-18 PROBLEM — R79.89 ELEVATED TROPONIN: Status: RESOLVED | Noted: 2017-08-28 | Resolved: 2024-08-18

## 2024-08-18 PROBLEM — R07.89 CHEST DISCOMFORT: Status: RESOLVED | Noted: 2024-06-07 | Resolved: 2024-08-18

## 2024-08-18 PROBLEM — F17.210 CIGARETTE NICOTINE DEPENDENCE: Status: RESOLVED | Noted: 2024-06-30 | Resolved: 2024-08-18

## 2024-08-18 PROBLEM — R00.2 HEART PALPITATIONS: Status: RESOLVED | Noted: 2024-06-07 | Resolved: 2024-08-18

## 2024-08-18 PROBLEM — R73.9 HYPERGLYCEMIA: Status: RESOLVED | Noted: 2024-06-30 | Resolved: 2024-08-18

## 2024-08-18 PROBLEM — N17.9 ACUTE RENAL FAILURE SYNDROME (CMS-HCC): Status: RESOLVED | Noted: 2024-06-07 | Resolved: 2024-08-18

## 2024-08-18 PROBLEM — M86.171 ACUTE OSTEOMYELITIS OF RIGHT FOOT (MULTI): Status: RESOLVED | Noted: 2023-10-11 | Resolved: 2024-08-18

## 2024-08-18 PROBLEM — E66.01 MORBID (SEVERE) OBESITY DUE TO EXCESS CALORIES (MULTI): Status: RESOLVED | Noted: 2023-05-10 | Resolved: 2024-08-18

## 2024-08-18 PROBLEM — Z86.79 HISTORY OF HYPERTENSION: Status: RESOLVED | Noted: 2024-06-07 | Resolved: 2024-08-18

## 2024-08-18 RX ORDER — MELATONIN 5 MG
5 CAPSULE ORAL NIGHTLY PRN
Qty: 30 CAPSULE | Refills: 1 | Status: SHIPPED | OUTPATIENT
Start: 2024-08-18 | End: 2024-10-17

## 2024-08-18 RX ORDER — PANTOPRAZOLE SODIUM 40 MG/1
40 TABLET, DELAYED RELEASE ORAL
Qty: 30 TABLET | Refills: 11 | Status: SHIPPED | OUTPATIENT
Start: 2024-08-18 | End: 2025-08-18

## 2024-08-18 NOTE — PROGRESS NOTES
"0610- Call was placed to patient for inconsistent SpO2 readings from mid 70's to high 90's. Patient answered, states he was awake & watching Youtube videos. Currently resting comfortably & denies SOB, CP, or dizziness. States sometimes he gets SOB or \"a bit dizzy\" but isn't this morning. After speaking for several minutes SpO2 remained 97-99% on RA.   "

## 2024-08-18 NOTE — PROGRESS NOTES
Daily Call Note:   [facility] call completed with patient & Dr. Sheffield. Patient states he's feeling less than well today. Complaint of SOB sometimes while at rest & he suspects it may be anxiety related. Having difficulty sleeping & asked for medication to help. Dr. Sheffield recommended patient purchase melatonin 2mg-5mg OTC. Patient states he doesn't believe it would work well for him, but Dr. Sheffield recommended he try taking it for a week.     Patient & family requested sleep study. Confirmed sleep study in October to see if he qualifies for CPAP. Patient reports using one inpatient and he felt great in the morning after wearing it.     Reports 's-170's on average. Confirmed continued insulin routine. Encouraged to check FBS in am, as patient states he hasn't been monitoring. No changes at this time. May adjust insulin in the future given FBS results.     Confirmed affordability of medications.   Patient has not been smoking since discharged from hospital. Denied need for nicotine patches/gum. Very motivated to continue to improve health.     Refills requested for pantoprazole 40 mg at [facility] on [address] [place] [address] in [place]. Dr. Sheffield submitted refill & sent new script for Melatonin as well.     Dr. Sheffield discontinued Masimo. Okay for patient to stay on till battery dies per his request. Medic visit Tuesday 8/20 to  tablet.     Patient & Dr. Sheffield agreeable to decrease calls to [facility].   Next [facility] scheduled for 8/26 @ 2000.     Pt Education: O2, CPAP, Sleep study  Barriers: none  Topics for Daily Review: Masimo vitals  Pt demonstrates clear understanding: Yes    Daily Weight:  There were no vitals filed for this visit.   Last 3 Weights:  Wt Readings from Last 7 Encounters:   08/10/24 81.2 kg (179 lb)   08/09/24 80.7 kg (178 lb)   08/01/24 80.3 kg (177 lb)   07/25/24 83 kg (182 lb 15.7 oz)   07/30/24 79 kg (174 lb 3.2 oz)   07/01/24 90 kg (198 lb 6.6 oz)   06/05/24 86.2 kg (190 lb)       Masimo Device: Yes   Masimo  Clinical Impression: none    Virtual Visits--Scheduled (Most Recent Date at Top)  Follow up Appointments  Recent Visits  No visits were found meeting these conditions.  Showing recent visits within past 30 days and meeting all other requirements  Future Appointments  No visits were found meeting these conditions.  Showing future appointments within next 90 days and meeting all other requirements       Frequency of RN Calls & Virtual Visits per Team Agreement: Healthy at Home Frequency: MWF    Medication issues Addressed (what was done): Melatonin OTC    Follow up appointments scheduled by [facility] Staff: none  Referrals made by [facility] staff: none

## 2024-08-19 ENCOUNTER — OFFICE VISIT (OUTPATIENT)
Dept: WOUND CARE | Facility: CLINIC | Age: 59
End: 2024-08-19
Payer: MEDICARE

## 2024-08-19 PROCEDURE — 11042 DBRDMT SUBQ TIS 1ST 20SQCM/<: CPT

## 2024-08-19 PROCEDURE — 99213 OFFICE O/P EST LOW 20 MIN: CPT | Mod: 25

## 2024-08-19 PROCEDURE — 11043 DBRDMT MUSC&/FSCA 1ST 20/<: CPT

## 2024-08-20 PROBLEM — N18.2 STAGE 2 CHRONIC KIDNEY DISEASE: Status: ACTIVE | Noted: 2024-08-20

## 2024-08-20 RX ORDER — ATORVASTATIN CALCIUM 40 MG/1
TABLET, FILM COATED ORAL
COMMUNITY
Start: 2024-08-17

## 2024-08-20 RX ORDER — INSULIN DETEMIR 100 [IU]/ML
INJECTION, SOLUTION SUBCUTANEOUS
COMMUNITY
Start: 2024-08-11

## 2024-08-20 RX ORDER — OMEPRAZOLE 40 MG/1
1 CAPSULE, DELAYED RELEASE ORAL
COMMUNITY
Start: 2024-07-28

## 2024-08-20 RX ORDER — DOXYCYCLINE HYCLATE 100 MG
TABLET ORAL
COMMUNITY
Start: 2024-08-19

## 2024-08-20 RX ORDER — SILVER SULFADIAZINE 10 G/1000G
CREAM TOPICAL
COMMUNITY
Start: 2024-08-09

## 2024-08-21 ENCOUNTER — PATIENT OUTREACH (OUTPATIENT)
Dept: HOME HEALTH SERVICES | Age: 59
End: 2024-08-21
Payer: MEDICARE

## 2024-08-21 ENCOUNTER — DOCUMENTATION (OUTPATIENT)
Dept: HOME HEALTH SERVICES | Age: 59
End: 2024-08-21
Payer: MEDICARE

## 2024-08-21 VITALS — HEART RATE: 78 BPM | OXYGEN SATURATION: 84 % | RESPIRATION RATE: 15 BRPM

## 2024-08-21 NOTE — PROGRESS NOTES
[facility] Transitions Assessment    Patient's Address:   [place] [address]  **  If this is not the address patient will receive services - alert team and address in EMR**       Patient Contacts:  Extended Emergency Contact Information  Primary Emergency Contact: DuaneJose CruzHarsh  Home Phone: [phone]  Relation: Sibling   needed? No  Secondary Emergency Contact: DuaneVin  [place] Phone: [phone]  Relation: Sibling           Two attempts to contact pt via phone call, left message with cell number.                      Patient's Preferred Phone: [phone]  Patient's E-mail: No e-mail address on record

## 2024-08-22 ENCOUNTER — DOCUMENTATION (OUTPATIENT)
Dept: HOME HEALTH SERVICES | Age: 59
End: 2024-08-22
Payer: MEDICARE

## 2024-08-22 NOTE — PROGRESS NOTES
[facility] Transitions Assessment    Patient's Address:   [place] [address]  **  If this is not the address patient will receive services - alert team and address in EMR**       Patient Contacts:  Extended Emergency Contact Information  Primary Emergency Contact: DuaneHarsh  Home Phone: [phone]  Relation: Sibling   needed? No  Secondary Emergency Contact: DuaneVin  [place] Phone: [phone]  Relation: Sibling                                Patient's Preferred Phone: [phone]  Patient's E-mail: No e-mail address on record        Remote monitoring discontinued and kit picked up today.

## 2024-08-23 ENCOUNTER — PATIENT OUTREACH (OUTPATIENT)
Dept: CARE COORDINATION | Age: 59
End: 2024-08-23
Payer: MEDICARE

## 2024-08-23 NOTE — PROGRESS NOTES
Daily Call Note:   LVM.    Daily Weight:  There were no vitals filed for this visit.   Last 3 Weights:  Wt Readings from Last 7 Encounters:   08/10/24 81.2 kg (179 lb)   08/09/24 80.7 kg (178 lb)   08/01/24 80.3 kg (177 lb)   07/25/24 83 kg (182 lb 15.7 oz)   07/30/24 79 kg (174 lb 3.2 oz)   07/01/24 90 kg (198 lb 6.6 oz)   06/05/24 86.2 kg (190 lb)         Virtual Visits--Scheduled (Most Recent Date at Top)  Follow up Appointments  Recent Visits  No visits were found meeting these conditions.  Showing recent visits within past 30 days and meeting all other requirements  Future Appointments  No visits were found meeting these conditions.  Showing future appointments within next 90 days and meeting all other requirements

## 2024-08-26 ENCOUNTER — APPOINTMENT (OUTPATIENT)
Dept: WOUND CARE | Facility: CLINIC | Age: 59
End: 2024-08-26
Payer: MEDICARE

## 2024-08-26 ENCOUNTER — APPOINTMENT (OUTPATIENT)
Dept: CARE COORDINATION | Age: 59
End: 2024-08-26
Payer: MEDICARE

## 2024-08-28 LAB — EJECTION FRACTION: 43 %

## 2024-09-11 ENCOUNTER — OFFICE VISIT (OUTPATIENT)
Dept: WOUND CARE | Facility: CLINIC | Age: 59
End: 2024-09-11
Payer: MEDICARE

## 2024-09-11 PROCEDURE — 11042 DBRDMT SUBQ TIS 1ST 20SQCM/<: CPT

## 2024-09-16 ENCOUNTER — APPOINTMENT (OUTPATIENT)
Dept: WOUND CARE | Facility: CLINIC | Age: 59
End: 2024-09-16
Payer: MEDICARE

## 2024-10-07 NOTE — PROGRESS NOTES
[facility] Sleep Medicine  [address]  [facility]  [address]  [facility] [address]     [facility]  New Visit Note      Subjective   Patient ID: Kael Zepeda is a 59 y.o. male with past medical history significant for Overweight, Hypertension, coronary artery disease, Non-ST elevated myocardial infarction, diabetes, Depression, Anxiety, Fibromyalgia, Insomnia, diabetes, Nicotine dependence and Sleep disorder breathing.     10/15/2024:The patient is here alone today and was referred by Vascular Surgery Ann Quezada, APRN-CNP, DNP, for comprehensive sleep medicine evaluation due to suspected sleep apnea, excessive daytime sleepiness/fatigue, and difficulty staying asleep. He was admitted to [facility] for bypass surgery and got CPAP treatment when he was in the hospital three months ago. He reports much better sleep quality but cannot get a CPAP because he did not have a sleep study to diagnose his sleep apnea. Therefore, he came here to establish care and discover sleep disturbance. ESS: 8, TINO: 21, and neck circumference is 15.5 inches today.      HPI  Patient had been having these symptoms for the past 20 years. Patient never had sleep study done yet.       SLEEP STUDY HISTORY: (personally reviewed raw data such as interpretation report, data sheet, hypnogram, and titration table if available and applicable)  N/A    SLEEP-WAKE SCHEDULE  Bedtime: 2 AM  on weekdays, same on weekends  Subjective sleep latency: 15 minutes  Problems falling asleep: No  Number of awakenings: 2-3 times per night spontaneously for unknown reasons  Falls back asleep in  minutes  Problems staying asleep: Yes  Final wake time: 4-6 AM on weekdays, same on weekends  Shift work: No  Naps: Yes, 1 hour  Feels rested after a nap: No  Average sleep duration (excluding naps): 4 hours    SLEEP ENVIRONMENT  Sleep location:  recliner  Sleep status: sleeps alone  Room is dark:  Yes  Room is quiet: Yes  Room is cool: Yes  Bed comfort: good    SLEEP HABITS:   Activities before bedtime: use laptop  Activities in bed: no  Preferred sleep position: reclined and upright    SLEEP ROS:  Night symptoms: POSITIVE for witnessed apnea  Morning symptoms: POSITIVE for unrefreshing sleep and morning dry mouth  Daytime symptoms POSITIVE for excessive daytime sleepiness, fatigue, irritability in daytime, and drowsy driving  Hypersomnia / narcolepsy symptoms: Patient denies symptoms of a hypersomnolence disorder such as sleep paralysis, sleep-related hallucinations, and cataplexy.   RLS symptoms: Patient admits having urge to move legs that occurs at rest (sitting, getting into bed, or lying n bed), worse in the evening, and relieved temporarily with movement.   Frequency of RLS symptoms: 1/week  RLS symptoms affect sleep onset: Yes   RLS symptoms wakes patient up from sleep: Yes   Movements in sleep: POSITIVE for frequent leg kicks / jerks at night while asleep  Parasomnia symptoms: Patient denies symptoms of parasomnia such as sleepwalking, sleeptalking, sleep-eating, acting out dreams, and nightmares.     WEIGHT: stable    REVIEW OF SYSTEMS: All other systems have been reviewed and are negative.    PERTINENT SOCIAL HISTORY:  Occupation: retired  Smoking: quitted 3 months ago  ETOH: No   Marijuana: No   Caffeine: Yes, 2-3 cups of coffee daily, the last coffee was 6 PM  Sleep aids: No   Claustrophobia: No     PERTINENT PAST SURGICAL HISTORY:  non-contributory    PERTINENT FAMILY HISTORY:  sleep apnea- sister, father    Active Problems, Allergy List, Medication List, and PMH/PSH/FH/Social Hx have been reviewed and reconciled in chart. No significant changes unless documented in the pertinent chart section. Updates made when necessary.       Objective   Vitals:    10/15/24 1130   BP: 98/63   Pulse: 68   Resp: 16   SpO2: 99%       REVIEW OF SYSTEMS  All  other systems have been reviewed and are negative.    ALLERGIES  No Known Allergies    MEDICATIONS  Current Outpatient Medications   Medication Sig Dispense Refill    albuterol 90 mcg/actuation inhaler Inhale 2 puffs by mouth into the lungs every 6 hours if needed for shortness of breath. 18 g 11    aspirin 81 mg EC tablet Take 1 tablet (81 mg) by mouth once daily.      atorvastatin (Lipitor) 40 mg tablet       atorvastatin (Lipitor) 80 mg tablet Take 1 tablet (80 mg) by mouth once daily at bedtime. 30 tablet 1    blood sugar diagnostic (True Metrix Glucose Test Strip) strip Use to check blood sugar 4 times daily 200 each 0    clopidogrel (Plavix) 75 mg tablet Take 1 tablet (75 mg) by mouth once daily. 90 tablet 3    docusate sodium (Colace) 100 mg capsule Take 1 capsule (100 mg) by mouth 2 times a day as needed (hard stools). 60 capsule 0    doxycycline (Vibra-Tabs) 100 mg tablet       DULoxetine (Cymbalta) 60 mg DR capsule 1 capsule (60 mg) once every 24 hours.      fluticasone propion-salmeteroL (Wixela Inhub) 100-50 mcg/dose diskus inhaler Inhale 1 puff by mouth into the lungs 2 times a day. Rinse mouth with water after use to reduce aftertaste and incidence of candidiasis. Do not swallow. 60 each 11    folic acid (Folvite) 1 mg tablet Take 1 tablet (1 mg) by mouth once daily. 30 tablet 11    insulin glargine (Lantus) 100 unit/mL (3 mL) pen Inject 25 Units under the skin once every 24 hours. 30 mL 3    insulin lispro (HumaLOG) 100 unit/mL injection Inject 8 Units plus 0 - 10 units per sliding scale under the skin 3 times a day before meals. Take as directed per insulin instructions. 15 mL 0    lancets 33 gauge misc Use to check blood sugar 4 times daily 200 each 0    Levemir FlexPen 100 unit/mL (3 mL) pen       levothyroxine (Synthroid, Levoxyl) 50 mcg tablet Take 1 tablet (50 mcg) by mouth once daily.      melatonin 5 mg capsule Take 1 capsule (5 mg) by mouth as needed at bedtime (for insomnia). 30 capsule 1     metFORMIN (Glucophage) 500 mg tablet Take 1 tablet (500 mg) by mouth 2 times a day. 60 tablet 2    metoprolol succinate XL (Toprol-XL) 25 mg 24 hr tablet Take 1 tablet (25 mg) by mouth once daily. Do not crush or chew. 90 tablet 3    omeprazole (PriLOSEC) 40 mg DR capsule Take 1 capsule (40 mg) by mouth early in the morning..      pantoprazole (ProtoNix) 40 mg EC tablet Take 1 tablet (40 mg) by mouth once daily in the morning. Take before meals. Do not crush, chew, or split. 30 tablet 11    pregabalin (Lyrica) 200 mg capsule Take 1 capsule (200 mg) by mouth 3 times a day.      sennosides-docusate sodium (Chelsie-Colace) 8.6-50 mg tablet Take 2 tablets by mouth 2 times a day. 120 tablet 11    silver sulfADIAZINE (Silvadene) 1 % cream Apply as directed to affected area daily.      thiamine (Vitamin B-1) 100 mg tablet Take 1 tablet (100 mg) by mouth once daily. 30 tablet 11    tiotropium (Spiriva Respimat) 2.5 mcg/actuation inhaler Inhale 2 puffs by mouth into the lungs once daily. 4 g 11     No current facility-administered medications for this visit.         Physical Exam  Constitutional:alert and oriented to time, place, and person  Sinus: - tenderness to palpation  Palate: Narrow Mallampati 2, - Tongue scalloping, - macroglossia  Lungs: Clear to auscultation bilateral, no rales  Heart: Regular rate and rhythm, no murmurs    Assessment/Plan   Kael Zepeda is a 59 y.o. male who is seen to evaluate for possible obstructive sleep apnea. The pathophysiology of sleep apnea, diagnostic testing (HST vs PSG), treatment options (PAP, oral appliance, surgery, hypoglossal nerve stimulator called Inspire), and supportive management (weight loss, positional therapy, smoking cessation, avoidance of alcohol and sedatives) were discussed with the patient in detail. Risk factors of sleep apnea as well as cardiometabolic and neurocognitive sequelae associated with untreated sleep apnea were also discussed. Lastly, patient was  advised to avoid driving vehicle or operating heavy machinery when sleepy.     Kael Zepeda with the following problems:     # SLEEP DISORDERED BREATHING:  -This is likely sleep apnea based on the the history and physical examination.   -Kael Zepeda has not yet had a sleep study.  -Instruct patient to complete an in lab sleep study.  -We consider treatment as indicated when testing is complete.     # CHRONIC SLEEP MAINTENANCE INSOMNIA:  -likely due to poor sleep hygiene,and untreated sleep apnea, Restless Legs Syndrome.  -TINO: 21  -Sleep hygiene discuss in the clinic.    # HYPERTENSION:  -Blood pressure was controlled today.  -Denies headache, palpitation, and syncope in the clinic.  -Follows with PCP/ Cardiology     # OVERWEIGHT:  -with a BMI of 28.85. Kael Zepeda most recent Bicarbonate was 27  Bicarbonate   Date Value Ref Range Status   07/26/2024 27 21 - 32 mmol/L Final   -Encourage to have regular exercise to manage weight well.    # XEROSTOMIA:  -Instruct Kael Zepedato purchase the Biotene gel to ease the dry mouth symptom,     # RESTLESS LEG SYNDROME: 1/week  -will reevaluate it after treatment.  Iron (ug/dL)   Date Value   07/02/2024 42   03/12/2024 28 (L)     % Saturation (%)   Date Value   07/02/2024 11 (L)   03/12/2024 7 (L)     TIBC (ug/dL)   Date Value   07/02/2024 399   03/12/2024 384     Ferritin (ng/mL)   Date Value   07/02/2024 25   03/12/2024 22     RTC 2-3 weeks after sleep study       All of patient's questions were answered. He verbalizes understanding and agreement with my assessment and plan.

## 2024-10-09 ENCOUNTER — OFFICE VISIT (OUTPATIENT)
Dept: WOUND CARE | Facility: CLINIC | Age: 59
End: 2024-10-09
Payer: MEDICARE

## 2024-10-09 ENCOUNTER — APPOINTMENT (OUTPATIENT)
Dept: RADIOLOGY | Facility: HOSPITAL | Age: 59
DRG: 617 | End: 2024-10-09
Payer: MEDICARE

## 2024-10-09 ENCOUNTER — HOSPITAL ENCOUNTER (INPATIENT)
Facility: HOSPITAL | Age: 59
LOS: 2 days | Discharge: HOME | DRG: 617 | End: 2024-10-11
Attending: STUDENT IN AN ORGANIZED HEALTH CARE EDUCATION/TRAINING PROGRAM | Admitting: INTERNAL MEDICINE
Payer: MEDICARE

## 2024-10-09 DIAGNOSIS — L03.116 CELLULITIS OF LEFT LOWER EXTREMITY: ICD-10-CM

## 2024-10-09 DIAGNOSIS — M86.9 OSTEOMYELITIS OF LEFT FOOT, UNSPECIFIED TYPE (MULTI): Primary | ICD-10-CM

## 2024-10-09 DIAGNOSIS — M86.172 ACUTE OSTEOMYELITIS OF LEFT FOOT (MULTI): ICD-10-CM

## 2024-10-09 LAB
ALBUMIN SERPL BCP-MCNC: 3.9 G/DL (ref 3.4–5)
ALP SERPL-CCNC: 146 U/L (ref 33–136)
ALT SERPL W P-5'-P-CCNC: 12 U/L (ref 10–52)
ANION GAP SERPL CALC-SCNC: 11 MMOL/L (ref 10–20)
AST SERPL W P-5'-P-CCNC: 15 U/L (ref 9–39)
BASOPHILS # BLD AUTO: 0.04 X10*3/UL (ref 0–0.1)
BASOPHILS NFR BLD AUTO: 0.4 %
BILIRUB SERPL-MCNC: 0.6 MG/DL (ref 0–1.2)
BUN SERPL-MCNC: 16 MG/DL (ref 6–23)
CALCIUM SERPL-MCNC: 9.2 MG/DL (ref 8.6–10.3)
CHLORIDE SERPL-SCNC: 100 MMOL/L (ref 98–107)
CO2 SERPL-SCNC: 29 MMOL/L (ref 21–32)
CREAT SERPL-MCNC: 0.77 MG/DL (ref 0.5–1.3)
CRP SERPL-MCNC: 13.42 MG/DL
EGFRCR SERPLBLD CKD-EPI 2021: >90 ML/MIN/1.73M*2
EOSINOPHIL # BLD AUTO: 0.1 X10*3/UL (ref 0–0.7)
EOSINOPHIL NFR BLD AUTO: 1 %
ERYTHROCYTE [DISTWIDTH] IN BLOOD BY AUTOMATED COUNT: 16.9 % (ref 11.5–14.5)
ERYTHROCYTE [SEDIMENTATION RATE] IN BLOOD BY WESTERGREN METHOD: 51 MM/H (ref 0–20)
GLUCOSE SERPL-MCNC: 115 MG/DL (ref 74–99)
HCT VFR BLD AUTO: 32.9 % (ref 41–52)
HGB BLD-MCNC: 10.3 G/DL (ref 13.5–17.5)
IMM GRANULOCYTES # BLD AUTO: 0.04 X10*3/UL (ref 0–0.7)
IMM GRANULOCYTES NFR BLD AUTO: 0.4 % (ref 0–0.9)
LACTATE SERPL-SCNC: 1.4 MMOL/L (ref 0.4–2)
LYMPHOCYTES # BLD AUTO: 1.59 X10*3/UL (ref 1.2–4.8)
LYMPHOCYTES NFR BLD AUTO: 15.3 %
MCH RBC QN AUTO: 25.3 PG (ref 26–34)
MCHC RBC AUTO-ENTMCNC: 31.3 G/DL (ref 32–36)
MCV RBC AUTO: 81 FL (ref 80–100)
MONOCYTES # BLD AUTO: 0.79 X10*3/UL (ref 0.1–1)
MONOCYTES NFR BLD AUTO: 7.6 %
NEUTROPHILS # BLD AUTO: 7.84 X10*3/UL (ref 1.2–7.7)
NEUTROPHILS NFR BLD AUTO: 75.3 %
NRBC BLD-RTO: 0 /100 WBCS (ref 0–0)
PLATELET # BLD AUTO: 315 X10*3/UL (ref 150–450)
POTASSIUM SERPL-SCNC: 4.4 MMOL/L (ref 3.5–5.3)
PROT SERPL-MCNC: 7.3 G/DL (ref 6.4–8.2)
RBC # BLD AUTO: 4.07 X10*6/UL (ref 4.5–5.9)
SODIUM SERPL-SCNC: 136 MMOL/L (ref 136–145)
WBC # BLD AUTO: 10.4 X10*3/UL (ref 4.4–11.3)

## 2024-10-09 PROCEDURE — 96361 HYDRATE IV INFUSION ADD-ON: CPT

## 2024-10-09 PROCEDURE — 73630 X-RAY EXAM OF FOOT: CPT | Mod: LT

## 2024-10-09 PROCEDURE — 36415 COLL VENOUS BLD VENIPUNCTURE: CPT | Performed by: STUDENT IN AN ORGANIZED HEALTH CARE EDUCATION/TRAINING PROGRAM

## 2024-10-09 PROCEDURE — 96365 THER/PROPH/DIAG IV INF INIT: CPT | Mod: 59

## 2024-10-09 PROCEDURE — 85025 COMPLETE CBC W/AUTO DIFF WBC: CPT | Performed by: STUDENT IN AN ORGANIZED HEALTH CARE EDUCATION/TRAINING PROGRAM

## 2024-10-09 PROCEDURE — 86140 C-REACTIVE PROTEIN: CPT | Performed by: STUDENT IN AN ORGANIZED HEALTH CARE EDUCATION/TRAINING PROGRAM

## 2024-10-09 PROCEDURE — 99285 EMERGENCY DEPT VISIT HI MDM: CPT

## 2024-10-09 PROCEDURE — 1100000001 HC PRIVATE ROOM DAILY

## 2024-10-09 PROCEDURE — 2500000004 HC RX 250 GENERAL PHARMACY W/ HCPCS (ALT 636 FOR OP/ED): Performed by: STUDENT IN AN ORGANIZED HEALTH CARE EDUCATION/TRAINING PROGRAM

## 2024-10-09 PROCEDURE — 99223 1ST HOSP IP/OBS HIGH 75: CPT

## 2024-10-09 PROCEDURE — 2550000001 HC RX 255 CONTRASTS: Performed by: STUDENT IN AN ORGANIZED HEALTH CARE EDUCATION/TRAINING PROGRAM

## 2024-10-09 PROCEDURE — 85652 RBC SED RATE AUTOMATED: CPT | Performed by: STUDENT IN AN ORGANIZED HEALTH CARE EDUCATION/TRAINING PROGRAM

## 2024-10-09 PROCEDURE — 99215 OFFICE O/P EST HI 40 MIN: CPT

## 2024-10-09 PROCEDURE — 73720 MRI LWR EXTREMITY W/O&W/DYE: CPT | Mod: LEFT SIDE | Performed by: RADIOLOGY

## 2024-10-09 PROCEDURE — 2500000005 HC RX 250 GENERAL PHARMACY W/O HCPCS: Performed by: STUDENT IN AN ORGANIZED HEALTH CARE EDUCATION/TRAINING PROGRAM

## 2024-10-09 PROCEDURE — 80053 COMPREHEN METABOLIC PANEL: CPT | Performed by: STUDENT IN AN ORGANIZED HEALTH CARE EDUCATION/TRAINING PROGRAM

## 2024-10-09 PROCEDURE — 73630 X-RAY EXAM OF FOOT: CPT | Mod: LEFT SIDE | Performed by: STUDENT IN AN ORGANIZED HEALTH CARE EDUCATION/TRAINING PROGRAM

## 2024-10-09 PROCEDURE — 87040 BLOOD CULTURE FOR BACTERIA: CPT | Mod: PORLAB | Performed by: STUDENT IN AN ORGANIZED HEALTH CARE EDUCATION/TRAINING PROGRAM

## 2024-10-09 PROCEDURE — 96375 TX/PRO/DX INJ NEW DRUG ADDON: CPT

## 2024-10-09 PROCEDURE — A9575 INJ GADOTERATE MEGLUMI 0.1ML: HCPCS | Performed by: STUDENT IN AN ORGANIZED HEALTH CARE EDUCATION/TRAINING PROGRAM

## 2024-10-09 PROCEDURE — 83605 ASSAY OF LACTIC ACID: CPT | Performed by: STUDENT IN AN ORGANIZED HEALTH CARE EDUCATION/TRAINING PROGRAM

## 2024-10-09 PROCEDURE — 73720 MRI LWR EXTREMITY W/O&W/DYE: CPT | Mod: LT

## 2024-10-09 RX ORDER — PANTOPRAZOLE SODIUM 40 MG/10ML
40 INJECTION, POWDER, LYOPHILIZED, FOR SOLUTION INTRAVENOUS
Status: DISCONTINUED | OUTPATIENT
Start: 2024-10-10 | End: 2024-10-11 | Stop reason: HOSPADM

## 2024-10-09 RX ORDER — METOPROLOL SUCCINATE 25 MG/1
25 TABLET, EXTENDED RELEASE ORAL DAILY
Status: DISCONTINUED | OUTPATIENT
Start: 2024-10-10 | End: 2024-10-11 | Stop reason: HOSPADM

## 2024-10-09 RX ORDER — ASPIRIN 81 MG/1
81 TABLET ORAL DAILY
Status: DISCONTINUED | OUTPATIENT
Start: 2024-10-10 | End: 2024-10-11 | Stop reason: HOSPADM

## 2024-10-09 RX ORDER — POLYETHYLENE GLYCOL 3350 17 G/17G
17 POWDER, FOR SOLUTION ORAL DAILY PRN
Status: DISCONTINUED | OUTPATIENT
Start: 2024-10-09 | End: 2024-10-11 | Stop reason: HOSPADM

## 2024-10-09 RX ORDER — PANTOPRAZOLE SODIUM 40 MG/1
40 TABLET, DELAYED RELEASE ORAL
Status: DISCONTINUED | OUTPATIENT
Start: 2024-10-10 | End: 2024-10-11 | Stop reason: HOSPADM

## 2024-10-09 RX ORDER — ACETAMINOPHEN 325 MG/1
650 TABLET ORAL EVERY 4 HOURS PRN
Status: DISCONTINUED | OUTPATIENT
Start: 2024-10-09 | End: 2024-10-11 | Stop reason: HOSPADM

## 2024-10-09 RX ORDER — INSULIN LISPRO 100 [IU]/ML
0-10 INJECTION, SOLUTION INTRAVENOUS; SUBCUTANEOUS
Status: DISCONTINUED | OUTPATIENT
Start: 2024-10-09 | End: 2024-10-11 | Stop reason: HOSPADM

## 2024-10-09 RX ORDER — LEVOTHYROXINE SODIUM 50 UG/1
50 TABLET ORAL DAILY
Status: DISCONTINUED | OUTPATIENT
Start: 2024-10-10 | End: 2024-10-11 | Stop reason: HOSPADM

## 2024-10-09 RX ORDER — GUAIFENESIN 600 MG/1
600 TABLET, EXTENDED RELEASE ORAL EVERY 12 HOURS PRN
Status: DISCONTINUED | OUTPATIENT
Start: 2024-10-09 | End: 2024-10-11 | Stop reason: HOSPADM

## 2024-10-09 RX ORDER — ENOXAPARIN SODIUM 100 MG/ML
40 INJECTION SUBCUTANEOUS EVERY 24 HOURS
Status: DISCONTINUED | OUTPATIENT
Start: 2024-10-09 | End: 2024-10-11 | Stop reason: HOSPADM

## 2024-10-09 RX ORDER — ONDANSETRON HYDROCHLORIDE 2 MG/ML
4 INJECTION, SOLUTION INTRAVENOUS EVERY 6 HOURS PRN
Status: DISCONTINUED | OUTPATIENT
Start: 2024-10-09 | End: 2024-10-11 | Stop reason: HOSPADM

## 2024-10-09 RX ORDER — GADOTERATE MEGLUMINE 376.9 MG/ML
0.2 INJECTION INTRAVENOUS
Status: COMPLETED | OUTPATIENT
Start: 2024-10-09 | End: 2024-10-09

## 2024-10-09 RX ORDER — DULOXETIN HYDROCHLORIDE 30 MG/1
60 CAPSULE, DELAYED RELEASE ORAL DAILY
Status: DISCONTINUED | OUTPATIENT
Start: 2024-10-10 | End: 2024-10-11 | Stop reason: HOSPADM

## 2024-10-09 RX ORDER — ATORVASTATIN CALCIUM 40 MG/1
80 TABLET, FILM COATED ORAL NIGHTLY
Status: DISCONTINUED | OUTPATIENT
Start: 2024-10-09 | End: 2024-10-11 | Stop reason: HOSPADM

## 2024-10-09 RX ORDER — ALBUTEROL SULFATE 90 UG/1
2 INHALANT RESPIRATORY (INHALATION) EVERY 6 HOURS PRN
Status: DISCONTINUED | OUTPATIENT
Start: 2024-10-09 | End: 2024-10-11 | Stop reason: HOSPADM

## 2024-10-09 RX ORDER — INSULIN GLARGINE 100 [IU]/ML
25 INJECTION, SOLUTION SUBCUTANEOUS NIGHTLY
Status: DISCONTINUED | OUTPATIENT
Start: 2024-10-09 | End: 2024-10-11 | Stop reason: HOSPADM

## 2024-10-09 RX ORDER — DEXTROSE 50 % IN WATER (D50W) INTRAVENOUS SYRINGE
12.5
Status: DISCONTINUED | OUTPATIENT
Start: 2024-10-09 | End: 2024-10-11 | Stop reason: HOSPADM

## 2024-10-09 RX ORDER — TALC
3 POWDER (GRAM) TOPICAL NIGHTLY PRN
Status: DISCONTINUED | OUTPATIENT
Start: 2024-10-09 | End: 2024-10-11 | Stop reason: HOSPADM

## 2024-10-09 RX ORDER — DEXTROSE 50 % IN WATER (D50W) INTRAVENOUS SYRINGE
25
Status: DISCONTINUED | OUTPATIENT
Start: 2024-10-09 | End: 2024-10-11 | Stop reason: HOSPADM

## 2024-10-09 RX ORDER — CLOPIDOGREL BISULFATE 75 MG/1
75 TABLET ORAL DAILY
Status: DISCONTINUED | OUTPATIENT
Start: 2024-10-10 | End: 2024-10-11 | Stop reason: HOSPADM

## 2024-10-09 ASSESSMENT — ENCOUNTER SYMPTOMS
CHEST TIGHTNESS: 0
VOMITING: 0
WOUND: 0
ABDOMINAL PAIN: 0
TREMORS: 0
BACK PAIN: 0
COUGH: 0
FLANK PAIN: 0
HEADACHES: 0
FEVER: 0
CHILLS: 0
PALPITATIONS: 0
CONSTIPATION: 0
WHEEZING: 0
FATIGUE: 0
NAUSEA: 0
JOINT SWELLING: 0
SHORTNESS OF BREATH: 0
DIARRHEA: 0
WEAKNESS: 0
HEMATURIA: 0

## 2024-10-09 ASSESSMENT — LIFESTYLE VARIABLES
HAVE YOU EVER FELT YOU SHOULD CUT DOWN ON YOUR DRINKING: NO
EVER HAD A DRINK FIRST THING IN THE MORNING TO STEADY YOUR NERVES TO GET RID OF A HANGOVER: NO
HAVE PEOPLE ANNOYED YOU BY CRITICIZING YOUR DRINKING: NO
TOTAL SCORE: 0
EVER FELT BAD OR GUILTY ABOUT YOUR DRINKING: NO

## 2024-10-09 ASSESSMENT — PAIN DESCRIPTION - PAIN TYPE: TYPE: ACUTE PAIN

## 2024-10-09 ASSESSMENT — PAIN DESCRIPTION - LOCATION: LOCATION: TOE (COMMENT WHICH ONE)

## 2024-10-09 ASSESSMENT — PAIN SCALES - GENERAL: PAINLEVEL_OUTOF10: 5 - MODERATE PAIN

## 2024-10-09 ASSESSMENT — COLUMBIA-SUICIDE SEVERITY RATING SCALE - C-SSRS
2. HAVE YOU ACTUALLY HAD ANY THOUGHTS OF KILLING YOURSELF?: NO
6. HAVE YOU EVER DONE ANYTHING, STARTED TO DO ANYTHING, OR PREPARED TO DO ANYTHING TO END YOUR LIFE?: NO
1. IN THE PAST MONTH, HAVE YOU WISHED YOU WERE DEAD OR WISHED YOU COULD GO TO SLEEP AND NOT WAKE UP?: NO

## 2024-10-09 ASSESSMENT — PAIN - FUNCTIONAL ASSESSMENT: PAIN_FUNCTIONAL_ASSESSMENT: 0-10

## 2024-10-09 NOTE — ED PROVIDER NOTES
HPI   Chief Complaint   Patient presents with    Wound Infection       58 yo M with history of osteomyelitis sent from urgent care for concern for wound infection. History of CAD s/p CABG x 3 in July 2024, peripheral neuropathy, history of osteomyelitis of the foot.  He has followed with wound care regularly and states they have noted increasing size of wounds to his left big toe, second toe and third toe.  He thinks he has had some drainage from the sites as well.  He has an area of redness to his left anterior calf as well as left thigh that he states has been present for several weeks and has not significantly changed in that time.  He has neuropathy at baseline.  He has previously followed with Dr. Raya with podiatry, and had been treated approximately 1 year prior for acute osteomyelitis of his left foot.  He denies fevers, chills, drainage from thigh or calf wound, falls or injuries, history of DVT or PE, chest pain, shortness of breath.              Patient History   Past Medical History:   Diagnosis Date    Abnormal result of other cardiovascular function study 06/28/2018    Abnormal stress test    Atherosclerotic heart disease of native coronary artery with unstable angina pectoris (Multi) 06/28/2018    Unstable angina pectoris due to coronary arteriosclerosis    Fibromyalgia     Fibromyalgia    Personal history of other diseases of the circulatory system     History of hypertension    Personal history of other diseases of the musculoskeletal system and connective tissue     History of rheumatoid arthritis    Personal history of other diseases of the musculoskeletal system and connective tissue     History of degenerative disc disease    Personal history of other diseases of the musculoskeletal system and connective tissue     History of osteoporosis    Personal history of other endocrine, nutritional and metabolic disease     History of hyperlipidemia    Personal history of other endocrine, nutritional  and metabolic disease     History of hypothyroidism    Type 2 diabetes mellitus without complications (Multi) 02/25/2022    Type 2 diabetes mellitus     Past Surgical History:   Procedure Laterality Date    BACK SURGERY  06/28/2018    Back Surgery    CARDIAC CATHETERIZATION N/A 7/1/2024    Procedure: Left Heart Cath;  Surgeon: Yao Calvin MD;  Location: [facility];  Service: Cardiovascular;  Laterality: N/A;    SEPTOPLASTY  06/28/2018    Septoplasty     No family history on file.  Social History     Tobacco Use    Smoking status: Every Day     Current packs/day: 1.00     Average packs/day: 1 pack/day for 40.0 years (40.0 ttl pk-yrs)     Types: Cigarettes    Smokeless tobacco: Never   Vaping Use    Vaping status: Not on file   Substance Use Topics    Alcohol use: Not Currently    Drug use: Never       Physical Exam   ED Triage Vitals [10/09/24 1720]   Temperature Heart Rate Respirations BP   36.9 °C (98.4 °F) 93 18 103/61      Pulse Ox Temp Source Heart Rate Source Patient Position   95 % Temporal -- --      BP Location FiO2 (%)     -- --       Physical Exam  Vitals reviewed.   Eyes:      Extraocular Movements: Extraocular movements intact.      Pupils: Pupils are equal, round, and reactive to light.   Cardiovascular:      Rate and Rhythm: Normal rate and regular rhythm.   Pulmonary:      Effort: Pulmonary effort is normal.      Breath sounds: Normal breath sounds.   Abdominal:      Palpations: Abdomen is soft.      Tenderness: There is no abdominal tenderness.   Musculoskeletal:         General: Normal range of motion.      Cervical back: Normal range of motion.   Skin:     Comments: Wounds to plantar surface of left big toe, left second toe and left third toe, big toe suspected down to bone. Erythematous area consistent with cellulitis to left anterior shin and left medial thigh.    Neurological:      General: No focal deficit present.      Mental Status: He is alert and oriented to person, place, and  time.           ED Course & MDM   ED Course as of 10/09/24 2237   Wed Oct 09, 2024   1834 XR left foot showing soft tissue ulceration at tip of 3rd digit with osteomyelitis. Media placed in chart. Lab workup ordered including cultures and lactate with ESR and CRP. MRI foot left ordered and pending. Patient has history of spinal implant and previously had MRI in 2023 without issue. [JG]   1916 C-Reactive Protein(!): 13.42 [JG]   1916 Sed Rate(!): 51 [JG]   2002 Discussed patient with podiatrist Dr. Raya who will evaluate patient in the morning. Plan for admission to medicine pending MRI.  [JG]   2142 MRI left foot IMPRESSION:  Confluent low T1 signal is noted within the 3rd distal phalanx. In conjunction with same day radiographic findings, this is concerning for osteomyelitis   [JG]      ED Course User Index  [JG] Vera Short MD         Diagnoses as of 10/09/24 2237   Osteomyelitis of left foot, unspecified type (Multi)   Cellulitis of left lower extremity                 No data recorded     Sterling Coma Scale Score: 15 (10/09/24 1903 : Kate Terry RN)                           Medical Decision Making  59-year-old male with history acemetacin of the left foot, CAD s/p CABG presents with concern for recurrent acute osteomyelitis.  On evaluation, patient has wounds to plantar surface of left big toe, left second toe, and left third toe.  Suspect left big toe wound may probe down to bone.  DP and PT pulses intact.  No active drainage from site on examination. Erythematous area consistent with cellulitis to left anterior shin and left medial thigh, marked by urgent care provider without extension beyond marking.  No fluctuance or active drainage from areas of cellulitis.  CRP and ESR elevated. X-ray obtained of left foot concerning for acute osteomyelitis.  Blood cultures and lactate as well as lab workup obtained, patient started on IV antibiotics.  MRI left foot also concerning for acute osteomyelitis  of the left third toe.  Media placed in chart. Discussed case with podiatrist Dr. Raya who will evaluate patient in the morning.  Patient will be admitted to medicine with consultation to podiatry.              Procedure  Procedures     Vera Short MD  10/09/24 9130

## 2024-10-10 ENCOUNTER — ANESTHESIA (OUTPATIENT)
Dept: OPERATING ROOM | Facility: HOSPITAL | Age: 59
End: 2024-10-10
Payer: MEDICARE

## 2024-10-10 ENCOUNTER — ANESTHESIA EVENT (OUTPATIENT)
Dept: OPERATING ROOM | Facility: HOSPITAL | Age: 59
End: 2024-10-10
Payer: MEDICARE

## 2024-10-10 PROBLEM — M86.172 ACUTE OSTEOMYELITIS OF LEFT FOOT (MULTI): Status: ACTIVE | Noted: 2024-10-09

## 2024-10-10 PROBLEM — L03.116 CELLULITIS OF LEFT LOWER EXTREMITY: Status: ACTIVE | Noted: 2024-10-09

## 2024-10-10 LAB
ALBUMIN SERPL BCP-MCNC: 3.5 G/DL (ref 3.4–5)
ALP SERPL-CCNC: 133 U/L (ref 33–136)
ALT SERPL W P-5'-P-CCNC: 11 U/L (ref 10–52)
ANION GAP SERPL CALC-SCNC: 12 MMOL/L (ref 10–20)
APPEARANCE UR: CLEAR
AST SERPL W P-5'-P-CCNC: 14 U/L (ref 9–39)
BILIRUB SERPL-MCNC: 0.6 MG/DL (ref 0–1.2)
BILIRUB UR STRIP.AUTO-MCNC: NEGATIVE MG/DL
BUN SERPL-MCNC: 12 MG/DL (ref 6–23)
CALCIUM SERPL-MCNC: 8.9 MG/DL (ref 8.6–10.3)
CHLORIDE SERPL-SCNC: 102 MMOL/L (ref 98–107)
CO2 SERPL-SCNC: 27 MMOL/L (ref 21–32)
COLOR UR: NORMAL
CREAT SERPL-MCNC: 0.66 MG/DL (ref 0.5–1.3)
EGFRCR SERPLBLD CKD-EPI 2021: >90 ML/MIN/1.73M*2
ERYTHROCYTE [DISTWIDTH] IN BLOOD BY AUTOMATED COUNT: 16.9 % (ref 11.5–14.5)
EST. AVERAGE GLUCOSE BLD GHB EST-MCNC: 171 MG/DL
GLUCOSE BLD MANUAL STRIP-MCNC: 73 MG/DL (ref 74–99)
GLUCOSE BLD MANUAL STRIP-MCNC: 75 MG/DL (ref 74–99)
GLUCOSE BLD MANUAL STRIP-MCNC: 78 MG/DL (ref 74–99)
GLUCOSE BLD MANUAL STRIP-MCNC: 91 MG/DL (ref 74–99)
GLUCOSE SERPL-MCNC: 58 MG/DL (ref 74–99)
GLUCOSE UR STRIP.AUTO-MCNC: NORMAL MG/DL
HBA1C MFR BLD: 7.6 %
HCT VFR BLD AUTO: 32.8 % (ref 41–52)
HGB BLD-MCNC: 10.3 G/DL (ref 13.5–17.5)
HOLD SPECIMEN: NORMAL
KETONES UR STRIP.AUTO-MCNC: NEGATIVE MG/DL
LEUKOCYTE ESTERASE UR QL STRIP.AUTO: NEGATIVE
MCH RBC QN AUTO: 25.2 PG (ref 26–34)
MCHC RBC AUTO-ENTMCNC: 31.4 G/DL (ref 32–36)
MCV RBC AUTO: 80 FL (ref 80–100)
NITRITE UR QL STRIP.AUTO: NEGATIVE
NRBC BLD-RTO: 0 /100 WBCS (ref 0–0)
PH UR STRIP.AUTO: 6 [PH]
PLATELET # BLD AUTO: 311 X10*3/UL (ref 150–450)
POTASSIUM SERPL-SCNC: 3.4 MMOL/L (ref 3.5–5.3)
PROT SERPL-MCNC: 6.8 G/DL (ref 6.4–8.2)
PROT UR STRIP.AUTO-MCNC: NEGATIVE MG/DL
RBC # BLD AUTO: 4.08 X10*6/UL (ref 4.5–5.9)
RBC # UR STRIP.AUTO: NEGATIVE /UL
SODIUM SERPL-SCNC: 138 MMOL/L (ref 136–145)
SP GR UR STRIP.AUTO: 1.01
UROBILINOGEN UR STRIP.AUTO-MCNC: NORMAL MG/DL
WBC # BLD AUTO: 8.7 X10*3/UL (ref 4.4–11.3)

## 2024-10-10 PROCEDURE — S0109 METHADONE ORAL 5MG: HCPCS | Performed by: PHYSICIAN ASSISTANT

## 2024-10-10 PROCEDURE — 2500000004 HC RX 250 GENERAL PHARMACY W/ HCPCS (ALT 636 FOR OP/ED): Performed by: PODIATRIST

## 2024-10-10 PROCEDURE — 0Y6U0Z0 DETACHMENT AT LEFT 3RD TOE, COMPLETE, OPEN APPROACH: ICD-10-PCS | Performed by: PODIATRIST

## 2024-10-10 PROCEDURE — 2500000004 HC RX 250 GENERAL PHARMACY W/ HCPCS (ALT 636 FOR OP/ED): Performed by: ANESTHESIOLOGY

## 2024-10-10 PROCEDURE — 2500000001 HC RX 250 WO HCPCS SELF ADMINISTERED DRUGS (ALT 637 FOR MEDICARE OP): Performed by: PODIATRIST

## 2024-10-10 PROCEDURE — 2500000002 HC RX 250 W HCPCS SELF ADMINISTERED DRUGS (ALT 637 FOR MEDICARE OP, ALT 636 FOR OP/ED): Performed by: PHYSICIAN ASSISTANT

## 2024-10-10 PROCEDURE — 2720000007 HC OR 272 NO HCPCS: Performed by: PODIATRIST

## 2024-10-10 PROCEDURE — 2500000005 HC RX 250 GENERAL PHARMACY W/O HCPCS: Performed by: PODIATRIST

## 2024-10-10 PROCEDURE — 84075 ASSAY ALKALINE PHOSPHATASE: CPT

## 2024-10-10 PROCEDURE — 2500000002 HC RX 250 W HCPCS SELF ADMINISTERED DRUGS (ALT 637 FOR MEDICARE OP, ALT 636 FOR OP/ED): Performed by: PODIATRIST

## 2024-10-10 PROCEDURE — 3600000003 HC OR TIME - INITIAL BASE CHARGE - PROCEDURE LEVEL THREE: Performed by: PODIATRIST

## 2024-10-10 PROCEDURE — 99233 SBSQ HOSP IP/OBS HIGH 50: CPT | Performed by: PHYSICIAN ASSISTANT

## 2024-10-10 PROCEDURE — 87186 SC STD MICRODIL/AGAR DIL: CPT | Mod: PORLAB | Performed by: PODIATRIST

## 2024-10-10 PROCEDURE — 3700000001 HC GENERAL ANESTHESIA TIME - INITIAL BASE CHARGE: Performed by: PODIATRIST

## 2024-10-10 PROCEDURE — 2500000004 HC RX 250 GENERAL PHARMACY W/ HCPCS (ALT 636 FOR OP/ED): Performed by: NURSE ANESTHETIST, CERTIFIED REGISTERED

## 2024-10-10 PROCEDURE — 82947 ASSAY GLUCOSE BLOOD QUANT: CPT

## 2024-10-10 PROCEDURE — 2500000001 HC RX 250 WO HCPCS SELF ADMINISTERED DRUGS (ALT 637 FOR MEDICARE OP)

## 2024-10-10 PROCEDURE — 3600000008 HC OR TIME - EACH INCREMENTAL 1 MINUTE - PROCEDURE LEVEL THREE: Performed by: PODIATRIST

## 2024-10-10 PROCEDURE — 83036 HEMOGLOBIN GLYCOSYLATED A1C: CPT

## 2024-10-10 PROCEDURE — 87070 CULTURE OTHR SPECIMN AEROBIC: CPT | Mod: PORLAB | Performed by: PODIATRIST

## 2024-10-10 PROCEDURE — 1100000001 HC PRIVATE ROOM DAILY

## 2024-10-10 PROCEDURE — 85027 COMPLETE CBC AUTOMATED: CPT

## 2024-10-10 PROCEDURE — 36415 COLL VENOUS BLD VENIPUNCTURE: CPT

## 2024-10-10 PROCEDURE — 2500000004 HC RX 250 GENERAL PHARMACY W/ HCPCS (ALT 636 FOR OP/ED)

## 2024-10-10 PROCEDURE — 7100000002 HC RECOVERY ROOM TIME - EACH INCREMENTAL 1 MINUTE: Performed by: PODIATRIST

## 2024-10-10 PROCEDURE — 81003 URINALYSIS AUTO W/O SCOPE: CPT | Performed by: STUDENT IN AN ORGANIZED HEALTH CARE EDUCATION/TRAINING PROGRAM

## 2024-10-10 PROCEDURE — S0109 METHADONE ORAL 5MG: HCPCS | Performed by: PODIATRIST

## 2024-10-10 PROCEDURE — 7100000001 HC RECOVERY ROOM TIME - INITIAL BASE CHARGE: Performed by: PODIATRIST

## 2024-10-10 PROCEDURE — 3700000002 HC GENERAL ANESTHESIA TIME - EACH INCREMENTAL 1 MINUTE: Performed by: PODIATRIST

## 2024-10-10 RX ORDER — METHADONE HYDROCHLORIDE 10 MG/1
80 TABLET ORAL NIGHTLY
Status: DISCONTINUED | OUTPATIENT
Start: 2024-10-10 | End: 2024-10-11 | Stop reason: HOSPADM

## 2024-10-10 RX ORDER — POTASSIUM CHLORIDE 750 MG/1
20 TABLET, FILM COATED, EXTENDED RELEASE ORAL ONCE
Status: COMPLETED | OUTPATIENT
Start: 2024-10-10 | End: 2024-10-10

## 2024-10-10 RX ORDER — LIDOCAINE HYDROCHLORIDE 10 MG/ML
0.1 INJECTION, SOLUTION EPIDURAL; INFILTRATION; INTRACAUDAL; PERINEURAL ONCE
Status: DISCONTINUED | OUTPATIENT
Start: 2024-10-10 | End: 2024-10-10 | Stop reason: HOSPADM

## 2024-10-10 RX ORDER — HYDRALAZINE HYDROCHLORIDE 20 MG/ML
5 INJECTION INTRAMUSCULAR; INTRAVENOUS EVERY 30 MIN PRN
Status: DISCONTINUED | OUTPATIENT
Start: 2024-10-10 | End: 2024-10-10 | Stop reason: HOSPADM

## 2024-10-10 RX ORDER — LABETALOL HYDROCHLORIDE 5 MG/ML
5 INJECTION, SOLUTION INTRAVENOUS ONCE AS NEEDED
Status: DISCONTINUED | OUTPATIENT
Start: 2024-10-10 | End: 2024-10-10 | Stop reason: HOSPADM

## 2024-10-10 RX ORDER — SODIUM CHLORIDE, SODIUM LACTATE, POTASSIUM CHLORIDE, CALCIUM CHLORIDE 600; 310; 30; 20 MG/100ML; MG/100ML; MG/100ML; MG/100ML
100 INJECTION, SOLUTION INTRAVENOUS CONTINUOUS
Status: DISCONTINUED | OUTPATIENT
Start: 2024-10-10 | End: 2024-10-10 | Stop reason: HOSPADM

## 2024-10-10 RX ORDER — BUPIVACAINE HYDROCHLORIDE 5 MG/ML
INJECTION, SOLUTION EPIDURAL; INTRACAUDAL AS NEEDED
Status: DISCONTINUED | OUTPATIENT
Start: 2024-10-10 | End: 2024-10-10 | Stop reason: HOSPADM

## 2024-10-10 RX ORDER — DIPHENHYDRAMINE HYDROCHLORIDE 50 MG/ML
12.5 INJECTION INTRAMUSCULAR; INTRAVENOUS ONCE AS NEEDED
Status: DISCONTINUED | OUTPATIENT
Start: 2024-10-10 | End: 2024-10-10 | Stop reason: HOSPADM

## 2024-10-10 RX ORDER — PROPOFOL 10 MG/ML
INJECTION, EMULSION INTRAVENOUS AS NEEDED
Status: DISCONTINUED | OUTPATIENT
Start: 2024-10-10 | End: 2024-10-10

## 2024-10-10 RX ORDER — KETOROLAC TROMETHAMINE 30 MG/ML
30 INJECTION, SOLUTION INTRAMUSCULAR; INTRAVENOUS EVERY 6 HOURS PRN
Status: DISCONTINUED | OUTPATIENT
Start: 2024-10-10 | End: 2024-10-10 | Stop reason: HOSPADM

## 2024-10-10 RX ORDER — ALBUTEROL SULFATE 0.83 MG/ML
2.5 SOLUTION RESPIRATORY (INHALATION) ONCE AS NEEDED
Status: DISCONTINUED | OUTPATIENT
Start: 2024-10-10 | End: 2024-10-10 | Stop reason: HOSPADM

## 2024-10-10 RX ORDER — SODIUM CHLORIDE 0.9 G/100ML
IRRIGANT IRRIGATION AS NEEDED
Status: DISCONTINUED | OUTPATIENT
Start: 2024-10-10 | End: 2024-10-10 | Stop reason: HOSPADM

## 2024-10-10 RX ORDER — FAMOTIDINE 10 MG/ML
20 INJECTION INTRAVENOUS ONCE
Status: COMPLETED | OUTPATIENT
Start: 2024-10-10 | End: 2024-10-10

## 2024-10-10 RX ORDER — OXYCODONE AND ACETAMINOPHEN 5; 325 MG/1; MG/1
1 TABLET ORAL EVERY 4 HOURS PRN
Status: DISCONTINUED | OUTPATIENT
Start: 2024-10-10 | End: 2024-10-10 | Stop reason: HOSPADM

## 2024-10-10 RX ORDER — ONDANSETRON HYDROCHLORIDE 2 MG/ML
4 INJECTION, SOLUTION INTRAVENOUS ONCE AS NEEDED
Status: DISCONTINUED | OUTPATIENT
Start: 2024-10-10 | End: 2024-10-10 | Stop reason: HOSPADM

## 2024-10-10 RX ORDER — MORPHINE SULFATE 2 MG/ML
2 INJECTION, SOLUTION INTRAMUSCULAR; INTRAVENOUS EVERY 5 MIN PRN
Status: DISCONTINUED | OUTPATIENT
Start: 2024-10-10 | End: 2024-10-10 | Stop reason: HOSPADM

## 2024-10-10 SDOH — SOCIAL STABILITY: SOCIAL INSECURITY: HAVE YOU HAD THOUGHTS OF HARMING ANYONE ELSE?: NO

## 2024-10-10 SDOH — SOCIAL STABILITY: SOCIAL INSECURITY: WITHIN THE LAST YEAR, HAVE YOU BEEN HUMILIATED OR EMOTIONALLY ABUSED IN OTHER WAYS BY YOUR PARTNER OR EX-PARTNER?: NO

## 2024-10-10 SDOH — ECONOMIC STABILITY: FOOD INSECURITY: HOW HARD IS IT FOR YOU TO PAY FOR THE VERY BASICS LIKE FOOD, HOUSING, MEDICAL CARE, AND HEATING?: NOT HARD AT ALL

## 2024-10-10 SDOH — SOCIAL STABILITY: SOCIAL INSECURITY: WITHIN THE LAST YEAR, HAVE YOU BEEN AFRAID OF YOUR PARTNER OR EX-PARTNER?: NO

## 2024-10-10 SDOH — ECONOMIC STABILITY: INCOME INSECURITY: IN THE LAST 12 MONTHS, WAS THERE A TIME WHEN YOU WERE NOT ABLE TO PAY THE MORTGAGE OR RENT ON TIME?: NO

## 2024-10-10 SDOH — ECONOMIC STABILITY: FOOD INSECURITY: WITHIN THE PAST 12 MONTHS, YOU WORRIED THAT YOUR FOOD WOULD RUN OUT BEFORE YOU GOT THE MONEY TO BUY MORE.: NEVER TRUE

## 2024-10-10 SDOH — SOCIAL STABILITY: SOCIAL INSECURITY: ABUSE: ADULT

## 2024-10-10 SDOH — ECONOMIC STABILITY: HOUSING INSECURITY: IN THE PAST 12 MONTHS, HOW MANY TIMES HAVE YOU MOVED WHERE YOU WERE LIVING?: 0

## 2024-10-10 SDOH — ECONOMIC STABILITY: FOOD INSECURITY: WITHIN THE PAST 12 MONTHS, YOU WORRIED THAT YOUR FOOD WOULD RUN OUT BEFORE YOU GOT MONEY TO BUY MORE.: NEVER TRUE

## 2024-10-10 SDOH — HEALTH STABILITY: MENTAL HEALTH: HOW OFTEN DO YOU HAVE A DRINK CONTAINING ALCOHOL?: NEVER

## 2024-10-10 SDOH — ECONOMIC STABILITY: TRANSPORTATION INSECURITY: IN THE PAST 12 MONTHS, HAS LACK OF TRANSPORTATION KEPT YOU FROM MEDICAL APPOINTMENTS OR FROM GETTING MEDICATIONS?: NO

## 2024-10-10 SDOH — SOCIAL STABILITY: SOCIAL INSECURITY
WITHIN THE LAST YEAR, HAVE YOU BEEN RAPED OR FORCED TO HAVE ANY KIND OF SEXUAL ACTIVITY BY YOUR PARTNER OR EX-PARTNER?: NO

## 2024-10-10 SDOH — ECONOMIC STABILITY: HOUSING INSECURITY: AT ANY TIME IN THE PAST 12 MONTHS, WERE YOU HOMELESS OR LIVING IN A SHELTER (INCLUDING NOW)?: NO

## 2024-10-10 SDOH — ECONOMIC STABILITY: FOOD INSECURITY: WITHIN THE PAST 12 MONTHS, THE FOOD YOU BOUGHT JUST DIDN'T LAST AND YOU DIDN'T HAVE MONEY TO GET MORE.: NEVER TRUE

## 2024-10-10 SDOH — HEALTH STABILITY: MENTAL HEALTH: HOW OFTEN DO YOU HAVE 6 OR MORE DRINKS ON ONE OCCASION?: NEVER

## 2024-10-10 SDOH — ECONOMIC STABILITY: HOUSING INSECURITY: IN THE LAST 12 MONTHS, WAS THERE A TIME WHEN YOU WERE NOT ABLE TO PAY THE MORTGAGE OR RENT ON TIME?: NO

## 2024-10-10 SDOH — SOCIAL STABILITY: SOCIAL INSECURITY: DOES ANYONE TRY TO KEEP YOU FROM HAVING/CONTACTING OTHER FRIENDS OR DOING THINGS OUTSIDE YOUR HOME?: NO

## 2024-10-10 SDOH — ECONOMIC STABILITY: INCOME INSECURITY: HOW HARD IS IT FOR YOU TO PAY FOR THE VERY BASICS LIKE FOOD, HOUSING, MEDICAL CARE, AND HEATING?: NOT HARD AT ALL

## 2024-10-10 SDOH — HEALTH STABILITY: MENTAL HEALTH: CURRENT SMOKER: 0

## 2024-10-10 SDOH — SOCIAL STABILITY: SOCIAL INSECURITY: WERE YOU ABLE TO COMPLETE ALL THE BEHAVIORAL HEALTH SCREENINGS?: YES

## 2024-10-10 SDOH — HEALTH STABILITY: MENTAL HEALTH: HOW OFTEN DO YOU HAVE SIX OR MORE DRINKS ON ONE OCCASION?: NEVER

## 2024-10-10 SDOH — ECONOMIC STABILITY: INCOME INSECURITY: IN THE PAST 12 MONTHS, HAS THE ELECTRIC, GAS, OIL, OR WATER COMPANY THREATENED TO SHUT OFF SERVICE IN YOUR HOME?: NO

## 2024-10-10 SDOH — ECONOMIC STABILITY: INCOME INSECURITY: IN THE PAST 12 MONTHS HAS THE ELECTRIC, GAS, OIL, OR WATER COMPANY THREATENED TO SHUT OFF SERVICES IN YOUR HOME?: NO

## 2024-10-10 SDOH — HEALTH STABILITY: MENTAL HEALTH: HOW MANY STANDARD DRINKS CONTAINING ALCOHOL DO YOU HAVE ON A TYPICAL DAY?: PATIENT DOES NOT DRINK

## 2024-10-10 SDOH — SOCIAL STABILITY: SOCIAL INSECURITY: HAVE YOU HAD ANY THOUGHTS OF HARMING ANYONE ELSE?: NO

## 2024-10-10 SDOH — HEALTH STABILITY: MENTAL HEALTH: HOW MANY DRINKS CONTAINING ALCOHOL DO YOU HAVE ON A TYPICAL DAY WHEN YOU ARE DRINKING?: PATIENT DOES NOT DRINK

## 2024-10-10 SDOH — SOCIAL STABILITY: SOCIAL INSECURITY: ARE THERE ANY APPARENT SIGNS OF INJURIES/BEHAVIORS THAT COULD BE RELATED TO ABUSE/NEGLECT?: NO

## 2024-10-10 ASSESSMENT — ENCOUNTER SYMPTOMS
CONSTIPATION: 0
NAUSEA: 0
DIZZINESS: 0
BRUISES/BLEEDS EASILY: 0
WEAKNESS: 0
SORE THROAT: 0
NUMBNESS: 0
HEMATURIA: 0
HALLUCINATIONS: 0
SHORTNESS OF BREATH: 0
FACIAL SWELLING: 0
PALPITATIONS: 0
BACK PAIN: 0
LIGHT-HEADEDNESS: 0
FATIGUE: 0
WHEEZING: 0
ABDOMINAL PAIN: 0
DIAPHORESIS: 0
HEADACHES: 0
WOUND: 1
EYE PAIN: 0
DYSURIA: 0
FLANK PAIN: 0
FEVER: 0
VOMITING: 0
CHILLS: 0
DIARRHEA: 0
BLOOD IN STOOL: 0
JOINT SWELLING: 0
COUGH: 0
TROUBLE SWALLOWING: 0
APPETITE CHANGE: 0
FREQUENCY: 0
CHEST TIGHTNESS: 0

## 2024-10-10 ASSESSMENT — ACTIVITIES OF DAILY LIVING (ADL)
HEARING - LEFT EAR: FUNCTIONAL
JUDGMENT_ADEQUATE_SAFELY_COMPLETE_DAILY_ACTIVITIES: YES
DRESSING YOURSELF: INDEPENDENT
PATIENT'S MEMORY ADEQUATE TO SAFELY COMPLETE DAILY ACTIVITIES?: YES
FEEDING YOURSELF: INDEPENDENT
TOILETING: INDEPENDENT
GROOMING: INDEPENDENT
BATHING: INDEPENDENT
HEARING - RIGHT EAR: FUNCTIONAL
LACK_OF_TRANSPORTATION: NO
WALKS IN HOME: INDEPENDENT
ADEQUATE_TO_COMPLETE_ADL: YES

## 2024-10-10 ASSESSMENT — COGNITIVE AND FUNCTIONAL STATUS - GENERAL
DAILY ACTIVITIY SCORE: 24
DAILY ACTIVITIY SCORE: 24
MOBILITY SCORE: 22
PATIENT BASELINE BEDBOUND: NO
MOBILITY SCORE: 23
DAILY ACTIVITIY SCORE: 24
CLIMB 3 TO 5 STEPS WITH RAILING: A LITTLE
WALKING IN HOSPITAL ROOM: A LITTLE
CLIMB 3 TO 5 STEPS WITH RAILING: A LITTLE
CLIMB 3 TO 5 STEPS WITH RAILING: A LITTLE
MOBILITY SCORE: 23

## 2024-10-10 ASSESSMENT — LIFESTYLE VARIABLES
HOW OFTEN DO YOU HAVE 6 OR MORE DRINKS ON ONE OCCASION: NEVER
HOW OFTEN DO YOU HAVE A DRINK CONTAINING ALCOHOL: NEVER
AUDIT-C TOTAL SCORE: 0
SKIP TO QUESTIONS 9-10: 1
SKIP TO QUESTIONS 9-10: 1
HOW MANY STANDARD DRINKS CONTAINING ALCOHOL DO YOU HAVE ON A TYPICAL DAY: PATIENT DOES NOT DRINK
AUDIT-C TOTAL SCORE: 0
AUDIT-C TOTAL SCORE: 0

## 2024-10-10 ASSESSMENT — PAIN SCALES - GENERAL
PAINLEVEL_OUTOF10: 0 - NO PAIN
PAIN_LEVEL: 0
PAINLEVEL_OUTOF10: 6
PAINLEVEL_OUTOF10: 5 - MODERATE PAIN
PAINLEVEL_OUTOF10: 5 - MODERATE PAIN
PAINLEVEL_OUTOF10: 0 - NO PAIN
PAINLEVEL_OUTOF10: 5 - MODERATE PAIN
PAINLEVEL_OUTOF10: 0 - NO PAIN

## 2024-10-10 ASSESSMENT — PATIENT HEALTH QUESTIONNAIRE - PHQ9
2. FEELING DOWN, DEPRESSED OR HOPELESS: NOT AT ALL
1. LITTLE INTEREST OR PLEASURE IN DOING THINGS: NOT AT ALL
SUM OF ALL RESPONSES TO PHQ9 QUESTIONS 1 & 2: 0

## 2024-10-10 ASSESSMENT — PAIN - FUNCTIONAL ASSESSMENT
PAIN_FUNCTIONAL_ASSESSMENT: 0-10
PAIN_FUNCTIONAL_ASSESSMENT: 0-10

## 2024-10-10 ASSESSMENT — COLUMBIA-SUICIDE SEVERITY RATING SCALE - C-SSRS
1. IN THE PAST MONTH, HAVE YOU WISHED YOU WERE DEAD OR WISHED YOU COULD GO TO SLEEP AND NOT WAKE UP?: NO
2. HAVE YOU ACTUALLY HAD ANY THOUGHTS OF KILLING YOURSELF?: NO
6. HAVE YOU EVER DONE ANYTHING, STARTED TO DO ANYTHING, OR PREPARED TO DO ANYTHING TO END YOUR LIFE?: NO

## 2024-10-10 NOTE — PROGRESS NOTES
Kael Zepeda is a 59 y.o. male on day 1 of admission presenting with Osteomyelitis of left foot, unspecified type (Multi).      Subjective   Kael Zepeda is a 59 y.o. male with PMHx s/f Afib, CAD s/p CABG x3 vessels on 7/9/24, RA, IDDM2, COPD, hypothyroidism, chronic tobacco abuse, chronic methadone dependency, neuropathy and unhealed diabetic foot ulcers (s/p right foot partial amputation in 12/2023) presenting with left foot third digit swelling. He states he woke up this morning with his third toe increasing in odor, redness and swelling and he went to the wound care center who referred him to come to ED for further evaluation. Lactate 1.4, CRP 13.42, sed rate 51. XR left - Soft tissue ulceration at the tip of 3rd digit with findings concerning for osteomyelitis. Moderate degenerative changes of the great toe interphalangeal joint with suggestion of juxta-articular osseous erosive changes. This can be seen with inflammatory arthropathy like gout. MR left - Significantly limited examination due to patient motion. The T2 and postcontrast sequences are essentially nondiagnostic. Confluent low T1 signal is noted within the 3rd distal phalanx. In conjunction with same day radiographic findings, this is concerning for osteomyelitis. A1c 7.6%    10/10/2024: No acute events overnight. Vitals stable. Labs largely unremarkable. Slight hypokalemia, replace  Seen by podiatry, plans for L 3rd toe amputation in OR today.          Review of Systems   Constitutional:  Negative for appetite change, chills, diaphoresis, fatigue and fever.   HENT:  Negative for congestion, ear pain, facial swelling, hearing loss, nosebleeds, sore throat, tinnitus and trouble swallowing.    Eyes:  Negative for pain.   Respiratory:  Negative for cough, chest tightness, shortness of breath and wheezing.    Cardiovascular:  Negative for chest pain, palpitations and leg swelling.   Gastrointestinal:  Negative for abdominal pain, blood in  stool, constipation, diarrhea, nausea and vomiting.   Genitourinary:  Negative for dysuria, flank pain, frequency, hematuria and urgency.   Musculoskeletal:  Negative for back pain and joint swelling.   Skin:  Positive for rash and wound.   Neurological:  Negative for dizziness, syncope, weakness, light-headedness, numbness and headaches.   Hematological:  Does not bruise/bleed easily.   Psychiatric/Behavioral:  Negative for behavioral problems, hallucinations and suicidal ideas.           Objective     Last Recorded Vitals  /86 (BP Location: Left arm, Patient Position: Lying)   Pulse 82   Temp 36.2 °C (97.1 °F) (Temporal)   Resp 18   Wt 83.9 kg (185 lb)   SpO2 94%     Image Results  MR foot left w and wo IV contrast    Result Date: 10/9/2024  Interpreted By:  Braulio Alfaro, STUDY: MR FOOT LEFT W AND WO IV CONTRAST;  10/9/2024 8:50 pm   INDICATION: Signs/Symptoms:Left foot osteomyelitis.     COMPARISON: None.   ACCESSION NUMBER(S): [id number]   ORDERING CLINICIAN: KYLEIGH PRYOR   TECHNIQUE: MR imaging of the left foot was obtained before and after the intravenous administration of 17 ml Dotarem gadolinium contrast.   FINDINGS: Examination is significantly limited due to motion. The T2 sequences and postcontrast sequences are essentially nondiagnostic. Within these limitations:   Confluent low T1 signal in the 3rd distal phalanx with probable erosion of the distal aspect of the 3rd distal phalanx. There is soft tissue edema within the mid to distal 3rd toe. Assessment for fluid collection limited as above. Status post amputation of the 5th metatarsal head. T1 marrow signal appears otherwise preserved within the above limitations.         1. Significantly limited examination due to patient motion. The T2 and postcontrast sequences are essentially nondiagnostic. 2. Confluent low T1 signal is noted within the 3rd distal phalanx. In conjunction with same day radiographic findings, this is concerning for  osteomyelitis.   MACRO: None   Signed by: Braulio Alfaro 10/9/2024 9:37 PM Dictation workstation:   [id number]    XR foot left 3+ views    Result Date: 10/9/2024  Interpreted By:  Sharonda Tubbs, STUDY: XR FOOT LEFT 3+ VIEWS; ;  10/9/2024 5:49 pm   INDICATION: Signs/Symptoms:Toe issue..     COMPARISON: None.   ACCESSION NUMBER(S): [id number]   ORDERING CLINICIAN: RUBI HOUSTON   FINDINGS: Three views of left foot were performed.   There is soft tissue irregularity at the tip of 3rd digit, likely related to ulceration. There is cortical destruction of the distal phalangeal tip of 3rd digit, concerning for osteomyelitis. Cortical deformity of 5th metatarsal at the level of metatarsophalangeal joint is most likely favored to be related to prior partial resection or sequela of remote trauma. Otherwise no acute fracture or dislocation is noted. There are moderate degenerative changes at the level of interphalangeal joint of great toe. There is suggestion of juxta-articular osseous erosions at the level of great toe interphalangeal joint. Rest of the osseous structures appear grossly unremarkable.       1. Soft tissue ulceration at the tip of 3rd digit with findings concerning for osteomyelitis of distal phalangeal tip as described above. Recommend MRI for further evaluation. 2. Moderate degenerative changes of the great toe interphalangeal joint with suggestion of juxta-articular osseous erosive changes. This can be seen with inflammatory arthropathy right gout. Recommend correlation with patient's symptomatology in this location. Recommend attention on MRI.       MACRO: Critical Finding:  See findings. Notification was initiated on 10/9/2024 at 6:21 pm by Dr. Sharonda Tubbs.  (**-YCF-**)   Signed by: Sharonda Tubbs 10/9/2024 6:21 PM Dictation workstation:   [id number]       Lab Results  Results for orders placed or performed during the hospital encounter of 10/09/24 (from the past 24 hour(s))   CBC and Auto  Differential   Result Value Ref Range    WBC 10.4 4.4 - 11.3 x10*3/uL    nRBC 0.0 0.0 - 0.0 /100 WBCs    RBC 4.07 (L) 4.50 - 5.90 x10*6/uL    Hemoglobin 10.3 (L) 13.5 - 17.5 g/dL    Hematocrit 32.9 (L) 41.0 - 52.0 %    MCV 81 80 - 100 fL    MCH 25.3 (L) 26.0 - 34.0 pg    MCHC 31.3 (L) 32.0 - 36.0 g/dL    RDW 16.9 (H) 11.5 - 14.5 %    Platelets 315 150 - 450 x10*3/uL    Neutrophils % 75.3 40.0 - 80.0 %    Immature Granulocytes %, Automated 0.4 0.0 - 0.9 %    Lymphocytes % 15.3 13.0 - 44.0 %    Monocytes % 7.6 2.0 - 10.0 %    Eosinophils % 1.0 0.0 - 6.0 %    Basophils % 0.4 0.0 - 2.0 %    Neutrophils Absolute 7.84 (H) 1.20 - 7.70 x10*3/uL    Immature Granulocytes Absolute, Automated 0.04 0.00 - 0.70 x10*3/uL    Lymphocytes Absolute 1.59 1.20 - 4.80 x10*3/uL    Monocytes Absolute 0.79 0.10 - 1.00 x10*3/uL    Eosinophils Absolute 0.10 0.00 - 0.70 x10*3/uL    Basophils Absolute 0.04 0.00 - 0.10 x10*3/uL   Comprehensive Metabolic Panel   Result Value Ref Range    Glucose 115 (H) 74 - 99 mg/dL    Sodium 136 136 - 145 mmol/L    Potassium 4.4 3.5 - 5.3 mmol/L    Chloride 100 98 - 107 mmol/L    Bicarbonate 29 21 - 32 mmol/L    Anion Gap 11 10 - 20 mmol/L    Urea Nitrogen 16 6 - 23 mg/dL    Creatinine 0.77 0.50 - 1.30 mg/dL    eGFR >90 >60 mL/min/1.73m*2    Calcium 9.2 8.6 - 10.3 mg/dL    Albumin 3.9 3.4 - 5.0 g/dL    Alkaline Phosphatase 146 (H) 33 - 136 U/L    Total Protein 7.3 6.4 - 8.2 g/dL    AST 15 9 - 39 U/L    Bilirubin, Total 0.6 0.0 - 1.2 mg/dL    ALT 12 10 - 52 U/L   Lactate   Result Value Ref Range    Lactate 1.4 0.4 - 2.0 mmol/L   Blood Culture    Specimen: Peripheral Venipuncture; Blood culture   Result Value Ref Range    Blood Culture Loaded on Instrument - Culture in progress    Blood Culture    Specimen: Peripheral Venipuncture; Blood culture   Result Value Ref Range    Blood Culture Loaded on Instrument - Culture in progress    Sedimentation rate, automated   Result Value Ref Range    Sedimentation Rate 51  (H) 0 - 20 mm/h   C-reactive protein   Result Value Ref Range    C-Reactive Protein 13.42 (H) <1.00 mg/dL   CBC   Result Value Ref Range    WBC 8.7 4.4 - 11.3 x10*3/uL    nRBC 0.0 0.0 - 0.0 /100 WBCs    RBC 4.08 (L) 4.50 - 5.90 x10*6/uL    Hemoglobin 10.3 (L) 13.5 - 17.5 g/dL    Hematocrit 32.8 (L) 41.0 - 52.0 %    MCV 80 80 - 100 fL    MCH 25.2 (L) 26.0 - 34.0 pg    MCHC 31.4 (L) 32.0 - 36.0 g/dL    RDW 16.9 (H) 11.5 - 14.5 %    Platelets 311 150 - 450 x10*3/uL   Comprehensive metabolic panel   Result Value Ref Range    Glucose 58 (L) 74 - 99 mg/dL    Sodium 138 136 - 145 mmol/L    Potassium 3.4 (L) 3.5 - 5.3 mmol/L    Chloride 102 98 - 107 mmol/L    Bicarbonate 27 21 - 32 mmol/L    Anion Gap 12 10 - 20 mmol/L    Urea Nitrogen 12 6 - 23 mg/dL    Creatinine 0.66 0.50 - 1.30 mg/dL    eGFR >90 >60 mL/min/1.73m*2    Calcium 8.9 8.6 - 10.3 mg/dL    Albumin 3.5 3.4 - 5.0 g/dL    Alkaline Phosphatase 133 33 - 136 U/L    Total Protein 6.8 6.4 - 8.2 g/dL    AST 14 9 - 39 U/L    Bilirubin, Total 0.6 0.0 - 1.2 mg/dL    ALT 11 10 - 52 U/L   Hemoglobin A1C   Result Value Ref Range    Hemoglobin A1C 7.6 (H) See comment %    Estimated Average Glucose 171 Not Established mg/dL   Urinalysis with Reflex Culture and Microscopic   Result Value Ref Range    Color, Urine Light-Yellow Light-Yellow, Yellow, Dark-Yellow    Appearance, Urine Clear Clear    Specific Gravity, Urine 1.011 1.005 - 1.035    pH, Urine 6.0 5.0, 5.5, 6.0, 6.5, 7.0, 7.5, 8.0    Protein, Urine NEGATIVE NEGATIVE, 10 (TRACE), 20 (TRACE) mg/dL    Glucose, Urine Normal Normal mg/dL    Blood, Urine NEGATIVE NEGATIVE    Ketones, Urine NEGATIVE NEGATIVE mg/dL    Bilirubin, Urine NEGATIVE NEGATIVE    Urobilinogen, Urine Normal Normal mg/dL    Nitrite, Urine NEGATIVE NEGATIVE    Leukocyte Esterase, Urine NEGATIVE NEGATIVE   POCT GLUCOSE   Result Value Ref Range    POCT Glucose 73 (L) 74 - 99 mg/dL        Medications  Scheduled medications:  aspirin, 81 mg, oral,  Daily  atorvastatin, 80 mg, oral, Nightly  clopidogrel, 75 mg, oral, Daily  DULoxetine, 60 mg, oral, Daily  enoxaparin, 40 mg, subcutaneous, q24h  insulin glargine, 25 Units, subcutaneous, Nightly  insulin lispro, 0-10 Units, subcutaneous, Before meals & nightly  levothyroxine, 50 mcg, oral, Daily  metoprolol succinate XL, 25 mg, oral, Daily  pantoprazole, 40 mg, oral, Daily before breakfast   Or  pantoprazole, 40 mg, intravenous, Daily before breakfast  piperacillin-tazobactam, 4.5 g, intravenous, q6h  tiotropium, 2 puff, inhalation, Daily  vancomycin, 1,250 mg, intravenous, q12h      Continuous medications:     PRN medications:  PRN medications: acetaminophen, albuterol, dextrose, dextrose, glucagon, glucagon, guaiFENesin, melatonin, ondansetron, polyethylene glycol     Physical Exam  Constitutional:       General: He is not in acute distress.     Appearance: Normal appearance.      Comments: Ambulating independently in the room upon my entrance to the room   HENT:      Head: Normocephalic and atraumatic.      Right Ear: External ear normal.      Left Ear: External ear normal.      Nose: Nose normal.      Mouth/Throat:      Mouth: Mucous membranes are moist.      Pharynx: Oropharynx is clear.   Eyes:      Extraocular Movements: Extraocular movements intact.      Conjunctiva/sclera: Conjunctivae normal.      Pupils: Pupils are equal, round, and reactive to light.   Cardiovascular:      Rate and Rhythm: Normal rate and regular rhythm.      Pulses: Normal pulses.      Heart sounds: Normal heart sounds.   Pulmonary:      Effort: Pulmonary effort is normal. No respiratory distress.      Breath sounds: Normal breath sounds. No wheezing, rhonchi or rales.   Abdominal:      General: Bowel sounds are normal.      Palpations: Abdomen is soft.      Tenderness: There is no abdominal tenderness. There is no right CVA tenderness, left CVA tenderness, guarding or rebound.   Musculoskeletal:         General: No swelling. Normal  range of motion.      Cervical back: Normal range of motion and neck supple.   Skin:     General: Skin is warm and dry.      Capillary Refill: Capillary refill takes less than 2 seconds.      Findings: Erythema and lesion present. No rash.      Comments: Large ulceration to bottom of R foot  L 3rd toe is erythematous with erythema spread up his shin, the toe is swollen and warm  There are scattered wounds on all toes of L foot   Neurological:      General: No focal deficit present.      Mental Status: He is alert and oriented to person, place, and time. Mental status is at baseline.   Psychiatric:         Mood and Affect: Mood normal.         Behavior: Behavior normal.                  Assessment/Plan      L 3rd toe wound with associated cellulitis and acute osteomyelitis   -NPO at midnight, plans for L 3rd toe amputation in OR 10/10/24  -continue on zosyn and vanc  -podiatry consult  -Does not meet sepsis criteria at admission  -Consider ID consultation   -Blood cultures x 2 collected in ED  -Follow operative bone culture  -Pain control     Hx of Afib with RVR  -occurred at prior hospitalization in July 2024 status post CABG  -continue home metoprolol  -Does not appear to be on OAC     CAD s/p CABG x3 vessels on 7/9/24  -continue DAPT with ASA and plavix as well as statin     IDDM   -Continue home basal insulin at current dosage   -Continue with SSI   -Continue with accucheks, hypoglycemic protocol   -Monitor and adjust as needed   -A1c 7.6%    Anemia  -Stable, monitor H/H    Hypokalemia  -mild  -Replace, recheck in AM     Hypothyroidism  -c/w home levothyroxine     COPD  -c/w home inhalers, albuterol PRN     RA  -Will need to clarify if still on plaquenil     H/o peripheral neuropathy and chronic methadone use   -c/w home duloxetine and methadone (85mg AM and 80mg PM)  -[place] reviewed: no methadone filled in 2024 (Fills in a clinic in [place] therefore do not expect to be on [place]) and lyrica last filled on 8/16/24  for 30 days    Code Status: Full Code          DVT ppx: Lovenox     Please see orders for more complete plan    Sera Rodriguez PA-C

## 2024-10-10 NOTE — CONSULTS
Reason For Consult  Osteomyelitis left 3rd toe    History Of Present Illness  Kael Zepeda is a 59 y.o. male presenting with chronic diabetic foot ulcers on both feet. Was seen by another provider at the wound center yesterday and noted to have cellulitis from the wound on the left 3rd toe. Patient reports the toe suddenly became swollen and had malodor and drainage.      Past Medical History  He has a past medical history of Abnormal result of other cardiovascular function study (06/28/2018), Atherosclerotic heart disease of native coronary artery with unstable angina pectoris (Multi) (06/28/2018), Fibromyalgia, Personal history of other diseases of the circulatory system, Personal history of other diseases of the musculoskeletal system and connective tissue, Personal history of other diseases of the musculoskeletal system and connective tissue, Personal history of other diseases of the musculoskeletal system and connective tissue, Personal history of other endocrine, nutritional and metabolic disease, Personal history of other endocrine, nutritional and metabolic disease, and Type 2 diabetes mellitus without complications (Multi) (02/25/2022).    Surgical History  He has a past surgical history that includes Septoplasty (06/28/2018); Back surgery (06/28/2018); and Cardiac catheterization (N/A, 7/1/2024).     Social History  He reports that he has been smoking cigarettes. He has a 40 pack-year smoking history. He has never used smokeless tobacco. He reports that he does not currently use alcohol. He reports that he does not use drugs.    Family History  No family history on file.     Allergies  Patient has no known allergies.    Review of Systems  Awake. Alert and fully oriented.   Denies current shortness of breath. Breathing room air.   Denies fever, chills, nausea or vomiting.        Physical Exam  Chronic ulcers on both feet.   Open ulcer on the distal left 3rd toe. Toe is edematous, boggy. Cellulitis  "spreading from the wound over the dorsum of the foot.   Dry ulcers on left 1st and 2nd toes. Those do not appear infected.   Chronic large ulcer on plantar right foot under 1st metatarsal. Also does not appear infected. Serous fluid drainage only. Granular wound bed.        Last Recorded Vitals  Blood pressure 133/87, pulse 87, temperature 36.3 °C (97.3 °F), temperature source Temporal, resp. rate 16, height 1.702 m (5' 7\"), weight 83.9 kg (185 lb), SpO2 98%.    Relevant Results    MR foot left w and wo IV contrast    Result Date: 10/9/2024  Interpreted By:  Braulio Alfaro, STUDY: MR FOOT LEFT W AND WO IV CONTRAST;  10/9/2024 8:50 pm   INDICATION: Signs/Symptoms:Left foot osteomyelitis.     COMPARISON: None.   ACCESSION NUMBER(S): [id number]   ORDERING CLINICIAN: KYLEIGH PRYOR   TECHNIQUE: MR imaging of the left foot was obtained before and after the intravenous administration of 17 ml Dotarem gadolinium contrast.   FINDINGS: Examination is significantly limited due to motion. The T2 sequences and postcontrast sequences are essentially nondiagnostic. Within these limitations:   Confluent low T1 signal in the 3rd distal phalanx with probable erosion of the distal aspect of the 3rd distal phalanx. There is soft tissue edema within the mid to distal 3rd toe. Assessment for fluid collection limited as above. Status post amputation of the 5th metatarsal head. T1 marrow signal appears otherwise preserved within the above limitations.         1. Significantly limited examination due to patient motion. The T2 and postcontrast sequences are essentially nondiagnostic. 2. Confluent low T1 signal is noted within the 3rd distal phalanx. In conjunction with same day radiographic findings, this is concerning for osteomyelitis.   MACRO: None   Signed by: Braulio Alfaro 10/9/2024 9:37 PM Dictation workstation:   [id number]    XR foot left 3+ views    Result Date: 10/9/2024  Interpreted By:  Sharonda Tubbs, STUDY: XR FOOT " LEFT 3+ VIEWS; ;  10/9/2024 5:49 pm   INDICATION: Signs/Symptoms:Toe issue..     COMPARISON: None.   ACCESSION NUMBER(S): [id number]   ORDERING CLINICIAN: RUBI HOUSTON   FINDINGS: Three views of left foot were performed.   There is soft tissue irregularity at the tip of 3rd digit, likely related to ulceration. There is cortical destruction of the distal phalangeal tip of 3rd digit, concerning for osteomyelitis. Cortical deformity of 5th metatarsal at the level of metatarsophalangeal joint is most likely favored to be related to prior partial resection or sequela of remote trauma. Otherwise no acute fracture or dislocation is noted. There are moderate degenerative changes at the level of interphalangeal joint of great toe. There is suggestion of juxta-articular osseous erosions at the level of great toe interphalangeal joint. Rest of the osseous structures appear grossly unremarkable.       1. Soft tissue ulceration at the tip of 3rd digit with findings concerning for osteomyelitis of distal phalangeal tip as described above. Recommend MRI for further evaluation. 2. Moderate degenerative changes of the great toe interphalangeal joint with suggestion of juxta-articular osseous erosive changes. This can be seen with inflammatory arthropathy right gout. Recommend correlation with patient's symptomatology in this location. Recommend attention on MRI.       MACRO: Critical Finding:  See findings. Notification was initiated on 10/9/2024 at 6:21 pm by Dr. Sharonda Tubbs.  (**-YCF-**)   Signed by: Sharonda Tubbs 10/9/2024 6:21 PM Dictation workstation:   [id number]    Scheduled medications  aspirin, 81 mg, oral, Daily  atorvastatin, 80 mg, oral, Nightly  [Held by provider] clopidogrel, 75 mg, oral, Daily  DULoxetine, 60 mg, oral, Daily  [Held by provider] enoxaparin, 40 mg, subcutaneous, q24h  insulin glargine, 25 Units, subcutaneous, Nightly  insulin lispro, 0-10 Units, subcutaneous, Before meals &  nightly  levothyroxine, 50 mcg, oral, Daily  methadone, 80 mg, oral, Nightly  methadone, 85 mg, oral, Daily  metoprolol succinate XL, 25 mg, oral, Daily  pantoprazole, 40 mg, oral, Daily before breakfast   Or  pantoprazole, 40 mg, intravenous, Daily before breakfast  piperacillin-tazobactam, 4.5 g, intravenous, q6h  tiotropium, 2 puff, inhalation, Daily  vancomycin, 1,250 mg, intravenous, q12h      Continuous medications     PRN medications  PRN medications: acetaminophen, albuterol, dextrose, dextrose, glucagon, glucagon, guaiFENesin, melatonin, ondansetron, polyethylene glycol  Results for orders placed or performed during the hospital encounter of 10/09/24 (from the past 24 hour(s))   CBC and Auto Differential   Result Value Ref Range    WBC 10.4 4.4 - 11.3 x10*3/uL    nRBC 0.0 0.0 - 0.0 /100 WBCs    RBC 4.07 (L) 4.50 - 5.90 x10*6/uL    Hemoglobin 10.3 (L) 13.5 - 17.5 g/dL    Hematocrit 32.9 (L) 41.0 - 52.0 %    MCV 81 80 - 100 fL    MCH 25.3 (L) 26.0 - 34.0 pg    MCHC 31.3 (L) 32.0 - 36.0 g/dL    RDW 16.9 (H) 11.5 - 14.5 %    Platelets 315 150 - 450 x10*3/uL    Neutrophils % 75.3 40.0 - 80.0 %    Immature Granulocytes %, Automated 0.4 0.0 - 0.9 %    Lymphocytes % 15.3 13.0 - 44.0 %    Monocytes % 7.6 2.0 - 10.0 %    Eosinophils % 1.0 0.0 - 6.0 %    Basophils % 0.4 0.0 - 2.0 %    Neutrophils Absolute 7.84 (H) 1.20 - 7.70 x10*3/uL    Immature Granulocytes Absolute, Automated 0.04 0.00 - 0.70 x10*3/uL    Lymphocytes Absolute 1.59 1.20 - 4.80 x10*3/uL    Monocytes Absolute 0.79 0.10 - 1.00 x10*3/uL    Eosinophils Absolute 0.10 0.00 - 0.70 x10*3/uL    Basophils Absolute 0.04 0.00 - 0.10 x10*3/uL   Comprehensive Metabolic Panel   Result Value Ref Range    Glucose 115 (H) 74 - 99 mg/dL    Sodium 136 136 - 145 mmol/L    Potassium 4.4 3.5 - 5.3 mmol/L    Chloride 100 98 - 107 mmol/L    Bicarbonate 29 21 - 32 mmol/L    Anion Gap 11 10 - 20 mmol/L    Urea Nitrogen 16 6 - 23 mg/dL    Creatinine 0.77 0.50 - 1.30 mg/dL     eGFR >90 >60 mL/min/1.73m*2    Calcium 9.2 8.6 - 10.3 mg/dL    Albumin 3.9 3.4 - 5.0 g/dL    Alkaline Phosphatase 146 (H) 33 - 136 U/L    Total Protein 7.3 6.4 - 8.2 g/dL    AST 15 9 - 39 U/L    Bilirubin, Total 0.6 0.0 - 1.2 mg/dL    ALT 12 10 - 52 U/L   Lactate   Result Value Ref Range    Lactate 1.4 0.4 - 2.0 mmol/L   Blood Culture    Specimen: Peripheral Venipuncture; Blood culture   Result Value Ref Range    Blood Culture Loaded on Instrument - Culture in progress    Blood Culture    Specimen: Peripheral Venipuncture; Blood culture   Result Value Ref Range    Blood Culture Loaded on Instrument - Culture in progress    Sedimentation rate, automated   Result Value Ref Range    Sedimentation Rate 51 (H) 0 - 20 mm/h   C-reactive protein   Result Value Ref Range    C-Reactive Protein 13.42 (H) <1.00 mg/dL   CBC   Result Value Ref Range    WBC 8.7 4.4 - 11.3 x10*3/uL    nRBC 0.0 0.0 - 0.0 /100 WBCs    RBC 4.08 (L) 4.50 - 5.90 x10*6/uL    Hemoglobin 10.3 (L) 13.5 - 17.5 g/dL    Hematocrit 32.8 (L) 41.0 - 52.0 %    MCV 80 80 - 100 fL    MCH 25.2 (L) 26.0 - 34.0 pg    MCHC 31.4 (L) 32.0 - 36.0 g/dL    RDW 16.9 (H) 11.5 - 14.5 %    Platelets 311 150 - 450 x10*3/uL   Comprehensive metabolic panel   Result Value Ref Range    Glucose 58 (L) 74 - 99 mg/dL    Sodium 138 136 - 145 mmol/L    Potassium 3.4 (L) 3.5 - 5.3 mmol/L    Chloride 102 98 - 107 mmol/L    Bicarbonate 27 21 - 32 mmol/L    Anion Gap 12 10 - 20 mmol/L    Urea Nitrogen 12 6 - 23 mg/dL    Creatinine 0.66 0.50 - 1.30 mg/dL    eGFR >90 >60 mL/min/1.73m*2    Calcium 8.9 8.6 - 10.3 mg/dL    Albumin 3.5 3.4 - 5.0 g/dL    Alkaline Phosphatase 133 33 - 136 U/L    Total Protein 6.8 6.4 - 8.2 g/dL    AST 14 9 - 39 U/L    Bilirubin, Total 0.6 0.0 - 1.2 mg/dL    ALT 11 10 - 52 U/L   Hemoglobin A1C   Result Value Ref Range    Hemoglobin A1C 7.6 (H) See comment %    Estimated Average Glucose 171 Not Established mg/dL   Urinalysis with Reflex Culture and Microscopic   Result  Value Ref Range    Color, Urine Light-Yellow Light-Yellow, Yellow, Dark-Yellow    Appearance, Urine Clear Clear    Specific Gravity, Urine 1.011 1.005 - 1.035    pH, Urine 6.0 5.0, 5.5, 6.0, 6.5, 7.0, 7.5, 8.0    Protein, Urine NEGATIVE NEGATIVE, 10 (TRACE), 20 (TRACE) mg/dL    Glucose, Urine Normal Normal mg/dL    Blood, Urine NEGATIVE NEGATIVE    Ketones, Urine NEGATIVE NEGATIVE mg/dL    Bilirubin, Urine NEGATIVE NEGATIVE    Urobilinogen, Urine Normal Normal mg/dL    Nitrite, Urine NEGATIVE NEGATIVE    Leukocyte Esterase, Urine NEGATIVE NEGATIVE   POCT GLUCOSE   Result Value Ref Range    POCT Glucose 73 (L) 74 - 99 mg/dL   POCT GLUCOSE   Result Value Ref Range    POCT Glucose 91 74 - 99 mg/dL        Assessment/Plan     MRI confirmed osteomyelitis of the left 3rd toe, distal phalange  -entire toe has soft tissue infection. Does not appear viable.   -Other toes did not show signs of osteomyelitis or infection on MRI.   -Recommended amputation of the L 3rd toe to fully remove nidus of infection.   -Patient in agreement. He has been NPO since midnight.   -OR today for amputation of the left 3rd toe.     William Raya DPM

## 2024-10-10 NOTE — PROGRESS NOTES
Vancomycin Dosing by Pharmacy- INITIAL    Kael Zepeda is a 59 y.o. year old male who Pharmacy has been consulted for vancomycin dosing for cellulitis, skin and soft tissue. Based on the patient's indication and renal status this patient will be dosed based on a goal AUC of 500-600.     Renal function is currently stable.    Visit Vitals  /83   Pulse 95   Temp 36.9 °C (98.4 °F) (Temporal)   Resp 16        Lab Results   Component Value Date    CREATININE 0.77 10/09/2024    CREATININE 1.02 2024    CREATININE 0.98 2024    CREATININE 0.93 2024        Patient weight is as follows:   Vitals:    10/09/24 1720   Weight: 83.9 kg (185 lb)       Cultures:  No results found for the encounter in last 14 days.        No intake/output data recorded.  I/O during current shift:  I/O this shift:  In: 1000 [IV Piggyback:1000]  Out: -     Temp (24hrs), Av.9 °C (98.4 °F), Min:36.9 °C (98.4 °F), Max:36.9 °C (98.4 °F)         Assessment/Plan     Patient has already been given a loading dose of 2000 mg.  Will initiate vancomycin maintenance,  1250 mg every 12 hours.    This dosing regimen is predicted by [employer] to result in the following pharmacokinetic parameters:  Regimen: 1250 mg IV every 12 hours.  Start time: 09:00 on 10/10/2024  Exposure target: AUC24 (range)400-600 mg/L.hr   [id number]: 510 mg/L.hr  AUC24,ss: 547 mg/L.hr  Probability of AUC24 > 400: 80 %  Ctrough,ss: 16.8 mg/L  Probability of Ctrough,ss > 20: 36 %      Follow-up level will be ordered on 10/11 at 5:00 unless clinically indicated sooner.  Will continue to monitor renal function daily while on vancomycin and order serum creatinine at least every 48 hours if not already ordered.  Follow for continued vancomycin needs, clinical response, and signs/symptoms of toxicity.       Krish Burns, PharmD

## 2024-10-10 NOTE — ANESTHESIA POSTPROCEDURE EVALUATION
Patient: Kael Zepeda    Procedure Summary       Date: 10/10/24 Room / Location: [facility] 03 / [facility]    Anesthesia Start: 1200 Anesthesia Stop: 1245    Procedure: Amputation Left 3rd Toe (Left: Foot) Diagnosis:       Cellulitis of left lower extremity      Acute osteomyelitis of left foot (Multi)      (Cellulitis of left lower extremity [L03.116])      (Acute osteomyelitis of left foot (Multi) [M86.172])    Surgeons: William Raya DPM Responsible Provider: NIKKO Cage    Anesthesia Type: MAC ASA Status: 3            Anesthesia Type: MAC    Vitals Value Taken Time   /68 10/10/24 1320   Temp 36.6 °C (97.8 °F) 10/10/24 1320   Pulse 73 10/10/24 1330   Resp 11 10/10/24 1330   SpO2 94 % 10/10/24 1330       Anesthesia Post Evaluation    Patient location during evaluation: PACU  Patient participation: complete - patient participated  Level of consciousness: awake  Pain score: 0  Pain management: adequate  Airway patency: patent  Cardiovascular status: acceptable  Respiratory status: acceptable  Hydration status: acceptable  Postoperative Nausea and Vomiting: none    There were no known notable events for this encounter.

## 2024-10-10 NOTE — BRIEF OP NOTE
Date: 10/9/2024 - 10/10/2024  OR Location: [facility]    Name: Kael Zepeda, : 1965, Age: 59 y.o., MRN: [id number], Sex: male    Diagnosis  Pre-op Diagnosis      * Cellulitis of left lower extremity [L03.116]     * Acute osteomyelitis of left foot (Multi) [M86.172] Post-op Diagnosis     * Cellulitis of left lower extremity [L03.116]     * Acute osteomyelitis of left foot (Multi) [M86.172]     Procedures  Amputation Left 3rd Toe  91404 - MD AMPUTATION TOE METATARSOPHALANGEAL JOINT      Surgeons      * William Raya - Primary    Resident/Fellow/Other Assistant:  Surgeons and Role:  * No surgeons found with a matching role *    Procedure Summary  Anesthesia: Monitor Anesthesia Care  ASA: III  Anesthesia Staff: CRNA: CHACE Cage-CRNA  Estimated Blood Loss: 0mL  Intra-op Medications:   Administrations occurring from 1145 to 1245 on 10/10/24:   Medication Name Total Dose   sodium chloride 0.9 % irrigation solution 120 mL              Anesthesia Record               Intraprocedure I/O Totals       None           Specimen: surgical Wound culture. Soft tissue.     Staff:   Circulator: Pat Cordon Person: Fara          Findings: necrotic wound with infection distal left 3rd toe. Fully amputated    Complications:  None; patient tolerated the procedure well.     Disposition: PACU - hemodynamically stable.  Condition: stable  Specimens Collected: No specimens collected  Attending Attestation:     William Raya  Phone Number: [phone]

## 2024-10-10 NOTE — CARE PLAN
The patient's goals for the shift include  wound care    The clinical goals for the shift include safety and pain control

## 2024-10-10 NOTE — H&P
[facility] - GENERAL MEDICINE HISTORY AND PHYSICAL    History Obtained From: Patient  Collateral History: Chart review, ED provider    History Of Present Illness:  Kael Zepeda is a 59 y.o. male with PMHx s/f Afib, CAD s/p CABG x3 vessels on 7/9/24, RA, IDDM2, COPD, hypothyroidism, chronic tobacco abuse, chronic methadone dependency, neuropathy and unhealed diabetic foot ulcers (s/p right foot partial amputation in 12/2023) presenting with left foot third digit swelling. He states he woke up this morning with his third toe increasing in odor, redness and swelling and he went to the wound care center who referred him to come to ED for further evaluation.  He denies fever chills, nausea vomiting, headache, chest pain, shortness of breath. He states to be taking his medications regularly including aspirin and Plavix.    ED Course (Summary):   Vitals on presentation: 98.4F, 93 bpm, 18 RR, 103/61, 95% on RA  Labs: CMP-glucose 115, alkaline phosphatase 146  Lactate 1.4, CRP 13.42, sed rate 51  CBC-hemoglobin 10.3, neutrophils 7.84  Imaging: XR left - Soft tissue ulceration at the tip of 3rd digit with findings concerning for osteomyelitis. Moderate degenerative changes of the great toe interphalangeal joint with suggestion of juxta-articular osseous erosive changes. This can be seen with inflammatory arthropathy right gout.  MR left - Significantly limited examination due to patient motion. The T2 and postcontrast sequences are essentially nondiagnostic. Confluent low T1 signal is noted within the 3rd distal phalanx. In conjunction with same day radiographic findings, this is concerning for osteomyelitis.  Interventions: NS 1 L, started on Zosyn and vancomycin, admission for further management    ED Course (From ED Provider):  ED Course as of 10/09/24 2253   Wed Oct 09, 2024   1834 XR left foot showing soft tissue ulceration at tip of 3rd digit with osteomyelitis. Media placed in chart. Lab workup  ordered including cultures and lactate with ESR and CRP. MRI foot left ordered and pending. Patient has history of spinal implant and previously had MRI in 2023 without issue. [JG]   1916 C-Reactive Protein(!): 13.42 [JG]   1916 Sed Rate(!): 51 [JG]   2002 Discussed patient with podiatrist Dr. Raya who will evaluate patient in the morning. Plan for admission to medicine pending MRI.  [JG]   2142 MRI left foot IMPRESSION:  Confluent low T1 signal is noted within the 3rd distal phalanx. In conjunction with same day radiographic findings, this is concerning for osteomyelitis   [JG]      ED Course User Index  [JG] Vera Short MD         Diagnoses as of 10/09/24 2253   Osteomyelitis of left foot, unspecified type (Multi)   Cellulitis of left lower extremity     Relevant Results  Results for orders placed or performed during the hospital encounter of 10/09/24 (from the past 24 hour(s))   CBC and Auto Differential   Result Value Ref Range    WBC 10.4 4.4 - 11.3 x10*3/uL    nRBC 0.0 0.0 - 0.0 /100 WBCs    RBC 4.07 (L) 4.50 - 5.90 x10*6/uL    Hemoglobin 10.3 (L) 13.5 - 17.5 g/dL    Hematocrit 32.9 (L) 41.0 - 52.0 %    MCV 81 80 - 100 fL    MCH 25.3 (L) 26.0 - 34.0 pg    MCHC 31.3 (L) 32.0 - 36.0 g/dL    RDW 16.9 (H) 11.5 - 14.5 %    Platelets 315 150 - 450 x10*3/uL    Neutrophils % 75.3 40.0 - 80.0 %    Immature Granulocytes %, Automated 0.4 0.0 - 0.9 %    Lymphocytes % 15.3 13.0 - 44.0 %    Monocytes % 7.6 2.0 - 10.0 %    Eosinophils % 1.0 0.0 - 6.0 %    Basophils % 0.4 0.0 - 2.0 %    Neutrophils Absolute 7.84 (H) 1.20 - 7.70 x10*3/uL    Immature Granulocytes Absolute, Automated 0.04 0.00 - 0.70 x10*3/uL    Lymphocytes Absolute 1.59 1.20 - 4.80 x10*3/uL    Monocytes Absolute 0.79 0.10 - 1.00 x10*3/uL    Eosinophils Absolute 0.10 0.00 - 0.70 x10*3/uL    Basophils Absolute 0.04 0.00 - 0.10 x10*3/uL   Comprehensive Metabolic Panel   Result Value Ref Range    Glucose 115 (H) 74 - 99 mg/dL    Sodium 136 136 - 145  mmol/L    Potassium 4.4 3.5 - 5.3 mmol/L    Chloride 100 98 - 107 mmol/L    Bicarbonate 29 21 - 32 mmol/L    Anion Gap 11 10 - 20 mmol/L    Urea Nitrogen 16 6 - 23 mg/dL    Creatinine 0.77 0.50 - 1.30 mg/dL    eGFR >90 >60 mL/min/1.73m*2    Calcium 9.2 8.6 - 10.3 mg/dL    Albumin 3.9 3.4 - 5.0 g/dL    Alkaline Phosphatase 146 (H) 33 - 136 U/L    Total Protein 7.3 6.4 - 8.2 g/dL    AST 15 9 - 39 U/L    Bilirubin, Total 0.6 0.0 - 1.2 mg/dL    ALT 12 10 - 52 U/L   Lactate   Result Value Ref Range    Lactate 1.4 0.4 - 2.0 mmol/L   Sedimentation rate, automated   Result Value Ref Range    Sedimentation Rate 51 (H) 0 - 20 mm/h   C-reactive protein   Result Value Ref Range    C-Reactive Protein 13.42 (H) <1.00 mg/dL      MR foot left w and wo IV contrast    Result Date: 10/9/2024  Interpreted By:  Braulio Alfaro, STUDY: MR FOOT LEFT W AND WO IV CONTRAST;  10/9/2024 8:50 pm   INDICATION: Signs/Symptoms:Left foot osteomyelitis.     COMPARISON: None.   ACCESSION NUMBER(S): [id number]   ORDERING CLINICIAN: KYLEIGH PRYOR   TECHNIQUE: MR imaging of the left foot was obtained before and after the intravenous administration of 17 ml Dotarem gadolinium contrast.   FINDINGS: Examination is significantly limited due to motion. The T2 sequences and postcontrast sequences are essentially nondiagnostic. Within these limitations:   Confluent low T1 signal in the 3rd distal phalanx with probable erosion of the distal aspect of the 3rd distal phalanx. There is soft tissue edema within the mid to distal 3rd toe. Assessment for fluid collection limited as above. Status post amputation of the 5th metatarsal head. T1 marrow signal appears otherwise preserved within the above limitations.         1. Significantly limited examination due to patient motion. The T2 and postcontrast sequences are essentially nondiagnostic. 2. Confluent low T1 signal is noted within the 3rd distal phalanx. In conjunction with same day radiographic findings,  this is concerning for osteomyelitis.   MACRO: None   Signed by: Braulio Alfaro 10/9/2024 9:37 PM Dictation workstation:   [id number]    XR foot left 3+ views    Result Date: 10/9/2024  Interpreted By:  Sharonda Tubbs, STUDY: XR FOOT LEFT 3+ VIEWS; ;  10/9/2024 5:49 pm   INDICATION: Signs/Symptoms:Toe issue..     COMPARISON: None.   ACCESSION NUMBER(S): [id number]   ORDERING CLINICIAN: RUBI HOUSTON   FINDINGS: Three views of left foot were performed.   There is soft tissue irregularity at the tip of 3rd digit, likely related to ulceration. There is cortical destruction of the distal phalangeal tip of 3rd digit, concerning for osteomyelitis. Cortical deformity of 5th metatarsal at the level of metatarsophalangeal joint is most likely favored to be related to prior partial resection or sequela of remote trauma. Otherwise no acute fracture or dislocation is noted. There are moderate degenerative changes at the level of interphalangeal joint of great toe. There is suggestion of juxta-articular osseous erosions at the level of great toe interphalangeal joint. Rest of the osseous structures appear grossly unremarkable.       1. Soft tissue ulceration at the tip of 3rd digit with findings concerning for osteomyelitis of distal phalangeal tip as described above. Recommend MRI for further evaluation. 2. Moderate degenerative changes of the great toe interphalangeal joint with suggestion of juxta-articular osseous erosive changes. This can be seen with inflammatory arthropathy right gout. Recommend correlation with patient's symptomatology in this location. Recommend attention on MRI.       MACRO: Critical Finding:  See findings. Notification was initiated on 10/9/2024 at 6:21 pm by Dr. Sharonda Tubbs.  (**-YCF-**)   Signed by: Sharonda Tubbs 10/9/2024 6:21 PM Dictation workstation:   [id number]    Scheduled medications:  [START ON 10/10/2024] aspirin, 81 mg, oral, Daily  atorvastatin, 80 mg, oral, Nightly  [START  ON 10/10/2024] clopidogrel, 75 mg, oral, Daily  [START ON 10/10/2024] DULoxetine, 60 mg, oral, Daily  enoxaparin, 40 mg, subcutaneous, q24h  insulin glargine, 25 Units, subcutaneous, Nightly  insulin lispro, 0-10 Units, subcutaneous, Before meals & nightly  [START ON 10/10/2024] levothyroxine, 50 mcg, oral, Daily  [START ON 10/10/2024] metoprolol succinate XL, 25 mg, oral, Daily  [START ON 10/10/2024] pantoprazole, 40 mg, oral, Daily before breakfast   Or  [START ON 10/10/2024] pantoprazole, 40 mg, intravenous, Daily before breakfast  [START ON 10/10/2024] piperacillin-tazobactam, 4.5 g, intravenous, q6h  [START ON 10/10/2024] tiotropium, 2 puff, inhalation, Daily  vancomycin, 2 g, intravenous, Once      Continuous medications:     PRN medications:  PRN medications: acetaminophen, albuterol, dextrose, dextrose, glucagon, glucagon, guaiFENesin, melatonin, ondansetron, polyethylene glycol     Past Medical History  He has a past medical history of Abnormal result of other cardiovascular function study (06/28/2018), Atherosclerotic heart disease of native coronary artery with unstable angina pectoris (Multi) (06/28/2018), Fibromyalgia, Personal history of other diseases of the circulatory system, Personal history of other diseases of the musculoskeletal system and connective tissue, Personal history of other diseases of the musculoskeletal system and connective tissue, Personal history of other diseases of the musculoskeletal system and connective tissue, Personal history of other endocrine, nutritional and metabolic disease, Personal history of other endocrine, nutritional and metabolic disease, and Type 2 diabetes mellitus without complications (Multi) (02/25/2022).    Surgical History  He has a past surgical history that includes Septoplasty (06/28/2018); Back surgery (06/28/2018); and Cardiac catheterization (N/A, 7/1/2024).     Social History  He reports that he has been smoking cigarettes. He has a 40 pack-year  smoking history. He has never used smokeless tobacco. He reports that he does not currently use alcohol. He reports that he does not use drugs.    Family History  No family history on file.    Allergies  Patient has no known allergies.    Code Status  Full Code     Review of Systems   Constitutional:  Negative for chills, fatigue and fever.   Respiratory:  Negative for cough, chest tightness, shortness of breath and wheezing.    Cardiovascular:  Negative for chest pain, palpitations and leg swelling.   Gastrointestinal:  Negative for abdominal pain, constipation, diarrhea, nausea and vomiting.   Genitourinary:  Negative for flank pain and hematuria.   Musculoskeletal:  Negative for back pain and joint swelling.        Third toe swelling and redness   Skin:  Negative for rash and wound.   Neurological:  Negative for tremors, syncope, weakness and headaches.       Last Recorded Vitals  /83   Pulse 95   Temp 36.9 °C (98.4 °F) (Temporal)   Resp 16   Wt 83.9 kg (185 lb)   SpO2 95%      Physical Exam:  Vital signs and nursing notes reviewed.   Constitutional: Pleasant and cooperative. Laying in bed in no acute distress. Conversant.   Skin: Warm and dry. Good turgor.  See media for detailed pictures.  He has right partial first and second digit amputation noted.  He has increasing erythema and edema on third digit and some pus noted in plantar side of third digit.  Erythematous warmth and redness noted on left anterior shin.  Eyes: EOMI. Anicteric sclera.   ENT: Mucous membranes moist; no obvious injury or deformity appreciated.   Head and Neck: Normocephalic, atraumatic. ROM preserved. Trachea midline. No appreciable JVD.   Respiratory: Nonlabored on RA. Lungs clear to auscultation bilaterally without obvious adventitious sounds. Chest rise is equal.  Cardiovascular: RRR. No gross murmur, gallop, or rub. Extremities are warm and well-perfused with good capillary refill (< 3 seconds). No chest wall tenderness.    GI: Abdomen soft, nontender, nondistended. No obvious organomegaly appreciated. Bowel sounds are present and normoactive.  : No CVA tenderness.   MSK: No gross abnormalities appreciated. No limitations to AROM/PROM appreciated.   Extremities: No cyanosis, edema, or clubbing evident. Neurovascularly intact.   Neuro: A&Ox3. CN 2-12 grossly intact. Able to respond to questions appropriately and clearly. No acute focal neurologic deficits appreciated.  Psych: Appropriate mood and behavior.    Assessment/Plan     59 y.o. male with PMHx s/f PMHx s/f Afib, CAD s/p CABG x3 vessels on 7/9/24, RA, IDDM2, COPD, hypothyroidism, chronic tobacco abuse, chronic methadone dependency, neuropathy and unhealed diabetic foot ulcers (s/p right foot partial amputation in 12/2023) presenting with left foot third digit swelling.    Osteomyelitis of left 3rd digit  -NPO at midnight  -continue on zosyn and vanc  -podiatry consult  -Does not meet sepsis criteria at admission`    Cellulitis of left anterior shin  -continue on zosyn and vanc  -Blood cultures x 2 collected in ED  -Does not meet sepsis criteria at admission    Hx of Afib with RVR  -occurred at prior hospitalization in July 2024 status post CABG  -continue home metoprolol    CAD s/p CABG x3 vessels on 7/9/24  -continue DAPT, statin    IDDM   -Continue home basal insulin at current dosage   -Continue with SSI   -Continue with accucheks, hypoglycemic protocol   -Monitor and adjust as needed     Hypothyroidism  -c/w home levothyroxine    COPD  -c/w home inhalers, albuterol PRN    RA  -c/w home plaquenil    H/o peripheral neuropathy and chronic methadone use   -c/w home duloxetine   -oarrs reviewed, no methadone filled in 2024 and lyrica last filled on 8/16/24 for 30 days    Diet: regular, NPO at midnight  DVT Prophylaxis: lovenox subcutaneous   Code Status: full code     Anika Arrington PA-C    Dragon dictation software was used to dictate this note and thus there may be minor errors  in translation/transcription including garbled speech or misspellings. Please contact for clarification if needed.

## 2024-10-10 NOTE — PROGRESS NOTES
10/10/24 1447   Discharge Planning   Living Arrangements Family members   Support Systems Family members   Assistance Needed none   Type of Residence Private residence   Home or Post Acute Services None   Expected Discharge Disposition Home     I spoke with patient to introduce discharge planning and explain role of TCC. Pt states he is independent at home.  His brother lives with him and he helps with housekeeping, laundry, cooking although pt states he also does this.  Pt drives; denies use of any DME but does have a WC and grab bars in bathroom.  He confirmed his PCP is Dr. Greer.    Pt went to OR today for L third toe amputation.  He states he had R great toe amputated in the past.  He has not received a walking shoe yet but says that Dr. Raya did mention getting him one.  PT/OT p.  Pt is planning on returning home and does not feel that he would need anything on discharge.  Will continue to follow.

## 2024-10-10 NOTE — ED NOTES
Pt arrives to ED via home from triage    Code Status:  Full Code    HPI     Chief Complaint   Patient presents with    Wound Infection       /83   Pulse 95   Temp 36.9 °C (98.4 °F) (Temporal)   Resp 16   Wt 83.9 kg (185 lb)   SpO2 95%     Karen Coma Scale Score: 15      LDA:   Peripheral IV 10/09/24 20 G Right Forearm (Active)   Placement Date/Time: 10/09/24 1857   Size (Gauge): 20 G  Orientation: Right  Location: Forearm  Site Prep: Chlorhexidine    Number of days: 0       Peripheral IV 10/09/24 20 G Left Forearm (Active)   Placement Date/Time: 10/09/24 1857   Size (Gauge): 20 G  Orientation: Left  Location: Forearm  Site Prep: Chlorhexidine    Number of days: 0        BACKGROUND  Past Medical History:   Diagnosis Date    Abnormal result of other cardiovascular function study 06/28/2018    Abnormal stress test    Atherosclerotic heart disease of native coronary artery with unstable angina pectoris (Multi) 06/28/2018    Unstable angina pectoris due to coronary arteriosclerosis    Fibromyalgia     Fibromyalgia    Personal history of other diseases of the circulatory system     History of hypertension    Personal history of other diseases of the musculoskeletal system and connective tissue     History of rheumatoid arthritis    Personal history of other diseases of the musculoskeletal system and connective tissue     History of degenerative disc disease    Personal history of other diseases of the musculoskeletal system and connective tissue     History of osteoporosis    Personal history of other endocrine, nutritional and metabolic disease     History of hyperlipidemia    Personal history of other endocrine, nutritional and metabolic disease     History of hypothyroidism    Type 2 diabetes mellitus without complications (Multi) 02/25/2022    Type 2 diabetes mellitus     Past Surgical History:   Procedure Laterality Date    BACK SURGERY  06/28/2018    Back Surgery    CARDIAC CATHETERIZATION N/A 7/1/2024     Procedure: Left Heart Cath;  Surgeon: Yao Calvin MD;  Location: [facility];  Service: Cardiovascular;  Laterality: N/A;    SEPTOPLASTY  2018    Septoplasty     No current facility-administered medications on file prior to encounter.     Current Outpatient Medications on File Prior to Encounter   Medication Sig Dispense Refill    [] acetaminophen (Tylenol) 325 mg tablet Take 3 tablets (975 mg) by mouth every 6 hours if needed for mild pain (1 - 3), moderate pain (4 - 6) or headaches. 30 tablet 0    albuterol 90 mcg/actuation inhaler Inhale 2 puffs by mouth into the lungs every 6 hours if needed for shortness of breath. 18 g 11    aspirin 81 mg EC tablet Take 1 tablet (81 mg) by mouth once daily.      atorvastatin (Lipitor) 40 mg tablet       atorvastatin (Lipitor) 80 mg tablet Take 1 tablet (80 mg) by mouth once daily at bedtime. 30 tablet 1    blood sugar diagnostic (True Metrix Glucose Test Strip) strip Use to check blood sugar 4 times daily 200 each 0    clopidogrel (Plavix) 75 mg tablet Take 1 tablet (75 mg) by mouth once daily. 90 tablet 3    docusate sodium (Colace) 100 mg capsule Take 1 capsule (100 mg) by mouth 2 times a day as needed (hard stools). 60 capsule 0    doxycycline (Vibra-Tabs) 100 mg tablet       DULoxetine (Cymbalta) 60 mg DR capsule 1 capsule (60 mg) once every 24 hours.      fluticasone propion-salmeteroL (Wixela Inhub) 100-50 mcg/dose diskus inhaler Inhale 1 puff by mouth into the lungs 2 times a day. Rinse mouth with water after use to reduce aftertaste and incidence of candidiasis. Do not swallow. 60 each 11    folic acid (Folvite) 1 mg tablet Take 1 tablet (1 mg) by mouth once daily. 30 tablet 11    insulin glargine (Lantus) 100 unit/mL (3 mL) pen Inject 25 Units under the skin once every 24 hours. 30 mL 3    insulin lispro (HumaLOG) 100 unit/mL injection Inject 8 Units plus 0 - 10 units per sliding scale under the skin 3 times a day before meals. Take as  directed per insulin instructions. 15 mL 0    lancets 33 gauge misc Use to check blood sugar 4 times daily 200 each 0    Levemir FlexPen 100 unit/mL (3 mL) pen       levothyroxine (Synthroid, Levoxyl) 50 mcg tablet Take 1 tablet (50 mcg) by mouth once daily.      melatonin 5 mg capsule Take 1 capsule (5 mg) by mouth as needed at bedtime (for insomnia). 30 capsule 1    metFORMIN (Glucophage) 500 mg tablet Take 1 tablet (500 mg) by mouth 2 times a day. 60 tablet 2    metoprolol succinate XL (Toprol-XL) 25 mg 24 hr tablet Take 1 tablet (25 mg) by mouth once daily. Do not crush or chew. 90 tablet 3    omeprazole (PriLOSEC) 40 mg DR capsule Take 1 capsule (40 mg) by mouth early in the morning..      pantoprazole (ProtoNix) 40 mg EC tablet Take 1 tablet (40 mg) by mouth once daily in the morning. Take before meals. Do not crush, chew, or split. 30 tablet 11    pregabalin (Lyrica) 200 mg capsule Take 1 capsule (200 mg) by mouth 3 times a day.      sennosides-docusate sodium (Chelsie-Colace) 8.6-50 mg tablet Take 2 tablets by mouth 2 times a day. 120 tablet 11    silver sulfADIAZINE (Silvadene) 1 % cream Apply as directed to affected area daily.      thiamine (Vitamin B-1) 100 mg tablet Take 1 tablet (100 mg) by mouth once daily. 30 tablet 11    tiotropium (Spiriva Respimat) 2.5 mcg/actuation inhaler Inhale 2 puffs by mouth into the lungs once daily. 4 g 11        ASSESSMENT  ED Course as of 10/09/24 2231   Wed Oct 09, 2024   1834 XR left foot showing soft tissue ulceration at tip of 3rd digit with osteomyelitis. Media placed in chart. Lab workup ordered including cultures and lactate with ESR and CRP. MRI foot left ordered and pending. Patient has history of spinal implant and previously had MRI in 2023 without issue. [JG]   1916 C-Reactive Protein(!): 13.42 [JG]   1916 Sed Rate(!): 51 [JG]   2002 Discussed patient with podiatrist Dr. Raya who will evaluate patient in the morning. Plan for admission to medicine pending MRI.   [JG]   2142 MRI left foot IMPRESSION:  Confluent low T1 signal is noted within the 3rd distal phalanx. In conjunction with same day radiographic findings, this is concerning for osteomyelitis   [JG]      ED Course User Index  [JG] Vera Short MD         Diagnoses as of 10/09/24 2231   Osteomyelitis of left foot, unspecified type (Multi)       Medications Currently Running:        Medications Given:  ED Medication Administration from 10/09/2024 1658 to 10/09/2024 2231         Date/Time Order Dose Route Action Action by     10/09/2024 1900 EDT piperacillin-tazobactam (Zosyn) 4.5 g in dextrose (iso)  mL 4.5 g intravenous New Bag Sandmann, L     10/09/2024 1900 EDT sodium chloride 0.9 % bolus 1,000 mL 1,000 mL intravenous New Bag Sandmann, L     10/09/2024 1936 EDT piperacillin-tazobactam (Zosyn) 4.5 g in dextrose (iso)  mL 0 g intravenous Stopped Sandrebecca, L     10/09/2024 2051 EDT gadoterate meglumine (Dotarem) 0.5 mmol/mL contrast injection 17 mL 17 mL intravenous Given FLORA Shanks     10/09/2024 2100 EDT sodium chloride 0.9 % bolus 1,000 mL 0 mL intravenous Stopped KIRIT Cavanaugh     10/09/2024 2100 EDT vancomycin (Vancocin) 2000 mg/500 ml in D5W 500 mL IV piggyback 2 g 2 g intravenous New Bag TAMMY Paz                 RESULTS    Imaging:  MR foot left w and wo IV contrast   Final Result   1. Significantly limited examination due to patient motion. The T2   and postcontrast sequences are essentially nondiagnostic.   2. Confluent low T1 signal is noted within the 3rd distal phalanx. In   conjunction with same day radiographic findings, this is concerning   for osteomyelitis.        MACRO:   None        Signed by: Braulio Alfaro 10/9/2024 9:37 PM   Dictation workstation:   [id number]      XR foot left 3+ views   Final Result   1. Soft tissue ulceration at the tip of 3rd digit with findings   concerning for osteomyelitis of distal phalangeal tip as described   above. Recommend MRI for further  evaluation.   2. Moderate degenerative changes of the great toe interphalangeal   joint with suggestion of juxta-articular osseous erosive changes.   This can be seen with inflammatory arthropathy right gout. Recommend   correlation with patient's symptomatology in this location. Recommend   attention on MRI.                  MACRO:   Critical Finding:  See findings. Notification was initiated on   10/9/2024 at 6:21 pm by Dr. Sharonda Tubbs.  (**-YCF-**)        Signed by: Sharonda Tubbs 10/9/2024 6:21 PM   Dictation workstation:   [id number]         }  Labs ::99  Abnormal Labs Reviewed   CBC WITH AUTO DIFFERENTIAL - Abnormal; Notable for the following components:       Result Value    RBC 4.07 (*)     Hemoglobin 10.3 (*)     Hematocrit 32.9 (*)     MCH 25.3 (*)     MCHC 31.3 (*)     RDW 16.9 (*)     Neutrophils Absolute 7.84 (*)     All other components within normal limits   COMPREHENSIVE METABOLIC PANEL - Abnormal; Notable for the following components:    Glucose 115 (*)     Alkaline Phosphatase 146 (*)     All other components within normal limits   SEDIMENTATION RATE, AUTOMATED - Abnormal; Notable for the following components:    Sedimentation Rate 51 (*)     All other components within normal limits   C-REACTIVE PROTEIN - Abnormal; Notable for the following components:    C-Reactive Protein 13.42 (*)     All other components within normal limits                Nan Paz RN  10/09/24 4417

## 2024-10-10 NOTE — CARE PLAN
The patient's goals for the shift include      The clinical goals for the shift include safety and pain control

## 2024-10-10 NOTE — PROGRESS NOTES
Occupational Therapy                 Therapy Communication Note    Patient Name: Kael Zepeda  MRN: [id number]  Department: [facility] 2 E  Room: [facility] [place]/2321-A  Today's Date: 10/10/2024     Discipline: Occupational Therapy    Missed Visit Reason: Other (Comment) (Pt. states indep. with all ADLs and amb. now, but to have podiatry consult regarding foot ulcers. Will assess pt. mobility after consult and possible surgery.)

## 2024-10-10 NOTE — PROGRESS NOTES
Physical Therapy                 Therapy Communication Note    Patient Name: Kael Zepeda  MRN: [id number]  Department: [facility] PACU  Room: [facility] OR  Today's Date: 10/10/2024     Discipline: Physical Therapy    Missed Visit Reason: Patient awaiting podiatry consult with osteomyelitis of L 3rd toe. To await consult and further orders for initiation of PT evaluation. Patient instructed in recommendation to avoid weight bearing through B feet except for necessary toileting to encourage healing (R foot bleeding through gauze, nurse notified).     Missed Time: Attempt

## 2024-10-11 ENCOUNTER — PHARMACY VISIT (OUTPATIENT)
Dept: PHARMACY | Facility: CLINIC | Age: 59
End: 2024-10-11
Payer: MEDICARE

## 2024-10-11 VITALS
TEMPERATURE: 98.2 F | OXYGEN SATURATION: 97 % | HEIGHT: 67 IN | HEART RATE: 80 BPM | RESPIRATION RATE: 18 BRPM | DIASTOLIC BLOOD PRESSURE: 72 MMHG | SYSTOLIC BLOOD PRESSURE: 134 MMHG | WEIGHT: 185 LBS | BODY MASS INDEX: 29.03 KG/M2

## 2024-10-11 LAB
GLUCOSE BLD MANUAL STRIP-MCNC: 77 MG/DL (ref 74–99)
GLUCOSE BLD MANUAL STRIP-MCNC: 88 MG/DL (ref 74–99)
VANCOMYCIN SERPL-MCNC: 17.5 UG/ML (ref 5–20)

## 2024-10-11 PROCEDURE — 99239 HOSP IP/OBS DSCHRG MGMT >30: CPT | Performed by: PHYSICIAN ASSISTANT

## 2024-10-11 PROCEDURE — 36415 COLL VENOUS BLD VENIPUNCTURE: CPT | Performed by: PODIATRIST

## 2024-10-11 PROCEDURE — 2500000001 HC RX 250 WO HCPCS SELF ADMINISTERED DRUGS (ALT 637 FOR MEDICARE OP): Performed by: PODIATRIST

## 2024-10-11 PROCEDURE — 82947 ASSAY GLUCOSE BLOOD QUANT: CPT

## 2024-10-11 PROCEDURE — 2500000004 HC RX 250 GENERAL PHARMACY W/ HCPCS (ALT 636 FOR OP/ED): Performed by: INTERNAL MEDICINE

## 2024-10-11 PROCEDURE — RXMED WILLOW AMBULATORY MEDICATION CHARGE

## 2024-10-11 PROCEDURE — 2500000002 HC RX 250 W HCPCS SELF ADMINISTERED DRUGS (ALT 637 FOR MEDICARE OP, ALT 636 FOR OP/ED): Performed by: PODIATRIST

## 2024-10-11 PROCEDURE — 80202 ASSAY OF VANCOMYCIN: CPT | Performed by: PODIATRIST

## 2024-10-11 PROCEDURE — S0109 METHADONE ORAL 5MG: HCPCS | Performed by: PODIATRIST

## 2024-10-11 PROCEDURE — 2500000004 HC RX 250 GENERAL PHARMACY W/ HCPCS (ALT 636 FOR OP/ED): Mod: JZ | Performed by: PODIATRIST

## 2024-10-11 RX ORDER — AMOXICILLIN AND CLAVULANATE POTASSIUM 875; 125 MG/1; MG/1
1 TABLET, FILM COATED ORAL 2 TIMES DAILY
Qty: 16 TABLET | Refills: 0 | Status: SHIPPED | OUTPATIENT
Start: 2024-10-11 | End: 2024-10-19

## 2024-10-11 ASSESSMENT — COGNITIVE AND FUNCTIONAL STATUS - GENERAL
DAILY ACTIVITIY SCORE: 24
MOBILITY SCORE: 24

## 2024-10-11 ASSESSMENT — PAIN SCALES - GENERAL
PAINLEVEL_OUTOF10: 4
PAINLEVEL_OUTOF10: 5 - MODERATE PAIN
PAINLEVEL_OUTOF10: 0 - NO PAIN

## 2024-10-11 NOTE — CARE PLAN
The patient's goals for the shift include      The clinical goals for the shift include pain      Problem: Pain - Adult  Goal: Verbalizes/displays adequate comfort level or baseline comfort level  Outcome: Progressing     Problem: Safety - Adult  Goal: Free from fall injury  Outcome: Progressing     Problem: Discharge Planning  Goal: Discharge to home or other facility with appropriate resources  Outcome: Progressing     Problem: Chronic Conditions and Co-morbidities  Goal: Patient's chronic conditions and co-morbidity symptoms are monitored and maintained or improved  Outcome: Progressing     Problem: Diabetes  Goal: Achieve decreasing blood glucose levels by end of shift  Outcome: Progressing  Goal: Increase stability of blood glucose readings by end of shift  Outcome: Progressing  Goal: Decrease in ketones present in urine by end of shift  Outcome: Progressing  Goal: Maintain electrolyte levels within acceptable range throughout shift  Outcome: Progressing  Goal: Maintain glucose levels >70mg/dl to <250mg/dl throughout shift  Outcome: Progressing  Goal: No changes in neurological exam by end of shift  Outcome: Progressing  Goal: Learn about and adhere to nutrition recommendations by end of shift  Outcome: Progressing  Goal: Vital signs within normal range for age by end of shift  Outcome: Progressing  Goal: Increase self care and/or family involovement by end of shift  Outcome: Progressing  Goal: Receive DSME education by end of shift  Outcome: Progressing     Problem: Pain  Goal: Takes deep breaths with improved pain control throughout the shift  Outcome: Progressing  Goal: Turns in bed with improved pain control throughout the shift  Outcome: Progressing  Goal: Walks with improved pain control throughout the shift  Outcome: Progressing  Goal: Performs ADL's with improved pain control throughout shift  Outcome: Progressing  Goal: Participates in PT with improved pain control throughout the shift  Outcome:  Progressing  Goal: Free from opioid side effects throughout the shift  Outcome: Progressing  Goal: Free from acute confusion related to pain meds throughout the shift  Outcome: Progressing     Problem: Skin  Goal: Decreased wound size/increased tissue granulation at next dressing change  Outcome: Progressing  Goal: Participates in plan/prevention/treatment measures  Outcome: Progressing  Goal: Prevent/manage excess moisture  Outcome: Progressing  Goal: Prevent/minimize sheer/friction injuries  Outcome: Progressing  Goal: Promote/optimize nutrition  Outcome: Progressing  Flowsheets (Taken 10/11/2024 0219)  Promote/optimize nutrition:   Consume > 50% meals/supplements   Monitor/record intake including meals  Goal: Promote skin healing  Outcome: Progressing

## 2024-10-11 NOTE — DISCHARGE SUMMARY
Admission Date: 10/9/2024  6:19 PM  Discharge Date:    Condition at discharge: Stable    Discharge Diagnosis  L 3rd toe wound with associated cellulitis and acute osteomyelitis s/p amputation L 3rd toe 10/10/24  Hx AF with RVR s/p GABG  CAD s/p CABG x3 vessels on 7/9/24  IDDM-II  Hypothyroidism  COPD  RA on plaquenil  H/o peripheral neuropathy and chronic methadone use   Hypokalemia  Anemia  Code Status: Full Code     Test Results Pending At Discharge  Pending Labs       Order Current Status    Blood Culture Preliminary result    Blood Culture Preliminary result    Tissue/Wound Culture/Smear Preliminary result            Hospital Course   Kael Zepeda is a 59 y.o. male with PMHx s/f Afib, CAD s/p CABG x3 vessels on 7/9/24, RA, IDDM2, COPD, hypothyroidism, chronic tobacco abuse, chronic methadone dependency, neuropathy and unhealed diabetic foot ulcers (s/p right foot partial amputation in 12/2023) presenting with left foot third digit swelling. He states he woke up this morning with his third toe increasing in odor, redness and swelling and he went to the wound care center who referred him to come to ED for further evaluation. Lactate 1.4, CRP 13.42, sed rate 51. XR left - Soft tissue ulceration at the tip of 3rd digit with findings concerning for osteomyelitis. Moderate degenerative changes of the great toe interphalangeal joint with suggestion of juxta-articular osseous erosive changes. This can be seen with inflammatory arthropathy like gout. MR left - Significantly limited examination due to patient motion. The T2 and postcontrast sequences are essentially nondiagnostic. Confluent low T1 signal is noted within the 3rd distal phalanx. In conjunction with same day radiographic findings, this is concerning for osteomyelitis. A1c 7.6%. He went to OR with Dr. Raya, podiatrist on 10/10/24 and had L 3rd toe amputation. Patient adamant about discharge to home, pain well controlled on home methadone. Dr. Raya  has approved dc to home, has close follow up in wound care center and will follow cultures as outpatient. Wound care recommendations and weight bearing per Dr. Raya's discussion with patient. Recommends oral augmentin x10 days.     Consultations: Podiatry consulted- treatment options were discussed and plan of care agreed upon.    Pertinent Physical Exam At Time of Discharge  Constitutional:       General: He is not in acute distress.     Appearance: Normal appearance.      Comments: Ambulating independently in the room upon my entrance to the room   HENT:      Head: Normocephalic and atraumatic.      Right Ear: External ear normal.      Left Ear: External ear normal.      Nose: Nose normal.      Mouth/Throat:      Mouth: Mucous membranes are moist.      Pharynx: Oropharynx is clear.   Eyes:      Extraocular Movements: Extraocular movements intact.      Conjunctiva/sclera: Conjunctivae normal.      Pupils: Pupils are equal, round, and reactive to light.   Cardiovascular:      Rate and Rhythm: Normal rate and regular rhythm.      Pulses: Normal pulses.      Heart sounds: Normal heart sounds.   Pulmonary:      Effort: Pulmonary effort is normal. No respiratory distress.      Breath sounds: Normal breath sounds. No wheezing, rhonchi or rales.   Abdominal:      General: Bowel sounds are normal.      Palpations: Abdomen is soft.      Tenderness: There is no abdominal tenderness. There is no right CVA tenderness, left CVA tenderness, guarding or rebound.   Musculoskeletal:         General: No swelling. Normal range of motion.      Cervical back: Normal range of motion and neck supple.   Skin:     General: Skin is warm and dry.      Capillary Refill: Capillary refill takes less than 2 seconds.      Findings: Erythema and lesion present. No rash.      Comments: Large ulceration to bottom of R foot  S/p L 3rd toe amputation- did not remove post-op dressing  Erythema of L shin improving   Neurological:      General: No focal  deficit present.      Mental Status: He is alert and oriented to person, place, and time. Mental status is at baseline.   Psychiatric:         Mood and Affect: Mood normal.         Behavior: Behavior normal.     Home Medications     Medication List      START taking these medications     amoxicillin-pot clavulanate 875-125 mg tablet; Commonly known as:   Augmentin; Take 1 tablet by mouth 2 times a day for 8 days.     CHANGE how you take these medications     atorvastatin 40 mg tablet; Commonly known as: Lipitor; What changed:   Another medication with the same name was removed. Continue taking this   medication, and follow the directions you see here.     CONTINUE taking these medications     albuterol 90 mcg/actuation inhaler; Inhale 2 puffs by mouth into the   lungs every 6 hours if needed for shortness of breath.   aspirin 81 mg EC tablet   clopidogrel 75 mg tablet; Commonly known as: Plavix; Take 1 tablet (75   mg) by mouth once daily.   docusate sodium 100 mg capsule; Commonly known as: Colace; Take 1   capsule (100 mg) by mouth 2 times a day as needed (hard stools).   DULoxetine 60 mg DR capsule; Commonly known as: Cymbalta   fluticasone propion-salmeteroL 100-50 mcg/dose diskus inhaler; Commonly   known as: Wixela Inhub; Inhale 1 puff by mouth into the lungs 2 times a   day. Rinse mouth with water after use to reduce aftertaste and incidence   of candidiasis. Do not swallow.   folic acid 1 mg tablet; Commonly known as: Folvite; Take 1 tablet (1 mg)   by mouth once daily.   insulin lispro 100 unit/mL injection; Commonly known as: HumaLOG; Inject   8 Units plus 0 - 10 units per sliding scale under the skin 3 times a day   before meals. Take as directed per insulin instructions.   lancets 33 gauge misc; Use to check blood sugar 4 times daily   Lantus Solostar U-100 Insulin 100 unit/mL (3 mL) pen; Generic drug:   insulin glargine; Inject 25 Units under the skin once every 24 hours.   Levemir FlexPen 100 unit/mL  (3 mL) pen; Generic drug: insulin detemir   levothyroxine 50 mcg tablet; Commonly known as: Synthroid, Levoxyl   melatonin 5 mg capsule; Take 1 capsule (5 mg) by mouth as needed at   bedtime (for insomnia).   metFORMIN 500 mg tablet; Commonly known as: Glucophage; Take 1 tablet   (500 mg) by mouth 2 times a day.   metoprolol succinate XL 25 mg 24 hr tablet; Commonly known as:   Toprol-XL; Take 1 tablet (25 mg) by mouth once daily. Do not crush or   chew.   omeprazole 40 mg DR capsule; Commonly known as: PriLOSEC   pantoprazole 40 mg EC tablet; Commonly known as: ProtoNix; Take 1 tablet   (40 mg) by mouth once daily in the morning. Take before meals. Do not   crush, chew, or split.   pregabalin 200 mg capsule; Commonly known as: Lyrica   Senexon-S 8.6-50 mg tablet; Generic drug: sennosides-docusate sodium;   Take 2 tablets by mouth 2 times a day.   silver sulfADIAZINE 1 % cream; Commonly known as: Silvadene   Spiriva Respimat 2.5 mcg/actuation inhaler; Generic drug: tiotropium;   Inhale 2 puffs by mouth into the lungs once daily.   thiamine 100 mg tablet; Commonly known as: Vitamin B-1; Take 1 tablet   (100 mg) by mouth once daily.   True Metrix Glucose Test Strip strip; Generic drug: blood sugar   diagnostic; Use to check blood sugar 4 times daily     STOP taking these medications     acetaminophen 325 mg tablet; Commonly known as: Tylenol   doxycycline 100 mg tablet; Commonly known as: Vibra-Tabs       Outpatient Follow-Up  Future Appointments   Date Time Provider Department [place]   10/15/2024 11:20 AM NETTIE Nguyen [id number] [facility]   10/16/2024  3:15 PM NETTIE Triana [id number] [facility]   11/27/2024  9:00 AM Lucita Belle MD [id number] [facility]         At the time of discharge, patient's pain was controlled with oral analgesia, patient was urinating, having BMs, sleeping, and eating well. Follow up recommendations are in discharge paperwork. Discharge plan was discussed with the  patient/family and all of the questions were answered. Medications were ordered to be delivered to bedside prior to discharge.     Discharge planning took greater than 35 minutes    Diagnoses at time of discharge:  L 3rd toe wound with associated cellulitis and acute osteomyelitis s/p amputation L 3rd toe 10/10/24- vanc/zosyn  Hx AF with RVR s/p GABG  CAD s/p CABG x3 vessels on 7/9/24  IDDM-II  Hypothyroidism  COPD  RA on plaquenil  H/o peripheral neuropathy and chronic methadone use   Hypokalemia  Anemia  Code Status: Full Code     Anticipated discharge destination: home    Please see orders for more complete plan    Sera Rodriguez PA-C

## 2024-10-11 NOTE — CARE PLAN
The patient's goals for the shift include      The clinical goals for the shift include Pt will be remain HDS throughout this shift

## 2024-10-11 NOTE — PROGRESS NOTES
Vancomycin Dosing by Pharmacy- FOLLOW UP    Kael Zepeda is a 59 y.o. year old male who Pharmacy has been consulted for vancomycin dosing for cellulitis, skin and soft tissue. Based on the patient's indication and renal status this patient is being dosed based on a goal AUC of 500-600.     Renal function is currently stable.    Current vancomycin dose: 1250 mg given every 12 hours    Estimated vancomycin AUC on current dose: 548 mg/L.hr   Visit Vitals  /76 (BP Location: Left arm, Patient Position: Lying)   Pulse 71   Temp 36.9 °C (98.5 °F) (Temporal)   Resp 16        Lab Results   Component Value Date    CREATININE 0.66 10/10/2024    CREATININE 0.77 10/09/2024    CREATININE 1.02 2024    CREATININE 0.98 2024        Patient weight is as follows:   Vitals:    10/10/24 1127   Weight: 83.9 kg (185 lb)       Cultures:  No results found for the encounter in last 14 days.       I/O last 3 completed shifts:  In: 1570 (18.7 mL/kg) [P.O.:570; IV Piggyback:1000]  Out: 700 (8.3 mL/kg) [Urine:700 (0.2 mL/kg/hr)]  Weight: 83.9 kg   I/O during current shift:  No intake/output data recorded.    Temp (24hrs), Av.7 °C (98.1 °F), Min:36.2 °C (97.2 °F), Max:37.1 °C (98.7 °F)      Assessment/Plan    Within goal AUC range. Continue current vancomycin regimen.    This dosing regimen is predicted by [employer] to result in the following pharmacokinetic parameters:  Loading dose: N/A  Regimen: 1250 mg IV every 12 hours.  Start time: 09:31 on 10/11/2024  Exposure target: AUC24 (range)400-600 mg/L.hr   [id number]: 534 mg/L.hr  AUC24,ss: 548 mg/L.hr  Probability of AUC24 > 400: 99 %  Ctrough,ss: 16.7 mg/L  Probability of Ctrough,ss > 20: 21 %      The next level will be obtained on 10/14 at 0500. May be obtained sooner if clinically indicated.   Will continue to monitor renal function daily while on vancomycin and order serum creatinine at least every 48 hours if not already ordered.  Follow for continued vancomycin  needs, clinical response, and signs/symptoms of toxicity.       Hans May, PharmD

## 2024-10-11 NOTE — NURSING NOTE
Pt verbalized understanding of discharge instructions.  Dr Raya vs and changed demario foot dressings

## 2024-10-13 LAB
BACTERIA BLD CULT: NORMAL
BACTERIA BLD CULT: NORMAL
BACTERIA SPEC CULT: ABNORMAL
GRAM STN SPEC: ABNORMAL
GRAM STN SPEC: ABNORMAL

## 2024-10-14 LAB
BACTERIA BLD CULT: NORMAL
BACTERIA BLD CULT: NORMAL
GLUCOSE BLD MANUAL STRIP-MCNC: 68 MG/DL (ref 74–99)

## 2024-10-15 ENCOUNTER — OFFICE VISIT (OUTPATIENT)
Dept: SLEEP MEDICINE | Facility: CLINIC | Age: 59
End: 2024-10-15
Payer: MEDICARE

## 2024-10-15 VITALS
RESPIRATION RATE: 16 BRPM | BODY MASS INDEX: 28.91 KG/M2 | SYSTOLIC BLOOD PRESSURE: 98 MMHG | OXYGEN SATURATION: 99 % | DIASTOLIC BLOOD PRESSURE: 63 MMHG | HEIGHT: 67 IN | HEART RATE: 68 BPM | WEIGHT: 184.2 LBS

## 2024-10-15 DIAGNOSIS — Z79.4 TYPE 2 DIABETES MELLITUS WITH OTHER SPECIFIED COMPLICATION, WITH LONG-TERM CURRENT USE OF INSULIN: ICD-10-CM

## 2024-10-15 DIAGNOSIS — I21.4 NSTEMI (NON-ST ELEVATED MYOCARDIAL INFARCTION) (MULTI): ICD-10-CM

## 2024-10-15 DIAGNOSIS — G47.30 SLEEP APNEA IN ADULT: ICD-10-CM

## 2024-10-15 DIAGNOSIS — E66.3 OVERWEIGHT (BMI 25.0-29.9): ICD-10-CM

## 2024-10-15 DIAGNOSIS — M79.7 FIBROMYALGIA: ICD-10-CM

## 2024-10-15 DIAGNOSIS — G47.30 SLEEP-RELATED BREATHING DISORDER: Primary | ICD-10-CM

## 2024-10-15 DIAGNOSIS — I10 BENIGN ESSENTIAL HYPERTENSION: ICD-10-CM

## 2024-10-15 DIAGNOSIS — E11.69 TYPE 2 DIABETES MELLITUS WITH OTHER SPECIFIED COMPLICATION, WITH LONG-TERM CURRENT USE OF INSULIN: ICD-10-CM

## 2024-10-15 DIAGNOSIS — F51.01 PRIMARY INSOMNIA: ICD-10-CM

## 2024-10-15 DIAGNOSIS — F32.A DEPRESSION, UNSPECIFIED DEPRESSION TYPE: ICD-10-CM

## 2024-10-15 DIAGNOSIS — F41.1 GENERALIZED ANXIETY DISORDER: ICD-10-CM

## 2024-10-15 PROCEDURE — 99204 OFFICE O/P NEW MOD 45 MIN: CPT

## 2024-10-15 PROCEDURE — 3074F SYST BP LT 130 MM HG: CPT

## 2024-10-15 PROCEDURE — 3078F DIAST BP <80 MM HG: CPT

## 2024-10-15 PROCEDURE — 99214 OFFICE O/P EST MOD 30 MIN: CPT

## 2024-10-15 PROCEDURE — 3051F HG A1C>EQUAL 7.0%<8.0%: CPT

## 2024-10-15 PROCEDURE — 3008F BODY MASS INDEX DOCD: CPT

## 2024-10-15 PROCEDURE — 3048F LDL-C <100 MG/DL: CPT

## 2024-10-15 ASSESSMENT — SLEEP AND FATIGUE QUESTIONNAIRES
HOW LIKELY ARE YOU TO NOD OFF OR FALL ASLEEP WHILE LYING DOWN TO REST IN THE AFTERNOON WHEN CIRCUMSTANCES PERMIT: SLIGHT CHANCE OF DOZING
ESS-CHAD TOTAL SCORE: 8
DIFFICULTY_FALLING_ASLEEP: MILD
HOW LIKELY ARE YOU TO NOD OFF OR FALL ASLEEP WHILE SITTING QUIETLY AFTER LUNCH WITHOUT ALCOHOL: MODERATE CHANCE OF DOZING
HOW LIKELY ARE YOU TO NOD OFF OR FALL ASLEEP IN A CAR, WHILE STOPPED FOR A FEW MINUTES IN TRAFFIC: WOULD NEVER DOZE
SLEEP_PROBLEM_INTERFERES_DAILY_ACTIVITIES: MUCH
HOW LIKELY ARE YOU TO NOD OFF OR FALL ASLEEP WHILE SITTING AND TALKING TO SOMEONE: WOULD NEVER DOZE
WORRIED_DISTRESSED_DUE_TO_SLEEP: MUCH
HOW LIKELY ARE YOU TO NOD OFF OR FALL ASLEEP WHILE SITTING AND READING: SLIGHT CHANCE OF DOZING
HOW LIKELY ARE YOU TO NOD OFF OR FALL ASLEEP WHEN YOU ARE A PASSENGER IN A CAR FOR AN HOUR WITHOUT A BREAK: SLIGHT CHANCE OF DOZING
WAKING_TOO_EARLY: SEVERE
HOW LIKELY ARE YOU TO NOD OFF OR FALL ASLEEP WHILE WATCHING TV: HIGH CHANCE OF DOZING
SITING INACTIVE IN A PUBLIC PLACE LIKE A CLASS ROOM OR A MOVIE THEATER: WOULD NEVER DOZE
DIFFICULTY_STAYING_ASLEEP: VERY SEVERE
SLEEP_PROBLEM_NOTICEABLE_TO_OTHERS: MUCH
SATISFACTION_WITH_CURRENT_SLEEP_PATTERN: VERY DISSATISFIED

## 2024-10-15 NOTE — OP NOTE
Amputation Left 3rd Toe (L) Operative Note     Date: 10/10/2024  OR Location: [facility] OR    Name: Kael Zepeda, : 1965, Age: 59 y.o., MRN: [id number], Sex: male    Diagnosis  Pre-op Diagnosis      * Cellulitis of left lower extremity [L03.116]     * Acute osteomyelitis of left foot (Multi) [M86.172] Post-op Diagnosis     * Cellulitis of left lower extremity [L03.116]     * Acute osteomyelitis of left foot (Multi) [M86.172]     Procedures  Amputation Left 3rd Toe  59319 - OK AMPUTATION TOE METATARSOPHALANGEAL JOINT      Surgeons      * William Raya - Primary    Resident/Fellow/Other Assistant:  Surgeons and Role:  * No surgeons found with a matching role *    Procedure Summary  Anesthesia: Monitor Anesthesia Care  ASA: III  Anesthesia Staff: CRNA: CHACE Cage-CRNA  Estimated Blood Loss: 0mL  Intra-op Medications:   Administrations occurring from 1145 to 1245 on 10/10/24:   Medication Name Total Dose   bupivacaine PF (Marcaine) 0.5 % (5 mg/mL) injection 10 mL   sodium chloride 0.9 % irrigation solution 120 mL              Anesthesia Record               Intraprocedure I/O Totals       None           Specimen: soft tissue culture    Staff:   Circulator: Pat Cordon Person: Fara         Drains and/or Catheters: * None in log *    Tourniquet Times:     Total Tourniquet Time Documented:  area (Left) - 15 minutes  Total: area (Left) - 15 minutes      Implants:     Findings: osteomyelitis of the distal left 3rd toe. Isolated to the toe. No tracking infection noted.     Indications: Kael Zepeda is an 59 y.o. male who is having surgery for Cellulitis of left lower extremity [L03.116]  Acute osteomyelitis of left foot (Multi) [M86.172]. Patient has an infected ulcer on the left 3rd toe. He was being treated at the wound center regularly for that and other wounds on both feet. He noted that the 3rd toe suddenly became swollen and red within the span of 2-3 days. His wound care provider sent him to the  [facility]. MRI confirmed osteomyelitis of the distal end of the toe. Patient opted for amputation to minimize the risk of spreading infection.     The patient was seen in the preoperative area. The risks, benefits, complications, treatment options, non-operative alternatives, expected recovery and outcomes were discussed with the patient. The possibilities of reaction to medication, pulmonary aspiration, injury to surrounding structures, bleeding, recurrent infection, the need for additional procedures, failure to diagnose a condition, and creating a complication requiring transfusion or operation were discussed with the patient. The patient concurred with the proposed plan, giving informed consent.  The site of surgery was properly noted/marked if necessary per policy. The patient has been actively warmed in preoperative area. Preoperative antibiotics have been ordered and given within 2 hours of incision. Venous thrombosis prophylaxis are not indicated.    Procedure Details: Patient brought to the OR and positioned supine in the hospital bed. Once IV sedation was achieved and the surgical time out was completed, local anesthetic was administered, pneumatic tourniquet was placed on the ankle and the foot was scrubbed, prepped and draped following standard aseptic protocol. Esmarch use to  exsanguinate the foot and the tourniquet inflated to hold hemostasis.    Incision made around the base of the left 3rd toe. Careful dissection was done down through the tendon and capsule tissues. The toe was disarticulated from the metatarsal joint and fully amputated. Skin was contoured for primary closure. No deep tracking infection was evident. Nidus of the infection was fully removed with the amputation of the toe. Site was flushed thoroughly and a culture collected from the surgical wound. Incision was sutured closed. Tourniquet deflated and there was prompt return of perfusion to the foot and all remaining toes. Post  operative anesthetic block was administered. Site was dressed with dry sterile gauze dressing.   Patient tolerated the procedure and the anesthesia well and was transferred to recovery with stable vitals and intact vascular status to the extremity. Patient was readmitted to inpatient care following a period of post operative monitored recovery.    Complications:  None; patient tolerated the procedure well.    Disposition: PACU - hemodynamically stable.  Condition: stable         Additional Details:     Attending Attestation:     William Raya  Phone Number: [phone]

## 2024-10-15 NOTE — PATIENT INSTRUCTIONS
[facility] Sleep Medicine  [address]  [facility]  [address] [facility] [address]  [facility] [address]       Thank you for coming to the Sleep Medicine Clinic today! Your sleep medicine provider today was: NETTIE Nguyen Below is a summary of your treatment plan, patient education, other important information, and our contact numbers.    Dear Mr. Kael Zepeda       Your Sleep Provider Today: NETTIE Nguyen  Your Primary Care Physician: Radha Greer MD   Your Referring Provider: Ann Quezada APRN*      Thank you for visiting  Sleep Medicine Clinic !     1. We will proceed with an IN-CENTER sleep study to check your risk of sleep apnea. The lab will call you and schedule it for you.     2. Please do not drive when you are sleepy and start practicing the sleep hygiene as discussed in clinic.    3. Please start using Biotene to ease your dry mouth if needed.    4. FOR QUESTIONS AND CONCERNS:   a) : To schedule, cancel, or reschedule SLEEP STUDY appointments, please call [phone]  b): Please call my office with issues or questions: [phone] ([place]); [phone] ([facility]); [phone] ([facility])    If you have a CPAP or BiPAP machine at home, please bring the unit and all accessories including the power cord to your appointments unless I tell you otherwise. Please have knowledge of the DME company you worked with to receive your PAP device. If you have copies of any previous sleep testing completed outside of , please bring with you to clinic as well. This information will make our visits more productive.     If you are new to CPAP or BiPAP, please note the minimum usage insurance requires to continuing coverage for the equipment as noted by your DME company. Please discuss equipment issues (PAP unit, mask fit, humidification, etc.) with your DME company first.       In the event that you are running more than 10  minutes late to your appointment, I will kindly ask you to reschedule. Thanks.      TREATMENT PLAN     Follow-up Appointment:   Follow-up in 2-3 weeks after sleep study.    PATIENT EDUCATION     OBSTRUCTIVE SLEEP APNEA (MARIAELENA) is a sleep disorder where your upper airway muscles relax during sleep and the airway intermittently and repetitively narrows and collapses leading to partially blocked airway (hypopnea) or completely blocked airway (apnea) which, in turn, can disrupt breathing in sleep, lower oxygen levels while you sleep and cause night time wakings. Because both apnea and hypopnea may cause higher carbon dioxide or low oxygen levels, untreated MARIAELENA can lead to heart arrhythmia, elevation of blood pressure, and make it harder for the body to consolidate memory and facilitate metabolism (leading to higher blood sugars at night). Frequent partial arousals occur during sleep resulting in sleep deprivation and daytime sleepiness. MAIRAELENA is associated with an increased risk of cardiovascular disease, stroke, hypertension, and insulin resistance. Moreover, untreated MARIAELENA with excessive daytime sleepiness can increase the risk of motor vehicular accidents.    Below are conservative strategies for MARIAELENA regardless of MARIAELENA severity are:   Positional therapy - Avoid sleeping on your back.   Healthy diet and regular exercise to optimize weight is highly encouraged.   Avoid alcohol late in the evening and sedative-hypnotics as these substances can make sleep apnea worse.   Improve breathing through the nose with intranasal steroid spray, saline rinse, or antihistamines    Safety: Avoid driving vehicle and operating heavy equipment while sleepy. Drowsy driving may lead to life-threatening motor vehicle accidents. A person driving while sleepy is 5 times more likely to have an accident. If you feel sleepy, pull over and take a short power nap (sleep for less than 30 minutes). Otherwise, ask somebody to drive you.    Treatment  options for sleep apnea include weight management, positional therapy, Positive Airway Therapy (PAP) therapy, oral appliance therapy, hypoglossal nerve stimulator (Inspire) and select airway surgeries.    Sleep Testing for sleep apnea: The best and ideal way to check out if you have sleep apnea is to do an overnight sleep study in the sleep laboratory. Alternatively, a home sleep apnea test can also be done depending on your insurance and risk factors.     If you are having a home sleep apnea test, kindly allot 1 hour during pickup of the testing kit as you will have to complete paperwork and listen to the sleep technician for in-person on-the-spot demonstration and instructions on how to hook up the testing kit at home. Do the test for 1 day and start off with sleeping on your back. If you sleep on your side in the middle of night or you have always been a side or stomach-sleeper, it is ok as long as you have some time on your back and off-back.     If you are having an overnight in-lab sleep study, please make sure to bring toiletries, a comfy pillow, additional warm blankets, and any nighttime medications (include as-needed inhaler, pain pill, etc) that you may regularly take. Also, be sure to eat dinner before you arrive as we generally do not provide meals inside the sleep testing center. Lastly, in order to fall asleep faster in the sleep testing center, we advise patients to wake up 2 hours earlier on the morning of scheduled testing and avoid napping 2 days prior testing. Sometimes, your sleep provider may prescribe a sleep aid to be taken at lights out in the sleep testing center. If you are taking a sleep aid, consider having somebody pick you up after the sleep testing.    Overnight sleep studies may be scheduled on a weekday or weekend. We also perform daytime testing for shift workers on a case-by-case basis.    Once you have booked an appointment to do the sleep study, please contact my office for  follow-up visit to discuss results.    On the other hand, if you have any of the following, please consider calling the sleep testing center to RESCHEDULE your sleep study appointment:  If you tested positive for COVID within 10 days of your sleep study appointment.  If you were exposed to somebody who was confirmed for COVID within 10 days of your sleep study appointment and now you are having symptoms of possible COVID  If you have fever>100F or any acute symptoms that you think will lead to poor sleep during testing (e.g. new or worsening stuffy nose not relieved by steroid nasal spray)  If you have traveled domestically or internationally in the last month and now you are having symptoms of possible COVID    Trouble falling asleep or trouble staying asleep is called INSOMNIA and it can be caused by many different things such as untreated sleep apnea, anxiety, depression, stress, poor sleep habits, and other medical conditions or medications. The best way to treat insomnia is to treat the cause. In general, we can all benefit from better sleep hygiene (also known as good sleep habits). Below are recommendations to help you improve your sleep so you can fall asleep, stay asleep, and wake up feeling refreshed.    Keep a routine bedtime and rise time even on weekends and non-work days. This helps your biological clock stay in sync with your sleep needs. If you currently have variable sleep-wake schedule, start off by waking up and getting out of bed the same time every day, even on weekends or non-work days. Whether you have a good or poor sleep the night before, waking up at the same time every day can help re-train and reset your body clock.  Go to bed when you are sleepy and not when tired nor before your goal bedtime. Long periods of time in bed will lead to fragmented, shallow, broken sleep.   Use the bed for sleep and intimacy only. Do not watch TV, eat, read or use phone/laptop in bed. Keeping sleep as the  "only activity in bed will help re-associate bed equals sleep.  Get up when you cannot sleep in 15-20 minutes. When you are unable to sleep, exit the bed, and go to another room or chair in bedroom, do something boring, calm, relaxing, distracting, or non-stimulating in dim lighting until you feel sleepy enough to fall back asleep. Avoid stimulating activity or any triggers for mental thinking (e.g. cellphone). Repeat as needed.  Avoid napping during the day to build up sleep pressure so that it won't be hard for you to fall asleep at night. Napping, particularly in the late afternoon or early evening may interfere with your night's sleep. If naps are necessary, limit naps under 15-20 minutes and not later than 3 PM.   Create a \"buffer zone\" or \"wind-down time\". This is a quiet time prior to bedtime, typically at least 30 minutes and perhaps as long as 1-2 hours. During this time, you should do things that are enjoyable, relaxing, and not necessarily goal-oriented like performing relaxation exercises, listening to relaxing music, reading a boring book, dimming the lights, or eating a light bedtime snack like a glass of milk and banana which can promote sleep. Activities that promote relaxation before sleep is important because these can help quiet the mind and relax the body to get into the right state for sleep.   Do not worry or plan in bed. If you are worrying, planning, feeling anxious, or cannot shut off your thoughts, get up and stay out of bed until you have quieted your mind. Set up a “scheduled worry time” in the morning or late afternoon to write down your worries and stressors as well as plans for the following day.   Keep your bedroom quiet, dark, and cool. Ideal sleeping temperature is 65F. If light bothers you, get a slumber mask or blackout shades. If noise bothers you, put ear plugs or get a white noise machine. Alternatively, you may turn on fan or humidifier to create a constant background noise to " "eliminate unexpected sounds that would otherwise wake you up in the middle of your sleep.  Keep your bedroom technology free. Avoid exposure to TV and electronics 1-2 hours prior to bedtime. May also consider wearing \"blue light blocker\" eyeglasses.  Turn the clock around to avoid clock-watching. Thoughts of the time can cause frustration and make it more difficult to go to sleep.   Other things to avoid to help   Avoid  caffeine (including chocolate) intake in the late afternoon and early evening. Limit the amount of caffeine and consume before noon.   Avoid smoking 2-3 hours before bedtime. Like caffeine, nicotine in cigarette smoking acts a stimulant.   Avoid alcohol intake 2-3 hours before bedtime. Although alcohol may seem to help you fall asleep more easily as it slows down brain activity, it also disrupts your sleep during the night by causing frequent awakenings as the effect of alcohol wears off. Additionally, alcohol worsens snoring and sleep apnea  Avoid eating a heavy meal (high-fat, high-carbohydrate, gas-producing foods) at dinner and 2-3 hours before bedtime.    Avoid drinking more than 8 oz liquids in the evening. Limit fluid intake at 8 oz during dinner. Restrict fluids after dinner. May take sips of water enough to swallow bedtime medications. Void at bedtime.  Avoid physical exercise or hot shower/bath within 2 hours of bedtime. Regular exercise can improve sleep quality, but exercising or having a hot shower/bath too close to bedtime can disrupt sleep onset. The best time to exercise to help sleep is in the late afternoon or early evening but not within 2 hours of bedtime. In order to promote sleep, hot shower/bath can be taken in the evening for 15-20 minutes but not within 2 hours of going to bed.   Avoid sleeping with pets or kids if they appear to bother your sleep and/or sleep quality.  Last but the least, DO NOT TRY TOO HARD TO SLEEP. JUST ALLOW SLEEP TO UNFOLD.    MOST IMPORTANTLY:  BE " PATIENT!  BE CONSISTENT!  Your sleep problem developed over time so it will take some time and consistency to return to a more normal sleep pattern. By following the suggestions listed above, you should see gradual sleep improvements over time.    Also you have been recommended to do Cognitive Behavioral Therapy for Insomnia (CBT-I). CBT-I is widely recognized as an effective treatment for a wide range of insomnias. CBT-I is typically made up of a number of components including assessment, behavioral and cognitive interventions, motivational techniques, and relapse prevention skills. An overwhelming amount of evidence suggests that CBT-I is as effective as hypnotics and the newer non-benzodiazepine sleep aides in the acute treatment and is more effective than medication in the long term treatment of insomnia.     Below are some resources for CBT-I:  To see a Behavioral Sleep Medicine specialist for one-on-one counseling  CBT-I at the [facility] - Dr. Gilberto Masterson PsyD or Dr. Tlaya Morales, PhD - Phone: [phone]  Fax: [phone]. There may be a several month waiting period.  CBT-I at [facility] - Julita Dowling PsyD (Call [phone] and speak with Rosario or Joelle  Fax: [phone] or backup fax: [phone])  Dr. Sue Gannon - [email or web address]  - She only does telemedicine. She was formerly part of  but is in private practice and would be out-of-network  Dr. Donnelly - jeremiah on one CBT-I service [email or web address]. You may have to pay out of pocket and submit visits to your insurance for reimbursement.  Other BSM providers in [place]:  https://www.behavioralsleep.org/index.php/directory/browse-by/state?value=[place]    2. If doing CBTi using an online platform, there are several online platforms that are good resources, but may vary based on cost. These include:  Stephanie- online course via [facility]. Self-guided. One time charge to sign up: Stephanie ($40)  The SleepReset - online guided  cognitive behavioral therapy https://[email or web address]/pricing ($300 for 8 weeks)  Sleepio - https://www.sleepio.com/sleepio/zenaida/354#1/1 (Some insurances or employers may cover - check with your HR Benefits office)  SleepEZ- Free self-guided course from the [facility] https://www.veterantraining.va.gov/insomnia/    You can also go to the following EDUCATION WEBSITES for further information:   American Academy of Sleep Medicine http://sleepeducation.org  National Sleep Foundation: https://sleepfoundation.org  American Sleep Apnea Association: https://www.sleepapnea.org (for patients with sleep apnea)  Narcolepsy Network: https://www.narcolepsynetwork.org (for patients with narcolepsy)  [employer]: https://www.Draftstreetlepsy.org (for patients with narcolepsy)  Hypersomnia Foundation: https://www.hypersomniafoundation.org (for patients with idiopathic hypersomnia)  RLS foundation: https://www.rls.org (for patients with restless leg syndrome)    IMPORTANT INFORMATION     Call 911 for medical emergencies.  Our offices are generally open from Monday-Friday, 8 am - 5 pm.   There are no supporting services by either the sleep doctors or their staff on weekends and Holidays, or after 5 PM on weekdays.   If you need to get in touch with me, you may either call my office number or you can use [employer].  If a referral for a test, for CPAP, or for another specialist was made, and you have not heard about scheduling this within a week, please call scheduling at [phone] ([phone]).  If you are unable to make your appointment for clinic or an overnight study, kindly call the office or sleep testing center at least 48 hours in advance to cancel and reschedule.  If you are on CPAP, please bring your device's card and/or the device to each clinic appointment.   In case of problems with PAP machine or mask interface, please contact your [employer] (Durable Medical Equipment) company first. [employer] is the company who provides you  the machine and/or PAP supplies.       PRESCRIPTIONS     We require 7 days advanced notice for prescription refills. If we do not receive the request in this time, we cannot guarantee that your medication will be refilled in time.    IMPORTANT PHONE NUMBERS     Sleep Medicine Clinic Fax: [phone]  Appointments (for Pediatric Sleep Clinic): [phone] - option 1  Appointments (for Adult Sleep Clinic): [phone] - option 2  Appointments (For Sleep Studies): [phone] - option 3  Behavioral Sleep Medicine: [phone]  Sleep Surgery: [phone]  Nutrition Service: [phone]  Weight management clinics with endocrinology: [phone]  Bariatric Services: [phone] (includes weight loss medications and weight loss surgery)  [facility]: [phone] (offers holistic approaches to weight management)  ENT (Otolaryngology): [phone]  Headache Clinic (Neurology): [phone]  Neurology: [phone]  Psychiatry: [phone]  Pulmonary Function Testing (PFT) Center: [phone]  Pulmonary Medicine: [phone]  First To File ([employer]): [phone]      OUR SLEEP TESTING LOCATIONS     Our team will contact you to schedule your sleep study, however, you can contact us as follow:  Main Phone Line (scheduling only): [phone]), option 3  Adult and Pediatric Locations  [facility] (6 years and older): [facility] - 4th floor ([address] [place] [address]) After hours line: [phone]  [facility] (Main campus: All ages): [facility], 6th floor. After hours line: [phone]   [place] (5 years and older; younger considered on case-by-case basis): [address]; [address], [address]. Scheduling  After hours line: [phone]   [place] (6 years and older): [address]; [address]; [address]   [place] (6 years and older): [address] [facility], [address]   "After hours line: [phone]   [place] (13 years and older) in [place]: [address], 2nd floor  After hours line: [phone]   [place] (13 year and older): [address]  After hours line: [phone]     Adult Only Locations:   [place] (18 years and older): [address] [facility], 2nd floor   [place] (18 years and older): [address] [facility]; 4th floor  After hours line: [phone]  [facility] (18 years and older) at [place]: [address] [facility]  After hours line: [phone]     CONTACTING YOUR SLEEP MEDICINE PROVIDER AND SLEEP TEAM      For issues with your machine or mask interface, please call your DME provider first. DME stands for durable medical company. DME is the company who provides you the machine and/or PAP supplies / accessories.   To schedule, cancel, or reschedule SLEEP STUDY APPOINTMENTS, please call the Main Phone Line at [phone] - option 3.   To schedule, cancel, or reschedule CLINIC APPOINTMENTS, you can do it in \"MyChart\", call [phone] to speak with my  (Pat Dudley), or call the Main Phone Line at [phone] - option 2  For CLINICAL QUESTIONS or MEDICATION REFILLS, please call direct line for Adult Sleep Nurses at [phone].   Lastly, you can also send a message directly to your provider through \"My Chart\", which is the email service through your  Records Account: [email or web address]       Here at [facility], we wish you a restful sleep!   "

## 2024-10-16 ENCOUNTER — APPOINTMENT (OUTPATIENT)
Dept: WOUND CARE | Facility: CLINIC | Age: 59
End: 2024-10-16
Payer: MEDICARE

## 2024-10-21 ENCOUNTER — APPOINTMENT (OUTPATIENT)
Dept: WOUND CARE | Facility: CLINIC | Age: 59
End: 2024-10-21
Payer: MEDICARE

## 2024-11-04 ENCOUNTER — OFFICE VISIT (OUTPATIENT)
Dept: WOUND CARE | Facility: CLINIC | Age: 59
End: 2024-11-04
Payer: MEDICARE

## 2024-11-04 PROCEDURE — 87077 CULTURE AEROBIC IDENTIFY: CPT | Mod: OUT | Performed by: STUDENT IN AN ORGANIZED HEALTH CARE EDUCATION/TRAINING PROGRAM

## 2024-11-04 PROCEDURE — 11042 DBRDMT SUBQ TIS 1ST 20SQCM/<: CPT | Mod: 59

## 2024-11-04 PROCEDURE — 11043 DBRDMT MUSC&/FSCA 1ST 20/<: CPT

## 2024-11-05 ENCOUNTER — LAB REQUISITION (OUTPATIENT)
Dept: LAB | Facility: HOSPITAL | Age: 59
End: 2024-11-05
Payer: MEDICARE

## 2024-11-05 DIAGNOSIS — E11.621 TYPE 2 DIABETES MELLITUS WITH FOOT ULCER (CODE): ICD-10-CM

## 2024-11-08 LAB
BACTERIA SPEC CULT: ABNORMAL
BACTERIA SPEC CULT: ABNORMAL
GRAM STN SPEC: ABNORMAL
GRAM STN SPEC: ABNORMAL

## 2024-11-18 ENCOUNTER — OFFICE VISIT (OUTPATIENT)
Dept: WOUND CARE | Facility: CLINIC | Age: 59
End: 2024-11-18
Payer: MEDICARE

## 2024-11-18 PROCEDURE — 11042 DBRDMT SUBQ TIS 1ST 20SQCM/<: CPT

## 2024-11-18 PROCEDURE — 11043 DBRDMT MUSC&/FSCA 1ST 20/<: CPT

## 2024-11-25 ENCOUNTER — APPOINTMENT (OUTPATIENT)
Dept: WOUND CARE | Facility: CLINIC | Age: 59
End: 2024-11-25
Payer: MEDICARE

## 2024-11-27 ENCOUNTER — OFFICE VISIT (OUTPATIENT)
Dept: WOUND CARE | Facility: CLINIC | Age: 59
End: 2024-11-27
Payer: MEDICARE

## 2024-11-27 ENCOUNTER — APPOINTMENT (OUTPATIENT)
Dept: ENDOCRINOLOGY | Facility: CLINIC | Age: 59
End: 2024-11-27
Payer: MEDICARE

## 2024-11-27 PROCEDURE — 11042 DBRDMT SUBQ TIS 1ST 20SQCM/<: CPT

## 2024-11-29 ENCOUNTER — CLINICAL SUPPORT (OUTPATIENT)
Dept: SLEEP MEDICINE | Facility: CLINIC | Age: 59
End: 2024-11-29
Payer: MEDICARE

## 2024-11-29 VITALS
DIASTOLIC BLOOD PRESSURE: 76 MMHG | SYSTOLIC BLOOD PRESSURE: 115 MMHG | HEIGHT: 67 IN | WEIGHT: 184.3 LBS | BODY MASS INDEX: 28.93 KG/M2

## 2024-11-29 DIAGNOSIS — G47.30 SLEEP-RELATED BREATHING DISORDER: ICD-10-CM

## 2024-11-30 NOTE — PROGRESS NOTES
[facility] TECH NOTE:     Patient: Kael Zepeda   MRN//AGE: [id number]  1965  59 y.o.   Technologist: ALDO Jimenez   Room: 3   Service Date: 2024        [facility]    TECHNOLOGIST SLEEP STUDY PROCEDURE NOTE:   This sleep study is being conducted according to the policies and procedures outlined by the AAS accreditation standards.  The sleep study procedure and processes involved during this appointment was explained to the patient/patient’s family, questions were answered. The patient/family verbalized understanding.      The patient is a 59 y.o. year old male scheduled for aDiagnostic PSG Split night with montage of:  standard . he arrived for his appointment.      The study that was ultimately completed was aDiagnostic PSG Split night with montage of:  standard .    The full study was not completed.  The study was started as usual at 10:11 pm.  Less than 1 hour into the recording, the patient called and complained that his restless legs was acting up severely and he was getting leg cramps.  He was very uncomfortable, miserable  and he  wanted to go home.  The study was ended at 10:50 pm completed.   The patient would like to  reschedule.    Patient questionnaires completed?: yes     Consents signed? yes    Initial Fall Risk Screening:     Kael has fallen in the last 6 months. his did not result in injury. Kael does not have a fear of falling. He does not need assistance with sitting, standing, or walking. he does not need assistance walking in his home. he does not need assistance in an unfamiliar setting. The patient is not using an assistive device.     Brief Study observations:  A diagnostic study was attempted briefly.  Hypopneas were observed.  No snoring.     Deviation to order/protocol and reason: The study was ended early.     If PAP, which was preferred mask/pressure/mode: NA      Other:None    After the procedure, the patient/family was  informed to ensure followup with ordering clinician for testing results.      Technologist: ALDO Jimenez

## 2024-12-12 DIAGNOSIS — G47.30 SLEEP-RELATED BREATHING DISORDER: Primary | ICD-10-CM

## 2024-12-12 DIAGNOSIS — G25.81 RLS (RESTLESS LEGS SYNDROME): ICD-10-CM

## 2024-12-12 NOTE — PROGRESS NOTES
The lab techician , MICKY DOLL, stated that the patient left due to RLS symptoms and discomfort. His sleep studies were insufficient for Dr. Bhatia to determine. I placed another new PSG order for the patient.

## 2024-12-30 ENCOUNTER — OFFICE VISIT (OUTPATIENT)
Dept: WOUND CARE | Facility: CLINIC | Age: 59
End: 2024-12-30
Payer: MEDICARE

## 2024-12-30 ENCOUNTER — LAB REQUISITION (OUTPATIENT)
Dept: LAB | Facility: HOSPITAL | Age: 59
End: 2024-12-30
Payer: MEDICARE

## 2024-12-30 DIAGNOSIS — E11.621 TYPE 2 DIABETES MELLITUS WITH FOOT ULCER (CODE): ICD-10-CM

## 2024-12-30 PROCEDURE — 99213 OFFICE O/P EST LOW 20 MIN: CPT | Mod: 25

## 2024-12-30 PROCEDURE — 11043 DBRDMT MUSC&/FSCA 1ST 20/<: CPT

## 2024-12-30 PROCEDURE — 87077 CULTURE AEROBIC IDENTIFY: CPT | Mod: OUT,PORLAB | Performed by: STUDENT IN AN ORGANIZED HEALTH CARE EDUCATION/TRAINING PROGRAM

## 2025-01-08 ENCOUNTER — OFFICE VISIT (OUTPATIENT)
Dept: WOUND CARE | Facility: CLINIC | Age: 60
End: 2025-01-08
Payer: MEDICARE

## 2025-01-08 PROCEDURE — 11042 DBRDMT SUBQ TIS 1ST 20SQCM/<: CPT

## 2025-01-27 ENCOUNTER — APPOINTMENT (OUTPATIENT)
Dept: WOUND CARE | Facility: CLINIC | Age: 60
End: 2025-01-27
Payer: MEDICARE

## 2025-01-29 NOTE — NURSING NOTE
POST-OPERATIVE INSTRUCTIONS FOR   CATARACT SURGERY    Keep your eye shield covering your eye at all times; only remove it to use your postop eye drops as scheduled 2 more times today and once tomorrow morning before your postop appointment at [address].  Place a tiny amount of the ointment in the corner of your eye at bedtime and it will melt and spread in the eye.  Always replace the shield over the eye immediately after placing medication.    Never touch or rub the operative eye.  The cataract wounds are sealed without any stitches, and if you push on the eye you can open up the wounds and allow germs to enter the eye.    You will need a  to take you home from surgery.  You should have a  available on the day after surgery in case you don't feel comfortable driving yourself to our office.    You may return to all regular activity. Avoid swimming, rigorous or high-impact exercise, eye makeup, and hot tubs for one week.    Call you doctor for problems of:  A. Increased pain (if greater than that experienced on the day of surgery).  B. Abnormal discharge from the eye.  C. Change in vision (worse than the vision the day after surgery).  D. Increased redness (worse than the day after surgery).    The vision in your operated eye may be poor until it heals completely. This may take up to six to eight weeks. Use caution on stairways or when walking. Do not drive until you are comfortable doing so.     A scratchy feeling in the corner of the eye or a foreign body sensation is not uncommon for the first week or two. You may use Tylenol for eye discomfort.         Patient up sitting at side of bed. Patient stated that earlier in the evening while he was on his phone he saw a ghost, a woman in a dress standing in his room but he could see through her. Team paged to make aware.

## 2025-02-17 ENCOUNTER — OFFICE VISIT (OUTPATIENT)
Dept: WOUND CARE | Facility: CLINIC | Age: 60
End: 2025-02-17
Payer: MEDICARE

## 2025-02-17 ENCOUNTER — HOSPITAL ENCOUNTER (OUTPATIENT)
Dept: RADIOLOGY | Facility: CLINIC | Age: 60
Discharge: HOME | End: 2025-02-17
Payer: MEDICARE

## 2025-02-17 DIAGNOSIS — L97.512 NON-PRESSURE CHRONIC ULCER OF OTHER PART OF RIGHT FOOT WITH FAT LAYER EXPOSED: ICD-10-CM

## 2025-02-17 DIAGNOSIS — E11.621 TYPE 2 DIABETES MELLITUS WITH FOOT ULCER (CODE): ICD-10-CM

## 2025-02-17 PROCEDURE — 11043 DBRDMT MUSC&/FSCA 1ST 20/<: CPT

## 2025-02-17 PROCEDURE — 99213 OFFICE O/P EST LOW 20 MIN: CPT | Mod: 25

## 2025-02-17 PROCEDURE — 73630 X-RAY EXAM OF FOOT: CPT | Mod: RIGHT SIDE | Performed by: RADIOLOGY

## 2025-02-17 PROCEDURE — 73630 X-RAY EXAM OF FOOT: CPT | Mod: RT

## 2025-02-24 ENCOUNTER — APPOINTMENT (OUTPATIENT)
Dept: WOUND CARE | Facility: CLINIC | Age: 60
End: 2025-02-24
Payer: MEDICARE

## 2025-03-03 ENCOUNTER — OFFICE VISIT (OUTPATIENT)
Dept: WOUND CARE | Facility: CLINIC | Age: 60
End: 2025-03-03
Payer: MEDICARE

## 2025-03-03 PROCEDURE — 11042 DBRDMT SUBQ TIS 1ST 20SQCM/<: CPT

## 2025-03-10 ENCOUNTER — APPOINTMENT (OUTPATIENT)
Dept: WOUND CARE | Facility: CLINIC | Age: 60
End: 2025-03-10
Payer: MEDICARE

## 2025-03-18 ENCOUNTER — APPOINTMENT (OUTPATIENT)
Dept: RADIOLOGY | Facility: HOSPITAL | Age: 60
End: 2025-03-18
Payer: MEDICARE

## 2025-03-24 ENCOUNTER — OFFICE VISIT (OUTPATIENT)
Dept: WOUND CARE | Facility: CLINIC | Age: 60
End: 2025-03-24
Payer: MEDICARE

## 2025-03-24 PROCEDURE — 11043 DBRDMT MUSC&/FSCA 1ST 20/<: CPT

## 2025-04-02 ENCOUNTER — HOSPITAL ENCOUNTER (OUTPATIENT)
Dept: RADIOLOGY | Facility: HOSPITAL | Age: 60
Discharge: HOME | End: 2025-04-02
Payer: MEDICARE

## 2025-04-02 DIAGNOSIS — E11.621 TYPE 2 DIABETES MELLITUS WITH FOOT ULCER (CODE): ICD-10-CM

## 2025-04-02 PROCEDURE — 2550000001 HC RX 255 CONTRASTS: Performed by: STUDENT IN AN ORGANIZED HEALTH CARE EDUCATION/TRAINING PROGRAM

## 2025-04-02 PROCEDURE — 73720 MRI LWR EXTREMITY W/O&W/DYE: CPT | Mod: RIGHT SIDE | Performed by: STUDENT IN AN ORGANIZED HEALTH CARE EDUCATION/TRAINING PROGRAM

## 2025-04-02 PROCEDURE — 73720 MRI LWR EXTREMITY W/O&W/DYE: CPT | Mod: RT

## 2025-04-02 PROCEDURE — A9575 INJ GADOTERATE MEGLUMI 0.1ML: HCPCS | Performed by: STUDENT IN AN ORGANIZED HEALTH CARE EDUCATION/TRAINING PROGRAM

## 2025-04-02 RX ORDER — GADOTERATE MEGLUMINE 376.9 MG/ML
0.2 INJECTION INTRAVENOUS
Status: COMPLETED | OUTPATIENT
Start: 2025-04-02 | End: 2025-04-02

## 2025-04-02 RX ADMIN — GADOTERATE MEGLUMINE 16.5 ML: 376.9 INJECTION INTRAVENOUS at 07:28

## 2025-04-14 ENCOUNTER — OFFICE VISIT (OUTPATIENT)
Dept: INFECTIOUS DISEASES | Facility: HOSPITAL | Age: 60
End: 2025-04-14
Payer: MEDICARE

## 2025-04-14 ENCOUNTER — APPOINTMENT (OUTPATIENT)
Dept: WOUND CARE | Facility: CLINIC | Age: 60
End: 2025-04-14
Payer: MEDICARE

## 2025-04-14 VITALS
OXYGEN SATURATION: 94 % | RESPIRATION RATE: 22 BRPM | HEART RATE: 96 BPM | SYSTOLIC BLOOD PRESSURE: 137 MMHG | DIASTOLIC BLOOD PRESSURE: 79 MMHG

## 2025-04-14 DIAGNOSIS — L03.115 CELLULITIS OF RIGHT FOOT: Primary | ICD-10-CM

## 2025-04-14 RX ORDER — SULFAMETHOXAZOLE AND TRIMETHOPRIM 800; 160 MG/1; MG/1
1 TABLET ORAL DAILY
Qty: 7 TABLET | Refills: 0 | Status: SHIPPED | OUTPATIENT
Start: 2025-04-14 | End: 2025-04-21

## 2025-04-14 RX ORDER — CEPHALEXIN 500 MG/1
1000 CAPSULE ORAL EVERY 8 HOURS
Qty: 42 CAPSULE | Refills: 0 | Status: SHIPPED | OUTPATIENT
Start: 2025-04-14 | End: 2025-04-21

## 2025-04-14 ASSESSMENT — ENCOUNTER SYMPTOMS
OCCASIONAL FEELINGS OF UNSTEADINESS: 1
LOSS OF SENSATION IN FEET: 1
DEPRESSION: 0

## 2025-04-14 ASSESSMENT — PATIENT HEALTH QUESTIONNAIRE - PHQ9
SUM OF ALL RESPONSES TO PHQ9 QUESTIONS 1 AND 2: 0
2. FEELING DOWN, DEPRESSED OR HOPELESS: NOT AT ALL
1. LITTLE INTEREST OR PLEASURE IN DOING THINGS: NOT AT ALL

## 2025-04-14 ASSESSMENT — COLUMBIA-SUICIDE SEVERITY RATING SCALE - C-SSRS
1. IN THE PAST MONTH, HAVE YOU WISHED YOU WERE DEAD OR WISHED YOU COULD GO TO SLEEP AND NOT WAKE UP?: NO
2. HAVE YOU ACTUALLY HAD ANY THOUGHTS OF KILLING YOURSELF?: NO

## 2025-04-14 NOTE — LETTER
04/14/25    Radha Greer MD  [address] [place] [id number]      Dear Dr. Radha Greer MD,    I am writing to confirm that your patient, Kael Zepeda, received care in my office on 04/14/25. I have enclosed a summary of the care provided to Kael for your reference.    Please contact me with any questions you may have regarding the visit.    Sincerely,         Chance Fong MD  [address] [facility] [address]  [facility] [address] [phone]    CC: No Recipients

## 2025-04-14 NOTE — PROGRESS NOTES
Infectious Diseases Clinic Follow Up Note:        Follow Up Reason: R foot ulcer      Assessment/Plan:    #R foot cellulitis with chronic ulcer  #R foot tenosynovitis, OM, septic arthritis ?  SSTI seen. MRI foot shows findings concerning for tenosynovitis, OM, septic arthritis. Unclear if over read. Clinically doesn't look like that. Previosuly grown MRSA. Will start keflex and bactrim. Discussed with the patient that it will be in his best interest to go to the ER but patient refused. He is seeing Dr Raya on Thursday. Explained that it can get worse and might have a significant effect on his health and foot but patient refused.     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 male with PMHx of RA, fibromyalgia, chronic low back pain, HTN, CAD s/p CABG, COPD, neuropathy presented to the clinic for eval of the L foot wound. Patient states that he had wound on both the feet but the right one has completely healed. The left wound was a lot more deep and it healed to a certain extent but since past 4 months, it is not healing anymore. Due to chronic wound, MRI was done which showed  segmental tenosynovitis of the flexor compartment tendons of 2nd and 3rd digits at the level of 2nd and 3rd metatarsophalangeal joints, indings concerning for septic arthritis of the 2nd digit metatarsophalangeal joint with osteomyelitis of the 2nd metatarsal head and patient came to the ID clinic. Reports significant warmth, erythema, pain but denies any significant discharge      Medications  Home  "medications:  (Not in a hospital admission)        Review of Systems     Constitutional:  Denies appetite change. Rpeorts fatigue  HENT:  Denies ear discharge, sore throat    Eyes:  Denies photophobia, eye drainage  Respiratory:  Denies cough, chest tightness, SOB  Cardiovascular:  Denies chest pain, palpitations  Gastrointestinal:  Denies abdominal pain, diarrhea, nausea, vomiting.   Genitourinary:  Denies dysuria, flank pain, frequency  Musculoskeletal:  R foot pain  Skin:  R foot ulcer  Neurological:  Denies light-headedness    Objective  Range of Vitals (last 24 hours)  [unfilled]  Daily Weight  04/02/25 : 83.5 kg (184 lb)    There is no height or weight on file to calculate BMI.     Physical Exam  /79 (BP Location: Right arm, Patient Position: Sitting, BP Cuff Size: Adult)   Pulse 96   Resp 22   SpO2 94%   @TMAX(24)@    General: alert, oriented, NAD  Lungs: bilaterally clear to auscultation, no wheezing  Abdomen: soft, non tender, non distended, BS+  Neuro: AAO x 3, PERRL, CN grossly intact  Extremities: no edema, no cyanosis  Skin: significant erythema and warmth around the skin with plantar ulcer. The wound overall appears healthy                   Relevant Results    Labs              CrCl cannot be calculated (Patient's most recent lab result is older than the maximum 7 days allowed.).  C-Reactive Protein   Date Value Ref Range Status   10/09/2024 13.42 (H) <1.00 mg/dL Final   10/12/2023 5.86 (H) <1.00 mg/dL Final     CRP   Date Value Ref Range Status   06/05/2023 3.29 (A) mg/dL Final     Comment:     REF VALUE  < 1.00       Sedimentation Rate   Date Value Ref Range Status   10/09/2024 51 (H) 0 - 20 mm/h Final   03/12/2024 71 (H) 0 - 20 mm/h Final   06/05/2023 47 (H) 0 - 20 mm/h Final     No results found for: \"HIV1X2\", \"HIVCONF\", \"NSTMWJ3WQ\"  No results found for: \"HEPCABINIT\", \"HEPCAB\", \"HCVPCRQUANT\"    Microbiology  No results found for the last 14 days.      Imaging  MR foot right w and wo IV " contrast    Result Date: 4/2/2025  1. Partial amputation of great toe to the level of 1st metatarsal head. Soft tissue ulceration in the amputation bed with findings suggestive of cellulitis and osteomyelitis of the 1st metatarsal stump measuring up to 2.5 cm in length as described above. 2. Findings concerning for septic arthritis of the 2nd digit metatarsophalangeal joint with osteomyelitis of the 2nd metatarsal head, especially given its close proximity to the site of ulceration. 3. Suggestion of segmental tenosynovitis of the flexor compartment tendons of 2nd and 3rd digits at the level of 2nd and 3rd metatarsophalangeal joints. 4. Susceptibility artifact along the lateral aspect of midfoot at the level of 4th and 5th metatarsal bases. This could be related to metallic clip from prior surgery as noted on prior radiograph. However a retained foreign body can be considered in the differential in appropriate clinical setting. Recommend clinical and surgical correlation.   MACRO: Critical Finding:  See findings. Notification was initiated on 4/2/2025 at 12:35 pm by Dr. Sharonda Tubbs.  (**-OCF-**)   Signed by: Sharonda Tubbs 4/2/2025 12:35 PM Dictation workstation:   [id number]

## 2025-04-17 ENCOUNTER — OFFICE VISIT (OUTPATIENT)
Dept: WOUND CARE | Facility: CLINIC | Age: 60
End: 2025-04-17
Payer: MEDICARE

## 2025-04-17 DIAGNOSIS — L97.513 ULCER OF TOE OF RIGHT FOOT, WITH NECROSIS OF MUSCLE (MULTI): Primary | ICD-10-CM

## 2025-04-17 PROCEDURE — 11043 DBRDMT MUSC&/FSCA 1ST 20/<: CPT

## 2025-04-20 LAB
BACTERIA SPEC AEROBE CULT: ABNORMAL
BACTERIA SPEC ANAEROBE CULT: ABNORMAL

## 2025-04-23 LAB
BACTERIA SPEC AEROBE CULT: ABNORMAL
BACTERIA SPEC ANAEROBE CULT: ABNORMAL

## 2025-04-24 ENCOUNTER — OFFICE VISIT (OUTPATIENT)
Dept: WOUND CARE | Facility: CLINIC | Age: 60
End: 2025-04-24
Payer: MEDICARE

## 2025-04-24 PROCEDURE — 99213 OFFICE O/P EST LOW 20 MIN: CPT

## 2025-04-29 ENCOUNTER — HOSPITAL ENCOUNTER (INPATIENT)
Facility: HOSPITAL | Age: 60
End: 2025-04-29
Attending: EMERGENCY MEDICINE | Admitting: INTERNAL MEDICINE
Payer: MEDICARE

## 2025-04-29 ENCOUNTER — APPOINTMENT (OUTPATIENT)
Dept: RADIOLOGY | Facility: HOSPITAL | Age: 60
DRG: 617 | End: 2025-04-29
Payer: MEDICARE

## 2025-04-29 DIAGNOSIS — L97.514 ULCER OF TOE OF RIGHT FOOT, WITH NECROSIS OF BONE (MULTI): ICD-10-CM

## 2025-04-29 DIAGNOSIS — M86.9 OSTEOMYELITIS OF RIGHT FOOT, UNSPECIFIED TYPE (MULTI): Primary | ICD-10-CM

## 2025-04-29 LAB
ALBUMIN SERPL BCP-MCNC: 3.3 G/DL (ref 3.4–5)
ALP SERPL-CCNC: 181 U/L (ref 33–136)
ALT SERPL W P-5'-P-CCNC: 13 U/L (ref 10–52)
ANION GAP SERPL CALC-SCNC: 18 MMOL/L (ref 10–20)
AST SERPL W P-5'-P-CCNC: 21 U/L (ref 9–39)
BASOPHILS # BLD AUTO: 0.03 X10*3/UL (ref 0–0.1)
BASOPHILS NFR BLD AUTO: 0.6 %
BILIRUB SERPL-MCNC: 0.3 MG/DL (ref 0–1.2)
BUN SERPL-MCNC: 15 MG/DL (ref 6–23)
CALCIUM SERPL-MCNC: 8.7 MG/DL (ref 8.6–10.3)
CHLORIDE SERPL-SCNC: 98 MMOL/L (ref 98–107)
CO2 SERPL-SCNC: 25 MMOL/L (ref 21–32)
CREAT SERPL-MCNC: 0.81 MG/DL (ref 0.5–1.3)
CRP SERPL-MCNC: 7.25 MG/DL
EGFRCR SERPLBLD CKD-EPI 2021: >90 ML/MIN/1.73M*2
EOSINOPHIL # BLD AUTO: 0.11 X10*3/UL (ref 0–0.7)
EOSINOPHIL NFR BLD AUTO: 2.2 %
ERYTHROCYTE [DISTWIDTH] IN BLOOD BY AUTOMATED COUNT: 17.4 % (ref 11.5–14.5)
ERYTHROCYTE [SEDIMENTATION RATE] IN BLOOD BY WESTERGREN METHOD: 120 MM/H (ref 0–20)
GLUCOSE BLD MANUAL STRIP-MCNC: 176 MG/DL (ref 74–99)
GLUCOSE BLD MANUAL STRIP-MCNC: 238 MG/DL (ref 74–99)
GLUCOSE BLD MANUAL STRIP-MCNC: 78 MG/DL (ref 74–99)
GLUCOSE SERPL-MCNC: 164 MG/DL (ref 74–99)
HCT VFR BLD AUTO: 30.8 % (ref 41–52)
HGB BLD-MCNC: 9.4 G/DL (ref 13.5–17.5)
IMM GRANULOCYTES # BLD AUTO: 0.1 X10*3/UL (ref 0–0.7)
IMM GRANULOCYTES NFR BLD AUTO: 2 % (ref 0–0.9)
LACTATE SERPL-SCNC: 1.5 MMOL/L (ref 0.4–2)
LYMPHOCYTES # BLD AUTO: 1.09 X10*3/UL (ref 1.2–4.8)
LYMPHOCYTES NFR BLD AUTO: 21.4 %
MCH RBC QN AUTO: 21 PG (ref 26–34)
MCHC RBC AUTO-ENTMCNC: 30.5 G/DL (ref 32–36)
MCV RBC AUTO: 69 FL (ref 80–100)
MONOCYTES # BLD AUTO: 0.57 X10*3/UL (ref 0.1–1)
MONOCYTES NFR BLD AUTO: 11.2 %
NEUTROPHILS # BLD AUTO: 3.19 X10*3/UL (ref 1.2–7.7)
NEUTROPHILS NFR BLD AUTO: 62.6 %
NRBC BLD-RTO: 0 /100 WBCS (ref 0–0)
PLATELET # BLD AUTO: 474 X10*3/UL (ref 150–450)
POTASSIUM SERPL-SCNC: 4.6 MMOL/L (ref 3.5–5.3)
PROT SERPL-MCNC: 7.1 G/DL (ref 6.4–8.2)
RBC # BLD AUTO: 4.47 X10*6/UL (ref 4.5–5.9)
SODIUM SERPL-SCNC: 136 MMOL/L (ref 136–145)
WBC # BLD AUTO: 5.1 X10*3/UL (ref 4.4–11.3)

## 2025-04-29 PROCEDURE — 99223 1ST HOSP IP/OBS HIGH 75: CPT | Performed by: NURSE PRACTITIONER

## 2025-04-29 PROCEDURE — 85025 COMPLETE CBC W/AUTO DIFF WBC: CPT | Performed by: NURSE PRACTITIONER

## 2025-04-29 PROCEDURE — 2500000004 HC RX 250 GENERAL PHARMACY W/ HCPCS (ALT 636 FOR OP/ED): Mod: JZ | Performed by: PHARMACIST

## 2025-04-29 PROCEDURE — 73630 X-RAY EXAM OF FOOT: CPT | Mod: RIGHT SIDE | Performed by: RADIOLOGY

## 2025-04-29 PROCEDURE — 36415 COLL VENOUS BLD VENIPUNCTURE: CPT | Performed by: NURSE PRACTITIONER

## 2025-04-29 PROCEDURE — 2500000005 HC RX 250 GENERAL PHARMACY W/O HCPCS: Performed by: NURSE PRACTITIONER

## 2025-04-29 PROCEDURE — 80053 COMPREHEN METABOLIC PANEL: CPT | Performed by: NURSE PRACTITIONER

## 2025-04-29 PROCEDURE — 2500000004 HC RX 250 GENERAL PHARMACY W/ HCPCS (ALT 636 FOR OP/ED): Performed by: NURSE PRACTITIONER

## 2025-04-29 PROCEDURE — 83605 ASSAY OF LACTIC ACID: CPT | Performed by: NURSE PRACTITIONER

## 2025-04-29 PROCEDURE — 99285 EMERGENCY DEPT VISIT HI MDM: CPT | Performed by: EMERGENCY MEDICINE

## 2025-04-29 PROCEDURE — 96376 TX/PRO/DX INJ SAME DRUG ADON: CPT

## 2025-04-29 PROCEDURE — 96365 THER/PROPH/DIAG IV INF INIT: CPT | Mod: 59

## 2025-04-29 PROCEDURE — 86140 C-REACTIVE PROTEIN: CPT | Performed by: NURSE PRACTITIONER

## 2025-04-29 PROCEDURE — 96372 THER/PROPH/DIAG INJ SC/IM: CPT | Performed by: NURSE PRACTITIONER

## 2025-04-29 PROCEDURE — 73630 X-RAY EXAM OF FOOT: CPT | Mod: RT

## 2025-04-29 PROCEDURE — 2500000001 HC RX 250 WO HCPCS SELF ADMINISTERED DRUGS (ALT 637 FOR MEDICARE OP): Performed by: NURSE PRACTITIONER

## 2025-04-29 PROCEDURE — 87040 BLOOD CULTURE FOR BACTERIA: CPT | Mod: PORLAB | Performed by: NURSE PRACTITIONER

## 2025-04-29 PROCEDURE — 82947 ASSAY GLUCOSE BLOOD QUANT: CPT

## 2025-04-29 PROCEDURE — 2500000002 HC RX 250 W HCPCS SELF ADMINISTERED DRUGS (ALT 637 FOR MEDICARE OP, ALT 636 FOR OP/ED): Performed by: NURSE PRACTITIONER

## 2025-04-29 PROCEDURE — 96366 THER/PROPH/DIAG IV INF ADDON: CPT

## 2025-04-29 PROCEDURE — S0109 METHADONE ORAL 5MG: HCPCS | Performed by: NURSE PRACTITIONER

## 2025-04-29 PROCEDURE — 85652 RBC SED RATE AUTOMATED: CPT | Performed by: NURSE PRACTITIONER

## 2025-04-29 PROCEDURE — 96368 THER/DIAG CONCURRENT INF: CPT

## 2025-04-29 PROCEDURE — 1200000002 HC GENERAL ROOM WITH TELEMETRY DAILY

## 2025-04-29 PROCEDURE — 94640 AIRWAY INHALATION TREATMENT: CPT | Mod: 59

## 2025-04-29 PROCEDURE — 2500000004 HC RX 250 GENERAL PHARMACY W/ HCPCS (ALT 636 FOR OP/ED): Mod: JZ | Performed by: NURSE PRACTITIONER

## 2025-04-29 RX ORDER — INSULIN GLARGINE 100 [IU]/ML
15 INJECTION, SOLUTION SUBCUTANEOUS EVERY 24 HOURS
Status: DISCONTINUED | OUTPATIENT
Start: 2025-04-29 | End: 2025-05-06 | Stop reason: HOSPADM

## 2025-04-29 RX ORDER — INSULIN LISPRO 100 [IU]/ML
0-10 INJECTION, SOLUTION INTRAVENOUS; SUBCUTANEOUS
Status: DISCONTINUED | OUTPATIENT
Start: 2025-04-29 | End: 2025-05-06 | Stop reason: HOSPADM

## 2025-04-29 RX ORDER — OXYCODONE AND ACETAMINOPHEN 5; 325 MG/1; MG/1
1 TABLET ORAL EVERY 6 HOURS PRN
Refills: 0 | Status: CANCELLED | OUTPATIENT
Start: 2025-04-29

## 2025-04-29 RX ORDER — METHADONE HYDROCHLORIDE 10 MG/1
40 TABLET ORAL ONCE
Refills: 0 | Status: COMPLETED | OUTPATIENT
Start: 2025-04-29 | End: 2025-04-29

## 2025-04-29 RX ORDER — ACETAMINOPHEN 325 MG/1
650 TABLET ORAL EVERY 4 HOURS PRN
Status: DISCONTINUED | OUTPATIENT
Start: 2025-04-29 | End: 2025-05-06 | Stop reason: HOSPADM

## 2025-04-29 RX ORDER — DULOXETINE 40 MG/1
40 CAPSULE, DELAYED RELEASE ORAL DAILY
Status: ON HOLD | COMMUNITY

## 2025-04-29 RX ORDER — METHADONE HYDROCHLORIDE 10 MG/ML
CONCENTRATE ORAL
Status: ON HOLD | COMMUNITY

## 2025-04-29 RX ORDER — ASPIRIN 81 MG/1
81 TABLET ORAL DAILY
Status: DISCONTINUED | OUTPATIENT
Start: 2025-04-29 | End: 2025-05-06 | Stop reason: HOSPADM

## 2025-04-29 RX ORDER — METHADONE HYDROCHLORIDE 10 MG/1
85 TABLET ORAL DAILY
Refills: 0 | Status: DISCONTINUED | OUTPATIENT
Start: 2025-04-29 | End: 2025-04-29

## 2025-04-29 RX ORDER — PREDNISONE 1 MG/1
1 TABLET ORAL DAILY
Status: ON HOLD | COMMUNITY
End: 2025-08-07 | Stop reason: ENTERED-IN-ERROR

## 2025-04-29 RX ORDER — PANTOPRAZOLE SODIUM 40 MG/1
40 TABLET, DELAYED RELEASE ORAL
Status: DISCONTINUED | OUTPATIENT
Start: 2025-04-30 | End: 2025-05-06 | Stop reason: HOSPADM

## 2025-04-29 RX ORDER — ACETAMINOPHEN 160 MG/5ML
650 SUSPENSION ORAL EVERY 4 HOURS PRN
Status: DISCONTINUED | OUTPATIENT
Start: 2025-04-29 | End: 2025-05-06 | Stop reason: HOSPADM

## 2025-04-29 RX ORDER — BISACODYL 10 MG/1
10 SUPPOSITORY RECTAL DAILY PRN
Status: DISCONTINUED | OUTPATIENT
Start: 2025-04-29 | End: 2025-05-06 | Stop reason: HOSPADM

## 2025-04-29 RX ORDER — FLUTICASONE FUROATE AND VILANTEROL 100; 25 UG/1; UG/1
1 POWDER RESPIRATORY (INHALATION)
Status: DISCONTINUED | OUTPATIENT
Start: 2025-04-29 | End: 2025-05-06 | Stop reason: HOSPADM

## 2025-04-29 RX ORDER — METOPROLOL SUCCINATE 25 MG/1
25 TABLET, EXTENDED RELEASE ORAL DAILY
Status: DISCONTINUED | OUTPATIENT
Start: 2025-04-29 | End: 2025-05-06 | Stop reason: HOSPADM

## 2025-04-29 RX ORDER — ATORVASTATIN CALCIUM 40 MG/1
40 TABLET, FILM COATED ORAL NIGHTLY
Status: DISCONTINUED | OUTPATIENT
Start: 2025-04-29 | End: 2025-05-06 | Stop reason: HOSPADM

## 2025-04-29 RX ORDER — GUAIFENESIN 600 MG/1
600 TABLET, EXTENDED RELEASE ORAL EVERY 12 HOURS PRN
Status: DISCONTINUED | OUTPATIENT
Start: 2025-04-29 | End: 2025-05-06 | Stop reason: HOSPADM

## 2025-04-29 RX ORDER — METHADONE HYDROCHLORIDE 10 MG/1
80 TABLET ORAL NIGHTLY
Refills: 0 | Status: DISCONTINUED | OUTPATIENT
Start: 2025-04-29 | End: 2025-04-29

## 2025-04-29 RX ORDER — VANCOMYCIN HYDROCHLORIDE 1 G/20ML
INJECTION, POWDER, LYOPHILIZED, FOR SOLUTION INTRAVENOUS DAILY PRN
Status: DISCONTINUED | OUTPATIENT
Start: 2025-04-29 | End: 2025-05-06 | Stop reason: HOSPADM

## 2025-04-29 RX ORDER — APREMILAST 10-20-30MG
1 KIT ORAL 2 TIMES DAILY
Status: ON HOLD | COMMUNITY

## 2025-04-29 RX ORDER — DEXTROSE 50 % IN WATER (D50W) INTRAVENOUS SYRINGE
12.5
Status: DISCONTINUED | OUTPATIENT
Start: 2025-04-29 | End: 2025-05-06 | Stop reason: HOSPADM

## 2025-04-29 RX ORDER — ONDANSETRON HYDROCHLORIDE 2 MG/ML
4 INJECTION, SOLUTION INTRAVENOUS EVERY 8 HOURS PRN
Status: DISCONTINUED | OUTPATIENT
Start: 2025-04-29 | End: 2025-05-06 | Stop reason: HOSPADM

## 2025-04-29 RX ORDER — VANCOMYCIN HYDROCHLORIDE 1.25 G/250ML
1250 INJECTION, SOLUTION INTRAVITREAL EVERY 12 HOURS
Status: DISCONTINUED | OUTPATIENT
Start: 2025-04-29 | End: 2025-04-30

## 2025-04-29 RX ORDER — DEXTROSE 50 % IN WATER (D50W) INTRAVENOUS SYRINGE
25
Status: DISCONTINUED | OUTPATIENT
Start: 2025-04-29 | End: 2025-05-06 | Stop reason: HOSPADM

## 2025-04-29 RX ORDER — CELECOXIB 200 MG/1
200 CAPSULE ORAL 2 TIMES DAILY
COMMUNITY
End: 2025-05-06 | Stop reason: HOSPADM

## 2025-04-29 RX ORDER — POLYETHYLENE GLYCOL 3350 17 G/17G
17 POWDER, FOR SOLUTION ORAL DAILY
Status: DISCONTINUED | OUTPATIENT
Start: 2025-04-29 | End: 2025-05-06 | Stop reason: HOSPADM

## 2025-04-29 RX ORDER — PANTOPRAZOLE SODIUM 40 MG/10ML
40 INJECTION, POWDER, LYOPHILIZED, FOR SOLUTION INTRAVENOUS
Status: DISCONTINUED | OUTPATIENT
Start: 2025-04-30 | End: 2025-05-06 | Stop reason: HOSPADM

## 2025-04-29 RX ORDER — ACETAMINOPHEN 650 MG/1
650 SUPPOSITORY RECTAL EVERY 4 HOURS PRN
Status: DISCONTINUED | OUTPATIENT
Start: 2025-04-29 | End: 2025-05-06 | Stop reason: HOSPADM

## 2025-04-29 RX ORDER — ONDANSETRON 4 MG/1
4 TABLET, ORALLY DISINTEGRATING ORAL EVERY 8 HOURS PRN
Status: DISCONTINUED | OUTPATIENT
Start: 2025-04-29 | End: 2025-05-06 | Stop reason: HOSPADM

## 2025-04-29 RX ORDER — ENOXAPARIN SODIUM 100 MG/ML
40 INJECTION SUBCUTANEOUS EVERY 24 HOURS
Status: DISCONTINUED | OUTPATIENT
Start: 2025-04-29 | End: 2025-05-06 | Stop reason: HOSPADM

## 2025-04-29 RX ORDER — METHADONE HYDROCHLORIDE 10 MG/1
40 TABLET ORAL ONCE
Refills: 0 | Status: DISCONTINUED | OUTPATIENT
Start: 2025-04-29 | End: 2025-04-29

## 2025-04-29 RX ORDER — LEVOTHYROXINE SODIUM 50 UG/1
50 TABLET ORAL DAILY
Status: DISCONTINUED | OUTPATIENT
Start: 2025-04-29 | End: 2025-05-06 | Stop reason: HOSPADM

## 2025-04-29 RX ORDER — BISACODYL 5 MG
10 TABLET, DELAYED RELEASE (ENTERIC COATED) ORAL DAILY PRN
Status: DISCONTINUED | OUTPATIENT
Start: 2025-04-29 | End: 2025-05-06 | Stop reason: HOSPADM

## 2025-04-29 RX ORDER — LANOLIN ALCOHOL/MO/W.PET/CERES
100 CREAM (GRAM) TOPICAL DAILY
Status: DISCONTINUED | OUTPATIENT
Start: 2025-04-29 | End: 2025-05-06 | Stop reason: HOSPADM

## 2025-04-29 RX ORDER — TALC
3 POWDER (GRAM) TOPICAL NIGHTLY PRN
Status: DISCONTINUED | OUTPATIENT
Start: 2025-04-29 | End: 2025-05-06 | Stop reason: HOSPADM

## 2025-04-29 RX ADMIN — ASPIRIN 81 MG: 81 TABLET, COATED ORAL at 12:14

## 2025-04-29 RX ADMIN — VANCOMYCIN HYDROCHLORIDE 2 G: 10 INJECTION, POWDER, LYOPHILIZED, FOR SOLUTION INTRAVENOUS at 10:56

## 2025-04-29 RX ADMIN — TIOTROPIUM BROMIDE INHALATION SPRAY 2 PUFF: 3.12 SPRAY, METERED RESPIRATORY (INHALATION) at 12:42

## 2025-04-29 RX ADMIN — THIAMINE HCL TAB 100 MG 100 MG: 100 TAB at 12:14

## 2025-04-29 RX ADMIN — INSULIN GLARGINE 15 UNITS: 100 INJECTION, SOLUTION SUBCUTANEOUS at 20:57

## 2025-04-29 RX ADMIN — PREGABALIN 200 MG: 75 CAPSULE ORAL at 12:14

## 2025-04-29 RX ADMIN — PIPERACILLIN SODIUM AND TAZOBACTAM SODIUM 4.5 G: 4; .5 INJECTION, SOLUTION INTRAVENOUS at 10:38

## 2025-04-29 RX ADMIN — METHADONE HYDROCHLORIDE 40 MG: 10 TABLET ORAL at 20:56

## 2025-04-29 RX ADMIN — VANCOMYCIN HYDROCHLORIDE 1250 MG: 1.25 INJECTION, SOLUTION INTRAVITREAL at 23:07

## 2025-04-29 RX ADMIN — PREGABALIN 200 MG: 75 CAPSULE ORAL at 20:57

## 2025-04-29 RX ADMIN — PREGABALIN 200 MG: 75 CAPSULE ORAL at 15:28

## 2025-04-29 RX ADMIN — PIPERACILLIN SODIUM AND TAZOBACTAM SODIUM 3.38 G: 3; .375 INJECTION, SOLUTION INTRAVENOUS at 20:58

## 2025-04-29 RX ADMIN — INSULIN LISPRO 2 UNITS: 100 INJECTION, SOLUTION INTRAVENOUS; SUBCUTANEOUS at 12:44

## 2025-04-29 RX ADMIN — FLUTICASONE FUROATE AND VILANTEROL TRIFENATATE 1 PUFF: 100; 25 POWDER RESPIRATORY (INHALATION) at 12:42

## 2025-04-29 RX ADMIN — PIPERACILLIN SODIUM AND TAZOBACTAM SODIUM 3.38 G: 3; .375 INJECTION, SOLUTION INTRAVENOUS at 15:40

## 2025-04-29 RX ADMIN — SODIUM CHLORIDE 1000 ML: 0.9 INJECTION, SOLUTION INTRAVENOUS at 10:38

## 2025-04-29 RX ADMIN — ENOXAPARIN SODIUM 40 MG: 40 INJECTION SUBCUTANEOUS at 12:14

## 2025-04-29 RX ADMIN — METOPROLOL SUCCINATE 25 MG: 25 TABLET, EXTENDED RELEASE ORAL at 12:14

## 2025-04-29 RX ADMIN — ATORVASTATIN CALCIUM 40 MG: 40 TABLET, FILM COATED ORAL at 20:58

## 2025-04-29 SDOH — SOCIAL STABILITY: SOCIAL INSECURITY: WITHIN THE LAST YEAR, HAVE YOU BEEN HUMILIATED OR EMOTIONALLY ABUSED IN OTHER WAYS BY YOUR PARTNER OR EX-PARTNER?: NO

## 2025-04-29 SDOH — SOCIAL STABILITY: SOCIAL INSECURITY: WITHIN THE LAST YEAR, HAVE YOU BEEN AFRAID OF YOUR PARTNER OR EX-PARTNER?: NO

## 2025-04-29 SDOH — ECONOMIC STABILITY: FOOD INSECURITY: WITHIN THE PAST 12 MONTHS, YOU WORRIED THAT YOUR FOOD WOULD RUN OUT BEFORE YOU GOT THE MONEY TO BUY MORE.: NEVER TRUE

## 2025-04-29 SDOH — ECONOMIC STABILITY: INCOME INSECURITY: IN THE PAST 12 MONTHS HAS THE ELECTRIC, GAS, OIL, OR WATER COMPANY THREATENED TO SHUT OFF SERVICES IN YOUR HOME?: NO

## 2025-04-29 SDOH — SOCIAL STABILITY: SOCIAL INSECURITY: WERE YOU ABLE TO COMPLETE ALL THE BEHAVIORAL HEALTH SCREENINGS?: YES

## 2025-04-29 SDOH — ECONOMIC STABILITY: FOOD INSECURITY: WITHIN THE PAST 12 MONTHS, THE FOOD YOU BOUGHT JUST DIDN'T LAST AND YOU DIDN'T HAVE MONEY TO GET MORE.: NEVER TRUE

## 2025-04-29 SDOH — SOCIAL STABILITY: SOCIAL INSECURITY: ABUSE: ADULT

## 2025-04-29 SDOH — SOCIAL STABILITY: SOCIAL INSECURITY: HAVE YOU HAD THOUGHTS OF HARMING ANYONE ELSE?: NO

## 2025-04-29 ASSESSMENT — PAIN DESCRIPTION - DESCRIPTORS: DESCRIPTORS: ACHING

## 2025-04-29 ASSESSMENT — ACTIVITIES OF DAILY LIVING (ADL)
HEARING - LEFT EAR: FUNCTIONAL
TOILETING: INDEPENDENT
GROOMING: INDEPENDENT
JUDGMENT_ADEQUATE_SAFELY_COMPLETE_DAILY_ACTIVITIES: YES
HEARING - RIGHT EAR: FUNCTIONAL
BATHING: INDEPENDENT
WALKS IN HOME: INDEPENDENT
ADEQUATE_TO_COMPLETE_ADL: YES
FEEDING YOURSELF: INDEPENDENT
DRESSING YOURSELF: INDEPENDENT
LACK_OF_TRANSPORTATION: NO
PATIENT'S MEMORY ADEQUATE TO SAFELY COMPLETE DAILY ACTIVITIES?: YES

## 2025-04-29 ASSESSMENT — LIFESTYLE VARIABLES
AUDIT-C TOTAL SCORE: 0
EVER HAD A DRINK FIRST THING IN THE MORNING TO STEADY YOUR NERVES TO GET RID OF A HANGOVER: NO
HOW OFTEN DO YOU HAVE A DRINK CONTAINING ALCOHOL: NEVER
SKIP TO QUESTIONS 9-10: 1
EVER FELT BAD OR GUILTY ABOUT YOUR DRINKING: NO
HOW OFTEN DO YOU HAVE 6 OR MORE DRINKS ON ONE OCCASION: NEVER
HAVE YOU EVER FELT YOU SHOULD CUT DOWN ON YOUR DRINKING: NO
TOTAL SCORE: 0
HAVE PEOPLE ANNOYED YOU BY CRITICIZING YOUR DRINKING: NO
AUDIT-C TOTAL SCORE: 0
HOW MANY STANDARD DRINKS CONTAINING ALCOHOL DO YOU HAVE ON A TYPICAL DAY: PATIENT DOES NOT DRINK

## 2025-04-29 ASSESSMENT — PAIN - FUNCTIONAL ASSESSMENT
PAIN_FUNCTIONAL_ASSESSMENT: 0-10
PAIN_FUNCTIONAL_ASSESSMENT: 0-10

## 2025-04-29 ASSESSMENT — COGNITIVE AND FUNCTIONAL STATUS - GENERAL
MOBILITY SCORE: 24
PATIENT BASELINE BEDBOUND: NO
DAILY ACTIVITIY SCORE: 24

## 2025-04-29 ASSESSMENT — PAIN SCALES - GENERAL
PAINLEVEL_OUTOF10: 5 - MODERATE PAIN
PAINLEVEL_OUTOF10: 6

## 2025-04-29 ASSESSMENT — PAIN DESCRIPTION - ORIENTATION
ORIENTATION: RIGHT
ORIENTATION: RIGHT;LEFT

## 2025-04-29 ASSESSMENT — PAIN DESCRIPTION - PAIN TYPE: TYPE: ACUTE PAIN

## 2025-04-29 ASSESSMENT — PAIN DESCRIPTION - LOCATION
LOCATION: NECK
LOCATION: FOOT

## 2025-04-29 NOTE — PROGRESS NOTES
Vancomycin Dosing by Pharmacy- INITIAL    Kael Zepeda is a 59 y.o. year old male who Pharmacy has been consulted for vancomycin dosing for other Diabetic foot infection . Based on the patient's indication and renal status this patient will be dosed based on a goal AUC of 400-600.     Renal function is currently stable.    Visit Vitals  /67   Pulse 80   Temp 37 °C (98.6 °F)   Resp 10        Lab Results   Component Value Date    CREATININE 0.81 2025    CREATININE 0.66 10/10/2024    CREATININE 0.77 10/09/2024    CREATININE 1.02 2024        Patient weight is as follows:   Vitals:    25 1007   Weight: 81.6 kg (180 lb)       Cultures:  No results found for the encounter in last 14 days.        No intake/output data recorded.  I/O during current shift:  No intake/output data recorded.    Temp (24hrs), Av °C (98.6 °F), Min:37 °C (98.6 °F), Max:37 °C (98.6 °F)         Assessment/Plan     Patient has already been given a loading dose of 2000 mg.  Will initiate vancomycin maintenance,  1250 mg every 12 hours.    This dosing regimen is predicted by [employer] to result in the following pharmacokinetic parameters:  Regimen: 1250 mg IV every 12 hours.  Start time: 22:56 on 2025  Exposure target: AUC24 (range)400-600 mg/L.hr   EHD20-33: 540 mg/L.hr  AUC24,ss: 585 mg/L.hr  Probability of AUC24 > 400: 85 %  Ctrough,ss: 18.2 mg/L  Probability of Ctrough,ss > 20: 42 %      Follow-up level will be ordered on  at 0500 unless clinically indicated sooner.  Will continue to monitor renal function daily while on vancomycin and order serum creatinine at least every 48 hours if not already ordered.  Follow for continued vancomycin needs, clinical response, and signs/symptoms of toxicity.       Uday Rodriguez [facility]

## 2025-04-29 NOTE — H&P
[facility] - GENERAL MEDICINE HISTORY AND PHYSICAL    HISTORY OF PRESENT ILLNESS     History Obtained From (Primary Source): Patient  Collateral History (Secondary Sources): D/w ED    History Of Present Illness (HPI):  Kael Zepeda is a 59 y.o. male with PMHx s/f insulin-dependent diabetes, hepatitis C, fibromyalgia, coronary artery disease history CABG, COPD, peripheral neuropathy, chronic methadone therapy, hypothyroidism, rheumatoid arthritis chronic right foot wound presenting with worsening wound and pain.  Patient had been following with podiatry for over a year due to persistent wound in the right lower extremity.  Patient recently had an outpatient MRI showing findings concerning for tenosynovitis and osteomyelitis septic arthritis.  He was treated with Keflex and Bactrim by infectious disease who had also advised patient to come into the hospital prior cultures were abnormal for MRSA last year.  He did follow-up with Dr. Raya today who advised him to come to the emergency department for further evaluation and treatment.  Presently patient is denying any fevers or chills any nausea or vomiting his pain is tolerable no urinary symptoms no diarrhea.  Case was discussed with the ED provider patient will be admitted medical floor continue IV antibiotics obtain ID consult and podiatry consult.  Given complexity and longevity of the patient's wound anticipate length of stay will exceed 2 midnights for full evaluation and treatment.    ED Course:   Vitals on presentation: T 37 °C (98.6 °F)  HR 83  /66  RR 16  O2 96 %    Labs:   CBC with WBC 5.1, Hgb 9.4, Plts 474.   CMP with glucose 164, Na 136, K 4.6, BUN 15, sCr 0.81, alk phos 181, ALT 13, AST 21, bilirubin 0.3. Magnesium 2.17.   . Trop 135.   Lactate 1.5  EKG: None performed  Imaging - agree with radiology interpretation(s):   Interventions: IV vancomycin Zosyn    12-point ROS reviewed and found to be negative aside from  aforementioned positives in HPI and/or noted in dedicated ROS section below.     Decision made to admit the patient to the hospitalist service after evaluation of the patient, review of the above, and discussion with ED provider.     LABS AND IMAGING     I have personally reviewed the following labs from 04/29/25: CBC and CMP  I have personally reviewed any obtained EKGs on 04/29/25, with my interpretation as listed above in the ED summary course.   I have personally reviewed the patient's vitals on presentation to the ED and any/all changes through to time of admission (on 04/29/25).     ED Course (From ED Provider):  Diagnoses as of 04/29/25 1212   Osteomyelitis of right foot, unspecified type (Multi)     Relevant Results  Results for orders placed or performed during the hospital encounter of 04/29/25 (from the past 24 hours)   C-reactive protein   Result Value Ref Range    C-Reactive Protein 7.25 (H) <1.00 mg/dL   Sedimentation rate, automated   Result Value Ref Range    Sedimentation Rate 120 (H) 0 - 20 mm/h   CBC and Auto Differential   Result Value Ref Range    WBC 5.1 4.4 - 11.3 x10*3/uL    nRBC 0.0 0.0 - 0.0 /100 WBCs    RBC 4.47 (L) 4.50 - 5.90 x10*6/uL    Hemoglobin 9.4 (L) 13.5 - 17.5 g/dL    Hematocrit 30.8 (L) 41.0 - 52.0 %    MCV 69 (L) 80 - 100 fL    MCH 21.0 (L) 26.0 - 34.0 pg    MCHC 30.5 (L) 32.0 - 36.0 g/dL    RDW 17.4 (H) 11.5 - 14.5 %    Platelets 474 (H) 150 - 450 x10*3/uL    Neutrophils % 62.6 40.0 - 80.0 %    Immature Granulocytes %, Automated 2.0 (H) 0.0 - 0.9 %    Lymphocytes % 21.4 13.0 - 44.0 %    Monocytes % 11.2 2.0 - 10.0 %    Eosinophils % 2.2 0.0 - 6.0 %    Basophils % 0.6 0.0 - 2.0 %    Neutrophils Absolute 3.19 1.20 - 7.70 x10*3/uL    Immature Granulocytes Absolute, Automated 0.10 0.00 - 0.70 x10*3/uL    Lymphocytes Absolute 1.09 (L) 1.20 - 4.80 x10*3/uL    Monocytes Absolute 0.57 0.10 - 1.00 x10*3/uL    Eosinophils Absolute 0.11 0.00 - 0.70 x10*3/uL    Basophils Absolute 0.03  0.00 - 0.10 x10*3/uL   Comprehensive metabolic panel   Result Value Ref Range    Glucose 164 (H) 74 - 99 mg/dL    Sodium 136 136 - 145 mmol/L    Potassium 4.6 3.5 - 5.3 mmol/L    Chloride 98 98 - 107 mmol/L    Bicarbonate 25 21 - 32 mmol/L    Anion Gap 18 10 - 20 mmol/L    Urea Nitrogen 15 6 - 23 mg/dL    Creatinine 0.81 0.50 - 1.30 mg/dL    eGFR >90 >60 mL/min/1.73m*2    Calcium 8.7 8.6 - 10.3 mg/dL    Albumin 3.3 (L) 3.4 - 5.0 g/dL    Alkaline Phosphatase 181 (H) 33 - 136 U/L    Total Protein 7.1 6.4 - 8.2 g/dL    AST 21 9 - 39 U/L    Bilirubin, Total 0.3 0.0 - 1.2 mg/dL    ALT 13 10 - 52 U/L   Lactate   Result Value Ref Range    Lactate 1.5 0.4 - 2.0 mmol/L     *Note: Due to a large number of results and/or encounters for the requested time period, some results have not been displayed. A complete set of results can be found in Results Review.      Imaging  XR foot right 3+ views  Result Date: 4/29/2025  1. Findings suggesting osteomyelitis of the 1st metatarsal stump as well as of the 2nd metatarsal head and 2nd proximal phalanx base with septic arthritis of the 2nd MTP joint. Consider MRI for further evaluation if clinically warranted.   MACRO: None.   Signed by: Jennifer Macias 4/29/2025 11:23 AM Dictation workstation:   [id number]      Cardiology, Vascular, and Other Imaging  No other imaging results found for the past 2 days       PAST HISTORIES AND ALLERGIES     Past Medical History  He has a past medical history of Abnormal result of other cardiovascular function study (06/28/2018), Atherosclerotic heart disease of native coronary artery with unstable angina pectoris (06/28/2018), Fibromyalgia, Personal history of other diseases of the circulatory system, Personal history of other diseases of the musculoskeletal system and connective tissue, Personal history of other diseases of the musculoskeletal system and connective tissue, Personal history of other diseases of the musculoskeletal system and  connective tissue, Personal history of other endocrine, nutritional and metabolic disease, Personal history of other endocrine, nutritional and metabolic disease, and Type 2 diabetes mellitus without complications (02/25/2022).    Surgical History  He has a past surgical history that includes Septoplasty (06/28/2018); Back surgery (06/28/2018); and Cardiac catheterization (N/A, 7/1/2024).     Social History  He reports that he quit smoking about 9 months ago. His smoking use included cigarettes. He has never used smokeless tobacco. He reports that he does not currently use alcohol. He reports that he does not use drugs.    Family History  Family History[1]    Allergies  Patient has no known allergies.    MEDICATIONS     Scheduled Medications:  Scheduled Medications[2]  Continuous Medications:  Continuous Medications[3]  PRN Medications:  PRN Medications[4]     REVIEW OF SYSTEMS     Review of Systems    OBJECTIVE     Last Recorded Vitals  /67   Pulse 80   Temp 37 °C (98.6 °F)   Resp 10   Wt 81.6 kg (180 lb)   SpO2 97%      Physical Exam:  Vital signs and nursing notes reviewed.   Constitutional: Pleasant and cooperative. Laying in bed in no acute distress. Conversant.   Skin: Warm and dry; no obvious lesions, rashes, pallor, or jaundice.   Eyes: EOMI. Anicteric sclera.   ENT: Mucous membranes moist; no obvious injury or deformity appreciated.   Head and Neck: Normocephalic, atraumatic. ROM preserved. Trachea midline. No appreciable JVD.   Respiratory: Nonlabored on room air. Lungs clear to auscultation bilaterally without obvious adventitious sounds. Chest rise is equal.  Cardiovascular: RRR. No gross murmur, gallop, or rub. Extremities are warm and well-perfused with good capillary refill (< 3 seconds). No chest wall tenderness.   GI: Abdomen soft, nontender, nondistended. No obvious organomegaly appreciated. Bowel sounds are present.  : No CVA tenderness.   MSK: No gross abnormalities appreciated. No  limitations to AROM/PROM appreciated.   Extremities: No edema bilaterally there is open wound to the bottom of the right foot  Neuro: A&Ox3. CN 2-12 grossly intact. Able to respond to questions appropriately and clearly. No acute focal neurologic deficits appreciated.  Psych: Appropriate mood and behavior.    ASSESSMENT AND PLAN   Assessment/Plan     59 y.o. male with PMHx s/f insulin-dependent diabetes, hepatitis C, fibromyalgia, coronary artery disease history CABG, COPD, peripheral neuropathy, chronic methadone therapy, hypothyroidism, rheumatoid arthritis chronic right foot wound presenting with worsening wound and pain.     Diabetic foot wound right lower extremity  Prior MRI findings reportedly concerning for osteomyelitis tenosynovitis  History of prior MRSA infection in same wound  We will continue patient on vancomycin consult infectious disease and podiatry    History of chronic methadone use  We will restart patient's methadone once dosages verified    Peripheral diabetic neuropathy  Resume Lyrica    Insulin-dependent diabetes  Patient will be placed on sliding scale insulin at a reduced dose of his Lantus  Continue consistent carbohydrate diet    Coronary artery disease history CABG  Will review and reconcile patient's home medications continue his DAPT therapy    COPD  No exacerbation at this time  Reconcile his home medications           Gopi Aguiar, APRN-CNP    Dragon dictation software was used to dictate this note and thus there may be minor errors in translation/transcription including garbled speech or misspellings. Please contact for clarification if needed.       [1]   Family History  Problem Relation Name Age of Onset    Sleep apnea Father      Sleep apnea Sister     [2] aspirin, 81 mg, oral, Daily  atorvastatin, 40 mg, oral, Nightly  enoxaparin, 40 mg, subcutaneous, q24h  fluticasone furoate-vilanteroL, 1 puff, inhalation, Daily  insulin glargine, 15 Units, subcutaneous, q24h  insulin  lispro, 0-10 Units, subcutaneous, TID AC  levothyroxine, 50 mcg, oral, Daily  metoprolol succinate XL, 25 mg, oral, Daily  [START ON 4/30/2025] pantoprazole, 40 mg, oral, Daily before breakfast   Or  [START ON 4/30/2025] pantoprazole, 40 mg, intravenous, Daily before breakfast  piperacillin-tazobactam, 3.375 g, intravenous, q6h  polyethylene glycol, 17 g, oral, Daily  pregabalin, 200 mg, oral, TID  thiamine, 100 mg, oral, Daily  tiotropium, 2 puff, inhalation, Daily  vancomycin, 2 g, intravenous, Once    [3]    [4] PRN medications: acetaminophen **OR** acetaminophen **OR** acetaminophen, bisacodyl, bisacodyl, dextrose, dextrose, glucagon, glucagon, guaiFENesin, melatonin, ondansetron ODT **OR** ondansetron, vancomycin

## 2025-04-29 NOTE — PROGRESS NOTES
Physical Therapy                 Therapy Communication Note    Patient Name: Kael Zepeda  MRN: [id number]  Department: [facility] ED  Room: [place] [id number]  Today's Date: 4/29/2025     Discipline: Physical Therapy    PT Missed Visit: Yes     Missed Visit Reason: Missed Visit Reason: Patient placed on medical hold (Patient admitted with worsening foot wound, possible osteomeylitis, awaiting Podiatry consult. Will hold today, reattempt tomorrow if more appropriate/cleared by Podiatry/WB restrictions confirmed.)    Missed Time: Attempt    Comment:

## 2025-04-29 NOTE — ED PROVIDER NOTES
HPI   Chief Complaint   Patient presents with    Wound Check     (R) known diabetic foot wound for 1 year. Pt states pain increasing, and the wound is worsening.        59-year-old male with a history of diabetes, osteomyelitis, septic arthritis, hep C, fibromyalgia, hypertension, CAD, COPD, neuropathy presents to the emergency department today for right foot wound.  Patient had an MRI at the beginning of April which shows findings concerning for tenosynovitis, osteomyelitis and septic arthritis.  He was started on Keflex and Bactrim mid April by infectious disease they recommended he go to the ER however patient refused at that point.  He followed up again today with Dr. Raya whom states he needs to go to the emergency department immediately for further evaluation and treatment.  He denies any fever or chills.  No chest pain, shortness of breath, nausea or vomiting.                          Karen Coma Scale Score: 15                  Patient History   Medical History[1]  Surgical History[2]  Family History[3]  Social History[4]    Physical Exam   ED Triage Vitals [04/29/25 1008]   Temperature Heart Rate Respirations BP   37 °C (98.6 °F) 83 16 112/66      Pulse Ox Temp src Heart Rate Source Patient Position   96 % -- -- --      BP Location FiO2 (%)     -- --       Physical Exam  Vitals and nursing note reviewed.   Constitutional:       General: He is not in acute distress.     Appearance: Normal appearance. He is not toxic-appearing.   HENT:      Right Ear: Tympanic membrane normal.      Left Ear: Tympanic membrane normal.      Mouth/Throat:      Mouth: Mucous membranes are moist.      Pharynx: Oropharynx is clear.   Eyes:      Extraocular Movements: Extraocular movements intact.      Pupils: Pupils are equal, round, and reactive to light.   Cardiovascular:      Rate and Rhythm: Normal rate and regular rhythm.      Pulses: Normal pulses.      Heart sounds: Normal heart sounds.   Pulmonary:      Effort: Pulmonary  effort is normal.      Breath sounds: Normal breath sounds.   Abdominal:      General: Abdomen is flat. Bowel sounds are normal.      Palpations: Abdomen is soft.      Tenderness: There is no abdominal tenderness.   Musculoskeletal:         General: Normal range of motion.      Cervical back: Normal range of motion and neck supple.      Right lower leg: No edema.      Left lower leg: No edema.   Skin:     General: Skin is warm and dry.      Capillary Refill: Capillary refill takes less than 2 seconds.      Comments: Amputation of the first toe on the right foot with a large wound to the ventral aspect with obvious bone shown   Neurological:      General: No focal deficit present.      Mental Status: He is alert and oriented to person, place, and time.   Psychiatric:         Mood and Affect: Mood normal.         Behavior: Behavior normal.         Judgment: Judgment normal.         Labs Reviewed   C-REACTIVE PROTEIN - Abnormal       Result Value    C-Reactive Protein 7.25 (*)    SEDIMENTATION RATE, AUTOMATED - Abnormal    Sedimentation Rate 120 (*)    CBC WITH AUTO DIFFERENTIAL - Abnormal    WBC 5.1      nRBC 0.0      RBC 4.47 (*)     Hemoglobin 9.4 (*)     Hematocrit 30.8 (*)     MCV 69 (*)     MCH 21.0 (*)     MCHC 30.5 (*)     RDW 17.4 (*)     Platelets 474 (*)     Neutrophils % 62.6      Immature Granulocytes %, Automated 2.0 (*)     Lymphocytes % 21.4      Monocytes % 11.2      Eosinophils % 2.2      Basophils % 0.6      Neutrophils Absolute 3.19      Immature Granulocytes Absolute, Automated 0.10      Lymphocytes Absolute 1.09 (*)     Monocytes Absolute 0.57      Eosinophils Absolute 0.11      Basophils Absolute 0.03     COMPREHENSIVE METABOLIC PANEL - Abnormal    Glucose 164 (*)     Sodium 136      Potassium 4.6      Chloride 98      Bicarbonate 25      Anion Gap 18      Urea Nitrogen 15      Creatinine 0.81      eGFR >90      Calcium 8.7      Albumin 3.3 (*)     Alkaline Phosphatase 181 (*)     Total Protein  7.1      AST 21      Bilirubin, Total 0.3      ALT 13     POCT GLUCOSE - Abnormal    POCT Glucose 176 (*)    LACTATE - Normal    Lactate 1.5      Narrative:     Venipuncture immediately after or during the administration of Metamizole may lead to falsely low results. Testing should be performed immediately prior to Metamizole dosing.   POCT GLUCOSE - Normal    POCT Glucose 78     BLOOD CULTURE   BLOOD CULTURE     Pain Management Panel          Latest Ref Rng & Units 7/12/2024   Pain Management Panel   Amphetamine Screen, Urine Presumptive Negative Presumptive Negative    Barbiturate Screen, Urine Presumptive Negative Presumptive Negative    Benzodiazepines Screen, Urine Presumptive Negative Presumptive Negative    Fentanyl Screen, Urine Presumptive Negative Presumptive Positive    Methadone Screen, Urine Presumptive Negative Presumptive Positive      XR foot right 3+ views   Final Result   1. Findings suggesting osteomyelitis of the 1st metatarsal stump as   well as of the 2nd metatarsal head and 2nd proximal phalanx base with   septic arthritis of the 2nd MTP joint. Consider MRI for further   evaluation if clinically warranted.        MACRO:   None.        Signed by: Jennifer Macias 4/29/2025 11:23 AM   Dictation workstation:   [id number]          ED Course & MDM   Diagnoses as of 04/29/25 1633   Osteomyelitis of right foot, unspecified type (Multi)       Medical Decision Making  Initial valuation patient is well-appearing emergency department at this time.  Has an obvious wound at the ventral aspect of his foot with bone exposure.  He was seen by his podiatrist last week and was told to come in at that time.  I reach out to Dr. Raya and made them aware that the patient is in the emergency department.  He is now systemic symptoms at this point.  I did give him Vanco and Zosyn as well as 1 L bolus in the ED.  Lactic negative CRP of 7.25 CMP within normal limits CBC shows no leukocytosis hemoglobin of 9.4 sed rate  of 120 blood cultures x 2 ordered and pending.  X-ray shows osteomyelitis of the first metatarsal stump as well as a second metatarsal head and second proximal phalanx base with septic arthritis in the second MTP joint.  He did have an MRI at the beginning of April that correlated with his x-ray.  Spoke with [facility] who is agreeable to admission.  Spoke with Dr. Raya as well made them aware the patient is here.        Procedure  Procedures      Differential Diagnoses include osteo, septic arthritis, cellulitis, sepsis  This is not an exhaustive list of all the diagnosis and therapeutics are considered during the patient's evaluation for an emergency medical condition.       [1]   Past Medical History:  Diagnosis Date    Abnormal result of other cardiovascular function study 06/28/2018    Abnormal stress test    Atherosclerotic heart disease of native coronary artery with unstable angina pectoris 06/28/2018    Unstable angina pectoris due to coronary arteriosclerosis    Fibromyalgia     Fibromyalgia    Personal history of other diseases of the circulatory system     History of hypertension    Personal history of other diseases of the musculoskeletal system and connective tissue     History of rheumatoid arthritis    Personal history of other diseases of the musculoskeletal system and connective tissue     History of degenerative disc disease    Personal history of other diseases of the musculoskeletal system and connective tissue     History of osteoporosis    Personal history of other endocrine, nutritional and metabolic disease     History of hyperlipidemia    Personal history of other endocrine, nutritional and metabolic disease     History of hypothyroidism    Type 2 diabetes mellitus without complications 02/25/2022    Type 2 diabetes mellitus   [2]   Past Surgical History:  Procedure Laterality Date    BACK SURGERY  06/28/2018    Back Surgery    CARDIAC CATHETERIZATION N/A 7/1/2024    Procedure: Left Heart Cath;   Surgeon: Yao Calvin MD;  Location: [facility];  Service: Cardiovascular;  Laterality: N/A;    SEPTOPLASTY  2018    Septoplasty   [3]   Family History  Problem Relation Name Age of Onset    Sleep apnea Father      Sleep apnea Sister     [4]   Social History  Tobacco Use    Smoking status: Former     Current packs/day: 0.00     Types: Cigarettes     Quit date: 2024     Years since quittin.8    Smokeless tobacco: Never   Vaping Use    Vaping status: Not on file   Substance Use Topics    Alcohol use: Not Currently    Drug use: Never        Pauline Marcus, CHACE-CNP  [phone]

## 2025-04-29 NOTE — PROGRESS NOTES
Kael Zepeda is a 59 y.o. male admitted for Osteomyelitis of right foot, unspecified type (Multi). Pharmacy reviewed the patient's [id number] medications and allergies for accuracy.    The list below reflects the PTA list prior to pharmacy medication history. A summary a changes to the PTA medication list has been listed below. Please review each medication in order reconciliation for additional clarification and justification.    Source of information: T2P, [employer]     Medications added:  Duloxetine 40mg caps --> 1 cap every day   Celecoxib 200mg cap--> 1 cap bid   Otezla starter pack 10/20mg --> 1 tab bid   Prednisone 1mg tab--> 1 tab every day       Medications modified:  Atorvastatin 40mg- no directions --> 1 tab every day  Clopidogrel 75mg tab- changed to not taking   Colace 100mg cap - changed to not taking   Wixela 100-50 diskus inhaler - 1 puff bid --> 1 puff bid PRN   Folic Acid 1mg -changed to not taking   Lantus 100units/ml - 25 units every day --> 40 units every day   Pantoprazole 40mg EC tab- changed to not taking   Chelsie-colace 8.6-50mg - changed to not taking   Vitamin B-1- changed to not taking       Medications to be removed:  Duloxetine 60mg caps  Levemir flexpen 100 units/ml   Silver sulfadiazine 1% cream       Medications of concern:  Pt gets methodone from outside  service, need to verify dose ( [phone]) tried to call 2 different times and can't get through.     Prior to Admission Medications   Prescriptions Last Dose Informant Patient Reported? Taking?   DULoxetine (Cymbalta) 60 mg DR capsule   Yes No   Si capsule (60 mg) once every 24 hours.   Levemir FlexPen 100 unit/mL (3 mL) pen   Yes No   albuterol 90 mcg/actuation inhaler   No No   Sig: Inhale 2 puffs by mouth into the lungs every 6 hours if needed for shortness of breath.   aspirin 81 mg EC tablet   Yes No   Sig: Take 1 tablet (81 mg) by mouth once daily.   atorvastatin (Lipitor) 40 mg tablet   Yes No   blood  sugar diagnostic (True Metrix Glucose Test Strip) strip   No No   Sig: Use to check blood sugar 4 times daily   clopidogrel (Plavix) 75 mg tablet   No No   Sig: Take 1 tablet (75 mg) by mouth once daily.   docusate sodium (Colace) 100 mg capsule   No No   Sig: Take 1 capsule (100 mg) by mouth 2 times a day as needed (hard stools).   fluticasone propion-salmeteroL (Wixela Inhub) 100-50 mcg/dose diskus inhaler   No No   Sig: Inhale 1 puff by mouth into the lungs 2 times a day. Rinse mouth with water after use to reduce aftertaste and incidence of candidiasis. Do not swallow.   folic acid (Folvite) 1 mg tablet   No No   Sig: Take 1 tablet (1 mg) by mouth once daily.   insulin glargine (Lantus) 100 unit/mL (3 mL) pen   No No   Sig: Inject 25 Units under the skin once every 24 hours.   insulin lispro (HumaLOG) 100 unit/mL injection   No No   Sig: Inject 8 Units plus 0 - 10 units per sliding scale under the skin 3 times a day before meals. Take as directed per insulin instructions.   lancets 33 gauge misc   No No   Sig: Use to check blood sugar 4 times daily   levothyroxine (Synthroid, Levoxyl) 50 mcg tablet   Yes No   Sig: Take 1 tablet (50 mcg) by mouth once daily.   metFORMIN (Glucophage) 500 mg tablet   No No   Sig: Take 1 tablet (500 mg) by mouth 2 times a day.   metoprolol succinate XL (Toprol-XL) 25 mg 24 hr tablet   No No   Sig: Take 1 tablet (25 mg) by mouth once daily. Do not crush or chew.   omeprazole (PriLOSEC) 40 mg DR capsule   Yes No   Sig: Take 1 capsule (40 mg) by mouth early in the morning..   pantoprazole (ProtoNix) 40 mg EC tablet   No No   Sig: Take 1 tablet (40 mg) by mouth once daily in the morning. Take before meals. Do not crush, chew, or split.   pregabalin (Lyrica) 200 mg capsule   Yes No   Sig: Take 1 capsule (200 mg) by mouth 3 times a day.   sennosides-docusate sodium (Chelsie-Colace) 8.6-50 mg tablet   No No   Sig: Take 2 tablets by mouth 2 times a day.   silver sulfADIAZINE (Silvadene) 1 %  cream   Yes No   Sig: Apply as directed to affected area daily.   thiamine (Vitamin B-1) 100 mg tablet   No No   Sig: Take 1 tablet (100 mg) by mouth once daily.   tiotropium (Spiriva Respimat) 2.5 mcg/actuation inhaler   No No   Sig: Inhale 2 puffs by mouth into the lungs once daily.      Facility-Administered Medications: None       LOU VAZQUEZ

## 2025-04-29 NOTE — PROGRESS NOTES
Occupational Therapy                 Therapy Communication Note    Patient Name: Kael Zepeda  MRN: [id number]  Department[place] [facility] [place] [facility]  Room: [place] [address]  Today's Date: 4/29/2025     Discipline: Occupational Therapy    OT Missed Visit: Yes     Missed Visit Reason: Missed Visit Reason: Patient placed on medical hold (Patient admitted with worsening foot wound, possible osteomeylitis, awaiting Podiatry consult. Will hold today, reattempt tomorrow if more appropriate/cleared by Podiatry/WB restrictions confirmed.)    Missed Time: Attempt    Comment:

## 2025-04-30 ENCOUNTER — ANESTHESIA EVENT (OUTPATIENT)
Dept: OPERATING ROOM | Facility: HOSPITAL | Age: 60
End: 2025-04-30
Payer: MEDICARE

## 2025-04-30 ENCOUNTER — SURGERY (OUTPATIENT)
Age: 60
End: 2025-04-30
Payer: MEDICARE

## 2025-04-30 ENCOUNTER — ANESTHESIA (OUTPATIENT)
Dept: OPERATING ROOM | Facility: HOSPITAL | Age: 60
End: 2025-04-30
Payer: MEDICARE

## 2025-04-30 PROBLEM — L97.514 ULCER OF TOE OF RIGHT FOOT, WITH NECROSIS OF BONE (MULTI): Status: ACTIVE | Noted: 2025-04-29

## 2025-04-30 LAB
ANION GAP SERPL CALC-SCNC: 13 MMOL/L (ref 10–20)
BUN SERPL-MCNC: 14 MG/DL (ref 6–23)
CALCIUM SERPL-MCNC: 8.7 MG/DL (ref 8.6–10.3)
CHLORIDE SERPL-SCNC: 103 MMOL/L (ref 98–107)
CO2 SERPL-SCNC: 24 MMOL/L (ref 21–32)
CREAT SERPL-MCNC: 0.87 MG/DL (ref 0.5–1.3)
EGFRCR SERPLBLD CKD-EPI 2021: >90 ML/MIN/1.73M*2
ERYTHROCYTE [DISTWIDTH] IN BLOOD BY AUTOMATED COUNT: 17.9 % (ref 11.5–14.5)
GLUCOSE BLD MANUAL STRIP-MCNC: 160 MG/DL (ref 74–99)
GLUCOSE BLD MANUAL STRIP-MCNC: 78 MG/DL (ref 74–99)
GLUCOSE BLD MANUAL STRIP-MCNC: 82 MG/DL (ref 74–99)
GLUCOSE BLD MANUAL STRIP-MCNC: 96 MG/DL (ref 74–99)
GLUCOSE SERPL-MCNC: 89 MG/DL (ref 74–99)
HCT VFR BLD AUTO: 33.6 % (ref 41–52)
HGB BLD-MCNC: 9.8 G/DL (ref 13.5–17.5)
MCH RBC QN AUTO: 20.9 PG (ref 26–34)
MCHC RBC AUTO-ENTMCNC: 29.2 G/DL (ref 32–36)
MCV RBC AUTO: 72 FL (ref 80–100)
NRBC BLD-RTO: 0 /100 WBCS (ref 0–0)
PLATELET # BLD AUTO: 523 X10*3/UL (ref 150–450)
POTASSIUM SERPL-SCNC: 4.2 MMOL/L (ref 3.5–5.3)
RBC # BLD AUTO: 4.68 X10*6/UL (ref 4.5–5.9)
SODIUM SERPL-SCNC: 136 MMOL/L (ref 136–145)
VANCOMYCIN SERPL-MCNC: 24.4 UG/ML (ref 5–20)
WBC # BLD AUTO: 5.6 X10*3/UL (ref 4.4–11.3)

## 2025-04-30 PROCEDURE — 85027 COMPLETE CBC AUTOMATED: CPT | Performed by: NURSE PRACTITIONER

## 2025-04-30 PROCEDURE — 2500000002 HC RX 250 W HCPCS SELF ADMINISTERED DRUGS (ALT 637 FOR MEDICARE OP, ALT 636 FOR OP/ED): Performed by: NURSE PRACTITIONER

## 2025-04-30 PROCEDURE — 0Y6M0ZF DETACHMENT AT RIGHT FOOT, PARTIAL 5TH RAY, OPEN APPROACH: ICD-10-PCS

## 2025-04-30 PROCEDURE — 87077 CULTURE AEROBIC IDENTIFY: CPT | Mod: PORLAB | Performed by: PODIATRIST

## 2025-04-30 PROCEDURE — 2500000004 HC RX 250 GENERAL PHARMACY W/ HCPCS (ALT 636 FOR OP/ED): Mod: JZ | Performed by: INTERNAL MEDICINE

## 2025-04-30 PROCEDURE — 80202 ASSAY OF VANCOMYCIN: CPT | Performed by: NURSE PRACTITIONER

## 2025-04-30 PROCEDURE — 2500000001 HC RX 250 WO HCPCS SELF ADMINISTERED DRUGS (ALT 637 FOR MEDICARE OP): Performed by: NURSE PRACTITIONER

## 2025-04-30 PROCEDURE — 2500000004 HC RX 250 GENERAL PHARMACY W/ HCPCS (ALT 636 FOR OP/ED): Mod: JZ | Performed by: NURSE PRACTITIONER

## 2025-04-30 PROCEDURE — S0109 METHADONE ORAL 5MG: HCPCS | Performed by: PODIATRIST

## 2025-04-30 PROCEDURE — 2500000005 HC RX 250 GENERAL PHARMACY W/O HCPCS: Performed by: PODIATRIST

## 2025-04-30 PROCEDURE — 2500000004 HC RX 250 GENERAL PHARMACY W/ HCPCS (ALT 636 FOR OP/ED): Mod: JZ | Performed by: NURSE ANESTHETIST, CERTIFIED REGISTERED

## 2025-04-30 PROCEDURE — 0Y6M0ZC DETACHMENT AT RIGHT FOOT, PARTIAL 3RD RAY, OPEN APPROACH: ICD-10-PCS

## 2025-04-30 PROCEDURE — 82947 ASSAY GLUCOSE BLOOD QUANT: CPT

## 2025-04-30 PROCEDURE — 36415 COLL VENOUS BLD VENIPUNCTURE: CPT | Performed by: NURSE PRACTITIONER

## 2025-04-30 PROCEDURE — 3700000001 HC GENERAL ANESTHESIA TIME - INITIAL BASE CHARGE: Performed by: PODIATRIST

## 2025-04-30 PROCEDURE — 7100000002 HC RECOVERY ROOM TIME - EACH INCREMENTAL 1 MINUTE: Performed by: PODIATRIST

## 2025-04-30 PROCEDURE — 2720000007 HC OR 272 NO HCPCS: Performed by: PODIATRIST

## 2025-04-30 PROCEDURE — 1210000001 HC SEMI-PRIVATE ROOM DAILY

## 2025-04-30 PROCEDURE — 2500000001 HC RX 250 WO HCPCS SELF ADMINISTERED DRUGS (ALT 637 FOR MEDICARE OP): Performed by: PODIATRIST

## 2025-04-30 PROCEDURE — 0Y6M0ZD DETACHMENT AT RIGHT FOOT, PARTIAL 4TH RAY, OPEN APPROACH: ICD-10-PCS

## 2025-04-30 PROCEDURE — 2500000004 HC RX 250 GENERAL PHARMACY W/ HCPCS (ALT 636 FOR OP/ED): Mod: JZ | Performed by: ANESTHESIOLOGY

## 2025-04-30 PROCEDURE — 80048 BASIC METABOLIC PNL TOTAL CA: CPT | Performed by: NURSE PRACTITIONER

## 2025-04-30 PROCEDURE — 99233 SBSQ HOSP IP/OBS HIGH 50: CPT | Performed by: PHYSICIAN ASSISTANT

## 2025-04-30 PROCEDURE — 3600000008 HC OR TIME - EACH INCREMENTAL 1 MINUTE - PROCEDURE LEVEL THREE: Performed by: PODIATRIST

## 2025-04-30 PROCEDURE — 2500000002 HC RX 250 W HCPCS SELF ADMINISTERED DRUGS (ALT 637 FOR MEDICARE OP, ALT 636 FOR OP/ED): Performed by: PODIATRIST

## 2025-04-30 PROCEDURE — 2500000004 HC RX 250 GENERAL PHARMACY W/ HCPCS (ALT 636 FOR OP/ED): Mod: JZ | Performed by: PODIATRIST

## 2025-04-30 PROCEDURE — 0Y6M0ZB DETACHMENT AT RIGHT FOOT, PARTIAL 2ND RAY, OPEN APPROACH: ICD-10-PCS

## 2025-04-30 PROCEDURE — 3600000003 HC OR TIME - INITIAL BASE CHARGE - PROCEDURE LEVEL THREE: Performed by: PODIATRIST

## 2025-04-30 PROCEDURE — 3700000002 HC GENERAL ANESTHESIA TIME - EACH INCREMENTAL 1 MINUTE: Performed by: PODIATRIST

## 2025-04-30 PROCEDURE — 0HXMXZZ TRANSFER RIGHT FOOT SKIN, EXTERNAL APPROACH: ICD-10-PCS

## 2025-04-30 PROCEDURE — 7100000001 HC RECOVERY ROOM TIME - INITIAL BASE CHARGE: Performed by: PODIATRIST

## 2025-04-30 RX ORDER — LABETALOL HYDROCHLORIDE 5 MG/ML
5 INJECTION, SOLUTION INTRAVENOUS ONCE AS NEEDED
Status: DISCONTINUED | OUTPATIENT
Start: 2025-04-30 | End: 2025-04-30 | Stop reason: HOSPADM

## 2025-04-30 RX ORDER — HYDRALAZINE HYDROCHLORIDE 20 MG/ML
5 INJECTION INTRAMUSCULAR; INTRAVENOUS EVERY 30 MIN PRN
Status: DISCONTINUED | OUTPATIENT
Start: 2025-04-30 | End: 2025-04-30 | Stop reason: HOSPADM

## 2025-04-30 RX ORDER — PROPOFOL 10 MG/ML
INJECTION, EMULSION INTRAVENOUS AS NEEDED
Status: DISCONTINUED | OUTPATIENT
Start: 2025-04-30 | End: 2025-04-30

## 2025-04-30 RX ORDER — OXYCODONE AND ACETAMINOPHEN 5; 325 MG/1; MG/1
1 TABLET ORAL EVERY 4 HOURS PRN
Status: DISCONTINUED | OUTPATIENT
Start: 2025-04-30 | End: 2025-04-30 | Stop reason: HOSPADM

## 2025-04-30 RX ORDER — CELECOXIB 200 MG/1
200 CAPSULE ORAL 2 TIMES DAILY
Status: DISCONTINUED | OUTPATIENT
Start: 2025-04-30 | End: 2025-04-30

## 2025-04-30 RX ORDER — DIPHENHYDRAMINE HYDROCHLORIDE 50 MG/ML
12.5 INJECTION, SOLUTION INTRAMUSCULAR; INTRAVENOUS ONCE AS NEEDED
Status: DISCONTINUED | OUTPATIENT
Start: 2025-04-30 | End: 2025-04-30 | Stop reason: HOSPADM

## 2025-04-30 RX ORDER — METHADONE HYDROCHLORIDE 10 MG/ML
87 CONCENTRATE ORAL DAILY
Refills: 0 | Status: DISCONTINUED | OUTPATIENT
Start: 2025-04-30 | End: 2025-05-06 | Stop reason: HOSPADM

## 2025-04-30 RX ORDER — SODIUM CHLORIDE, SODIUM LACTATE, POTASSIUM CHLORIDE, CALCIUM CHLORIDE 600; 310; 30; 20 MG/100ML; MG/100ML; MG/100ML; MG/100ML
100 INJECTION, SOLUTION INTRAVENOUS CONTINUOUS
Status: DISCONTINUED | OUTPATIENT
Start: 2025-04-30 | End: 2025-04-30 | Stop reason: HOSPADM

## 2025-04-30 RX ORDER — LIDOCAINE HYDROCHLORIDE 10 MG/ML
0.1 INJECTION, SOLUTION EPIDURAL; INFILTRATION; INTRACAUDAL; PERINEURAL ONCE
Status: DISCONTINUED | OUTPATIENT
Start: 2025-04-30 | End: 2025-04-30 | Stop reason: HOSPADM

## 2025-04-30 RX ORDER — BUPIVACAINE HYDROCHLORIDE 5 MG/ML
INJECTION, SOLUTION EPIDURAL; INTRACAUDAL; PERINEURAL AS NEEDED
Status: DISCONTINUED | OUTPATIENT
Start: 2025-04-30 | End: 2025-04-30 | Stop reason: HOSPADM

## 2025-04-30 RX ORDER — MIDAZOLAM HYDROCHLORIDE 1 MG/ML
INJECTION, SOLUTION INTRAMUSCULAR; INTRAVENOUS AS NEEDED
Status: DISCONTINUED | OUTPATIENT
Start: 2025-04-30 | End: 2025-04-30

## 2025-04-30 RX ORDER — DROPERIDOL 2.5 MG/ML
0.62 INJECTION, SOLUTION INTRAMUSCULAR; INTRAVENOUS ONCE AS NEEDED
Status: DISCONTINUED | OUTPATIENT
Start: 2025-04-30 | End: 2025-04-30 | Stop reason: HOSPADM

## 2025-04-30 RX ORDER — PHENYLEPHRINE HCL IN 0.9% NACL 1 MG/10 ML
SYRINGE (ML) INTRAVENOUS AS NEEDED
Status: DISCONTINUED | OUTPATIENT
Start: 2025-04-30 | End: 2025-04-30

## 2025-04-30 RX ORDER — FAMOTIDINE 10 MG/ML
20 INJECTION, SOLUTION INTRAVENOUS ONCE
Status: COMPLETED | OUTPATIENT
Start: 2025-04-30 | End: 2025-04-30

## 2025-04-30 RX ORDER — PREDNISONE 1 MG/1
1 TABLET ORAL DAILY
Status: DISCONTINUED | OUTPATIENT
Start: 2025-04-30 | End: 2025-05-06 | Stop reason: HOSPADM

## 2025-04-30 RX ORDER — SODIUM CHLORIDE, SODIUM LACTATE, POTASSIUM CHLORIDE, CALCIUM CHLORIDE 600; 310; 30; 20 MG/100ML; MG/100ML; MG/100ML; MG/100ML
75 INJECTION, SOLUTION INTRAVENOUS CONTINUOUS
Status: ACTIVE | OUTPATIENT
Start: 2025-04-30 | End: 2025-04-30

## 2025-04-30 RX ORDER — ALBUTEROL SULFATE 0.83 MG/ML
2.5 SOLUTION RESPIRATORY (INHALATION) ONCE AS NEEDED
Status: DISCONTINUED | OUTPATIENT
Start: 2025-04-30 | End: 2025-04-30 | Stop reason: HOSPADM

## 2025-04-30 RX ORDER — ONDANSETRON HYDROCHLORIDE 2 MG/ML
4 INJECTION, SOLUTION INTRAVENOUS ONCE AS NEEDED
Status: DISCONTINUED | OUTPATIENT
Start: 2025-04-30 | End: 2025-04-30 | Stop reason: HOSPADM

## 2025-04-30 RX ORDER — MORPHINE SULFATE 2 MG/ML
2 INJECTION, SOLUTION INTRAMUSCULAR; INTRAVENOUS EVERY 5 MIN PRN
Status: DISCONTINUED | OUTPATIENT
Start: 2025-04-30 | End: 2025-04-30 | Stop reason: HOSPADM

## 2025-04-30 RX ORDER — METHADONE HYDROCHLORIDE 10 MG/ML
78 CONCENTRATE ORAL NIGHTLY
Refills: 0 | Status: DISCONTINUED | OUTPATIENT
Start: 2025-04-30 | End: 2025-05-06 | Stop reason: HOSPADM

## 2025-04-30 RX ORDER — LIDOCAINE HYDROCHLORIDE AND EPINEPHRINE 10; 10 UG/ML; MG/ML
INJECTION, SOLUTION INFILTRATION; PERINEURAL AS NEEDED
Status: DISCONTINUED | OUTPATIENT
Start: 2025-04-30 | End: 2025-04-30 | Stop reason: HOSPADM

## 2025-04-30 RX ORDER — DULOXETIN HYDROCHLORIDE 20 MG/1
40 CAPSULE, DELAYED RELEASE ORAL DAILY
Status: DISCONTINUED | OUTPATIENT
Start: 2025-04-30 | End: 2025-05-06 | Stop reason: HOSPADM

## 2025-04-30 RX ADMIN — PREGABALIN 200 MG: 75 CAPSULE ORAL at 08:51

## 2025-04-30 RX ADMIN — FAMOTIDINE 20 MG: 10 INJECTION, SOLUTION INTRAVENOUS at 12:25

## 2025-04-30 RX ADMIN — Medication 100 MCG: at 13:15

## 2025-04-30 RX ADMIN — PROPOFOL 200 MG: 10 INJECTION, EMULSION INTRAVENOUS at 13:05

## 2025-04-30 RX ADMIN — MIDAZOLAM 2 MG: 1 INJECTION INTRAMUSCULAR; INTRAVENOUS at 13:05

## 2025-04-30 RX ADMIN — LEVOTHYROXINE SODIUM 50 MCG: 0.05 TABLET ORAL at 05:13

## 2025-04-30 RX ADMIN — Medication 100 MCG: at 13:45

## 2025-04-30 RX ADMIN — BUPIVACAINE HYDROCHLORIDE 20 ML: 5 INJECTION, SOLUTION EPIDURAL; INTRACAUDAL; PERINEURAL at 13:22

## 2025-04-30 RX ADMIN — MORPHINE SULFATE 2 MG: 2 INJECTION, SOLUTION INTRAMUSCULAR; INTRAVENOUS at 14:38

## 2025-04-30 RX ADMIN — INSULIN GLARGINE 15 UNITS: 100 INJECTION, SOLUTION SUBCUTANEOUS at 21:43

## 2025-04-30 RX ADMIN — DULOXETINE HYDROCHLORIDE 40 MG: 20 CAPSULE, DELAYED RELEASE ORAL at 09:11

## 2025-04-30 RX ADMIN — AMPICILLIN SODIUM AND SULBACTAM SODIUM 3 G: 2; 1 INJECTION, POWDER, FOR SOLUTION INTRAMUSCULAR; INTRAVENOUS at 10:50

## 2025-04-30 RX ADMIN — VANCOMYCIN HYDROCHLORIDE 1750 MG: 10 INJECTION, POWDER, LYOPHILIZED, FOR SOLUTION INTRAVENOUS at 21:43

## 2025-04-30 RX ADMIN — PREGABALIN 200 MG: 75 CAPSULE ORAL at 21:43

## 2025-04-30 RX ADMIN — LIDOCAINE HYDROCHLORIDE,EPINEPHRINE BITARTRATE 10 ML: 10; .01 INJECTION, SOLUTION INFILTRATION; PERINEURAL at 14:22

## 2025-04-30 RX ADMIN — THIAMINE HCL TAB 100 MG 100 MG: 100 TAB at 09:08

## 2025-04-30 RX ADMIN — ASPIRIN 81 MG: 81 TABLET, COATED ORAL at 09:07

## 2025-04-30 RX ADMIN — PIPERACILLIN SODIUM AND TAZOBACTAM SODIUM 3.38 G: 3; .375 INJECTION, SOLUTION INTRAVENOUS at 05:13

## 2025-04-30 RX ADMIN — AMPICILLIN SODIUM AND SULBACTAM SODIUM 3 G: 2; 1 INJECTION, POWDER, FOR SOLUTION INTRAMUSCULAR; INTRAVENOUS at 16:45

## 2025-04-30 RX ADMIN — Medication 100 MCG: at 13:25

## 2025-04-30 RX ADMIN — PANTOPRAZOLE SODIUM 40 MG: 40 INJECTION, POWDER, FOR SOLUTION INTRAVENOUS at 06:47

## 2025-04-30 RX ADMIN — FLUTICASONE FUROATE AND VILANTEROL TRIFENATATE 1 PUFF: 100; 25 POWDER RESPIRATORY (INHALATION) at 10:42

## 2025-04-30 RX ADMIN — METHADONE HYDROCHLORIDE 78 MG: 10 CONCENTRATE ORAL at 22:32

## 2025-04-30 RX ADMIN — TIOTROPIUM BROMIDE INHALATION SPRAY 2 PUFF: 3.12 SPRAY, METERED RESPIRATORY (INHALATION) at 10:41

## 2025-04-30 RX ADMIN — POLYETHYLENE GLYCOL 3350 17 G: 17 POWDER, FOR SOLUTION ORAL at 09:07

## 2025-04-30 RX ADMIN — PREDNISONE 1 MG: 1 TABLET ORAL at 10:40

## 2025-04-30 RX ADMIN — ATORVASTATIN CALCIUM 40 MG: 40 TABLET, FILM COATED ORAL at 21:43

## 2025-04-30 SDOH — HEALTH STABILITY: MENTAL HEALTH: CURRENT SMOKER: 0

## 2025-04-30 ASSESSMENT — ENCOUNTER SYMPTOMS
LIGHT-HEADEDNESS: 0
HEADACHES: 0
DIAPHORESIS: 0
FLANK PAIN: 0
COUGH: 0
BRUISES/BLEEDS EASILY: 0
APPETITE CHANGE: 0
ABDOMINAL PAIN: 0
HEMATURIA: 0
FACIAL SWELLING: 0
CHILLS: 0
VOMITING: 0
SHORTNESS OF BREATH: 0
JOINT SWELLING: 0
DYSURIA: 0
WHEEZING: 0
FREQUENCY: 0
PALPITATIONS: 0
NUMBNESS: 0
HALLUCINATIONS: 0
CONSTIPATION: 0
DIARRHEA: 0
NAUSEA: 0
FEVER: 0
TROUBLE SWALLOWING: 0
SORE THROAT: 0
CHEST TIGHTNESS: 0
WEAKNESS: 0
DIZZINESS: 0
BACK PAIN: 0
WOUND: 1
BLOOD IN STOOL: 0
FATIGUE: 0
EYE PAIN: 0

## 2025-04-30 ASSESSMENT — COLUMBIA-SUICIDE SEVERITY RATING SCALE - C-SSRS
1. IN THE PAST MONTH, HAVE YOU WISHED YOU WERE DEAD OR WISHED YOU COULD GO TO SLEEP AND NOT WAKE UP?: NO
6. HAVE YOU EVER DONE ANYTHING, STARTED TO DO ANYTHING, OR PREPARED TO DO ANYTHING TO END YOUR LIFE?: NO
2. HAVE YOU ACTUALLY HAD ANY THOUGHTS OF KILLING YOURSELF?: NO

## 2025-04-30 ASSESSMENT — PAIN - FUNCTIONAL ASSESSMENT
PAIN_FUNCTIONAL_ASSESSMENT: 0-10

## 2025-04-30 ASSESSMENT — PAIN DESCRIPTION - ORIENTATION
ORIENTATION: RIGHT
ORIENTATION: RIGHT

## 2025-04-30 ASSESSMENT — PAIN SCALES - GENERAL
PAINLEVEL_OUTOF10: 0 - NO PAIN
PAINLEVEL_OUTOF10: 6
PAINLEVEL_OUTOF10: 0 - NO PAIN
PAINLEVEL_OUTOF10: 6
PAINLEVEL_OUTOF10: 0 - NO PAIN
PAIN_LEVEL: 2
PAINLEVEL_OUTOF10: 6

## 2025-04-30 ASSESSMENT — PAIN DESCRIPTION - LOCATION
LOCATION: FOOT
LOCATION: FOOT

## 2025-04-30 ASSESSMENT — PAIN DESCRIPTION - DESCRIPTORS: DESCRIPTORS: SHARP

## 2025-04-30 NOTE — ANESTHESIA POSTPROCEDURE EVALUATION
Patient: Kael Zepeda    Procedure Summary       Date: 04/30/25 Room / Location: P[facility] OR 02 / Virtual [facility] OR    Anesthesia Start: 1303 Anesthesia Stop: 1433    Procedure: Transmetatarsal amputation right foot (Right: Foot) Diagnosis:       Osteomyelitis of right foot, unspecified type (Multi)      Ulcer of toe of right foot, with necrosis of bone (Multi)      (Osteomyelitis of right foot, unspecified type (Multi) [M86.9])      (Ulcer of toe of right foot, with necrosis of bone (Multi) [L97.514])    Surgeons: William Raya DPM Responsible Provider: NIKKO Hammonds    Anesthesia Type: MAC ASA Status: 3            Anesthesia Type: MAC    Vitals Value Taken Time   /70 04/30/25 15:00   Temp 36.3 °C (97.3 °F) 04/30/25 15:00   Pulse 64 04/30/25 15:01   Resp 8 04/30/25 15:02   SpO2 94 % 04/30/25 15:02   Vitals shown include unfiled device data.    Anesthesia Post Evaluation    Patient location during evaluation: PACU  Patient participation: complete - patient participated  Level of consciousness: awake  Pain score: 2  Pain management: adequate  Airway patency: patent  Cardiovascular status: acceptable  Respiratory status: acceptable  Hydration status: acceptable  Postoperative Nausea and Vomiting: none        No notable events documented.

## 2025-04-30 NOTE — PROGRESS NOTES
"Kael Zepeda is a 59 y.o. male on day 1 of admission presenting with Osteomyelitis of right foot, unspecified type (Multi).    Subjective   OSTEOMYELITIS RIGHT FOOT       Objective     Physical Exam    Last Recorded Vitals  Blood pressure 120/75, pulse 73, temperature 36.3 °C (97.3 °F), temperature source Temporal, resp. rate 16, height 1.702 m (5' 7.01\"), weight 81.6 kg (180 lb), SpO2 96%.  Intake/Output last 3 Shifts:  I/O last 3 completed shifts:  In: 540 (6.6 mL/kg) [P.O.:240; IV Piggyback:300]  Out: 600 (7.3 mL/kg) [Urine:600 (0.2 mL/kg/hr)]  Weight: 81.6 kg     Relevant Results    XR foot right 3+ views  Result Date: 4/29/2025  Interpreted By:  Jennifer Macias, STUDY: Right foot, 3 views.   INDICATION: Signs/Symptoms:bone exposed from wound   COMPARISON: Radiographs 02/17/2025.   ACCESSION NUMBER(S): [id number]   ORDERING CLINICIAN: FAINA REGAN   FINDINGS: No acute fracture. No malalignment. Transmetatarsal amputation of the 1st digit to the level of the distal metatarsal. There is irregular fuzzy appearance of the 1st digit metatarsal bony stump suggesting osteomyelitis. There is also collapse of the 2nd MTP joint with irregular appearance of the 2nd metatarsal head/2nd proximal phalanx base suggesting osteomyelitis and septic arthritis. Smooth tapering of the 5th metatarsal head likely representing prior amputation change. Chronic retained small metallic appearing foreign body in linear morphology observed projecting over the base of the 5th metatarsal.       1. Findings suggesting osteomyelitis of the 1st metatarsal stump as well as of the 2nd metatarsal head and 2nd proximal phalanx base with septic arthritis of the 2nd MTP joint. Consider MRI for further evaluation if clinically warranted.   MACRO: None.   Signed by: Jennifer Macias 4/29/2025 11:23 AM Dictation workstation:   [id number]     Scheduled medications  Scheduled Medications[1]  Continuous medications  Continuous " Medications[2]  PRN medications  PRN Medications[3]  Results for orders placed or performed during the hospital encounter of 04/29/25 (from the past 24 hours)   C-reactive protein   Result Value Ref Range    C-Reactive Protein 7.25 (H) <1.00 mg/dL   Sedimentation rate, automated   Result Value Ref Range    Sedimentation Rate 120 (H) 0 - 20 mm/h   Blood Culture    Specimen: Peripheral Venipuncture; Blood culture   Result Value Ref Range    Blood Culture Loaded on Instrument - Culture in progress    CBC and Auto Differential   Result Value Ref Range    WBC 5.1 4.4 - 11.3 x10*3/uL    nRBC 0.0 0.0 - 0.0 /100 WBCs    RBC 4.47 (L) 4.50 - 5.90 x10*6/uL    Hemoglobin 9.4 (L) 13.5 - 17.5 g/dL    Hematocrit 30.8 (L) 41.0 - 52.0 %    MCV 69 (L) 80 - 100 fL    MCH 21.0 (L) 26.0 - 34.0 pg    MCHC 30.5 (L) 32.0 - 36.0 g/dL    RDW 17.4 (H) 11.5 - 14.5 %    Platelets 474 (H) 150 - 450 x10*3/uL    Neutrophils % 62.6 40.0 - 80.0 %    Immature Granulocytes %, Automated 2.0 (H) 0.0 - 0.9 %    Lymphocytes % 21.4 13.0 - 44.0 %    Monocytes % 11.2 2.0 - 10.0 %    Eosinophils % 2.2 0.0 - 6.0 %    Basophils % 0.6 0.0 - 2.0 %    Neutrophils Absolute 3.19 1.20 - 7.70 x10*3/uL    Immature Granulocytes Absolute, Automated 0.10 0.00 - 0.70 x10*3/uL    Lymphocytes Absolute 1.09 (L) 1.20 - 4.80 x10*3/uL    Monocytes Absolute 0.57 0.10 - 1.00 x10*3/uL    Eosinophils Absolute 0.11 0.00 - 0.70 x10*3/uL    Basophils Absolute 0.03 0.00 - 0.10 x10*3/uL   Comprehensive metabolic panel   Result Value Ref Range    Glucose 164 (H) 74 - 99 mg/dL    Sodium 136 136 - 145 mmol/L    Potassium 4.6 3.5 - 5.3 mmol/L    Chloride 98 98 - 107 mmol/L    Bicarbonate 25 21 - 32 mmol/L    Anion Gap 18 10 - 20 mmol/L    Urea Nitrogen 15 6 - 23 mg/dL    Creatinine 0.81 0.50 - 1.30 mg/dL    eGFR >90 >60 mL/min/1.73m*2    Calcium 8.7 8.6 - 10.3 mg/dL    Albumin 3.3 (L) 3.4 - 5.0 g/dL    Alkaline Phosphatase 181 (H) 33 - 136 U/L    Total Protein 7.1 6.4 - 8.2 g/dL    AST 21 9  - 39 U/L    Bilirubin, Total 0.3 0.0 - 1.2 mg/dL    ALT 13 10 - 52 U/L   Blood Culture    Specimen: Peripheral Venipuncture; Blood culture   Result Value Ref Range    Blood Culture Loaded on Instrument - Culture in progress    Lactate   Result Value Ref Range    Lactate 1.5 0.4 - 2.0 mmol/L   POCT GLUCOSE   Result Value Ref Range    POCT Glucose 176 (H) 74 - 99 mg/dL   POCT GLUCOSE   Result Value Ref Range    POCT Glucose 78 74 - 99 mg/dL   POCT GLUCOSE   Result Value Ref Range    POCT Glucose 238 (H) 74 - 99 mg/dL   CBC   Result Value Ref Range    WBC 5.6 4.4 - 11.3 x10*3/uL    nRBC 0.0 0.0 - 0.0 /100 WBCs    RBC 4.68 4.50 - 5.90 x10*6/uL    Hemoglobin 9.8 (L) 13.5 - 17.5 g/dL    Hematocrit 33.6 (L) 41.0 - 52.0 %    MCV 72 (L) 80 - 100 fL    MCH 20.9 (L) 26.0 - 34.0 pg    MCHC 29.2 (L) 32.0 - 36.0 g/dL    RDW 17.9 (H) 11.5 - 14.5 %    Platelets 523 (H) 150 - 450 x10*3/uL   Basic metabolic panel   Result Value Ref Range    Glucose 89 74 - 99 mg/dL    Sodium 136 136 - 145 mmol/L    Potassium 4.2 3.5 - 5.3 mmol/L    Chloride 103 98 - 107 mmol/L    Bicarbonate 24 21 - 32 mmol/L    Anion Gap 13 10 - 20 mmol/L    Urea Nitrogen 14 6 - 23 mg/dL    Creatinine 0.87 0.50 - 1.30 mg/dL    eGFR >90 >60 mL/min/1.73m*2    Calcium 8.7 8.6 - 10.3 mg/dL   Vancomycin   Result Value Ref Range    Vancomycin 24.4 (H) 5.0 - 20.0 ug/mL   POCT GLUCOSE   Result Value Ref Range    POCT Glucose 96 74 - 99 mg/dL                            Assessment & Plan  Osteomyelitis of right foot, unspecified type (Multi)    Ulcer of toe of right foot, with necrosis of bone (Multi)    OSTEOMYELITIS RIGHT FOOT  -Plan for Transmetatarsal Amputation of right foot today at 1pm  -Patient will need placement for at least 2 weeks after discharge for non-weightbearing and wound care. Possible antibiotics.   -Will collect bone margin culture      I spent 35 minutes in the professional and overall care of this patient.      William Raya DPM           [1]  ampicillin-sulbactam, 3 g, intravenous, q6h  aspirin, 81 mg, oral, Daily  atorvastatin, 40 mg, oral, Nightly  DULoxetine, 40 mg, oral, Daily  enoxaparin, 40 mg, subcutaneous, q24h  fluticasone furoate-vilanteroL, 1 puff, inhalation, Daily  insulin glargine, 15 Units, subcutaneous, q24h  insulin lispro, 0-10 Units, subcutaneous, TID AC  levothyroxine, 50 mcg, oral, Daily  metoprolol succinate XL, 25 mg, oral, Daily  pantoprazole, 40 mg, oral, Daily before breakfast   Or  pantoprazole, 40 mg, intravenous, Daily before breakfast  polyethylene glycol, 17 g, oral, Daily  predniSONE, 1 mg, oral, Daily  pregabalin, 200 mg, oral, TID  thiamine, 100 mg, oral, Daily  tiotropium, 2 puff, inhalation, Daily  vancomycin, 1,750 mg, intravenous, q24h    [2]    [3] PRN medications: acetaminophen **OR** acetaminophen **OR** acetaminophen, bisacodyl, bisacodyl, dextrose, dextrose, glucagon, glucagon, guaiFENesin, melatonin, ondansetron ODT **OR** ondansetron, vancomycin

## 2025-04-30 NOTE — PROGRESS NOTES
Vancomycin Dosing by Pharmacy- FOLLOW UP    Kael Zepeda is a 59 y.o. year old male who Pharmacy has been consulted for vancomycin dosing for other diabetic foot infection. Based on the patient's indication and renal status this patient is being dosed based on a goal AUC of 400-600.     Renal function is currently stable.    Current vancomycin dose: 1250 mg given every 12 hours    Estimated vancomycin AUC on current dose: 731 mg/L.hr     Visit Vitals  /62 (BP Location: Left arm, Patient Position: Lying)   Pulse 68   Temp 36.3 °C (97.4 °F) (Temporal)   Resp 16        Lab Results   Component Value Date    CREATININE 0.87 2025    CREATININE 0.81 2025    CREATININE 0.66 10/10/2024    CREATININE 0.77 10/09/2024        Patient weight is as follows:   Vitals:    25   Weight: 81.6 kg (180 lb)       Cultures:  No results found for the encounter in last 14 days.       I/O last 3 completed shifts:  In: 540 (6.6 mL/kg) [P.O.:240; IV Piggyback:300]  Out: 600 (7.3 mL/kg) [Urine:600 (0.2 mL/kg/hr)]  Weight: 81.6 kg   I/O during current shift:  No intake/output data recorded.    Temp (24hrs), Av.6 °C (97.8 °F), Min:36.3 °C (97.4 °F), Max:37 °C (98.6 °F)      Assessment/Plan    Above goal AUC. Orders placed for new vancomcyin regimen of 1750 mg IV every 24 hours to begin at 2100.    This dosing regimen is predicted by [employer] to result in the following pharmacokinetic parameters:  Regimen: 1750 mg IV every 24 hours.  Start time: 23:07 on 2025  Exposure target: AUC24 (range)400-600 mg/L.hr   [id number]: 502 mg/L.hr  AUC24,ss: 513 mg/L.hr  Probability of AUC24 > 400: 91 %  Ctrough,ss: 12.5 mg/L  Probability of Ctrough,ss > 20: 8 %      The next level will be obtained on  at 0500. May be obtained sooner if clinically indicated.   Will continue to monitor renal function daily while on vancomycin and order serum creatinine at least every 48 hours if not already ordered.  Follow for  continued vancomycin needs, clinical response, and signs/symptoms of toxicity.       Uday Rodriguez, RPh

## 2025-04-30 NOTE — PROGRESS NOTES
Physical Therapy                 Therapy Communication Note    Patient Name: Kael Zepeda  MRN: [id number]  Department: [facility] OR  Room: [facility] OR  Today's Date: 4/30/2025     Discipline: Physical Therapy          Missed Visit Reason: Missed Visit Reason: Patient in a medical procedure (PTIN SURG)    Missed Time: Attempt    Comment:

## 2025-04-30 NOTE — PROGRESS NOTES
Social work consult placed for discharge planning. SW reviewed pt's chart and communicated with TCC. No SW needs foreseen at this time. SW signing off; available upon request.    Simba Smith, MSW, LSW ([id number])   Care Transitions

## 2025-04-30 NOTE — PROGRESS NOTES
Occupational Therapy                 Therapy Communication Note    Patient Name: Kael Zepeda  MRN: [id number]  Department: [facility] OR  Room: [facility] OR  Today's Date: 4/30/2025     Discipline: Occupational Therapy    OT Missed Visit: Yes     Missed Visit Reason: Missed Visit Reason: Patient in a medical procedure (OT eval attempted. pt currently in OR for procedure.)    Missed Time: Attempt    Comment:

## 2025-04-30 NOTE — PROGRESS NOTES
04/30/25 1021   Discharge Planning   Living Arrangements Family members   Support Systems Family members   Assistance Needed independent at baseline   Type of Residence Private residence   Home or Post Acute Services None   Expected Discharge Disposition Home   Does the patient need discharge transport arranged? No   Intensity of Service   Intensity of Service 0-30 min     Met with patient at bedside, introduced self and role as RN TCC. Patient lives at home with brother. He is able to care for self at baseline. He has been following with outpatient wound care with Dr Raya for his foot wound, which is chronic. He has had to have toes removed in past. He is admitted for concern for OM on outpatient MRI. Podiatry and ID consulted. Patient prefers to return home with brother on discharge, possible Home Care if needed. TCC to follow for discharge plans.

## 2025-04-30 NOTE — PROGRESS NOTES
Kael Zepeda is a 59 y.o. male on day 1 of admission presenting with Osteomyelitis of right foot, unspecified type (Multi).      Subjective   Kael Zepeda is a 59 y.o. male with PMHx s/f insulin-dependent diabetes, hepatitis C, fibromyalgia, coronary artery disease history CABG, COPD, peripheral neuropathy, chronic methadone therapy, hypothyroidism, rheumatoid arthritis chronic right foot wound presenting with worsening wound and pain.  Patient had been following with podiatry for over a year due to persistent wound in the right lower extremity.  Patient recently had an outpatient MRI showing findings concerning for tenosynovitis and osteomyelitis septic arthritis.  He was treated with Keflex and Bactrim by infectious disease who had also advised patient to come into the hospital prior cultures were abnormal for MRSA last year.  He did follow-up with Dr. Raya today who advised him to come to the emergency department for further evaluation and treatment.  Presently patient is denying any fevers or chills any nausea or vomiting his pain is tolerable no urinary symptoms no diarrhea.  CBC with WBC 5.1, Hgb 9.4, Plts 474. . Trop 135. Lactate 1.5      4/30/2025: No acute events overnight. Vitals stable. CBC reviewed, largely unchanged from baseline, no leukocytosis, has microcytic hypochromic anemia and thrombocytosis.  BMP reviewed, unremarkable  Await podiatry recommendations. Follow blood cx x2  Stop Zosyn as recent cx w w SA and anaerobes only, start unasyn, continue vanco  Seen by podiatry, planning R TMA today.        Review of Systems   Constitutional:  Negative for appetite change, chills, diaphoresis, fatigue and fever.   HENT:  Negative for congestion, ear pain, facial swelling, hearing loss, nosebleeds, sore throat, tinnitus and trouble swallowing.    Eyes:  Negative for pain.   Respiratory:  Negative for cough, chest tightness, shortness of breath and wheezing.    Cardiovascular:   Negative for chest pain, palpitations and leg swelling.   Gastrointestinal:  Negative for abdominal pain, blood in stool, constipation, diarrhea, nausea and vomiting.   Genitourinary:  Negative for dysuria, flank pain, frequency, hematuria and urgency.   Musculoskeletal:  Negative for back pain and joint swelling.   Skin:  Positive for wound. Negative for rash.   Neurological:  Negative for dizziness, syncope, weakness, light-headedness, numbness and headaches.   Hematological:  Does not bruise/bleed easily.   Psychiatric/Behavioral:  Negative for behavioral problems, hallucinations and suicidal ideas.           Objective     Last Recorded Vitals  /62 (BP Location: Left arm, Patient Position: Lying)   Pulse 68   Temp 36.3 °C (97.4 °F) (Temporal)   Resp 16   Wt 81.6 kg (180 lb)   SpO2 99%     Image Results  Imaging  XR foot right 3+ views  Result Date: 4/29/2025  1. Findings suggesting osteomyelitis of the 1st metatarsal stump as well as of the 2nd metatarsal head and 2nd proximal phalanx base with septic arthritis of the 2nd MTP joint. Consider MRI for further evaluation if clinically warranted.   MACRO: None.   Signed by: Jennifer Macias 4/29/2025 11:23 AM Dictation workstation:   [id number]      Cardiology, Vascular, and Other Imaging  No other imaging results found for the past 7 days       Lab Results  Results for orders placed or performed during the hospital encounter of 04/29/25 (from the past 24 hours)   C-reactive protein   Result Value Ref Range    C-Reactive Protein 7.25 (H) <1.00 mg/dL   Sedimentation rate, automated   Result Value Ref Range    Sedimentation Rate 120 (H) 0 - 20 mm/h   Blood Culture    Specimen: Peripheral Venipuncture; Blood culture   Result Value Ref Range    Blood Culture Loaded on Instrument - Culture in progress    CBC and Auto Differential   Result Value Ref Range    WBC 5.1 4.4 - 11.3 x10*3/uL    nRBC 0.0 0.0 - 0.0 /100 WBCs    RBC 4.47 (L) 4.50 - 5.90 x10*6/uL     Hemoglobin 9.4 (L) 13.5 - 17.5 g/dL    Hematocrit 30.8 (L) 41.0 - 52.0 %    MCV 69 (L) 80 - 100 fL    MCH 21.0 (L) 26.0 - 34.0 pg    MCHC 30.5 (L) 32.0 - 36.0 g/dL    RDW 17.4 (H) 11.5 - 14.5 %    Platelets 474 (H) 150 - 450 x10*3/uL    Neutrophils % 62.6 40.0 - 80.0 %    Immature Granulocytes %, Automated 2.0 (H) 0.0 - 0.9 %    Lymphocytes % 21.4 13.0 - 44.0 %    Monocytes % 11.2 2.0 - 10.0 %    Eosinophils % 2.2 0.0 - 6.0 %    Basophils % 0.6 0.0 - 2.0 %    Neutrophils Absolute 3.19 1.20 - 7.70 x10*3/uL    Immature Granulocytes Absolute, Automated 0.10 0.00 - 0.70 x10*3/uL    Lymphocytes Absolute 1.09 (L) 1.20 - 4.80 x10*3/uL    Monocytes Absolute 0.57 0.10 - 1.00 x10*3/uL    Eosinophils Absolute 0.11 0.00 - 0.70 x10*3/uL    Basophils Absolute 0.03 0.00 - 0.10 x10*3/uL   Comprehensive metabolic panel   Result Value Ref Range    Glucose 164 (H) 74 - 99 mg/dL    Sodium 136 136 - 145 mmol/L    Potassium 4.6 3.5 - 5.3 mmol/L    Chloride 98 98 - 107 mmol/L    Bicarbonate 25 21 - 32 mmol/L    Anion Gap 18 10 - 20 mmol/L    Urea Nitrogen 15 6 - 23 mg/dL    Creatinine 0.81 0.50 - 1.30 mg/dL    eGFR >90 >60 mL/min/1.73m*2    Calcium 8.7 8.6 - 10.3 mg/dL    Albumin 3.3 (L) 3.4 - 5.0 g/dL    Alkaline Phosphatase 181 (H) 33 - 136 U/L    Total Protein 7.1 6.4 - 8.2 g/dL    AST 21 9 - 39 U/L    Bilirubin, Total 0.3 0.0 - 1.2 mg/dL    ALT 13 10 - 52 U/L   Blood Culture    Specimen: Peripheral Venipuncture; Blood culture   Result Value Ref Range    Blood Culture Loaded on Instrument - Culture in progress    Lactate   Result Value Ref Range    Lactate 1.5 0.4 - 2.0 mmol/L   POCT GLUCOSE   Result Value Ref Range    POCT Glucose 176 (H) 74 - 99 mg/dL   POCT GLUCOSE   Result Value Ref Range    POCT Glucose 78 74 - 99 mg/dL   POCT GLUCOSE   Result Value Ref Range    POCT Glucose 238 (H) 74 - 99 mg/dL   CBC   Result Value Ref Range    WBC 5.6 4.4 - 11.3 x10*3/uL    nRBC 0.0 0.0 - 0.0 /100 WBCs    RBC 4.68 4.50 - 5.90 x10*6/uL     Hemoglobin 9.8 (L) 13.5 - 17.5 g/dL    Hematocrit 33.6 (L) 41.0 - 52.0 %    MCV 72 (L) 80 - 100 fL    MCH 20.9 (L) 26.0 - 34.0 pg    MCHC 29.2 (L) 32.0 - 36.0 g/dL    RDW 17.9 (H) 11.5 - 14.5 %    Platelets 523 (H) 150 - 450 x10*3/uL   POCT GLUCOSE   Result Value Ref Range    POCT Glucose 96 74 - 99 mg/dL        Medications  Scheduled medications:  Scheduled Medications[1]  Continuous medications:  Continuous Medications[2]  PRN medications:  PRN Medications[3]     Physical Exam  Constitutional:       General: He is not in acute distress.     Appearance: Normal appearance.   HENT:      Head: Normocephalic and atraumatic.      Right Ear: External ear normal.      Left Ear: External ear normal.      Nose: Nose normal.      Mouth/Throat:      Mouth: Mucous membranes are moist.      Pharynx: Oropharynx is clear.   Eyes:      Extraocular Movements: Extraocular movements intact.      Conjunctiva/sclera: Conjunctivae normal.      Pupils: Pupils are equal, round, and reactive to light.   Cardiovascular:      Rate and Rhythm: Normal rate and regular rhythm.      Pulses: Normal pulses.      Heart sounds: Normal heart sounds.   Pulmonary:      Effort: Pulmonary effort is normal. No respiratory distress.      Breath sounds: Normal breath sounds. No wheezing, rhonchi or rales.   Abdominal:      General: Bowel sounds are normal.      Palpations: Abdomen is soft.      Tenderness: There is no abdominal tenderness. There is no right CVA tenderness, left CVA tenderness, guarding or rebound.   Musculoskeletal:         General: No swelling. Normal range of motion.      Cervical back: Normal range of motion and neck supple.   Skin:     General: Skin is warm and dry.      Capillary Refill: Capillary refill takes less than 2 seconds.      Findings: Lesion present. No rash.      Comments: R foot wound dressed, reviewed images in media tab with patient at bedside   Neurological:      General: No focal deficit present.      Mental Status: He  is alert and oriented to person, place, and time. Mental status is at baseline.   Psychiatric:         Mood and Affect: Mood normal.         Behavior: Behavior normal.                  Assessment/Plan      Diabetic foot wound right lower extremity  Prior MRI findings reportedly concerning for osteomyelitis tenosynovitis  History of prior MRSA infection in same wound  We will continue patient on vancomycin/unasyn  Consult infectious disease and podiatry  Planning R TMA 4/30/25     Chronic methadone use  We will restart patient's methadone once dosages verified by pharmacy     Peripheral diabetic neuropathy  Resume Lyrica     IDDM-II  Patient will be placed on sliding scale insulin at a reduced dose of his Lantus  Continue consistent carbohydrate diet     Coronary artery disease history of CABG  Will review and reconcile patient's home medications continue his ASA therapy  Should not be on celebrex- will hold while here     COPD  No exacerbation at this time  Reconcile his home medications    Code Status: Full Code                 DVT ppx: Lovenox     Please see orders for more complete plan    Sera Rodriguez PA-C         [1] aspirin, 81 mg, oral, Daily  atorvastatin, 40 mg, oral, Nightly  celecoxib, 200 mg, oral, BID  DULoxetine, 40 mg, oral, Daily  enoxaparin, 40 mg, subcutaneous, q24h  fluticasone furoate-vilanteroL, 1 puff, inhalation, Daily  insulin glargine, 15 Units, subcutaneous, q24h  insulin lispro, 0-10 Units, subcutaneous, TID AC  levothyroxine, 50 mcg, oral, Daily  metoprolol succinate XL, 25 mg, oral, Daily  pantoprazole, 40 mg, oral, Daily before breakfast   Or  pantoprazole, 40 mg, intravenous, Daily before breakfast  piperacillin-tazobactam, 3.375 g, intravenous, q6h  polyethylene glycol, 17 g, oral, Daily  predniSONE, 1 mg, oral, Daily  pregabalin, 200 mg, oral, TID  thiamine, 100 mg, oral, Daily  tiotropium, 2 puff, inhalation, Daily  vancomycin, 1,250 mg, intravenous, q12h    [2]    [3] PRN  medications: acetaminophen **OR** acetaminophen **OR** acetaminophen, bisacodyl, bisacodyl, dextrose, dextrose, glucagon, glucagon, guaiFENesin, melatonin, ondansetron ODT **OR** ondansetron, vancomycin

## 2025-04-30 NOTE — CARE PLAN
Problem: Pain - Adult  Goal: Verbalizes/displays adequate comfort level or baseline comfort level  Outcome: Progressing     Problem: Safety - Adult  Goal: Free from fall injury  Outcome: Progressing     Problem: Discharge Planning  Goal: Discharge to home or other facility with appropriate resources  Outcome: Progressing     Problem: Chronic Conditions and Co-morbidities  Goal: Patient's chronic conditions and co-morbidity symptoms are monitored and maintained or improved  Outcome: Progressing     Problem: Nutrition  Goal: Nutrient intake appropriate for maintaining nutritional needs  Outcome: Progressing   The patient's goals for the shift include  Pt will remain free of injury this shift    The clinical goals for the shift include no falls

## 2025-04-30 NOTE — CONSULTS
"Nutrition Initial Assessment:   Nutrition Assessment    Reason for Assessment: Admission nursing screening    Patient is a 59 y.o. male presenting with osteomyelitis of right foot. Consulted by MST for wound. Noting wound to R foot (posterior, surgical). Pt currently NPO for procedure. Pt with poor PO prior to NPO. Rec; ensure plus HP 3x with all meals (350 kcals and 20g protein).    Nutrition History:  Energy Intake: Poor < 50 %  Food and Nutrient History: Pt was NPO for procedure. Diet advancing to cons cho after. Pt with poor PO prior to procedure, rec; ensure plus HP 3x with neals.    Anthropometrics:  Height: 170.2 cm (5' 7\")   Weight: 81.6 kg (180 lb)   BMI (Calculated): 28.19    Weight Change %:  Weight History / % Weight Change: no significant wt changes to note.    Nutrition Focused Physical Exam Findings:  Subcutaneous Fat Loss:   Defer Subcutaneous Fat Loss Assessment: Defer all  Defer All Reason: unable to assess  Muscle Wasting:  Defer Muscle Wasting Assessment: Defer all  Defer All Reason: unable to assess  Edema:  Edema: none  Physical Findings:  Skin: Positive (noting R foot and 2nd toe wounds.)    Nutrition Significant Labs:  CBC Trend:   Results from last 7 days   Lab Units 04/30/25  0515 04/29/25  1031   WBC AUTO x10*3/uL 5.6 5.1   RBC AUTO x10*6/uL 4.68 4.47*   HEMOGLOBIN g/dL 9.8* 9.4*   HEMATOCRIT % 33.6* 30.8*   MCV fL 72* 69*   PLATELETS AUTO x10*3/uL 523* 474*    , BMP Trend:   Results from last 7 days   Lab Units 04/30/25  0515 04/29/25  1031   GLUCOSE mg/dL 89 164*   CALCIUM mg/dL 8.7 8.7   SODIUM mmol/L 136 136   POTASSIUM mmol/L 4.2 4.6   CO2 mmol/L 24 25   CHLORIDE mmol/L 103 98   BUN mg/dL 14 15   CREATININE mg/dL 0.87 0.81    , A1C:  Lab Results   Component Value Date    HGBA1C 8.2 (A) 01/31/2025     Nutrition Specific Medications:  Scheduled Medications[1]     Dietary Orders (From admission, onward)       Start     Ordered    04/30/25 1531  Oral nutritional supplements  Until " discontinued        Question Answer Comment   Deliver with Breakfast    Deliver with Dinner    Deliver with Lunch    Select supplement: Ensure Plus High Protein        04/30/25 1530    04/29/25 1220  May Participate in Room Service  ( ROOM SERVICE MAY PARTICIPATE)  Once        Question:  .  Answer:  Yes    04/29/25 1219                  Estimated Needs:   Total Energy Estimated Needs in 24 hours (kCal): 2050 kCal  Method for Estimating Needs: 25kcals/kg BW  Total Protein Estimated Needs in 24 Hours (g):  (82-98)  Method for Estimating 24 Hour Protein Needs: 1-1.2g/kg BW  Total Fluid Estimated Needs in 24 Hours (mL): 2050 mL  Method for Estimating 24 Hour Fluid Needs: 1 ml/kcal or per MD        Nutrition Diagnosis   Malnutrition Diagnosis  Patient has Malnutrition Diagnosis: No    Nutrition Diagnosis  Patient has Nutrition Diagnosis: Yes  Diagnosis Status (1): New  Nutrition Diagnosis 1: Predicted inadequate nutrient intake  Related to (1): poor PO  As Evidenced by (1): pt with poor PO intakes since admission       Nutrition Interventions/Recommendations   Nutrition prescription for oral nutrition    Nutrition Recommendations:  Individualized Nutrition Prescription Provided for : Continue with cons cho diet order. Ensure plus 3x daily with meals. (350 kcals and 20g pro each)    Nutrition Interventions/Goals:   Meals and Snacks: Carbohydrate-modified diet  Goal: provide diet as ordered.  Medical Food Supplement: Commercial beverage medical food supplement therapy  Goal: ensure plus HP 3x daily (350 kcals and 20g pro)      Education Documentation  No documentation found.       Nutrition Monitoring and Evaluation   Food/Nutrient Related History Monitoring  Monitoring and Evaluation Plan: Intake / amount of food, Estimated Energy Intake  Estimated Energy Intake: Energy intake 50 -75% of estimated energy needs  Intake / Amount of food: Consumes at least 50% or more of meals/snacks/supplements  Additional Plans: ensure  plus HP 3x daily.    Anthropometric Measurements  Monitoring and Evaluation Plan: Body weight  Body Weight: Body weight - Maintain stable weight    Biochemical Data, Medical Tests and Procedures  Monitoring and Evaluation Plan: GI profile    Physical Exam Findings  Monitoring and Evaluation Plan: Skin  Skin Finding: Impaired wound healing - Skin to heal    Time Spent (min): 60 minutes       [1] [Transfer Hold] ampicillin-sulbactam, 3 g, intravenous, q6h  [Transfer Hold] aspirin, 81 mg, oral, Daily  [Transfer Hold] atorvastatin, 40 mg, oral, Nightly  [Transfer Hold] DULoxetine, 40 mg, oral, Daily  [Transfer Hold] enoxaparin, 40 mg, subcutaneous, q24h  [Transfer Hold] fluticasone furoate-vilanteroL, 1 puff, inhalation, Daily  [Transfer Hold] insulin glargine, 15 Units, subcutaneous, q24h  [Transfer Hold] insulin lispro, 0-10 Units, subcutaneous, TID AC  [Transfer Hold] levothyroxine, 50 mcg, oral, Daily  [Transfer Hold] methadone, 78 mg, oral, Nightly  [Transfer Hold] methadone, 87 mg, oral, Daily  [Transfer Hold] metoprolol succinate XL, 25 mg, oral, Daily  [Transfer Hold] pantoprazole, 40 mg, oral, Daily before breakfast   Or  [Transfer Hold] pantoprazole, 40 mg, intravenous, Daily before breakfast  [Transfer Hold] polyethylene glycol, 17 g, oral, Daily  [Transfer Hold] predniSONE, 1 mg, oral, Daily  [Transfer Hold] pregabalin, 200 mg, oral, TID  [Transfer Hold] thiamine, 100 mg, oral, Daily  [Transfer Hold] tiotropium, 2 puff, inhalation, Daily  [Transfer Hold] vancomycin, 1,750 mg, intravenous, q24h

## 2025-04-30 NOTE — OP NOTE
Transmetatarsal amputation right foot (R) Operative Note     Date: 2025  OR Location: [facility] OR    Name: Kael Zepeda, : 1965, Age: 59 y.o., MRN: [id number], Sex: male    Diagnosis  Pre-op Diagnosis      * Osteomyelitis of right foot, unspecified type (Multi) [M86.9]     * Ulcer of toe of right foot, with necrosis of bone (Multi) [L97.514] Post-op Diagnosis     * Osteomyelitis of right foot, unspecified type (Multi) [M86.9]     * Ulcer of toe of right foot, with necrosis of bone (Multi) [L97.514]     Procedures  Transmetatarsal amputation right foot  99054 - MA AMPUTATION FOOT TRANSMETARSAL      Surgeons      * William Raya - Primary    Resident/Fellow/Other Assistant:  Surgeons and Role:  * No surgeons found with a matching role *    Staff:   Scrub Person: Shiloh  Circulator: Asya    Anesthesia Staff: CRNA: CHACE Hammonds-CRNA    Procedure Summary  Anesthesia: Monitor Anesthesia Care  ASA: III  Estimated Blood Loss: 5mL  Intra-op Medications:   Administrations occurring from 1245 to 1400 on 25:   Medication Name Total Dose   bupivacaine PF (Marcaine) 0.5 % (5 mg/mL) injection 20 mL   dexmedeTOMIDine (Precedex) bolus from bag 20 mcg   midazolam (Versed) injection 1 mg/mL 2 mg   phenylephrine 100 mcg/mL syringe 10 mL (prefilled) 300 mcg   propofol (Diprivan) injection 10 mg/mL 200 mg              Anesthesia Record               Intraprocedure I/O Totals          Intake    Dexmedetomidine 0.00 mL    The total shown is the total volume documented since Anesthesia Start was filed.    Total Intake 0 mL          Specimen: No specimens collected              Drains and/or Catheters: * None in log *    Tourniquet Times:     Total Tourniquet Time Documented:  Calf (Right) - 71 minutes  Total: Calf (Right) - 71 minutes      Implants:     Findings: Under mild sedation the patient was brought into the OR and placed on the table in a supine position.  A pneumatic ankle tourniquet was applied to the  patient's right foot.  Following IV sedation a local block was given to the right foot.  The right foot was then scrubbed, prepped, and draped in usual aseptic manner.  An Esmarch bandage was then applied to the right ankle and used for hemostasis.    Next a transverse fishmouth incision was made straight down to the level of bone at the distal metatarsal region of metatarsals 2 through 5.  The skin incision was then extended plantarly medially and laterally to encompass the forefoot.  All soft tissue attachments were resected from the dorsal surface of the metatarsals and a full-thickness flap.  Vascular status of the flap was assessed and noted to be excellent at this time.  Using oscillating bone saw Metatarsals 2 through 5 were resected maintaining the metatarsal parabola at the midshaft level.  The resected meds and distal aspect of the forefoot was resected from the free plantar flap and passed from the field in toto.  All rough edges were smoothed with the bone saw.  The plantar and dorsal flaps were resected of all diseased tissue.  Remaining tissue was healthy in appearance.  All exposed tendons were tenotomized as far proximal as possible.    The wound was flushed thoroughly with normal sterile saline.  A clean margin of bone was sent to micro.  A rotational skin flap was then performed thus minimizing the existing wound to 10% of its original size.    The wound edges were reapproximated using Prolene and Ethilon.  A right pneumatic ankle tourniquet was removed and a prompt hyperemic response was noted to the right forefoot.      Xeroform and 4 x 4 fluffs were then applied to the incision site followed by a Kerlix wrap.  The patient tolerated the procedure and anesthesia well and was transferred to the PACU with vital signs stable and vascular status intact.    Indications: Kael Zepeda is an 59 y.o. male who is having surgery for Osteomyelitis of right foot, unspecified type (Multi) [M86.9]  Ulcer  of toe of right foot, with necrosis of bone (Multi) [L97.514].     The patient was seen in the preoperative area. The risks, benefits, complications, treatment options, non-operative alternatives, expected recovery and outcomes were discussed with the patient. The possibilities of reaction to medication, pulmonary aspiration, injury to surrounding structures, bleeding, recurrent infection, the need for additional procedures, failure to diagnose a condition, and creating a complication requiring transfusion or operation were discussed with the patient. The patient concurred with the proposed plan, giving informed consent.  The site of surgery was properly noted/marked if necessary per policy. The patient has been actively warmed in preoperative area. Preoperative antibiotics have been ordered and given within 1 hours of incision. Venous thrombosis prophylaxis are not indicated.    Procedure Details: Right foot transmetatarsal amputation with rotational skin flap.  Evidence of Infection:   Complications:  None; patient tolerated the procedure well.    Disposition: PACU - hemodynamically stable.  Condition: stable                 Additional Details: none    Attending Attestation:     William Raya  Phone Number: [phone]

## 2025-04-30 NOTE — CONSULTS
Infectious Disease Inpatient Consult    Consults  Referred by Dr salo Najera MD: Radha Greer MD    Reason For Consult  R foot OM    History Of Present Illness  Kael Zepeda is a 59 y.o. male presenting with  PMHx s/f insulin-dependent diabetes, hepatitis C, fibromyalgia, coronary artery disease history CABG, COPD, peripheral neuropathy, chronic methadone therapy, hypothyroidism, rheumatoid arthritis chronic right foot wound presenting with worsening wound and pain.  Patient had been following with podiatry for over a year due to persistent wound in the right lower extremity.  Patient recently had an outpatient MRI showing findings concerning for tenosynovitis and osteomyelitis septic arthritis.  He was treated with Keflex and Bactrim by infectious disease who had also advised patient to come into the hospital prior cultures were abnormal for MRSA last year.  He did follow-up with Dr. Elver macdonald who advised him to come to the emergency department for further evaluation and treatment.  Presently patient is denying any fevers or chills any nausea or vomiting his pain is tolerable no urinary symptoms no diarrhea.       Past Medical History  He has a past medical history of Abnormal result of other cardiovascular function study (06/28/2018), Atherosclerotic heart disease of native coronary artery with unstable angina pectoris (06/28/2018), Fibromyalgia, Personal history of other diseases of the circulatory system, Personal history of other diseases of the musculoskeletal system and connective tissue, Personal history of other diseases of the musculoskeletal system and connective tissue, Personal history of other diseases of the musculoskeletal system and connective tissue, Personal history of other endocrine, nutritional and metabolic disease, Personal history of other endocrine, nutritional and metabolic disease, and Type 2 diabetes mellitus without complications  "(2022).    Surgical History  He has a past surgical history that includes Septoplasty (2018); Back surgery (2018); and Cardiac catheterization (N/A, 2024).     Social History     Occupational History    Not on file   Tobacco Use    Smoking status: Former     Current packs/day: 0.00     Types: Cigarettes     Quit date: 2024     Years since quittin.8    Smokeless tobacco: Never   Vaping Use    Vaping status: Not on file   Substance and Sexual Activity    Alcohol use: Not Currently    Drug use: Never    Sexual activity: Never     Travel History   Travel since 24    No documented travel since 25          Family History  Family History[1]  Allergies  Patient has no known allergies.     Immunization History   Administered Date(s) Administered    COVID-19, mRNA, LNP-S, PF, 30 mcg/0.3 mL dose 2021    Influenza, Unspecified 2018    Influenza, seasonal, injectable 2014    Pneumococcal polysaccharide vaccine, 23-valent, age 2 years and older (PNEUMOVAX 23) 2011     Medications  Home medications:  Prescriptions Prior to Admission[2]  Current medications:  Scheduled medications  Scheduled Medications[3]  Continuous medications  Continuous Medications[4]  PRN medications  PRN Medications[5]    Review of Systems     Objective  Range of Vitals (last 24 hours)  Heart Rate:  [64-83]   Temp:  [36.3 °C (97.4 °F)-37 °C (98.6 °F)]   Resp:  [10-18]   BP: (102-127)/(58-73)   Height:  [170.2 cm (5' 7\")-170.2 cm (5' 7.01\")]   Weight:  [81.6 kg (180 lb)]   SpO2:  [93 %-99 %]   Daily Weight  25 : 81.6 kg (180 lb)    Body mass index is 28.19 kg/m².     Physical Exam  /62 (BP Location: Left arm, Patient Position: Lying)   Pulse 68   Temp 36.3 °C (97.4 °F) (Temporal)   Resp 16   Ht 1.702 m (5' 7.01\")   Wt 81.6 kg (180 lb)   SpO2 99%   BMI 28.19 kg/m²   Temp (24hrs), Av.6 °C (97.8 °F), Min:36.3 °C (97.4 °F), Max:37 °C (98.6 °F)    General: alert, oriented, " "NAD  Lungs: bilaterally clear to auscultation  Heart: regular rate and rhythm  Abdomen: soft, non tender, non distended, BS+  Extremities: R foot wound dressing  No rashes  No joint inflammation  Neck supple  Lines ok  No CVAT     Relevant Results    Labs  Results from last 72 hours   Lab Units 04/30/25  0515 04/29/25  1031   WBC AUTO x10*3/uL 5.6 5.1   HEMOGLOBIN g/dL 9.8* 9.4*   HEMATOCRIT % 33.6* 30.8*   PLATELETS AUTO x10*3/uL 523* 474*   NEUTROS PCT AUTO %  --  62.6   LYMPHS PCT AUTO %  --  21.4   MONOS PCT AUTO %  --  11.2   EOS PCT AUTO %  --  2.2     Results from last 72 hours   Lab Units 04/30/25  0515 04/29/25  1031   SODIUM mmol/L 136 136   POTASSIUM mmol/L 4.2 4.6   CHLORIDE mmol/L 103 98   CO2 mmol/L 24 25   BUN mg/dL 14 15   CREATININE mg/dL 0.87 0.81   GLUCOSE mg/dL 89 164*   CALCIUM mg/dL 8.7 8.7   ANION GAP mmol/L 13 18   EGFR mL/min/1.73m*2 >90 >90     Results from last 72 hours   Lab Units 04/29/25  1031   ALK PHOS U/L 181*   BILIRUBIN TOTAL mg/dL 0.3   PROTEIN TOTAL g/dL 7.1   ALT U/L 13   AST U/L 21   ALBUMIN g/dL 3.3*     Estimated Creatinine Clearance: 92.3 mL/min (by C-G formula based on SCr of 0.87 mg/dL).  C-Reactive Protein   Date Value Ref Range Status   04/29/2025 7.25 (H) <1.00 mg/dL Final   10/09/2024 13.42 (H) <1.00 mg/dL Final   10/12/2023 5.86 (H) <1.00 mg/dL Final     Sedimentation Rate   Date Value Ref Range Status   04/29/2025 120 (H) 0 - 20 mm/h Final   10/09/2024 51 (H) 0 - 20 mm/h Final   03/12/2024 71 (H) 0 - 20 mm/h Final     No results found for: \"HIV1X2\", \"HIVCONF\", \"YVRKNW9UB\"  No results found for: \"HEPCABINIT\", \"HEPCAB\", \"HCVPCRQUANT\"  Microbiology  No results found for the last 100 days.        No lab exists for component: \"AGALPCRNB\"  Results from last 7 days   Lab Units 04/30/25  0515   VANCOMYCIN RM ug/mL 24.4*                       Imaging  Imaging  XR foot right 3+ views  Result Date: 4/29/2025  1. Findings suggesting osteomyelitis of the 1st metatarsal stump as " well as of the 2nd metatarsal head and 2nd proximal phalanx base with septic arthritis of the 2nd MTP joint. Consider MRI for further evaluation if clinically warranted.   MACRO: None.   Signed by: Jennifer Macias 4/29/2025 11:23 AM Dictation workstation:   [id number]      Cardiology, Vascular, and Other Imaging  No other imaging results found for the past 7 days      Echo  No results found for this or any previous visit.    Assessment   R foot wound infection  R foot OM  DM2 w neuropathy  HCV  CAD  COPD  RA  Hypothyroid    Plan   Vancomycin  Zosyn  Vanco T  Bcx x 2  Podiatry consult  R foot XR rev and discussed and w Findings suggesting osteomyelitis of the 1st metatarsal stump as  well as of the 2nd metatarsal head and 2nd proximal phalanx base with septic arthritis of the 2nd MTP joint.     I spent 32 minutes in the professional and overall care of this patient.      Freddy Mike MD       [1]   Family History  Problem Relation Name Age of Onset    Sleep apnea Father      Sleep apnea Sister     [2]   Medications Prior to Admission   Medication Sig Dispense Refill Last Dose/Taking    albuterol 90 mcg/actuation inhaler Inhale 2 puffs by mouth into the lungs every 6 hours if needed for shortness of breath. 18 g 11     apremilast (Otezla Starter) 10 mg (4)-20 mg (4)-30 mg(19) tablets,dose pack tablet starter pack Take 1 tablet by mouth 2 times a day.       aspirin 81 mg EC tablet Take 1 tablet (81 mg) by mouth once daily.       atorvastatin (Lipitor) 40 mg tablet Take 1 tablet (40 mg) by mouth once daily.       blood sugar diagnostic (True Metrix Glucose Test Strip) strip Use to check blood sugar 4 times daily 200 each 0     celecoxib (CeleBREX) 200 mg capsule Take 1 capsule (200 mg) by mouth 2 times a day.       clopidogrel (Plavix) 75 mg tablet Take 1 tablet (75 mg) by mouth once daily. (Patient not taking: Reported on 4/29/2025) 90 tablet 3 Not Taking    docusate sodium (Colace) 100 mg capsule Take 1 capsule (100  mg) by mouth 2 times a day as needed (hard stools). (Patient not taking: Reported on 4/29/2025) 60 capsule 0 Not Taking    DULoxetine 40 mg DR capsule Take 1 capsule (40 mg) by mouth once daily.       fluticasone propion-salmeteroL (Wixela Inhub) 100-50 mcg/dose diskus inhaler Inhale 1 puff by mouth into the lungs 2 times a day. Rinse mouth with water after use to reduce aftertaste and incidence of candidiasis. Do not swallow. (Patient taking differently: Inhale 1 puff 2 times a day as needed.) 60 each 11     folic acid (Folvite) 1 mg tablet Take 1 tablet (1 mg) by mouth once daily. (Patient not taking: Reported on 4/29/2025) 30 tablet 11 Not Taking    insulin glargine (Lantus) 100 unit/mL (3 mL) pen Inject 25 Units under the skin once every 24 hours. (Patient taking differently: Inject 40 Units under the skin once every 24 hours.) 30 mL 3     insulin lispro (HumaLOG) 100 unit/mL injection Inject 8 Units plus 0 - 10 units per sliding scale under the skin 3 times a day before meals. Take as directed per insulin instructions. 15 mL 0     lancets 33 gauge misc Use to check blood sugar 4 times daily 200 each 0     levothyroxine (Synthroid, Levoxyl) 50 mcg tablet Take 1 tablet (50 mcg) by mouth once daily.       metFORMIN (Glucophage) 500 mg tablet Take 1 tablet (500 mg) by mouth 2 times a day. 60 tablet 2     methadone HCl (METHADONE ORAL) Take by mouth. Unknown dosage, need to call clinic to verify       metoprolol succinate XL (Toprol-XL) 25 mg 24 hr tablet Take 1 tablet (25 mg) by mouth once daily. Do not crush or chew. 90 tablet 3     omeprazole (PriLOSEC) 40 mg DR capsule Take 1 capsule (40 mg) by mouth early in the morning..       pantoprazole (ProtoNix) 40 mg EC tablet Take 1 tablet (40 mg) by mouth once daily in the morning. Take before meals. Do not crush, chew, or split. 30 tablet 11     predniSONE (Deltasone) 1 mg tablet Take 1 tablet (1 mg) by mouth once daily.       pregabalin (Lyrica) 200 mg capsule Take 1  capsule (200 mg) by mouth 3 times a day.       sennosides-docusate sodium (Chelsie-Colace) 8.6-50 mg tablet Take 2 tablets by mouth 2 times a day. (Patient not taking: Reported on 4/29/2025) 120 tablet 11 Not Taking    thiamine (Vitamin B-1) 100 mg tablet Take 1 tablet (100 mg) by mouth once daily. (Patient not taking: Reported on 4/29/2025) 30 tablet 11 Not Taking    tiotropium (Spiriva Respimat) 2.5 mcg/actuation inhaler Inhale 2 puffs by mouth into the lungs once daily. 4 g 11    [3] aspirin, 81 mg, oral, Daily  atorvastatin, 40 mg, oral, Nightly  DULoxetine, 40 mg, oral, Daily  enoxaparin, 40 mg, subcutaneous, q24h  fluticasone furoate-vilanteroL, 1 puff, inhalation, Daily  insulin glargine, 15 Units, subcutaneous, q24h  insulin lispro, 0-10 Units, subcutaneous, TID AC  levothyroxine, 50 mcg, oral, Daily  metoprolol succinate XL, 25 mg, oral, Daily  pantoprazole, 40 mg, oral, Daily before breakfast   Or  pantoprazole, 40 mg, intravenous, Daily before breakfast  piperacillin-tazobactam, 3.375 g, intravenous, q6h  polyethylene glycol, 17 g, oral, Daily  predniSONE, 1 mg, oral, Daily  pregabalin, 200 mg, oral, TID  thiamine, 100 mg, oral, Daily  tiotropium, 2 puff, inhalation, Daily  vancomycin, 1,750 mg, intravenous, q24h    [4]    [5] PRN medications: acetaminophen **OR** acetaminophen **OR** acetaminophen, bisacodyl, bisacodyl, dextrose, dextrose, glucagon, glucagon, guaiFENesin, melatonin, ondansetron ODT **OR** ondansetron, vancomycin

## 2025-04-30 NOTE — ANESTHESIA PREPROCEDURE EVALUATION
Patient: Kael Zepeda    Procedure Information       Date/Time: 04/30/25 1415    Procedure: Transmetatarsal amputation right foot (Right: Foot)    Location: [facility] / [facility]    Surgeons: William Raya DPM            Relevant Problems   Cardiac   (+) Benign essential hypertension   (+) CAD in native artery   (+) Hyperlipidemia, mixed   (+) NSTEMI (non-ST elevated myocardial infarction) (Multi)      Pulmonary   (+) Pulmonary emphysema (Multi)      Neuro   (+) Depression   (+) Generalized anxiety disorder   (+) Lumbar radiculopathy   (+) Opioid dependence, in remission   (+) Peripheral neuropathy      GI   (+) Gastroesophageal reflux disease without esophagitis      Liver   (+) Hepatitis C      Endocrine   (+) Diabetes mellitus, type 2 (Multi)   (+) Hypothyroidism, acquired      Hematology   (+) Microcytic anemia      Musculoskeletal   (+) Degeneration of intervertebral disc of lumbar region   (+) Fibromyalgia   (+) Rheumatoid arthritis involving both hands with positive rheumatoid factor (Multi)   (+) Spinal stenosis of lumbosacral region      ID   (+) Acute osteomyelitis of left foot (Multi)   (+) Hepatitis C   (+) Osteomyelitis of left foot, unspecified type (Multi)   (+) Osteomyelitis of right foot, unspecified type (Multi)       Clinical information reviewed:   Tobacco  Allergies  Meds   Med Hx  Surg Hx   Fam Hx  Soc Hx        NPO Detail:  NPO/Void Status  Carbohydrate Drink Given Prior to Surgery? : N  Date of Last Liquid: 04/30/25  Time of Last Liquid: 0800  Date of Last Solid: 04/29/25  Time of Last Solid: 1900  Last Intake Type: Clear fluids  Time of Last Void: 1100         Physical Exam    Airway  Mallampati: II  TM distance: >3 FB  Mouth opening: 3 or more finger widths     Cardiovascular Rate: normal     Dental - normal exam     Pulmonary - normal exam   Abdominal - normal exam           Anesthesia Plan    History of general anesthesia?: yes  History of complications of general  anesthesia?: no    ASA 3     MAC     The patient is not a current smoker.    Anesthetic plan and risks discussed with patient.    Plan discussed with CRNA.

## 2025-04-30 NOTE — PROGRESS NOTES
"[facility] INFECTIOUS DISEASE PROGRESS NOTE    Patient Name: Kael Zepeda  MRN: [id number]    INTERVAL HISTORY:   No fevers. No new complaints. Tolerating abx well.     Problem List[1]     ASSESSMENT:   R foot wound infection  R foot OM/septic arthritis  DM2 w neuropathy  HCV  CAD  COPD  RA  Hypothyroid    Plan   Continue Vancomycin  Stop Zosyn as recent cx w w SA and anaerobes only  Start unasyn  Vanco T  Bcx x 2  Podiatry consult  Will repeat R foot cultures today  R foot XR rev and discussed and w Findings suggesting osteomyelitis of the 1st metatarsal stump as  well as of the 2nd metatarsal head and 2nd proximal phalanx base with septic arthritis of the 2nd MTP joint.     MEDICATIONS: reviewed.  Current Medications[2]     PHYSICAL EXAM:  Vital signs: /62 (BP Location: Left arm, Patient Position: Lying)   Pulse 68   Temp 36.3 °C (97.4 °F) (Temporal)   Resp 16   Ht 1.702 m (5' 7.01\")   Wt 81.6 kg (180 lb)   SpO2 99%   BMI 28.19 kg/m²   Temp (24hrs), Av.6 °C (97.8 °F), Min:36.3 °C (97.4 °F), Max:37 °C (98.6 °F)    General: alert, oriented, NAD  Lungs: bilaterally clear to auscultation  Heart: regular rate and rhythm  Abdomen: soft, non tender, non distended, BS+  Extremities: no edema  No rashes  No joint inflammation  Neck supple  Lines ok  No CVAT              Labs:    Results for orders placed or performed during the hospital encounter of 25 (from the past 96 hours)   C-reactive protein   Result Value Ref Range    C-Reactive Protein 7.25 (H) <1.00 mg/dL   Sedimentation rate, automated   Result Value Ref Range    Sedimentation Rate 120 (H) 0 - 20 mm/h   Blood Culture    Specimen: Peripheral Venipuncture; Blood culture   Result Value Ref Range    Blood Culture Loaded on Instrument - Culture in progress    CBC and Auto Differential   Result Value Ref Range    WBC 5.1 4.4 - 11.3 x10*3/uL    nRBC 0.0 0.0 - 0.0 /100 WBCs    RBC 4.47 (L) 4.50 - 5.90 x10*6/uL    Hemoglobin 9.4 (L) 13.5 - " 17.5 g/dL    Hematocrit 30.8 (L) 41.0 - 52.0 %    MCV 69 (L) 80 - 100 fL    MCH 21.0 (L) 26.0 - 34.0 pg    MCHC 30.5 (L) 32.0 - 36.0 g/dL    RDW 17.4 (H) 11.5 - 14.5 %    Platelets 474 (H) 150 - 450 x10*3/uL    Neutrophils % 62.6 40.0 - 80.0 %    Immature Granulocytes %, Automated 2.0 (H) 0.0 - 0.9 %    Lymphocytes % 21.4 13.0 - 44.0 %    Monocytes % 11.2 2.0 - 10.0 %    Eosinophils % 2.2 0.0 - 6.0 %    Basophils % 0.6 0.0 - 2.0 %    Neutrophils Absolute 3.19 1.20 - 7.70 x10*3/uL    Immature Granulocytes Absolute, Automated 0.10 0.00 - 0.70 x10*3/uL    Lymphocytes Absolute 1.09 (L) 1.20 - 4.80 x10*3/uL    Monocytes Absolute 0.57 0.10 - 1.00 x10*3/uL    Eosinophils Absolute 0.11 0.00 - 0.70 x10*3/uL    Basophils Absolute 0.03 0.00 - 0.10 x10*3/uL   Comprehensive metabolic panel   Result Value Ref Range    Glucose 164 (H) 74 - 99 mg/dL    Sodium 136 136 - 145 mmol/L    Potassium 4.6 3.5 - 5.3 mmol/L    Chloride 98 98 - 107 mmol/L    Bicarbonate 25 21 - 32 mmol/L    Anion Gap 18 10 - 20 mmol/L    Urea Nitrogen 15 6 - 23 mg/dL    Creatinine 0.81 0.50 - 1.30 mg/dL    eGFR >90 >60 mL/min/1.73m*2    Calcium 8.7 8.6 - 10.3 mg/dL    Albumin 3.3 (L) 3.4 - 5.0 g/dL    Alkaline Phosphatase 181 (H) 33 - 136 U/L    Total Protein 7.1 6.4 - 8.2 g/dL    AST 21 9 - 39 U/L    Bilirubin, Total 0.3 0.0 - 1.2 mg/dL    ALT 13 10 - 52 U/L   Blood Culture    Specimen: Peripheral Venipuncture; Blood culture   Result Value Ref Range    Blood Culture Loaded on Instrument - Culture in progress    Lactate   Result Value Ref Range    Lactate 1.5 0.4 - 2.0 mmol/L   POCT GLUCOSE   Result Value Ref Range    POCT Glucose 176 (H) 74 - 99 mg/dL   POCT GLUCOSE   Result Value Ref Range    POCT Glucose 78 74 - 99 mg/dL   POCT GLUCOSE   Result Value Ref Range    POCT Glucose 238 (H) 74 - 99 mg/dL   CBC   Result Value Ref Range    WBC 5.6 4.4 - 11.3 x10*3/uL    nRBC 0.0 0.0 - 0.0 /100 WBCs    RBC 4.68 4.50 - 5.90 x10*6/uL    Hemoglobin 9.8 (L) 13.5 - 17.5  g/dL    Hematocrit 33.6 (L) 41.0 - 52.0 %    MCV 72 (L) 80 - 100 fL    MCH 20.9 (L) 26.0 - 34.0 pg    MCHC 29.2 (L) 32.0 - 36.0 g/dL    RDW 17.9 (H) 11.5 - 14.5 %    Platelets 523 (H) 150 - 450 x10*3/uL   Basic metabolic panel   Result Value Ref Range    Glucose 89 74 - 99 mg/dL    Sodium 136 136 - 145 mmol/L    Potassium 4.2 3.5 - 5.3 mmol/L    Chloride 103 98 - 107 mmol/L    Bicarbonate 24 21 - 32 mmol/L    Anion Gap 13 10 - 20 mmol/L    Urea Nitrogen 14 6 - 23 mg/dL    Creatinine 0.87 0.50 - 1.30 mg/dL    eGFR >90 >60 mL/min/1.73m*2    Calcium 8.7 8.6 - 10.3 mg/dL   Vancomycin   Result Value Ref Range    Vancomycin 24.4 (H) 5.0 - 20.0 ug/mL   POCT GLUCOSE   Result Value Ref Range    POCT Glucose 96 74 - 99 mg/dL                @LABRCNT(wbc:3d,hb:3d,hct:3d,plt:3d,inr:3d,aptt:3d,na:3d,k:3d,chlor:3d,co2:3d,BUN:3d,creat:3d,hepatic:3d)@    Microbiology data: reviewed    Imaging data: reviewed      Freddy Mike  Pager:[phone]  Date of service: 4/30/2025  Time of service: 8:51 AM         [1]   Patient Active Problem List  Diagnosis    Diabetic foot ulcer with osteomyelitis (Multi)    Diabetes mellitus, type 2 (Multi)    Benign essential hypertension    Gastroesophageal reflux disease without esophagitis    Corns and callosities    Degeneration of intervertebral disc of lumbar region    Depression    Dermatitis    Chronic back pain    Fibromyalgia    Generalized anxiety disorder    Hepatitis B core antibody positive    History of hepatitis C    History of hypothyroidism    Hyperlipidemia, mixed    Hypothyroidism, acquired    Lumbar radiculopathy    Lumbar spondylosis    Obesity, Class I, BMI 30-34.9    Opioid dependence, in remission    Patient's noncompliance with other medical treatment and regimen due to unspecified reason    Peripheral neuropathy    Primary insomnia    Pulmonary emphysema (Multi)    Rheumatoid arthritis involving both hands with positive rheumatoid factor (Multi)    Rheumatoid nodules (Multi)     Scrotal cyst    Spinal stenosis of lumbosacral region    Steroid-induced osteoporosis    Tobacco use disorder    Ulcer of toe of right foot, limited to breakdown of skin    Vitamin D deficiency    NSTEMI (non-ST elevated myocardial infarction) (Multi)    Microcytic anemia    CAD in native artery    Hepatitis C    Stage 2 chronic kidney disease    Osteomyelitis of left foot, unspecified type (Multi)    Cellulitis of left lower extremity    Acute osteomyelitis of left foot (Multi)    Overweight (BMI 25.0-29.9)    Sleep-related breathing disorder    RLS (restless legs syndrome)    Osteomyelitis of right foot, unspecified type (Multi)   [2]   Current Facility-Administered Medications:     acetaminophen (Tylenol) tablet 650 mg, 650 mg, oral, q4h PRN **OR** acetaminophen (Tylenol) suspension 650 mg, 650 mg, nasogastric tube, q4h PRN **OR** acetaminophen (Tylenol) suppository 650 mg, 650 mg, rectal, q4h PRN, NETTIE Pugh    aspirin EC tablet 81 mg, 81 mg, oral, Daily, CHACE Pugh-CNP, 81 mg at 04/29/25 1214    atorvastatin (Lipitor) tablet 40 mg, 40 mg, oral, Nightly, CHACE Pugh-CNP, 40 mg at 04/29/25 2058    bisacodyl (Dulcolax) EC tablet 10 mg, 10 mg, oral, Daily PRN, CHACE Pugh-CNP    bisacodyl (Dulcolax) suppository 10 mg, 10 mg, rectal, Daily PRN, CHACE Pugh-CNP    dextrose 50 % injection 12.5 g, 12.5 g, intravenous, q15 min PRN, CHACE Pugh-CNP    dextrose 50 % injection 25 g, 25 g, intravenous, q15 min PRN, CHACE Pugh-CNP    DULoxetine (Cymbalta) DR capsule 40 mg, 40 mg, oral, Daily, NETTIE Pugh    enoxaparin (Lovenox) syringe 40 mg, 40 mg, subcutaneous, q24h, CHACE Pugh-CNP, 40 mg at 04/29/25 1214    fluticasone furoate-vilanteroL (Breo Ellipta) 100-25 mcg/dose inhaler 1 puff, 1 puff, inhalation, Daily, Gopi Aguiar, APRN-CNP, 1 puff at 04/29/25 1242    glucagon (Glucagen) injection 1 mg, 1  mg, intramuscular, q15 min PRN, NETTIE Pugh    glucagon (Glucagen) injection 1 mg, 1 mg, intramuscular, q15 min PRN, NETTIE Pugh    guaiFENesin (Mucinex) 12 hr tablet 600 mg, 600 mg, oral, q12h PRN, NETTIE Pugh    insulin glargine (Lantus) injection 15 Units, 15 Units, subcutaneous, q24h, NETTIE Pugh, 15 Units at 04/29/25 2057    insulin lispro injection 0-10 Units, 0-10 Units, subcutaneous, TID AC, NETTIE Pugh, 2 Units at 04/29/25 1244    levothyroxine (Synthroid, Levoxyl) tablet 50 mcg, 50 mcg, oral, Daily, NETTIE Pugh, 50 mcg at 04/30/25 0513    melatonin tablet 3 mg, 3 mg, oral, Nightly PRN, NETTIE Pugh    metoprolol succinate XL (Toprol-XL) 24 hr tablet 25 mg, 25 mg, oral, Daily, NETTIE Pugh, 25 mg at 04/29/25 1214    ondansetron ODT (Zofran-ODT) disintegrating tablet 4 mg, 4 mg, oral, q8h PRN **OR** ondansetron (Zofran) injection 4 mg, 4 mg, intravenous, q8h PRN, NETTIE Pugh    pantoprazole (ProtoNix) EC tablet 40 mg, 40 mg, oral, Daily before breakfast **OR** pantoprazole (Protonix) injection 40 mg, 40 mg, intravenous, Daily before breakfast, NETTIE Pugh, 40 mg at 04/30/25 0647    piperacillin-tazobactam (Zosyn) 3.375 g in dextrose (iso) IV 50 mL, 3.375 g, intravenous, q6h, NETTIE Pugh, Stopped at 04/30/25 0646    polyethylene glycol (Glycolax, Miralax) packet 17 g, 17 g, oral, Daily, NETTIE Pugh    predniSONE (Deltasone) tablet 1 mg, 1 mg, oral, Daily, NETTIE Pugh    pregabalin (Lyrica) capsule 200 mg, 200 mg, oral, TID, NETTIE Pugh, 200 mg at 04/29/25 2057    thiamine (Vitamin B-1) tablet 100 mg, 100 mg, oral, Daily, NETTIE Pugh, 100 mg at 04/29/25 1214    tiotropium (Spiriva Respimat) 2.5 mcg/actuation inhaler 2 puff, 2 puff, inhalation, Daily, Gopi Aguiar,  APRN-CNP, 2 puff at 04/29/25 1242    vancomycin (Vancocin) pharmacy to dose - pharmacy monitoring, , miscellaneous, Daily PRN, Gopi Aguiar, CHACE-CNP    vancomycin 1,750 mg in dextrose 5% 500 mL IV (Ordered as: vancomycin), 1,750 mg, intravenous, q24h, dUay Rodriguez, [facility]

## 2025-05-01 ENCOUNTER — APPOINTMENT (OUTPATIENT)
Dept: WOUND CARE | Facility: CLINIC | Age: 60
End: 2025-05-01
Payer: MEDICAID

## 2025-05-01 LAB
ANION GAP SERPL CALC-SCNC: 8 MMOL/L (ref 10–20)
BUN SERPL-MCNC: 10 MG/DL (ref 6–23)
CALCIUM SERPL-MCNC: 8.1 MG/DL (ref 8.6–10.3)
CHLORIDE SERPL-SCNC: 101 MMOL/L (ref 98–107)
CO2 SERPL-SCNC: 26 MMOL/L (ref 21–32)
CREAT SERPL-MCNC: 0.84 MG/DL (ref 0.5–1.3)
EGFRCR SERPLBLD CKD-EPI 2021: >90 ML/MIN/1.73M*2
ERYTHROCYTE [DISTWIDTH] IN BLOOD BY AUTOMATED COUNT: 17.6 % (ref 11.5–14.5)
GLUCOSE BLD MANUAL STRIP-MCNC: 108 MG/DL (ref 74–99)
GLUCOSE BLD MANUAL STRIP-MCNC: 133 MG/DL (ref 74–99)
GLUCOSE BLD MANUAL STRIP-MCNC: 207 MG/DL (ref 74–99)
GLUCOSE BLD MANUAL STRIP-MCNC: 217 MG/DL (ref 74–99)
GLUCOSE SERPL-MCNC: 125 MG/DL (ref 74–99)
HCT VFR BLD AUTO: 29.4 % (ref 41–52)
HGB BLD-MCNC: 9 G/DL (ref 13.5–17.5)
MAGNESIUM SERPL-MCNC: 1.48 MG/DL (ref 1.6–2.4)
MCH RBC QN AUTO: 21 PG (ref 26–34)
MCHC RBC AUTO-ENTMCNC: 30.6 G/DL (ref 32–36)
MCV RBC AUTO: 69 FL (ref 80–100)
NRBC BLD-RTO: 0 /100 WBCS (ref 0–0)
PLATELET # BLD AUTO: 440 X10*3/UL (ref 150–450)
POTASSIUM SERPL-SCNC: 3.9 MMOL/L (ref 3.5–5.3)
RBC # BLD AUTO: 4.29 X10*6/UL (ref 4.5–5.9)
SODIUM SERPL-SCNC: 131 MMOL/L (ref 136–145)
VANCOMYCIN SERPL-MCNC: 26.1 UG/ML (ref 5–20)
WBC # BLD AUTO: 6.1 X10*3/UL (ref 4.4–11.3)

## 2025-05-01 PROCEDURE — 2500000005 HC RX 250 GENERAL PHARMACY W/O HCPCS: Performed by: PODIATRIST

## 2025-05-01 PROCEDURE — 80048 BASIC METABOLIC PNL TOTAL CA: CPT | Performed by: PHYSICIAN ASSISTANT

## 2025-05-01 PROCEDURE — 85027 COMPLETE CBC AUTOMATED: CPT | Performed by: PHYSICIAN ASSISTANT

## 2025-05-01 PROCEDURE — 2500000002 HC RX 250 W HCPCS SELF ADMINISTERED DRUGS (ALT 637 FOR MEDICARE OP, ALT 636 FOR OP/ED): Performed by: PODIATRIST

## 2025-05-01 PROCEDURE — 82947 ASSAY GLUCOSE BLOOD QUANT: CPT

## 2025-05-01 PROCEDURE — 2500000004 HC RX 250 GENERAL PHARMACY W/ HCPCS (ALT 636 FOR OP/ED): Performed by: PODIATRIST

## 2025-05-01 PROCEDURE — 2500000001 HC RX 250 WO HCPCS SELF ADMINISTERED DRUGS (ALT 637 FOR MEDICARE OP): Performed by: PODIATRIST

## 2025-05-01 PROCEDURE — 83735 ASSAY OF MAGNESIUM: CPT | Performed by: PHYSICIAN ASSISTANT

## 2025-05-01 PROCEDURE — 99233 SBSQ HOSP IP/OBS HIGH 50: CPT | Performed by: PHYSICIAN ASSISTANT

## 2025-05-01 PROCEDURE — 97166 OT EVAL MOD COMPLEX 45 MIN: CPT | Mod: GO

## 2025-05-01 PROCEDURE — 2500000004 HC RX 250 GENERAL PHARMACY W/ HCPCS (ALT 636 FOR OP/ED): Mod: JZ | Performed by: PODIATRIST

## 2025-05-01 PROCEDURE — 97162 PT EVAL MOD COMPLEX 30 MIN: CPT | Mod: GP

## 2025-05-01 PROCEDURE — 36415 COLL VENOUS BLD VENIPUNCTURE: CPT | Performed by: PODIATRIST

## 2025-05-01 PROCEDURE — 1210000001 HC SEMI-PRIVATE ROOM DAILY

## 2025-05-01 PROCEDURE — S0109 METHADONE ORAL 5MG: HCPCS | Performed by: PODIATRIST

## 2025-05-01 PROCEDURE — 36415 COLL VENOUS BLD VENIPUNCTURE: CPT | Performed by: PHYSICIAN ASSISTANT

## 2025-05-01 PROCEDURE — 80202 ASSAY OF VANCOMYCIN: CPT | Performed by: PODIATRIST

## 2025-05-01 RX ADMIN — METHADONE HYDROCHLORIDE 87 MG: 10 CONCENTRATE ORAL at 09:07

## 2025-05-01 RX ADMIN — METHADONE HYDROCHLORIDE 78 MG: 10 CONCENTRATE ORAL at 23:21

## 2025-05-01 RX ADMIN — LEVOTHYROXINE SODIUM 50 MCG: 0.05 TABLET ORAL at 05:14

## 2025-05-01 RX ADMIN — INSULIN LISPRO 4 UNITS: 100 INJECTION, SOLUTION INTRAVENOUS; SUBCUTANEOUS at 17:49

## 2025-05-01 RX ADMIN — THIAMINE HCL TAB 100 MG 100 MG: 100 TAB at 09:04

## 2025-05-01 RX ADMIN — ASPIRIN 81 MG: 81 TABLET, COATED ORAL at 09:03

## 2025-05-01 RX ADMIN — PREGABALIN 200 MG: 75 CAPSULE ORAL at 15:58

## 2025-05-01 RX ADMIN — PREDNISONE 1 MG: 1 TABLET ORAL at 09:04

## 2025-05-01 RX ADMIN — TIOTROPIUM BROMIDE INHALATION SPRAY 2 PUFF: 3.12 SPRAY, METERED RESPIRATORY (INHALATION) at 09:03

## 2025-05-01 RX ADMIN — METOPROLOL SUCCINATE 25 MG: 25 TABLET, EXTENDED RELEASE ORAL at 09:04

## 2025-05-01 RX ADMIN — PANTOPRAZOLE SODIUM 40 MG: 40 INJECTION, POWDER, FOR SOLUTION INTRAVENOUS at 05:14

## 2025-05-01 RX ADMIN — DULOXETINE HYDROCHLORIDE 40 MG: 20 CAPSULE, DELAYED RELEASE ORAL at 09:04

## 2025-05-01 RX ADMIN — ENOXAPARIN SODIUM 40 MG: 40 INJECTION SUBCUTANEOUS at 12:51

## 2025-05-01 RX ADMIN — PREGABALIN 200 MG: 75 CAPSULE ORAL at 09:04

## 2025-05-01 RX ADMIN — ACETAMINOPHEN 650 MG: 325 TABLET ORAL at 05:33

## 2025-05-01 RX ADMIN — PREGABALIN 200 MG: 75 CAPSULE ORAL at 22:15

## 2025-05-01 RX ADMIN — AMPICILLIN SODIUM AND SULBACTAM SODIUM 3 G: 2; 1 INJECTION, POWDER, FOR SOLUTION INTRAMUSCULAR; INTRAVENOUS at 00:27

## 2025-05-01 RX ADMIN — FLUTICASONE FUROATE AND VILANTEROL TRIFENATATE 1 PUFF: 100; 25 POWDER RESPIRATORY (INHALATION) at 09:03

## 2025-05-01 RX ADMIN — AMPICILLIN SODIUM AND SULBACTAM SODIUM 3 G: 2; 1 INJECTION, POWDER, FOR SOLUTION INTRAMUSCULAR; INTRAVENOUS at 12:51

## 2025-05-01 RX ADMIN — AMPICILLIN SODIUM AND SULBACTAM SODIUM 3 G: 2; 1 INJECTION, POWDER, FOR SOLUTION INTRAMUSCULAR; INTRAVENOUS at 05:14

## 2025-05-01 RX ADMIN — ATORVASTATIN CALCIUM 40 MG: 40 TABLET, FILM COATED ORAL at 22:14

## 2025-05-01 RX ADMIN — AMPICILLIN SODIUM AND SULBACTAM SODIUM 3 G: 2; 1 INJECTION, POWDER, FOR SOLUTION INTRAMUSCULAR; INTRAVENOUS at 17:49

## 2025-05-01 RX ADMIN — VANCOMYCIN HYDROCHLORIDE 1750 MG: 10 INJECTION, POWDER, LYOPHILIZED, FOR SOLUTION INTRAVENOUS at 22:17

## 2025-05-01 ASSESSMENT — COGNITIVE AND FUNCTIONAL STATUS - GENERAL
DRESSING REGULAR LOWER BODY CLOTHING: A LITTLE
EATING MEALS: A LITTLE
DAILY ACTIVITIY SCORE: 24
TURNING FROM BACK TO SIDE WHILE IN FLAT BAD: A LITTLE
HELP NEEDED FOR BATHING: A LOT
WALKING IN HOSPITAL ROOM: TOTAL
MOVING TO AND FROM BED TO CHAIR: A LITTLE
MOBILITY SCORE: 21
MOBILITY SCORE: 24
WALKING IN HOSPITAL ROOM: A LITTLE
PERSONAL GROOMING: A LITTLE
MOBILITY SCORE: 10
TOILETING: A LOT
MOVING TO AND FROM BED TO CHAIR: TOTAL
DAILY ACTIVITIY SCORE: 23
DRESSING REGULAR UPPER BODY CLOTHING: A LITTLE
CLIMB 3 TO 5 STEPS WITH RAILING: TOTAL
DAILY ACTIVITIY SCORE: 15
DRESSING REGULAR LOWER BODY CLOTHING: A LOT
MOVING FROM LYING ON BACK TO SITTING ON SIDE OF FLAT BED WITH BEDRAILS: A LITTLE
STANDING UP FROM CHAIR USING ARMS: TOTAL
CLIMB 3 TO 5 STEPS WITH RAILING: A LITTLE

## 2025-05-01 ASSESSMENT — PAIN SCALES - GENERAL
PAINLEVEL_OUTOF10: 7
PAINLEVEL_OUTOF10: 6
PAINLEVEL_OUTOF10: 2
PAINLEVEL_OUTOF10: 4

## 2025-05-01 ASSESSMENT — ENCOUNTER SYMPTOMS
APPETITE CHANGE: 0
DIZZINESS: 0
WEAKNESS: 0
EYE PAIN: 0
ABDOMINAL PAIN: 0
FATIGUE: 0
FEVER: 0
HALLUCINATIONS: 0
BLOOD IN STOOL: 0
BRUISES/BLEEDS EASILY: 0
COUGH: 0
HEADACHES: 0
FREQUENCY: 0
SHORTNESS OF BREATH: 0
CHILLS: 0
JOINT SWELLING: 0
TROUBLE SWALLOWING: 0
NAUSEA: 0
HEMATURIA: 0
FLANK PAIN: 0
CONSTIPATION: 0
BACK PAIN: 0
WOUND: 1
NUMBNESS: 0
CHEST TIGHTNESS: 0
SORE THROAT: 0
DIAPHORESIS: 0
VOMITING: 0
PALPITATIONS: 0
DIARRHEA: 0
FACIAL SWELLING: 0
DYSURIA: 0
WHEEZING: 0
LIGHT-HEADEDNESS: 0

## 2025-05-01 ASSESSMENT — PAIN - FUNCTIONAL ASSESSMENT
PAIN_FUNCTIONAL_ASSESSMENT: 0-10

## 2025-05-01 ASSESSMENT — ACTIVITIES OF DAILY LIVING (ADL)
ADL_ASSISTANCE: INDEPENDENT
BATHING_ASSISTANCE: MAXIMAL
ADL_ASSISTANCE: INDEPENDENT

## 2025-05-01 NOTE — PROGRESS NOTES
Kael Zepeda is a 59 y.o. male on day 2 of admission presenting with Osteomyelitis of right foot, unspecified type (Multi).      Subjective   Kael Zepeda is a 59 y.o. male with PMHx s/f insulin-dependent diabetes, hepatitis C, fibromyalgia, coronary artery disease history CABG, COPD, peripheral neuropathy, chronic methadone therapy, hypothyroidism, rheumatoid arthritis chronic right foot wound presenting with worsening wound and pain.  Patient had been following with podiatry for over a year due to persistent wound in the right lower extremity.  Patient recently had an outpatient MRI showing findings concerning for tenosynovitis and osteomyelitis septic arthritis.  He was treated with Keflex and Bactrim by infectious disease who had also advised patient to come into the hospital prior cultures were abnormal for MRSA last year.  He did follow-up with Dr. Raya today who advised him to come to the emergency department for further evaluation and treatment.  Presently patient is denying any fevers or chills any nausea or vomiting his pain is tolerable no urinary symptoms no diarrhea.  CBC with WBC 5.1, Hgb 9.4, Plts 474. . Trop 135. Lactate 1.5. Patient seen by Dr. Raya from podiatry, had R TMA 4/30/25. Follow residual bone cx.     5/1/2025: No acute events overnight. Vitals stable. Labs are pending. Had R TMA yesterday. Follow blood and bone cultures  Bl cx x2- ng x1 day       Review of Systems   Constitutional:  Negative for appetite change, chills, diaphoresis, fatigue and fever.   HENT:  Negative for congestion, ear pain, facial swelling, hearing loss, nosebleeds, sore throat, tinnitus and trouble swallowing.    Eyes:  Negative for pain.   Respiratory:  Negative for cough, chest tightness, shortness of breath and wheezing.    Cardiovascular:  Negative for chest pain, palpitations and leg swelling.   Gastrointestinal:  Negative for abdominal pain, blood in stool, constipation, diarrhea,  nausea and vomiting.   Genitourinary:  Negative for dysuria, flank pain, frequency, hematuria and urgency.   Musculoskeletal:  Negative for back pain and joint swelling.        +R foot pain   Skin:  Positive for wound. Negative for rash.   Neurological:  Negative for dizziness, syncope, weakness, light-headedness, numbness and headaches.   Hematological:  Does not bruise/bleed easily.   Psychiatric/Behavioral:  Negative for behavioral problems, hallucinations and suicidal ideas.           Objective     Last Recorded Vitals  /78 (BP Location: Left arm, Patient Position: Sitting)   Pulse 91   Temp 37 °C (98.6 °F) (Temporal)   Resp 20   Wt 81.6 kg (180 lb)   SpO2 100%     Image Results  Imaging  XR foot right 3+ views  Result Date: 4/29/2025  1. Findings suggesting osteomyelitis of the 1st metatarsal stump as well as of the 2nd metatarsal head and 2nd proximal phalanx base with septic arthritis of the 2nd MTP joint. Consider MRI for further evaluation if clinically warranted.   MACRO: None.   Signed by: Jennifer Macias 4/29/2025 11:23 AM Dictation workstation:   [id number]      Cardiology, Vascular, and Other Imaging  No other imaging results found for the past 7 days       Lab Results  Results for orders placed or performed during the hospital encounter of 04/29/25 (from the past 24 hours)   POCT GLUCOSE   Result Value Ref Range    POCT Glucose 82 74 - 99 mg/dL   Tissue/Wound Culture/Smear    Specimen: Bone; Tissue/Biopsy   Result Value Ref Range    Gram Stain (1+) Rare Polymorphonuclear leukocytes     Gram Stain No organisms seen    POCT GLUCOSE   Result Value Ref Range    POCT Glucose 78 74 - 99 mg/dL   POCT GLUCOSE   Result Value Ref Range    POCT Glucose 160 (H) 74 - 99 mg/dL   Vancomycin   Result Value Ref Range    Vancomycin 26.1 (H) 5.0 - 20.0 ug/mL   POCT GLUCOSE   Result Value Ref Range    POCT Glucose 133 (H) 74 - 99 mg/dL     *Note: Due to a large number of results and/or encounters for the  requested time period, some results have not been displayed. A complete set of results can be found in Results Review.        Medications  Scheduled medications:  Scheduled Medications[1]  Continuous medications:  Continuous Medications[2]  PRN medications:  PRN Medications[3]     Physical Exam  Constitutional:       General: He is not in acute distress.     Appearance: Normal appearance.   HENT:      Head: Normocephalic and atraumatic.      Right Ear: External ear normal.      Left Ear: External ear normal.      Nose: Nose normal.      Mouth/Throat:      Mouth: Mucous membranes are moist.      Pharynx: Oropharynx is clear.   Eyes:      Extraocular Movements: Extraocular movements intact.      Conjunctiva/sclera: Conjunctivae normal.      Pupils: Pupils are equal, round, and reactive to light.   Cardiovascular:      Rate and Rhythm: Normal rate and regular rhythm.      Pulses: Normal pulses.      Heart sounds: Normal heart sounds.   Pulmonary:      Effort: Pulmonary effort is normal. No respiratory distress.      Breath sounds: Normal breath sounds. No wheezing, rhonchi or rales.   Abdominal:      General: Bowel sounds are normal.      Palpations: Abdomen is soft.      Tenderness: There is no abdominal tenderness. There is no right CVA tenderness, left CVA tenderness, guarding or rebound.   Musculoskeletal:      Cervical back: Normal range of motion and neck supple.      Comments: Post-operative dressing is C/D/I, did not remove dressing   Skin:     General: Skin is warm and dry.      Capillary Refill: Capillary refill takes less than 2 seconds.      Findings: Lesion present. No rash.      Comments: R foot wound dressed   Neurological:      General: No focal deficit present.      Mental Status: He is alert and oriented to person, place, and time. Mental status is at baseline.   Psychiatric:         Mood and Affect: Mood normal.         Behavior: Behavior normal.                  Assessment/Plan      Diabetic foot  wound right lower extremity s/p R TMA 4/30/25  Prior MRI findings reportedly concerning for osteomyelitis tenosynovitis  History of prior MRSA infection in same wound  We will continue patient on vancomycin/unasyn  Consult infectious disease and podiatry  S/p R TMA 4/30/25  Follow residual bone cx     Chronic methadone use  We will restart patient's methadone     Peripheral diabetic neuropathy  Resume Lyrica     IDDM-II  Patient will be placed on sliding scale insulin at a reduced dose of his Lantus  Continue consistent carbohydrate diet     Coronary artery disease history of CABG  Will review and reconcile patient's home medications continue his ASA therapy  Should not be on celebrex- will hold while here     COPD  No exacerbation at this time    Code Status: Full Code                 DVT ppx: Lovenox     Please see orders for more complete plan    Sera Rodriguez PA-C         [1] ampicillin-sulbactam, 3 g, intravenous, q6h  aspirin, 81 mg, oral, Daily  atorvastatin, 40 mg, oral, Nightly  DULoxetine, 40 mg, oral, Daily  enoxaparin, 40 mg, subcutaneous, q24h  fluticasone furoate-vilanteroL, 1 puff, inhalation, Daily  insulin glargine, 15 Units, subcutaneous, q24h  insulin lispro, 0-10 Units, subcutaneous, TID AC  levothyroxine, 50 mcg, oral, Daily  methadone, 78 mg, oral, Nightly  methadone, 87 mg, oral, Daily  metoprolol succinate XL, 25 mg, oral, Daily  pantoprazole, 40 mg, oral, Daily before breakfast   Or  pantoprazole, 40 mg, intravenous, Daily before breakfast  polyethylene glycol, 17 g, oral, Daily  predniSONE, 1 mg, oral, Daily  pregabalin, 200 mg, oral, TID  thiamine, 100 mg, oral, Daily  tiotropium, 2 puff, inhalation, Daily  vancomycin, 1,750 mg, intravenous, q24h     [2]    [3] PRN medications: acetaminophen **OR** acetaminophen **OR** acetaminophen, bisacodyl, bisacodyl, dextrose, dextrose, glucagon, glucagon, guaiFENesin, melatonin, ondansetron ODT **OR** ondansetron, vancomycin

## 2025-05-01 NOTE — CARE PLAN
The patient's goals for the shift include      The clinical goals for the shift include Patient safety will be maintained dthroughout this shift      Problem: Pain - Adult  Goal: Verbalizes/displays adequate comfort level or baseline comfort level  Outcome: Progressing     Problem: Safety - Adult  Goal: Free from fall injury  Outcome: Progressing     Problem: Discharge Planning  Goal: Discharge to home or other facility with appropriate resources  Outcome: Progressing     Problem: Chronic Conditions and Co-morbidities  Goal: Patient's chronic conditions and co-morbidity symptoms are monitored and maintained or improved  Outcome: Progressing     Problem: Nutrition  Goal: Nutrient intake appropriate for maintaining nutritional needs  Outcome: Progressing

## 2025-05-01 NOTE — PROGRESS NOTES
Vancomycin Dosing by Pharmacy- FOLLOW UP    Kael Zepeda is a 59 y.o. year old male who Pharmacy has been consulted for vancomycin dosing for other diabetic foot infection. Based on the patient's indication and renal status this patient is being dosed based on a goal AUC of 500-600.     Renal function is currently stable.    Current vancomycin dose: 1750 mg given every 24 hours    Estimated vancomycin AUC on current dose: 544 mg/L.hr     Visit Vitals  /78 (BP Location: Left arm, Patient Position: Sitting)   Pulse 91   Temp 37 °C (98.6 °F) (Temporal)   Resp 20        Lab Results   Component Value Date    CREATININE 0.87 2025    CREATININE 0.81 2025    CREATININE 0.66 10/10/2024    CREATININE 0.77 10/09/2024        Patient weight is as follows:   Vitals:    25 1211   Weight: 81.6 kg (180 lb)       Cultures:  No results found for the encounter in last 14 days.       I/O last 3 completed shifts:  In: 1490 (18.2 mL/kg) [P.O.:240; I.V.:900 (11 mL/kg); IV Piggyback:350]  Out: 4150 (50.8 mL/kg) [Urine:4150 (1.4 mL/kg/hr)]  Weight: 81.6 kg   I/O during current shift:  No intake/output data recorded.    Temp (24hrs), Av.3 °C (97.4 °F), Min:35.8 °C (96.5 °F), Max:37 °C (98.6 °F)      Assessment/Plan    Within goal AUC range. Continue current vancomycin regimen.    This dosing regimen is predicted by [employer] to result in the following pharmacokinetic parameters:  Loading dose: N/A  Regimen: 1750 mg IV every 24 hours.  Start time: 21:43 on 2025  Exposure target: AUC24 (range)400-600 mg/L.hr   DAY78-46: 539 mg/L.hr  AUC24,ss: 544 mg/L.hr  Probability of AUC24 > 400: 98 %  Ctrough,ss: 13.8 mg/L  Probability of Ctrough,ss > 20: 9 %    The next level will be obtained on  at 0500. May be obtained sooner if clinically indicated.   Will continue to monitor renal function daily while on vancomycin and order serum creatinine at least every 48 hours if not already ordered.  Follow for  continued vancomycin needs, clinical response, and signs/symptoms of toxicity.       Hans May, PharmD

## 2025-05-01 NOTE — PROGRESS NOTES
Occupational Therapy  Evaluation    Patient Name: Kael Zepeda  MRN: [id number]  Today's Date: 5/1/2025  Time Calculation  Start Time: 1131  Stop Time: 1154  Time Calculation (min): 23 min    Current Problem:   1. Osteomyelitis of right foot, unspecified type (Multi)    2. Ulcer of toe of right foot, with necrosis of bone (Multi)        OT order: OT eval and treat   Referred by: Elver  Reason for referral: ADLs, safety assessment (R Transmetatarsal amputation 4/30)  Past medical history related to rehab:  has a past medical history of Abnormal result of other cardiovascular function study (06/28/2018), Atherosclerotic heart disease of native coronary artery with unstable angina pectoris (06/28/2018), Fibromyalgia, Personal history of other diseases of the circulatory system, Personal history of other diseases of the musculoskeletal system and connective tissue, Personal history of other diseases of the musculoskeletal system and connective tissue, Personal history of other diseases of the musculoskeletal system and connective tissue, Personal history of other endocrine, nutritional and metabolic disease, Personal history of other endocrine, nutritional and metabolic disease, and Type 2 diabetes mellitus without complications (02/25/2022).     Precautions:   Hearing/Visual Limitations: intact  LE Weight Bearing Status:  (Non-weight bearing to the right foot. Only okay for heel transfer to chair or bedside commode. per podiatry)  Medical Precautions: Fall precautions    ASSESSMENT  OT Assessment: OT eval completed. The patient is functioning below baseline for ADLs and mobility. can benefit from continued OT. Pt with Decreased ADL status, Decreased upper extremity strength, Decreased safe judgment during ADL, Decreased cognition, Decreased endurance, Decreased functional mobility, Decreased gross motor control, Decreased IADLs  Prognosis:    Barriers to discharge home: Physical needs     24hr mobility assistance  "needed, 24hr ADL assistance needed     Tolerance:      PLAN  Frequency: 3 times per week  Treatment Interventions: ADL retraining, Functional transfer training, UE strengthening/ROM, Endurance training, Patient/family training, Cognitive reorientation, Equipment evaluation/education, Neuromuscular reeducation  Discharge Recommendations: Moderate intensity level of continued care  OT OK to discharge: Yes    GENERAL VISIT INFORMATION   Start of session communication: Bedside nurse  End of session communication: Bedside nurse  Family/caregiver present: No  Caregiver feedback:    Co-Treatment: PT  Reason for co-treatment: to optimize safety and mobility, while focusing on discipline specific goals   Position Pt Received:  Bed, 3 rail up, Alarm on  End of session position: Up in chair, Alarm on    SUBJECTIVE  Home Living:  Type of Home: House  Lives With: Siblings (brother)  Home Adaptive Equipment: Cane, Walker rolling or standard  Home Layout: One level  Home Access: Stairs to enter with rails  Entrance Stairs-Number of Steps: 1     Prior Level of Function:  Receives Help From: Family  ADL Assistance: Independent  Homemaking Assistance: Independent  Ambulatory Assistance: Independent  Prior Function Comments: +drives    IADL History:       Pain:  Assessment: 0-10  Score:  (\"hurts a lot\")  Type: Acute pain, Chronic pain  Location: Foot  Interventions:    Response to pain interventions:      OBJECTIVE  Vital Signs:       Cognition:  Overall Cognitive Status: Within Functional Limits  Orientation Level: Oriented X4             Current ADL function:   EATING:  Stand by     GROOMING: Stand by     BATHING: Maximal     UB DRESSING: Minimal     LB DRESSING: Maximal  (total to rayna left sock. max A to rayna depends)   TOILETING: Maximal    ADL comments:       Activity Tolerance:  Endurance: Decreased tolerance for upright activites    Bed Mobility/Transfers:   Bed Mobility  Bed Mobility: Yes  Bed Mobility 1  Bed Mobility 1: " Supine to sitting  Level of Assistance 1: Minimum assistance  Transfers  Transfer:  (sit<>stand min A x2)    Ambulation/Gait Training:  Functional Mobility  Functional Mobility Performed:  (pt peformed functional mobility bed>chair with min A x2 FWW. unable to maintain NWB. able to heel WB)    Sitting Balance:  Static Sitting Balance  Static Sitting-Level of Assistance: Close supervision  Dynamic Sitting Balance  Dynamic Sitting-Level of Assistance: Close supervision    Standing Balance:  Static Standing Balance  Static Standing-Level of Assistance: Contact guard, Minimum assistance  Dynamic Standing Balance  Dynamic Standing-Level of Assistance: Minimum assistance, Contact guard    Vision: Vision - Basic Assessment  Current Vision: No visual deficits   and      Sensation:  Light Touch: Partial deficits in the LUE, Partial deficits in the RLE, Partial deficits in the LLE, Partial deficits in the RUE    Strength:  Strength Comments: BUE grossly 4/5    Perception:  Inattention/Neglect: Appears intact    Coordination:  Movements are Fluid and Coordinated: Yes     Hand Function:  Hand Function  Gross Grasp: Functional    Extremities: RUE   RUE : Within Functional Limits and LUE   LUE: Within Functional Limits    Outcome Measures: [facility] Daily Activity   Putting on and taking off regular lower body clothing: A lot  Bathing (including washing, rinsing, drying): A lot  Putting on and taking off regular upper body clothing: A little  Toileting, which includes using toilet, bedpan or urinal: A lot  Taking care of personal grooming such as brushing teeth: A little   Eating Meals: A little   Daily Activity - Total Score: 15    EDUCATION:     Education Documentation  ADL Training, taught by Kalli Garcia OT at 5/1/2025  1:26 PM.  Learner: Patient  Readiness: Acceptance  Method: Explanation  Response: Needs Reinforcement    Education Comments  No comments found.        Goals:   Encounter Problems       Encounter Problems  (Active)       ADLs       Patient with complete lower body dressing with modified independent level of assistance donning and doffing all LE clothes  with PRN adaptive equipment while supported sitting and standing (Progressing)       Start:  05/01/25    Expected End:  05/15/25               COGNITION/SAFETY       Patient will recall and adhere to weight bearing restriction with all ADL and functional mobility in order to promote healing and safety with functional tasks (Progressing)       Start:  05/01/25    Expected End:  05/15/25               TRANSFERS       Patient will complete functional transfers with least restrictive device with modified independent level of assistance. (Progressing)       Start:  05/01/25    Expected End:  05/15/25

## 2025-05-01 NOTE — CARE PLAN
Problem: Pain - Adult  Goal: Verbalizes/displays adequate comfort level or baseline comfort level  Outcome: Progressing     Problem: Safety - Adult  Goal: Free from fall injury  Outcome: Progressing     Problem: Discharge Planning  Goal: Discharge to home or other facility with appropriate resources  Outcome: Progressing     Problem: Chronic Conditions and Co-morbidities  Goal: Patient's chronic conditions and co-morbidity symptoms are monitored and maintained or improved  Outcome: Progressing     Problem: Nutrition  Goal: Nutrient intake appropriate for maintaining nutritional needs  Outcome: Progressing   The patient's goals for the shift include  Pt will remain free of injury    The clinical goals for the shift include pt will remain free of injury

## 2025-05-01 NOTE — PROGRESS NOTES
Physical Therapy    Physical Therapy Evaluation    Patient Name: Kael Zepeda  MRN: [id number]  Department: [address]  Room: 3333333-A  Today's Date: 5/1/2025   Time Calculation  Start Time: 1132  Stop Time: 1156  Time Calculation (min): 24 min    Assessment/Plan   PT Assessment  PT Assessment Results: Decreased strength, Decreased endurance, Impaired balance, Decreased mobility, Decreased safety awareness, Impaired judgement, Pain, Orthopedic restrictions  Rehab Prognosis: Fair  Barriers to Discharge Home: Caregiver assistance, Cognition needs, Physical needs  Caregiver Assistance: Caregiver assistance needed per identified barriers - however, level of patient's required assistance exceeds assistance available at home  Cognition Needs: 24hr supervision for safety awareness needed, Medication and/or medical management daily assist needed, Recollection or understanding of precautions/restrictions limited, Insight of patient limited regarding functional ability/needs, Recollection or understanding of home exercise program limited  Physical Needs: Stair navigation into home limited by function/safety, Ambulating household distances limited by function/safety, 24hr mobility assistance needed, Intermittent ADL assistance needed, High falls risk due to function or environment, Weight bearing precautions unable to be safely maintained  Evaluation/Treatment Tolerance: Patient limited by fatigue, Patient limited by pain  End of Session Communication: Bedside nurse  Assessment Comment: VC FOR NWB & FWW SAFETY FOR TRANSFERS, NEEDS  MIN A X2 FOR SAFETY/STABI.TIY OF THE TRANSFER,HIGH FALL RISK RECOMMNED MOD REHAB  End of Session Patient Position: Up in chair, Alarm on (CALL LIGHT IN REACH)  IP OR SWING BED PT PLAN  Inpatient or Swing Bed: Inpatient  PT Plan  Treatment/Interventions: Bed mobility, Transfer training, Gait training, Stair training, Strengthening, Endurance training, Therapeutic exercise  PT Plan: Ongoing  "PT  PT Frequency: 4 times per week  PT Discharge Recommendations: Moderate intensity level of continued care  Equipment Recommended upon Discharge: Wheeled walker (SHOWER SEAT)  PT Recommended Transfer Status: Assist x2 (FWW, HEEL WB RLE FOR TRANSFERS, NWB RLE FOR AMB)  PT - OK to Discharge: Yes (WHENMEDICALLY CLEARED)    Subjective   General Visit Information:  General  Reason for Referral: IMPAIRED MOBILITY, NWB R, OK FOR HEEL TRANSFERS TO CHAIR OR BSC  Referred By: CHARMAINE  Past Medical History Relevant to Rehab: FOOT WOUND; DX: OMR FOOT, TRANSMET  AMP R FOOT 4/30; HX: DM, OM, SEPTIC OA, HEP C, FIBROMYALGIA, HTN, CAD, COPD, NEUROPATHY, CABG-7/2024, METHADONE USE, RA, MRSA, BCKSURG, FALLS  Co-Treatment: OT  Co-Treatment Reason: to optimize safety and mobility, while focusing on discipline specific goals  Prior to Session Communication: Bedside nurse (OK FOR THERAPY)  Patient Position Received: Bed, 3 rail up, Alarm on (ROOM 3333 ALERT IV DRESSING R FOOT, AGREES TO THERAPY)  General Comment: PT. STATES HE HAS BEEN WEAK SINCE HIS CABG, HE REPORTS HIS FEET FEEL HEAVY, GETS TIRED QUICKLY  Home Living:  Home Living  Type of Home: House  Lives With: Siblings (BROTHER)  Home Adaptive Equipment: Cane, Walker rolling or standard  Home Layout: One level  Home Access: Stairs to enter with rails  Entrance Stairs-Number of Steps: 1  Bathroom Shower/Tub: Tub/shower unit  Prior Level of Function:  Prior Function Per Pt/Caregiver Report  Receives Help From: Family  ADL Assistance: Independent  Homemaking Assistance: Independent  Ambulatory Assistance: Independent  Prior Function Comments: +drives (USES THE SCOOTER WHENIN THE STORE)  Precautions:  Precautions  LE Weight Bearing Status:  (NWB R FOOT; R HEEL  FOR TRANSFERS TO BSC/CHAIR)  Medical Precautions: Fall precautions             Objective   Pain:  Pain Assessment  0-10 (Numeric) Pain Score:  (REPORTS HE \"HURTS ALOT\" 2/2 CHRONIC PAIN BUT ARMIDA RLE)  Cognition:  Cognition  Overall " Cognitive Status: Within Functional Limits  Arousal/Alertness: Delayed responses to stimuli  Orientation Level: Oriented X4  Following Commands: Follows one step commands with increased time  Safety Judgment: Decreased awareness of need for safety precautions  Problem Solving: Assistance required to identify errors made  Memory: Exceptions to WFL  Short-Term Memory: Impaired  Problem Solving: Exceptions to WFL  Complex Functional Tasks: Impaired  Performing Discharge Planning: Minimal  Safety/Judgement: Exceptions to [facility]  Complex Functional Tasks: Moderate  Novel Situations: Moderate  Routine Tasks: Minimal  Unable to Self-Monitor and Self-Correct Consistently: Minimal  Insight: Mild  Impulsive: Mildly  Processing Speed: Delayed    General Assessments:  General Observation  General Observation: ANXIOUS THROUGHOUT SESSION TIRES QUICKLY               Activity Tolerance  Endurance: Decreased tolerance for upright activites    Sensation  Sensation Comment: NEUROPATHYIN HANDS/FEET    Strength  Strength Comments: STRENGTH IN HIPS 3/5 KNEE/ANKLE ON L 3+/5; R HIP /KN33 3/5, ANKLE ON R 3-/5, RO MIN LEGS WFL MILD TIGHT HEEL CORDSBIL SLIGHT BILHAMSTRING TIGHTNESS  Strength  Strength Comments: STRENGTH IN HIPS 3/5 KNEE/ANKLE ON L 3+/5; R HIP /KN33 3/5, ANKLE ON R 3-/5, RO MIN LEGS WFL MILD TIGHT HEEL CORDSBIL SLIGHT BILHAMSTRING TIGHTNESS                     Static Sitting Balance  Static Sitting-Comment/Number of Minutes: FAIR  Dynamic Sitting Balance  Dynamic Sitting-Comments: FAIR    Static Standing Balance  Static Standing-Comment/Number of Minutes: FAIR-  Dynamic Standing Balance  Dynamic Standing-Comments: FAIR-  Functional Assessments:  Bed Mobility  Bed Mobility:  (MIN A SUPNE<>SIT, SITS EOB 5 MIN SBA)    Transfers  Transfer:  (SIT<>STAND MIN A X2 VC FOR SAFETY &NWB RLE, PT DID HEEL WB RLE, WIDE LOU, DIDNOT USE FWW FOR  SUPPORT)    Ambulation/Gait Training  Ambulation/Gait Training Performed:  (AMB 2 FT TO CHAIR FWW  ENCOURAGED NWB RLE BUT HE DID HEEL WB RLE,  DOES NOT USE FWW FOR STABILTIY -TO GET WT OFF RLE, JUST RESTS HIS HANDS ON THE FWW, WIDE LOU UNSTABLE LEGS)  Extremity/Trunk Assessments:     Outcome Measures:  [facility] Basic Mobility  Turning from your back to your side while in a flat bed without using bedrails: A little  Moving from lying on your back to sitting on the side of a flat bed without using bedrails: A little  Moving to and from bed to chair (including a wheelchair): Total  Standing up from a chair using your arms (e.g. wheelchair or bedside chair): Total  To walk in hospital room: Total  Climbing 3-5 steps with railing: Total  Basic Mobility - Total Score: 10    Encounter Problems       Encounter Problems (Active)       Mobility       STG - Patient will ambulate (Not Progressing)       Start:  05/01/25    Expected End:  05/10/25       FWW NWB RLE 30 FT MIN A X1         Goal 1 (Not Progressing)       Start:  05/01/25    Expected End:  05/22/25       20 REPS RROM INCREASING STRENGTH OF LLE, RHIP/KNEE & AROM R ANKLE IMPROVING GAIT STABILITY            PT Transfers       STG - Patient will transfer sit to and from stand (Not Progressing)       Start:  05/01/25    Expected End:  05/07/25       FWW USING PROPER TECHNIQUE NWB RLE                Education Documentation  Precautions, taught by Aundrea Slaas, PT at 5/1/2025  3:12 PM.  Learner: Patient  Readiness: Acceptance  Method: Explanation  Response: Needs Reinforcement, No Evidence of Learning  Comment: NWB RLE W/ MOBILTIY, ROLE OF REHAB IN RECOVERY, IM PROTANCE OF NWB RLE    Mobility Training, taught by Aundrea Salas, PT at 5/1/2025  3:12 PM.  Learner: Patient  Readiness: Acceptance  Method: Explanation  Response: Needs Reinforcement, No Evidence of Learning  Comment: NWB RLE W/ MOBILTIY, ROLE OF REHAB IN RECOVERY, IM PROTANCE OF NWB RLE    Education Comments  No comments found.

## 2025-05-01 NOTE — CARE PLAN
Problem: Mobility  Goal: STG - Patient will ambulate  Description: FWW NWB RLE 30 FT MIN A X1  Outcome: Not Progressing  Goal: Goal 1  Description: 20 REPS RROM INCREASING STRENGTH OF LLE, RHIP/KNEE & AROM R ANKLE IMPROVING GAIT STABILITY  Outcome: Not Progressing     Problem: PT Transfers  Goal: STG - Patient will transfer sit to and from stand  Description: FWW USING PROPER TECHNIQUE NWB RLE  Outcome: Not Progressing

## 2025-05-02 LAB
BACTERIA SPEC CULT: ABNORMAL
BACTERIA SPEC CULT: ABNORMAL
GLUCOSE BLD MANUAL STRIP-MCNC: 113 MG/DL (ref 74–99)
GLUCOSE BLD MANUAL STRIP-MCNC: 171 MG/DL (ref 74–99)
GLUCOSE BLD MANUAL STRIP-MCNC: 193 MG/DL (ref 74–99)
GLUCOSE BLD MANUAL STRIP-MCNC: 224 MG/DL (ref 74–99)
GLUCOSE BLD MANUAL STRIP-MCNC: 250 MG/DL (ref 74–99)
GRAM STN SPEC: ABNORMAL
GRAM STN SPEC: ABNORMAL

## 2025-05-02 PROCEDURE — 2500000002 HC RX 250 W HCPCS SELF ADMINISTERED DRUGS (ALT 637 FOR MEDICARE OP, ALT 636 FOR OP/ED): Performed by: PODIATRIST

## 2025-05-02 PROCEDURE — 2500000004 HC RX 250 GENERAL PHARMACY W/ HCPCS (ALT 636 FOR OP/ED): Performed by: PODIATRIST

## 2025-05-02 PROCEDURE — 2500000004 HC RX 250 GENERAL PHARMACY W/ HCPCS (ALT 636 FOR OP/ED): Performed by: PHYSICIAN ASSISTANT

## 2025-05-02 PROCEDURE — 97116 GAIT TRAINING THERAPY: CPT | Mod: GP,CQ

## 2025-05-02 PROCEDURE — 2500000005 HC RX 250 GENERAL PHARMACY W/O HCPCS: Performed by: PODIATRIST

## 2025-05-02 PROCEDURE — 2500000004 HC RX 250 GENERAL PHARMACY W/ HCPCS (ALT 636 FOR OP/ED): Mod: JZ | Performed by: PODIATRIST

## 2025-05-02 PROCEDURE — 1210000001 HC SEMI-PRIVATE ROOM DAILY

## 2025-05-02 PROCEDURE — 2500000001 HC RX 250 WO HCPCS SELF ADMINISTERED DRUGS (ALT 637 FOR MEDICARE OP): Performed by: PODIATRIST

## 2025-05-02 PROCEDURE — 82947 ASSAY GLUCOSE BLOOD QUANT: CPT

## 2025-05-02 PROCEDURE — 97110 THERAPEUTIC EXERCISES: CPT | Mod: GP,CQ

## 2025-05-02 PROCEDURE — S0109 METHADONE ORAL 5MG: HCPCS | Performed by: PODIATRIST

## 2025-05-02 PROCEDURE — 99233 SBSQ HOSP IP/OBS HIGH 50: CPT | Performed by: PHYSICIAN ASSISTANT

## 2025-05-02 RX ORDER — LANOLIN ALCOHOL/MO/W.PET/CERES
400 CREAM (GRAM) TOPICAL DAILY
Status: DISCONTINUED | OUTPATIENT
Start: 2025-05-02 | End: 2025-05-06 | Stop reason: HOSPADM

## 2025-05-02 RX ADMIN — DULOXETINE HYDROCHLORIDE 40 MG: 20 CAPSULE, DELAYED RELEASE ORAL at 09:26

## 2025-05-02 RX ADMIN — Medication 3 MG: at 22:29

## 2025-05-02 RX ADMIN — LEVOTHYROXINE SODIUM 50 MCG: 0.05 TABLET ORAL at 06:50

## 2025-05-02 RX ADMIN — INSULIN LISPRO 4 UNITS: 100 INJECTION, SOLUTION INTRAVENOUS; SUBCUTANEOUS at 12:15

## 2025-05-02 RX ADMIN — AMPICILLIN SODIUM AND SULBACTAM SODIUM 3 G: 2; 1 INJECTION, POWDER, FOR SOLUTION INTRAMUSCULAR; INTRAVENOUS at 06:50

## 2025-05-02 RX ADMIN — METHADONE HYDROCHLORIDE 78 MG: 10 CONCENTRATE ORAL at 22:24

## 2025-05-02 RX ADMIN — PREGABALIN 200 MG: 75 CAPSULE ORAL at 09:25

## 2025-05-02 RX ADMIN — THIAMINE HCL TAB 100 MG 100 MG: 100 TAB at 09:25

## 2025-05-02 RX ADMIN — PREGABALIN 200 MG: 75 CAPSULE ORAL at 16:45

## 2025-05-02 RX ADMIN — ASPIRIN 81 MG: 81 TABLET, COATED ORAL at 09:25

## 2025-05-02 RX ADMIN — AMPICILLIN SODIUM AND SULBACTAM SODIUM 3 G: 2; 1 INJECTION, POWDER, FOR SOLUTION INTRAMUSCULAR; INTRAVENOUS at 00:21

## 2025-05-02 RX ADMIN — INSULIN LISPRO 2 UNITS: 100 INJECTION, SOLUTION INTRAVENOUS; SUBCUTANEOUS at 16:48

## 2025-05-02 RX ADMIN — FLUTICASONE FUROATE AND VILANTEROL TRIFENATATE 1 PUFF: 100; 25 POWDER RESPIRATORY (INHALATION) at 09:26

## 2025-05-02 RX ADMIN — ATORVASTATIN CALCIUM 40 MG: 40 TABLET, FILM COATED ORAL at 22:28

## 2025-05-02 RX ADMIN — PREGABALIN 200 MG: 75 CAPSULE ORAL at 22:27

## 2025-05-02 RX ADMIN — METHADONE HYDROCHLORIDE 87 MG: 10 CONCENTRATE ORAL at 10:36

## 2025-05-02 RX ADMIN — Medication 400 MG: at 09:41

## 2025-05-02 RX ADMIN — AMPICILLIN SODIUM AND SULBACTAM SODIUM 3 G: 2; 1 INJECTION, POWDER, FOR SOLUTION INTRAMUSCULAR; INTRAVENOUS at 12:15

## 2025-05-02 RX ADMIN — VANCOMYCIN HYDROCHLORIDE 1750 MG: 10 INJECTION, POWDER, LYOPHILIZED, FOR SOLUTION INTRAVENOUS at 22:26

## 2025-05-02 RX ADMIN — PREDNISONE 1 MG: 1 TABLET ORAL at 09:26

## 2025-05-02 RX ADMIN — ENOXAPARIN SODIUM 40 MG: 40 INJECTION SUBCUTANEOUS at 12:15

## 2025-05-02 RX ADMIN — PANTOPRAZOLE SODIUM 40 MG: 40 TABLET, DELAYED RELEASE ORAL at 06:50

## 2025-05-02 RX ADMIN — ACETAMINOPHEN 650 MG: 325 TABLET ORAL at 18:03

## 2025-05-02 RX ADMIN — TIOTROPIUM BROMIDE INHALATION SPRAY 2 PUFF: 3.12 SPRAY, METERED RESPIRATORY (INHALATION) at 09:36

## 2025-05-02 ASSESSMENT — COGNITIVE AND FUNCTIONAL STATUS - GENERAL
MOVING TO AND FROM BED TO CHAIR: A LITTLE
CLIMB 3 TO 5 STEPS WITH RAILING: A LOT
DAILY ACTIVITIY SCORE: 23
CLIMB 3 TO 5 STEPS WITH RAILING: A LITTLE
WALKING IN HOSPITAL ROOM: A LITTLE
MOBILITY SCORE: 19
MOVING TO AND FROM BED TO CHAIR: A LITTLE
WALKING IN HOSPITAL ROOM: A LITTLE
MOBILITY SCORE: 21
DRESSING REGULAR LOWER BODY CLOTHING: A LITTLE
STANDING UP FROM CHAIR USING ARMS: A LITTLE

## 2025-05-02 ASSESSMENT — ENCOUNTER SYMPTOMS
COUGH: 0
CHEST TIGHTNESS: 0
HALLUCINATIONS: 0
SHORTNESS OF BREATH: 0
SORE THROAT: 0
NUMBNESS: 0
DIARRHEA: 0
APPETITE CHANGE: 0
ABDOMINAL PAIN: 0
WEAKNESS: 0
FATIGUE: 0
TROUBLE SWALLOWING: 0
LIGHT-HEADEDNESS: 0
HEMATURIA: 0
FREQUENCY: 0
CONSTIPATION: 0
JOINT SWELLING: 0
DYSURIA: 0
FEVER: 0
DIAPHORESIS: 0
PALPITATIONS: 0
BLOOD IN STOOL: 0
FACIAL SWELLING: 0
NAUSEA: 0
BACK PAIN: 0
CHILLS: 0
WHEEZING: 0
BRUISES/BLEEDS EASILY: 0
EYE PAIN: 0
HEADACHES: 0
WOUND: 1
FLANK PAIN: 0
DIZZINESS: 0
VOMITING: 0

## 2025-05-02 ASSESSMENT — PAIN - FUNCTIONAL ASSESSMENT
PAIN_FUNCTIONAL_ASSESSMENT: 0-10

## 2025-05-02 ASSESSMENT — PAIN SCALES - GENERAL
PAINLEVEL_OUTOF10: 6
PAINLEVEL_OUTOF10: 4
PAINLEVEL_OUTOF10: 7

## 2025-05-02 NOTE — PROGRESS NOTES
"[facility] INFECTIOUS DISEASE PROGRESS NOTE    Patient Name: Kael Zepeda  MRN: [id number]    INTERVAL HISTORY:   No fevers. No new complaints. Tolerating abx well.     Problem List[1]     ASSESSMENT:   R foot wound infection  R foot OM/septic arthritis s/p TMA  but proximal cx are positive w MRSA  DM2 w neuropathy  HCV  CAD  COPD  RA  Hypothyroid    Plan   Continue Vancomycin  Discontinue Unasyn  Vanco T  Bcx x 2  Podiatry consult noted  OK for PICC  CoPAT on chart. >30 min [place]    MEDICATIONS: reviewed.  Current Medications[2]     PHYSICAL EXAM:  Vital signs: /55 (BP Location: Left arm, Patient Position: Lying)   Pulse 86   Temp 36.1 °C (96.9 °F) (Temporal)   Resp 16   Ht 1.702 m (5' 7\")   Wt 81.6 kg (180 lb)   SpO2 99%   BMI 28.19 kg/m²   Temp (24hrs), Av.4 °C (97.5 °F), Min:35.7 °C (96.3 °F), Max:37.3 °C (99.1 °F)    General: alert, oriented, NAD  Lungs: bilaterally clear to auscultation  Heart: regular rate and rhythm  Abdomen: soft, non tender, non distended, BS+  Extremities: Foot dressings post op  No rashes  No joint inflammation  Neck supple  Lines ok  No CVAT              Labs:    Results for orders placed or performed during the hospital encounter of 25 (from the past 96 hours)   C-reactive protein   Result Value Ref Range    C-Reactive Protein 7.25 (H) <1.00 mg/dL   Sedimentation rate, automated   Result Value Ref Range    Sedimentation Rate 120 (H) 0 - 20 mm/h   Blood Culture    Specimen: Peripheral Venipuncture; Blood culture   Result Value Ref Range    Blood Culture No growth at 2 days    CBC and Auto Differential   Result Value Ref Range    WBC 5.1 4.4 - 11.3 x10*3/uL    nRBC 0.0 0.0 - 0.0 /100 WBCs    RBC 4.47 (L) 4.50 - 5.90 x10*6/uL    Hemoglobin 9.4 (L) 13.5 - 17.5 g/dL    Hematocrit 30.8 (L) 41.0 - 52.0 %    MCV 69 (L) 80 - 100 fL    MCH 21.0 (L) 26.0 - 34.0 pg    MCHC 30.5 (L) 32.0 - 36.0 g/dL    RDW 17.4 (H) 11.5 - 14.5 %    Platelets 474 (H) 150 - 450 x10*3/uL "    Neutrophils % 62.6 40.0 - 80.0 %    Immature Granulocytes %, Automated 2.0 (H) 0.0 - 0.9 %    Lymphocytes % 21.4 13.0 - 44.0 %    Monocytes % 11.2 2.0 - 10.0 %    Eosinophils % 2.2 0.0 - 6.0 %    Basophils % 0.6 0.0 - 2.0 %    Neutrophils Absolute 3.19 1.20 - 7.70 x10*3/uL    Immature Granulocytes Absolute, Automated 0.10 0.00 - 0.70 x10*3/uL    Lymphocytes Absolute 1.09 (L) 1.20 - 4.80 x10*3/uL    Monocytes Absolute 0.57 0.10 - 1.00 x10*3/uL    Eosinophils Absolute 0.11 0.00 - 0.70 x10*3/uL    Basophils Absolute 0.03 0.00 - 0.10 x10*3/uL   Comprehensive metabolic panel   Result Value Ref Range    Glucose 164 (H) 74 - 99 mg/dL    Sodium 136 136 - 145 mmol/L    Potassium 4.6 3.5 - 5.3 mmol/L    Chloride 98 98 - 107 mmol/L    Bicarbonate 25 21 - 32 mmol/L    Anion Gap 18 10 - 20 mmol/L    Urea Nitrogen 15 6 - 23 mg/dL    Creatinine 0.81 0.50 - 1.30 mg/dL    eGFR >90 >60 mL/min/1.73m*2    Calcium 8.7 8.6 - 10.3 mg/dL    Albumin 3.3 (L) 3.4 - 5.0 g/dL    Alkaline Phosphatase 181 (H) 33 - 136 U/L    Total Protein 7.1 6.4 - 8.2 g/dL    AST 21 9 - 39 U/L    Bilirubin, Total 0.3 0.0 - 1.2 mg/dL    ALT 13 10 - 52 U/L   Blood Culture    Specimen: Peripheral Venipuncture; Blood culture   Result Value Ref Range    Blood Culture No growth at 2 days    Lactate   Result Value Ref Range    Lactate 1.5 0.4 - 2.0 mmol/L   POCT GLUCOSE   Result Value Ref Range    POCT Glucose 176 (H) 74 - 99 mg/dL   POCT GLUCOSE   Result Value Ref Range    POCT Glucose 78 74 - 99 mg/dL   POCT GLUCOSE   Result Value Ref Range    POCT Glucose 238 (H) 74 - 99 mg/dL   CBC   Result Value Ref Range    WBC 5.6 4.4 - 11.3 x10*3/uL    nRBC 0.0 0.0 - 0.0 /100 WBCs    RBC 4.68 4.50 - 5.90 x10*6/uL    Hemoglobin 9.8 (L) 13.5 - 17.5 g/dL    Hematocrit 33.6 (L) 41.0 - 52.0 %    MCV 72 (L) 80 - 100 fL    MCH 20.9 (L) 26.0 - 34.0 pg    MCHC 29.2 (L) 32.0 - 36.0 g/dL    RDW 17.9 (H) 11.5 - 14.5 %    Platelets 523 (H) 150 - 450 x10*3/uL   Basic metabolic panel    Result Value Ref Range    Glucose 89 74 - 99 mg/dL    Sodium 136 136 - 145 mmol/L    Potassium 4.2 3.5 - 5.3 mmol/L    Chloride 103 98 - 107 mmol/L    Bicarbonate 24 21 - 32 mmol/L    Anion Gap 13 10 - 20 mmol/L    Urea Nitrogen 14 6 - 23 mg/dL    Creatinine 0.87 0.50 - 1.30 mg/dL    eGFR >90 >60 mL/min/1.73m*2    Calcium 8.7 8.6 - 10.3 mg/dL   Vancomycin   Result Value Ref Range    Vancomycin 24.4 (H) 5.0 - 20.0 ug/mL   POCT GLUCOSE   Result Value Ref Range    POCT Glucose 96 74 - 99 mg/dL   POCT GLUCOSE   Result Value Ref Range    POCT Glucose 82 74 - 99 mg/dL   Tissue/Wound Culture/Smear    Specimen: Bone; Tissue/Biopsy   Result Value Ref Range    Tissue/Wound Culture/Smear (A)      (1+) Rare Methicillin Resistant Staphylococcus aureus (MRSA)    Tissue/Wound Culture/Smear (1+) Rare Mixed Skin Microorganisms     Gram Stain (1+) Rare Polymorphonuclear leukocytes     Gram Stain No organisms seen        Susceptibility    Methicillin Resistant Staphylococcus aureus (MRSA) - MICROSCAN     Oxacillin  Resistant ug/ml     Trimethoprim/Sulfamethoxazole  Resistant ug/ml     Tetracycline  Resistant ug/ml     Clindamycin  Resistant ug/ml     Erythromycin  Resistant ug/ml     Vancomycin  Susceptible ug/ml   POCT GLUCOSE   Result Value Ref Range    POCT Glucose 78 74 - 99 mg/dL   POCT GLUCOSE   Result Value Ref Range    POCT Glucose 160 (H) 74 - 99 mg/dL   Vancomycin   Result Value Ref Range    Vancomycin 26.1 (H) 5.0 - 20.0 ug/mL   POCT GLUCOSE   Result Value Ref Range    POCT Glucose 133 (H) 74 - 99 mg/dL   Basic Metabolic Panel   Result Value Ref Range    Glucose 125 (H) 74 - 99 mg/dL    Sodium 131 (L) 136 - 145 mmol/L    Potassium 3.9 3.5 - 5.3 mmol/L    Chloride 101 98 - 107 mmol/L    Bicarbonate 26 21 - 32 mmol/L    Anion Gap 8 (L) 10 - 20 mmol/L    Urea Nitrogen 10 6 - 23 mg/dL    Creatinine 0.84 0.50 - 1.30 mg/dL    eGFR >90 >60 mL/min/1.73m*2    Calcium 8.1 (L) 8.6 - 10.3 mg/dL   CBC   Result Value Ref Range    WBC  6.1 4.4 - 11.3 x10*3/uL    nRBC 0.0 0.0 - 0.0 /100 WBCs    RBC 4.29 (L) 4.50 - 5.90 x10*6/uL    Hemoglobin 9.0 (L) 13.5 - 17.5 g/dL    Hematocrit 29.4 (L) 41.0 - 52.0 %    MCV 69 (L) 80 - 100 fL    MCH 21.0 (L) 26.0 - 34.0 pg    MCHC 30.6 (L) 32.0 - 36.0 g/dL    RDW 17.6 (H) 11.5 - 14.5 %    Platelets 440 150 - 450 x10*3/uL   Magnesium   Result Value Ref Range    Magnesium 1.48 (L) 1.60 - 2.40 mg/dL   POCT GLUCOSE   Result Value Ref Range    POCT Glucose 217 (H) 74 - 99 mg/dL   POCT GLUCOSE   Result Value Ref Range    POCT Glucose 207 (H) 74 - 99 mg/dL   POCT GLUCOSE   Result Value Ref Range    POCT Glucose 108 (H) 74 - 99 mg/dL   POCT GLUCOSE   Result Value Ref Range    POCT Glucose 113 (H) 74 - 99 mg/dL   POCT GLUCOSE   Result Value Ref Range    POCT Glucose 224 (H) 74 - 99 mg/dL   POCT GLUCOSE   Result Value Ref Range    POCT Glucose 250 (H) 74 - 99 mg/dL   POCT GLUCOSE   Result Value Ref Range    POCT Glucose 193 (H) 74 - 99 mg/dL     *Note: Due to a large number of results and/or encounters for the requested time period, some results have not been displayed. A complete set of results can be found in Results Review.        Microbiology data: reviewed    Imaging data: reviewed      Freddy Mike  Pager:[phone]         [1]   Patient Active Problem List  Diagnosis    Diabetic foot ulcer with osteomyelitis (Multi)    Diabetes mellitus, type 2 (Multi)    Benign essential hypertension    Gastroesophageal reflux disease without esophagitis    Corns and callosities    Degeneration of intervertebral disc of lumbar region    Depression    Dermatitis    Chronic back pain    Fibromyalgia    Generalized anxiety disorder    Hepatitis B core antibody positive    History of hepatitis C    History of hypothyroidism    Hyperlipidemia, mixed    Hypothyroidism, acquired    Lumbar radiculopathy    Lumbar spondylosis    Obesity, Class I, BMI 30-34.9    Opioid dependence, in remission    Patient's noncompliance with other medical  treatment and regimen due to unspecified reason    Peripheral neuropathy    Primary insomnia    Pulmonary emphysema (Multi)    Rheumatoid arthritis involving both hands with positive rheumatoid factor (Multi)    Rheumatoid nodules (Multi)    Scrotal cyst    Spinal stenosis of lumbosacral region    Steroid-induced osteoporosis    Tobacco use disorder    Ulcer of toe of right foot, limited to breakdown of skin    Vitamin D deficiency    NSTEMI (non-ST elevated myocardial infarction) (Multi)    Microcytic anemia    CAD in native artery    Hepatitis C    Stage 2 chronic kidney disease    Osteomyelitis of left foot, unspecified type (Multi)    Cellulitis of left lower extremity    Acute osteomyelitis of left foot (Multi)    Overweight (BMI 25.0-29.9)    Sleep-related breathing disorder    RLS (restless legs syndrome)    Osteomyelitis of right foot, unspecified type (Multi)    Ulcer of toe of right foot, with necrosis of bone (Multi)   [2]   Current Facility-Administered Medications:     acetaminophen (Tylenol) tablet 650 mg, 650 mg, oral, q4h PRN, 650 mg at 05/01/25 0533 **OR** acetaminophen (Tylenol) suspension 650 mg, 650 mg, nasogastric tube, q4h PRN **OR** acetaminophen (Tylenol) suppository 650 mg, 650 mg, rectal, q4h PRN, William Raya DPM    ampicillin-sulbactam (Unasyn) 3 g in sodium chloride 0.9%  mL, 3 g, intravenous, q6h, William Raya DPM, Stopped at 05/02/25 1248    aspirin EC tablet 81 mg, 81 mg, oral, Daily, William Raya DPM, 81 mg at 05/02/25 0925    atorvastatin (Lipitor) tablet 40 mg, 40 mg, oral, Nightly, William Raya DPM, 40 mg at 05/01/25 2214    bisacodyl (Dulcolax) EC tablet 10 mg, 10 mg, oral, Daily PRN, William Raya DPM    bisacodyl (Dulcolax) suppository 10 mg, 10 mg, rectal, Daily PRN, William Raya DPM    dextrose 50 % injection 12.5 g, 12.5 g, intravenous, q15 min PRN, William Raya DPM    dextrose 50 % injection 25 g, 25 g, intravenous, q15 min PRN, William Raya DPM    DULoxetine  (Cymbalta) DR capsule 40 mg, 40 mg, oral, Daily, William Raya DPM, 40 mg at 05/02/25 0926    enoxaparin (Lovenox) syringe 40 mg, 40 mg, subcutaneous, q24h, William Raya DPM, 40 mg at 05/02/25 1215    fluticasone furoate-vilanteroL (Breo Ellipta) 100-25 mcg/dose inhaler 1 puff, 1 puff, inhalation, Daily, William Raya DPM, 1 puff at 05/02/25 0926    glucagon (Glucagen) injection 1 mg, 1 mg, intramuscular, q15 min PRN, William Raya DPM    glucagon (Glucagen) injection 1 mg, 1 mg, intramuscular, q15 min PRN, William Raya DPM    guaiFENesin (Mucinex) 12 hr tablet 600 mg, 600 mg, oral, q12h PRN, William Raya DPM    insulin glargine (Lantus) injection 15 Units, 15 Units, subcutaneous, q24h, William Raya DPM, 15 Units at 04/30/25 2143    insulin lispro injection 0-10 Units, 0-10 Units, subcutaneous, TID AC, William Raya DPM, 2 Units at 05/02/25 1648    levothyroxine (Synthroid, Levoxyl) tablet 50 mcg, 50 mcg, oral, Daily, William Raya DPM, 50 mcg at 05/02/25 0650    magnesium oxide (Mag-Ox) tablet 400 mg, 400 mg, oral, Daily, Sera Liu PA-C, 400 mg at 05/02/25 0941    melatonin tablet 3 mg, 3 mg, oral, Nightly PRN, William Raya DPM    methadone (Dolophine) 10 mg/mL solution 78 mg, 78 mg, oral, Nightly, William Raya DPM, 78 mg at 05/01/25 2321    methadone (Dolophine) 10 mg/mL solution 87 mg, 87 mg, oral, Daily, William Raya DPM, 87 mg at 05/02/25 1036    metoprolol succinate XL (Toprol-XL) 24 hr tablet 25 mg, 25 mg, oral, Daily, William Raya DPM, 25 mg at 05/01/25 0904    ondansetron ODT (Zofran-ODT) disintegrating tablet 4 mg, 4 mg, oral, q8h PRN **OR** ondansetron (Zofran) injection 4 mg, 4 mg, intravenous, q8h PRN, William Raya DPM    pantoprazole (ProtoNix) EC tablet 40 mg, 40 mg, oral, Daily before breakfast, 40 mg at 05/02/25 0650 **OR** pantoprazole (Protonix) injection 40 mg, 40 mg, intravenous, Daily before breakfast, William Raya DPM, 40 mg at 05/01/25 0514    polyethylene glycol (Glycolax,  Miralax) packet 17 g, 17 g, oral, Daily, William Raya DPM, 17 g at 04/30/25 0907    predniSONE (Deltasone) tablet 1 mg, 1 mg, oral, Daily, William Raya DPM, 1 mg at 05/02/25 0926    pregabalin (Lyrica) capsule 200 mg, 200 mg, oral, TID, William Raya DPM, 200 mg at 05/02/25 1645    thiamine (Vitamin B-1) tablet 100 mg, 100 mg, oral, Daily, William Raya DPM, 100 mg at 05/02/25 0925    tiotropium (Spiriva Respimat) 2.5 mcg/actuation inhaler 2 puff, 2 puff, inhalation, Daily, William Raya DPM, 2 puff at 05/02/25 0936    vancomycin (Vancocin) pharmacy to dose - pharmacy monitoring, , miscellaneous, Daily PRN, William Raya DPM    vancomycin 1,750 mg in dextrose 5% 500 mL IV (Ordered as: vancomycin), 1,750 mg, intravenous, q24h, William Raya DPM, Stopped at 05/02/25 0020

## 2025-05-02 NOTE — DISCHARGE INSTRUCTIONS
Discharge IV Antibiotic Instructions        ID diagnosis - R foot wound infection  R foot OM/septic arthritis s/p TMA 4/30 but proximal cx are positive w MRSA  DM2 w neuropathy  HCV  CAD  COPD  RA  Hypothyroid     Antimicrobials - vancomycin 1.75g IV q24     Stop date - 6/11/2025     Labs - CBC w diff, Creatinine, ALT, Vanco T every Monday while on abx     Followup - 4 weeks     Please remove PICC after abx completed unless follow up specified above     PICC line dressing changes and flushes per protocol     Please fax to [phone].   Please call [phone] with questions.        Freddy Mike MD  Date of service: 5/2/2025

## 2025-05-02 NOTE — CARE PLAN
Problem: Safety - Adult  Goal: Free from fall injury  Outcome: Progressing     Problem: Discharge Planning  Goal: Discharge to home or other facility with appropriate resources  Outcome: Progressing     Problem: Chronic Conditions and Co-morbidities  Goal: Patient's chronic conditions and co-morbidity symptoms are monitored and maintained or improved  Outcome: Progressing     Problem: Nutrition  Goal: Nutrient intake appropriate for maintaining nutritional needs  Outcome: Progressing     Problem: Fall/Injury  Goal: Not fall by end of shift  Outcome: Progressing  Goal: Be free from injury by end of the shift  Outcome: Progressing  Goal: Verbalize understanding of personal risk factors for fall in the hospital  Outcome: Progressing  Goal: Verbalize understanding of risk factor reduction measures to prevent injury from fall in the home  Outcome: Progressing  Goal: Use assistive devices by end of the shift  Outcome: Progressing  Goal: Pace activities to prevent fatigue by end of the shift  Outcome: Progressing     Problem: Pain  Goal: Takes deep breaths with improved pain control throughout the shift  Outcome: Progressing  Goal: Turns in bed with improved pain control throughout the shift  Outcome: Progressing  Goal: Performs ADL's with improved pain control throughout shift  Outcome: Progressing  Goal: Participates in PT with improved pain control throughout the shift  Outcome: Progressing     Problem: Diabetes  Goal: Achieve decreasing blood glucose levels by end of shift  Outcome: Progressing  Goal: Increase stability of blood glucose readings by end of shift  Outcome: Progressing  Goal: Maintain electrolyte levels within acceptable range throughout shift  Outcome: Progressing  Goal: Maintain glucose levels >70mg/dl to <250mg/dl throughout shift  Outcome: Progressing  Goal: Vital signs within normal range for age by end of shift  Outcome: Progressing

## 2025-05-02 NOTE — PROGRESS NOTES
Met with patient at bedside. Updated patient that ID recommendations are pending at this time to determine if he will need IV antibiotics on discharge. Explained what IV antibiotics would entail and he confirmed he is agreeable to complete at home where his brother would be his learner. TCC to follow.

## 2025-05-02 NOTE — TREATMENT PLAN
Discharge IV Antibiotic Instructions      ID diagnosis - R foot wound infection  R foot OM/septic arthritis s/p TMA 4/30 but proximal cx are positive w MRSA  DM2 w neuropathy  HCV  CAD  COPD  RA  Hypothyroid    Antimicrobials - vancomycin 1.75g IV q24    Stop date - 6/11/2025    Labs - CBC w diff, Creatinine, ALT, Vanco T every Monday while on abx    Followup - 4 weeks    Please remove PICC after abx completed unless follow up specified above    PICC line dressing changes and flushes per protocol    Please fax to [phone].   Please call [phone] with questions.      Freddy Mike MD  Date of service: 5/2/2025

## 2025-05-02 NOTE — PROGRESS NOTES
"Physical Therapy    Physical Therapy Treatment    Patient Name: Kael Zepeda  MRN: [id number]  Department: [address]  Room: [facility]  Today's Date: 5/2/2025  Time Calculation  Start Time: 0823  Stop Time: 0850  Time Calculation (min): 27 min         Assessment/Plan   PT Assessment  PT Assessment Results: Impaired balance, Decreased mobility, Decreased safety awareness, Pain, Decreased skin integrity, Orthopedic restrictions (pt. increased gait to 20' with FWW and CGA of 1 on smooth surfaces. pt. compliant with NWB RLE during ambulation, and R heel WB with transfers. Emphasis placed on safe hand placement with trnsfers. Pt. in alarmed recliner with call light in reach.)     PT Plan  Treatment/Interventions: Bed mobility, Transfer training, Gait training, Stair training, Strengthening, Endurance training, Therapeutic exercise  PT Plan: Ongoing PT  PT Frequency: 4 times per week  PT Discharge Recommendations: Moderate intensity level of continued care  Equipment Recommended upon Discharge: Wheeled walker (SHOWER SEAT)  PT Recommended Transfer Status: Assist x2 (FWW, HEEL WB RLE FOR TRANSFERS, NWB RLE FOR AMB)  PT - OK to Discharge: Yes (WHENMEDICALLY CLEARED)      General Visit Information:      General  Prior to Session Communication: PCT/NA/CTA  Patient Position Received: Bed, 3 rail up, Alarm on  General Comment: pt. sleeping heavily upon arrival phone loudly playing audio.Addressed pt. name x's 3 loudly, pt. awoke,  pleaseant and agreeable for tx. No new c/o.    Subjective   Precautions:               Objective   Pain:  Pain Assessment  Pain Assessment: 0-10 (pt. reports \" a little pain\", not quantified on pain scale.)  Cognition:  Cognition  Orientation Level: Oriented X4  Coordination:     Postural Control:     Extremity/Trunk Assessments:    Activity Tolerance:  Activity Tolerance  Endurance: Tolerates 10 - 20 min exercise with multiple rests  Treatments:  Therapeutic Exercise  Therapeutic Exercise " "Performed: Yes (LAQ\"s, Marching, Hip Abduction/Adduction x's 20 reps each JIHAN. LE\"s with Green Tband.)    Bed Mobility  Bed Mobility: Yes (Supine--> Sit Supervision)    Ambulation/Gait Training  Ambulation/Gait Training Performed: Yes (Gait training x's 20' NWB RLE with FWW on smooth surfaces.)  Transfers  Transfer: Yes (Sit<--->Stand from EOB SBA with vc's for safe hand placement vs. pulling at FWW to stand.)    Outcome Measures:  [facility] Basic Mobility  Turning from your back to your side while in a flat bed without using bedrails: None  Moving from lying on your back to sitting on the side of a flat bed without using bedrails: None  Moving to and from bed to chair (including a wheelchair): A little  Standing up from a chair using your arms (e.g. wheelchair or bedside chair): A little  To walk in hospital room: A little  Climbing 3-5 steps with railing: A lot  Basic Mobility - Total Score: 19    Education Documentation  Precautions, taught by Sybil Root PTA at 5/2/2025  9:16 AM.  Learner: Patient  Readiness: Acceptance  Method: Explanation, Demonstration, Teach-back  Response: Verbalizes Understanding, Demonstrated Understanding    Mobility Training, taught by Sybil Root PTA at 5/2/2025  9:16 AM.  Learner: Patient  Readiness: Acceptance  Method: Explanation, Demonstration, Teach-back  Response: Verbalizes Understanding, Demonstrated Understanding    Education Comments  No comments found.        OP EDUCATION:       Encounter Problems       Encounter Problems (Active)       Mobility       STG - Patient will ambulate (Progressing)       Start:  05/01/25    Expected End:  05/10/25       FWW NWB RLE 30 FT MIN A X1         Goal 1 (Progressing)       Start:  05/01/25    Expected End:  05/22/25       20 REPS RROM INCREASING STRENGTH OF LLE, RHIP/KNEE & AROM R ANKLE IMPROVING GAIT STABILITY            PT Transfers       STG - Patient will transfer sit to and from stand (Progressing)       Start:  05/01/25    " Expected End:  05/07/25       FWW USING PROPER TECHNIQUE ELEAZAR KIM

## 2025-05-02 NOTE — PROGRESS NOTES
Kael Zepeda is a 59 y.o. male on day 3 of admission presenting with Osteomyelitis of right foot, unspecified type (Multi).      Subjective   Kael Zepeda is a 59 y.o. male with PMHx s/f insulin-dependent diabetes, hepatitis C, fibromyalgia, coronary artery disease history CABG, COPD, peripheral neuropathy, chronic methadone therapy, hypothyroidism, rheumatoid arthritis chronic right foot wound presenting with worsening wound and pain.  Patient had been following with podiatry for over a year due to persistent wound in the right lower extremity.  Patient recently had an outpatient MRI showing findings concerning for tenosynovitis and osteomyelitis septic arthritis.  He was treated with Keflex and Bactrim by infectious disease who had also advised patient to come into the hospital prior cultures were abnormal for MRSA last year.  He did follow-up with Dr. Raya today who advised him to come to the emergency department for further evaluation and treatment.  Presently patient is denying any fevers or chills any nausea or vomiting his pain is tolerable no urinary symptoms no diarrhea.  CBC with WBC 5.1, Hgb 9.4, Plts 474. . Trop 135. Lactate 1.5. Patient seen by Dr. Raya from podiatry, had R TMA 4/30/25. Follow residual bone cx.     5/2/2025: No acute events overnight. Vitals stable. Labs are pending. Had R TMA 4/30. Follow bone cultures. Bl cx x2- ng x2 days  Bl cx x2- ng x1 day  Residual bone cx- rare SA  Await ID recommendations regarding length of antibiotic therapy        Review of Systems   Constitutional:  Negative for appetite change, chills, diaphoresis, fatigue and fever.   HENT:  Negative for congestion, ear pain, facial swelling, hearing loss, nosebleeds, sore throat, tinnitus and trouble swallowing.    Eyes:  Negative for pain.   Respiratory:  Negative for cough, chest tightness, shortness of breath and wheezing.    Cardiovascular:  Negative for chest pain, palpitations and leg  swelling.   Gastrointestinal:  Negative for abdominal pain, blood in stool, constipation, diarrhea, nausea and vomiting.   Genitourinary:  Negative for dysuria, flank pain, frequency, hematuria and urgency.   Musculoskeletal:  Negative for back pain and joint swelling.        +R foot pain   Skin:  Positive for wound. Negative for rash.   Neurological:  Negative for dizziness, syncope, weakness, light-headedness, numbness and headaches.   Hematological:  Does not bruise/bleed easily.   Psychiatric/Behavioral:  Negative for behavioral problems, hallucinations and suicidal ideas.           Objective     Last Recorded Vitals  BP 97/58 (BP Location: Left arm, Patient Position: Lying)   Pulse 93   Temp 36.6 °C (97.9 °F) (Temporal)   Resp 16   Wt 81.6 kg (180 lb)   SpO2 91%     Image Results  Imaging  XR foot right 3+ views  Result Date: 4/29/2025  1. Findings suggesting osteomyelitis of the 1st metatarsal stump as well as of the 2nd metatarsal head and 2nd proximal phalanx base with septic arthritis of the 2nd MTP joint. Consider MRI for further evaluation if clinically warranted.   MACRO: None.   Signed by: Jennifer Macias 4/29/2025 11:23 AM Dictation workstation:   [id number]      Cardiology, Vascular, and Other Imaging  No other imaging results found for the past 7 days       Lab Results  Results for orders placed or performed during the hospital encounter of 04/29/25 (from the past 24 hours)   Basic Metabolic Panel   Result Value Ref Range    Glucose 125 (H) 74 - 99 mg/dL    Sodium 131 (L) 136 - 145 mmol/L    Potassium 3.9 3.5 - 5.3 mmol/L    Chloride 101 98 - 107 mmol/L    Bicarbonate 26 21 - 32 mmol/L    Anion Gap 8 (L) 10 - 20 mmol/L    Urea Nitrogen 10 6 - 23 mg/dL    Creatinine 0.84 0.50 - 1.30 mg/dL    eGFR >90 >60 mL/min/1.73m*2    Calcium 8.1 (L) 8.6 - 10.3 mg/dL   CBC   Result Value Ref Range    WBC 6.1 4.4 - 11.3 x10*3/uL    nRBC 0.0 0.0 - 0.0 /100 WBCs    RBC 4.29 (L) 4.50 - 5.90 x10*6/uL     Hemoglobin 9.0 (L) 13.5 - 17.5 g/dL    Hematocrit 29.4 (L) 41.0 - 52.0 %    MCV 69 (L) 80 - 100 fL    MCH 21.0 (L) 26.0 - 34.0 pg    MCHC 30.6 (L) 32.0 - 36.0 g/dL    RDW 17.6 (H) 11.5 - 14.5 %    Platelets 440 150 - 450 x10*3/uL   Magnesium   Result Value Ref Range    Magnesium 1.48 (L) 1.60 - 2.40 mg/dL   POCT GLUCOSE   Result Value Ref Range    POCT Glucose 217 (H) 74 - 99 mg/dL   POCT GLUCOSE   Result Value Ref Range    POCT Glucose 207 (H) 74 - 99 mg/dL   POCT GLUCOSE   Result Value Ref Range    POCT Glucose 108 (H) 74 - 99 mg/dL   POCT GLUCOSE   Result Value Ref Range    POCT Glucose 113 (H) 74 - 99 mg/dL     *Note: Due to a large number of results and/or encounters for the requested time period, some results have not been displayed. A complete set of results can be found in Results Review.        Medications  Scheduled medications:  Scheduled Medications[1]  Continuous medications:  Continuous Medications[2]  PRN medications:  PRN Medications[3]     Physical Exam  Constitutional:       General: He is not in acute distress.     Appearance: Normal appearance.   HENT:      Head: Normocephalic and atraumatic.      Right Ear: External ear normal.      Left Ear: External ear normal.      Nose: Nose normal.      Mouth/Throat:      Mouth: Mucous membranes are moist.      Pharynx: Oropharynx is clear.   Eyes:      Extraocular Movements: Extraocular movements intact.      Conjunctiva/sclera: Conjunctivae normal.      Pupils: Pupils are equal, round, and reactive to light.   Cardiovascular:      Rate and Rhythm: Normal rate and regular rhythm.      Pulses: Normal pulses.      Heart sounds: Normal heart sounds.   Pulmonary:      Effort: Pulmonary effort is normal. No respiratory distress.      Breath sounds: Normal breath sounds. No wheezing, rhonchi or rales.   Abdominal:      General: Bowel sounds are normal.      Palpations: Abdomen is soft.      Tenderness: There is no abdominal tenderness. There is no right CVA  tenderness, left CVA tenderness, guarding or rebound.   Musculoskeletal:      Cervical back: Normal range of motion and neck supple.      Comments: Post-operative dressing is C/D/I, did not remove dressing   Skin:     General: Skin is warm and dry.      Capillary Refill: Capillary refill takes less than 2 seconds.      Findings: Lesion present. No rash.      Comments: R foot wound dressed   Neurological:      General: No focal deficit present.      Mental Status: He is alert and oriented to person, place, and time. Mental status is at baseline.   Psychiatric:         Mood and Affect: Mood normal.         Behavior: Behavior normal.                  Assessment/Plan      Diabetic R foot wound infection  R foot OM/septic arthritis s/p TMA 4/30  Prior MRI findings reportedly concerning for osteomyelitis tenosynovitis  History of prior MRSA infection in same wound  We will continue patient on vancomycin/unasyn  Consult infectious disease and podiatry  S/p R TMA 4/30/25  Bl cx x2- ng x2 days  Residual bone cx- rare SA     Chronic methadone use  We will restart patient's methadone     Peripheral diabetic neuropathy  Resume Lyrica     IDDM-II  Patient will be placed on sliding scale insulin at a reduced dose of his Lantus  Continue consistent carbohydrate diet     Coronary artery disease history of CABG  Will review and reconcile patient's home medications continue his ASA therapy  Should not be on celebrex- will hold while here     COPD  No exacerbation at this time    Code Status: Full Code                 DVT ppx: Lovenox     Please see orders for more complete plan    Sera Rodriguez PA-C         [1] ampicillin-sulbactam, 3 g, intravenous, q6h  aspirin, 81 mg, oral, Daily  atorvastatin, 40 mg, oral, Nightly  DULoxetine, 40 mg, oral, Daily  enoxaparin, 40 mg, subcutaneous, q24h  fluticasone furoate-vilanteroL, 1 puff, inhalation, Daily  insulin glargine, 15 Units, subcutaneous, q24h  insulin lispro, 0-10 Units,  subcutaneous, TID AC  levothyroxine, 50 mcg, oral, Daily  magnesium oxide, 400 mg, oral, Daily  methadone, 78 mg, oral, Nightly  methadone, 87 mg, oral, Daily  metoprolol succinate XL, 25 mg, oral, Daily  pantoprazole, 40 mg, oral, Daily before breakfast   Or  pantoprazole, 40 mg, intravenous, Daily before breakfast  polyethylene glycol, 17 g, oral, Daily  predniSONE, 1 mg, oral, Daily  pregabalin, 200 mg, oral, TID  thiamine, 100 mg, oral, Daily  tiotropium, 2 puff, inhalation, Daily  vancomycin, 1,750 mg, intravenous, q24h     [2]    [3] PRN medications: acetaminophen **OR** acetaminophen **OR** acetaminophen, bisacodyl, bisacodyl, dextrose, dextrose, glucagon, glucagon, guaiFENesin, melatonin, ondansetron ODT **OR** ondansetron, vancomycin

## 2025-05-02 NOTE — PROGRESS NOTES
"[facility] INFECTIOUS DISEASE PROGRESS NOTE    Patient Name: Kael Zepeda  MRN: [id number]    INTERVAL HISTORY:   No fevers. No new complaints. Tolerating abx well.     Problem List[1]     ASSESSMENT:   R foot wound infection  R foot OM/septic arthritis s/p TMA   DM2 w neuropathy  HCV  CAD  COPD  RA  Hypothyroid    Plan   Continue Vancomycin  Continue Unasyn  Vanco T  Bcx x 2  Podiatry consult noted    MEDICATIONS: reviewed.  Current Medications[2]     PHYSICAL EXAM:  Vital signs: /60 (BP Location: Left arm, Patient Position: Sitting)   Pulse 84   Temp 37.3 °C (99.1 °F) (Temporal)   Resp 18   Ht 1.702 m (5' 7\")   Wt 81.6 kg (180 lb)   SpO2 94%   BMI 28.19 kg/m²   Temp (24hrs), Av.8 °C (98.3 °F), Min:36.3 °C (97.4 °F), Max:37.3 °C (99.1 °F)    General: alert, oriented, NAD  Lungs: bilaterally clear to auscultation  Heart: regular rate and rhythm  Abdomen: soft, non tender, non distended, BS+  Extremities: Foot dressings post op  No rashes  No joint inflammation  Neck supple  Lines ok  No CVAT              Labs:    Results for orders placed or performed during the hospital encounter of 25 (from the past 96 hours)   C-reactive protein   Result Value Ref Range    C-Reactive Protein 7.25 (H) <1.00 mg/dL   Sedimentation rate, automated   Result Value Ref Range    Sedimentation Rate 120 (H) 0 - 20 mm/h   Blood Culture    Specimen: Peripheral Venipuncture; Blood culture   Result Value Ref Range    Blood Culture No growth at 2 days    CBC and Auto Differential   Result Value Ref Range    WBC 5.1 4.4 - 11.3 x10*3/uL    nRBC 0.0 0.0 - 0.0 /100 WBCs    RBC 4.47 (L) 4.50 - 5.90 x10*6/uL    Hemoglobin 9.4 (L) 13.5 - 17.5 g/dL    Hematocrit 30.8 (L) 41.0 - 52.0 %    MCV 69 (L) 80 - 100 fL    MCH 21.0 (L) 26.0 - 34.0 pg    MCHC 30.5 (L) 32.0 - 36.0 g/dL    RDW 17.4 (H) 11.5 - 14.5 %    Platelets 474 (H) 150 - 450 x10*3/uL    Neutrophils % 62.6 40.0 - 80.0 %    Immature Granulocytes %, Automated 2.0 " (H) 0.0 - 0.9 %    Lymphocytes % 21.4 13.0 - 44.0 %    Monocytes % 11.2 2.0 - 10.0 %    Eosinophils % 2.2 0.0 - 6.0 %    Basophils % 0.6 0.0 - 2.0 %    Neutrophils Absolute 3.19 1.20 - 7.70 x10*3/uL    Immature Granulocytes Absolute, Automated 0.10 0.00 - 0.70 x10*3/uL    Lymphocytes Absolute 1.09 (L) 1.20 - 4.80 x10*3/uL    Monocytes Absolute 0.57 0.10 - 1.00 x10*3/uL    Eosinophils Absolute 0.11 0.00 - 0.70 x10*3/uL    Basophils Absolute 0.03 0.00 - 0.10 x10*3/uL   Comprehensive metabolic panel   Result Value Ref Range    Glucose 164 (H) 74 - 99 mg/dL    Sodium 136 136 - 145 mmol/L    Potassium 4.6 3.5 - 5.3 mmol/L    Chloride 98 98 - 107 mmol/L    Bicarbonate 25 21 - 32 mmol/L    Anion Gap 18 10 - 20 mmol/L    Urea Nitrogen 15 6 - 23 mg/dL    Creatinine 0.81 0.50 - 1.30 mg/dL    eGFR >90 >60 mL/min/1.73m*2    Calcium 8.7 8.6 - 10.3 mg/dL    Albumin 3.3 (L) 3.4 - 5.0 g/dL    Alkaline Phosphatase 181 (H) 33 - 136 U/L    Total Protein 7.1 6.4 - 8.2 g/dL    AST 21 9 - 39 U/L    Bilirubin, Total 0.3 0.0 - 1.2 mg/dL    ALT 13 10 - 52 U/L   Blood Culture    Specimen: Peripheral Venipuncture; Blood culture   Result Value Ref Range    Blood Culture No growth at 2 days    Lactate   Result Value Ref Range    Lactate 1.5 0.4 - 2.0 mmol/L   POCT GLUCOSE   Result Value Ref Range    POCT Glucose 176 (H) 74 - 99 mg/dL   POCT GLUCOSE   Result Value Ref Range    POCT Glucose 78 74 - 99 mg/dL   POCT GLUCOSE   Result Value Ref Range    POCT Glucose 238 (H) 74 - 99 mg/dL   CBC   Result Value Ref Range    WBC 5.6 4.4 - 11.3 x10*3/uL    nRBC 0.0 0.0 - 0.0 /100 WBCs    RBC 4.68 4.50 - 5.90 x10*6/uL    Hemoglobin 9.8 (L) 13.5 - 17.5 g/dL    Hematocrit 33.6 (L) 41.0 - 52.0 %    MCV 72 (L) 80 - 100 fL    MCH 20.9 (L) 26.0 - 34.0 pg    MCHC 29.2 (L) 32.0 - 36.0 g/dL    RDW 17.9 (H) 11.5 - 14.5 %    Platelets 523 (H) 150 - 450 x10*3/uL   Basic metabolic panel   Result Value Ref Range    Glucose 89 74 - 99 mg/dL    Sodium 136 136 - 145 mmol/L     Potassium 4.2 3.5 - 5.3 mmol/L    Chloride 103 98 - 107 mmol/L    Bicarbonate 24 21 - 32 mmol/L    Anion Gap 13 10 - 20 mmol/L    Urea Nitrogen 14 6 - 23 mg/dL    Creatinine 0.87 0.50 - 1.30 mg/dL    eGFR >90 >60 mL/min/1.73m*2    Calcium 8.7 8.6 - 10.3 mg/dL   Vancomycin   Result Value Ref Range    Vancomycin 24.4 (H) 5.0 - 20.0 ug/mL   POCT GLUCOSE   Result Value Ref Range    POCT Glucose 96 74 - 99 mg/dL   POCT GLUCOSE   Result Value Ref Range    POCT Glucose 82 74 - 99 mg/dL   Tissue/Wound Culture/Smear    Specimen: Bone; Tissue/Biopsy   Result Value Ref Range    Tissue/Wound Culture/Smear (1+) Rare Staphylococcus aureus (A)     Gram Stain (1+) Rare Polymorphonuclear leukocytes     Gram Stain No organisms seen    POCT GLUCOSE   Result Value Ref Range    POCT Glucose 78 74 - 99 mg/dL   POCT GLUCOSE   Result Value Ref Range    POCT Glucose 160 (H) 74 - 99 mg/dL   Vancomycin   Result Value Ref Range    Vancomycin 26.1 (H) 5.0 - 20.0 ug/mL   POCT GLUCOSE   Result Value Ref Range    POCT Glucose 133 (H) 74 - 99 mg/dL   Basic Metabolic Panel   Result Value Ref Range    Glucose 125 (H) 74 - 99 mg/dL    Sodium 131 (L) 136 - 145 mmol/L    Potassium 3.9 3.5 - 5.3 mmol/L    Chloride 101 98 - 107 mmol/L    Bicarbonate 26 21 - 32 mmol/L    Anion Gap 8 (L) 10 - 20 mmol/L    Urea Nitrogen 10 6 - 23 mg/dL    Creatinine 0.84 0.50 - 1.30 mg/dL    eGFR >90 >60 mL/min/1.73m*2    Calcium 8.1 (L) 8.6 - 10.3 mg/dL   CBC   Result Value Ref Range    WBC 6.1 4.4 - 11.3 x10*3/uL    nRBC 0.0 0.0 - 0.0 /100 WBCs    RBC 4.29 (L) 4.50 - 5.90 x10*6/uL    Hemoglobin 9.0 (L) 13.5 - 17.5 g/dL    Hematocrit 29.4 (L) 41.0 - 52.0 %    MCV 69 (L) 80 - 100 fL    MCH 21.0 (L) 26.0 - 34.0 pg    MCHC 30.6 (L) 32.0 - 36.0 g/dL    RDW 17.6 (H) 11.5 - 14.5 %    Platelets 440 150 - 450 x10*3/uL   Magnesium   Result Value Ref Range    Magnesium 1.48 (L) 1.60 - 2.40 mg/dL   POCT GLUCOSE   Result Value Ref Range    POCT Glucose 217 (H) 74 - 99 mg/dL   POCT  GLUCOSE   Result Value Ref Range    POCT Glucose 207 (H) 74 - 99 mg/dL   POCT GLUCOSE   Result Value Ref Range    POCT Glucose 108 (H) 74 - 99 mg/dL     *Note: Due to a large number of results and/or encounters for the requested time period, some results have not been displayed. A complete set of results can be found in Results Review.        Microbiology data: reviewed    Imaging data: reviewed      Freddy Mike  Pager:[phone]         [1]   Patient Active Problem List  Diagnosis    Diabetic foot ulcer with osteomyelitis (Multi)    Diabetes mellitus, type 2 (Multi)    Benign essential hypertension    Gastroesophageal reflux disease without esophagitis    Corns and callosities    Degeneration of intervertebral disc of lumbar region    Depression    Dermatitis    Chronic back pain    Fibromyalgia    Generalized anxiety disorder    Hepatitis B core antibody positive    History of hepatitis C    History of hypothyroidism    Hyperlipidemia, mixed    Hypothyroidism, acquired    Lumbar radiculopathy    Lumbar spondylosis    Obesity, Class I, BMI 30-34.9    Opioid dependence, in remission    Patient's noncompliance with other medical treatment and regimen due to unspecified reason    Peripheral neuropathy    Primary insomnia    Pulmonary emphysema (Multi)    Rheumatoid arthritis involving both hands with positive rheumatoid factor (Multi)    Rheumatoid nodules (Multi)    Scrotal cyst    Spinal stenosis of lumbosacral region    Steroid-induced osteoporosis    Tobacco use disorder    Ulcer of toe of right foot, limited to breakdown of skin    Vitamin D deficiency    NSTEMI (non-ST elevated myocardial infarction) (Multi)    Microcytic anemia    CAD in native artery    Hepatitis C    Stage 2 chronic kidney disease    Osteomyelitis of left foot, unspecified type (Multi)    Cellulitis of left lower extremity    Acute osteomyelitis of left foot (Multi)    Overweight (BMI 25.0-29.9)    Sleep-related breathing disorder    RLS  (restless legs syndrome)    Osteomyelitis of right foot, unspecified type (Multi)    Ulcer of toe of right foot, with necrosis of bone (Multi)   [2]   Current Facility-Administered Medications:     acetaminophen (Tylenol) tablet 650 mg, 650 mg, oral, q4h PRN, 650 mg at 05/01/25 0533 **OR** acetaminophen (Tylenol) suspension 650 mg, 650 mg, nasogastric tube, q4h PRN **OR** acetaminophen (Tylenol) suppository 650 mg, 650 mg, rectal, q4h PRN, William Raya DPM    ampicillin-sulbactam (Unasyn) 3 g in sodium chloride 0.9%  mL, 3 g, intravenous, q6h, William Raya DPM, Stopped at 05/01/25 1833    aspirin EC tablet 81 mg, 81 mg, oral, Daily, William Raya DPM, 81 mg at 05/01/25 0903    atorvastatin (Lipitor) tablet 40 mg, 40 mg, oral, Nightly, William Raya DPM, 40 mg at 05/01/25 2214    bisacodyl (Dulcolax) EC tablet 10 mg, 10 mg, oral, Daily PRN, William Raya DPM    bisacodyl (Dulcolax) suppository 10 mg, 10 mg, rectal, Daily PRN, William Raya DPM    dextrose 50 % injection 12.5 g, 12.5 g, intravenous, q15 min PRN, William Raya DPM    dextrose 50 % injection 25 g, 25 g, intravenous, q15 min PRN, William Raya DPM    DULoxetine (Cymbalta) DR capsule 40 mg, 40 mg, oral, Daily, William Raya DPM, 40 mg at 05/01/25 0904    enoxaparin (Lovenox) syringe 40 mg, 40 mg, subcutaneous, q24h, William Raya DPM, 40 mg at 05/01/25 1251    fluticasone furoate-vilanteroL (Breo Ellipta) 100-25 mcg/dose inhaler 1 puff, 1 puff, inhalation, Daily, William Raya DPM, 1 puff at 05/01/25 0903    glucagon (Glucagen) injection 1 mg, 1 mg, intramuscular, q15 min PRN, William Raya DPM    glucagon (Glucagen) injection 1 mg, 1 mg, intramuscular, q15 min PRN, William Raya DPM    guaiFENesin (Mucinex) 12 hr tablet 600 mg, 600 mg, oral, q12h PRN, William Raya DPM    insulin glargine (Lantus) injection 15 Units, 15 Units, subcutaneous, q24h, William Raya DPM, 15 Units at 04/30/25 2143    insulin lispro injection 0-10 Units, 0-10 Units,  subcutaneous, TID AC, William Raya DPM, 4 Units at 05/01/25 1749    levothyroxine (Synthroid, Levoxyl) tablet 50 mcg, 50 mcg, oral, Daily, William Raya DPM, 50 mcg at 05/01/25 0514    melatonin tablet 3 mg, 3 mg, oral, Nightly PRN, William Raya DPM    methadone (Dolophine) 10 mg/mL solution 78 mg, 78 mg, oral, Nightly, William Raya DPM, 78 mg at 04/30/25 2232    methadone (Dolophine) 10 mg/mL solution 87 mg, 87 mg, oral, Daily, William Raya DPM, 87 mg at 05/01/25 0907    metoprolol succinate XL (Toprol-XL) 24 hr tablet 25 mg, 25 mg, oral, Daily, William Raya DPM, 25 mg at 05/01/25 0904    ondansetron ODT (Zofran-ODT) disintegrating tablet 4 mg, 4 mg, oral, q8h PRN **OR** ondansetron (Zofran) injection 4 mg, 4 mg, intravenous, q8h PRN, William Raya DPM    pantoprazole (ProtoNix) EC tablet 40 mg, 40 mg, oral, Daily before breakfast **OR** pantoprazole (Protonix) injection 40 mg, 40 mg, intravenous, Daily before breakfast, William Raya DPM, 40 mg at 05/01/25 0514    polyethylene glycol (Glycolax, Miralax) packet 17 g, 17 g, oral, Daily, William Raya DPM, 17 g at 04/30/25 0907    predniSONE (Deltasone) tablet 1 mg, 1 mg, oral, Daily, William Raya DPM, 1 mg at 05/01/25 0904    pregabalin (Lyrica) capsule 200 mg, 200 mg, oral, TID, William Raya DPM, 200 mg at 05/01/25 2215    thiamine (Vitamin B-1) tablet 100 mg, 100 mg, oral, Daily, William Raya DPM, 100 mg at 05/01/25 0904    tiotropium (Spiriva Respimat) 2.5 mcg/actuation inhaler 2 puff, 2 puff, inhalation, Daily, William Raya DPM, 2 puff at 05/01/25 0903    vancomycin (Vancocin) pharmacy to dose - pharmacy monitoring, , miscellaneous, Daily PRN, Willaim Raya DPM    vancomycin 1,750 mg in dextrose 5% 500 mL IV (Ordered as: vancomycin), 1,750 mg, intravenous, q24h, William Raya DPM, Last Rate: 0 mL/hr at 05/01/25 0027, 1,750 mg at 05/01/25 2214

## 2025-05-02 NOTE — PROGRESS NOTES
Occupational Therapy                 Therapy Communication Note    Patient Name: Kael Zepeda  MRN: [id number]  Department: [facility] 3 E  Room: [facility]3333-A  Today's Date: 5/2/2025     Discipline: Occupational Therapy    OT Missed Visit: Yes     Missed Visit Reason: Missed Visit Reason:  (pt. reports LLE too sore and he wants to relax right now. Pt. up in chair with LLE crossed at knee,. Provided education on elevation and tech. to use with FWW when walking to maintain WB status. Pt. stated he would try tomorrow,)    Missed Time: Attempt    Comment:

## 2025-05-03 ENCOUNTER — APPOINTMENT (OUTPATIENT)
Dept: RADIOLOGY | Facility: HOSPITAL | Age: 60
DRG: 617 | End: 2025-05-03
Payer: MEDICARE

## 2025-05-03 LAB
ANION GAP SERPL CALC-SCNC: 14 MMOL/L (ref 10–20)
BACTERIA BLD CULT: NORMAL
BACTERIA BLD CULT: NORMAL
BUN SERPL-MCNC: 11 MG/DL (ref 6–23)
CALCIUM SERPL-MCNC: 8.6 MG/DL (ref 8.6–10.3)
CHLORIDE SERPL-SCNC: 100 MMOL/L (ref 98–107)
CO2 SERPL-SCNC: 26 MMOL/L (ref 21–32)
CREAT SERPL-MCNC: 0.83 MG/DL (ref 0.5–1.3)
EGFRCR SERPLBLD CKD-EPI 2021: >90 ML/MIN/1.73M*2
ERYTHROCYTE [DISTWIDTH] IN BLOOD BY AUTOMATED COUNT: 18.1 % (ref 11.5–14.5)
GLUCOSE BLD MANUAL STRIP-MCNC: 125 MG/DL (ref 74–99)
GLUCOSE BLD MANUAL STRIP-MCNC: 144 MG/DL (ref 74–99)
GLUCOSE BLD MANUAL STRIP-MCNC: 182 MG/DL (ref 74–99)
GLUCOSE BLD MANUAL STRIP-MCNC: 264 MG/DL (ref 74–99)
GLUCOSE SERPL-MCNC: 165 MG/DL (ref 74–99)
HCT VFR BLD AUTO: 30.6 % (ref 41–52)
HGB BLD-MCNC: 9.3 G/DL (ref 13.5–17.5)
MAGNESIUM SERPL-MCNC: 1.96 MG/DL (ref 1.6–2.4)
MCH RBC QN AUTO: 21.6 PG (ref 26–34)
MCHC RBC AUTO-ENTMCNC: 30.4 G/DL (ref 32–36)
MCV RBC AUTO: 71 FL (ref 80–100)
NRBC BLD-RTO: 0 /100 WBCS (ref 0–0)
PLATELET # BLD AUTO: 416 X10*3/UL (ref 150–450)
POTASSIUM SERPL-SCNC: 4.2 MMOL/L (ref 3.5–5.3)
RBC # BLD AUTO: 4.3 X10*6/UL (ref 4.5–5.9)
SODIUM SERPL-SCNC: 136 MMOL/L (ref 136–145)
WBC # BLD AUTO: 6.3 X10*3/UL (ref 4.4–11.3)

## 2025-05-03 PROCEDURE — 2500000004 HC RX 250 GENERAL PHARMACY W/ HCPCS (ALT 636 FOR OP/ED): Mod: JZ | Performed by: PODIATRIST

## 2025-05-03 PROCEDURE — 85027 COMPLETE CBC AUTOMATED: CPT | Performed by: PHYSICIAN ASSISTANT

## 2025-05-03 PROCEDURE — C1751 CATH, INF, PER/CENT/MIDLINE: HCPCS

## 2025-05-03 PROCEDURE — 2500000004 HC RX 250 GENERAL PHARMACY W/ HCPCS (ALT 636 FOR OP/ED): Performed by: PHYSICIAN ASSISTANT

## 2025-05-03 PROCEDURE — 83735 ASSAY OF MAGNESIUM: CPT | Performed by: PHYSICIAN ASSISTANT

## 2025-05-03 PROCEDURE — 99233 SBSQ HOSP IP/OBS HIGH 50: CPT | Performed by: INTERNAL MEDICINE

## 2025-05-03 PROCEDURE — 2500000005 HC RX 250 GENERAL PHARMACY W/O HCPCS: Performed by: PODIATRIST

## 2025-05-03 PROCEDURE — 2500000002 HC RX 250 W HCPCS SELF ADMINISTERED DRUGS (ALT 637 FOR MEDICARE OP, ALT 636 FOR OP/ED): Performed by: PODIATRIST

## 2025-05-03 PROCEDURE — 1210000001 HC SEMI-PRIVATE ROOM DAILY

## 2025-05-03 PROCEDURE — 02HV33Z INSERTION OF INFUSION DEVICE INTO SUPERIOR VENA CAVA, PERCUTANEOUS APPROACH: ICD-10-PCS | Performed by: INTERNAL MEDICINE

## 2025-05-03 PROCEDURE — 2500000001 HC RX 250 WO HCPCS SELF ADMINISTERED DRUGS (ALT 637 FOR MEDICARE OP): Performed by: PODIATRIST

## 2025-05-03 PROCEDURE — 36573 INSJ PICC RS&I 5 YR+: CPT

## 2025-05-03 PROCEDURE — 97530 THERAPEUTIC ACTIVITIES: CPT | Mod: GO,CO

## 2025-05-03 PROCEDURE — 87081 CULTURE SCREEN ONLY: CPT | Mod: PORLAB | Performed by: INTERNAL MEDICINE

## 2025-05-03 PROCEDURE — 2780000003 HC OR 278 NO HCPCS

## 2025-05-03 PROCEDURE — 36415 COLL VENOUS BLD VENIPUNCTURE: CPT | Performed by: PHYSICIAN ASSISTANT

## 2025-05-03 PROCEDURE — 82947 ASSAY GLUCOSE BLOOD QUANT: CPT

## 2025-05-03 PROCEDURE — 80048 BASIC METABOLIC PNL TOTAL CA: CPT | Performed by: PHYSICIAN ASSISTANT

## 2025-05-03 PROCEDURE — S0109 METHADONE ORAL 5MG: HCPCS | Performed by: PODIATRIST

## 2025-05-03 RX ORDER — LIDOCAINE HYDROCHLORIDE 10 MG/ML
5 INJECTION, SOLUTION INFILTRATION; PERINEURAL ONCE
Status: DISCONTINUED | OUTPATIENT
Start: 2025-05-03 | End: 2025-05-06 | Stop reason: HOSPADM

## 2025-05-03 RX ADMIN — METHADONE HYDROCHLORIDE 78 MG: 10 CONCENTRATE ORAL at 21:58

## 2025-05-03 RX ADMIN — PREGABALIN 200 MG: 75 CAPSULE ORAL at 09:40

## 2025-05-03 RX ADMIN — INSULIN GLARGINE 15 UNITS: 100 INJECTION, SOLUTION SUBCUTANEOUS at 21:59

## 2025-05-03 RX ADMIN — INSULIN LISPRO 6 UNITS: 100 INJECTION, SOLUTION INTRAVENOUS; SUBCUTANEOUS at 13:16

## 2025-05-03 RX ADMIN — TIOTROPIUM BROMIDE INHALATION SPRAY 2 PUFF: 3.12 SPRAY, METERED RESPIRATORY (INHALATION) at 09:41

## 2025-05-03 RX ADMIN — PREGABALIN 200 MG: 75 CAPSULE ORAL at 21:58

## 2025-05-03 RX ADMIN — VANCOMYCIN HYDROCHLORIDE 1750 MG: 10 INJECTION, POWDER, LYOPHILIZED, FOR SOLUTION INTRAVENOUS at 21:59

## 2025-05-03 RX ADMIN — METHADONE HYDROCHLORIDE 87 MG: 10 CONCENTRATE ORAL at 09:56

## 2025-05-03 RX ADMIN — PANTOPRAZOLE SODIUM 40 MG: 40 TABLET, DELAYED RELEASE ORAL at 06:23

## 2025-05-03 RX ADMIN — PREGABALIN 200 MG: 75 CAPSULE ORAL at 15:21

## 2025-05-03 RX ADMIN — DULOXETINE HYDROCHLORIDE 40 MG: 20 CAPSULE, DELAYED RELEASE ORAL at 09:39

## 2025-05-03 RX ADMIN — METOPROLOL SUCCINATE 25 MG: 25 TABLET, EXTENDED RELEASE ORAL at 09:40

## 2025-05-03 RX ADMIN — ASPIRIN 81 MG: 81 TABLET, COATED ORAL at 09:40

## 2025-05-03 RX ADMIN — ATORVASTATIN CALCIUM 40 MG: 40 TABLET, FILM COATED ORAL at 21:58

## 2025-05-03 RX ADMIN — PREDNISONE 1 MG: 1 TABLET ORAL at 09:40

## 2025-05-03 RX ADMIN — Medication 400 MG: at 09:40

## 2025-05-03 RX ADMIN — FLUTICASONE FUROATE AND VILANTEROL TRIFENATATE 1 PUFF: 100; 25 POWDER RESPIRATORY (INHALATION) at 06:23

## 2025-05-03 RX ADMIN — ENOXAPARIN SODIUM 40 MG: 40 INJECTION SUBCUTANEOUS at 13:17

## 2025-05-03 RX ADMIN — THIAMINE HCL TAB 100 MG 100 MG: 100 TAB at 09:39

## 2025-05-03 RX ADMIN — LEVOTHYROXINE SODIUM 50 MCG: 0.05 TABLET ORAL at 06:23

## 2025-05-03 ASSESSMENT — COGNITIVE AND FUNCTIONAL STATUS - GENERAL
MOVING FROM LYING ON BACK TO SITTING ON SIDE OF FLAT BED WITH BEDRAILS: A LITTLE
EATING MEALS: A LITTLE
TOILETING: A LITTLE
CLIMB 3 TO 5 STEPS WITH RAILING: TOTAL
STANDING UP FROM CHAIR USING ARMS: A LITTLE
TOILETING: A LOT
DRESSING REGULAR UPPER BODY CLOTHING: A LITTLE
DRESSING REGULAR LOWER BODY CLOTHING: A LITTLE
DAILY ACTIVITIY SCORE: 18
HELP NEEDED FOR BATHING: A LOT
MOBILITY SCORE: 16
STANDING UP FROM CHAIR USING ARMS: A LITTLE
PERSONAL GROOMING: A LITTLE
WALKING IN HOSPITAL ROOM: A LITTLE
EATING MEALS: A LITTLE
DRESSING REGULAR LOWER BODY CLOTHING: A LOT
DRESSING REGULAR UPPER BODY CLOTHING: A LITTLE
WALKING IN HOSPITAL ROOM: A LITTLE
MOVING TO AND FROM BED TO CHAIR: A LITTLE
TURNING FROM BACK TO SIDE WHILE IN FLAT BAD: A LITTLE
HELP NEEDED FOR BATHING: A LITTLE
MOVING FROM LYING ON BACK TO SITTING ON SIDE OF FLAT BED WITH BEDRAILS: A LITTLE
DAILY ACTIVITIY SCORE: 18
MOVING TO AND FROM BED TO CHAIR: A LITTLE
EATING MEALS: A LITTLE
TOILETING: A LITTLE
DRESSING REGULAR UPPER BODY CLOTHING: A LITTLE
DAILY ACTIVITIY SCORE: 15
HELP NEEDED FOR BATHING: A LITTLE
PERSONAL GROOMING: A LITTLE
DRESSING REGULAR LOWER BODY CLOTHING: A LITTLE
CLIMB 3 TO 5 STEPS WITH RAILING: TOTAL
TURNING FROM BACK TO SIDE WHILE IN FLAT BAD: A LITTLE
MOBILITY SCORE: 16
PERSONAL GROOMING: A LITTLE

## 2025-05-03 ASSESSMENT — PAIN SCALES - GENERAL
PAINLEVEL_OUTOF10: 0 - NO PAIN
PAINLEVEL_OUTOF10: 8
PAINLEVEL_OUTOF10: 5 - MODERATE PAIN

## 2025-05-03 ASSESSMENT — ENCOUNTER SYMPTOMS
NUMBNESS: 0
JOINT SWELLING: 0
LIGHT-HEADEDNESS: 0
CONSTIPATION: 0
FACIAL SWELLING: 0
BRUISES/BLEEDS EASILY: 0
FLANK PAIN: 0
NAUSEA: 0
HEADACHES: 0
DYSURIA: 0
FREQUENCY: 0
DIARRHEA: 0
VOMITING: 0
SORE THROAT: 0
SHORTNESS OF BREATH: 0
PALPITATIONS: 0
DIAPHORESIS: 0
WEAKNESS: 0
FATIGUE: 0
WOUND: 1
CHILLS: 0
BACK PAIN: 0
TROUBLE SWALLOWING: 0
DIZZINESS: 0
BLOOD IN STOOL: 0
CHEST TIGHTNESS: 0
EYE PAIN: 0
HEMATURIA: 0
HALLUCINATIONS: 0
FEVER: 0
ABDOMINAL PAIN: 0
COUGH: 0
WHEEZING: 0
APPETITE CHANGE: 0

## 2025-05-03 ASSESSMENT — PAIN - FUNCTIONAL ASSESSMENT
PAIN_FUNCTIONAL_ASSESSMENT: 0-10
PAIN_FUNCTIONAL_ASSESSMENT: 0-10

## 2025-05-03 NOTE — PROGRESS NOTES
"Occupational Therapy    Occupational Therapy Treatment    Name: Kael Zepeda  MRN: [id number]  Department: [facility] 3 E  Room: 333/3333-A  Date: 05/03/25  Time Calculation  Start Time: 1345  Stop Time: 1425  Time Calculation (min): 40 min    Assessment:  OT Assessment: Pt agreeable to therapy and requesting to do heel WB with functional mobiity. Educated therapy at length on NWB status with mobility however is able to heel WB with chair/BSC transfers only. Pt had diffculty understanding the inconsisently of being able to to heel WB with transfers but not for functional mobility, therapist educated at length and answered extensive questions. Therapist provided multiple demo's of NWB using FWW for functional mobility, pt inconsistently receptive to edu. Pt ulitmately agreeable to trialing pivot transfers while heel weightbearing. Pt demo'd full WB while standing therapist attempting to provide feeback. Pt agressively told therapist to \"shut up\" and what he was doing was fine became increasingly agitated with therapist. Therapist requested pt remain respectful during tx session offering to come back at a later/date if pt would like  at which point pt became more agitated and frustrated. Aborted tx session d/t pt agitation. RN notifited.  Barriers to Discharge Home: Physical needs  End of Session Communication: Bedside nurse  End of Session Patient Position: Up in chair (pt adamantly declined chair alarm. RN aware)  Plan:  Treatment Interventions: ADL retraining, Functional transfer training, UE strengthening/ROM, Endurance training, Patient/family training, Cognitive reorientation, Equipment evaluation/education, Neuromuscular reeducation  OT Frequency: 3 times per week  OT Discharge Recommendations: Moderate intensity level of continued care  OT - OK to Discharge: Yes    Subjective   Previous Visit Info:  OT Last Visit  OT Received On: 05/03/25  General:  General  Prior to Session Communication: Bedside " nurse  Patient Position Received: Bed, 3 rail up, Alarm on  General Comment: Pt agreeable and cooperative to therapy.      Pain Assessment:  Pain Assessment  Pain Assessment: 0-10  0-10 (Numeric) Pain Score: 0 - No pain    Objective   Cognition:  Overall Cognitive Status: Within Functional Limits           Functional Standing Tolerance:  Functional Standing Tolerance  Activity: Pt tolerated static standing for 2 minutes with SBA using FWW for stability.  Bed Mobility/Transfers:      Transfers  Transfer: Yes  Transfer 1  Technique 1: Sit to stand, Stand to sit  Transfer Device 1: Walker  Transfer Level of Assistance 1: Close supervision                    Outcome Measures:  [facility] Daily Activity  Putting on and taking off regular lower body clothing: A lot  Bathing (including washing, rinsing, drying): A lot  Putting on and taking off regular upper body clothing: A little  Toileting, which includes using toilet, bedpan or urinal: A lot  Taking care of personal grooming such as brushing teeth: A little  Eating Meals: A little  Daily Activity - Total Score: 15        Education Documentation  Precautions, taught by SIMON Hope at 5/3/2025  3:53 PM.  Learner: Patient  Readiness: Acceptance  Method: Explanation  Response: Needs Reinforcement    Education Comments  No comments found.      Goals:  Encounter Problems       Encounter Problems (Active)       ADLs       Patient with complete lower body dressing with modified independent level of assistance donning and doffing all LE clothes  with PRN adaptive equipment while supported sitting and standing (Progressing)       Start:  05/01/25    Expected End:  05/15/25               COGNITION/SAFETY       Patient will recall and adhere to weight bearing restriction with all ADL and functional mobility in order to promote healing and safety with functional tasks (Progressing)       Start:  05/01/25    Expected End:  05/15/25               TRANSFERS       Patient will  complete functional transfers with least restrictive device with modified independent level of assistance. (Progressing)       Start:  05/01/25    Expected End:  05/15/25

## 2025-05-03 NOTE — PROGRESS NOTES
"Kael Zepeda is a 59 y.o. male on day 4 of admission presenting with Osteomyelitis of right foot, unspecified type (Multi).    Subjective   post transmetatarsal, amputation of the right foot.  Pain has been well controlled.  Minimal bleeding from the surgical wound. Patient has been limiting his weight-bearing. Able to transfer to restroom and bedside chair.   Surgical bone margin culture was positive. Will require treatment for residual osteomyelitis. Six weeks antibiotic. Infectious Disease made recommendations     It is recommended that patient go to SNF for at least initial two weeks post discharge. This will allow for proper wound care, limited, weight-bearing, IV antibiotics.         Objective     Physical Exam    Last Recorded Vitals  Blood pressure 110/67, pulse 75, temperature 36.3 °C (97.3 °F), temperature source Temporal, resp. rate 16, height 1.702 m (5' 7\"), weight 81.6 kg (180 lb), SpO2 95%.  Intake/Output last 3 Shifts:  I/O last 3 completed shifts:  In: 1436 (17.6 mL/kg) [P.O.:1136; IV Piggyback:300]  Out: 500 (6.1 mL/kg) [Urine:500 (0.2 mL/kg/hr)]  Weight: 81.6 kg     Relevant Results      Scheduled medications  aspirin, 81 mg, oral, Daily  atorvastatin, 40 mg, oral, Nightly  DULoxetine, 40 mg, oral, Daily  enoxaparin, 40 mg, subcutaneous, q24h  fluticasone furoate-vilanteroL, 1 puff, inhalation, Daily  insulin glargine, 15 Units, subcutaneous, q24h  insulin lispro, 0-10 Units, subcutaneous, TID AC  levothyroxine, 50 mcg, oral, Daily  magnesium oxide, 400 mg, oral, Daily  methadone, 78 mg, oral, Nightly  methadone, 87 mg, oral, Daily  metoprolol succinate XL, 25 mg, oral, Daily  pantoprazole, 40 mg, oral, Daily before breakfast   Or  pantoprazole, 40 mg, intravenous, Daily before breakfast  polyethylene glycol, 17 g, oral, Daily  predniSONE, 1 mg, oral, Daily  pregabalin, 200 mg, oral, TID  thiamine, 100 mg, oral, Daily  tiotropium, 2 puff, inhalation, Daily  vancomycin, 1,750 mg, " intravenous, q24h    Continuous medications     PRN medications  PRN medications: acetaminophen **OR** acetaminophen **OR** acetaminophen, bisacodyl, bisacodyl, dextrose, dextrose, glucagon, glucagon, guaiFENesin, melatonin, ondansetron ODT **OR** ondansetron, vancomycin  Results for orders placed or performed during the hospital encounter of 04/29/25 (from the past 24 hours)   POCT GLUCOSE   Result Value Ref Range    POCT Glucose 193 (H) 74 - 99 mg/dL   POCT GLUCOSE   Result Value Ref Range    POCT Glucose 171 (H) 74 - 99 mg/dL   Basic Metabolic Panel   Result Value Ref Range    Glucose 165 (H) 74 - 99 mg/dL    Sodium 136 136 - 145 mmol/L    Potassium 4.2 3.5 - 5.3 mmol/L    Chloride 100 98 - 107 mmol/L    Bicarbonate 26 21 - 32 mmol/L    Anion Gap 14 10 - 20 mmol/L    Urea Nitrogen 11 6 - 23 mg/dL    Creatinine 0.83 0.50 - 1.30 mg/dL    eGFR >90 >60 mL/min/1.73m*2    Calcium 8.6 8.6 - 10.3 mg/dL   CBC   Result Value Ref Range    WBC 6.3 4.4 - 11.3 x10*3/uL    nRBC 0.0 0.0 - 0.0 /100 WBCs    RBC 4.30 (L) 4.50 - 5.90 x10*6/uL    Hemoglobin 9.3 (L) 13.5 - 17.5 g/dL    Hematocrit 30.6 (L) 41.0 - 52.0 %    MCV 71 (L) 80 - 100 fL    MCH 21.6 (L) 26.0 - 34.0 pg    MCHC 30.4 (L) 32.0 - 36.0 g/dL    RDW 18.1 (H) 11.5 - 14.5 %    Platelets 416 150 - 450 x10*3/uL   Magnesium   Result Value Ref Range    Magnesium 1.96 1.60 - 2.40 mg/dL   POCT GLUCOSE   Result Value Ref Range    POCT Glucose 144 (H) 74 - 99 mg/dL   POCT GLUCOSE   Result Value Ref Range    POCT Glucose 264 (H) 74 - 99 mg/dL                            This SmartSection is not supported in the current .  post transmetatarsal amputation, right foot  -Positive bone margin culture. ID recommending antibiotic therapy for six weeks. Patient will need a.picc line.  -recommend discharge to SNF for limited weight-bearing, wound care and IV antibiotics. Once patient is able to bear more weight, he may be able to return home and complete the antibiotic regimen. At  least two weeks stay would be recommended. They should give the sutures enough time to heal and work with PT/OT.    I spent 45 minutes in the professional and overall care of this patient.      William Raya DPM

## 2025-05-03 NOTE — CARE PLAN
The patient's goals for the shift include      The clinical goals for the shift include pt will follow non wt bearing precautions throughout my shift    Over the shift, the patient did not make progress toward the following goals. Barriers to progression include  Recommendations to address these barriers include

## 2025-05-03 NOTE — PROGRESS NOTES
Patient choose [facility] and [facility] for IV antibiotics. He is being recommended for vancomycin 1.75g IV q24 till 6/11. Referrals sent. Patient needs PICC line placed.     [address] [facility] can not accept patient. Patient is agreeable to mass referral.

## 2025-05-03 NOTE — PROGRESS NOTES
"[facility] INFECTIOUS DISEASE PROGRESS NOTE    Patient Name: Kael Zepeda  MRN: [id number]    INTERVAL HISTORY:   No significant event.  Pain is well controlled  Appetite fair. No fever/chills  NO fever/leukocytosis  Screat 0.83    Problem List[1]     ASSESSMENT:   R foot wound infection  R foot OM/septic arthritis s/p TMA  but proximal cx are positive w MRSA  DM2 w neuropathy  HCV  CAD  COPD  RA  Hypothyroid    Plan   Continue Vancomycin until 25   - Monitor Screat and trough  Bcx x 2 -no growth  Podiatry consult noted  PICC placement  CoPAT on chart.   Dispo planning : awaiting placement vs Home with [facility]    MEDICATIONS: reviewed.  Current Medications[2]     PHYSICAL EXAM:  Vital signs: /62 (BP Location: Right arm, Patient Position: Sitting)   Pulse 71   Temp 36.4 °C (97.5 °F) (Temporal)   Resp 16   Ht 1.702 m (5' 7\")   Wt 81.6 kg (180 lb)   SpO2 97%   BMI 28.19 kg/m²   Temp (24hrs), Av.3 °C (97.4 °F), Min:36.1 °C (96.9 °F), Max:36.8 °C (98.3 °F)    General: alert, oriented, NAD  Lungs: bilaterally clear to auscultation  Heart: regular rate and rhythm  Abdomen: soft, non tender, non distended, BS+  Extremities: Foot dressings post op  No rashes  No joint inflammation  Neck supple  Lines ok  No CVAT              Labs:    Results for orders placed or performed during the hospital encounter of 25 (from the past 96 hours)   POCT GLUCOSE   Result Value Ref Range    POCT Glucose 78 74 - 99 mg/dL   POCT GLUCOSE   Result Value Ref Range    POCT Glucose 238 (H) 74 - 99 mg/dL   CBC   Result Value Ref Range    WBC 5.6 4.4 - 11.3 x10*3/uL    nRBC 0.0 0.0 - 0.0 /100 WBCs    RBC 4.68 4.50 - 5.90 x10*6/uL    Hemoglobin 9.8 (L) 13.5 - 17.5 g/dL    Hematocrit 33.6 (L) 41.0 - 52.0 %    MCV 72 (L) 80 - 100 fL    MCH 20.9 (L) 26.0 - 34.0 pg    MCHC 29.2 (L) 32.0 - 36.0 g/dL    RDW 17.9 (H) 11.5 - 14.5 %    Platelets 523 (H) 150 - 450 x10*3/uL   Basic metabolic panel   Result Value Ref Range    " Glucose 89 74 - 99 mg/dL    Sodium 136 136 - 145 mmol/L    Potassium 4.2 3.5 - 5.3 mmol/L    Chloride 103 98 - 107 mmol/L    Bicarbonate 24 21 - 32 mmol/L    Anion Gap 13 10 - 20 mmol/L    Urea Nitrogen 14 6 - 23 mg/dL    Creatinine 0.87 0.50 - 1.30 mg/dL    eGFR >90 >60 mL/min/1.73m*2    Calcium 8.7 8.6 - 10.3 mg/dL   Vancomycin   Result Value Ref Range    Vancomycin 24.4 (H) 5.0 - 20.0 ug/mL   POCT GLUCOSE   Result Value Ref Range    POCT Glucose 96 74 - 99 mg/dL   POCT GLUCOSE   Result Value Ref Range    POCT Glucose 82 74 - 99 mg/dL   Tissue/Wound Culture/Smear    Specimen: Bone; Tissue/Biopsy   Result Value Ref Range    Tissue/Wound Culture/Smear (A)      (1+) Rare Methicillin Resistant Staphylococcus aureus (MRSA)    Tissue/Wound Culture/Smear (1+) Rare Mixed Skin Microorganisms     Gram Stain (1+) Rare Polymorphonuclear leukocytes     Gram Stain No organisms seen        Susceptibility    Methicillin Resistant Staphylococcus aureus (MRSA) - MICROSCAN     Oxacillin  Resistant ug/ml     Trimethoprim/Sulfamethoxazole  Resistant ug/ml     Tetracycline  Resistant ug/ml     Clindamycin  Resistant ug/ml     Erythromycin  Resistant ug/ml     Vancomycin  Susceptible ug/ml   POCT GLUCOSE   Result Value Ref Range    POCT Glucose 78 74 - 99 mg/dL   POCT GLUCOSE   Result Value Ref Range    POCT Glucose 160 (H) 74 - 99 mg/dL   Vancomycin   Result Value Ref Range    Vancomycin 26.1 (H) 5.0 - 20.0 ug/mL   POCT GLUCOSE   Result Value Ref Range    POCT Glucose 133 (H) 74 - 99 mg/dL   Basic Metabolic Panel   Result Value Ref Range    Glucose 125 (H) 74 - 99 mg/dL    Sodium 131 (L) 136 - 145 mmol/L    Potassium 3.9 3.5 - 5.3 mmol/L    Chloride 101 98 - 107 mmol/L    Bicarbonate 26 21 - 32 mmol/L    Anion Gap 8 (L) 10 - 20 mmol/L    Urea Nitrogen 10 6 - 23 mg/dL    Creatinine 0.84 0.50 - 1.30 mg/dL    eGFR >90 >60 mL/min/1.73m*2    Calcium 8.1 (L) 8.6 - 10.3 mg/dL   CBC   Result Value Ref Range    WBC 6.1 4.4 - 11.3 x10*3/uL     nRBC 0.0 0.0 - 0.0 /100 WBCs    RBC 4.29 (L) 4.50 - 5.90 x10*6/uL    Hemoglobin 9.0 (L) 13.5 - 17.5 g/dL    Hematocrit 29.4 (L) 41.0 - 52.0 %    MCV 69 (L) 80 - 100 fL    MCH 21.0 (L) 26.0 - 34.0 pg    MCHC 30.6 (L) 32.0 - 36.0 g/dL    RDW 17.6 (H) 11.5 - 14.5 %    Platelets 440 150 - 450 x10*3/uL   Magnesium   Result Value Ref Range    Magnesium 1.48 (L) 1.60 - 2.40 mg/dL   POCT GLUCOSE   Result Value Ref Range    POCT Glucose 217 (H) 74 - 99 mg/dL   POCT GLUCOSE   Result Value Ref Range    POCT Glucose 207 (H) 74 - 99 mg/dL   POCT GLUCOSE   Result Value Ref Range    POCT Glucose 108 (H) 74 - 99 mg/dL   POCT GLUCOSE   Result Value Ref Range    POCT Glucose 113 (H) 74 - 99 mg/dL   POCT GLUCOSE   Result Value Ref Range    POCT Glucose 224 (H) 74 - 99 mg/dL   POCT GLUCOSE   Result Value Ref Range    POCT Glucose 250 (H) 74 - 99 mg/dL   POCT GLUCOSE   Result Value Ref Range    POCT Glucose 193 (H) 74 - 99 mg/dL   POCT GLUCOSE   Result Value Ref Range    POCT Glucose 171 (H) 74 - 99 mg/dL   Basic Metabolic Panel   Result Value Ref Range    Glucose 165 (H) 74 - 99 mg/dL    Sodium 136 136 - 145 mmol/L    Potassium 4.2 3.5 - 5.3 mmol/L    Chloride 100 98 - 107 mmol/L    Bicarbonate 26 21 - 32 mmol/L    Anion Gap 14 10 - 20 mmol/L    Urea Nitrogen 11 6 - 23 mg/dL    Creatinine 0.83 0.50 - 1.30 mg/dL    eGFR >90 >60 mL/min/1.73m*2    Calcium 8.6 8.6 - 10.3 mg/dL   CBC   Result Value Ref Range    WBC 6.3 4.4 - 11.3 x10*3/uL    nRBC 0.0 0.0 - 0.0 /100 WBCs    RBC 4.30 (L) 4.50 - 5.90 x10*6/uL    Hemoglobin 9.3 (L) 13.5 - 17.5 g/dL    Hematocrit 30.6 (L) 41.0 - 52.0 %    MCV 71 (L) 80 - 100 fL    MCH 21.6 (L) 26.0 - 34.0 pg    MCHC 30.4 (L) 32.0 - 36.0 g/dL    RDW 18.1 (H) 11.5 - 14.5 %    Platelets 416 150 - 450 x10*3/uL   Magnesium   Result Value Ref Range    Magnesium 1.96 1.60 - 2.40 mg/dL   POCT GLUCOSE   Result Value Ref Range    POCT Glucose 144 (H) 74 - 99 mg/dL   POCT GLUCOSE   Result Value Ref Range    POCT Glucose  264 (H) 74 - 99 mg/dL        Microbiology data: reviewed    Imaging data: reviewed      Aliciablue MAS CNP  Infectious Disease  Date of service: 5/3/2025  Time of service: 2:19 PM      I personally saw and evaluated the patient. I reviewed the NP Hx, exam and MDM and I agree with the NP assessment and plan unless otherwise addended above.     Freddy Mike MD  [phone]         [1]   Patient Active Problem List  Diagnosis    Diabetic foot ulcer with osteomyelitis (Multi)    Diabetes mellitus, type 2 (Multi)    Benign essential hypertension    Gastroesophageal reflux disease without esophagitis    Corns and callosities    Degeneration of intervertebral disc of lumbar region    Depression    Dermatitis    Chronic back pain    Fibromyalgia    Generalized anxiety disorder    Hepatitis B core antibody positive    History of hepatitis C    History of hypothyroidism    Hyperlipidemia, mixed    Hypothyroidism, acquired    Lumbar radiculopathy    Lumbar spondylosis    Obesity, Class I, BMI 30-34.9    Opioid dependence, in remission    Patient's noncompliance with other medical treatment and regimen due to unspecified reason    Peripheral neuropathy    Primary insomnia    Pulmonary emphysema (Multi)    Rheumatoid arthritis involving both hands with positive rheumatoid factor (Multi)    Rheumatoid nodules (Multi)    Scrotal cyst    Spinal stenosis of lumbosacral region    Steroid-induced osteoporosis    Tobacco use disorder    Ulcer of toe of right foot, limited to breakdown of skin    Vitamin D deficiency    NSTEMI (non-ST elevated myocardial infarction) (Multi)    Microcytic anemia    CAD in native artery    Hepatitis C    Stage 2 chronic kidney disease    Osteomyelitis of left foot, unspecified type (Multi)    Cellulitis of left lower extremity    Acute osteomyelitis of left foot (Multi)    Overweight (BMI 25.0-29.9)    Sleep-related breathing disorder    RLS (restless legs syndrome)    Osteomyelitis of right foot,  unspecified type (Multi)    Ulcer of toe of right foot, with necrosis of bone (Multi)   [2]   Current Facility-Administered Medications:     acetaminophen (Tylenol) tablet 650 mg, 650 mg, oral, q4h PRN, 650 mg at 05/02/25 1803 **OR** acetaminophen (Tylenol) suspension 650 mg, 650 mg, nasogastric tube, q4h PRN **OR** acetaminophen (Tylenol) suppository 650 mg, 650 mg, rectal, q4h PRN, William Raya DPM    aspirin EC tablet 81 mg, 81 mg, oral, Daily, William Raya DPM, 81 mg at 05/03/25 0940    atorvastatin (Lipitor) tablet 40 mg, 40 mg, oral, Nightly, William Raya DPM, 40 mg at 05/02/25 2228    bisacodyl (Dulcolax) EC tablet 10 mg, 10 mg, oral, Daily PRN, William Raya DPM    bisacodyl (Dulcolax) suppository 10 mg, 10 mg, rectal, Daily PRN, William Raya DPM    dextrose 50 % injection 12.5 g, 12.5 g, intravenous, q15 min PRN, William Raya DPM    dextrose 50 % injection 25 g, 25 g, intravenous, q15 min PRN, William Raya DPM    DULoxetine (Cymbalta) DR capsule 40 mg, 40 mg, oral, Daily, William Raya DPM, 40 mg at 05/03/25 0939    enoxaparin (Lovenox) syringe 40 mg, 40 mg, subcutaneous, q24h, William Raya DPM, 40 mg at 05/03/25 1317    fluticasone furoate-vilanteroL (Breo Ellipta) 100-25 mcg/dose inhaler 1 puff, 1 puff, inhalation, Daily, William Raya DPM, 1 puff at 05/03/25 0623    glucagon (Glucagen) injection 1 mg, 1 mg, intramuscular, q15 min PRN, William Raya DPM    glucagon (Glucagen) injection 1 mg, 1 mg, intramuscular, q15 min PRN, William Raya DPM    guaiFENesin (Mucinex) 12 hr tablet 600 mg, 600 mg, oral, q12h PRN, William Raya DPM    insulin glargine (Lantus) injection 15 Units, 15 Units, subcutaneous, q24h, William Raya DPM, 15 Units at 04/30/25 2143    insulin lispro injection 0-10 Units, 0-10 Units, subcutaneous, TID AC, William Raya DPM, 6 Units at 05/03/25 1316    levothyroxine (Synthroid, Levoxyl) tablet 50 mcg, 50 mcg, oral, Daily, William Raya DPM, 50 mcg at 05/03/25 0623    magnesium  oxide (Mag-Ox) tablet 400 mg, 400 mg, oral, Daily, Sera Liu PA-C, 400 mg at 05/03/25 0940    melatonin tablet 3 mg, 3 mg, oral, Nightly PRN, William Raya DPM, 3 mg at 05/02/25 2229    methadone (Dolophine) 10 mg/mL solution 78 mg, 78 mg, oral, Nightly, William Raya DPM, 78 mg at 05/02/25 2224    methadone (Dolophine) 10 mg/mL solution 87 mg, 87 mg, oral, Daily, William Raya DPM, 87 mg at 05/03/25 0956    metoprolol succinate XL (Toprol-XL) 24 hr tablet 25 mg, 25 mg, oral, Daily, William Raya DPM, 25 mg at 05/03/25 0940    ondansetron ODT (Zofran-ODT) disintegrating tablet 4 mg, 4 mg, oral, q8h PRN **OR** ondansetron (Zofran) injection 4 mg, 4 mg, intravenous, q8h PRN, William Raya DPM    pantoprazole (ProtoNix) EC tablet 40 mg, 40 mg, oral, Daily before breakfast, 40 mg at 05/03/25 0623 **OR** pantoprazole (Protonix) injection 40 mg, 40 mg, intravenous, Daily before breakfast, William Raya DPM, 40 mg at 05/01/25 0514    polyethylene glycol (Glycolax, Miralax) packet 17 g, 17 g, oral, Daily, William Raya DPM, 17 g at 04/30/25 0907    predniSONE (Deltasone) tablet 1 mg, 1 mg, oral, Daily, William Raya DPM, 1 mg at 05/03/25 0940    pregabalin (Lyrica) capsule 200 mg, 200 mg, oral, TID, William Raya DPM, 200 mg at 05/03/25 0940    thiamine (Vitamin B-1) tablet 100 mg, 100 mg, oral, Daily, William Raya DPM, 100 mg at 05/03/25 0939    tiotropium (Spiriva Respimat) 2.5 mcg/actuation inhaler 2 puff, 2 puff, inhalation, Daily, William Raya DPM, 2 puff at 05/03/25 0941    vancomycin (Vancocin) pharmacy to dose - pharmacy monitoring, , miscellaneous, Daily PRN, William Raya DPM    vancomycin 1,750 mg in dextrose 5% 500 mL IV (Ordered as: vancomycin), 1,750 mg, intravenous, q24h, William Raya DPM, Stopped at 05/03/25 0019

## 2025-05-03 NOTE — NURSING NOTE
Patient refusing to allow staff assistance. Dr. Grimm and PT with patient along with myself, explaining he needs NWB to right heel. Patient refusing to allow staff to educate and use urinal. We Educated patient on discharge planning to rehab vs. Home.   Refusing alarms.

## 2025-05-03 NOTE — PROGRESS NOTES
Kael Zepeda is a 59 y.o. male on day 4 of admission presenting with Osteomyelitis of right foot, unspecified type (Multi).      Subjective   Kael Zepeda is a 59 y.o. male with PMHx s/f insulin-dependent diabetes, hepatitis C, fibromyalgia, coronary artery disease history CABG, COPD, peripheral neuropathy, chronic methadone therapy, hypothyroidism, rheumatoid arthritis chronic right foot wound presenting with worsening wound and pain.  Patient had been following with podiatry for over a year due to persistent wound in the right lower extremity.  Patient recently had an outpatient MRI showing findings concerning for tenosynovitis and osteomyelitis septic arthritis.  He was treated with Keflex and Bactrim by infectious disease who had also advised patient to come into the hospital prior cultures were abnormal for MRSA last year.  He did follow-up with Dr. Raya today who advised him to come to the emergency department for further evaluation and treatment.  Presently patient is denying any fevers or chills any nausea or vomiting his pain is tolerable no urinary symptoms no diarrhea.  CBC with WBC 5.1, Hgb 9.4, Plts 474. . Trop 135. Lactate 1.5. Patient seen by Dr. Raya from podiatry, had R TMA 4/30/25. Follow residual bone cx.   4/30/2025: No acute events overnight. Vitals stable. CBC reviewed, largely unchanged from baseline, no leukocytosis, has microcytic hypochromic anemia and thrombocytosis.  BMP reviewed, unremarkable  Await podiatry recommendations. Follow blood cx x2  Stop Zosyn as recent cx w w SA and anaerobes only, start unasyn, continue vanco. Seen by podiatry, planning R TMA today.   5/1/2025: No acute events overnight. Vitals stable. Labs are pending. Had R TMA yesterday. Follow blood and bone cultures  Bl cx x2- ng x1 day  5/2/2025: No acute events overnight. Vitals stable. Labs are pending. Had R TMA 4/30. Follow bone cultures. Bl cx x2- ng x2 days. Bl cx x2- ng x1 day. Residual  bone cx- rare SA. Await ID recommendations regarding length of antibiotic therapy.  5/3: Patient was seen and examined.  PT at [facility] when I saw educating about non weight bearing. Pt will consider SNF on discharge. [place] for long-term IV antibiotics placed per ID.  Patient agrees to home health care for long-term IV antibiotics.      Review of Systems   Constitutional:  Negative for appetite change, chills, diaphoresis, fatigue and fever.   HENT:  Negative for congestion, ear pain, facial swelling, hearing loss, nosebleeds, sore throat, tinnitus and trouble swallowing.    Eyes:  Negative for pain.   Respiratory:  Negative for cough, chest tightness, shortness of breath and wheezing.    Cardiovascular:  Negative for chest pain, palpitations and leg swelling.   Gastrointestinal:  Negative for abdominal pain, blood in stool, constipation, diarrhea, nausea and vomiting.   Genitourinary:  Negative for dysuria, flank pain, frequency, hematuria and urgency.   Musculoskeletal:  Negative for back pain and joint swelling.        +R foot pain   Skin:  Positive for wound. Negative for rash.   Neurological:  Negative for dizziness, syncope, weakness, light-headedness, numbness and headaches.   Hematological:  Does not bruise/bleed easily.   Psychiatric/Behavioral:  Negative for behavioral problems, hallucinations and suicidal ideas.           Objective     Last Recorded Vitals  /62 (BP Location: Right arm, Patient Position: Sitting)   Pulse 71   Temp 36.4 °C (97.5 °F) (Temporal)   Resp 16   Wt 81.6 kg (180 lb)   SpO2 97%     Image Results  Imaging  XR foot right 3+ views  Result Date: 4/29/2025  1. Findings suggesting osteomyelitis of the 1st metatarsal stump as well as of the 2nd metatarsal head and 2nd proximal phalanx base with septic arthritis of the 2nd MTP joint. Consider MRI for further evaluation if clinically warranted.   MACRO: None.   Signed by: Jennifer Macias 4/29/2025 11:23 AM Dictation workstation:    [id number]      Cardiology, Vascular, and Other Imaging  No other imaging results found for the past 7 days       Lab Results  Results for orders placed or performed during the hospital encounter of 04/29/25 (from the past 24 hours)   POCT GLUCOSE   Result Value Ref Range    POCT Glucose 193 (H) 74 - 99 mg/dL   POCT GLUCOSE   Result Value Ref Range    POCT Glucose 171 (H) 74 - 99 mg/dL   Basic Metabolic Panel   Result Value Ref Range    Glucose 165 (H) 74 - 99 mg/dL    Sodium 136 136 - 145 mmol/L    Potassium 4.2 3.5 - 5.3 mmol/L    Chloride 100 98 - 107 mmol/L    Bicarbonate 26 21 - 32 mmol/L    Anion Gap 14 10 - 20 mmol/L    Urea Nitrogen 11 6 - 23 mg/dL    Creatinine 0.83 0.50 - 1.30 mg/dL    eGFR >90 >60 mL/min/1.73m*2    Calcium 8.6 8.6 - 10.3 mg/dL   CBC   Result Value Ref Range    WBC 6.3 4.4 - 11.3 x10*3/uL    nRBC 0.0 0.0 - 0.0 /100 WBCs    RBC 4.30 (L) 4.50 - 5.90 x10*6/uL    Hemoglobin 9.3 (L) 13.5 - 17.5 g/dL    Hematocrit 30.6 (L) 41.0 - 52.0 %    MCV 71 (L) 80 - 100 fL    MCH 21.6 (L) 26.0 - 34.0 pg    MCHC 30.4 (L) 32.0 - 36.0 g/dL    RDW 18.1 (H) 11.5 - 14.5 %    Platelets 416 150 - 450 x10*3/uL   Magnesium   Result Value Ref Range    Magnesium 1.96 1.60 - 2.40 mg/dL   POCT GLUCOSE   Result Value Ref Range    POCT Glucose 144 (H) 74 - 99 mg/dL   POCT GLUCOSE   Result Value Ref Range    POCT Glucose 264 (H) 74 - 99 mg/dL        Medications  Scheduled medications:  Scheduled Medications[1]  Continuous medications:  Continuous Medications[2]  PRN medications:  PRN Medications[3]     Physical Exam  Constitutional:       General: He is not in acute distress.     Appearance: Normal appearance.   HENT:      Head: Normocephalic and atraumatic.      Right Ear: External ear normal.      Left Ear: External ear normal.      Nose: Nose normal.      Mouth/Throat:      Mouth: Mucous membranes are moist.      Pharynx: Oropharynx is clear.   Eyes:      Extraocular Movements: Extraocular movements intact.       Conjunctiva/sclera: Conjunctivae normal.      Pupils: Pupils are equal, round, and reactive to light.   Cardiovascular:      Rate and Rhythm: Normal rate and regular rhythm.      Pulses: Normal pulses.      Heart sounds: Normal heart sounds.   Pulmonary:      Effort: Pulmonary effort is normal. No respiratory distress.      Breath sounds: Normal breath sounds. No wheezing, rhonchi or rales.   Abdominal:      General: Bowel sounds are normal.      Palpations: Abdomen is soft.      Tenderness: There is no abdominal tenderness. There is no right CVA tenderness, left CVA tenderness, guarding or rebound.   Musculoskeletal:      Cervical back: Normal range of motion and neck supple.      Comments: Post-operative dressing is C/D/I, did not remove dressing   Skin:     General: Skin is warm and dry.      Capillary Refill: Capillary refill takes less than 2 seconds.      Findings: Lesion present. No rash.      Comments: R foot wound dressed   Neurological:      General: No focal deficit present.      Mental Status: He is alert and oriented to person, place, and time. Mental status is at baseline.   Psychiatric:         Mood and Affect: Mood normal.         Behavior: Behavior normal.                  Assessment/Plan      Diabetic R foot wound infection  R foot OM/septic arthritis s/p TMA 4/30  Prior MRI findings reportedly concerning for osteomyelitis tenosynovitis  History of prior MRSA infection in same wound  We will continue patient on vancomycin/unasyn  Consult infectious disease and podiatry  S/p R [facility] 4/30/25  Bl cx x2- ng x2 days  Residual bone cx- rare SA  ID recommending long-term IV antibiotics  PICC line for long-term antibiotics     Chronic methadone use  We will restart patient's methadone     Peripheral diabetic neuropathy  Resume Lyrica     IDDM-II  Patient will be placed on sliding scale insulin at a reduced dose of his Lantus  Continue consistent carbohydrate diet     Coronary artery disease history of  CABG  Will review and reconcile patient's home medications continue his ASA therapy  Should not be on celebrex- will hold while here     COPD  No exacerbation at this time    Code Status: Full Code                 DVT ppx: Lovenox     Please see orders for more complete plan      I spent 35 minutes in the follow-up management of this patient today    Anoop Grimm MD         [1] aspirin, 81 mg, oral, Daily  atorvastatin, 40 mg, oral, Nightly  DULoxetine, 40 mg, oral, Daily  enoxaparin, 40 mg, subcutaneous, q24h  fluticasone furoate-vilanteroL, 1 puff, inhalation, Daily  insulin glargine, 15 Units, subcutaneous, q24h  insulin lispro, 0-10 Units, subcutaneous, TID AC  levothyroxine, 50 mcg, oral, Daily  magnesium oxide, 400 mg, oral, Daily  methadone, 78 mg, oral, Nightly  methadone, 87 mg, oral, Daily  metoprolol succinate XL, 25 mg, oral, Daily  pantoprazole, 40 mg, oral, Daily before breakfast   Or  pantoprazole, 40 mg, intravenous, Daily before breakfast  polyethylene glycol, 17 g, oral, Daily  predniSONE, 1 mg, oral, Daily  pregabalin, 200 mg, oral, TID  thiamine, 100 mg, oral, Daily  tiotropium, 2 puff, inhalation, Daily  vancomycin, 1,750 mg, intravenous, q24h     [2]    [3] PRN medications: acetaminophen **OR** acetaminophen **OR** acetaminophen, bisacodyl, bisacodyl, dextrose, dextrose, glucagon, glucagon, guaiFENesin, melatonin, ondansetron ODT **OR** ondansetron, vancomycin

## 2025-05-04 VITALS
BODY MASS INDEX: 28.25 KG/M2 | WEIGHT: 180 LBS | TEMPERATURE: 98.8 F | HEIGHT: 67 IN | SYSTOLIC BLOOD PRESSURE: 116 MMHG | OXYGEN SATURATION: 97 % | HEART RATE: 75 BPM | DIASTOLIC BLOOD PRESSURE: 59 MMHG | RESPIRATION RATE: 18 BRPM

## 2025-05-04 LAB
ANION GAP SERPL CALC-SCNC: 15 MMOL/L (ref 10–20)
BUN SERPL-MCNC: 14 MG/DL (ref 6–23)
CALCIUM SERPL-MCNC: 8.5 MG/DL (ref 8.6–10.3)
CHLORIDE SERPL-SCNC: 96 MMOL/L (ref 98–107)
CO2 SERPL-SCNC: 25 MMOL/L (ref 21–32)
CREAT SERPL-MCNC: 0.87 MG/DL (ref 0.5–1.3)
EGFRCR SERPLBLD CKD-EPI 2021: >90 ML/MIN/1.73M*2
ERYTHROCYTE [DISTWIDTH] IN BLOOD BY AUTOMATED COUNT: 17.7 % (ref 11.5–14.5)
GLUCOSE BLD MANUAL STRIP-MCNC: 111 MG/DL (ref 74–99)
GLUCOSE BLD MANUAL STRIP-MCNC: 138 MG/DL (ref 74–99)
GLUCOSE BLD MANUAL STRIP-MCNC: 144 MG/DL (ref 74–99)
GLUCOSE BLD MANUAL STRIP-MCNC: 159 MG/DL (ref 74–99)
GLUCOSE SERPL-MCNC: 126 MG/DL (ref 74–99)
HCT VFR BLD AUTO: 29.8 % (ref 41–52)
HGB BLD-MCNC: 8.9 G/DL (ref 13.5–17.5)
MAGNESIUM SERPL-MCNC: 1.8 MG/DL (ref 1.6–2.4)
MCH RBC QN AUTO: 21 PG (ref 26–34)
MCHC RBC AUTO-ENTMCNC: 29.9 G/DL (ref 32–36)
MCV RBC AUTO: 70 FL (ref 80–100)
NRBC BLD-RTO: 0 /100 WBCS (ref 0–0)
PLATELET # BLD AUTO: 453 X10*3/UL (ref 150–450)
POTASSIUM SERPL-SCNC: 4.2 MMOL/L (ref 3.5–5.3)
RBC # BLD AUTO: 4.23 X10*6/UL (ref 4.5–5.9)
SODIUM SERPL-SCNC: 132 MMOL/L (ref 136–145)
VANCOMYCIN SERPL-MCNC: 28.9 UG/ML (ref 5–20)
WBC # BLD AUTO: 6.2 X10*3/UL (ref 4.4–11.3)

## 2025-05-04 PROCEDURE — 80048 BASIC METABOLIC PNL TOTAL CA: CPT | Performed by: PHYSICIAN ASSISTANT

## 2025-05-04 PROCEDURE — 82947 ASSAY GLUCOSE BLOOD QUANT: CPT

## 2025-05-04 PROCEDURE — 36415 COLL VENOUS BLD VENIPUNCTURE: CPT | Performed by: PHYSICIAN ASSISTANT

## 2025-05-04 PROCEDURE — 2500000002 HC RX 250 W HCPCS SELF ADMINISTERED DRUGS (ALT 637 FOR MEDICARE OP, ALT 636 FOR OP/ED): Performed by: PODIATRIST

## 2025-05-04 PROCEDURE — 1210000001 HC SEMI-PRIVATE ROOM DAILY

## 2025-05-04 PROCEDURE — 85027 COMPLETE CBC AUTOMATED: CPT | Performed by: PHYSICIAN ASSISTANT

## 2025-05-04 PROCEDURE — S0109 METHADONE ORAL 5MG: HCPCS | Performed by: PODIATRIST

## 2025-05-04 PROCEDURE — 99233 SBSQ HOSP IP/OBS HIGH 50: CPT | Performed by: INTERNAL MEDICINE

## 2025-05-04 PROCEDURE — 83735 ASSAY OF MAGNESIUM: CPT | Performed by: PHYSICIAN ASSISTANT

## 2025-05-04 PROCEDURE — 2500000004 HC RX 250 GENERAL PHARMACY W/ HCPCS (ALT 636 FOR OP/ED): Performed by: PODIATRIST

## 2025-05-04 PROCEDURE — 2500000004 HC RX 250 GENERAL PHARMACY W/ HCPCS (ALT 636 FOR OP/ED): Performed by: PHYSICIAN ASSISTANT

## 2025-05-04 PROCEDURE — 2500000005 HC RX 250 GENERAL PHARMACY W/O HCPCS: Performed by: PODIATRIST

## 2025-05-04 PROCEDURE — 80202 ASSAY OF VANCOMYCIN: CPT | Performed by: PODIATRIST

## 2025-05-04 PROCEDURE — 2500000004 HC RX 250 GENERAL PHARMACY W/ HCPCS (ALT 636 FOR OP/ED): Mod: JZ | Performed by: PODIATRIST

## 2025-05-04 PROCEDURE — 2500000001 HC RX 250 WO HCPCS SELF ADMINISTERED DRUGS (ALT 637 FOR MEDICARE OP): Performed by: PODIATRIST

## 2025-05-04 RX ADMIN — METHADONE HYDROCHLORIDE 78 MG: 10 CONCENTRATE ORAL at 21:08

## 2025-05-04 RX ADMIN — PREGABALIN 200 MG: 75 CAPSULE ORAL at 08:56

## 2025-05-04 RX ADMIN — VANCOMYCIN HYDROCHLORIDE 1750 MG: 10 INJECTION, POWDER, LYOPHILIZED, FOR SOLUTION INTRAVENOUS at 21:08

## 2025-05-04 RX ADMIN — TIOTROPIUM BROMIDE INHALATION SPRAY 2 PUFF: 3.12 SPRAY, METERED RESPIRATORY (INHALATION) at 10:51

## 2025-05-04 RX ADMIN — METHADONE HYDROCHLORIDE 87 MG: 10 CONCENTRATE ORAL at 08:56

## 2025-05-04 RX ADMIN — FLUTICASONE FUROATE AND VILANTEROL TRIFENATATE 1 PUFF: 100; 25 POWDER RESPIRATORY (INHALATION) at 05:38

## 2025-05-04 RX ADMIN — ENOXAPARIN SODIUM 40 MG: 40 INJECTION SUBCUTANEOUS at 10:51

## 2025-05-04 RX ADMIN — THIAMINE HCL TAB 100 MG 100 MG: 100 TAB at 08:55

## 2025-05-04 RX ADMIN — ATORVASTATIN CALCIUM 40 MG: 40 TABLET, FILM COATED ORAL at 21:09

## 2025-05-04 RX ADMIN — Medication 400 MG: at 08:55

## 2025-05-04 RX ADMIN — PREGABALIN 200 MG: 75 CAPSULE ORAL at 16:35

## 2025-05-04 RX ADMIN — PANTOPRAZOLE SODIUM 40 MG: 40 INJECTION, POWDER, FOR SOLUTION INTRAVENOUS at 05:38

## 2025-05-04 RX ADMIN — PREGABALIN 200 MG: 75 CAPSULE ORAL at 21:08

## 2025-05-04 RX ADMIN — METOPROLOL SUCCINATE 25 MG: 25 TABLET, EXTENDED RELEASE ORAL at 08:55

## 2025-05-04 RX ADMIN — ASPIRIN 81 MG: 81 TABLET, COATED ORAL at 08:56

## 2025-05-04 RX ADMIN — LEVOTHYROXINE SODIUM 50 MCG: 0.05 TABLET ORAL at 05:39

## 2025-05-04 RX ADMIN — INSULIN GLARGINE 15 UNITS: 100 INJECTION, SOLUTION SUBCUTANEOUS at 21:09

## 2025-05-04 RX ADMIN — PREDNISONE 1 MG: 1 TABLET ORAL at 08:56

## 2025-05-04 RX ADMIN — DULOXETINE HYDROCHLORIDE 40 MG: 20 CAPSULE, DELAYED RELEASE ORAL at 08:56

## 2025-05-04 ASSESSMENT — COGNITIVE AND FUNCTIONAL STATUS - GENERAL
DRESSING REGULAR UPPER BODY CLOTHING: A LITTLE
DRESSING REGULAR LOWER BODY CLOTHING: A LITTLE
TOILETING: A LITTLE
MOVING FROM LYING ON BACK TO SITTING ON SIDE OF FLAT BED WITH BEDRAILS: A LITTLE
PERSONAL GROOMING: A LITTLE
TURNING FROM BACK TO SIDE WHILE IN FLAT BAD: A LITTLE
MOBILITY SCORE: 16
WALKING IN HOSPITAL ROOM: A LITTLE
CLIMB 3 TO 5 STEPS WITH RAILING: TOTAL
STANDING UP FROM CHAIR USING ARMS: A LITTLE
HELP NEEDED FOR BATHING: A LITTLE
MOVING TO AND FROM BED TO CHAIR: A LITTLE
EATING MEALS: A LITTLE
DAILY ACTIVITIY SCORE: 18

## 2025-05-04 ASSESSMENT — ENCOUNTER SYMPTOMS
DIZZINESS: 0
WEAKNESS: 0
HALLUCINATIONS: 0
FREQUENCY: 0
SHORTNESS OF BREATH: 0
JOINT SWELLING: 0
DIARRHEA: 0
CHILLS: 0
CHEST TIGHTNESS: 0
FEVER: 0
WHEEZING: 0
COUGH: 0
DIAPHORESIS: 0
SORE THROAT: 0
ABDOMINAL PAIN: 0
TROUBLE SWALLOWING: 0
NAUSEA: 0
NUMBNESS: 0
FATIGUE: 0
BACK PAIN: 0
APPETITE CHANGE: 0
DYSURIA: 0
HEMATURIA: 0
FLANK PAIN: 0
PALPITATIONS: 0
EYE PAIN: 0
LIGHT-HEADEDNESS: 0
HEADACHES: 0
FACIAL SWELLING: 0
VOMITING: 0
BRUISES/BLEEDS EASILY: 0
BLOOD IN STOOL: 0
CONSTIPATION: 0
WOUND: 1

## 2025-05-04 ASSESSMENT — PAIN SCALES - GENERAL
PAINLEVEL_OUTOF10: 0 - NO PAIN
PAINLEVEL_OUTOF10: 0 - NO PAIN

## 2025-05-04 ASSESSMENT — PAIN - FUNCTIONAL ASSESSMENT: PAIN_FUNCTIONAL_ASSESSMENT: 0-10

## 2025-05-04 NOTE — PROGRESS NOTES
Kael Zepeda is a 59 y.o. male on day 5 of admission presenting with Osteomyelitis of right foot, unspecified type (Multi).      Subjective   Kael Zepeda is a 59 y.o. male with PMHx s/f insulin-dependent diabetes, hepatitis C, fibromyalgia, coronary artery disease history CABG, COPD, peripheral neuropathy, chronic methadone therapy, hypothyroidism, rheumatoid arthritis chronic right foot wound presenting with worsening wound and pain.  Patient had been following with podiatry for over a year due to persistent wound in the right lower extremity.  Patient recently had an outpatient MRI showing findings concerning for tenosynovitis and osteomyelitis septic arthritis.  He was treated with Keflex and Bactrim by infectious disease who had also advised patient to come into the hospital prior cultures were abnormal for MRSA last year.  He did follow-up with Dr. Raya today who advised him to come to the emergency department for further evaluation and treatment.  Presently patient is denying any fevers or chills any nausea or vomiting his pain is tolerable no urinary symptoms no diarrhea.  CBC with WBC 5.1, Hgb 9.4, Plts 474. . Trop 135. Lactate 1.5. Patient seen by Dr. Raya from podiatry, had R TMA 4/30/25. Follow residual bone cx.   4/30/2025: No acute events overnight. Vitals stable. CBC reviewed, largely unchanged from baseline, no leukocytosis, has microcytic hypochromic anemia and thrombocytosis.  BMP reviewed, unremarkable  Await podiatry recommendations. Follow blood cx x2  Stop Zosyn as recent cx w w SA and anaerobes only, start unasyn, continue vanco. Seen by podiatry, planning R TMA today.   5/1/2025: No acute events overnight. Vitals stable. Labs are pending. Had R TMA yesterday. Follow blood and bone cultures  Bl cx x2- ng x1 day  5/2/2025: No acute events overnight. Vitals stable. Labs are pending. Had R TMA 4/30. Follow bone cultures. Bl cx x2- ng x2 days. Bl cx x2- ng x1 day. Residual  bone cx- rare SA. Await ID recommendations regarding length of antibiotic therapy.  5/3: Patient was seen and examined.  PT at bedside when I saw educating about non weight bearing. Pt will consider SNF on discharge. [facility] for long-term IV antibiotics placed per ID.  Patient agrees to home health care for long-term IV antibiotics.  5/4: Patient was seen and examined.  Was sleeping quietly when I saw her but easily arousable.  Continued on long-term IV antibiotics.  Awaiting choice in placement for extended-care facility.    Review of Systems   Constitutional:  Negative for appetite change, chills, diaphoresis, fatigue and fever.   HENT:  Negative for congestion, ear pain, facial swelling, hearing loss, nosebleeds, sore throat, tinnitus and trouble swallowing.    Eyes:  Negative for pain.   Respiratory:  Negative for cough, chest tightness, shortness of breath and wheezing.    Cardiovascular:  Negative for chest pain, palpitations and leg swelling.   Gastrointestinal:  Negative for abdominal pain, blood in stool, constipation, diarrhea, nausea and vomiting.   Genitourinary:  Negative for dysuria, flank pain, frequency, hematuria and urgency.   Musculoskeletal:  Negative for back pain and joint swelling.        +R foot pain   Skin:  Positive for wound. Negative for rash.   Neurological:  Negative for dizziness, syncope, weakness, light-headedness, numbness and headaches.   Hematological:  Does not bruise/bleed easily.   Psychiatric/Behavioral:  Negative for behavioral problems, hallucinations and suicidal ideas.           Objective     Last Recorded Vitals  /71 (BP Location: Left arm, Patient Position: Sitting)   Pulse 69   Temp 36.1 °C (97 °F) (Temporal)   Resp 17   Wt 81.6 kg (180 lb)   SpO2 97%     Image Results  Imaging  XR foot right 3+ views  Result Date: 4/29/2025  1. Findings suggesting osteomyelitis of the 1st metatarsal stump as well as of the 2nd metatarsal head and 2nd proximal phalanx base with  septic arthritis of the 2nd MTP joint. Consider MRI for further evaluation if clinically warranted.   MACRO: None.   Signed by: Jennifer Macias 4/29/2025 11:23 AM Dictation workstation:   [id number]      Cardiology, Vascular, and Other Imaging  No other imaging results found for the past 7 days       Lab Results  Results for orders placed or performed during the hospital encounter of 04/29/25 (from the past 24 hours)   POCT GLUCOSE   Result Value Ref Range    POCT Glucose 125 (H) 74 - 99 mg/dL   POCT GLUCOSE   Result Value Ref Range    POCT Glucose 182 (H) 74 - 99 mg/dL   Vancomycin   Result Value Ref Range    Vancomycin 28.9 (H) 5.0 - 20.0 ug/mL   Basic Metabolic Panel   Result Value Ref Range    Glucose 126 (H) 74 - 99 mg/dL    Sodium 132 (L) 136 - 145 mmol/L    Potassium 4.2 3.5 - 5.3 mmol/L    Chloride 96 (L) 98 - 107 mmol/L    Bicarbonate 25 21 - 32 mmol/L    Anion Gap 15 10 - 20 mmol/L    Urea Nitrogen 14 6 - 23 mg/dL    Creatinine 0.87 0.50 - 1.30 mg/dL    eGFR >90 >60 mL/min/1.73m*2    Calcium 8.5 (L) 8.6 - 10.3 mg/dL   CBC   Result Value Ref Range    WBC 6.2 4.4 - 11.3 x10*3/uL    nRBC 0.0 0.0 - 0.0 /100 WBCs    RBC 4.23 (L) 4.50 - 5.90 x10*6/uL    Hemoglobin 8.9 (L) 13.5 - 17.5 g/dL    Hematocrit 29.8 (L) 41.0 - 52.0 %    MCV 70 (L) 80 - 100 fL    MCH 21.0 (L) 26.0 - 34.0 pg    MCHC 29.9 (L) 32.0 - 36.0 g/dL    RDW 17.7 (H) 11.5 - 14.5 %    Platelets 453 (H) 150 - 450 x10*3/uL   Magnesium   Result Value Ref Range    Magnesium 1.80 1.60 - 2.40 mg/dL   POCT GLUCOSE   Result Value Ref Range    POCT Glucose 111 (H) 74 - 99 mg/dL   POCT GLUCOSE   Result Value Ref Range    POCT Glucose 138 (H) 74 - 99 mg/dL     *Note: Due to a large number of results and/or encounters for the requested time period, some results have not been displayed. A complete set of results can be found in Results Review.        Medications  Scheduled medications:  Scheduled Medications[1]  Continuous medications:  Continuous  Medications[2]  PRN medications:  PRN Medications[3]     Physical Exam  Constitutional:       General: He is not in acute distress.     Appearance: Normal appearance.   HENT:      Head: Normocephalic and atraumatic.      Right Ear: External ear normal.      Left Ear: External ear normal.      Nose: Nose normal.      Mouth/Throat:      Mouth: Mucous membranes are moist.      Pharynx: Oropharynx is clear.   Eyes:      Extraocular Movements: Extraocular movements intact.      Conjunctiva/sclera: Conjunctivae normal.      Pupils: Pupils are equal, round, and reactive to light.   Cardiovascular:      Rate and Rhythm: Normal rate and regular rhythm.      Pulses: Normal pulses.      Heart sounds: Normal heart sounds.   Pulmonary:      Effort: Pulmonary effort is normal. No respiratory distress.      Breath sounds: Normal breath sounds. No wheezing, rhonchi or rales.   Abdominal:      General: Bowel sounds are normal.      Palpations: Abdomen is soft.      Tenderness: There is no abdominal tenderness. There is no right CVA tenderness, left CVA tenderness, guarding or rebound.   Musculoskeletal:      Cervical back: Normal range of motion and neck supple.      Comments: Post-operative dressing is C/D/I, did not remove dressing   Skin:     General: Skin is warm and dry.      Capillary Refill: Capillary refill takes less than 2 seconds.      Findings: Lesion present. No rash.      Comments: R foot wound dressed   Neurological:      General: No focal deficit present.      Mental Status: He is alert and oriented to person, place, and time. Mental status is at baseline.   Psychiatric:         Mood and Affect: Mood normal.         Behavior: Behavior normal.                  Assessment/Plan      Diabetic R foot wound infection  R foot OM/septic arthritis s/p TMA 4/30  Prior MRI findings reportedly concerning for osteomyelitis tenosynovitis  History of prior MRSA infection in same wound  We will continue patient on  vancomycin/unasyn  Consult infectious disease and podiatry  S/p R TMA 4/30/25  Bl cx x2- ng x2 days  Residual bone cx- rare SA  ID recommending long-term IV antibiotics  PICC line for long-term antibiotics     Chronic methadone use  We will restart patient's methadone     Peripheral diabetic neuropathy  Resume Lyrica     IDDM-II  Patient will be placed on sliding scale insulin at a reduced dose of his Lantus  Continue consistent carbohydrate diet     Coronary artery disease history of CABG  Will review and reconcile patient's home medications continue his ASA therapy  Should not be on celebrex- will hold while here     COPD  No exacerbation at this time    Code Status: Full Code                 DVT ppx: Lovenox     Please see orders for more complete plan      I spent 35 minutes in the follow-up management of this patient today    Anoop Grimm MD         [1] aspirin, 81 mg, oral, Daily  atorvastatin, 40 mg, oral, Nightly  DULoxetine, 40 mg, oral, Daily  enoxaparin, 40 mg, subcutaneous, q24h  fluticasone furoate-vilanteroL, 1 puff, inhalation, Daily  insulin glargine, 15 Units, subcutaneous, q24h  insulin lispro, 0-10 Units, subcutaneous, TID AC  levothyroxine, 50 mcg, oral, Daily  lidocaine, 5 mL, infiltration, Once  magnesium oxide, 400 mg, oral, Daily  methadone, 78 mg, oral, Nightly  methadone, 87 mg, oral, Daily  metoprolol succinate XL, 25 mg, oral, Daily  pantoprazole, 40 mg, oral, Daily before breakfast   Or  pantoprazole, 40 mg, intravenous, Daily before breakfast  polyethylene glycol, 17 g, oral, Daily  predniSONE, 1 mg, oral, Daily  pregabalin, 200 mg, oral, TID  thiamine, 100 mg, oral, Daily  tiotropium, 2 puff, inhalation, Daily  vancomycin, 1,750 mg, intravenous, q24h     [2]    [3] PRN medications: acetaminophen **OR** acetaminophen **OR** acetaminophen, alteplase, bisacodyl, bisacodyl, dextrose, dextrose, glucagon, glucagon, guaiFENesin, melatonin, ondansetron ODT **OR** ondansetron,  vancomycin

## 2025-05-04 NOTE — CARE PLAN
Problem: Safety - Adult  Goal: Free from fall injury  Outcome: Progressing     Problem: Discharge Planning  Goal: Discharge to home or other facility with appropriate resources  Outcome: Progressing     Problem: Chronic Conditions and Co-morbidities  Goal: Patient's chronic conditions and co-morbidity symptoms are monitored and maintained or improved  Outcome: Progressing     Problem: Nutrition  Goal: Nutrient intake appropriate for maintaining nutritional needs  Outcome: Progressing     Problem: Fall/Injury  Goal: Not fall by end of shift  Outcome: Progressing  Goal: Be free from injury by end of the shift  Outcome: Progressing  Goal: Verbalize understanding of personal risk factors for fall in the hospital  Outcome: Progressing  Goal: Verbalize understanding of risk factor reduction measures to prevent injury from fall in the home  Outcome: Progressing  Goal: Use assistive devices by end of the shift  Outcome: Progressing  Goal: Pace activities to prevent fatigue by end of the shift  Outcome: Progressing     Problem: Pain  Goal: Takes deep breaths with improved pain control throughout the shift  Outcome: Progressing  Goal: Turns in bed with improved pain control throughout the shift  Outcome: Progressing  Goal: Performs ADL's with improved pain control throughout shift  Outcome: Progressing  Goal: Participates in PT with improved pain control throughout the shift  Outcome: Progressing     Problem: Diabetes  Goal: Achieve decreasing blood glucose levels by end of shift  Outcome: Progressing  Goal: Increase stability of blood glucose readings by end of shift  Outcome: Progressing  Goal: Maintain electrolyte levels within acceptable range throughout shift  Outcome: Progressing  Goal: Maintain glucose levels >70mg/dl to <250mg/dl throughout shift  Outcome: Progressing  Goal: Vital signs within normal range for age by end of shift  Outcome: Progressing    The patient's goals for the shift include      The clinical  goals for the shift include Patient will remain free from falls and injury during shift

## 2025-05-04 NOTE — PROGRESS NOTES
Vancomycin Dosing by Pharmacy- FOLLOW UP    Kael Zepeda is a 59 y.o. year old male who Pharmacy has been consulted for vancomycin dosing for cellulitis, skin and soft tissue DFI. Based on the patient's indication and renal status this patient is being dosed based on a goal AUC of 400-600.     Renal function is currently stable.    Current vancomycin dose: 1750 mg given every 24 hours    Estimated vancomycin AUC on current dose: 566 mg/L.hr     Visit Vitals  /66 (BP Location: Right arm, Patient Position: Lying)   Pulse 75   Temp 36.2 °C (97.1 °F) (Temporal)   Resp 17        Lab Results   Component Value Date    CREATININE 0.87 2025    CREATININE 0.83 2025    CREATININE 0.84 2025    CREATININE 0.87 2025        Patient weight is as follows:   Vitals:    25 1211   Weight: 81.6 kg (180 lb)       Cultures:  Susceptibility data for the encounter in last 14 days.  Collected Specimen Info Organism Clindamycin Erythromycin Oxacillin Tetracycline Trimethoprim/Sulfamethoxazole Vancomycin   25 Tissue/Biopsy from Bone Methicillin Resistant Staphylococcus aureus (MRSA)  R  R  R  R  R  S     Mixed Skin Microorganisms               I/O last 3 completed shifts:  In: 1700 (20.8 mL/kg) [P.O.:1400; IV Piggyback:300]  Out: 200 (2.4 mL/kg) [Urine:200 (0.1 mL/kg/hr)]  Weight: 81.6 kg   I/O during current shift:  No intake/output data recorded.    Temp (24hrs), Av.3 °C (97.4 °F), Min:36.2 °C (97.1 °F), Max:36.4 °C (97.5 °F)      Assessment/Plan    Within goal AUC range. Continue current vancomycin regimen.    This dosing regimen is predicted by [employer] to result in the following pharmacokinetic parameters:  Loading dose: N/A  Regimen: 1750 mg IV every 24 hours.  Start time: 21:59 on 2025  Exposure target: AUC24 (range)400-600 mg/L.hr   [id number]: 564 mg/L.hr  AUC24,ss: 566 mg/L.hr  Probability of AUC24 > 400: 100 %  Ctrough,ss: 15 mg/L  Probability of Ctrough,ss > 20: 11  %      The next level will be obtained on 5/9 at 0500. May be obtained sooner if clinically indicated.   Will continue to monitor renal function daily while on vancomycin and order serum creatinine at least every 48 hours if not already ordered.  Follow for continued vancomycin needs, clinical response, and signs/symptoms of toxicity.       Humza Shanks, PharmD

## 2025-05-04 NOTE — PROGRESS NOTES
Physical Therapy                 Therapy Communication Note    Patient Name: Kael Zepeda  MRN: [id number]  Department: [facility]  Room: [facility]  Today's Date: 5/4/2025     Discipline: Physical Therapy    PT Missed Visit: Yes     Missed Visit Reason:   1st attempt pt request PT check back later    2nd attempt pt sleeping soundly    Missed Time: Attempt

## 2025-05-04 NOTE — PROGRESS NOTES
"[facility] INFECTIOUS DISEASE PROGRESS NOTE    Patient Name: Kael Zepeda  MRN: [id number]    INTERVAL HISTORY:     No significant event.   Pain is well controlled  No fever/chills  Tolerating antibiotic  Screat stable    Problem List[1]     ASSESSMENT:   R foot wound infection  R foot OM/septic arthritis s/p TMA  but proximal cx are positive w MRSA  DM2 w neuropathy  HCV  CAD  COPD  RA  Hypothyroid    Plan   Continue Vancomycin until 25   - Monitor Screat and trough  Bcx x 2 -no growth  Podiatry consult noted  PICC placement  CoPAT on chart.   Dispo planning : awaiting placement vs Home with [facility]    MEDICATIONS: reviewed.  Current Medications[2]     PHYSICAL EXAM:  Vital signs: /63 (BP Location: Left arm, Patient Position: Sitting)   Pulse 83   Temp 36.2 °C (97.1 °F) (Temporal)   Resp 19   Ht 1.702 m (5' 7\")   Wt 81.6 kg (180 lb)   SpO2 98%   BMI 28.19 kg/m²   Temp (24hrs), Av.3 °C (97.3 °F), Min:36.1 °C (97 °F), Max:36.4 °C (97.5 °F)    General: alert, oriented, NAD  Lungs: bilaterally clear to auscultation  Heart: regular rate and rhythm  Abdomen: soft, non tender, non distended, BS+  Extremities: Foot dressings post op  No rashes  No joint inflammation  Neck supple  Lines ok  No CVAT              Labs:    Results for orders placed or performed during the hospital encounter of 25 (from the past 96 hours)   POCT GLUCOSE   Result Value Ref Range    POCT Glucose 78 74 - 99 mg/dL   POCT GLUCOSE   Result Value Ref Range    POCT Glucose 160 (H) 74 - 99 mg/dL   Vancomycin   Result Value Ref Range    Vancomycin 26.1 (H) 5.0 - 20.0 ug/mL   POCT GLUCOSE   Result Value Ref Range    POCT Glucose 133 (H) 74 - 99 mg/dL   Basic Metabolic Panel   Result Value Ref Range    Glucose 125 (H) 74 - 99 mg/dL    Sodium 131 (L) 136 - 145 mmol/L    Potassium 3.9 3.5 - 5.3 mmol/L    Chloride 101 98 - 107 mmol/L    Bicarbonate 26 21 - 32 mmol/L    Anion Gap 8 (L) 10 - 20 mmol/L    Urea Nitrogen 10 6 - 23 " mg/dL    Creatinine 0.84 0.50 - 1.30 mg/dL    eGFR >90 >60 mL/min/1.73m*2    Calcium 8.1 (L) 8.6 - 10.3 mg/dL   CBC   Result Value Ref Range    WBC 6.1 4.4 - 11.3 x10*3/uL    nRBC 0.0 0.0 - 0.0 /100 WBCs    RBC 4.29 (L) 4.50 - 5.90 x10*6/uL    Hemoglobin 9.0 (L) 13.5 - 17.5 g/dL    Hematocrit 29.4 (L) 41.0 - 52.0 %    MCV 69 (L) 80 - 100 fL    MCH 21.0 (L) 26.0 - 34.0 pg    MCHC 30.6 (L) 32.0 - 36.0 g/dL    RDW 17.6 (H) 11.5 - 14.5 %    Platelets 440 150 - 450 x10*3/uL   Magnesium   Result Value Ref Range    Magnesium 1.48 (L) 1.60 - 2.40 mg/dL   POCT GLUCOSE   Result Value Ref Range    POCT Glucose 217 (H) 74 - 99 mg/dL   POCT GLUCOSE   Result Value Ref Range    POCT Glucose 207 (H) 74 - 99 mg/dL   POCT GLUCOSE   Result Value Ref Range    POCT Glucose 108 (H) 74 - 99 mg/dL   POCT GLUCOSE   Result Value Ref Range    POCT Glucose 113 (H) 74 - 99 mg/dL   POCT GLUCOSE   Result Value Ref Range    POCT Glucose 224 (H) 74 - 99 mg/dL   POCT GLUCOSE   Result Value Ref Range    POCT Glucose 250 (H) 74 - 99 mg/dL   POCT GLUCOSE   Result Value Ref Range    POCT Glucose 193 (H) 74 - 99 mg/dL   POCT GLUCOSE   Result Value Ref Range    POCT Glucose 171 (H) 74 - 99 mg/dL   Basic Metabolic Panel   Result Value Ref Range    Glucose 165 (H) 74 - 99 mg/dL    Sodium 136 136 - 145 mmol/L    Potassium 4.2 3.5 - 5.3 mmol/L    Chloride 100 98 - 107 mmol/L    Bicarbonate 26 21 - 32 mmol/L    Anion Gap 14 10 - 20 mmol/L    Urea Nitrogen 11 6 - 23 mg/dL    Creatinine 0.83 0.50 - 1.30 mg/dL    eGFR >90 >60 mL/min/1.73m*2    Calcium 8.6 8.6 - 10.3 mg/dL   CBC   Result Value Ref Range    WBC 6.3 4.4 - 11.3 x10*3/uL    nRBC 0.0 0.0 - 0.0 /100 WBCs    RBC 4.30 (L) 4.50 - 5.90 x10*6/uL    Hemoglobin 9.3 (L) 13.5 - 17.5 g/dL    Hematocrit 30.6 (L) 41.0 - 52.0 %    MCV 71 (L) 80 - 100 fL    MCH 21.6 (L) 26.0 - 34.0 pg    MCHC 30.4 (L) 32.0 - 36.0 g/dL    RDW 18.1 (H) 11.5 - 14.5 %    Platelets 416 150 - 450 x10*3/uL   Magnesium   Result Value Ref  Range    Magnesium 1.96 1.60 - 2.40 mg/dL   POCT GLUCOSE   Result Value Ref Range    POCT Glucose 144 (H) 74 - 99 mg/dL   POCT GLUCOSE   Result Value Ref Range    POCT Glucose 264 (H) 74 - 99 mg/dL   POCT GLUCOSE   Result Value Ref Range    POCT Glucose 125 (H) 74 - 99 mg/dL   POCT GLUCOSE   Result Value Ref Range    POCT Glucose 182 (H) 74 - 99 mg/dL   Vancomycin   Result Value Ref Range    Vancomycin 28.9 (H) 5.0 - 20.0 ug/mL   Basic Metabolic Panel   Result Value Ref Range    Glucose 126 (H) 74 - 99 mg/dL    Sodium 132 (L) 136 - 145 mmol/L    Potassium 4.2 3.5 - 5.3 mmol/L    Chloride 96 (L) 98 - 107 mmol/L    Bicarbonate 25 21 - 32 mmol/L    Anion Gap 15 10 - 20 mmol/L    Urea Nitrogen 14 6 - 23 mg/dL    Creatinine 0.87 0.50 - 1.30 mg/dL    eGFR >90 >60 mL/min/1.73m*2    Calcium 8.5 (L) 8.6 - 10.3 mg/dL   CBC   Result Value Ref Range    WBC 6.2 4.4 - 11.3 x10*3/uL    nRBC 0.0 0.0 - 0.0 /100 WBCs    RBC 4.23 (L) 4.50 - 5.90 x10*6/uL    Hemoglobin 8.9 (L) 13.5 - 17.5 g/dL    Hematocrit 29.8 (L) 41.0 - 52.0 %    MCV 70 (L) 80 - 100 fL    MCH 21.0 (L) 26.0 - 34.0 pg    MCHC 29.9 (L) 32.0 - 36.0 g/dL    RDW 17.7 (H) 11.5 - 14.5 %    Platelets 453 (H) 150 - 450 x10*3/uL   Magnesium   Result Value Ref Range    Magnesium 1.80 1.60 - 2.40 mg/dL   POCT GLUCOSE   Result Value Ref Range    POCT Glucose 111 (H) 74 - 99 mg/dL   POCT GLUCOSE   Result Value Ref Range    POCT Glucose 138 (H) 74 - 99 mg/dL        Microbiology data: reviewed    Imaging data: reviewed      Javy MAS CNP  Infectious Disease  Date of service: 5/4/2025  Time of service: 3:47 PM      I personally saw and evaluated the patient. I reviewed the NP Hx, exam and MDM and I agree with the NP assessment and plan unless otherwise addended above.     Freddy Mike MD  [phone]  5/4/2025  5:48 PM         [1]   Patient Active Problem List  Diagnosis    Diabetic foot ulcer with osteomyelitis (Multi)    Diabetes mellitus, type 2 (Multi)    Benign  essential hypertension    Gastroesophageal reflux disease without esophagitis    Corns and callosities    Degeneration of intervertebral disc of lumbar region    Depression    Dermatitis    Chronic back pain    Fibromyalgia    Generalized anxiety disorder    Hepatitis B core antibody positive    History of hepatitis C    History of hypothyroidism    Hyperlipidemia, mixed    Hypothyroidism, acquired    Lumbar radiculopathy    Lumbar spondylosis    Obesity, Class I, BMI 30-34.9    Opioid dependence, in remission    Patient's noncompliance with other medical treatment and regimen due to unspecified reason    Peripheral neuropathy    Primary insomnia    Pulmonary emphysema (Multi)    Rheumatoid arthritis involving both hands with positive rheumatoid factor (Multi)    Rheumatoid nodules (Multi)    Scrotal cyst    Spinal stenosis of lumbosacral region    Steroid-induced osteoporosis    Tobacco use disorder    Ulcer of toe of right foot, limited to breakdown of skin    Vitamin D deficiency    NSTEMI (non-ST elevated myocardial infarction) (Multi)    Microcytic anemia    CAD in native artery    Hepatitis C    Stage 2 chronic kidney disease    Osteomyelitis of left foot, unspecified type (Multi)    Cellulitis of left lower extremity    Acute osteomyelitis of left foot (Multi)    Overweight (BMI 25.0-29.9)    Sleep-related breathing disorder    RLS (restless legs syndrome)    Osteomyelitis of right foot, unspecified type (Multi)    Ulcer of toe of right foot, with necrosis of bone (Multi)   [2]   Current Facility-Administered Medications:     acetaminophen (Tylenol) tablet 650 mg, 650 mg, oral, q4h PRN, 650 mg at 05/02/25 1803 **OR** acetaminophen (Tylenol) suspension 650 mg, 650 mg, nasogastric tube, q4h PRN **OR** acetaminophen (Tylenol) suppository 650 mg, 650 mg, rectal, q4h PRN, William Raya DPM    alteplase (Cathflo Activase) injection 2 mg, 2 mg, intra-catheter, PRN, Anoop Grimm MD    aspirin EC tablet 81  mg, 81 mg, oral, Daily, William Raya DPM, 81 mg at 05/04/25 0856    atorvastatin (Lipitor) tablet 40 mg, 40 mg, oral, Nightly, William Raya DPM, 40 mg at 05/03/25 2158    bisacodyl (Dulcolax) EC tablet 10 mg, 10 mg, oral, Daily PRN, William Raya DPM    bisacodyl (Dulcolax) suppository 10 mg, 10 mg, rectal, Daily PRN, William Raya DPM    dextrose 50 % injection 12.5 g, 12.5 g, intravenous, q15 min PRN, William Raya DPM    dextrose 50 % injection 25 g, 25 g, intravenous, q15 min PRN, William Raya DPM    DULoxetine (Cymbalta) DR capsule 40 mg, 40 mg, oral, Daily, William Raya DPM, 40 mg at 05/04/25 0856    enoxaparin (Lovenox) syringe 40 mg, 40 mg, subcutaneous, q24h, William Raya DPM, 40 mg at 05/04/25 1051    fluticasone furoate-vilanteroL (Breo Ellipta) 100-25 mcg/dose inhaler 1 puff, 1 puff, inhalation, Daily, William Raya DPM, 1 puff at 05/04/25 0538    glucagon (Glucagen) injection 1 mg, 1 mg, intramuscular, q15 min PRN, William Raya DPM    glucagon (Glucagen) injection 1 mg, 1 mg, intramuscular, q15 min PRN, William Raya DPM    guaiFENesin (Mucinex) 12 hr tablet 600 mg, 600 mg, oral, q12h PRN, William Raya DPM    insulin glargine (Lantus) injection 15 Units, 15 Units, subcutaneous, q24h, William Raya DPM, 15 Units at 05/03/25 2159    insulin lispro injection 0-10 Units, 0-10 Units, subcutaneous, TID AC, William Raya DPM, 6 Units at 05/03/25 1316    levothyroxine (Synthroid, Levoxyl) tablet 50 mcg, 50 mcg, oral, Daily, William Raya DPM, 50 mcg at 05/04/25 0539    lidocaine (Xylocaine) 10 mg/mL (1 %) injection 5 mL, 5 mL, infiltration, Once, Anoop Grimm MD    magnesium oxide (Mag-Ox) tablet 400 mg, 400 mg, oral, Daily, Sera Liu PA-C, 400 mg at 05/04/25 0855    melatonin tablet 3 mg, 3 mg, oral, Nightly PRN, William Raya DPM, 3 mg at 05/02/25 2229    methadone (Dolophine) 10 mg/mL solution 78 mg, 78 mg, oral, Nightly, GARRY CalderonM, 78 mg at 05/03/25 2158    methadone  (Dolophine) 10 mg/mL solution 87 mg, 87 mg, oral, Daily, William Raya DPM, 87 mg at 05/04/25 0856    metoprolol succinate XL (Toprol-XL) 24 hr tablet 25 mg, 25 mg, oral, Daily, William Raya DPM, 25 mg at 05/04/25 0855    ondansetron ODT (Zofran-ODT) disintegrating tablet 4 mg, 4 mg, oral, q8h PRN **OR** ondansetron (Zofran) injection 4 mg, 4 mg, intravenous, q8h PRN, William Raya DPM    pantoprazole (ProtoNix) EC tablet 40 mg, 40 mg, oral, Daily before breakfast, 40 mg at 05/03/25 0623 **OR** pantoprazole (Protonix) injection 40 mg, 40 mg, intravenous, Daily before breakfast, William Raya DPM, 40 mg at 05/04/25 0538    polyethylene glycol (Glycolax, Miralax) packet 17 g, 17 g, oral, Daily, William Raya DPM, 17 g at 04/30/25 0907    predniSONE (Deltasone) tablet 1 mg, 1 mg, oral, Daily, William Raya DPM, 1 mg at 05/04/25 0856    pregabalin (Lyrica) capsule 200 mg, 200 mg, oral, TID, William Raya DPM, 200 mg at 05/04/25 0856    thiamine (Vitamin B-1) tablet 100 mg, 100 mg, oral, Daily, William Raya DPM, 100 mg at 05/04/25 0855    tiotropium (Spiriva Respimat) 2.5 mcg/actuation inhaler 2 puff, 2 puff, inhalation, Daily, William Raya DPM, 2 puff at 05/04/25 1051    vancomycin (Vancocin) pharmacy to dose - pharmacy monitoring, , miscellaneous, Daily PRN, William Raya DPM    vancomycin 1,750 mg in dextrose 5% 500 mL IV (Ordered as: vancomycin), 1,750 mg, intravenous, q24h, William Raya DPM, Stopped at 05/03/25 9399

## 2025-05-04 NOTE — CARE PLAN
Problem: Safety - Adult  Goal: Free from fall injury  Outcome: Progressing     Problem: Discharge Planning  Goal: Discharge to home or other facility with appropriate resources  Outcome: Progressing     Problem: Chronic Conditions and Co-morbidities  Goal: Patient's chronic conditions and co-morbidity symptoms are monitored and maintained or improved  Outcome: Progressing     Problem: Nutrition  Goal: Nutrient intake appropriate for maintaining nutritional needs  Outcome: Progressing     Problem: Fall/Injury  Goal: Not fall by end of shift  Outcome: Progressing  Goal: Be free from injury by end of the shift  Outcome: Progressing  Goal: Verbalize understanding of personal risk factors for fall in the hospital  Outcome: Progressing  Goal: Verbalize understanding of risk factor reduction measures to prevent injury from fall in the home  Outcome: Progressing  Goal: Use assistive devices by end of the shift  Outcome: Progressing  Goal: Pace activities to prevent fatigue by end of the shift  Outcome: Progressing     Problem: Pain  Goal: Takes deep breaths with improved pain control throughout the shift  Outcome: Progressing  Goal: Turns in bed with improved pain control throughout the shift  Outcome: Progressing  Goal: Performs ADL's with improved pain control throughout shift  Outcome: Progressing  Goal: Participates in PT with improved pain control throughout the shift  Outcome: Progressing     Problem: Diabetes  Goal: Achieve decreasing blood glucose levels by end of shift  Outcome: Progressing  Goal: Increase stability of blood glucose readings by end of shift  Outcome: Progressing  Goal: Maintain electrolyte levels within acceptable range throughout shift  Outcome: Progressing  Goal: Maintain glucose levels >70mg/dl to <250mg/dl throughout shift  Outcome: Progressing  Goal: Vital signs within normal range for age by end of shift  Outcome: Progressing   The patient's goals for the shift include      The clinical  goals for the shift include blood sugar remain < 200 during my shift.

## 2025-05-04 NOTE — CARE PLAN
Problem: Safety - Adult  Goal: Free from fall injury  Outcome: Progressing     Problem: Discharge Planning  Goal: Discharge to home or other facility with appropriate resources  Outcome: Progressing     Problem: Chronic Conditions and Co-morbidities  Goal: Patient's chronic conditions and co-morbidity symptoms are monitored and maintained or improved  Outcome: Progressing     Problem: Nutrition  Goal: Nutrient intake appropriate for maintaining nutritional needs  Outcome: Progressing     Problem: Fall/Injury  Goal: Not fall by end of shift  Outcome: Progressing  Goal: Be free from injury by end of the shift  Outcome: Progressing  Goal: Verbalize understanding of personal risk factors for fall in the hospital  Outcome: Progressing  Goal: Verbalize understanding of risk factor reduction measures to prevent injury from fall in the home  Outcome: Progressing  Goal: Use assistive devices by end of the shift  Outcome: Progressing  Goal: Pace activities to prevent fatigue by end of the shift  Outcome: Progressing     Problem: Pain  Goal: Takes deep breaths with improved pain control throughout the shift  Outcome: Progressing  Goal: Turns in bed with improved pain control throughout the shift  Outcome: Progressing  Goal: Performs ADL's with improved pain control throughout shift  Outcome: Progressing  Goal: Participates in PT with improved pain control throughout the shift  Outcome: Progressing     Problem: Diabetes  Goal: Achieve decreasing blood glucose levels by end of shift  Outcome: Progressing  Goal: Increase stability of blood glucose readings by end of shift  Outcome: Progressing  Goal: Maintain electrolyte levels within acceptable range throughout shift  Outcome: Progressing  Goal: Maintain glucose levels >70mg/dl to <250mg/dl throughout shift  Outcome: Progressing  Goal: Vital signs within normal range for age by end of shift  Outcome: Progressing     Problem: Skin  Goal: Decreased wound size/increased tissue  granulation at next dressing change  Outcome: Progressing  Goal: Participates in plan/prevention/treatment measures  Outcome: Progressing  Goal: Prevent/manage excess moisture  Outcome: Progressing  Goal: Prevent/minimize sheer/friction injuries  Outcome: Progressing  Goal: Promote/optimize nutrition  Outcome: Progressing  Goal: Promote skin healing  Outcome: Progressing   The patient's goals for the shift include  Pt will remain free of injury     The clinical goals for the shift include Patient will remain free from falls and injury during shift

## 2025-05-05 LAB
ANION GAP SERPL CALC-SCNC: 9 MMOL/L (ref 10–20)
BUN SERPL-MCNC: 14 MG/DL (ref 6–23)
CALCIUM SERPL-MCNC: 8.2 MG/DL (ref 8.6–10.3)
CHLORIDE SERPL-SCNC: 100 MMOL/L (ref 98–107)
CO2 SERPL-SCNC: 26 MMOL/L (ref 21–32)
CREAT SERPL-MCNC: 1.06 MG/DL (ref 0.5–1.3)
EGFRCR SERPLBLD CKD-EPI 2021: 80 ML/MIN/1.73M*2
ERYTHROCYTE [DISTWIDTH] IN BLOOD BY AUTOMATED COUNT: 17.9 % (ref 11.5–14.5)
GLUCOSE BLD MANUAL STRIP-MCNC: 178 MG/DL (ref 74–99)
GLUCOSE BLD MANUAL STRIP-MCNC: 205 MG/DL (ref 74–99)
GLUCOSE BLD MANUAL STRIP-MCNC: 87 MG/DL (ref 74–99)
GLUCOSE BLD MANUAL STRIP-MCNC: 97 MG/DL (ref 74–99)
GLUCOSE SERPL-MCNC: 100 MG/DL (ref 74–99)
HCT VFR BLD AUTO: 27.2 % (ref 41–52)
HGB BLD-MCNC: 8.2 G/DL (ref 13.5–17.5)
MAGNESIUM SERPL-MCNC: 1.82 MG/DL (ref 1.6–2.4)
MCH RBC QN AUTO: 21.2 PG (ref 26–34)
MCHC RBC AUTO-ENTMCNC: 30.1 G/DL (ref 32–36)
MCV RBC AUTO: 70 FL (ref 80–100)
NRBC BLD-RTO: 0 /100 WBCS (ref 0–0)
PLATELET # BLD AUTO: 431 X10*3/UL (ref 150–450)
POTASSIUM SERPL-SCNC: 3.9 MMOL/L (ref 3.5–5.3)
RBC # BLD AUTO: 3.87 X10*6/UL (ref 4.5–5.9)
SODIUM SERPL-SCNC: 131 MMOL/L (ref 136–145)
STAPHYLOCOCCUS SPEC CULT: NORMAL
WBC # BLD AUTO: 6 X10*3/UL (ref 4.4–11.3)

## 2025-05-05 PROCEDURE — 80048 BASIC METABOLIC PNL TOTAL CA: CPT | Performed by: PHYSICIAN ASSISTANT

## 2025-05-05 PROCEDURE — 82947 ASSAY GLUCOSE BLOOD QUANT: CPT

## 2025-05-05 PROCEDURE — 2500000004 HC RX 250 GENERAL PHARMACY W/ HCPCS (ALT 636 FOR OP/ED): Mod: JZ | Performed by: PODIATRIST

## 2025-05-05 PROCEDURE — 85027 COMPLETE CBC AUTOMATED: CPT | Performed by: PHYSICIAN ASSISTANT

## 2025-05-05 PROCEDURE — 83735 ASSAY OF MAGNESIUM: CPT | Performed by: PHYSICIAN ASSISTANT

## 2025-05-05 PROCEDURE — 2500000004 HC RX 250 GENERAL PHARMACY W/ HCPCS (ALT 636 FOR OP/ED): Performed by: PODIATRIST

## 2025-05-05 PROCEDURE — 1210000001 HC SEMI-PRIVATE ROOM DAILY

## 2025-05-05 PROCEDURE — 99233 SBSQ HOSP IP/OBS HIGH 50: CPT | Performed by: INTERNAL MEDICINE

## 2025-05-05 PROCEDURE — 2500000001 HC RX 250 WO HCPCS SELF ADMINISTERED DRUGS (ALT 637 FOR MEDICARE OP): Performed by: PODIATRIST

## 2025-05-05 PROCEDURE — 36415 COLL VENOUS BLD VENIPUNCTURE: CPT | Performed by: PHYSICIAN ASSISTANT

## 2025-05-05 PROCEDURE — 97116 GAIT TRAINING THERAPY: CPT | Mod: CQ,GP

## 2025-05-05 PROCEDURE — 97535 SELF CARE MNGMENT TRAINING: CPT | Mod: GO,CO

## 2025-05-05 PROCEDURE — 2500000005 HC RX 250 GENERAL PHARMACY W/O HCPCS: Performed by: PODIATRIST

## 2025-05-05 PROCEDURE — S0109 METHADONE ORAL 5MG: HCPCS | Performed by: PODIATRIST

## 2025-05-05 PROCEDURE — 2500000004 HC RX 250 GENERAL PHARMACY W/ HCPCS (ALT 636 FOR OP/ED): Performed by: PHYSICIAN ASSISTANT

## 2025-05-05 PROCEDURE — 2500000002 HC RX 250 W HCPCS SELF ADMINISTERED DRUGS (ALT 637 FOR MEDICARE OP, ALT 636 FOR OP/ED): Performed by: PODIATRIST

## 2025-05-05 PROCEDURE — 36569 INSJ PICC 5 YR+ W/O IMAGING: CPT

## 2025-05-05 PROCEDURE — 97530 THERAPEUTIC ACTIVITIES: CPT | Mod: GO,CO

## 2025-05-05 RX ADMIN — METHADONE HYDROCHLORIDE 78 MG: 10 CONCENTRATE ORAL at 22:49

## 2025-05-05 RX ADMIN — ASPIRIN 81 MG: 81 TABLET, COATED ORAL at 10:32

## 2025-05-05 RX ADMIN — LEVOTHYROXINE SODIUM 50 MCG: 0.05 TABLET ORAL at 06:42

## 2025-05-05 RX ADMIN — METHADONE HYDROCHLORIDE 87 MG: 10 CONCENTRATE ORAL at 10:46

## 2025-05-05 RX ADMIN — PREGABALIN 200 MG: 75 CAPSULE ORAL at 12:24

## 2025-05-05 RX ADMIN — Medication 400 MG: at 10:31

## 2025-05-05 RX ADMIN — INSULIN GLARGINE 15 UNITS: 100 INJECTION, SOLUTION SUBCUTANEOUS at 21:43

## 2025-05-05 RX ADMIN — THIAMINE HCL TAB 100 MG 100 MG: 100 TAB at 10:31

## 2025-05-05 RX ADMIN — VANCOMYCIN HYDROCHLORIDE 1750 MG: 10 INJECTION, POWDER, LYOPHILIZED, FOR SOLUTION INTRAVENOUS at 21:43

## 2025-05-05 RX ADMIN — PANTOPRAZOLE SODIUM 40 MG: 40 TABLET, DELAYED RELEASE ORAL at 06:42

## 2025-05-05 RX ADMIN — INSULIN LISPRO 2 UNITS: 100 INJECTION, SOLUTION INTRAVENOUS; SUBCUTANEOUS at 12:24

## 2025-05-05 RX ADMIN — PREGABALIN 200 MG: 75 CAPSULE ORAL at 21:43

## 2025-05-05 RX ADMIN — FLUTICASONE FUROATE AND VILANTEROL TRIFENATATE 1 PUFF: 100; 25 POWDER RESPIRATORY (INHALATION) at 06:42

## 2025-05-05 RX ADMIN — ENOXAPARIN SODIUM 40 MG: 40 INJECTION SUBCUTANEOUS at 10:51

## 2025-05-05 RX ADMIN — TIOTROPIUM BROMIDE INHALATION SPRAY 2 PUFF: 3.12 SPRAY, METERED RESPIRATORY (INHALATION) at 10:50

## 2025-05-05 RX ADMIN — DULOXETINE HYDROCHLORIDE 40 MG: 20 CAPSULE, DELAYED RELEASE ORAL at 10:31

## 2025-05-05 RX ADMIN — ATORVASTATIN CALCIUM 40 MG: 40 TABLET, FILM COATED ORAL at 21:43

## 2025-05-05 RX ADMIN — PREDNISONE 1 MG: 1 TABLET ORAL at 10:32

## 2025-05-05 ASSESSMENT — COGNITIVE AND FUNCTIONAL STATUS - GENERAL
MOVING FROM LYING ON BACK TO SITTING ON SIDE OF FLAT BED WITH BEDRAILS: A LITTLE
CLIMB 3 TO 5 STEPS WITH RAILING: TOTAL
WALKING IN HOSPITAL ROOM: A LOT
WALKING IN HOSPITAL ROOM: A LITTLE
MOBILITY SCORE: 19
EATING MEALS: A LITTLE
TOILETING: A LITTLE
DRESSING REGULAR UPPER BODY CLOTHING: A LITTLE
STANDING UP FROM CHAIR USING ARMS: A LITTLE
CLIMB 3 TO 5 STEPS WITH RAILING: TOTAL
MOBILITY SCORE: 15
STANDING UP FROM CHAIR USING ARMS: A LITTLE
DAILY ACTIVITIY SCORE: 24
MOVING TO AND FROM BED TO CHAIR: A LITTLE
MOBILITY SCORE: 19
DRESSING REGULAR LOWER BODY CLOTHING: A LOT
STANDING UP FROM CHAIR USING ARMS: A LITTLE
TURNING FROM BACK TO SIDE WHILE IN FLAT BAD: A LITTLE
PERSONAL GROOMING: A LITTLE
CLIMB 3 TO 5 STEPS WITH RAILING: TOTAL
DAILY ACTIVITIY SCORE: 16
WALKING IN HOSPITAL ROOM: A LITTLE
DAILY ACTIVITIY SCORE: 24
HELP NEEDED FOR BATHING: A LOT

## 2025-05-05 ASSESSMENT — ENCOUNTER SYMPTOMS
DYSURIA: 0
CONSTIPATION: 0
SORE THROAT: 0
APPETITE CHANGE: 0
FATIGUE: 0
WHEEZING: 0
EYE PAIN: 0
LIGHT-HEADEDNESS: 0
COUGH: 0
FEVER: 0
DIAPHORESIS: 0
FREQUENCY: 0
VOMITING: 0
HALLUCINATIONS: 0
SHORTNESS OF BREATH: 0
DIZZINESS: 0
JOINT SWELLING: 0
PALPITATIONS: 0
BLOOD IN STOOL: 0
BRUISES/BLEEDS EASILY: 0
HEADACHES: 0
ABDOMINAL PAIN: 0
TROUBLE SWALLOWING: 0
NAUSEA: 0
CHEST TIGHTNESS: 0
NUMBNESS: 0
BACK PAIN: 0
CHILLS: 0
WEAKNESS: 0
DIARRHEA: 0
WOUND: 1
FACIAL SWELLING: 0
HEMATURIA: 0
FLANK PAIN: 0

## 2025-05-05 ASSESSMENT — PAIN SCALES - GENERAL
PAINLEVEL_OUTOF10: 0 - NO PAIN

## 2025-05-05 ASSESSMENT — PAIN - FUNCTIONAL ASSESSMENT: PAIN_FUNCTIONAL_ASSESSMENT: 0-10

## 2025-05-05 ASSESSMENT — ACTIVITIES OF DAILY LIVING (ADL): HOME_MANAGEMENT_TIME_ENTRY: 8

## 2025-05-05 NOTE — PROGRESS NOTES
Met with patient to discuss discharge planning. Patient has been recommended for Skilled Nursing Facility. Provided skilled nursing list to patient from [facility] directory that includes facilities that are within  [facility], as well as meeting patient's medical needs, and are in-network for patient's insurance; while also in discharge geographic area patient prefers, and identifies each facilities CMS star rating. Patient choose [facility]. Patient stated that he has a methadone clinic appointment tomorrow morning that he cannot miss. Explained that he would need to discharge from the hospital and go straight to the SNF. During discussion about methadone, patient disclosed that he has participated in IV drug use in the past. Explained that HHC for IV antibiotics may not be able to be arranged due to this history. Patient is agreeable to [place] SN[place] referrals to facilities that can accept Methadone. Requested [facility] send SNF referrals to [place], [facility], [place] [facility], [facility], and [facility]. He also requested this TCC work on HHC with the IV antibiotics as well. [place] referral for HHC will be sent. [facility] is able to provide medications. PICC line pending at this time.     1430 Met with patient with MILAGRO Harris at bedside. Updated patient that [facility] and [facility] are the only SNF's able to accept him. He states [facility] is too far. After lengthy discussion explaining that these facilities would be able to provide his Methadone treatment. Explained that he would be getting PT, OT, IV antibiotics, and wound care for him. Patient continues to request that the antibiotics be changed to oral so he would be able to discharge home and requested to speak to Dr. Grimm. Notified Dr. Grimm via secure chat. TCC to follow.

## 2025-05-05 NOTE — PROGRESS NOTES
Occupational Therapy    OT Treatment    Patient Name: Kael Zepeda  MRN: [id number]  Department: [facility] 3 E  Room: [facility]-A  Today's Date: 5/5/2025  Time Calculation  Start Time: 0830  Stop Time: 0853  Time Calculation (min): 23 min        Assessment:  End of Session Communication: Bedside nurse  End of Session Patient Position: Up in chair (eating breakfast)     Plan:  Treatment Interventions: ADL retraining, Functional transfer training, UE strengthening/ROM, Endurance training, Patient/family training, Cognitive reorientation, Equipment evaluation/education, Neuromuscular reeducation  OT Frequency: 3 times per week  OT Discharge Recommendations: Moderate intensity level of continued care  OT - OK to Discharge: Yes  Treatment Interventions: ADL retraining, Functional transfer training, UE strengthening/ROM, Endurance training, Patient/family training, Cognitive reorientation, Equipment evaluation/education, Neuromuscular reeducation    Subjective   Previous Visit Info:  OT Last Visit  OT Received On: 05/05/25  General:  General  Prior to Session Communication: Bedside nurse  Patient Position Received: Up in chair (sound asleep)  General Comment: pt. progressing he was instructed on heel wb with walker he was able to maintain WB but tend to abandon walker and get on commode. When corrected pt. acknowleged. Will benefit from ongoing reinforcement for safety and promoting healing or R foot.  Precautions:        Date/Time Vitals Session Patient Position Pulse Resp SpO2 BP MAP (mmHg)    05/05/25 0917 --  --  76  18  92 %  100/48  65                 Pain:  Pain Assessment  0-10 (Numeric) Pain Score: 0 - No pain    Objective    Cognition:  Cognition  Orientation Level: Oriented X4  Coordination:     Activities of Daily Living:      LE Dressing  LE Dressing: Yes  Sock Level of Assistance: Maximum assistance, Setup  LE Dressing Where Assessed: Chair  LE Dressing Comments: MAX A  R sock, L sock set up     Bed  Mobility/Transfers:   Modalities:     Transfers  Transfer: Yes  Transfer 1  Transfer From 1: Bed to  Transfer to 1: Toilet  Technique 1: Sit to stand, Stand to sit  Transfer Device 1: Walker  Transfer Level of Assistance 1: Contact guard  Trials/Comments 1: cueing for compliance to wb and walker safety  Transfers 2  Transfer From 2: Toilet to  Transfer to 2: Chair with arms  Technique 2: Sit to stand, Stand to sit  Transfer Device 2: Walker  Transfer Level of Assistance 2: Contact guard  Trials/Comments 2: cueing for walker safety              Outcome Measures:[facility] Daily Activity  Putting on and taking off regular lower body clothing: A lot  Bathing (including washing, rinsing, drying): A lot  Putting on and taking off regular upper body clothing: A little  Toileting, which includes using toilet, bedpan or urinal: A little  Taking care of personal grooming such as brushing teeth: A little  Eating Meals: A little  Daily Activity - Total Score: 16        Education Documentation  Precautions, taught by SIMON Thomas at 5/5/2025  9:27 AM.  Learner: Patient  Readiness: Acceptance  Method: Explanation  Response: Verbalizes Understanding, Needs Reinforcement    ADL Training, taught by SIMON Thomas at 5/5/2025  9:27 AM.  Learner: Patient  Readiness: Acceptance  Method: Explanation  Response: Verbalizes Understanding, Needs Reinforcement    Education Comments  No comments found.        OP EDUCATION:       Goals:  Encounter Problems       Encounter Problems (Active)       ADLs       Patient with complete lower body dressing with modified independent level of assistance donning and doffing all LE clothes  with PRN adaptive equipment while supported sitting and standing (Progressing)       Start:  05/01/25    Expected End:  05/15/25               COGNITION/SAFETY       Patient will recall and adhere to weight bearing restriction with all ADL and functional mobility in order to promote healing and safety  with functional tasks (Progressing)       Start:  05/01/25    Expected End:  05/15/25               TRANSFERS       Patient will complete functional transfers with least restrictive device with modified independent level of assistance. (Progressing)       Start:  05/01/25    Expected End:  05/15/25

## 2025-05-05 NOTE — PROGRESS NOTES
"[facility] INFECTIOUS DISEASE PROGRESS NOTE    Patient Name: Kael Zepeda  MRN: [id number]    INTERVAL HISTORY:     No complaints  Tolerating antibiotic.  Denies chest pain, headaches,  fever/chills, cough , SOB, abdominal pain, diarrhea, nausea, vomiting, dysuria or flank pain.  Afebrile  Screat: 1.06    Problem List[1]     ASSESSMENT:   R foot wound infection  R foot OM/septic arthritis s/p TMA  but proximal cx are positive w MRSA  DM2 w neuropathy  HCV  CAD  COPD  RA  Hypothyroid    Plan   Continue Vancomycin until 25   - Monitor Screat and trough  Bcx x 2 -no growth  Podiatry consult noted  PICC placement  CoPAT on chart.   Dispo planning : awaiting placement vs Home with [facility]    MEDICATIONS: reviewed.  Current Medications[2]     PHYSICAL EXAM:  Vital signs: BP 83/52 (BP Location: Left arm, Patient Position: Sitting) Comment: TITUS Hussein notified  Pulse 75   Temp 36.8 °C (98.3 °F) (Temporal)   Resp 17   Ht 1.702 m (5' 7\")   Wt 81.6 kg (180 lb)   SpO2 94%   BMI 28.19 kg/m²   Temp (24hrs), Av.6 °C (97.8 °F), Min:35.9 °C (96.6 °F), Max:37.1 °C (98.8 °F)    General: alert, oriented, NAD  Lungs: bilaterally clear to auscultation  Heart: regular rate and rhythm  Abdomen: soft, non tender, non distended, BS+  Extremities: Foot dressings post op  No rashes  No joint inflammation  Neck supple  Lines ok  No CVAT              Labs:    Results for orders placed or performed during the hospital encounter of 25 (from the past 96 hours)   POCT GLUCOSE   Result Value Ref Range    POCT Glucose 207 (H) 74 - 99 mg/dL   POCT GLUCOSE   Result Value Ref Range    POCT Glucose 108 (H) 74 - 99 mg/dL   POCT GLUCOSE   Result Value Ref Range    POCT Glucose 113 (H) 74 - 99 mg/dL   POCT GLUCOSE   Result Value Ref Range    POCT Glucose 224 (H) 74 - 99 mg/dL   POCT GLUCOSE   Result Value Ref Range    POCT Glucose 250 (H) 74 - 99 mg/dL   POCT GLUCOSE   Result Value Ref Range    POCT Glucose 193 (H) 74 - 99 mg/dL "   POCT GLUCOSE   Result Value Ref Range    POCT Glucose 171 (H) 74 - 99 mg/dL   Basic Metabolic Panel   Result Value Ref Range    Glucose 165 (H) 74 - 99 mg/dL    Sodium 136 136 - 145 mmol/L    Potassium 4.2 3.5 - 5.3 mmol/L    Chloride 100 98 - 107 mmol/L    Bicarbonate 26 21 - 32 mmol/L    Anion Gap 14 10 - 20 mmol/L    Urea Nitrogen 11 6 - 23 mg/dL    Creatinine 0.83 0.50 - 1.30 mg/dL    eGFR >90 >60 mL/min/1.73m*2    Calcium 8.6 8.6 - 10.3 mg/dL   CBC   Result Value Ref Range    WBC 6.3 4.4 - 11.3 x10*3/uL    nRBC 0.0 0.0 - 0.0 /100 WBCs    RBC 4.30 (L) 4.50 - 5.90 x10*6/uL    Hemoglobin 9.3 (L) 13.5 - 17.5 g/dL    Hematocrit 30.6 (L) 41.0 - 52.0 %    MCV 71 (L) 80 - 100 fL    MCH 21.6 (L) 26.0 - 34.0 pg    MCHC 30.4 (L) 32.0 - 36.0 g/dL    RDW 18.1 (H) 11.5 - 14.5 %    Platelets 416 150 - 450 x10*3/uL   Magnesium   Result Value Ref Range    Magnesium 1.96 1.60 - 2.40 mg/dL   POCT GLUCOSE   Result Value Ref Range    POCT Glucose 144 (H) 74 - 99 mg/dL   POCT GLUCOSE   Result Value Ref Range    POCT Glucose 264 (H) 74 - 99 mg/dL   POCT GLUCOSE   Result Value Ref Range    POCT Glucose 125 (H) 74 - 99 mg/dL   Staphylococcus Aureus/MRSA Colonization, Culture - PICC Only    Specimen: Anterior Nares; Swab   Result Value Ref Range    Staph/MRSA Screen Culture No Staphylococcus aureus isolated    POCT GLUCOSE   Result Value Ref Range    POCT Glucose 182 (H) 74 - 99 mg/dL   Vancomycin   Result Value Ref Range    Vancomycin 28.9 (H) 5.0 - 20.0 ug/mL   Basic Metabolic Panel   Result Value Ref Range    Glucose 126 (H) 74 - 99 mg/dL    Sodium 132 (L) 136 - 145 mmol/L    Potassium 4.2 3.5 - 5.3 mmol/L    Chloride 96 (L) 98 - 107 mmol/L    Bicarbonate 25 21 - 32 mmol/L    Anion Gap 15 10 - 20 mmol/L    Urea Nitrogen 14 6 - 23 mg/dL    Creatinine 0.87 0.50 - 1.30 mg/dL    eGFR >90 >60 mL/min/1.73m*2    Calcium 8.5 (L) 8.6 - 10.3 mg/dL   CBC   Result Value Ref Range    WBC 6.2 4.4 - 11.3 x10*3/uL    nRBC 0.0 0.0 - 0.0 /100 WBCs     RBC 4.23 (L) 4.50 - 5.90 x10*6/uL    Hemoglobin 8.9 (L) 13.5 - 17.5 g/dL    Hematocrit 29.8 (L) 41.0 - 52.0 %    MCV 70 (L) 80 - 100 fL    MCH 21.0 (L) 26.0 - 34.0 pg    MCHC 29.9 (L) 32.0 - 36.0 g/dL    RDW 17.7 (H) 11.5 - 14.5 %    Platelets 453 (H) 150 - 450 x10*3/uL   Magnesium   Result Value Ref Range    Magnesium 1.80 1.60 - 2.40 mg/dL   POCT GLUCOSE   Result Value Ref Range    POCT Glucose 111 (H) 74 - 99 mg/dL   POCT GLUCOSE   Result Value Ref Range    POCT Glucose 138 (H) 74 - 99 mg/dL   POCT GLUCOSE   Result Value Ref Range    POCT Glucose 144 (H) 74 - 99 mg/dL   POCT GLUCOSE   Result Value Ref Range    POCT Glucose 159 (H) 74 - 99 mg/dL   Basic Metabolic Panel   Result Value Ref Range    Glucose 100 (H) 74 - 99 mg/dL    Sodium 131 (L) 136 - 145 mmol/L    Potassium 3.9 3.5 - 5.3 mmol/L    Chloride 100 98 - 107 mmol/L    Bicarbonate 26 21 - 32 mmol/L    Anion Gap 9 (L) 10 - 20 mmol/L    Urea Nitrogen 14 6 - 23 mg/dL    Creatinine 1.06 0.50 - 1.30 mg/dL    eGFR 80 >60 mL/min/1.73m*2    Calcium 8.2 (L) 8.6 - 10.3 mg/dL   CBC   Result Value Ref Range    WBC 6.0 4.4 - 11.3 x10*3/uL    nRBC 0.0 0.0 - 0.0 /100 WBCs    RBC 3.87 (L) 4.50 - 5.90 x10*6/uL    Hemoglobin 8.2 (L) 13.5 - 17.5 g/dL    Hematocrit 27.2 (L) 41.0 - 52.0 %    MCV 70 (L) 80 - 100 fL    MCH 21.2 (L) 26.0 - 34.0 pg    MCHC 30.1 (L) 32.0 - 36.0 g/dL    RDW 17.9 (H) 11.5 - 14.5 %    Platelets 431 150 - 450 x10*3/uL   Magnesium   Result Value Ref Range    Magnesium 1.82 1.60 - 2.40 mg/dL   POCT GLUCOSE   Result Value Ref Range    POCT Glucose 97 74 - 99 mg/dL   POCT GLUCOSE   Result Value Ref Range    POCT Glucose 178 (H) 74 - 99 mg/dL     *Note: Due to a large number of results and/or encounters for the requested time period, some results have not been displayed. A complete set of results can be found in Results Review.        Microbiology data: reviewed    Imaging data: reviewed      Javy MAS CNP  Infectious Disease  Date of  service: 5/5/2025  Time of service: 2:20 PM      I personally saw and evaluated the patient. I reviewed the NP Hx, exam and MDM and I agree with the NP assessment and plan unless otherwise addended above.     Freddy Mike MD  [phone]  5/5/2025       [1]   Patient Active Problem List  Diagnosis    Diabetic foot ulcer with osteomyelitis (Multi)    Diabetes mellitus, type 2 (Multi)    Benign essential hypertension    Gastroesophageal reflux disease without esophagitis    Corns and callosities    Degeneration of intervertebral disc of lumbar region    Depression    Dermatitis    Chronic back pain    Fibromyalgia    Generalized anxiety disorder    Hepatitis B core antibody positive    History of hepatitis C    History of hypothyroidism    Hyperlipidemia, mixed    Hypothyroidism, acquired    Lumbar radiculopathy    Lumbar spondylosis    Obesity, Class I, BMI 30-34.9    Opioid dependence, in remission    Patient's noncompliance with other medical treatment and regimen due to unspecified reason    Peripheral neuropathy    Primary insomnia    Pulmonary emphysema (Multi)    Rheumatoid arthritis involving both hands with positive rheumatoid factor (Multi)    Rheumatoid nodules (Multi)    Scrotal cyst    Spinal stenosis of lumbosacral region    Steroid-induced osteoporosis    Tobacco use disorder    Ulcer of toe of right foot, limited to breakdown of skin    Vitamin D deficiency    NSTEMI (non-ST elevated myocardial infarction) (Multi)    Microcytic anemia    CAD in native artery    Hepatitis C    Stage 2 chronic kidney disease    Osteomyelitis of left foot, unspecified type (Multi)    Cellulitis of left lower extremity    Acute osteomyelitis of left foot (Multi)    Overweight (BMI 25.0-29.9)    Sleep-related breathing disorder    RLS (restless legs syndrome)    Osteomyelitis of right foot, unspecified type (Multi)    Ulcer of toe of right foot, with necrosis of bone (Multi)   [2]   Current Facility-Administered  Medications:     acetaminophen (Tylenol) tablet 650 mg, 650 mg, oral, q4h PRN, 650 mg at 05/02/25 1803 **OR** acetaminophen (Tylenol) suspension 650 mg, 650 mg, nasogastric tube, q4h PRN **OR** acetaminophen (Tylenol) suppository 650 mg, 650 mg, rectal, q4h PRN, William Raya DPM    alteplase (Cathflo Activase) injection 2 mg, 2 mg, intra-catheter, PRN, Anoop Grimm MD    aspirin EC tablet 81 mg, 81 mg, oral, Daily, William Raya DPM, 81 mg at 05/05/25 1032    atorvastatin (Lipitor) tablet 40 mg, 40 mg, oral, Nightly, William Raya DPM, 40 mg at 05/04/25 2109    bisacodyl (Dulcolax) EC tablet 10 mg, 10 mg, oral, Daily PRN, William Raya DPM    bisacodyl (Dulcolax) suppository 10 mg, 10 mg, rectal, Daily PRN, William Raya DPM    dextrose 50 % injection 12.5 g, 12.5 g, intravenous, q15 min PRN, William Raya DPM    dextrose 50 % injection 25 g, 25 g, intravenous, q15 min PRN, William Raya DPM    DULoxetine (Cymbalta) DR capsule 40 mg, 40 mg, oral, Daily, William Raya DPM, 40 mg at 05/05/25 1031    enoxaparin (Lovenox) syringe 40 mg, 40 mg, subcutaneous, q24h, William Raya DPM, 40 mg at 05/05/25 1051    fluticasone furoate-vilanteroL (Breo Ellipta) 100-25 mcg/dose inhaler 1 puff, 1 puff, inhalation, Daily, William Raya DPM, 1 puff at 05/05/25 0642    glucagon (Glucagen) injection 1 mg, 1 mg, intramuscular, q15 min PRN, William Raya DPM    glucagon (Glucagen) injection 1 mg, 1 mg, intramuscular, q15 min PRN, William Raya DPM    guaiFENesin (Mucinex) 12 hr tablet 600 mg, 600 mg, oral, q12h PRN, William Raya DPM    insulin glargine (Lantus) injection 15 Units, 15 Units, subcutaneous, q24h, William Raya DPM, 15 Units at 05/04/25 2109    insulin lispro injection 0-10 Units, 0-10 Units, subcutaneous, TID AC, William Raya DPM, 2 Units at 05/05/25 1224    levothyroxine (Synthroid, Levoxyl) tablet 50 mcg, 50 mcg, oral, Daily, William Raya DPM, 50 mcg at 05/05/25 0642    lidocaine (Xylocaine) 10 mg/mL (1 %)  injection 5 mL, 5 mL, infiltration, Once, Anoop Grimm MD    magnesium oxide (Mag-Ox) tablet 400 mg, 400 mg, oral, Daily, Sera Liu PA-C, 400 mg at 05/05/25 1031    melatonin tablet 3 mg, 3 mg, oral, Nightly PRN, William Raya DPM, 3 mg at 05/02/25 2229    methadone (Dolophine) 10 mg/mL solution 78 mg, 78 mg, oral, Nightly, William Raya DPM, 78 mg at 05/04/25 2108    methadone (Dolophine) 10 mg/mL solution 87 mg, 87 mg, oral, Daily, William Raya DPM, 87 mg at 05/05/25 1046    metoprolol succinate XL (Toprol-XL) 24 hr tablet 25 mg, 25 mg, oral, Daily, William Raya DPM, 25 mg at 05/04/25 0855    ondansetron ODT (Zofran-ODT) disintegrating tablet 4 mg, 4 mg, oral, q8h PRN **OR** ondansetron (Zofran) injection 4 mg, 4 mg, intravenous, q8h PRN, William Raya DPM    pantoprazole (ProtoNix) EC tablet 40 mg, 40 mg, oral, Daily before breakfast, 40 mg at 05/05/25 0642 **OR** pantoprazole (Protonix) injection 40 mg, 40 mg, intravenous, Daily before breakfast, William Raya DPM, 40 mg at 05/04/25 0538    polyethylene glycol (Glycolax, Miralax) packet 17 g, 17 g, oral, Daily, William Raya DPM, 17 g at 04/30/25 0907    predniSONE (Deltasone) tablet 1 mg, 1 mg, oral, Daily, William Raya DPM, 1 mg at 05/05/25 1032    pregabalin (Lyrica) capsule 200 mg, 200 mg, oral, TID, William Raya DPM, 200 mg at 05/05/25 1224    thiamine (Vitamin B-1) tablet 100 mg, 100 mg, oral, Daily, William Raya DPM, 100 mg at 05/05/25 1031    tiotropium (Spiriva Respimat) 2.5 mcg/actuation inhaler 2 puff, 2 puff, inhalation, Daily, William Raya DPM, 2 puff at 05/05/25 1050    vancomycin (Vancocin) pharmacy to dose - pharmacy monitoring, , miscellaneous, Daily PRN, William Raya DPM    vancomycin 1,750 mg in dextrose 5% 500 mL IV (Ordered as: vancomycin), 1,750 mg, intravenous, q24h, William Raya DPM, Stopped at 05/04/25 2300

## 2025-05-05 NOTE — PROGRESS NOTES
Physical Therapy  Physical Therapy Treatment    Patient Name: Kael Zepeda  MRN: [id number]  Department: [facility] 3   Room: [facility]3333-A  Today's Date: 5/5/2025  Time Calculation  Start Time: 0829  Stop Time: 0853  Time Calculation (min): 24 min    PT Plan  Treatment/Interventions: Bed mobility, Transfer training, Gait training, Stair training, Strengthening, Endurance training, Therapeutic exercise  PT Plan: Ongoing PT  PT Frequency: 4 times per week  PT Discharge Recommendations: Moderate intensity level of continued care  Equipment Recommended upon Discharge: Wheeled walker (SHOWER SEAT)  PT Recommended Transfer Status: Assist x2 (FWW, HEEL WB RLE FOR TRANSFERS, NWB RLE FOR AMB)  PT - OK to Discharge: Yes (WHENMEDICALLY CLEARED)    General Visit Information:   PT  Visit  PT Received On: 05/05/25  Response to Previous Treatment: Patient with no complaints from previous session.    Reason for Referral:   R TMA    Precautions:  Falls    NWB RLE for gait    May heel WB RLE for transfers only    Pain:  No pain    Cognition:  Oriented X4    Activity Tolerance:  Activity Tolerance  Endurance: Tolerates 10 - 20 min exercise with multiple rests    Treatments:  Bed Mobility  Supine to sitting: Minimum assistance  Scooting: Minimum assistance    Transfers  Sit to stand: Contact guard  Stand to sit: Contact guard  Commode: Contact guard    Ambulation/Gait Training  15 feet x2 reps with FWW, Neva     VC for NWB RLE        Pt sitting in bedside chair following tx. Alarm on and call light in reach.      Outcome Measures:  [facility] Basic Mobility  Turning from your back to your side while in a flat bed without using bedrails: A little  Moving from lying on your back to sitting on the side of a flat bed without using bedrails: A little  Moving to and from bed to chair (including a wheelchair): A little  Standing up from a chair using your arms (e.g. wheelchair or bedside chair): A little  To walk in hospital room: A lot  Climbing  3-5 steps with railing: Total  Basic Mobility - Total Score: 15    Education Documentation  Precautions, taught by Elver Baum PTA at 5/5/2025 11:28 AM.  Learner: Patient  Readiness: Acceptance  Method: Explanation, Demonstration  Response: Verbalizes Understanding, Needs Reinforcement    Mobility Training, taught by Elver Baum PTA at 5/5/2025 11:28 AM.  Learner: Patient  Readiness: Acceptance  Method: Explanation, Demonstration  Response: Verbalizes Understanding, Needs Reinforcement    Education Comments  No comments found.        OP EDUCATION:       Encounter Problems       Encounter Problems (Active)       Mobility       STG - Patient will ambulate (Progressing)       Start:  05/01/25    Expected End:  05/10/25       FWW NWB RLE 30 FT MIN A X1         Goal 1 (Progressing)       Start:  05/01/25    Expected End:  05/22/25       20 REPS RROM INCREASING STRENGTH OF LLE, RHIP/KNEE & AROM R ANKLE IMPROVING GAIT STABILITY            PT Transfers       STG - Patient will transfer sit to and from stand (Progressing)       Start:  05/01/25    Expected End:  05/07/25       FWW USING PROPER TECHNIQUE NWB RLE

## 2025-05-05 NOTE — CARE PLAN
Problem: Safety - Adult  Goal: Free from fall injury  Outcome: Progressing     Problem: Discharge Planning  Goal: Discharge to home or other facility with appropriate resources  Outcome: Progressing     Problem: Chronic Conditions and Co-morbidities  Goal: Patient's chronic conditions and co-morbidity symptoms are monitored and maintained or improved  Outcome: Progressing     Problem: Nutrition  Goal: Nutrient intake appropriate for maintaining nutritional needs  Outcome: Progressing     Problem: Fall/Injury  Goal: Not fall by end of shift  Outcome: Progressing  Goal: Be free from injury by end of the shift  Outcome: Progressing  Goal: Verbalize understanding of personal risk factors for fall in the hospital  Outcome: Progressing  Goal: Verbalize understanding of risk factor reduction measures to prevent injury from fall in the home  Outcome: Progressing

## 2025-05-05 NOTE — PROGRESS NOTES
Vascular Access Team Intervention Note (PICC)     Visit Date: 5/5/2025      Patient Name: Kael Zepeda         MRN: [id number]                Reason for Visit: Right arm PICC placement for IV atbs until 6/11    Plan: OK for immediate use. See LDA report for line details       Erica Burns RN  5/5/2025  12:19 PM

## 2025-05-05 NOTE — PROGRESS NOTES
"Nutrition Follow Up Assessment:   Nutrition Assessment         Medical history per chart: Kael Zepeda is a 59 y.o. male with PMHx s/f insulin-dependent diabetes, hepatitis C, fibromyalgia, coronary artery disease history CABG, COPD, peripheral neuropathy, chronic methadone therapy, hypothyroidism, rheumatoid arthritis chronic right foot wound presenting with worsening wound and pain.   ~R foot OM/septic arthritis s/p TMA 4/30 but proximal cx are positive w MRSA . Continues on long-term IV antibiotics. Awaiting choice in placement for extended-care facility.     5/5: Patient unavailable at time of visit due to sterile procedure.  Attempted to call patient but he was unavailable..Intakes are variable (0-100%), but not consistently >75%. Will continue with supplements and add Rodrigo     Nutrition History:  Energy Intake: Poor < 50 %  Food and Nutrient History: Pt was NPO for procedure. Diet advancing to cons cho after. Pt with poor PO prior to procedure, rec; ensure plus HP 3x with neals.       Dietary Orders (From admission, onward)       Start     Ordered    05/05/25 1535  Oral nutritional supplements  Until discontinued        Question Answer Comment   Deliver with Breakfast    Deliver with Dinner    Select supplement: Rodrigo        05/05/25 1534    04/30/25 1558  Adult diet Consistent Carb; CCD 60 gm/meal  Diet effective now        Question Answer Comment   Diet type Consistent Carb    Carb diet selection: CCD 60 gm/meal        04/30/25 1557    04/30/25 1531  Oral nutritional supplements  Until discontinued        Question Answer Comment   Deliver with Breakfast    Deliver with Dinner    Deliver with Lunch    Select supplement: Ensure Plus High Protein        04/30/25 1530    04/29/25 1220  May Participate in Room Service  ( ROOM SERVICE MAY PARTICIPATE)  Once        Question:  .  Answer:  Yes    04/29/25 1219                    Anthropometrics:  Height: 170.2 cm (5' 7\")   Weight: 81.6 kg (180 lb)   BMI " (Calculated): 28.19           Weight History:   Wt Readings from Last 10 Encounters:   04/30/25 81.6 kg (180 lb)   04/02/25 83.5 kg (184 lb)   11/29/24 83.6 kg (184 lb 4.9 oz)   10/15/24 83.6 kg (184 lb 3.2 oz)   10/10/24 83.9 kg (185 lb)   08/10/24 81.2 kg (179 lb)   08/09/24 80.7 kg (178 lb)   08/01/24 80.3 kg (177 lb)   07/25/24 83 kg (182 lb 15.7 oz)   07/30/24 79 kg (174 lb 3.2 oz)       Weight Change %:  Weight History / % Weight Change: no significant wt changes to note.    Nutrition Focused Physical Exam Findings:  Subcutaneous Fat Loss:   Defer Subcutaneous Fat Loss Assessment: Defer all  Defer All Reason: unable to assess  Muscle Wasting:  Defer Muscle Wasting Assessment: Defer all  Defer All Reason: unable to assess  Edema:     Physical Findings:       Nutrition Significant Labs:  Results from last 7 days   Lab Units 05/05/25  0429 05/04/25  0507 05/03/25  0624   GLUCOSE mg/dL 100* 126* 165*   SODIUM mmol/L 131* 132* 136   POTASSIUM mmol/L 3.9 4.2 4.2   CHLORIDE mmol/L 100 96* 100   CO2 mmol/L 26 25 26   BUN mg/dL 14 14 11   CREATININE mg/dL 1.06 0.87 0.83   EGFR mL/min/1.73m*2 80 >90 >90   CALCIUM mg/dL 8.2* 8.5* 8.6   MAGNESIUM mg/dL 1.82 1.80 1.96     Lab Results   Component Value Date    HGBA1C 8.2 (A) 01/31/2025    HGBA1C 7.6 (H) 10/10/2024     Results from last 7 days   Lab Units 05/05/25  1130 05/05/25  0637 05/04/25  2028 05/04/25  1614 05/04/25  1107 05/04/25  0639 05/03/25  2131 05/03/25  1604   POCT GLUCOSE mg/dL 178* 97 159* 144* 138* 111* 182* 125*       Nutrition Specific Medications:  Meds reviewed/noted.   Scheduled Medications[1]   Continuous Medications[2]     I/O:    ; Stool Appearance: Formed (per pt) (05/04/25 1510)    Estimated Needs:   Total Energy Estimated Needs in 24 hours (kCal): 2050 kCal  Method for Estimating Needs: 25kcals/kg BW  Total Protein Estimated Needs in 24 Hours (g):  (82-98)  Method for Estimating 24 Hour Protein Needs: 1-1.2g/kg BW  Total Fluid Estimated Needs in  24 Hours (mL): 2050 mL  Method for Estimating 24 Hour Fluid Needs: 1 ml/kcal or per MD        Nutrition Diagnosis   Malnutrition Diagnosis  Patient has Malnutrition Diagnosis: No    Nutrition Diagnosis  Patient has Nutrition Diagnosis: Yes  Diagnosis Status (1): Active  Nutrition Diagnosis 1: Predicted inadequate nutrient intake  Related to (1): poor PO  As Evidenced by (1): pt with poor PO intakes since admission       Nutrition Interventions/Recommendations   Nutrition prescription for oral nutrition    Nutrition Recommendations:  Individualized Nutrition Prescription Provided for : Continue with Carb 60 gm/meal diet order. Continue ensure plus TID. Added Rodrigo BID    Nutrition Interventions/Goals:   Interventions: Medical food supplement  Medical Food Supplement: Commercial beverage medical food supplement therapy  Additional Interventions: Ensure Plus HP (350 kcals, 20 grams protein, per 8 oz), Rodrigo (90-95 kcals, 2.5 grams protein, 7 gm arginine and glutamine, 9.5 mg zinc, 300 mg Vit C)      Education Documentation  No documentation found.            Nutrition Monitoring and Evaluation   Food/Nutrient Related History Monitoring  Monitoring and Evaluation Plan: Estimated Energy Intake  Estimated Energy Intake: Energy intake greater or equal to 75% of estimated energy needs    Anthropometric Measurements  Monitoring and Evaluation Plan: Body weight  Body Weight: Body weight - Maintain stable weight    Biochemical Data, Medical Tests and Procedures  Monitoring and Evaluation Plan: Electrolyte/renal panel, Glucose/endocrine profile  Electrolyte and Renal Panel: Electrolytes within normal limits  Glucose/Endocrine Profile: Glucose within normal limits ( mg/dL)    Physical Exam Findings  Monitoring and Evaluation Plan: Skin  Skin Finding: Impaired wound healing - Improved wound healing    Goal Status: Some progress toward goal(s)    Time Spent (min): 30 minutes              [1] aspirin, 81 mg, oral,  Daily  atorvastatin, 40 mg, oral, Nightly  DULoxetine, 40 mg, oral, Daily  enoxaparin, 40 mg, subcutaneous, q24h  fluticasone furoate-vilanteroL, 1 puff, inhalation, Daily  insulin glargine, 15 Units, subcutaneous, q24h  insulin lispro, 0-10 Units, subcutaneous, TID AC  levothyroxine, 50 mcg, oral, Daily  lidocaine, 5 mL, infiltration, Once  magnesium oxide, 400 mg, oral, Daily  methadone, 78 mg, oral, Nightly  methadone, 87 mg, oral, Daily  metoprolol succinate XL, 25 mg, oral, Daily  pantoprazole, 40 mg, oral, Daily before breakfast   Or  pantoprazole, 40 mg, intravenous, Daily before breakfast  polyethylene glycol, 17 g, oral, Daily  predniSONE, 1 mg, oral, Daily  pregabalin, 200 mg, oral, TID  thiamine, 100 mg, oral, Daily  tiotropium, 2 puff, inhalation, Daily  vancomycin, 1,750 mg, intravenous, q24h     [2]

## 2025-05-06 VITALS
RESPIRATION RATE: 17 BRPM | BODY MASS INDEX: 28.25 KG/M2 | HEART RATE: 78 BPM | WEIGHT: 180 LBS | DIASTOLIC BLOOD PRESSURE: 73 MMHG | SYSTOLIC BLOOD PRESSURE: 119 MMHG | OXYGEN SATURATION: 92 % | HEIGHT: 67 IN | TEMPERATURE: 98.5 F

## 2025-05-06 NOTE — CARE PLAN
Problem: Safety - Adult  Goal: Free from fall injury  5/5/2025 2157 by Jovita Lombardo RN  Outcome: Progressing  5/5/2025 1153 by Jovita Lombardo RN  Outcome: Progressing     Problem: Discharge Planning  Goal: Discharge to home or other facility with appropriate resources  5/5/2025 2157 by Jovita Lombardo RN  Outcome: Progressing  5/5/2025 1153 by Jovita Lombardo RN  Outcome: Progressing     Problem: Chronic Conditions and Co-morbidities  Goal: Patient's chronic conditions and co-morbidity symptoms are monitored and maintained or improved  5/5/2025 2157 by Jovita Lombardo RN  Outcome: Progressing  5/5/2025 1153 by Jovita Lombardo RN  Outcome: Progressing     Problem: Nutrition  Goal: Nutrient intake appropriate for maintaining nutritional needs  5/5/2025 2157 by Jovita Lombardo RN  Outcome: Progressing  5/5/2025 1153 by Jovita Lombardo RN  Outcome: Progressing     Problem: Fall/Injury  Goal: Not fall by end of shift  5/5/2025 2157 by Jovita Lombardo RN  Outcome: Progressing  5/5/2025 1153 by Jovita Lombardo RN  Outcome: Progressing  Goal: Be free from injury by end of the shift  5/5/2025 2157 by Jovita Lombardo RN  Outcome: Progressing  5/5/2025 1153 by Jovita Lombardo RN  Outcome: Progressing  Goal: Verbalize understanding of personal risk factors for fall in the hospital  5/5/2025 2157 by Jovita Lombardo RN  Outcome: Progressing  5/5/2025 1153 by Jovita Lombardo RN  Outcome: Progressing  Goal: Verbalize understanding of risk factor reduction measures to prevent injury from fall in the home  5/5/2025 2157 by Jovita Lombardo RN  Outcome: Progressing  5/5/2025 1153 by Jovita Lombardo RN  Outcome: Progressing

## 2025-05-06 NOTE — NURSING NOTE
Pt stated that he was going to leave AMA at 6am because he had something important to do. He stated that the medical team was giving him the run around and wouldn't let him leave for a few hours to handle his business. I educated the pt on the importance of not leaving along with the risk involved. I contacted the the night time supervisor and MD of pt decision. Nursing supervisor came and talked to Pt. Pt singed AMA form. Pt left the floor at 0620 via wheelchair.

## 2025-05-06 NOTE — PROGRESS NOTES
Kael Zepeda is a 59 y.o. male on day 6 of admission presenting with Osteomyelitis of right foot, unspecified type (Multi).      Subjective   Kael Zepeda is a 59 y.o. male with PMHx s/f insulin-dependent diabetes, hepatitis C, fibromyalgia, coronary artery disease history CABG, COPD, peripheral neuropathy, chronic methadone therapy, hypothyroidism, rheumatoid arthritis chronic right foot wound presenting with worsening wound and pain.  Patient had been following with podiatry for over a year due to persistent wound in the right lower extremity.  Patient recently had an outpatient MRI showing findings concerning for tenosynovitis and osteomyelitis septic arthritis.  He was treated with Keflex and Bactrim by infectious disease who had also advised patient to come into the hospital prior cultures were abnormal for MRSA last year.  He did follow-up with Dr. Raya today who advised him to come to the emergency department for further evaluation and treatment.  Presently patient is denying any fevers or chills any nausea or vomiting his pain is tolerable no urinary symptoms no diarrhea.  CBC with WBC 5.1, Hgb 9.4, Plts 474. . Trop 135. Lactate 1.5. Patient seen by Dr. Raya from podiatry, had R TMA 4/30/25. Follow residual bone cx.   4/30/2025: No acute events overnight. Vitals stable. CBC reviewed, largely unchanged from baseline, no leukocytosis, has microcytic hypochromic anemia and thrombocytosis.  BMP reviewed, unremarkable  Await podiatry recommendations. Follow blood cx x2  Stop Zosyn as recent cx w w SA and anaerobes only, start unasyn, continue vanco. Seen by podiatry, planning R TMA today.   5/1/2025: No acute events overnight. Vitals stable. Labs are pending. Had R TMA yesterday. Follow blood and bone cultures  Bl cx x2- ng x1 day  5/2/2025: No acute events overnight. Vitals stable. Labs are pending. Had R TMA 4/30. Follow bone cultures. Bl cx x2- ng x2 days. Bl cx x2- ng x1 day. Residual  bone cx- rare SA. Await ID recommendations regarding length of antibiotic therapy.  5/3: Patient was seen and examined.  PT at bedside when I saw educating about non weight bearing. Pt will consider SNF on discharge. [facility] for long-term IV antibiotics placed per ID.  Patient agrees to home health care for long-term IV antibiotics.  5/4: Patient was seen and examined.  Was sleeping quietly when I saw her but easily arousable.  Continued on long-term IV antibiotics.  Awaiting choice in placement for extended-care facility.  5/5: Patient was seen and examined.  Patient was reluctant about going to skilled nursing facility with  and [facility] spoke with him earlier today.  I have spoken extensively with him.  He is agreeable to SNF if we are able to reach out to his methadone clinic.  I will inform  in a.m.  Will continue current IV antibiotics and trend CBC and BMP daily.    Review of Systems   Constitutional:  Negative for appetite change, chills, diaphoresis, fatigue and fever.   HENT:  Negative for congestion, ear pain, facial swelling, hearing loss, nosebleeds, sore throat, tinnitus and trouble swallowing.    Eyes:  Negative for pain.   Respiratory:  Negative for cough, chest tightness, shortness of breath and wheezing.    Cardiovascular:  Negative for chest pain, palpitations and leg swelling.   Gastrointestinal:  Negative for abdominal pain, blood in stool, constipation, diarrhea, nausea and vomiting.   Genitourinary:  Negative for dysuria, flank pain, frequency, hematuria and urgency.   Musculoskeletal:  Negative for back pain and joint swelling.        +R foot pain   Skin:  Positive for wound. Negative for rash.   Neurological:  Negative for dizziness, syncope, weakness, light-headedness, numbness and headaches.   Hematological:  Does not bruise/bleed easily.   Psychiatric/Behavioral:  Negative for behavioral problems, hallucinations and suicidal ideas.           Objective     Last Recorded  Vitals  /55 (BP Location: Left arm, Patient Position: Sitting)   Pulse 74   Temp 36.4 °C (97.5 °F) (Temporal)   Resp 16   Wt 81.6 kg (180 lb)   SpO2 96%     Image Results  Imaging  XR foot right 3+ views  Result Date: 4/29/2025  1. Findings suggesting osteomyelitis of the 1st metatarsal stump as well as of the 2nd metatarsal head and 2nd proximal phalanx base with septic arthritis of the 2nd MTP joint. Consider MRI for further evaluation if clinically warranted.   MACRO: None.   Signed by: Jennifer Macias 4/29/2025 11:23 AM Dictation workstation:   [id number]      Cardiology, Vascular, and Other Imaging  Bedside PICC Imaging  Result Date: 5/5/2025  These images are not reportable by radiology and will not be interpreted by  Radiologists.         Lab Results  Results for orders placed or performed during the hospital encounter of 04/29/25 (from the past 24 hours)   Basic Metabolic Panel   Result Value Ref Range    Glucose 100 (H) 74 - 99 mg/dL    Sodium 131 (L) 136 - 145 mmol/L    Potassium 3.9 3.5 - 5.3 mmol/L    Chloride 100 98 - 107 mmol/L    Bicarbonate 26 21 - 32 mmol/L    Anion Gap 9 (L) 10 - 20 mmol/L    Urea Nitrogen 14 6 - 23 mg/dL    Creatinine 1.06 0.50 - 1.30 mg/dL    eGFR 80 >60 mL/min/1.73m*2    Calcium 8.2 (L) 8.6 - 10.3 mg/dL   CBC   Result Value Ref Range    WBC 6.0 4.4 - 11.3 x10*3/uL    nRBC 0.0 0.0 - 0.0 /100 WBCs    RBC 3.87 (L) 4.50 - 5.90 x10*6/uL    Hemoglobin 8.2 (L) 13.5 - 17.5 g/dL    Hematocrit 27.2 (L) 41.0 - 52.0 %    MCV 70 (L) 80 - 100 fL    MCH 21.2 (L) 26.0 - 34.0 pg    MCHC 30.1 (L) 32.0 - 36.0 g/dL    RDW 17.9 (H) 11.5 - 14.5 %    Platelets 431 150 - 450 x10*3/uL   Magnesium   Result Value Ref Range    Magnesium 1.82 1.60 - 2.40 mg/dL   POCT GLUCOSE   Result Value Ref Range    POCT Glucose 97 74 - 99 mg/dL   POCT GLUCOSE   Result Value Ref Range    POCT Glucose 178 (H) 74 - 99 mg/dL   POCT GLUCOSE   Result Value Ref Range    POCT Glucose 87 74 - 99 mg/dL     *Note:  Due to a large number of results and/or encounters for the requested time period, some results have not been displayed. A complete set of results can be found in Results Review.        Medications  Scheduled medications:  Scheduled Medications[1]  Continuous medications:  Continuous Medications[2]  PRN medications:  PRN Medications[3]     Physical Exam  Constitutional:       General: He is not in acute distress.     Appearance: Normal appearance.   HENT:      Head: Normocephalic and atraumatic.      Right Ear: External ear normal.      Left Ear: External ear normal.      Nose: Nose normal.      Mouth/Throat:      Mouth: Mucous membranes are moist.      Pharynx: Oropharynx is clear.   Eyes:      Extraocular Movements: Extraocular movements intact.      Conjunctiva/sclera: Conjunctivae normal.      Pupils: Pupils are equal, round, and reactive to light.   Cardiovascular:      Rate and Rhythm: Normal rate and regular rhythm.      Pulses: Normal pulses.      Heart sounds: Normal heart sounds.   Pulmonary:      Effort: Pulmonary effort is normal. No respiratory distress.      Breath sounds: Normal breath sounds. No wheezing, rhonchi or rales.   Abdominal:      General: Bowel sounds are normal.      Palpations: Abdomen is soft.      Tenderness: There is no abdominal tenderness. There is no right CVA tenderness, left CVA tenderness, guarding or rebound.   Musculoskeletal:      Cervical back: Normal range of motion and neck supple.      Comments: Post-operative dressing is C/D/I, did not remove dressing   Skin:     General: Skin is warm and dry.      Capillary Refill: Capillary refill takes less than 2 seconds.      Findings: Lesion present. No rash.      Comments: R foot wound dressed   Neurological:      General: No focal deficit present.      Mental Status: He is alert and oriented to person, place, and time. Mental status is at baseline.   Psychiatric:         Mood and Affect: Mood normal.         Behavior: Behavior  normal.                  Assessment/Plan      Diabetic R foot wound infection  R foot OM/septic arthritis s/p TMA 4/30  Prior MRI findings reportedly concerning for osteomyelitis tenosynovitis  History of prior MRSA infection in same wound  We will continue patient on vancomycin/unasyn  Consult infectious disease and podiatry  S/p R TMA 4/30/25  Bl cx x2- ng x2 days  Residual bone cx- rare SA  ID recommending long-term IV antibiotics  PICC line for long-term antibiotics     Chronic methadone use  We will restart patient's methadone     Peripheral diabetic neuropathy  Resume Lyrica     IDDM-II  Patient will be placed on sliding scale insulin at a reduced dose of his Lantus  Continue consistent carbohydrate diet     Coronary artery disease history of CABG  Will review and reconcile patient's home medications continue his ASA therapy  Should not be on celebrex- will hold while here     COPD  No exacerbation at this time    Code Status: Full Code                 DVT ppx: Lovenox     Please see orders for more complete plan      I spent 35 minutes in the follow-up management of this patient today    Anoop Grimm MD         [1] aspirin, 81 mg, oral, Daily  atorvastatin, 40 mg, oral, Nightly  DULoxetine, 40 mg, oral, Daily  enoxaparin, 40 mg, subcutaneous, q24h  fluticasone furoate-vilanteroL, 1 puff, inhalation, Daily  insulin glargine, 15 Units, subcutaneous, q24h  insulin lispro, 0-10 Units, subcutaneous, TID AC  levothyroxine, 50 mcg, oral, Daily  lidocaine, 5 mL, infiltration, Once  magnesium oxide, 400 mg, oral, Daily  methadone, 78 mg, oral, Nightly  methadone, 87 mg, oral, Daily  metoprolol succinate XL, 25 mg, oral, Daily  pantoprazole, 40 mg, oral, Daily before breakfast   Or  pantoprazole, 40 mg, intravenous, Daily before breakfast  polyethylene glycol, 17 g, oral, Daily  predniSONE, 1 mg, oral, Daily  pregabalin, 200 mg, oral, TID  thiamine, 100 mg, oral, Daily  tiotropium, 2 puff, inhalation,  Daily  vancomycin, 1,750 mg, intravenous, q24h     [2]    [3] PRN medications: acetaminophen **OR** acetaminophen **OR** acetaminophen, alteplase, bisacodyl, bisacodyl, dextrose, dextrose, glucagon, glucagon, guaiFENesin, melatonin, ondansetron ODT **OR** ondansetron, vancomycin

## 2025-05-06 NOTE — NURSING NOTE
Pt refused to let me remove his picc line he states that he is going to continue his antibiotic therapy and it took a lot for them to get it in and he doesn't want it taken out. Pt educated about the risk involved if his picc is not properly cared for. Pt states that he is returning th hospital finish his antibiotics and he will get a hold of .

## 2025-05-06 NOTE — DISCHARGE SUMMARY
Discharge Diagnosis  Osteomyelitis of right foot, unspecified type (Multi)           Issues Requiring Follow-Up      Discharge Meds     Medication List      CONTINUE taking these medications     lancets 33 gauge misc; Use to check blood sugar 4 times daily     STOP taking these medications     celecoxib 200 mg capsule; Commonly known as: CeleBREX     ASK your doctor about these medications     albuterol 90 mcg/actuation inhaler; Inhale 2 puffs by mouth into the   lungs every 6 hours if needed for shortness of breath.   aspirin 81 mg EC tablet   atorvastatin 40 mg tablet; Commonly known as: Lipitor   clopidogrel 75 mg tablet; Commonly known as: Plavix; Take 1 tablet (75   mg) by mouth once daily.   docusate sodium 100 mg capsule; Commonly known as: Colace; Take 1   capsule (100 mg) by mouth 2 times a day as needed (hard stools).   DULoxetine 40 mg DR capsule; Ask about: Which instructions should I use?   fluticasone propion-salmeteroL 100-50 mcg/dose diskus inhaler; Commonly   known as: Wixela Inhub; Inhale 1 puff by mouth into the lungs 2 times a   day. Rinse mouth with water after use to reduce aftertaste and incidence   of candidiasis. Do not swallow.   folic acid 1 mg tablet; Commonly known as: Folvite; Take 1 tablet (1 mg)   by mouth once daily.   insulin lispro 100 unit/mL pen; Commonly known as: HumaLOG; Inject 8   Units plus 0 - 10 units per sliding scale under the skin 3 times a day   before meals. Take as directed per insulin instructions.   Lantus Solostar U-100 Insulin 100 unit/mL (3 mL) pen; Generic drug:   insulin glargine; Inject 25 Units under the skin once every 24 hours.   levothyroxine 50 mcg tablet; Commonly known as: Synthroid, Levoxyl   metFORMIN 500 mg tablet; Commonly known as: Glucophage; Take 1 tablet   (500 mg) by mouth 2 times a day.   METHADONE ORAL   metoprolol succinate XL 25 mg 24 hr tablet; Commonly known as:   Toprol-XL; Take 1 tablet (25 mg) by mouth once daily. Do not crush or    chew.   omeprazole 40 mg DR capsule; Commonly known as: PriLOSEC   Otezla Starter 10 mg (4)-20 mg (4)-30 mg(19) tablets,dose pack tablet   starter pack; Generic drug: apremilast   pantoprazole 40 mg EC tablet; Commonly known as: ProtoNix; Take 1 tablet   (40 mg) by mouth once daily in the morning. Take before meals. Do not   crush, chew, or split.   predniSONE 1 mg tablet; Commonly known as: Deltasone   pregabalin 200 mg capsule; Commonly known as: Lyrica   Senexon-S 8.6-50 mg tablet; Generic drug: sennosides-docusate sodium;   Take 2 tablets by mouth 2 times a day.   Spiriva Respimat 2.5 mcg/actuation inhaler; Generic drug: tiotropium;   Inhale 2 puffs by mouth into the lungs once daily.   thiamine 100 mg tablet; Commonly known as: Vitamin B-1; Take 1 tablet   (100 mg) by mouth once daily.   True Metrix Glucose Test Strip; Generic drug: blood sugar diagnostic;   Use to check blood sugar 4 times daily       Test Results Pending At Discharge  Pending Labs       No current pending labs.            Hospital Course  Kael Zepeda is a 59 y.o. male with PMHx s/f insulin-dependent diabetes, hepatitis C, fibromyalgia, coronary artery disease history CABG, COPD, peripheral neuropathy, chronic methadone therapy, hypothyroidism, rheumatoid arthritis chronic right foot wound presenting with worsening wound and pain.  Patient had been following with podiatry for over a year due to persistent wound in the right lower extremity.  Patient recently had an outpatient MRI showing findings concerning for tenosynovitis and osteomyelitis septic arthritis.  He was treated with Keflex and Bactrim by infectious disease who had also advised patient to come into the hospital prior cultures were abnormal for MRSA last year.  He did follow-up with Dr. Raya today who advised him to come to the emergency department for further evaluation and treatment.  Presently patient is denying any fevers or chills any nausea or vomiting his pain  is tolerable no urinary symptoms no diarrhea.  CBC with WBC 5.1, Hgb 9.4, Plts 474. . Trop 135. Lactate 1.5. Patient seen by Dr. Raya from podiatry, had R TMA 4/30/25. Follow residual bone cx.   4/30/2025: No acute events overnight. Vitals stable. CBC reviewed, largely unchanged from baseline, no leukocytosis, has microcytic hypochromic anemia and thrombocytosis.  BMP reviewed, unremarkable  Await podiatry recommendations. Follow blood cx x2  Stop Zosyn as recent cx w w SA and anaerobes only, start unasyn, continue vanco. Seen by podiatry, planning R TMA today.   5/1/2025: No acute events overnight. Vitals stable. Labs are pending. Had R TMA yesterday. Follow blood and bone cultures  Bl cx x2- ng x1 day  5/2/2025: No acute events overnight. Vitals stable. Labs are pending. Had R TMA 4/30. Follow bone cultures. Bl cx x2- ng x2 days. Bl cx x2- ng x1 day. Residual bone cx- rare SA. Await ID recommendations regarding length of antibiotic therapy.  5/3: Patient was seen and examined.  PT at bedside when I saw educating about non weight bearing. Pt will consider SNF on discharge. [facility] for long-term IV antibiotics placed per ID.  Patient agrees to home health care for long-term IV antibiotics.  5/4: Patient was seen and examined.  Was sleeping quietly when I saw her but easily arousable.  Continued on long-term IV antibiotics.  Awaiting choice in placement for extended-care facility.  5/5: Patient was seen and examined.  Patient was reluctant about going to skilled nursing facility with  and [facility] spoke with him earlier today.  I have spoken extensively with him.  He is agreeable to SNF if we are able to reach out to his methadone clinic.  I will inform  in a.m.  Will continue current IV antibiotics and trend CBC and BMP daily.  5/6: This patient left the hospital AGAINST MEDICAL ADVICE for he could be seen by me.  Currently not supervised so bedside nurse and nocturnist MD were informed  and he is signed acknowledging the risks of leaving AGAINST MEDICAL ADVICE.    Pertinent Physical Exam At Time of Discharge  Physical Exam    Outpatient Follow-Up  Future Appointments   Date Time Provider Department Center   5/12/2025  4:30 PM Chance Fong MD [id number]         Anoop Grimm MD

## 2025-05-07 ENCOUNTER — HOSPITAL ENCOUNTER (EMERGENCY)
Facility: HOSPITAL | Age: 60
Discharge: HOME | End: 2025-05-07
Attending: STUDENT IN AN ORGANIZED HEALTH CARE EDUCATION/TRAINING PROGRAM
Payer: MEDICARE

## 2025-05-07 VITALS
HEART RATE: 89 BPM | BODY MASS INDEX: 28.93 KG/M2 | WEIGHT: 180 LBS | DIASTOLIC BLOOD PRESSURE: 56 MMHG | HEIGHT: 66 IN | OXYGEN SATURATION: 95 % | RESPIRATION RATE: 16 BRPM | TEMPERATURE: 97.9 F | SYSTOLIC BLOOD PRESSURE: 98 MMHG

## 2025-05-07 DIAGNOSIS — Z45.2 PIC LINE (PERIPHERALLY INSERTED CENTRAL CATHETER) REMOVAL: Primary | ICD-10-CM

## 2025-05-07 PROCEDURE — 99281 EMR DPT VST MAYX REQ PHY/QHP: CPT | Performed by: STUDENT IN AN ORGANIZED HEALTH CARE EDUCATION/TRAINING PROGRAM

## 2025-05-07 ASSESSMENT — PAIN - FUNCTIONAL ASSESSMENT: PAIN_FUNCTIONAL_ASSESSMENT: 0-10

## 2025-05-07 ASSESSMENT — PAIN DESCRIPTION - LOCATION: LOCATION: FOOT

## 2025-05-07 ASSESSMENT — LIFESTYLE VARIABLES
HAVE PEOPLE ANNOYED YOU BY CRITICIZING YOUR DRINKING: NO
EVER FELT BAD OR GUILTY ABOUT YOUR DRINKING: NO
TOTAL SCORE: 0
HAVE YOU EVER FELT YOU SHOULD CUT DOWN ON YOUR DRINKING: NO
EVER HAD A DRINK FIRST THING IN THE MORNING TO STEADY YOUR NERVES TO GET RID OF A HANGOVER: NO

## 2025-05-07 ASSESSMENT — PAIN SCALES - GENERAL: PAINLEVEL_OUTOF10: 6

## 2025-05-07 ASSESSMENT — PAIN DESCRIPTION - ORIENTATION: ORIENTATION: RIGHT

## 2025-05-07 ASSESSMENT — PAIN DESCRIPTION - PAIN TYPE: TYPE: CHRONIC PAIN

## 2025-05-07 NOTE — ED TRIAGE NOTES
Pt presents to have his PICC line taken out because it has not been used in 3 days and he was told that there was a risk for infection and  a blood clot because of that. He had the PICC line placed for IV antibiotics. Alert and oriented x's 4.

## 2025-05-07 NOTE — PROGRESS NOTES
"[facility] INFECTIOUS DISEASE PROGRESS NOTE    Patient Name: Kael Zepeda  MRN: [id number]    INTERVAL HISTORY:     No complaints  Tolerating antibiotic.  Denies chest pain, headaches,  fever/chills, cough , SOB, abdominal pain, diarrhea, nausea, vomiting, dysuria or flank pain.  Afebrile  Screat: 1.06    Problem List[1]     ASSESSMENT:   R foot wound infection  R foot OM/septic arthritis s/p TMA  but proximal cx are positive w MRSA  DM2 w neuropathy  HCV  CAD  COPD  RA  Hypothyroid    Plan   Continue Vancomycin until 25   - Monitor Screat and trough  Bcx x 2 -no growth  Podiatry consult noted  PICC placement  CoPAT on chart.   Dispo planning : awaiting placement vs Home with [facility]    MEDICATIONS: reviewed.  Current Medications[2]     PHYSICAL EXAM:  Vital signs: /73 (BP Location: Left arm, Patient Position: Sitting)   Pulse 78   Temp 36.9 °C (98.5 °F) (Temporal)   Resp 17   Ht 1.702 m (5' 7\")   Wt 81.6 kg (180 lb)   SpO2 92%   BMI 28.19 kg/m²   Temp (24hrs), Av.7 °C (98 °F), Min:36.4 °C (97.5 °F), Max:36.9 °C (98.5 °F)    General: alert, oriented, NAD  Lungs: bilaterally clear to auscultation  Heart: regular rate and rhythm  Abdomen: soft, non tender, non distended, BS+  Extremities: Foot dressings post op  No rashes  No joint inflammation  Neck supple  Lines ok  No CVAT              Labs:    Results for orders placed or performed during the hospital encounter of 25 (from the past 96 hours)   Basic Metabolic Panel   Result Value Ref Range    Glucose 165 (H) 74 - 99 mg/dL    Sodium 136 136 - 145 mmol/L    Potassium 4.2 3.5 - 5.3 mmol/L    Chloride 100 98 - 107 mmol/L    Bicarbonate 26 21 - 32 mmol/L    Anion Gap 14 10 - 20 mmol/L    Urea Nitrogen 11 6 - 23 mg/dL    Creatinine 0.83 0.50 - 1.30 mg/dL    eGFR >90 >60 mL/min/1.73m*2    Calcium 8.6 8.6 - 10.3 mg/dL   CBC   Result Value Ref Range    WBC 6.3 4.4 - 11.3 x10*3/uL    nRBC 0.0 0.0 - 0.0 /100 WBCs    RBC 4.30 (L) 4.50 - 5.90 " x10*6/uL    Hemoglobin 9.3 (L) 13.5 - 17.5 g/dL    Hematocrit 30.6 (L) 41.0 - 52.0 %    MCV 71 (L) 80 - 100 fL    MCH 21.6 (L) 26.0 - 34.0 pg    MCHC 30.4 (L) 32.0 - 36.0 g/dL    RDW 18.1 (H) 11.5 - 14.5 %    Platelets 416 150 - 450 x10*3/uL   Magnesium   Result Value Ref Range    Magnesium 1.96 1.60 - 2.40 mg/dL   POCT GLUCOSE   Result Value Ref Range    POCT Glucose 144 (H) 74 - 99 mg/dL   POCT GLUCOSE   Result Value Ref Range    POCT Glucose 264 (H) 74 - 99 mg/dL   POCT GLUCOSE   Result Value Ref Range    POCT Glucose 125 (H) 74 - 99 mg/dL   Staphylococcus Aureus/MRSA Colonization, Culture - PICC Only    Specimen: Anterior Nares; Swab   Result Value Ref Range    Staph/MRSA Screen Culture No Staphylococcus aureus isolated    POCT GLUCOSE   Result Value Ref Range    POCT Glucose 182 (H) 74 - 99 mg/dL   Vancomycin   Result Value Ref Range    Vancomycin 28.9 (H) 5.0 - 20.0 ug/mL   Basic Metabolic Panel   Result Value Ref Range    Glucose 126 (H) 74 - 99 mg/dL    Sodium 132 (L) 136 - 145 mmol/L    Potassium 4.2 3.5 - 5.3 mmol/L    Chloride 96 (L) 98 - 107 mmol/L    Bicarbonate 25 21 - 32 mmol/L    Anion Gap 15 10 - 20 mmol/L    Urea Nitrogen 14 6 - 23 mg/dL    Creatinine 0.87 0.50 - 1.30 mg/dL    eGFR >90 >60 mL/min/1.73m*2    Calcium 8.5 (L) 8.6 - 10.3 mg/dL   CBC   Result Value Ref Range    WBC 6.2 4.4 - 11.3 x10*3/uL    nRBC 0.0 0.0 - 0.0 /100 WBCs    RBC 4.23 (L) 4.50 - 5.90 x10*6/uL    Hemoglobin 8.9 (L) 13.5 - 17.5 g/dL    Hematocrit 29.8 (L) 41.0 - 52.0 %    MCV 70 (L) 80 - 100 fL    MCH 21.0 (L) 26.0 - 34.0 pg    MCHC 29.9 (L) 32.0 - 36.0 g/dL    RDW 17.7 (H) 11.5 - 14.5 %    Platelets 453 (H) 150 - 450 x10*3/uL   Magnesium   Result Value Ref Range    Magnesium 1.80 1.60 - 2.40 mg/dL   POCT GLUCOSE   Result Value Ref Range    POCT Glucose 111 (H) 74 - 99 mg/dL   POCT GLUCOSE   Result Value Ref Range    POCT Glucose 138 (H) 74 - 99 mg/dL   POCT GLUCOSE   Result Value Ref Range    POCT Glucose 144 (H) 74 - 99  mg/dL   POCT GLUCOSE   Result Value Ref Range    POCT Glucose 159 (H) 74 - 99 mg/dL   Basic Metabolic Panel   Result Value Ref Range    Glucose 100 (H) 74 - 99 mg/dL    Sodium 131 (L) 136 - 145 mmol/L    Potassium 3.9 3.5 - 5.3 mmol/L    Chloride 100 98 - 107 mmol/L    Bicarbonate 26 21 - 32 mmol/L    Anion Gap 9 (L) 10 - 20 mmol/L    Urea Nitrogen 14 6 - 23 mg/dL    Creatinine 1.06 0.50 - 1.30 mg/dL    eGFR 80 >60 mL/min/1.73m*2    Calcium 8.2 (L) 8.6 - 10.3 mg/dL   CBC   Result Value Ref Range    WBC 6.0 4.4 - 11.3 x10*3/uL    nRBC 0.0 0.0 - 0.0 /100 WBCs    RBC 3.87 (L) 4.50 - 5.90 x10*6/uL    Hemoglobin 8.2 (L) 13.5 - 17.5 g/dL    Hematocrit 27.2 (L) 41.0 - 52.0 %    MCV 70 (L) 80 - 100 fL    MCH 21.2 (L) 26.0 - 34.0 pg    MCHC 30.1 (L) 32.0 - 36.0 g/dL    RDW 17.9 (H) 11.5 - 14.5 %    Platelets 431 150 - 450 x10*3/uL   Magnesium   Result Value Ref Range    Magnesium 1.82 1.60 - 2.40 mg/dL   POCT GLUCOSE   Result Value Ref Range    POCT Glucose 97 74 - 99 mg/dL   POCT GLUCOSE   Result Value Ref Range    POCT Glucose 178 (H) 74 - 99 mg/dL   POCT GLUCOSE   Result Value Ref Range    POCT Glucose 87 74 - 99 mg/dL   POCT GLUCOSE   Result Value Ref Range    POCT Glucose 205 (H) 74 - 99 mg/dL     *Note: Due to a large number of results and/or encounters for the requested time period, some results have not been displayed. A complete set of results can be found in Results Review.        Microbiology data: reviewed    Imaging data: reviewed      Freddy Mike MD  [phone]  5/6/2025       [1]   Patient Active Problem List  Diagnosis    Diabetic foot ulcer with osteomyelitis (Multi)    Diabetes mellitus, type 2 (Multi)    Benign essential hypertension    Gastroesophageal reflux disease without esophagitis    Corns and callosities    Degeneration of intervertebral disc of lumbar region    Depression    Dermatitis    Chronic back pain    Fibromyalgia    Generalized anxiety disorder    Hepatitis B core antibody positive     History of hepatitis C    History of hypothyroidism    Hyperlipidemia, mixed    Hypothyroidism, acquired    Lumbar radiculopathy    Lumbar spondylosis    Obesity, Class I, BMI 30-34.9    Opioid dependence, in remission    Patient's noncompliance with other medical treatment and regimen due to unspecified reason    Peripheral neuropathy    Primary insomnia    Pulmonary emphysema (Multi)    Rheumatoid arthritis involving both hands with positive rheumatoid factor (Multi)    Rheumatoid nodules (Multi)    Scrotal cyst    Spinal stenosis of lumbosacral region    Steroid-induced osteoporosis    Tobacco use disorder    Ulcer of toe of right foot, limited to breakdown of skin    Vitamin D deficiency    NSTEMI (non-ST elevated myocardial infarction) (Multi)    Microcytic anemia    CAD in native artery    Hepatitis C    Stage 2 chronic kidney disease    Osteomyelitis of left foot, unspecified type (Multi)    Cellulitis of left lower extremity    Acute osteomyelitis of left foot (Multi)    Overweight (BMI 25.0-29.9)    Sleep-related breathing disorder    RLS (restless legs syndrome)    Osteomyelitis of right foot, unspecified type (Multi)    Ulcer of toe of right foot, with necrosis of bone (Multi)   [2] No current facility-administered medications for this encounter.    Current Outpatient Medications:     albuterol 90 mcg/actuation inhaler, Inhale 2 puffs by mouth into the lungs every 6 hours if needed for shortness of breath., Disp: 18 g, Rfl: 11    apremilast (Otezla Starter) 10 mg (4)-20 mg (4)-30 mg(19) tablets,dose pack tablet starter pack, Take 1 tablet by mouth 2 times a day., Disp: , Rfl:     aspirin 81 mg EC tablet, Take 1 tablet (81 mg) by mouth once daily., Disp: , Rfl:     atorvastatin (Lipitor) 40 mg tablet, Take 1 tablet (40 mg) by mouth once daily., Disp: , Rfl:     blood sugar diagnostic (True Metrix Glucose Test Strip) strip, Use to check blood sugar 4 times daily, Disp: 200 each, Rfl: 0    clopidogrel  (Plavix) 75 mg tablet, Take 1 tablet (75 mg) by mouth once daily. (Patient not taking: Reported on 4/29/2025), Disp: 90 tablet, Rfl: 3    docusate sodium (Colace) 100 mg capsule, Take 1 capsule (100 mg) by mouth 2 times a day as needed (hard stools). (Patient not taking: Reported on 4/29/2025), Disp: 60 capsule, Rfl: 0    DULoxetine 40 mg DR capsule, Take 1 capsule (40 mg) by mouth once daily., Disp: , Rfl:     fluticasone propion-salmeteroL (Wixela Inhub) 100-50 mcg/dose diskus inhaler, Inhale 1 puff by mouth into the lungs 2 times a day. Rinse mouth with water after use to reduce aftertaste and incidence of candidiasis. Do not swallow. (Patient taking differently: Inhale 1 puff 2 times a day as needed.), Disp: 60 each, Rfl: 11    folic acid (Folvite) 1 mg tablet, Take 1 tablet (1 mg) by mouth once daily. (Patient not taking: Reported on 4/29/2025), Disp: 30 tablet, Rfl: 11    insulin glargine (Lantus) 100 unit/mL (3 mL) pen, Inject 25 Units under the skin once every 24 hours. (Patient taking differently: Inject 40 Units under the skin once every 24 hours.), Disp: 30 mL, Rfl: 3    insulin lispro (HumaLOG) 100 unit/mL injection, Inject 8 Units plus 0 - 10 units per sliding scale under the skin 3 times a day before meals. Take as directed per insulin instructions., Disp: 15 mL, Rfl: 0    lancets 33 gauge misc, Use to check blood sugar 4 times daily, Disp: 200 each, Rfl: 0    levothyroxine (Synthroid, Levoxyl) 50 mcg tablet, Take 1 tablet (50 mcg) by mouth once daily., Disp: , Rfl:     metFORMIN (Glucophage) 500 mg tablet, Take 1 tablet (500 mg) by mouth 2 times a day., Disp: 60 tablet, Rfl: 2    methadone HCl (METHADONE ORAL), Take 87mg by mouth every morning and 78mg by mouth  every evening, Disp: , Rfl:     metoprolol succinate XL (Toprol-XL) 25 mg 24 hr tablet, Take 1 tablet (25 mg) by mouth once daily. Do not crush or chew., Disp: 90 tablet, Rfl: 3    omeprazole (PriLOSEC) 40 mg DR capsule, Take 1 capsule (40 mg)  by mouth early in the morning.., Disp: , Rfl:     pantoprazole (ProtoNix) 40 mg EC tablet, Take 1 tablet (40 mg) by mouth once daily in the morning. Take before meals. Do not crush, chew, or split., Disp: 30 tablet, Rfl: 11    predniSONE (Deltasone) 1 mg tablet, Take 1 tablet (1 mg) by mouth once daily., Disp: , Rfl:     pregabalin (Lyrica) 200 mg capsule, Take 1 capsule (200 mg) by mouth 3 times a day., Disp: , Rfl:     sennosides-docusate sodium (Chelsie-Colace) 8.6-50 mg tablet, Take 2 tablets by mouth 2 times a day. (Patient not taking: Reported on 4/29/2025), Disp: 120 tablet, Rfl: 11    thiamine (Vitamin B-1) 100 mg tablet, Take 1 tablet (100 mg) by mouth once daily. (Patient not taking: Reported on 4/29/2025), Disp: 30 tablet, Rfl: 11    tiotropium (Spiriva Respimat) 2.5 mcg/actuation inhaler, Inhale 2 puffs by mouth into the lungs once daily., Disp: 4 g, Rfl: 11

## 2025-05-07 NOTE — ED PROVIDER NOTES
"    HPI   Chief Complaint   Patient presents with    Wants PICC line removed       HPI: Patient is a 59-year-old male with a history of insulin-dependent diabetes, hepatitis C, fibromyalgia, coronary artery disease, status post CABG, COPD, peripheral neuropathy, chronic methadone therapy, hypothyroidism, rheumatoid arthritis, and a chronic right foot wound, recently admitted to the hospital, and patient left AMA with a PICC line in, who is presenting to the emergency department today to get his PICC line removed.  Patient was called by nursing and told that he needed to get his PICC line removed he was not going to stay in the hospital.  He is here to get the PICC line removed.  He denies any complaints at this time.  He is seeing Dr. Raya tomorrow at 2 PM.      ROS: Complete  review of systems performed, otherwise negative except as noted in the history of present illness    PMH: Reviewed, documented below in note. Pertinents in HPI  PSH: Reviewed and documented below in note. Pertinents in HPI  SH: History of substance use disorder, currently on chronic methadone therapy.  Fam: Reviewed, noncontributory to patients current complaint  MEDS: Reviewed and documented below in note. Pertinents in HPI  ALLERGIES: Reviewed and documented below in note.            History provided by:  Patient and medical records   used: No                          Karen Coma Scale Score: 15                  Patient History   Medical History[1]  Surgical History[2]  Family History[3]  Social History[4]    Physical Exam   Visit Vitals  BP 98/56   Pulse 89   Temp 36.6 °C (97.9 °F)   Resp 16   Ht 1.676 m (5' 6\")   Wt 81.6 kg (180 lb)   SpO2 95%   BMI 29.05 kg/m²   Smoking Status Former   BSA 1.95 m²      Physical Exam  Vitals and nursing note reviewed.   Constitutional:       Appearance: Normal appearance.   HENT:      Head: Normocephalic and atraumatic.   Neck:      Vascular: No carotid bruit.   Cardiovascular:      " Rate and Rhythm: Normal rate and regular rhythm.      Pulses: Normal pulses.      Heart sounds: Normal heart sounds.   Pulmonary:      Effort: Pulmonary effort is normal.      Breath sounds: Normal breath sounds.   Abdominal:      Palpations: Abdomen is soft.   Musculoskeletal:         General: No tenderness, deformity or signs of injury. Normal range of motion.      Cervical back: Normal range of motion. No rigidity.   Skin:     General: Skin is warm and dry.      Capillary Refill: Capillary refill takes less than 2 seconds.      Comments: PICC line in place in the right upper extremity without surrounding erythema, warmth, or drainage.   Neurological:      General: No focal deficit present.      Mental Status: He is alert and oriented to person, place, and time.      Sensory: No sensory deficit.      Motor: No weakness.         No orders to display       Labs Reviewed - No data to display      ED Course & MDM   Diagnoses as of 05/07/25 1425   PIC line (peripherally inserted central catheter) removal           Medical Decision Making  All mentioned lab results, ECGs, and imaging were independently reviewed by myself  - Patient evaluated. Patient presented to the emergency department for removal of his PICC line.  I spoke with Dr. Raya who sent oral antibiotics to the pharmacy and will see him tomorrow in their clinic.  At this time the patient is requesting the line to come out given he left AGAINST MEDICAL ADVICE. He does not want to stay in the hospital or go to a facility to continue treatment at this time.  The line was removed by nursing staff at his request.  Patient was discharged otherwise stable conditions.  - Monitored for any changes in stability or symptomatology. Patient remained stable.   - Counseled regarding labs, imaging, diagnosis, and plan. Patient was agreeable. All questions were answered. The patient was receptive and agreeable to the plan of care.   -The patient was instructed to return to  the emergency department if any symptoms recurred, worsened, or if there were any additional concerns.    *Disclaimer: This note was dictated by speech recognition. Minor errors in transcription may be present. Please call with questions.    Yoandy Davies MD             Your medication list        CONTINUE taking these medications        Instructions Last Dose Given Next Dose Due   lancets 33 gauge misc      Use to check blood sugar 4 times daily              ASK your doctor about these medications        Instructions Last Dose Given Next Dose Due   albuterol 90 mcg/actuation inhaler      Inhale 2 puffs by mouth into the lungs every 6 hours if needed for shortness of breath.       aspirin 81 mg EC tablet           atorvastatin 40 mg tablet  Commonly known as: Lipitor           clopidogrel 75 mg tablet  Commonly known as: Plavix      Take 1 tablet (75 mg) by mouth once daily.       docusate sodium 100 mg capsule  Commonly known as: Colace      Take 1 capsule (100 mg) by mouth 2 times a day as needed (hard stools).       DULoxetine 40 mg DR capsule           fluticasone propion-salmeteroL 100-50 mcg/dose diskus inhaler  Commonly known as: Wixela Inhub      Inhale 1 puff by mouth into the lungs 2 times a day. Rinse mouth with water after use to reduce aftertaste and incidence of candidiasis. Do not swallow.       folic acid 1 mg tablet  Commonly known as: Folvite      Take 1 tablet (1 mg) by mouth once daily.       insulin lispro 100 unit/mL pen  Commonly known as: HumaLOG      Inject 8 Units plus 0 - 10 units per sliding scale under the skin 3 times a day before meals. Take as directed per insulin instructions.       Lantus Solostar U-100 Insulin 100 unit/mL (3 mL) pen  Generic drug: insulin glargine      Inject 25 Units under the skin once every 24 hours.       levothyroxine 50 mcg tablet  Commonly known as: Synthroid, Levoxyl           metFORMIN 500 mg tablet  Commonly known as: Glucophage      Take 1 tablet  (500 mg) by mouth 2 times a day.       METHADONE ORAL           metoprolol succinate XL 25 mg 24 hr tablet  Commonly known as: Toprol-XL      Take 1 tablet (25 mg) by mouth once daily. Do not crush or chew.       omeprazole 40 mg DR capsule  Commonly known as: PriLOSEC           Otezla Starter 10 mg (4)-20 mg (4)-30 mg(19) tablets,dose pack tablet starter pack  Generic drug: apremilast           pantoprazole 40 mg EC tablet  Commonly known as: ProtoNix      Take 1 tablet (40 mg) by mouth once daily in the morning. Take before meals. Do not crush, chew, or split.       predniSONE 1 mg tablet  Commonly known as: Deltasone           pregabalin 200 mg capsule  Commonly known as: Lyrica           Senexon-S 8.6-50 mg tablet  Generic drug: sennosides-docusate sodium      Take 2 tablets by mouth 2 times a day.       Spiriva Respimat 2.5 mcg/actuation inhaler  Generic drug: tiotropium      Inhale 2 puffs by mouth into the lungs once daily.       thiamine 100 mg tablet  Commonly known as: Vitamin B-1      Take 1 tablet (100 mg) by mouth once daily.       True Metrix Glucose Test Strip  Generic drug: blood sugar diagnostic      Use to check blood sugar 4 times daily                Procedure  Procedures     *This report was transcribed using voice recognition software.  Every effort was made to ensure accuracy; however, inadvertent computerized transcription errors may be present.*  Jignesh Davies MD  05/07/25           [1]   Past Medical History:  Diagnosis Date    Abnormal result of other cardiovascular function study 06/28/2018    Abnormal stress test    Atherosclerotic heart disease of native coronary artery with unstable angina pectoris 06/28/2018    Unstable angina pectoris due to coronary arteriosclerosis    Fibromyalgia     Fibromyalgia    Personal history of other diseases of the circulatory system     History of hypertension    Personal history of other diseases of the musculoskeletal system and connective  tissue     History of rheumatoid arthritis    Personal history of other diseases of the musculoskeletal system and connective tissue     History of degenerative disc disease    Personal history of other diseases of the musculoskeletal system and connective tissue     History of osteoporosis    Personal history of other endocrine, nutritional and metabolic disease     History of hyperlipidemia    Personal history of other endocrine, nutritional and metabolic disease     History of hypothyroidism    Type 2 diabetes mellitus without complications 2022    Type 2 diabetes mellitus   [2]   Past Surgical History:  Procedure Laterality Date    BACK SURGERY  2018    Back Surgery    CARDIAC CATHETERIZATION N/A 2024    Procedure: Left Heart Cath;  Surgeon: Yao Calvin MD;  Location: [facility] Card[facility];  Service: Cardiovascular;  Laterality: N/A;    SEPTOPLASTY  2018    Septoplasty   [3]   Family History  Problem Relation Name Age of Onset    Sleep apnea Father      Sleep apnea Sister     [4]   Social History  Tobacco Use    Smoking status: Former     Current packs/day: 0.00     Types: Cigarettes     Quit date: 2024     Years since quittin.8    Smokeless tobacco: Never   Vaping Use    Vaping status: Not on file   Substance Use Topics    Alcohol use: Not Currently    Drug use: Never        Jignesh Davies MD  25 [id number]

## 2025-05-07 NOTE — DISCHARGE INSTRUCTIONS
Antibiotics were sent to your pharmacy.  Please see Dr. Raya at your scheduled appointment at 2 PM tomorrow.  Please return to the emergency department if any symptoms develop or you have any concerns.

## 2025-05-08 ENCOUNTER — OFFICE VISIT (OUTPATIENT)
Dept: WOUND CARE | Facility: CLINIC | Age: 60
End: 2025-05-08
Payer: MEDICARE

## 2025-05-15 ENCOUNTER — OFFICE VISIT (OUTPATIENT)
Dept: WOUND CARE | Facility: CLINIC | Age: 60
End: 2025-05-15
Payer: MEDICARE

## 2025-05-15 PROCEDURE — 99213 OFFICE O/P EST LOW 20 MIN: CPT

## 2025-05-22 ENCOUNTER — OFFICE VISIT (OUTPATIENT)
Dept: WOUND CARE | Facility: CLINIC | Age: 60
End: 2025-05-22
Payer: MEDICARE

## 2025-05-22 ENCOUNTER — APPOINTMENT (OUTPATIENT)
Dept: WOUND CARE | Facility: CLINIC | Age: 60
End: 2025-05-22
Payer: MEDICARE

## 2025-05-22 PROCEDURE — 99213 OFFICE O/P EST LOW 20 MIN: CPT

## 2025-05-29 ENCOUNTER — OFFICE VISIT (OUTPATIENT)
Dept: WOUND CARE | Facility: CLINIC | Age: 60
End: 2025-05-29
Payer: MEDICARE

## 2025-05-29 PROCEDURE — 11045 DBRDMT SUBQ TISS EACH ADDL: CPT

## 2025-05-29 PROCEDURE — 11042 DBRDMT SUBQ TIS 1ST 20SQCM/<: CPT | Mod: 58

## 2025-06-05 ENCOUNTER — APPOINTMENT (OUTPATIENT)
Dept: WOUND CARE | Facility: CLINIC | Age: 60
End: 2025-06-05
Payer: MEDICARE

## 2025-06-12 ENCOUNTER — OFFICE VISIT (OUTPATIENT)
Dept: WOUND CARE | Facility: CLINIC | Age: 60
End: 2025-06-12
Payer: MEDICARE

## 2025-06-12 DIAGNOSIS — B05.9 MEASLES: ICD-10-CM

## 2025-06-12 DIAGNOSIS — Z11.59 MEASLES SCREENING: ICD-10-CM

## 2025-06-12 DIAGNOSIS — L97.523: Primary | ICD-10-CM

## 2025-06-12 PROCEDURE — 11042 DBRDMT SUBQ TIS 1ST 20SQCM/<: CPT

## 2025-06-12 RX ORDER — DOXYCYCLINE 100 MG/1
100 CAPSULE ORAL 2 TIMES DAILY
Qty: 28 CAPSULE | Refills: 0 | Status: SHIPPED | OUTPATIENT
Start: 2025-06-12 | End: 2025-06-12

## 2025-06-12 RX ORDER — DOXYCYCLINE 100 MG/1
100 CAPSULE ORAL 2 TIMES DAILY
Qty: 28 CAPSULE | Refills: 0 | Status: SHIPPED | OUTPATIENT
Start: 2025-06-12 | End: 2025-06-26

## 2025-06-15 LAB
BACTERIA SPEC AEROBE CULT: ABNORMAL
BACTERIA SPEC ANAEROBE CULT: ABNORMAL

## 2025-06-18 LAB
BACTERIA SPEC AEROBE CULT: ABNORMAL
BACTERIA SPEC ANAEROBE CULT: ABNORMAL

## 2025-06-19 ENCOUNTER — APPOINTMENT (OUTPATIENT)
Dept: WOUND CARE | Facility: CLINIC | Age: 60
End: 2025-06-19
Payer: MEDICARE

## 2025-06-26 ENCOUNTER — APPOINTMENT (OUTPATIENT)
Dept: WOUND CARE | Facility: CLINIC | Age: 60
End: 2025-06-26
Payer: MEDICARE

## 2025-07-03 ENCOUNTER — APPOINTMENT (OUTPATIENT)
Dept: WOUND CARE | Facility: CLINIC | Age: 60
End: 2025-07-03
Payer: MEDICARE

## 2025-07-03 ENCOUNTER — OFFICE VISIT (OUTPATIENT)
Dept: WOUND CARE | Facility: CLINIC | Age: 60
End: 2025-07-03
Payer: MEDICARE

## 2025-07-03 PROCEDURE — 11044 DBRDMT BONE 1ST 20 SQ CM/<: CPT

## 2025-07-10 ENCOUNTER — OFFICE VISIT (OUTPATIENT)
Dept: WOUND CARE | Facility: CLINIC | Age: 60
End: 2025-07-10
Payer: MEDICARE

## 2025-07-10 DIAGNOSIS — L97.514 ULCER OF TOE OF RIGHT FOOT, WITH NECROSIS OF BONE (MULTI): Primary | ICD-10-CM

## 2025-07-10 PROCEDURE — 11043 DBRDMT MUSC&/FSCA 1ST 20/<: CPT

## 2025-07-13 LAB
BACTERIA SPEC AEROBE CULT: ABNORMAL
BACTERIA SPEC ANAEROBE CULT: ABNORMAL

## 2025-07-16 LAB
BACTERIA SPEC AEROBE CULT: ABNORMAL
BACTERIA SPEC ANAEROBE CULT: ABNORMAL

## 2025-07-17 ENCOUNTER — APPOINTMENT (OUTPATIENT)
Dept: WOUND CARE | Facility: CLINIC | Age: 60
End: 2025-07-17
Payer: MEDICARE

## 2025-07-24 ENCOUNTER — OFFICE VISIT (OUTPATIENT)
Dept: WOUND CARE | Facility: CLINIC | Age: 60
End: 2025-07-24
Payer: MEDICARE

## 2025-07-24 PROCEDURE — 11044 DBRDMT BONE 1ST 20 SQ CM/<: CPT

## 2025-08-04 ENCOUNTER — OFFICE VISIT (OUTPATIENT)
Dept: WOUND CARE | Facility: CLINIC | Age: 60
End: 2025-08-04
Payer: MEDICARE

## 2025-08-04 PROCEDURE — 0JBQ0ZZ EXCISION OF RIGHT FOOT SUBCUTANEOUS TISSUE AND FASCIA, OPEN APPROACH: ICD-10-PCS | Performed by: STUDENT IN AN ORGANIZED HEALTH CARE EDUCATION/TRAINING PROGRAM

## 2025-08-04 PROCEDURE — 11042 DBRDMT SUBQ TIS 1ST 20SQCM/<: CPT

## 2025-08-06 ENCOUNTER — APPOINTMENT (OUTPATIENT)
Dept: CARDIOLOGY | Facility: HOSPITAL | Age: 60
DRG: 463 | End: 2025-08-06
Payer: MEDICARE

## 2025-08-06 ENCOUNTER — APPOINTMENT (OUTPATIENT)
Dept: RADIOLOGY | Facility: HOSPITAL | Age: 60
DRG: 463 | End: 2025-08-06
Payer: MEDICARE

## 2025-08-06 ENCOUNTER — HOSPITAL ENCOUNTER (INPATIENT)
Facility: HOSPITAL | Age: 60
End: 2025-08-06
Attending: EMERGENCY MEDICINE | Admitting: INTERNAL MEDICINE
Payer: MEDICARE

## 2025-08-06 DIAGNOSIS — L97.519 DIABETIC ULCER OF RIGHT FOOT ASSOCIATED WITH TYPE 2 DIABETES MELLITUS, UNSPECIFIED PART OF FOOT, UNSPECIFIED ULCER STAGE: ICD-10-CM

## 2025-08-06 DIAGNOSIS — I25.85 CHRONIC CORONARY MICROVASCULAR DYSFUNCTION: ICD-10-CM

## 2025-08-06 DIAGNOSIS — Z95.1 S/P CABG X 3: ICD-10-CM

## 2025-08-06 DIAGNOSIS — M86.171 OSTEOMYELITIS OF ANKLE OR FOOT, RIGHT, ACUTE (MULTI): ICD-10-CM

## 2025-08-06 DIAGNOSIS — Z95.1 S/P CABG (CORONARY ARTERY BYPASS GRAFT): ICD-10-CM

## 2025-08-06 DIAGNOSIS — A48.0 GAS GANGRENE OF FOOT (MULTI): ICD-10-CM

## 2025-08-06 DIAGNOSIS — M86.9 OSTEOMYELITIS OF RIGHT FOOT, UNSPECIFIED TYPE (MULTI): Primary | ICD-10-CM

## 2025-08-06 DIAGNOSIS — E11.621 DIABETIC ULCER OF RIGHT FOOT ASSOCIATED WITH TYPE 2 DIABETES MELLITUS, UNSPECIFIED PART OF FOOT, UNSPECIFIED ULCER STAGE: ICD-10-CM

## 2025-08-06 LAB
ALBUMIN SERPL BCP-MCNC: 3 G/DL (ref 3.4–5)
ALP SERPL-CCNC: 182 U/L (ref 33–136)
ALT SERPL W P-5'-P-CCNC: 17 U/L (ref 10–52)
ANION GAP SERPL CALC-SCNC: 14 MMOL/L (ref 10–20)
APPEARANCE UR: CLEAR
AST SERPL W P-5'-P-CCNC: 35 U/L (ref 9–39)
BASOPHILS # BLD AUTO: 0.05 X10*3/UL (ref 0–0.1)
BASOPHILS NFR BLD AUTO: 0.5 %
BILIRUB SERPL-MCNC: 0.6 MG/DL (ref 0–1.2)
BILIRUB UR STRIP.AUTO-MCNC: NEGATIVE MG/DL
BNP SERPL-MCNC: 160 PG/ML (ref 0–99)
BUN SERPL-MCNC: 12 MG/DL (ref 6–23)
CALCIUM SERPL-MCNC: 8.7 MG/DL (ref 8.6–10.3)
CARDIAC TROPONIN I PNL SERPL HS: 4 NG/L (ref 0–20)
CARDIAC TROPONIN I PNL SERPL HS: 5 NG/L (ref 0–20)
CHLORIDE SERPL-SCNC: 95 MMOL/L (ref 98–107)
CO2 SERPL-SCNC: 22 MMOL/L (ref 21–32)
COLOR UR: YELLOW
CREAT SERPL-MCNC: 0.92 MG/DL (ref 0.5–1.3)
CRP SERPL-MCNC: 20.13 MG/DL
EGFRCR SERPLBLD CKD-EPI 2021: >90 ML/MIN/1.73M*2
EOSINOPHIL # BLD AUTO: 0.16 X10*3/UL (ref 0–0.7)
EOSINOPHIL NFR BLD AUTO: 1.5 %
ERYTHROCYTE [DISTWIDTH] IN BLOOD BY AUTOMATED COUNT: 18.1 % (ref 11.5–14.5)
ERYTHROCYTE [SEDIMENTATION RATE] IN BLOOD BY WESTERGREN METHOD: 89 MM/H (ref 0–20)
GLUCOSE BLD MANUAL STRIP-MCNC: 183 MG/DL (ref 74–99)
GLUCOSE SERPL-MCNC: 320 MG/DL (ref 74–99)
GLUCOSE UR STRIP.AUTO-MCNC: ABNORMAL MG/DL
HCT VFR BLD AUTO: 26.1 % (ref 41–52)
HGB BLD-MCNC: 7.9 G/DL (ref 13.5–17.5)
HYALINE CASTS #/AREA URNS AUTO: ABNORMAL /LPF
IMM GRANULOCYTES # BLD AUTO: 0.29 X10*3/UL (ref 0–0.7)
IMM GRANULOCYTES NFR BLD AUTO: 2.6 % (ref 0–0.9)
KETONES UR STRIP.AUTO-MCNC: NEGATIVE MG/DL
LACTATE SERPL-SCNC: 2.3 MMOL/L (ref 0.4–2)
LACTATE SERPL-SCNC: 2.5 MMOL/L (ref 0.4–2)
LACTATE SERPL-SCNC: 3.1 MMOL/L (ref 0.4–2)
LEUKOCYTE ESTERASE UR QL STRIP.AUTO: NEGATIVE
LYMPHOCYTES # BLD AUTO: 0.72 X10*3/UL (ref 1.2–4.8)
LYMPHOCYTES NFR BLD AUTO: 6.6 %
MCH RBC QN AUTO: 21.2 PG (ref 26–34)
MCHC RBC AUTO-ENTMCNC: 30.3 G/DL (ref 32–36)
MCV RBC AUTO: 70 FL (ref 80–100)
MONOCYTES # BLD AUTO: 1.1 X10*3/UL (ref 0.1–1)
MONOCYTES NFR BLD AUTO: 10 %
MUCOUS THREADS #/AREA URNS AUTO: ABNORMAL /LPF
NEUTROPHILS # BLD AUTO: 8.65 X10*3/UL (ref 1.2–7.7)
NEUTROPHILS NFR BLD AUTO: 78.8 %
NITRITE UR QL STRIP.AUTO: NEGATIVE
NRBC BLD-RTO: 0 /100 WBCS (ref 0–0)
PH UR STRIP.AUTO: 6 [PH]
PLATELET # BLD AUTO: 641 X10*3/UL (ref 150–450)
POTASSIUM SERPL-SCNC: 3.8 MMOL/L (ref 3.5–5.3)
PROT SERPL-MCNC: 7.1 G/DL (ref 6.4–8.2)
PROT UR STRIP.AUTO-MCNC: ABNORMAL MG/DL
RBC # BLD AUTO: 3.72 X10*6/UL (ref 4.5–5.9)
RBC # UR STRIP.AUTO: NEGATIVE MG/DL
RBC #/AREA URNS AUTO: ABNORMAL /HPF
SODIUM SERPL-SCNC: 127 MMOL/L (ref 136–145)
SP GR UR STRIP.AUTO: 1.01
SQUAMOUS #/AREA URNS AUTO: ABNORMAL /HPF
UROBILINOGEN UR STRIP.AUTO-MCNC: ABNORMAL MG/DL
WBC # BLD AUTO: 11 X10*3/UL (ref 4.4–11.3)
WBC #/AREA URNS AUTO: ABNORMAL /HPF

## 2025-08-06 PROCEDURE — 85025 COMPLETE CBC W/AUTO DIFF WBC: CPT | Performed by: NURSE PRACTITIONER

## 2025-08-06 PROCEDURE — 2500000002 HC RX 250 W HCPCS SELF ADMINISTERED DRUGS (ALT 637 FOR MEDICARE OP, ALT 636 FOR OP/ED): Performed by: NURSE PRACTITIONER

## 2025-08-06 PROCEDURE — 36415 COLL VENOUS BLD VENIPUNCTURE: CPT | Performed by: NURSE PRACTITIONER

## 2025-08-06 PROCEDURE — 73630 X-RAY EXAM OF FOOT: CPT | Mod: RIGHT SIDE | Performed by: RADIOLOGY

## 2025-08-06 PROCEDURE — 96374 THER/PROPH/DIAG INJ IV PUSH: CPT

## 2025-08-06 PROCEDURE — 86140 C-REACTIVE PROTEIN: CPT | Performed by: NURSE PRACTITIONER

## 2025-08-06 PROCEDURE — 73630 X-RAY EXAM OF FOOT: CPT | Mod: RT

## 2025-08-06 PROCEDURE — 2500000005 HC RX 250 GENERAL PHARMACY W/O HCPCS: Performed by: NURSE PRACTITIONER

## 2025-08-06 PROCEDURE — 96375 TX/PRO/DX INJ NEW DRUG ADDON: CPT

## 2025-08-06 PROCEDURE — 2500000002 HC RX 250 W HCPCS SELF ADMINISTERED DRUGS (ALT 637 FOR MEDICARE OP, ALT 636 FOR OP/ED): Performed by: STUDENT IN AN ORGANIZED HEALTH CARE EDUCATION/TRAINING PROGRAM

## 2025-08-06 PROCEDURE — 96365 THER/PROPH/DIAG IV INF INIT: CPT | Mod: 59

## 2025-08-06 PROCEDURE — 83880 ASSAY OF NATRIURETIC PEPTIDE: CPT | Performed by: NURSE PRACTITIONER

## 2025-08-06 PROCEDURE — 96361 HYDRATE IV INFUSION ADD-ON: CPT

## 2025-08-06 PROCEDURE — 87040 BLOOD CULTURE FOR BACTERIA: CPT | Mod: PORLAB | Performed by: NURSE PRACTITIONER

## 2025-08-06 PROCEDURE — 2500000004 HC RX 250 GENERAL PHARMACY W/ HCPCS (ALT 636 FOR OP/ED): Performed by: STUDENT IN AN ORGANIZED HEALTH CARE EDUCATION/TRAINING PROGRAM

## 2025-08-06 PROCEDURE — 80053 COMPREHEN METABOLIC PANEL: CPT | Performed by: NURSE PRACTITIONER

## 2025-08-06 PROCEDURE — 2060000001 HC INTERMEDIATE ICU ROOM DAILY

## 2025-08-06 PROCEDURE — 93005 ELECTROCARDIOGRAM TRACING: CPT

## 2025-08-06 PROCEDURE — 83605 ASSAY OF LACTIC ACID: CPT | Performed by: NURSE PRACTITIONER

## 2025-08-06 PROCEDURE — 99291 CRITICAL CARE FIRST HOUR: CPT | Mod: 25 | Performed by: EMERGENCY MEDICINE

## 2025-08-06 PROCEDURE — 84484 ASSAY OF TROPONIN QUANT: CPT | Performed by: NURSE PRACTITIONER

## 2025-08-06 PROCEDURE — 85652 RBC SED RATE AUTOMATED: CPT | Performed by: NURSE PRACTITIONER

## 2025-08-06 PROCEDURE — 73701 CT LOWER EXTREMITY W/DYE: CPT | Mod: RT

## 2025-08-06 PROCEDURE — 2550000001 HC RX 255 CONTRASTS: Performed by: EMERGENCY MEDICINE

## 2025-08-06 PROCEDURE — 2500000004 HC RX 250 GENERAL PHARMACY W/ HCPCS (ALT 636 FOR OP/ED): Performed by: NURSE PRACTITIONER

## 2025-08-06 PROCEDURE — 94640 AIRWAY INHALATION TREATMENT: CPT

## 2025-08-06 PROCEDURE — 93971 EXTREMITY STUDY: CPT | Performed by: RADIOLOGY

## 2025-08-06 PROCEDURE — 71045 X-RAY EXAM CHEST 1 VIEW: CPT | Performed by: RADIOLOGY

## 2025-08-06 PROCEDURE — 71045 X-RAY EXAM CHEST 1 VIEW: CPT

## 2025-08-06 PROCEDURE — 93971 EXTREMITY STUDY: CPT

## 2025-08-06 PROCEDURE — 81001 URINALYSIS AUTO W/SCOPE: CPT | Performed by: NURSE PRACTITIONER

## 2025-08-06 PROCEDURE — 2500000001 HC RX 250 WO HCPCS SELF ADMINISTERED DRUGS (ALT 637 FOR MEDICARE OP): Performed by: STUDENT IN AN ORGANIZED HEALTH CARE EDUCATION/TRAINING PROGRAM

## 2025-08-06 PROCEDURE — 82947 ASSAY GLUCOSE BLOOD QUANT: CPT

## 2025-08-06 PROCEDURE — 87077 CULTURE AEROBIC IDENTIFY: CPT | Mod: PORLAB | Performed by: PODIATRIST

## 2025-08-06 PROCEDURE — 99223 1ST HOSP IP/OBS HIGH 75: CPT | Performed by: STUDENT IN AN ORGANIZED HEALTH CARE EDUCATION/TRAINING PROGRAM

## 2025-08-06 RX ORDER — DEXTROSE 50 % IN WATER (D50W) INTRAVENOUS SYRINGE
25
Status: ACTIVE | OUTPATIENT
Start: 2025-08-06

## 2025-08-06 RX ORDER — METOPROLOL SUCCINATE 25 MG/1
25 TABLET, EXTENDED RELEASE ORAL DAILY
Status: DISPENSED | OUTPATIENT
Start: 2025-08-07

## 2025-08-06 RX ORDER — ONDANSETRON HYDROCHLORIDE 2 MG/ML
4 INJECTION, SOLUTION INTRAVENOUS ONCE
Status: COMPLETED | OUTPATIENT
Start: 2025-08-06 | End: 2025-08-06

## 2025-08-06 RX ORDER — PANTOPRAZOLE SODIUM 40 MG/1
40 TABLET, DELAYED RELEASE ORAL
Status: DISPENSED | OUTPATIENT
Start: 2025-08-07

## 2025-08-06 RX ORDER — ASPIRIN 81 MG/1
81 TABLET ORAL DAILY
Status: DISCONTINUED | OUTPATIENT
Start: 2025-08-07 | End: 2025-08-09

## 2025-08-06 RX ORDER — HYDROXYCHLOROQUINE SULFATE 200 MG/1
200 TABLET, FILM COATED ORAL 2 TIMES DAILY
Status: DISPENSED | OUTPATIENT
Start: 2025-08-06

## 2025-08-06 RX ORDER — VANCOMYCIN HYDROCHLORIDE 1 G/20ML
INJECTION, POWDER, LYOPHILIZED, FOR SOLUTION INTRAVENOUS DAILY PRN
Status: DISPENSED | OUTPATIENT
Start: 2025-08-06

## 2025-08-06 RX ORDER — DEXTROSE 50 % IN WATER (D50W) INTRAVENOUS SYRINGE
12.5
Status: DISPENSED | OUTPATIENT
Start: 2025-08-06

## 2025-08-06 RX ORDER — MORPHINE SULFATE 4 MG/ML
4 INJECTION INTRAVENOUS ONCE AS NEEDED
Status: DISCONTINUED | OUTPATIENT
Start: 2025-08-06 | End: 2025-08-06

## 2025-08-06 RX ORDER — GUAIFENESIN 600 MG/1
600 TABLET, EXTENDED RELEASE ORAL EVERY 12 HOURS PRN
Status: ACTIVE | OUTPATIENT
Start: 2025-08-06

## 2025-08-06 RX ORDER — ATORVASTATIN CALCIUM 40 MG/1
40 TABLET, FILM COATED ORAL DAILY
Status: DISPENSED | OUTPATIENT
Start: 2025-08-06

## 2025-08-06 RX ORDER — ONDANSETRON HYDROCHLORIDE 2 MG/ML
4 INJECTION, SOLUTION INTRAVENOUS EVERY 8 HOURS PRN
Status: DISPENSED | OUTPATIENT
Start: 2025-08-06

## 2025-08-06 RX ORDER — TALC
3 POWDER (GRAM) TOPICAL NIGHTLY PRN
Status: ACTIVE | OUTPATIENT
Start: 2025-08-06

## 2025-08-06 RX ORDER — HEPARIN SODIUM 5000 [USP'U]/ML
5000 INJECTION, SOLUTION INTRAVENOUS; SUBCUTANEOUS EVERY 8 HOURS
Status: DISCONTINUED | OUTPATIENT
Start: 2025-08-06 | End: 2025-08-09

## 2025-08-06 RX ORDER — DULOXETIN HYDROCHLORIDE 20 MG/1
40 CAPSULE, DELAYED RELEASE ORAL DAILY
Status: DISPENSED | OUTPATIENT
Start: 2025-08-07

## 2025-08-06 RX ORDER — INSULIN GLARGINE 100 [IU]/ML
40 INJECTION, SOLUTION SUBCUTANEOUS EVERY 24 HOURS
Status: DISPENSED | OUTPATIENT
Start: 2025-08-06

## 2025-08-06 RX ORDER — LEVOTHYROXINE SODIUM 50 UG/1
50 TABLET ORAL DAILY
Status: DISPENSED | OUTPATIENT
Start: 2025-08-07

## 2025-08-06 RX ORDER — HYDROXYCHLOROQUINE SULFATE 200 MG/1
200 TABLET, FILM COATED ORAL 2 TIMES DAILY
Status: ON HOLD | COMMUNITY
Start: 2025-07-28 | End: 2025-10-26

## 2025-08-06 RX ORDER — METHADONE HYDROCHLORIDE 10 MG/ML
78 CONCENTRATE ORAL NIGHTLY
Refills: 0 | Status: DISCONTINUED | OUTPATIENT
Start: 2025-08-06 | End: 2025-08-06

## 2025-08-06 RX ORDER — VANCOMYCIN HYDROCHLORIDE 1 G/200ML
1000 INJECTION, SOLUTION INTRAVENOUS EVERY 12 HOURS
Status: DISCONTINUED | OUTPATIENT
Start: 2025-08-07 | End: 2025-08-06

## 2025-08-06 RX ORDER — MORPHINE SULFATE 4 MG/ML
4 INJECTION INTRAVENOUS ONCE
Status: COMPLETED | OUTPATIENT
Start: 2025-08-06 | End: 2025-08-06

## 2025-08-06 RX ORDER — POLYETHYLENE GLYCOL 3350 17 G/17G
17 POWDER, FOR SOLUTION ORAL DAILY
Status: DISPENSED | OUTPATIENT
Start: 2025-08-06

## 2025-08-06 RX ORDER — BISACODYL 10 MG/1
10 SUPPOSITORY RECTAL DAILY PRN
Status: ACTIVE | OUTPATIENT
Start: 2025-08-06

## 2025-08-06 RX ORDER — CLINDAMYCIN PHOSPHATE 900 MG/50ML
900 INJECTION, SOLUTION INTRAVENOUS EVERY 8 HOURS
Status: DISCONTINUED | OUTPATIENT
Start: 2025-08-06 | End: 2025-08-07

## 2025-08-06 RX ORDER — ONDANSETRON 4 MG/1
4 TABLET, FILM COATED ORAL EVERY 8 HOURS PRN
Status: ACTIVE | OUTPATIENT
Start: 2025-08-06

## 2025-08-06 RX ORDER — ALBUTEROL SULFATE 90 UG/1
2 INHALANT RESPIRATORY (INHALATION) EVERY 6 HOURS PRN
Status: ACTIVE | OUTPATIENT
Start: 2025-08-06

## 2025-08-06 RX ORDER — IPRATROPIUM BROMIDE AND ALBUTEROL SULFATE 2.5; .5 MG/3ML; MG/3ML
3 SOLUTION RESPIRATORY (INHALATION) ONCE
Status: COMPLETED | OUTPATIENT
Start: 2025-08-06 | End: 2025-08-06

## 2025-08-06 RX ORDER — ACETAMINOPHEN 325 MG/1
650 TABLET ORAL EVERY 4 HOURS PRN
Status: ACTIVE | OUTPATIENT
Start: 2025-08-06

## 2025-08-06 RX ORDER — METHADONE HYDROCHLORIDE 10 MG/1
30 TABLET ORAL ONCE
Refills: 0 | Status: COMPLETED | OUTPATIENT
Start: 2025-08-06 | End: 2025-08-07

## 2025-08-06 RX ORDER — INSULIN LISPRO 100 [IU]/ML
0-10 INJECTION, SOLUTION INTRAVENOUS; SUBCUTANEOUS
Status: DISPENSED | OUTPATIENT
Start: 2025-08-06

## 2025-08-06 RX ORDER — VANCOMYCIN HYDROCHLORIDE 1.5 G/300ML
1500 INJECTION, SOLUTION INTRAVITREAL EVERY 24 HOURS
Status: DISCONTINUED | OUTPATIENT
Start: 2025-08-07 | End: 2025-08-09 | Stop reason: DRUGHIGH

## 2025-08-06 RX ORDER — CLINDAMYCIN PHOSPHATE 900 MG/50ML
900 INJECTION, SOLUTION INTRAVENOUS ONCE
Status: DISCONTINUED | OUTPATIENT
Start: 2025-08-06 | End: 2025-08-06

## 2025-08-06 RX ORDER — BISACODYL 5 MG
10 TABLET, DELAYED RELEASE (ENTERIC COATED) ORAL DAILY PRN
Status: ACTIVE | OUTPATIENT
Start: 2025-08-06

## 2025-08-06 RX ORDER — PANTOPRAZOLE SODIUM 40 MG/10ML
40 INJECTION, POWDER, LYOPHILIZED, FOR SOLUTION INTRAVENOUS
Status: DISPENSED | OUTPATIENT
Start: 2025-08-07

## 2025-08-06 RX ADMIN — PIPERACILLIN SODIUM AND TAZOBACTAM SODIUM 4.5 G: 4; .5 INJECTION, SOLUTION INTRAVENOUS at 16:37

## 2025-08-06 RX ADMIN — ATORVASTATIN CALCIUM 40 MG: 40 TABLET, FILM COATED ORAL at 21:41

## 2025-08-06 RX ADMIN — MORPHINE SULFATE 4 MG: 4 INJECTION INTRAVENOUS at 16:37

## 2025-08-06 RX ADMIN — IPRATROPIUM BROMIDE AND ALBUTEROL SULFATE 3 ML: 2.5; .5 SOLUTION RESPIRATORY (INHALATION) at 16:38

## 2025-08-06 RX ADMIN — PREGABALIN 200 MG: 75 CAPSULE ORAL at 21:41

## 2025-08-06 RX ADMIN — CLINDAMYCIN PHOSPHATE 900 MG: 900 INJECTION, SOLUTION INTRAVENOUS at 21:40

## 2025-08-06 RX ADMIN — INSULIN GLARGINE 40 UNITS: 100 INJECTION, SOLUTION SUBCUTANEOUS at 21:32

## 2025-08-06 RX ADMIN — IOHEXOL 75 ML: 350 INJECTION, SOLUTION INTRAVENOUS at 17:37

## 2025-08-06 RX ADMIN — SODIUM CHLORIDE, SODIUM LACTATE, POTASSIUM CHLORIDE, AND CALCIUM CHLORIDE 500 ML: .6; .31; .03; .02 INJECTION, SOLUTION INTRAVENOUS at 21:52

## 2025-08-06 RX ADMIN — SODIUM CHLORIDE 500 ML: 0.9 INJECTION, SOLUTION INTRAVENOUS at 16:43

## 2025-08-06 RX ADMIN — PIPERACILLIN SODIUM AND TAZOBACTAM SODIUM 4.5 G: 4; .5 INJECTION, SOLUTION INTRAVENOUS at 22:37

## 2025-08-06 RX ADMIN — VANCOMYCIN HYDROCHLORIDE 2 G: 10 INJECTION, POWDER, LYOPHILIZED, FOR SOLUTION INTRAVENOUS at 17:12

## 2025-08-06 RX ADMIN — ONDANSETRON 4 MG: 2 INJECTION, SOLUTION INTRAMUSCULAR; INTRAVENOUS at 16:37

## 2025-08-06 RX ADMIN — HYDROXYCHLOROQUINE SULFATE 200 MG: 200 TABLET, FILM COATED ORAL at 21:41

## 2025-08-06 RX ADMIN — HEPARIN SODIUM 5000 UNITS: 5000 INJECTION, SOLUTION INTRAVENOUS; SUBCUTANEOUS at 21:41

## 2025-08-06 SDOH — ECONOMIC STABILITY: FOOD INSECURITY: WITHIN THE PAST 12 MONTHS, THE FOOD YOU BOUGHT JUST DIDN'T LAST AND YOU DIDN'T HAVE MONEY TO GET MORE.: NEVER TRUE

## 2025-08-06 SDOH — SOCIAL STABILITY: SOCIAL INSECURITY: HAS ANYONE EVER THREATENED TO HURT YOUR FAMILY OR YOUR PETS?: NO

## 2025-08-06 SDOH — SOCIAL STABILITY: SOCIAL INSECURITY: ARE THERE ANY APPARENT SIGNS OF INJURIES/BEHAVIORS THAT COULD BE RELATED TO ABUSE/NEGLECT?: NO

## 2025-08-06 SDOH — ECONOMIC STABILITY: INCOME INSECURITY: IN THE PAST 12 MONTHS HAS THE ELECTRIC, GAS, OIL, OR WATER COMPANY THREATENED TO SHUT OFF SERVICES IN YOUR HOME?: NO

## 2025-08-06 SDOH — ECONOMIC STABILITY: FOOD INSECURITY: WITHIN THE PAST 12 MONTHS, YOU WORRIED THAT YOUR FOOD WOULD RUN OUT BEFORE YOU GOT THE MONEY TO BUY MORE.: NEVER TRUE

## 2025-08-06 SDOH — SOCIAL STABILITY: SOCIAL INSECURITY: DOES ANYONE TRY TO KEEP YOU FROM HAVING/CONTACTING OTHER FRIENDS OR DOING THINGS OUTSIDE YOUR HOME?: NO

## 2025-08-06 SDOH — SOCIAL STABILITY: SOCIAL INSECURITY: WITHIN THE LAST YEAR, HAVE YOU BEEN AFRAID OF YOUR PARTNER OR EX-PARTNER?: NO

## 2025-08-06 SDOH — SOCIAL STABILITY: SOCIAL INSECURITY: WITHIN THE LAST YEAR, HAVE YOU BEEN HUMILIATED OR EMOTIONALLY ABUSED IN OTHER WAYS BY YOUR PARTNER OR EX-PARTNER?: NO

## 2025-08-06 SDOH — SOCIAL STABILITY: SOCIAL INSECURITY: HAVE YOU HAD THOUGHTS OF HARMING ANYONE ELSE?: NO

## 2025-08-06 SDOH — SOCIAL STABILITY: SOCIAL INSECURITY: DO YOU FEEL ANYONE HAS EXPLOITED OR TAKEN ADVANTAGE OF YOU FINANCIALLY OR OF YOUR PERSONAL PROPERTY?: NO

## 2025-08-06 SDOH — SOCIAL STABILITY: SOCIAL INSECURITY: DO YOU FEEL UNSAFE GOING BACK TO THE PLACE WHERE YOU ARE LIVING?: NO

## 2025-08-06 SDOH — SOCIAL STABILITY: SOCIAL INSECURITY: WERE YOU ABLE TO COMPLETE ALL THE BEHAVIORAL HEALTH SCREENINGS?: YES

## 2025-08-06 SDOH — SOCIAL STABILITY: SOCIAL INSECURITY: HAVE YOU HAD ANY THOUGHTS OF HARMING ANYONE ELSE?: NO

## 2025-08-06 SDOH — SOCIAL STABILITY: SOCIAL INSECURITY: ARE YOU OR HAVE YOU BEEN THREATENED OR ABUSED PHYSICALLY, EMOTIONALLY, OR SEXUALLY BY ANYONE?: NO

## 2025-08-06 ASSESSMENT — ACTIVITIES OF DAILY LIVING (ADL)
HEARING - RIGHT EAR: FUNCTIONAL
LACK_OF_TRANSPORTATION: NO
WALKS IN HOME: INDEPENDENT
ADEQUATE_TO_COMPLETE_ADL: YES
HEARING - LEFT EAR: FUNCTIONAL
TOILETING: INDEPENDENT
FEEDING YOURSELF: INDEPENDENT
PATIENT'S MEMORY ADEQUATE TO SAFELY COMPLETE DAILY ACTIVITIES?: YES
JUDGMENT_ADEQUATE_SAFELY_COMPLETE_DAILY_ACTIVITIES: YES
DRESSING YOURSELF: INDEPENDENT
BATHING: INDEPENDENT
GROOMING: INDEPENDENT

## 2025-08-06 ASSESSMENT — COGNITIVE AND FUNCTIONAL STATUS - GENERAL
CLIMB 3 TO 5 STEPS WITH RAILING: A LITTLE
WALKING IN HOSPITAL ROOM: A LITTLE
TOILETING: A LITTLE
CLIMB 3 TO 5 STEPS WITH RAILING: A LITTLE
MOBILITY SCORE: 21
WALKING IN HOSPITAL ROOM: A LITTLE
EATING MEALS: A LITTLE
MOBILITY SCORE: 21
STANDING UP FROM CHAIR USING ARMS: A LITTLE
PATIENT BASELINE BEDBOUND: NO
MOBILITY SCORE: 21
STANDING UP FROM CHAIR USING ARMS: A LITTLE
PATIENT BASELINE BEDBOUND: NO
CLIMB 3 TO 5 STEPS WITH RAILING: A LITTLE
STANDING UP FROM CHAIR USING ARMS: A LITTLE
PERSONAL GROOMING: A LITTLE
PERSONAL GROOMING: A LITTLE
DAILY ACTIVITIY SCORE: 22
WALKING IN HOSPITAL ROOM: A LITTLE
PERSONAL GROOMING: A LITTLE
DAILY ACTIVITIY SCORE: 23

## 2025-08-06 ASSESSMENT — PAIN - FUNCTIONAL ASSESSMENT
PAIN_FUNCTIONAL_ASSESSMENT: 0-10

## 2025-08-06 ASSESSMENT — ENCOUNTER SYMPTOMS
LIGHT-HEADEDNESS: 0
PHOTOPHOBIA: 0
VOMITING: 0
BLOOD IN STOOL: 0
WOUND: 1
DYSPHORIC MOOD: 1
BACK PAIN: 1
HEMATURIA: 0
SLEEP DISTURBANCE: 0
SHORTNESS OF BREATH: 0
WEAKNESS: 1
APPETITE CHANGE: 1
CHILLS: 0
TREMORS: 0
POLYDIPSIA: 0
PALPITATIONS: 0
COLOR CHANGE: 1
CONFUSION: 0
ABDOMINAL PAIN: 0
FATIGUE: 1
FEVER: 0
COUGH: 1
NAUSEA: 0
DYSURIA: 0
DIZZINESS: 0

## 2025-08-06 ASSESSMENT — LIFESTYLE VARIABLES
AUDIT-C TOTAL SCORE: 0
HOW OFTEN DO YOU HAVE A DRINK CONTAINING ALCOHOL: NEVER
HOW OFTEN DO YOU HAVE 6 OR MORE DRINKS ON ONE OCCASION: NEVER
AUDIT-C TOTAL SCORE: 0
SKIP TO QUESTIONS 9-10: 1
HOW MANY STANDARD DRINKS CONTAINING ALCOHOL DO YOU HAVE ON A TYPICAL DAY: PATIENT DOES NOT DRINK

## 2025-08-06 ASSESSMENT — PAIN DESCRIPTION - LOCATION
LOCATION: FOOT
LOCATION: FOOT

## 2025-08-06 ASSESSMENT — PAIN SCALES - GENERAL
PAINLEVEL_OUTOF10: 6

## 2025-08-06 ASSESSMENT — PAIN DESCRIPTION - ORIENTATION
ORIENTATION: RIGHT
ORIENTATION: RIGHT

## 2025-08-06 ASSESSMENT — PAIN DESCRIPTION - PAIN TYPE: TYPE: ACUTE PAIN;CHRONIC PAIN

## 2025-08-06 ASSESSMENT — PAIN DESCRIPTION - DESCRIPTORS: DESCRIPTORS: ACHING

## 2025-08-06 NOTE — ED PROVIDER NOTES
"    HPI   Chief Complaint   Patient presents with    Foot Injury     Pt sent over from Drs office for IV antibiotics for patients right foot infection       Patient presents the emergency department for evaluation of known diabetic foot wound of the right foot.  He has had a partial amputation done by Dr. William Raya and follows with him at the wound care clinic.  He last went at the end of July.  He is unsure what antibiotics he has been on last or if he is even currently on antibiotics.  He takes multiple daily medications.  He denies being on any anticoagulants.  He has noticed increased drainage and pain from his foot causing him to come in for evaluation today.  Additionally he has had some chest congestion for the last week.  Occasionally he will get some productive sputum that is clear in color.  He has not been short of breath at all nor has he had any chest pain, syncope, measured fever, nausea, vomiting, abdominal pain, black or bloody stools, UTI symptoms.  He has noticed increased leg swelling on the right with increased redness along with malodorous drainage.  He takes Tylenol for pain which is no longer helping and causes him to miss wound care appointments and be less active.  His providers  have been trying to get him to come to the emergency department for admission and he is, \"not ready.\"      History provided by:  Patient   used: No      INFORMED PHOTO CONSENT:    The patient has given verbal consent to have photos taken of right foot and inserted into their provider note as a part of their permanent medical record for purposes of documentation, treatment management, and/or medical review. All images taken were transmitted and stored on a secure Epic  Site located within a Media Folder Tab by a registered Epic-[employer] Mobile Application Device. See \"Media\" tab in Epic or photo as below.                      Karen Coma Scale Score: 15                  Patient History " "  Medical History[1]  Surgical History[2]  Family History[3]  Social History[4]    Physical Exam   Visit Vitals  /66   Pulse 88   Temp 36.9 °C (98.5 °F) (Temporal)   Resp 20   Ht 1.702 m (5' 7\")   Wt 81.6 kg (180 lb)   SpO2 100%   BMI 28.19 kg/m²   Smoking Status Former   BSA 1.96 m²      Physical Exam  Vitals and nursing note reviewed.   Constitutional:       General: He is not in acute distress.     Appearance: He is ill-appearing.      Comments: Patient appears uncomfortable with facial grimace.   HENT:      Head: Normocephalic and atraumatic.      Mouth/Throat:      Lips: Pink.      Mouth: Mucous membranes are moist.      Pharynx: Oropharynx is clear.     Cardiovascular:      Rate and Rhythm: Normal rate and regular rhythm.      Pulses:           Radial pulses are 2+ on the right side.      Comments: The right leg is asymmetric compared to the left with the lower leg being more swollen than the right.  There is some posterior calf tenderness without an obvious cord.  No tenderness to the left posterior leg.  Pulmonary:      Effort: Pulmonary effort is normal. No accessory muscle usage or respiratory distress.      Breath sounds: Decreased breath sounds and wheezing present.      Comments: No conversational dyspnea.  Abdominal:      General: Bowel sounds are normal.      Palpations: Abdomen is soft. There is no pulsatile mass.      Tenderness: There is no abdominal tenderness.     Musculoskeletal:         General: No swelling.      Cervical back: Normal range of motion and neck supple.      Comments: WATERS randomly.  There is bony tenderness to the ankle and foot on the right with wound as depicted below.  Neurovascularly intact.     Skin:     General: Skin is warm and dry.      Capillary Refill: Capillary refill takes less than 2 seconds.      Coloration: Skin is pale. Skin is not cyanotic.      Findings: Wound (right foot as depicted) present.     Neurological:      General: No focal deficit present.      " Mental Status: He is alert and oriented to person, place, and time.      Sensory: Sensation is intact.      Motor: Motor function is intact.      Coordination: Coordination is intact.     Psychiatric:         Mood and Affect: Mood normal.         Behavior: Behavior is cooperative.               Vascular US Lower Extremity Venous Duplex Right         XR chest 1 view   Final Result   No acute cardiopulmonary process.        MACRO:   None        Signed by: Pia Khan 8/6/2025 4:16 PM   Dictation workstation:   [id number]      XR foot right 3+ views   Final Result   1. Status post transmetatarsal amputation.   2. Soft tissue swelling with an ulcer along the plantar aspect of the   right or fluid. There is air in the soft tissues, likely gas-forming   infection.   3. Bony destruction of the bases of the 4th and 5th metatarsals and   cuboid consistent with osteomyelitis. Areas also a fracture of the   base of the 5th metatarsal.                  MACRO:   None        Signed by: Pia Khan 8/6/2025 4:14 PM   Dictation workstation:   [id number]      CT foot right w IV contrast    (Results Pending)       Labs Reviewed   CBC WITH AUTO DIFFERENTIAL - Abnormal       Result Value    WBC 11.0      nRBC 0.0      RBC 3.72 (*)     Hemoglobin 7.9 (*)     Hematocrit 26.1 (*)     MCV 70 (*)     MCH 21.2 (*)     MCHC 30.3 (*)     RDW 18.1 (*)     Platelets 641 (*)     Neutrophils % 78.8      Immature Granulocytes %, Automated 2.6 (*)     Lymphocytes % 6.6      Monocytes % 10.0      Eosinophils % 1.5      Basophils % 0.5      Neutrophils Absolute 8.65 (*)     Immature Granulocytes Absolute, Automated 0.29      Lymphocytes Absolute 0.72 (*)     Monocytes Absolute 1.10 (*)     Eosinophils Absolute 0.16      Basophils Absolute 0.05     COMPREHENSIVE METABOLIC PANEL - Abnormal    Glucose 320 (*)     Sodium 127 (*)     Potassium 3.8      Chloride 95 (*)     Bicarbonate 22      Anion Gap 14      Urea Nitrogen 12      Creatinine 0.92       eGFR >90      Calcium 8.7      Albumin 3.0 (*)     Alkaline Phosphatase 182 (*)     Total Protein 7.1      AST 35      Bilirubin, Total 0.6      ALT 17     LACTATE - Abnormal    Lactate 3.1 (*)     Narrative:     Venipuncture immediately after or during the administration of Metamizole may lead to falsely low results. Testing should be performed immediately prior to Metamizole dosing.   B-TYPE NATRIURETIC PEPTIDE - Abnormal     (*)     Narrative:        <100 pg/mL - Heart failure unlikely  100-299 pg/mL - Intermediate probability of acute heart                  failure exacerbation. Correlate with clinical                  context and patient history.    >=300 pg/mL - Heart Failure likely. Correlate with clinical                  context and patient history.    BNP testing is performed using different testing methodology at [facility] than at other [facility]. Direct result comparisons should only be made within the same method.      C-REACTIVE PROTEIN - Abnormal    C-Reactive Protein 20.13 (*)    SEDIMENTATION RATE, AUTOMATED - Abnormal    Sedimentation Rate 89 (*)    SERIAL TROPONIN-INITIAL - Normal    Troponin I, High Sensitivity 4      Narrative:     Less than 99th percentile of normal range cutoff-  Female and children under 18 years old <14 ng/L; Male <21 ng/L: Negative  Repeat testing should be performed if clinically indicated.     Female and children under 18 years old 14-50 ng/L; Male 21-50 ng/L:  Consistent with possible cardiac damage and possible increased clinical   risk. Serial measurements may help to assess extent of myocardial damage.     >50 ng/L: Consistent with cardiac damage, increased clinical risk and  myocardial infarction. Serial measurements may help assess extent of   myocardial damage.      NOTE: Children less than 1 year old may have higher baseline troponin   levels and results should be interpreted in conjunction with the overall   clinical context.      NOTE: Troponin I testing is performed using a different   testing methodology at [facility] than at other   [facility]. Direct result comparisons should only   be made within the same method.   BLOOD CULTURE   BLOOD CULTURE   URINALYSIS WITH REFLEX CULTURE AND MICROSCOPIC    Narrative:     The following orders were created for panel order Urinalysis with Reflex Culture and Microscopic.  Procedure                               Abnormality         Status                     ---------                               -----------         ------                     Urinalysis with Reflex C...[[id number]]                      In process                 Extra Urine Gray Tube[[id number]]                            In process                   Please view results for these tests on the individual orders.   TROPONIN SERIES- (INITIAL, 1 HR)    Narrative:     The following orders were created for panel order Troponin I Series, High Sensitivity (0, 1 HR).  Procedure                               Abnormality         Status                     ---------                               -----------         ------                     Troponin I, High Sensiti...[[id number]]  Normal              Final result               Troponin, High Sensitivi...[[id number]]                      In process                   Please view results for these tests on the individual orders.   URINALYSIS WITH REFLEX CULTURE AND MICROSCOPIC   EXTRA URINE GRAY TUBE   SERIAL TROPONIN, 1 HOUR   LACTATE       No results found for this or any previous visit (from the past 4464 hours).    ED Course & MDM     Medical Decision Making  Patient presents the emergency department independently for his diabetic foot wound with osteomyelitis for admission to the hospital and treatment by his podiatrist.  He does have chest congestion in the setting of decreased ejection fraction with his last at 39% in 2024.  Plan is for admission and initial sepsis order  set with cardiac workup to evaluate for CHF, pneumonia and NSTEMI, ultrasound to rule out DVT in which case if he is positive, would require CTA of the chest to rule out PE and will provide empiric antibiotics of Zosyn and vancomycin.  He will only be given 500 L of IV fluid given his EF.  He will be given morphine and Zofran for symptom management and DuoNeb breathing treatment.  Patient follows with Dr. Raya who will be notified as well and plan for admission to [facility] as per patient's consent.         ED Course as of 08/06/25 1800   Wed Aug 06, 2025   1544 July 2024 ECHO:  CONCLUSIONS:   1. Basal and mid inferior wall, basal and mid inferolateral wall, basal inferoseptal segment, and basal and mid anterolateral wall are abnormal.   2. Poorly visualized anatomical structures due to suboptimal image quality.   3. Left ventricular ejection fraction is moderately decreased, calculated by Valera's biplane at 39%.   4. Spectral Doppler shows an abnormal pattern of left ventricular diastolic filling.   5. No left ventricular thrombus visualized.   6. There is moderate regional LV systolic dysfunction with inferior and inferolateral wall motion abnormalitiesy.   7. There is mildly reduced right ventricular systolic function.   8. RVSP within normal limits.   9. Mild aortic valve regurgitation.  10. Compared with the prior exam from 7/1/2024 ( [facility]) the prior exam had LVEF 40-45% with akinetic inferior and inferolateal walls. There was trace to mild MR at that time. There has been interval CABG. Overall there are no significant changes. Wall motion abnormalities appear similar.    Limited IVF due to EF [NA]   1636 HEMOGLOBIN(!): 7.9  8.2 three months ago [NA]   1637 IMPRESSION:  No acute cardiopulmonary process.   [NA]   1637 IMPRESSION:  1. Status post transmetatarsal amputation.  2. Soft tissue swelling with an ulcer along the plantar aspect of the right or fluid. There is air in the soft tissues,  likely gas-forming infection.  3. Bony destruction of the bases of the 4th and 5th metatarsals and cuboid consistent with osteomyelitis. Areas also a fracture of the  base of the 5th metatarsal.   [NA]   1637 WBC: 11.0 [NA]   1638 Per secure chat with podiatry, Dr. William Raya.  He would like a CT with contrast of the right foot which can help make a quick decision as there is likely an abscess in there.  He would like consultation to orthopedics as well for podiatry recommending BKA for osteomyelitis.  He will be coming up to see the patient in the morning. Text out to Dr. Santillan on call for orthopedics.  [NA]   1723 Dr. Santillan aware of consultation and is aware of the findings of gas on the foot x-ray. [NA]   1724 Sed Rate(!): 89 [NA]   1724 Lactate(!): 3.1 [NA]   1724 C-Reactive Protein(!): 20.13 [NA]   1732 Patient's lung sounds improved after DuoNeb breathing treatment.  Given his lactate and infection, will provide additional 500 cc of IV fluid.  Normal capillary refill with strong radial pulse.  [facility] paged for admission. [NA]   1759 Spoke to Tanisha CRAIN with [facility] who accepts the patient to stepdown under Dr. Bradley.   recommends clindamycin as well to empiric antibiotic therapy which was ordered and patient aware of admission and continues to accept. [NA]      ED Course User Index  [NA] Tanisha Sutherland, APRN-CNP         Diagnoses as of 08/06/25 1800   Diabetic ulcer of right foot associated with type 2 diabetes mellitus, unspecified part of foot, unspecified ulcer stage   Osteomyelitis of right foot, unspecified type (Multi)   Gas gangrene of foot (Multi)          Your medication list        CONTINUE taking these medications        Instructions Last Dose Given Next Dose Due   lancets 33 gauge misc      Use to check blood sugar 4 times daily              ASK your doctor about these medications        Instructions Last Dose Given Next Dose Due   albuterol 90 mcg/actuation inhaler      Inhale 2 puffs by mouth into  the lungs every 6 hours if needed for shortness of breath.       aspirin 81 mg EC tablet           atorvastatin 40 mg tablet  Commonly known as: Lipitor           DULoxetine 40 mg DR capsule           fluticasone propion-salmeteroL 100-50 mcg/dose diskus inhaler  Commonly known as: Wixela Inhub      Inhale 1 puff by mouth into the lungs 2 times a day. Rinse mouth with water after use to reduce aftertaste and incidence of candidiasis. Do not swallow.       hydroxychloroquine 200 mg tablet  Commonly known as: Plaquenil           insulin lispro 100 unit/mL pen  Commonly known as: HumaLOG      Inject 8 Units plus 0 - 10 units per sliding scale under the skin 3 times a day before meals. Take as directed per insulin instructions.       Lantus Solostar U-100 Insulin 100 unit/mL (3 mL) pen  Generic drug: insulin glargine      Inject 25 Units under the skin once every 24 hours.       levothyroxine 50 mcg tablet  Commonly known as: Synthroid, Levoxyl           metFORMIN 500 mg tablet  Commonly known as: Glucophage      Take 1 tablet (500 mg) by mouth 2 times a day.       METHADONE ORAL           metoprolol succinate XL 25 mg 24 hr tablet  Commonly known as: Toprol-XL      Take 1 tablet (25 mg) by mouth once daily. Do not crush or chew.       omeprazole 40 mg DR capsule  Commonly known as: PriLOSEC           Otezla Starter 10 mg (4)-20 mg (4)-30 mg(19) tablets,dose pack tablet starter pack  Generic drug: apremilast           pantoprazole 40 mg EC tablet  Commonly known as: ProtoNix      Take 1 tablet (40 mg) by mouth once daily in the morning. Take before meals. Do not crush, chew, or split.       predniSONE 1 mg tablet  Commonly known as: Deltasone           pregabalin 200 mg capsule  Commonly known as: Lyrica           Spiriva Respimat 2.5 mcg/actuation inhaler  Generic drug: tiotropium      Inhale 2 puffs by mouth into the lungs once daily.       True Metrix Glucose Test Strip  Generic drug: blood sugar diagnostic      Use  to check blood sugar 4 times daily                Procedure  None     *This report was transcribed using voice recognition software.  Every effort was made to ensure accuracy; however, inadvertent computerized transcription errors may be present.*  NETTIE Sandoval  08/06/25         CHACE Sandoval-AHRRY  08/06/25 1800         [1]   Past Medical History:  Diagnosis Date    Abnormal result of other cardiovascular function study 06/28/2018    Abnormal stress test    Atherosclerotic heart disease of native coronary artery with unstable angina pectoris 06/28/2018    Unstable angina pectoris due to coronary arteriosclerosis    Fibromyalgia     Fibromyalgia    Personal history of other diseases of the circulatory system     History of hypertension    Personal history of other diseases of the musculoskeletal system and connective tissue     History of rheumatoid arthritis    Personal history of other diseases of the musculoskeletal system and connective tissue     History of degenerative disc disease    Personal history of other diseases of the musculoskeletal system and connective tissue     History of osteoporosis    Personal history of other endocrine, nutritional and metabolic disease     History of hyperlipidemia    Personal history of other endocrine, nutritional and metabolic disease     History of hypothyroidism    Type 2 diabetes mellitus without complications 02/25/2022    Type 2 diabetes mellitus   [2]   Past Surgical History:  Procedure Laterality Date    BACK SURGERY  06/28/2018    Back Surgery    CARDIAC CATHETERIZATION N/A 7/1/2024    Procedure: Left Heart Cath;  Surgeon: Yao Calvin MD;  Location: [facility] Cardiac Cath Lab;  Service: Cardiovascular;  Laterality: N/A;    SEPTOPLASTY  06/28/2018    Septoplasty   [3]   Family History  Problem Relation Name Age of Onset    Sleep apnea Father      Sleep apnea Sister     [4]   Social History  Tobacco Use    Smoking status: Former     Current packs/day:  0.00     Types: Cigarettes     Quit date: 2024     Years since quittin.0    Smokeless tobacco: Never   Vaping Use    Vaping status: Not on file   Substance Use Topics    Alcohol use: Not Currently    Drug use: Never        Tanisha Sutherland, CHACE-HARRY  25 1808

## 2025-08-06 NOTE — PROGRESS NOTES
Vancomycin Dosing by Pharmacy- INITIAL    Kael Zepeda is a 60 y.o. year old male who Pharmacy has been consulted for vancomycin dosing for cellulitis, skin and soft tissue. Based on the patient's indication and renal status this patient will be dosed based on a goal AUC of 500-600.     Renal function is currently stable.    Visit Vitals  BP 98/53   Pulse 85   Temp 36.9 °C (98.5 °F) (Temporal)   Resp 18        Lab Results   Component Value Date    CREATININE 0.92 2025    CREATININE 1.06 2025    CREATININE 0.87 2025    CREATININE 0.83 2025        Patient weight is as follows:   Vitals:    25 1502   Weight: 81.6 kg (180 lb)       Cultures:  No results found for the encounter in last 14 days.        No intake/output data recorded.  I/O during current shift:  No intake/output data recorded.    Temp (24hrs), Av.9 °C (98.5 °F), Min:36.9 °C (98.5 °F), Max:36.9 °C (98.5 °F)         Assessment/Plan     Patient has already been given a loading dose of 2000 mg.  Will initiate vancomycin maintenance, 1500 mg every 24 hours.    This dosing regimen is predicted by [employer] to result in the following pharmacokinetic parameters:  Regimen: 1500 mg IV every 24 hours.  Start time: 17:00 on 2025  Exposure target: AUC24 (range) 400-600 mg/L.hr   HQV73-49: 507 mg/L.hr  AUC24,ss: 509 mg/L.hr  Probability of AUC24 > 400: 74 %  Ctrough,ss: 13.7 mg/L  Probability of Ctrough,ss > 20: 24 %      Follow-up level will be ordered on  at 0500 unless clinically indicated sooner.  Will continue to monitor renal function daily while on vancomycin and order serum creatinine at least every 48 hours if not already ordered.  Follow for continued vancomycin needs, clinical response, and signs/symptoms of toxicity.       Uday Rodriguez [facility]

## 2025-08-06 NOTE — H&P
"[facility] - GENERAL MEDICINE HISTORY AND PHYSICAL    HISTORY OF PRESENT ILLNESS     History Obtained From (Primary Source): Patient  Collateral History (Secondary Sources):  D/w ED    History Of Present Illness (HPI):  Kael Zepeda is a 60 y.o. male with PMHx s/f HTN, HLD, CAD s/p CABG, transient post-op Afib after CABG (no AC), HFrEF (EF 39% 07/24), COPD (no baseline O2), h/o IVDU (on long-term methadone), h/o Hep C (s/p treatment, in remission), RA (on methotrexate), hypothyroidism, IDDM-II c/b peripheral neuropathy and DM foot wounds (s/p transmetatarsal amputation of the right foot 04/30/25), presenting with advanced R foot wound. Patient had TMA of the R foot in setting of nonhealing wounds and OM 04/30/25. He was supposed to continue outpatient IV antibiotics on discharge and go to rehab at that time; however, he elected to leave the hospital AMA (he reports he was afraid to miss weekly check-in for his methadone clinic). Since then, he has only intermittently followed up with the wound care center. He was seen earlier this week at wound care and described feeling weak, having poor appetite, and having increasing drainage from the wound which is malodorous. At that time there was significant concern he was septic and he was told to go to the ED for evaluation. The patient subsequently waited multiple days, coming in ~72 hours later, because he was \"finally ready.\" He has continued to feel ill the entire time. He has poor intake and malaise. Has also experienced swelling of the RLE > LLE. He is a bit of a difficult historian, reporting he takes \"a lot\" of medications but cannot name them or their doses. He has a difficult time telling me a time frame on his symptoms overall. Denies cp/pressure, palpitations, diaphoresis, SOB, CALDERON, dizziness / lightheadedness, syncope or near syncope, HA, vision changes, f/c/v/d/abd pain.     ED Course:   Vitals on presentation: T 36.9 °C (98.5 °F)  HR " 79  /85  RR 17  O2 98 % None (Room air)  Labs: CBC with WBC 11.0, Hgb 7.9, Plts 641. CMP with glucose 320, Na 127, K 3.8, BUN 12, sCr 0.92, alk phos 182, ALT 17, AST 35, bilirubin 0.6. . Trop 4-5. Lactate 3.1 -> 2.3 -> pending. CRP 20.13, ESR 89. UA negative for infection. Blood cultures x2 and wound culture collected, in process.   EKG: Sinus rhythm with nonspecific IVCD and likely old inferior infarct which have been seen on prior EKGs, no overt acute ischemic changes when compared to prior   Imaging - agree with radiology interpretation(s):   CXR - Neg for acute process, chronic mild congestion vs chronic interstitial changes  DVT US RLE - Neg for DVT  XR R FOOT - S/p R TMA, Soft tissue swelling with an ulcer along the plantar aspect of the right or fluid. There is air in the soft tissues, likely gas-forming infection. Bony destruction of the bases of the 4th and 5th metatarsals and cuboid consistent with osteomyelitis. Areas also a fracture of the base of the 5th metatarsal.   CT FOOT (R) W IV CONTRAST - Necrotizing soft tissue infection of the amputation stump with acute osteomyelitis involving the 4th and 5th metatarsal bases, cuboid and likely 3rd metatarsal base. No rim enhancing fluid collection.   Interventions:   MEDS - 500cc NS bolus, Vanc/Zosyn/Clindamycin  CONSULTS - Podiatry (Dr. Raya), Orthopedics (Dr. Santillan)    12-point ROS reviewed and found to be negative aside from aforementioned positives in HPI and/or noted in dedicated ROS section below.     Decision made to admit the patient to the hospitalist service after evaluation of the patient, review of the above, and discussion with ED provider.     LABS AND IMAGING / OBJECTIVE     I have personally reviewed the following labs from 08/06/25: CBC, CMP, Troponin, BNP, UA, Lactate, ESR, CRP, and PT/INR  I have personally reviewed the following imaging studies from 08/06/25: CXR, XR Foot (R), DVT US (R) , and XR Foot (R), with my  personal interpretations as documented in ED course above.   I have personally reviewed any obtained EKGs on 08/06/25, with my interpretation as listed above in the ED summary course.   I have personally reviewed the patient's vitals on presentation to the ED and any/all changes through to time of admission (on 08/06/25).     ED Course (From ED Provider):  ED Course as of 08/06/25 2047   Wed Aug 06, 2025   1544 July 2024 ECHO:  CONCLUSIONS:   1. Basal and mid inferior wall, basal and mid inferolateral wall, basal inferoseptal segment, and basal and mid anterolateral wall are abnormal.   2. Poorly visualized anatomical structures due to suboptimal image quality.   3. Left ventricular ejection fraction is moderately decreased, calculated by Valera's biplane at 39%.   4. Spectral Doppler shows an abnormal pattern of left ventricular diastolic filling.   5. No left ventricular thrombus visualized.   6. There is moderate regional LV systolic dysfunction with inferior and inferolateral wall motion abnormalitiesy.   7. There is mildly reduced right ventricular systolic function.   8. RVSP within normal limits.   9. Mild aortic valve regurgitation.  10. Compared with the prior exam from 7/1/2024 ( [facility]) the prior exam had LVEF 40-45% with akinetic inferior and inferolateal walls. There was trace to mild MR at that time. There has been interval CABG. Overall there are no significant changes. Wall motion abnormalities appear similar.    Limited IVF due to EF [NA]   1636 HEMOGLOBIN(!): 7.9  8.2 three months ago [NA]   1637 IMPRESSION:  No acute cardiopulmonary process.   [NA]   1637 IMPRESSION:  1. Status post transmetatarsal amputation.  2. Soft tissue swelling with an ulcer along the plantar aspect of the right or fluid. There is air in the soft tissues, likely gas-forming infection.  3. Bony destruction of the bases of the 4th and 5th metatarsals and cuboid consistent with osteomyelitis. Areas also a  fracture of the  base of the 5th metatarsal.   [NA]   1637 WBC: 11.0 [NA]   1638 Per secure chat with podiatry, Dr. William Raya.  He would like a CT with contrast of the right foot which can help make a quick decision as there is likely an abscess in there.  He would like consultation to orthopedics as well for podiatry recommending BKA for osteomyelitis.  He will be coming up to see the patient in the morning. Text out to Dr. Santillan on call for orthopedics.  [NA]   1723 Dr. Santillan aware of consultation and is aware of the findings of gas on the foot x-ray. [NA]   1724 Sed Rate(!): 89 [NA]   1724 Lactate(!): 3.1 [NA]   1724 C-Reactive Protein(!): 20.13 [NA]   1732 Patient's lung sounds improved after DuoNeb breathing treatment.  Given his lactate and infection, will provide additional 500 cc of IV fluid.  Normal capillary refill with strong radial pulse.  [facility] paged for admission. [NA]   1759 Spoke to Tanisha CRAIN with [facility] who accepts the patient to stepdown under Dr. Bradley.   recommends clindamycin as well to empiric antibiotic therapy which was ordered and patient aware of admission and continues to accept. [NA]   1958 Critical findings regarding CT foot was communicated by NP and myself to podiatry Dr. Raya via epic chat. He will see the patient. [WJ]   1959 The patient seen and examined with the nurse practitioner/physician assistant. I personally saw the patient and made/approved the management plan and take responsibility for the patient management.    60-year-old male presenting to the emergency department with diabetic foot wound of the right foot.  He has been dealing with this for the past 1 year with podiatry Dr. Raya and wound care.  Patient foot wound does show dorsal foot swelling, ulcerations to multiple areas of the foot.  He is a diabetic.  The patient vital signs did not suggest signs of sepsis in terms of fever, tachycardia.  Patient did not have a white count but does have elevated inflammatory  markers with a CRP of 20, ESR of 89, hyperglycemia of 320.  Case discussed with [facility] for admission after patient was covered broadly with antibiotics.  CT imaging was ordered which showed localized necrotizing infection with osteomyelitis.  This was relayed to podiatry    [facility] score of 9    Jed Hernandez DO  Emergency Medicine [[facility]]      ED Course User Index  [NA] Tanisha Sutherland, APRN-CNP  [W] Omar Hernandez DO         Diagnoses as of 08/06/25 2047   Diabetic ulcer of right foot associated with type 2 diabetes mellitus, unspecified part of foot, unspecified ulcer stage   Osteomyelitis of right foot, unspecified type (Multi)   Gas gangrene of foot (Multi)     Relevant Results  Results for orders placed or performed during the hospital encounter of 08/06/25 (from the past 24 hours)   CBC and Auto Differential   Result Value Ref Range    WBC 11.0 4.4 - 11.3 x10*3/uL    nRBC 0.0 0.0 - 0.0 /100 WBCs    RBC 3.72 (L) 4.50 - 5.90 x10*6/uL    Hemoglobin 7.9 (L) 13.5 - 17.5 g/dL    Hematocrit 26.1 (L) 41.0 - 52.0 %    MCV 70 (L) 80 - 100 fL    MCH 21.2 (L) 26.0 - 34.0 pg    MCHC 30.3 (L) 32.0 - 36.0 g/dL    RDW 18.1 (H) 11.5 - 14.5 %    Platelets 641 (H) 150 - 450 x10*3/uL    Neutrophils % 78.8 40.0 - 80.0 %    Immature Granulocytes %, Automated 2.6 (H) 0.0 - 0.9 %    Lymphocytes % 6.6 13.0 - 44.0 %    Monocytes % 10.0 2.0 - 10.0 %    Eosinophils % 1.5 0.0 - 6.0 %    Basophils % 0.5 0.0 - 2.0 %    Neutrophils Absolute 8.65 (H) 1.20 - 7.70 x10*3/uL    Immature Granulocytes Absolute, Automated 0.29 0.00 - 0.70 x10*3/uL    Lymphocytes Absolute 0.72 (L) 1.20 - 4.80 x10*3/uL    Monocytes Absolute 1.10 (H) 0.10 - 1.00 x10*3/uL    Eosinophils Absolute 0.16 0.00 - 0.70 x10*3/uL    Basophils Absolute 0.05 0.00 - 0.10 x10*3/uL   Comprehensive Metabolic Panel   Result Value Ref Range    Glucose 320 (H) 74 - 99 mg/dL    Sodium 127 (L) 136 - 145 mmol/L    Potassium 3.8 3.5 - 5.3 mmol/L    Chloride 95 (L) 98 - 107 mmol/L    Bicarbonate 22  21 - 32 mmol/L    Anion Gap 14 10 - 20 mmol/L    Urea Nitrogen 12 6 - 23 mg/dL    Creatinine 0.92 0.50 - 1.30 mg/dL    eGFR >90 >60 mL/min/1.73m*2    Calcium 8.7 8.6 - 10.3 mg/dL    Albumin 3.0 (L) 3.4 - 5.0 g/dL    Alkaline Phosphatase 182 (H) 33 - 136 U/L    Total Protein 7.1 6.4 - 8.2 g/dL    AST 35 9 - 39 U/L    Bilirubin, Total 0.6 0.0 - 1.2 mg/dL    ALT 17 10 - 52 U/L   Lactate   Result Value Ref Range    Lactate 3.1 (H) 0.4 - 2.0 mmol/L   B-Type Natriuretic Peptide   Result Value Ref Range     (H) 0 - 99 pg/mL   C-Reactive Protein   Result Value Ref Range    C-Reactive Protein 20.13 (H) <1.00 mg/dL   Sedimentation Rate   Result Value Ref Range    Sedimentation Rate 89 (H) 0 - 20 mm/h   Troponin I, High Sensitivity, Initial   Result Value Ref Range    Troponin I, High Sensitivity 4 0 - 20 ng/L   Urinalysis with Reflex Culture and Microscopic   Result Value Ref Range    Color, Urine Yellow Light-Yellow, Yellow, Dark-Yellow    Appearance, Urine Clear Clear    Specific Gravity, Urine 1.014 1.005 - 1.035    pH, Urine 6.0 5.0, 5.5, 6.0, 6.5, 7.0, 7.5, 8.0    Protein, Urine 20 (TRACE) NEGATIVE, 10 (TRACE), 20 (TRACE) mg/dL    Glucose, Urine 1000 (4+) (A) Normal mg/dL    Blood, Urine NEGATIVE NEGATIVE mg/dL    Ketones, Urine NEGATIVE NEGATIVE mg/dL    Bilirubin, Urine NEGATIVE NEGATIVE mg/dL    Urobilinogen, Urine 4 (2+) (A) Normal mg/dL    Nitrite, Urine NEGATIVE NEGATIVE    Leukocyte Esterase, Urine NEGATIVE NEGATIVE   Urinalysis Microscopic   Result Value Ref Range    WBC, Urine 1-5 1-5, NONE /HPF    RBC, Urine 1-2 NONE, 1-2, 3-5 /HPF    Squamous Epithelial Cells, Urine 1-9 (SPARSE) Reference range not established. /HPF    Mucus, Urine FEW Reference range not established. /LPF    Hyaline Casts, Urine 1+ (A) NONE /LPF      Imaging  Vascular US Lower Extremity Venous Duplex Right  Result Date: 8/6/2025  Negative study.  No deep venous thrombosis of the  right lower extremity.   MACRO: None   Signed by: David  Earle 8/6/2025 5:59 PM Dictation workstation:   [id number]    XR chest 1 view  Result Date: 8/6/2025  No acute cardiopulmonary process.   MACRO: None   Signed by: Pia Khan 8/6/2025 4:16 PM Dictation workstation:   [id number]    XR foot right 3+ views  Result Date: 8/6/2025  1. Status post transmetatarsal amputation. 2. Soft tissue swelling with an ulcer along the plantar aspect of the right or fluid. There is air in the soft tissues, likely gas-forming infection. 3. Bony destruction of the bases of the 4th and 5th metatarsals and cuboid consistent with osteomyelitis. Areas also a fracture of the base of the 5th metatarsal.       MACRO: None   Signed by: Pia Khan 8/6/2025 4:14 PM Dictation workstation:   [id number]      Cardiology, Vascular, and Other Imaging  No other imaging results found for the past 2 days       PAST HISTORIES AND ALLERGIES     Past Medical History  He has a past medical history of Abnormal result of other cardiovascular function study (06/28/2018), Atherosclerotic heart disease of native coronary artery with unstable angina pectoris (06/28/2018), Fibromyalgia, Personal history of other diseases of the circulatory system, Personal history of other diseases of the musculoskeletal system and connective tissue, Personal history of other diseases of the musculoskeletal system and connective tissue, Personal history of other diseases of the musculoskeletal system and connective tissue, Personal history of other endocrine, nutritional and metabolic disease, Personal history of other endocrine, nutritional and metabolic disease, and Type 2 diabetes mellitus without complications (02/25/2022).    Surgical History  He has a past surgical history that includes Septoplasty (06/28/2018); Back surgery (06/28/2018); and Cardiac catheterization (N/A, 7/1/2024).     Social History  He reports that he quit smoking about 13 months ago. His smoking use included cigarettes. He has never used  smokeless tobacco. He reports that he does not currently use alcohol. He reports that he does not use drugs.    Family History  Family History[1]    Allergies  Patient has no known allergies.    MEDICATIONS     Scheduled Medications:  Scheduled Medications[2]  Continuous Medications:  Continuous Medications[3]  PRN Medications:  PRN Medications[4]     REVIEW OF SYSTEMS     Review of Systems   Constitutional:  Positive for appetite change and fatigue. Negative for chills and fever.   HENT:  Positive for congestion.    Eyes:  Negative for photophobia and visual disturbance.   Respiratory:  Positive for cough (Chronic intermittently productive cough (since he started smoking)). Negative for shortness of breath.    Cardiovascular:  Positive for leg swelling (RLE > LLE). Negative for chest pain and palpitations.   Gastrointestinal:  Negative for abdominal pain, blood in stool, nausea and vomiting.   Endocrine: Negative for polydipsia and polyuria.   Genitourinary:  Negative for decreased urine volume, dysuria, hematuria and urgency.   Musculoskeletal:  Positive for back pain (chronic) and gait problem.   Skin:  Positive for color change and wound.   Neurological:  Positive for weakness. Negative for dizziness, tremors, syncope and light-headedness.   Psychiatric/Behavioral:  Positive for dysphoric mood. Negative for confusion and sleep disturbance.    All other systems reviewed and are negative.      PHYSICAL EXAM     Last Recorded Vitals  /66   Pulse 88   Temp 36.9 °C (98.5 °F) (Temporal)   Resp 20   Wt 81.6 kg (180 lb)   SpO2 100%      Physical Exam:  Vital signs and nursing notes reviewed.   Constitutional: Cooperative, a bit flat with affect. Laying in bed in no acute cardiopulmonary distress. Appears ill.  Skin: Warm; the patient is pale; R foot -> there is an open wound to area of the first metatarsal with some surrounding skin changes, there is an area under the 4th and 5th metatarsals which has  "purulent drainage; there is erythema and edema of the foot with asymmetric size when compared to the L. Please see images below for greater detail.   Eyes: EOMI. Anicteric sclera.   Ear, Nose, Throat: Mucous membranes moist; no obvious injury or deformity appreciated.   Head and Neck: Normocephalic, atraumatic. ROM preserved. Trachea midline. No appreciable JVD.   Respiratory: Nonlabored on RA. Lungs predominantly clear to auscultation bilaterally, but diminished. Chest rise is equal.  Cardiovascular: RRR. No gross murmur, gallop, or rub.   Chest Wall: No chest wall tenderness.   Gastrointestinal: Abdomen soft, nontender, nondistended. Bowel sounds are present.  Genitourinary: No CVA tenderness.   Musculoskeletal: S/p R TMA with wounds and foot described above, pictured below. Otherwise, no gross abnormalities appreciated. No limitations to AROM/PROM appreciated.   Extremities: RLE > LLE edema. No cyanosis or clubbing evident.   Neurologic: A&Ox3. CN 2-12 grossly intact. Able to respond to questions appropriately and clearly. No acute focal neurologic deficits appreciated.  Psychiatric: Flat affect    INFORMED PHOTO CONSENT:  The patient and/or family member has given verbal consent to have photos taken of R foot/leg and inserted into their provider note as a part of their permanent medical record for purposes of documentation, treatment management, and/or medical review. All images taken were transmitted and stored on a secure Epic  Site located within a Media Folder Tab by a registered Epic-[employer] Mobile Application Device. See \"Media\" tab in Epic or photo as below. Initial images obtained by NETTIE Dubois, but copied with permission from patient into the chart.        ASSESSMENT AND PLAN   Assessment/Plan     60 y.o. male with PMHx s/f HTN, HLD, CAD s/p CABG, transient post-op Afib after CABG (no AC), HFrEF (EF 39% 07/24), COPD (no baseline O2), h/o IVDU (on long-term methadone), h/o Hep C (s/p " treatment, in remission), RA (on methotrexate), hypothyroidism, IDDM-II c/b peripheral neuropathy and DM foot wounds (s/p transmetatarsal amputation of the right foot 04/30/25), presenting with advanced R foot wound.    Sepsis without shock 2/2 R Foot Advanced OM  Elevated inflammatory markers (ESR, CRP)   -Clinically patient meets sepsis criteria and is being treated appropriately as outlined below  -Inflammatory markers: CRP 20.13, ESR 89   -Imaging results reviewed (pertinent): XR R Foot, CT R Foot, and DVT US (R)  -Lactate 3.1 -> 2.3 -> repeat pending  -BP is low-normal. Patient has had poor intake/appetite. He appears perhaps a bit volume down on exam. He rec'd 500cc bolus in ED, will order another. Have to be cautious with HF history.   -Blood cultures x2 and wound culture collected, in process.   -Follow fever curve, WBCs  -Continue antibiotic coverage with vanc/zosyn/clindamycin for now (based on CT results; however, reviewed Dr. Raya's note and patient's exam, not clinically c/w nec fasc)  -High risk of morbidity from additional drug therapy requiring intensive monitoring for toxicity: Vancomycin IV   -(+) monitor for sxs of toxicity and/or rxn related to vancomycin; appreciate pharmacy assistance with management  -Continue home methadone dosing -> patient unable to confirm dosing. I spoke with pharmacy, his methadone clinic is closed for the evening. I have reviewed the patient's lost hospitalization and resumed the evening dose of 78mg from then, as the patient does not believe he has had any significant changes recently in that dose. Pharmacy will call the clinic in the morning and work on confirming morning dose and confirming no change to the evening doses.   -Podiatry + Orthopedics consultation appreciated   -ID consultation appreciated   -Wound care consultation appreciated        HTN, HLD, CAD s/p CABG   -BP: low normal on presentation, home therapies (Metoprolol Succinate 25mg AM) will be  continued. Close monitoring and adjust as needed.   -HLD: will be continued on home therapies (Lipitor 40mg).  -CAD: no current reports of chest pain or anginal equivalents. Continue 81mg ASA.   -Continued outpatient follow-up with cardiology and PCP as scheduled     Chronic HFrEF (EF 39% 07/2024)   -Patient appears euvolemic if not slightly hypovolemic on exam and is overall compensated from this standpoint  -Last limited echo from 07/24 at OSH reviewed -> overall seemingly similar akinetic segments and WMAs compared to the previous available at [facility]. Patient does not have any overt symptoms otherwise.    -Will be continued on home medications (ASA, Toprol)  -Continue outpatient follow-up with cardiology/HF as scheduled     COPD without acute exacerbation   -Patient without evidence of decompensation at this time   -Continued on home therapies: Spiriva, prn albuterol   -Encourage pulmonary hygiene while admitted.      IDDM-II c/b peripheral neuropathy, with current hyperglycemia   -Continue home basal insulin at current dosage (Lantus 40 units nightly -> from last admit until confirmed)  -Continue with SSI   -Continue with accucheks, hypoglycemic protocol   -Optimize glycemic control  -Monitor and adjust as needed   -Last available A1c = 8.2   -Continue home Lyrica    Mild hyponatremia   -Na 127 -> corrects to 132 for glucose of 320    Chronic anemia   -On review of available labs (CBC, H&H), patient with baseline Hgb 7s-9s   -On admission, patient with Hgb of 7.9 which is at baseline   -Continue to monitor while admitted     H/o IVDU  -Patient is on long term methadone for management   -Patient unable to confirm dosing. I spoke with pharmacy, his methadone clinic is closed for the evening. I have reviewed the patient's lost hospitalization and resumed the evening dose of 78mg from then, as the patient does not believe he has had any significant changes recently in that dose. Pharmacy will call the clinic in the  morning and work on confirming morning dose and confirming no change to the evening doses.     RA  -Continue home methotrexate     Hypothyroidism   -Continue home levothyroxine     Anxiety, depression  Fibromyalgia   -Patient will be continued on home therapies (Cymbalta)   -Continue home Lyrica    H/o Hep C  -In remission, s/p treatment    Diet: Cardiac, carb controlled and NPO @ MN  DVT Prophylaxis: SCDs, SQH  Code Status: Full Code   Case Discussed With: ED provider; ED RN, Pharmacy, and Patient  Additional Sources Reviewed: ED note day of admission; Additional notes reviewed from the inpatient and/or outpatient setting includes the following - Internal Medicine/Hospitalist Service, Anesthesiology, Pharmacy, Rheumatology, Infectious Disease, Prior ED Visits, Wound Care, and Podiatry    Anticipated Length of Stay (LOS): Patient will require two-plus midnight stay for further evaluation and management of the above.      Tanisha Giles PA-C  Internal Medicine / Hospitalist Service  [facility]    [employer] PTY dictation software was used to dictate this note and thus there may be minor errors in translation/transcription including garbled speech or misspellings. Please contact for clarification if needed.       [1]   Family History  Problem Relation Name Age of Onset    Sleep apnea Father      Sleep apnea Sister     [2] [START ON 8/7/2025] aspirin, 81 mg, oral, Daily  atorvastatin, 40 mg, oral, Daily  clindamycin, 900 mg, intravenous, Once  clindamycin, 900 mg, intravenous, q8h  [START ON 8/7/2025] DULoxetine, 40 mg, oral, Daily  heparin (porcine), 5,000 Units, subcutaneous, q8h  hydroxychloroquine, 200 mg, oral, BID  insulin glargine, 40 Units, subcutaneous, q24h  insulin lispro, 0-10 Units, subcutaneous, TID  [START ON 8/7/2025] levothyroxine, 50 mcg, oral, Daily  [START ON 8/7/2025] metoprolol succinate XL, 25 mg, oral, Daily  [START ON 8/7/2025] pantoprazole, 40 mg, oral, Daily  before breakfast   Or  [START ON 8/7/2025] pantoprazole, 40 mg, intravenous, Daily before breakfast  piperacillin-tazobactam, 4.5 g, intravenous, q6h  polyethylene glycol, 17 g, oral, Daily  pregabalin, 200 mg, oral, TID  sodium chloride, 500 mL, intravenous, Once  sodium chloride, 500 mL, intravenous, Once  [START ON 8/7/2025] tiotropium, 2 puff, inhalation, Daily  vancomycin, 2 g, intravenous, Once    [3]    [4] PRN medications: acetaminophen, albuterol, bisacodyl, bisacodyl, dextrose, dextrose, glucagon, glucagon, guaiFENesin, melatonin, morphine, ondansetron **OR** ondansetron

## 2025-08-06 NOTE — NURSING NOTE
1850:  Patient admitted to floor, introduced myself to the patient. Educated them on room, bed, call light, and medical equipment. Telemetry applied. Vital signs obtained. Patient assessed at this time and all orders reviewed

## 2025-08-07 ENCOUNTER — PREP FOR PROCEDURE (OUTPATIENT)
Dept: ORTHOPEDIC SURGERY | Facility: HOSPITAL | Age: 60
End: 2025-08-07
Payer: MEDICARE

## 2025-08-07 DIAGNOSIS — M86.171 OSTEOMYELITIS OF ANKLE OR FOOT, RIGHT, ACUTE (MULTI): ICD-10-CM

## 2025-08-07 PROBLEM — A41.9 SEPSIS WITH ACUTE ORGAN DYSFUNCTION (MULTI): Status: ACTIVE | Noted: 2025-08-07

## 2025-08-07 PROBLEM — D64.9 CHRONIC ANEMIA: Status: ACTIVE | Noted: 2025-08-07

## 2025-08-07 PROBLEM — I50.22 CHRONIC SYSTOLIC HEART FAILURE: Status: ACTIVE | Noted: 2025-08-07

## 2025-08-07 PROBLEM — E87.1 HYPONATREMIA: Status: ACTIVE | Noted: 2025-08-07

## 2025-08-07 PROBLEM — R65.20 SEPSIS WITH ACUTE ORGAN DYSFUNCTION (MULTI): Status: ACTIVE | Noted: 2025-08-07

## 2025-08-07 PROBLEM — Z79.4 TYPE 2 DIABETES MELLITUS, WITH LONG-TERM CURRENT USE OF INSULIN: Status: ACTIVE | Noted: 2023-10-13

## 2025-08-07 LAB
ABO GROUP (TYPE) IN BLOOD: NORMAL
ABO GROUP (TYPE) IN BLOOD: NORMAL
ALBUMIN SERPL BCP-MCNC: 2.9 G/DL (ref 3.4–5)
ALP SERPL-CCNC: 257 U/L (ref 33–136)
ALT SERPL W P-5'-P-CCNC: 16 U/L (ref 10–52)
ANION GAP SERPL CALC-SCNC: 12 MMOL/L (ref 10–20)
ANTIBODY SCREEN: NORMAL
AST SERPL W P-5'-P-CCNC: 36 U/L (ref 9–39)
BASOPHILS # BLD AUTO: 0.08 X10*3/UL (ref 0–0.1)
BASOPHILS NFR BLD AUTO: 0.8 %
BILIRUB SERPL-MCNC: 0.7 MG/DL (ref 0–1.2)
BUN SERPL-MCNC: 10 MG/DL (ref 6–23)
CALCIUM SERPL-MCNC: 8.3 MG/DL (ref 8.6–10.3)
CHLORIDE SERPL-SCNC: 98 MMOL/L (ref 98–107)
CO2 SERPL-SCNC: 25 MMOL/L (ref 21–32)
CREAT SERPL-MCNC: 0.93 MG/DL (ref 0.5–1.3)
EGFRCR SERPLBLD CKD-EPI 2021: >90 ML/MIN/1.73M*2
EOSINOPHIL # BLD AUTO: 0.23 X10*3/UL (ref 0–0.7)
EOSINOPHIL NFR BLD AUTO: 2.3 %
ERYTHROCYTE [DISTWIDTH] IN BLOOD BY AUTOMATED COUNT: 18 % (ref 11.5–14.5)
GLUCOSE BLD MANUAL STRIP-MCNC: 159 MG/DL (ref 74–99)
GLUCOSE BLD MANUAL STRIP-MCNC: 185 MG/DL (ref 74–99)
GLUCOSE BLD MANUAL STRIP-MCNC: 72 MG/DL (ref 74–99)
GLUCOSE BLD MANUAL STRIP-MCNC: 74 MG/DL (ref 74–99)
GLUCOSE SERPL-MCNC: 90 MG/DL (ref 74–99)
HCT VFR BLD AUTO: 22.5 % (ref 41–52)
HGB BLD-MCNC: 7 G/DL (ref 13.5–17.5)
HOLD SPECIMEN: NORMAL
IMM GRANULOCYTES # BLD AUTO: 0.19 X10*3/UL (ref 0–0.7)
IMM GRANULOCYTES NFR BLD AUTO: 1.9 % (ref 0–0.9)
LACTATE SERPL-SCNC: 1.4 MMOL/L (ref 0.4–2)
LYMPHOCYTES # BLD AUTO: 1.08 X10*3/UL (ref 1.2–4.8)
LYMPHOCYTES NFR BLD AUTO: 11 %
MAGNESIUM SERPL-MCNC: 1.62 MG/DL (ref 1.6–2.4)
MCH RBC QN AUTO: 21.5 PG (ref 26–34)
MCHC RBC AUTO-ENTMCNC: 31.1 G/DL (ref 32–36)
MCV RBC AUTO: 69 FL (ref 80–100)
MONOCYTES # BLD AUTO: 1.47 X10*3/UL (ref 0.1–1)
MONOCYTES NFR BLD AUTO: 15 %
NEUTROPHILS # BLD AUTO: 6.75 X10*3/UL (ref 1.2–7.7)
NEUTROPHILS NFR BLD AUTO: 69 %
NRBC BLD-RTO: 0 /100 WBCS (ref 0–0)
PLATELET # BLD AUTO: 633 X10*3/UL (ref 150–450)
POTASSIUM SERPL-SCNC: 4 MMOL/L (ref 3.5–5.3)
PROT SERPL-MCNC: 6.6 G/DL (ref 6.4–8.2)
RBC # BLD AUTO: 3.25 X10*6/UL (ref 4.5–5.9)
RH FACTOR (ANTIGEN D): NORMAL
RH FACTOR (ANTIGEN D): NORMAL
SODIUM SERPL-SCNC: 131 MMOL/L (ref 136–145)
WBC # BLD AUTO: 9.8 X10*3/UL (ref 4.4–11.3)

## 2025-08-07 PROCEDURE — S0109 METHADONE ORAL 5MG: HCPCS | Performed by: STUDENT IN AN ORGANIZED HEALTH CARE EDUCATION/TRAINING PROGRAM

## 2025-08-07 PROCEDURE — 85025 COMPLETE CBC W/AUTO DIFF WBC: CPT | Performed by: STUDENT IN AN ORGANIZED HEALTH CARE EDUCATION/TRAINING PROGRAM

## 2025-08-07 PROCEDURE — 2500000002 HC RX 250 W HCPCS SELF ADMINISTERED DRUGS (ALT 637 FOR MEDICARE OP, ALT 636 FOR OP/ED): Performed by: STUDENT IN AN ORGANIZED HEALTH CARE EDUCATION/TRAINING PROGRAM

## 2025-08-07 PROCEDURE — 2500000004 HC RX 250 GENERAL PHARMACY W/ HCPCS (ALT 636 FOR OP/ED): Performed by: STUDENT IN AN ORGANIZED HEALTH CARE EDUCATION/TRAINING PROGRAM

## 2025-08-07 PROCEDURE — 36415 COLL VENOUS BLD VENIPUNCTURE: CPT | Performed by: STUDENT IN AN ORGANIZED HEALTH CARE EDUCATION/TRAINING PROGRAM

## 2025-08-07 PROCEDURE — 83605 ASSAY OF LACTIC ACID: CPT | Performed by: INTERNAL MEDICINE

## 2025-08-07 PROCEDURE — S0109 METHADONE ORAL 5MG: HCPCS | Performed by: INTERNAL MEDICINE

## 2025-08-07 PROCEDURE — 86923 COMPATIBILITY TEST ELECTRIC: CPT

## 2025-08-07 PROCEDURE — P9016 RBC LEUKOCYTES REDUCED: HCPCS

## 2025-08-07 PROCEDURE — 83735 ASSAY OF MAGNESIUM: CPT | Performed by: STUDENT IN AN ORGANIZED HEALTH CARE EDUCATION/TRAINING PROGRAM

## 2025-08-07 PROCEDURE — 99222 1ST HOSP IP/OBS MODERATE 55: CPT

## 2025-08-07 PROCEDURE — 2500000004 HC RX 250 GENERAL PHARMACY W/ HCPCS (ALT 636 FOR OP/ED): Performed by: PHARMACIST

## 2025-08-07 PROCEDURE — 99233 SBSQ HOSP IP/OBS HIGH 50: CPT | Performed by: INTERNAL MEDICINE

## 2025-08-07 PROCEDURE — 36430 TRANSFUSION BLD/BLD COMPNT: CPT

## 2025-08-07 PROCEDURE — 2500000001 HC RX 250 WO HCPCS SELF ADMINISTERED DRUGS (ALT 637 FOR MEDICARE OP): Performed by: STUDENT IN AN ORGANIZED HEALTH CARE EDUCATION/TRAINING PROGRAM

## 2025-08-07 PROCEDURE — 2060000001 HC INTERMEDIATE ICU ROOM DAILY

## 2025-08-07 PROCEDURE — 80053 COMPREHEN METABOLIC PANEL: CPT | Performed by: STUDENT IN AN ORGANIZED HEALTH CARE EDUCATION/TRAINING PROGRAM

## 2025-08-07 PROCEDURE — 86901 BLOOD TYPING SEROLOGIC RH(D): CPT | Performed by: INTERNAL MEDICINE

## 2025-08-07 PROCEDURE — 36415 COLL VENOUS BLD VENIPUNCTURE: CPT | Performed by: INTERNAL MEDICINE

## 2025-08-07 PROCEDURE — 82947 ASSAY GLUCOSE BLOOD QUANT: CPT

## 2025-08-07 PROCEDURE — 2500000002 HC RX 250 W HCPCS SELF ADMINISTERED DRUGS (ALT 637 FOR MEDICARE OP, ALT 636 FOR OP/ED): Performed by: INTERNAL MEDICINE

## 2025-08-07 RX ORDER — METHADONE HYDROCHLORIDE 10 MG/ML
78 CONCENTRATE ORAL NIGHTLY
Refills: 0 | Status: DISCONTINUED | OUTPATIENT
Start: 2025-08-07 | End: 2025-08-08 | Stop reason: SDUPTHER

## 2025-08-07 RX ORDER — CEFTRIAXONE 2 G/50ML
2 INJECTION, SOLUTION INTRAVENOUS EVERY 24 HOURS
Status: DISCONTINUED | OUTPATIENT
Start: 2025-08-07 | End: 2025-08-10

## 2025-08-07 RX ORDER — OXYCODONE HYDROCHLORIDE 5 MG/1
5 TABLET ORAL EVERY 4 HOURS PRN
Refills: 0 | Status: DISPENSED | OUTPATIENT
Start: 2025-08-07

## 2025-08-07 RX ORDER — OXYCODONE HYDROCHLORIDE 10 MG/1
10 TABLET ORAL EVERY 4 HOURS PRN
Refills: 0 | Status: DISPENSED | OUTPATIENT
Start: 2025-08-07

## 2025-08-07 RX ORDER — METHADONE HYDROCHLORIDE 10 MG/ML
87 CONCENTRATE ORAL DAILY
Refills: 0 | Status: DISCONTINUED | OUTPATIENT
Start: 2025-08-07 | End: 2025-08-08 | Stop reason: SDUPTHER

## 2025-08-07 RX ADMIN — ASPIRIN 81 MG: 81 TABLET, DELAYED RELEASE ORAL at 09:03

## 2025-08-07 RX ADMIN — VANCOMYCIN HYDROCHLORIDE 1.5 G: 1.5 INJECTION, SOLUTION INTRAVITREAL at 16:56

## 2025-08-07 RX ADMIN — INSULIN LISPRO 2 UNITS: 100 INJECTION, SOLUTION INTRAVENOUS; SUBCUTANEOUS at 16:54

## 2025-08-07 RX ADMIN — METHADONE HYDROCHLORIDE 78 MG: 10 CONCENTRATE ORAL at 20:03

## 2025-08-07 RX ADMIN — INSULIN GLARGINE 40 UNITS: 100 INJECTION, SOLUTION SUBCUTANEOUS at 20:00

## 2025-08-07 RX ADMIN — PREGABALIN 200 MG: 75 CAPSULE ORAL at 15:07

## 2025-08-07 RX ADMIN — METHADONE HYDROCHLORIDE 87 MG: 10 CONCENTRATE ORAL at 11:28

## 2025-08-07 RX ADMIN — DULOXETINE 40 MG: 20 CAPSULE, DELAYED RELEASE ORAL at 09:02

## 2025-08-07 RX ADMIN — HYDROXYCHLOROQUINE SULFATE 200 MG: 200 TABLET, FILM COATED ORAL at 20:01

## 2025-08-07 RX ADMIN — HYDROXYCHLOROQUINE SULFATE 200 MG: 200 TABLET, FILM COATED ORAL at 09:03

## 2025-08-07 RX ADMIN — PREGABALIN 200 MG: 75 CAPSULE ORAL at 20:01

## 2025-08-07 RX ADMIN — ATORVASTATIN CALCIUM 40 MG: 40 TABLET, FILM COATED ORAL at 20:01

## 2025-08-07 RX ADMIN — PIPERACILLIN SODIUM AND TAZOBACTAM SODIUM 4.5 G: 4; .5 INJECTION, SOLUTION INTRAVENOUS at 04:20

## 2025-08-07 RX ADMIN — METHADONE HYDROCHLORIDE 30 MG: 10 TABLET ORAL at 00:02

## 2025-08-07 RX ADMIN — LEVOTHYROXINE SODIUM 50 MCG: 0.05 TABLET ORAL at 05:17

## 2025-08-07 RX ADMIN — PREGABALIN 200 MG: 75 CAPSULE ORAL at 09:05

## 2025-08-07 RX ADMIN — CEFTRIAXONE 2 G: 2 INJECTION, SOLUTION INTRAVENOUS at 11:29

## 2025-08-07 RX ADMIN — CLINDAMYCIN PHOSPHATE 900 MG: 900 INJECTION, SOLUTION INTRAVENOUS at 04:20

## 2025-08-07 RX ADMIN — HEPARIN SODIUM 5000 UNITS: 5000 INJECTION, SOLUTION INTRAVENOUS; SUBCUTANEOUS at 05:17

## 2025-08-07 RX ADMIN — PANTOPRAZOLE SODIUM 40 MG: 40 TABLET, DELAYED RELEASE ORAL at 09:03

## 2025-08-07 RX ADMIN — TIOTROPIUM BROMIDE INHALATION SPRAY 2 PUFF: 3.12 SPRAY, METERED RESPIRATORY (INHALATION) at 09:06

## 2025-08-07 ASSESSMENT — ENCOUNTER SYMPTOMS
DYSURIA: 0
HALLUCINATIONS: 0
FATIGUE: 1
JOINT SWELLING: 0
WEAKNESS: 1
SORE THROAT: 0
CONSTIPATION: 0
FEVER: 0
SHORTNESS OF BREATH: 0
BACK PAIN: 1
NAUSEA: 0
ABDOMINAL PAIN: 0
AGITATION: 0
NECK PAIN: 0
LIGHT-HEADEDNESS: 0
CHOKING: 0
DYSPHORIC MOOD: 1
COLOR CHANGE: 1
CONFUSION: 0
CHEST TIGHTNESS: 0
FACIAL SWELLING: 0
ACTIVITY CHANGE: 1
WHEEZING: 0
COUGH: 1
NERVOUS/ANXIOUS: 0
DIARRHEA: 0
DIAPHORESIS: 0
PALPITATIONS: 0
CHILLS: 0
VOMITING: 0
WOUND: 1
HEMATURIA: 0

## 2025-08-07 ASSESSMENT — PAIN DESCRIPTION - DESCRIPTORS: DESCRIPTORS: ACHING

## 2025-08-07 ASSESSMENT — PAIN SCALES - GENERAL
PAINLEVEL_OUTOF10: 5 - MODERATE PAIN
PAINLEVEL_OUTOF10: 0 - NO PAIN

## 2025-08-07 ASSESSMENT — PAIN - FUNCTIONAL ASSESSMENT
PAIN_FUNCTIONAL_ASSESSMENT: 0-10
PAIN_FUNCTIONAL_ASSESSMENT: 0-10

## 2025-08-07 NOTE — PROGRESS NOTES
Physical Therapy                 Therapy Communication Note    Patient Name: Kael Zepeda  MRN: [id number]  Department: [facility] 2 W  Room: 2007/2007-A  Today's Date: 8/7/2025     Discipline: Physical Therapy    Missed Visit: PT Missed Visit: Yes     Missed Visit Reason: Patient placed on medical hold (Pt plan for R BKA per podiatry. Will HOLD OT evals until pt post OP.)

## 2025-08-07 NOTE — CONSULTS
Wound Care Consult     Visit Date: 8/7/2025      Patient Name: Kael Zepeda         MRN: [id number]             Wound consult was placed for right foot diabetic wounds. Patient seen by Dr. Raya yesterday with recommendation for BKA. Patient seen by Ortho today with plan for surgery tomorrow. Simple dressing care orders reviewed with Dr. Keys.    Nursing updated, wound care to be completed by nursing per orders and re-consult wound RN if needed as wound care is not following.     Please contact me with questions or changes in patient condition.   YOLI GallagherN RN  Wound/Ostomy Care  [phone]

## 2025-08-07 NOTE — CONSULTS
Infectious Disease Inpatient Consult    Inpatient consult to Infectious Diseases  Consult performed by: Chance Fong MD  Consult ordered by: Tanisha Giles PA-C          Primary MD: Radha Greer MD    Reason For Consult  OM, gas on XR      Assessment/Plan:    #Right foot osteomyelitis  Podiatry recommending amputation to which patient agrees.  Clindamycin discontinued as less likely to be nec Fac.  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  Cultures positive for gram-positive cocci.  Will switch Zosyn to ceftriaxone and continue vancomycin    HTN, HLD  CAD s/p CABG  HFrEF  COPD  hep C SVR  Hypothyroidism  IDDM    Recommendations:    -Monitor blood cultures, tissue cultures  -Continue vancomycin.  Patient has always grown MRSA  -Switch Zosyn to Rocephin  -DC clindamycin to minimize risk of C. Difficile  -Thank you for consult.  Will continue to follow    Chance Fong MD  Date of service: 8/7/2025  Time of service: 8:18 AM      History Of Present Illness  Kael Zepeda is a 60 y.o. male with PMH of HTN, HLD, CAD s/p CABG, HFrEF (EF 39% 07/24), COPD (no baseline O2), h/o IVDU (on long-term methadone), h/o Hep C (s/p treatment, in remission), RA (on methotrexate), hypothyroidism, IDDM-II c/b peripheral neuropathy and DM foot wounds (s/p transmetatarsal amputation of the right foot 04/30/25) presented to the hospital for evaluation of right foot wound.    In May after amputation, patient was supposed to be on IV antibiotics but left AMA.  On admission, vital stable.  Labs showed WBC count 7, Hb 7.9, platelet 641, potassium 3.8, creatinine 0.9, AST/ALT normal, lactic acid 3.1, ESR 89, CRP 20.  UA negative.  CT right foot x-ray was done which shows necrotizing soft tissue infection of the amputation stump with osteomyelitis.    Podiatry was consulted and recommended amputation to which patient agrees.     Past Medical History  He has a past medical history of Abnormal result of other  cardiovascular function study (2018), Atherosclerotic heart disease of native coronary artery with unstable angina pectoris (2018), Fibromyalgia, Personal history of other diseases of the circulatory system, Personal history of other diseases of the musculoskeletal system and connective tissue, Personal history of other diseases of the musculoskeletal system and connective tissue, Personal history of other diseases of the musculoskeletal system and connective tissue, Personal history of other endocrine, nutritional and metabolic disease, Personal history of other endocrine, nutritional and metabolic disease, and Type 2 diabetes mellitus without complications (2022).    Surgical History  He has a past surgical history that includes Septoplasty (2018); Back surgery (2018); and Cardiac catheterization (N/A, 2024).     Social History     Occupational History    Not on file   Tobacco Use    Smoking status: Former     Current packs/day: 0.00     Types: Cigarettes     Quit date: 2024     Years since quittin.1    Smokeless tobacco: Never   Vaping Use    Vaping status: Not on file   Substance and Sexual Activity    Alcohol use: Not Currently    Drug use: Never    Sexual activity: Never     Travel History   Travel since 24    No documented travel since 24              Family History  Family History[1]  Allergies  Patient has no known allergies.     Immunization History   Administered Date(s) Administered    COVID-19, mRNA, LNP-S, PF, 30 mcg/0.3 mL dose 2021    Influenza, Unspecified 2018    Influenza, seasonal, injectable 2014    Pneumococcal polysaccharide vaccine, 23-valent, age 2 years and older (PNEUMOVAX 23) 2011     Medications  Home medications:  Prescriptions Prior to Admission[2]  Current medications:  Scheduled medications  Scheduled Medications[3]  Continuous medications  Continuous Medications[4]  PRN medications  PRN  "Medications[5]    Review of Systems     Constitutional:  Denies appetite change, chills, fatigue, fever.  HENT:  Denies ear discharge, ear pain, sore throat    Eyes:  Denies photophobia, eye drainage  Respiratory:  Denies cough, choking, chest tightness, shortness of breath  Cardiovascular:  Denies chest pain, palpitations  Gastrointestinal:  Denies abdominal pain, diarrhea, nausea and vomiting.   Musculoskeletal:  R foot pain  Skin:  Reports ulcer  Neurological:  Denies light-headedness, numbness and headaches.    Objective  Range of Vitals (last 24 hours)  Heart Rate:  [79-92]   Temp:  [36.6 °C (97.8 °F)-37.1 °C (98.7 °F)]   Resp:  [17-20]   BP: ()/(53-89)   Height:  [170.2 cm (5' 7\")]   Weight:  [78 kg (171 lb 14.4 oz)-81.6 kg (180 lb)]   SpO2:  [91 %-100 %]   Daily Weight  25 : 78 kg (171 lb 14.4 oz)    Body mass index is 26.92 kg/m².     Physical Exam  BP 97/59 (BP Location: Left arm, Patient Position: Sitting)   Pulse 92   Temp 36.6 °C (97.8 °F) (Temporal)   Resp 18   Ht 1.702 m (5' 7\")   Wt 78 kg (171 lb 14.4 oz)   SpO2 91%   BMI 26.92 kg/m²   Temp (24hrs), Av.9 °C (98.4 °F), Min:36.6 °C (97.8 °F), Max:37.1 °C (98.7 °F)      General: alert, oriented, NAD  HEENT: No conjunctival pallor, no scleral icterus  Lungs: bilaterally clear to auscultation, no wheezing  Abdomen: soft, non tender, non distended, BS+  Neuro: AAO x 3, CN grossly intact  Extremities: R foot dressing  Skin: R foot dressing     Relevant Results    Labs  Results from last 72 hours   Lab Units 25  0417 25  1547   WBC AUTO x10*3/uL 9.8 11.0   HEMOGLOBIN g/dL 7.0* 7.9*   HEMATOCRIT % 22.5* 26.1*   PLATELETS AUTO x10*3/uL 633* 641*   NEUTROS PCT AUTO % 69.0 78.8   LYMPHS PCT AUTO % 11.0 6.6   MONOS PCT AUTO % 15.0 10.0   EOS PCT AUTO % 2.3 1.5     Results from last 72 hours   Lab Units 25  0417 25  1547   SODIUM mmol/L 131* 127*   POTASSIUM mmol/L 4.0 3.8   CHLORIDE mmol/L 98 95*   CO2 mmol/L 25 22 " "  BUN mg/dL 10 12   CREATININE mg/dL 0.93 0.92   GLUCOSE mg/dL 90 320*   CALCIUM mg/dL 8.3* 8.7   ANION GAP mmol/L 12 14   EGFR mL/min/1.73m*2 >90 >90     Results from last 72 hours   Lab Units 08/07/25  0417 08/06/25  1547   ALK PHOS U/L 257* 182*   BILIRUBIN TOTAL mg/dL 0.7 0.6   PROTEIN TOTAL g/dL 6.6 7.1   ALT U/L 16 17   AST U/L 36 35   ALBUMIN g/dL 2.9* 3.0*     Estimated Creatinine Clearance: 78 mL/min (by C-G formula based on SCr of 0.93 mg/dL).  C-Reactive Protein   Date Value Ref Range Status   08/06/2025 20.13 (H) <1.00 mg/dL Final   04/29/2025 7.25 (H) <1.00 mg/dL Final   10/09/2024 13.42 (H) <1.00 mg/dL Final     Sedimentation Rate   Date Value Ref Range Status   08/06/2025 89 (H) 0 - 20 mm/h Final   04/29/2025 120 (H) 0 - 20 mm/h Final   10/09/2024 51 (H) 0 - 20 mm/h Final     No results found for: \"HIV1X2\", \"HIVCONF\", \"ORZJDP6EK\"  No results found for: \"HEPCABINIT\", \"HEPCAB\", \"HCVPCRQUANT\"    Microbiology  No results found for the last 14 days.      Imaging  CT foot right w IV contrast  Result Date: 8/6/2025    Necrotizing soft tissue infection of the amputation stump with acute osteomyelitis involving the 4th and 5th metatarsal bases, cuboid and likely 3rd metatarsal base. No rim enhancing fluid collection.   MACRO:   Kai Waterman discussed the significance and urgency of this critical finding by EPIC secure chat with  YESI BEACH on 8/6/2025 at 6:37 pm.  (**-RCF-**) Findings:  See findings.     Signed by: Kai Waterman 8/6/2025 6:46 PM Dictation workstation:   [id number]    Vascular US Lower Extremity Venous Duplex Right  Result Date: 8/6/2025  Negative study.  No deep venous thrombosis of the  right lower extremity.   MACRO: None   Signed by: David Og 8/6/2025 5:59 PM Dictation workstation:   [id number]    XR chest 1 view  Result Date: 8/6/2025  No acute cardiopulmonary process.   MACRO: None   Signed by: Pia Khan 8/6/2025 4:16 PM Dictation workstation:   [id number]    XR foot " right 3+ views  Result Date: 8/6/2025  1. Status post transmetatarsal amputation. 2. Soft tissue swelling with an ulcer along the plantar aspect of the right or fluid. There is air in the soft tissues, likely gas-forming infection. 3. Bony destruction of the bases of the 4th and 5th metatarsals and cuboid consistent with osteomyelitis. Areas also a fracture of the base of the 5th metatarsal.       MACRO: None   Signed by: Pia Khan 8/6/2025 4:14 PM Dictation workstation:   [id number]             [1]   Family History  Problem Relation Name Age of Onset    Sleep apnea Father      Sleep apnea Sister     [2]   Medications Prior to Admission   Medication Sig Dispense Refill Last Dose/Taking    hydroxychloroquine (Plaquenil) 200 mg tablet Take 1 tablet (200 mg) by mouth twice a day.   Taking    albuterol 90 mcg/actuation inhaler Inhale 2 puffs by mouth into the lungs every 6 hours if needed for shortness of breath. 18 g 11     apremilast (Otezla Starter) 10 mg (4)-20 mg (4)-30 mg(19) tablets,dose pack tablet starter pack Take 1 tablet by mouth 2 times a day.       aspirin 81 mg EC tablet Take 1 tablet (81 mg) by mouth once daily.       atorvastatin (Lipitor) 40 mg tablet Take 1 tablet (40 mg) by mouth once daily.       blood sugar diagnostic (True Metrix Glucose Test Strip) strip Use to check blood sugar 4 times daily 200 each 0     DULoxetine 40 mg DR capsule Take 1 capsule (40 mg) by mouth once daily.       fluticasone propion-salmeteroL (Wixela Inhub) 100-50 mcg/dose diskus inhaler Inhale 1 puff by mouth into the lungs 2 times a day. Rinse mouth with water after use to reduce aftertaste and incidence of candidiasis. Do not swallow. (Patient taking differently: Inhale 1 puff 2 times a day as needed.) 60 each 11     insulin glargine (Lantus) 100 unit/mL (3 mL) pen Inject 25 Units under the skin once every 24 hours. (Patient taking differently: Inject 40 Units under the skin once every 24 hours.) 30 mL 3      insulin lispro (HumaLOG) 100 unit/mL injection Inject 8 Units plus 0 - 10 units per sliding scale under the skin 3 times a day before meals. Take as directed per insulin instructions. 15 mL 0     lancets 33 gauge misc Use to check blood sugar 4 times daily 200 each 0     levothyroxine (Synthroid, Levoxyl) 50 mcg tablet Take 1 tablet (50 mcg) by mouth once daily.       metFORMIN (Glucophage) 500 mg tablet Take 1 tablet (500 mg) by mouth 2 times a day. 60 tablet 2     methadone HCl (METHADONE ORAL) Take 87mg by mouth every morning and 78mg by mouth  every evening       metoprolol succinate XL (Toprol-XL) 25 mg 24 hr tablet Take 1 tablet (25 mg) by mouth once daily. Do not crush or chew. 90 tablet 3     omeprazole (PriLOSEC) 40 mg DR capsule Take 1 capsule (40 mg) by mouth early in the morning..       pantoprazole (ProtoNix) 40 mg EC tablet Take 1 tablet (40 mg) by mouth once daily in the morning. Take before meals. Do not crush, chew, or split. 30 tablet 11     predniSONE (Deltasone) 1 mg tablet Take 1 tablet (1 mg) by mouth once daily.       pregabalin (Lyrica) 200 mg capsule Take 1 capsule (200 mg) by mouth 3 times a day.       tiotropium (Spiriva Respimat) 2.5 mcg/actuation inhaler Inhale 2 puffs by mouth into the lungs once daily. 4 g 11    [3] aspirin, 81 mg, oral, Daily  atorvastatin, 40 mg, oral, Daily  clindamycin, 900 mg, intravenous, q8h  DULoxetine, 40 mg, oral, Daily  heparin (porcine), 5,000 Units, subcutaneous, q8h  hydroxychloroquine, 200 mg, oral, BID  insulin glargine, 40 Units, subcutaneous, q24h  insulin lispro, 0-10 Units, subcutaneous, TID  levothyroxine, 50 mcg, oral, Daily  metoprolol succinate XL, 25 mg, oral, Daily  pantoprazole, 40 mg, oral, Daily before breakfast   Or  pantoprazole, 40 mg, intravenous, Daily before breakfast  piperacillin-tazobactam, 4.5 g, intravenous, q6h  polyethylene glycol, 17 g, oral, Daily  pregabalin, 200 mg, oral, TID  tiotropium, 2 puff, inhalation,  Daily  vancomycin, 1,500 mg, intravenous, q24h    [4]    [5] PRN medications: acetaminophen, albuterol, bisacodyl, bisacodyl, dextrose, dextrose, glucagon, glucagon, guaiFENesin, melatonin, ondansetron **OR** ondansetron, vancomycin

## 2025-08-07 NOTE — ED PROCEDURE NOTE
Procedure  Critical Care    Performed by: Omar Hernandez DO  Authorized by: Omar Hernandez DO    Critical care provider statement:     Critical care time (minutes):  30    Critical care time was exclusive of:  Separately billable procedures and treating other patients and teaching time    Critical care was necessary to treat or prevent imminent or life-threatening deterioration of the following conditions:  Sepsis    Critical care was time spent personally by me on the following activities:  Blood draw for specimens, development of treatment plan with patient or surrogate, evaluation of patient's response to treatment, examination of patient, ordering and performing treatments and interventions, ordering and review of laboratory studies, ordering and review of radiographic studies, pulse oximetry, re-evaluation of patient's condition, discussions with consultants and obtaining history from patient or surrogate    Care discussed with: admitting provider                 Omar Hernandez DO  08/06/25 2002

## 2025-08-07 NOTE — PROGRESS NOTES
Occupational Therapy                 Therapy Communication Note    Patient Name: Kael Zepeda  MRN: [id number]  Department: [facility] 2 W  Room: 2007/2007-A  Today's Date: 8/7/2025     Discipline: Occupational Therapy    Missed Visit: OT Missed Visit: Yes     Missed Visit Reason: Missed Visit Reason: Patient placed on medical hold (Pt plan for R BKA per podiatry. Will HOLD OT evals until pt post OP.)    Missed Time: Attempt    Comment:

## 2025-08-07 NOTE — ASSESSMENT & PLAN NOTE
-Continue home methadone dosing -> patient unable to confirm dosing. I spoke with pharmacy, his methadone clinic is closed for the evening. I have reviewed the patient's lost hospitalization and resumed the evening dose of 78mg from then, as the patient does not believe he has had any significant changes recently in that dose. Pharmacy will call the clinic in the morning and work on confirming morning dose and confirming no change to the evening doses.   -Patient is on long term methadone for management   -Patient unable to confirm dosing. I spoke with pharmacy, his methadone clinic is closed for the evening. I have reviewed the patient's lost hospitalization and resumed the evening dose of 78mg from then, as the patient does not believe he has had any significant changes recently in that dose. Pharmacy will call the clinic in the morning and work on confirming morning dose and confirming no change to the evening doses.  8/7: Was able to confirm his rather large methadone dosing.  Referral made to pharmacy for assistance with pain control following surgery.

## 2025-08-07 NOTE — PROGRESS NOTES
Methadone Clinic Verification    NAME: Kael Zepeda  SERVICE DATE: 8/6/2025  SERVICE TIME: 10:12 PM    Methadone Clinic Name and Contact Info: Currently closed med [facility] tech to call in AM    Date and time of last methadone dose: unknown clinic closed.  Did patient receive any take home doses? Unknown    Methadone dose to be given tonight 30 mg by mouth    Uday Rodriguez [facility]

## 2025-08-07 NOTE — ASSESSMENT & PLAN NOTE
Anxiety, depression  Fibromyalgia   -Patient will be continued on home therapies (Cymbalta)   -Continue home Lyrica

## 2025-08-07 NOTE — CONSULTS
Inpatient consult to Orthopaedic Surgery  Consult performed by: Patricia George PA-C  Consult ordered by: Tanisha Giles PA-C  Reason for consult: Chronic right lower extremity osteomyelitis, nonhealing diabetic foot ulcer            History Of Present Illness  Kael Zepeda is a 60 y.o. male with longstanding history of chronic right lower extremity nonhealing diabetic foot ulcers and chronic osteomyelitis.  Patient has been noncompliant with outpatient wound care follow-up as well as antibiotic therapy.  Patient consistently leaves the hospital AMA.  He is on long-term methadone, and worries about missing his weekly check-in's which is what prompted him to leave AMA.  He had a long discussion with multiple providers, and will be able to get his methadone while inpatient. Has history of transmetatarsal amputation done by Dr. Raya.  Over the past several days he has noticed increased swelling pain and drainage from his foot wounds.  He also endorses malaise, loss of appetite, fevers, chills.  After discussion with infectious disease and podiatry, patient is open to a right BKA.     Past Medical History  He has a past medical history of Abnormal result of other cardiovascular function study (06/28/2018), Atherosclerotic heart disease of native coronary artery with unstable angina pectoris (06/28/2018), Fibromyalgia, Personal history of other diseases of the circulatory system, Personal history of other diseases of the musculoskeletal system and connective tissue, Personal history of other diseases of the musculoskeletal system and connective tissue, Personal history of other diseases of the musculoskeletal system and connective tissue, Personal history of other endocrine, nutritional and metabolic disease, Personal history of other endocrine, nutritional and metabolic disease, and Type 2 diabetes mellitus without complications (02/25/2022).    Surgical History  He has a past surgical history that  "includes Septoplasty (06/28/2018); Back surgery (06/28/2018); and Cardiac catheterization (N/A, 7/1/2024).     Social History  He reports that he quit smoking about 13 months ago. His smoking use included cigarettes. He has never used smokeless tobacco. He reports that he does not currently use alcohol. He reports that he does not use drugs.    Family History  Family History[1]     Allergies  Patient has no known allergies.    Review of Systems  See HPI for pertinent review of systems     Physical Exam  Constitutional:       General: He is not in acute distress.     Appearance: He is normal weight. He is not toxic-appearing.   HENT:      Head: Normocephalic and atraumatic.     Eyes:      Extraocular Movements: Extraocular movements intact.      Pupils: Pupils are equal, round, and reactive to light.     Pulmonary:      Effort: Pulmonary effort is normal.     Musculoskeletal:      Cervical back: Normal range of motion.      Comments: Bulky dressing in place to right lower extremity.  Dressing is clean dry and intact.  History of transmetatarsal amputation.  Neurovascular status diminished to light touch distally.  Sensation intact proximal to the calcaneus to light touch.     Skin:     Findings: Erythema present.     Neurological:      Mental Status: He is alert and oriented to person, place, and time.     Psychiatric:         Mood and Affect: Mood normal.         Thought Content: Thought content normal.          Last Recorded Vitals  Blood pressure 97/59, pulse 92, temperature 36.6 °C (97.8 °F), temperature source Temporal, resp. rate 18, height 1.702 m (5' 7\"), weight 78 kg (171 lb 14.4 oz), SpO2 91%.         Relevant Results      Scheduled medications  Scheduled Medications[2]  Continuous medications  Continuous Medications[3]  PRN medications  PRN Medications[4]  Results for orders placed or performed during the hospital encounter of 08/06/25 (from the past 24 hours)   CBC and Auto Differential   Result Value Ref " Range    WBC 11.0 4.4 - 11.3 x10*3/uL    nRBC 0.0 0.0 - 0.0 /100 WBCs    RBC 3.72 (L) 4.50 - 5.90 x10*6/uL    Hemoglobin 7.9 (L) 13.5 - 17.5 g/dL    Hematocrit 26.1 (L) 41.0 - 52.0 %    MCV 70 (L) 80 - 100 fL    MCH 21.2 (L) 26.0 - 34.0 pg    MCHC 30.3 (L) 32.0 - 36.0 g/dL    RDW 18.1 (H) 11.5 - 14.5 %    Platelets 641 (H) 150 - 450 x10*3/uL    Neutrophils % 78.8 40.0 - 80.0 %    Immature Granulocytes %, Automated 2.6 (H) 0.0 - 0.9 %    Lymphocytes % 6.6 13.0 - 44.0 %    Monocytes % 10.0 2.0 - 10.0 %    Eosinophils % 1.5 0.0 - 6.0 %    Basophils % 0.5 0.0 - 2.0 %    Neutrophils Absolute 8.65 (H) 1.20 - 7.70 x10*3/uL    Immature Granulocytes Absolute, Automated 0.29 0.00 - 0.70 x10*3/uL    Lymphocytes Absolute 0.72 (L) 1.20 - 4.80 x10*3/uL    Monocytes Absolute 1.10 (H) 0.10 - 1.00 x10*3/uL    Eosinophils Absolute 0.16 0.00 - 0.70 x10*3/uL    Basophils Absolute 0.05 0.00 - 0.10 x10*3/uL   Comprehensive Metabolic Panel   Result Value Ref Range    Glucose 320 (H) 74 - 99 mg/dL    Sodium 127 (L) 136 - 145 mmol/L    Potassium 3.8 3.5 - 5.3 mmol/L    Chloride 95 (L) 98 - 107 mmol/L    Bicarbonate 22 21 - 32 mmol/L    Anion Gap 14 10 - 20 mmol/L    Urea Nitrogen 12 6 - 23 mg/dL    Creatinine 0.92 0.50 - 1.30 mg/dL    eGFR >90 >60 mL/min/1.73m*2    Calcium 8.7 8.6 - 10.3 mg/dL    Albumin 3.0 (L) 3.4 - 5.0 g/dL    Alkaline Phosphatase 182 (H) 33 - 136 U/L    Total Protein 7.1 6.4 - 8.2 g/dL    AST 35 9 - 39 U/L    Bilirubin, Total 0.6 0.0 - 1.2 mg/dL    ALT 17 10 - 52 U/L   Lactate   Result Value Ref Range    Lactate 3.1 (H) 0.4 - 2.0 mmol/L   Blood Culture    Specimen: Peripheral Venipuncture; Blood culture   Result Value Ref Range    Blood Culture Loaded on Instrument - Culture in progress    Blood Culture    Specimen: Peripheral Venipuncture; Blood culture   Result Value Ref Range    Blood Culture Loaded on Instrument - Culture in progress    B-Type Natriuretic Peptide   Result Value Ref Range     (H) 0 - 99 pg/mL    C-Reactive Protein   Result Value Ref Range    C-Reactive Protein 20.13 (H) <1.00 mg/dL   Sedimentation Rate   Result Value Ref Range    Sedimentation Rate 89 (H) 0 - 20 mm/h   Troponin I, High Sensitivity, Initial   Result Value Ref Range    Troponin I, High Sensitivity 4 0 - 20 ng/L   ECG 12 Lead   Result Value Ref Range    Ventricular Rate 83 BPM    Atrial Rate 82 BPM    ND Interval 190 ms    QRS Duration 124 ms    QT Interval 423 ms    QTC Calculation(Bazett) 497 ms    P Axis 57 degrees    R Axis 36 degrees    T Axis 29 degrees    QRS Count 13 beats    Q Onset 249 ms    T Offset 461 ms    QTC Fredericia 471 ms   Troponin, High Sensitivity, 1 Hour   Result Value Ref Range    Troponin I, High Sensitivity 5 0 - 20 ng/L   Lactate   Result Value Ref Range    Lactate 2.3 (H) 0.4 - 2.0 mmol/L   Urinalysis with Reflex Culture and Microscopic   Result Value Ref Range    Color, Urine Yellow Light-Yellow, Yellow, Dark-Yellow    Appearance, Urine Clear Clear    Specific Gravity, Urine 1.014 1.005 - 1.035    pH, Urine 6.0 5.0, 5.5, 6.0, 6.5, 7.0, 7.5, 8.0    Protein, Urine 20 (TRACE) NEGATIVE, 10 (TRACE), 20 (TRACE) mg/dL    Glucose, Urine 1000 (4+) (A) Normal mg/dL    Blood, Urine NEGATIVE NEGATIVE mg/dL    Ketones, Urine NEGATIVE NEGATIVE mg/dL    Bilirubin, Urine NEGATIVE NEGATIVE mg/dL    Urobilinogen, Urine 4 (2+) (A) Normal mg/dL    Nitrite, Urine NEGATIVE NEGATIVE    Leukocyte Esterase, Urine NEGATIVE NEGATIVE   Urinalysis Microscopic   Result Value Ref Range    WBC, Urine 1-5 1-5, NONE /HPF    RBC, Urine 1-2 NONE, 1-2, 3-5 /HPF    Squamous Epithelial Cells, Urine 1-9 (SPARSE) Reference range not established. /HPF    Mucus, Urine FEW Reference range not established. /LPF    Hyaline Casts, Urine 1+ (A) NONE /LPF   Tissue/Wound Culture/Smear    Specimen: Wound/Tissue; Tissue/Biopsy   Result Value Ref Range    Gram Stain (2+) Few Polymorphonuclear leukocytes (A)     Gram Stain (1+) Rare Gram positive cocci (A)    POCT  GLUCOSE   Result Value Ref Range    POCT Glucose 183 (H) 74 - 99 mg/dL   Lactate   Result Value Ref Range    Lactate 2.5 (H) 0.4 - 2.0 mmol/L   Comprehensive Metabolic Panel   Result Value Ref Range    Glucose 90 74 - 99 mg/dL    Sodium 131 (L) 136 - 145 mmol/L    Potassium 4.0 3.5 - 5.3 mmol/L    Chloride 98 98 - 107 mmol/L    Bicarbonate 25 21 - 32 mmol/L    Anion Gap 12 10 - 20 mmol/L    Urea Nitrogen 10 6 - 23 mg/dL    Creatinine 0.93 0.50 - 1.30 mg/dL    eGFR >90 >60 mL/min/1.73m*2    Calcium 8.3 (L) 8.6 - 10.3 mg/dL    Albumin 2.9 (L) 3.4 - 5.0 g/dL    Alkaline Phosphatase 257 (H) 33 - 136 U/L    Total Protein 6.6 6.4 - 8.2 g/dL    AST 36 9 - 39 U/L    Bilirubin, Total 0.7 0.0 - 1.2 mg/dL    ALT 16 10 - 52 U/L   CBC and Auto Differential   Result Value Ref Range    WBC 9.8 4.4 - 11.3 x10*3/uL    nRBC 0.0 0.0 - 0.0 /100 WBCs    RBC 3.25 (L) 4.50 - 5.90 x10*6/uL    Hemoglobin 7.0 (L) 13.5 - 17.5 g/dL    Hematocrit 22.5 (L) 41.0 - 52.0 %    MCV 69 (L) 80 - 100 fL    MCH 21.5 (L) 26.0 - 34.0 pg    MCHC 31.1 (L) 32.0 - 36.0 g/dL    RDW 18.0 (H) 11.5 - 14.5 %    Platelets 633 (H) 150 - 450 x10*3/uL    Neutrophils % 69.0 40.0 - 80.0 %    Immature Granulocytes %, Automated 1.9 (H) 0.0 - 0.9 %    Lymphocytes % 11.0 13.0 - 44.0 %    Monocytes % 15.0 2.0 - 10.0 %    Eosinophils % 2.3 0.0 - 6.0 %    Basophils % 0.8 0.0 - 2.0 %    Neutrophils Absolute 6.75 1.20 - 7.70 x10*3/uL    Immature Granulocytes Absolute, Automated 0.19 0.00 - 0.70 x10*3/uL    Lymphocytes Absolute 1.08 (L) 1.20 - 4.80 x10*3/uL    Monocytes Absolute 1.47 (H) 0.10 - 1.00 x10*3/uL    Eosinophils Absolute 0.23 0.00 - 0.70 x10*3/uL    Basophils Absolute 0.08 0.00 - 0.10 x10*3/uL   Magnesium   Result Value Ref Range    Magnesium 1.62 1.60 - 2.40 mg/dL   POCT GLUCOSE   Result Value Ref Range    POCT Glucose 74 74 - 99 mg/dL     *Note: Due to a large number of results and/or encounters for the requested time period, some results have not been displayed. A  complete set of results can be found in Results Review.        Assessment/Plan     Chronic right lower extremity osteomyelitis, chronic nonhealing diabetic foot ulcers:  - Will plan for right BKA with Dr. Pak tomorrow afternoon.  Had a long discussion with the patient and he is willing to go ahead and proceed with surgery.  I do feel this will drastically improve his symptoms as he has failed conservative therapy to treat this chronic infection.  He has been noncompliant with outpatient follow-up with wound care and antibiotic therapy.  I did stressed the importance of complying with follow-up following surgery to ensure he has not developed another infection.  - N.p.o. at midnight.  - Continue antibiotics per infectious disease and primary service.  - Continue pain control per primary service.  - Thank for the consult, orthopedics will continue to follow.    Patricia George PA-C           [1]   Family History  Problem Relation Name Age of Onset    Sleep apnea Father      Sleep apnea Sister     [2] aspirin, 81 mg, oral, Daily  atorvastatin, 40 mg, oral, Daily  DULoxetine, 40 mg, oral, Daily  heparin (porcine), 5,000 Units, subcutaneous, q8h  hydroxychloroquine, 200 mg, oral, BID  insulin glargine, 40 Units, subcutaneous, q24h  insulin lispro, 0-10 Units, subcutaneous, TID  levothyroxine, 50 mcg, oral, Daily  metoprolol succinate XL, 25 mg, oral, Daily  pantoprazole, 40 mg, oral, Daily before breakfast   Or  pantoprazole, 40 mg, intravenous, Daily before breakfast  piperacillin-tazobactam, 4.5 g, intravenous, q6h  polyethylene glycol, 17 g, oral, Daily  pregabalin, 200 mg, oral, TID  tiotropium, 2 puff, inhalation, Daily  vancomycin, 1,500 mg, intravenous, q24h    [3]    [4] PRN medications: acetaminophen, albuterol, bisacodyl, bisacodyl, dextrose, dextrose, glucagon, glucagon, guaiFENesin, melatonin, ondansetron **OR** ondansetron, vancomycin

## 2025-08-07 NOTE — CONSULTS
Reason For Consult  Osteomyelitis of right foot    History Of Present Illness  Kael Zepeda is a 60 y.o. male presenting with chronic ulcers on the right foot with recurrent infection. Now with advanced osteomyelitis. Swelling, pain and drainage from the lateral foot wounds. Patient had transmetatarsal amputation of the right foot about two months ago. He did not complete the recommended course of outpatient IV antibiotics. He left the hospital AMA instead of going to rehab. he has followed up intermittently at the wound center. He was seen by another provider earlier this week and noted to be septic appearing. He was instructed to go to the ER on Monday but did not until today. Patient has been feeling weak, ill, loss of appetite   Patient has significant cardiac history. Also on long-term methadone. Typically, he is assessed at the methadone clinic weekly and manages his dosages throughout the week. patient reports he left AMA last admission because he was worried about missing his weekly check-in. Otherwise he would have had to go to the clinic daily to get his dosing. He was asking again today about being discharged home in time to go to his check-in, but I explained thoroughly that he needs to see this through and go to rehab as needed. Any documentation necessary from his admission can be provided afterwards. He expressed understanding of this and is agreeable to staying for necessary surgical intervention and to go to rehab afterwards as needed..     Past Medical History  He has a past medical history of Abnormal result of other cardiovascular function study (06/28/2018), Atherosclerotic heart disease of native coronary artery with unstable angina pectoris (06/28/2018), Fibromyalgia, Personal history of other diseases of the circulatory system, Personal history of other diseases of the musculoskeletal system and connective tissue, Personal history of other diseases of the musculoskeletal system and  "connective tissue, Personal history of other diseases of the musculoskeletal system and connective tissue, Personal history of other endocrine, nutritional and metabolic disease, Personal history of other endocrine, nutritional and metabolic disease, and Type 2 diabetes mellitus without complications (02/25/2022).    Surgical History  He has a past surgical history that includes Septoplasty (06/28/2018); Back surgery (06/28/2018); and Cardiac catheterization (N/A, 7/1/2024).     Social History  He reports that he quit smoking about 13 months ago. His smoking use included cigarettes. He has never used smokeless tobacco. He reports that he does not currently use alcohol. He reports that he does not use drugs.    Family History  Family History   Problem Relation Name Age of Onset   • Sleep apnea Father     • Sleep apnea Sister          Allergies  Patient has no known allergies.    Review of Systems  awake. Alert. Fully oriented.  Ill appearing.   Coughing, congestion. Denies chest pain. Denies shortness of breath.   Loss of appetite. Denies nausea or vomiting.        Physical Exam  right foot is edematous and has erythema around the 4 foot to mid foot. No laryngitis up the leg.. Also, under the first metatarsal is stable. Also under the fourth and fifth metatarsal area had purulent drainage. Tracking down through the bone, which is palpable and noted to be fractured and loose. Tunnels through a separate opening on the lateral foot where there was further drainage. I&D performed bedside. Thoroughly flushed with saline through both draining wounds. Deep culture collected, which was in direct contact with underlying metatarsal bone.  palpable pedal pulses. Adequate capillary perfusion. Controllable bleeding from the wound with the procedure.     Last Recorded Vitals  Blood pressure 98/53, pulse 85, temperature 36.9 °C (98.5 °F), temperature source Temporal, resp. rate 18, height 1.702 m (5' 7\"), weight 81.6 kg (180 lb), " SpO2 97%.         Relevant Results      Scheduled medications  [START ON 8/7/2025] aspirin, 81 mg, oral, Daily  atorvastatin, 40 mg, oral, Daily  clindamycin, 900 mg, intravenous, q8h  [START ON 8/7/2025] DULoxetine, 40 mg, oral, Daily  heparin (porcine), 5,000 Units, subcutaneous, q8h  hydroxychloroquine, 200 mg, oral, BID  insulin glargine, 40 Units, subcutaneous, q24h  insulin lispro, 0-10 Units, subcutaneous, TID  lactated Ringer's, 500 mL, intravenous, Once  [START ON 8/7/2025] levothyroxine, 50 mcg, oral, Daily  [START ON 8/7/2025] metoprolol succinate XL, 25 mg, oral, Daily  [START ON 8/7/2025] pantoprazole, 40 mg, oral, Daily before breakfast   Or  [START ON 8/7/2025] pantoprazole, 40 mg, intravenous, Daily before breakfast  piperacillin-tazobactam, 4.5 g, intravenous, q6h  polyethylene glycol, 17 g, oral, Daily  pregabalin, 200 mg, oral, TID  [START ON 8/7/2025] tiotropium, 2 puff, inhalation, Daily  [START ON 8/7/2025] vancomycin, 1,500 mg, intravenous, q24h    Continuous medications     PRN medications  PRN medications: acetaminophen, albuterol, bisacodyl, bisacodyl, dextrose, dextrose, glucagon, glucagon, guaiFENesin, melatonin, ondansetron **OR** ondansetron, vancomycin  Results for orders placed or performed during the hospital encounter of 08/06/25 (from the past 24 hours)   CBC and Auto Differential   Result Value Ref Range    WBC 11.0 4.4 - 11.3 x10*3/uL    nRBC 0.0 0.0 - 0.0 /100 WBCs    RBC 3.72 (L) 4.50 - 5.90 x10*6/uL    Hemoglobin 7.9 (L) 13.5 - 17.5 g/dL    Hematocrit 26.1 (L) 41.0 - 52.0 %    MCV 70 (L) 80 - 100 fL    MCH 21.2 (L) 26.0 - 34.0 pg    MCHC 30.3 (L) 32.0 - 36.0 g/dL    RDW 18.1 (H) 11.5 - 14.5 %    Platelets 641 (H) 150 - 450 x10*3/uL    Neutrophils % 78.8 40.0 - 80.0 %    Immature Granulocytes %, Automated 2.6 (H) 0.0 - 0.9 %    Lymphocytes % 6.6 13.0 - 44.0 %    Monocytes % 10.0 2.0 - 10.0 %    Eosinophils % 1.5 0.0 - 6.0 %    Basophils % 0.5 0.0 - 2.0 %    Neutrophils Absolute  8.65 (H) 1.20 - 7.70 x10*3/uL    Immature Granulocytes Absolute, Automated 0.29 0.00 - 0.70 x10*3/uL    Lymphocytes Absolute 0.72 (L) 1.20 - 4.80 x10*3/uL    Monocytes Absolute 1.10 (H) 0.10 - 1.00 x10*3/uL    Eosinophils Absolute 0.16 0.00 - 0.70 x10*3/uL    Basophils Absolute 0.05 0.00 - 0.10 x10*3/uL   Comprehensive Metabolic Panel   Result Value Ref Range    Glucose 320 (H) 74 - 99 mg/dL    Sodium 127 (L) 136 - 145 mmol/L    Potassium 3.8 3.5 - 5.3 mmol/L    Chloride 95 (L) 98 - 107 mmol/L    Bicarbonate 22 21 - 32 mmol/L    Anion Gap 14 10 - 20 mmol/L    Urea Nitrogen 12 6 - 23 mg/dL    Creatinine 0.92 0.50 - 1.30 mg/dL    eGFR >90 >60 mL/min/1.73m*2    Calcium 8.7 8.6 - 10.3 mg/dL    Albumin 3.0 (L) 3.4 - 5.0 g/dL    Alkaline Phosphatase 182 (H) 33 - 136 U/L    Total Protein 7.1 6.4 - 8.2 g/dL    AST 35 9 - 39 U/L    Bilirubin, Total 0.6 0.0 - 1.2 mg/dL    ALT 17 10 - 52 U/L   Lactate   Result Value Ref Range    Lactate 3.1 (H) 0.4 - 2.0 mmol/L   B-Type Natriuretic Peptide   Result Value Ref Range     (H) 0 - 99 pg/mL   C-Reactive Protein   Result Value Ref Range    C-Reactive Protein 20.13 (H) <1.00 mg/dL   Sedimentation Rate   Result Value Ref Range    Sedimentation Rate 89 (H) 0 - 20 mm/h   Troponin I, High Sensitivity, Initial   Result Value Ref Range    Troponin I, High Sensitivity 4 0 - 20 ng/L   Troponin, High Sensitivity, 1 Hour   Result Value Ref Range    Troponin I, High Sensitivity 5 0 - 20 ng/L   Lactate   Result Value Ref Range    Lactate 2.3 (H) 0.4 - 2.0 mmol/L   Urinalysis with Reflex Culture and Microscopic   Result Value Ref Range    Color, Urine Yellow Light-Yellow, Yellow, Dark-Yellow    Appearance, Urine Clear Clear    Specific Gravity, Urine 1.014 1.005 - 1.035    pH, Urine 6.0 5.0, 5.5, 6.0, 6.5, 7.0, 7.5, 8.0    Protein, Urine 20 (TRACE) NEGATIVE, 10 (TRACE), 20 (TRACE) mg/dL    Glucose, Urine 1000 (4+) (A) Normal mg/dL    Blood, Urine NEGATIVE NEGATIVE mg/dL    Ketones, Urine  NEGATIVE NEGATIVE mg/dL    Bilirubin, Urine NEGATIVE NEGATIVE mg/dL    Urobilinogen, Urine 4 (2+) (A) Normal mg/dL    Nitrite, Urine NEGATIVE NEGATIVE    Leukocyte Esterase, Urine NEGATIVE NEGATIVE   Urinalysis Microscopic   Result Value Ref Range    WBC, Urine 1-5 1-5, NONE /HPF    RBC, Urine 1-2 NONE, 1-2, 3-5 /HPF    Squamous Epithelial Cells, Urine 1-9 (SPARSE) Reference range not established. /HPF    Mucus, Urine FEW Reference range not established. /LPF    Hyaline Casts, Urine 1+ (A) NONE /LPF     *Note: Due to a large number of results and/or encounters for the requested time period, some results have not been displayed. A complete set of results can be found in Results Review.        Assessment/Plan     osteomyelitis of right foot.   CT scan showed concerns for necrotizing infection. I evaluated patient and do not see signs of necrotizing fasciitis. There is advanced osteomyelitis.  fourth and fifth metatarsal and cuboid have osteolytic destruction. Foot is not salvageable.  non compliance with necessary antibiotic treatment at past admission. long-term antibiotics are not an option. The infection is too advanced.   I recommended to patient that he have a below knee amputation. This will fully remove the infection source. Patient has been suffering with pain from the foot for a long time. He is likely to see drastic improvement if not full resolution of that pain after the postoperative pain resolves. He understands if he decides against the amputation this will lead to sepsis and eventual death. He does not wish for this and expresses his desire to proceed with the amputation.  Orthopedic surgery consult placed. If necessary, the amputation can be performed by vascular surgery instead.  At this point in the evening, the wound has been stabilized and patient is hemodynamically stable.     William Raya DPM

## 2025-08-07 NOTE — CARE PLAN
Problem: Pain - Adult  Goal: Verbalizes/displays adequate comfort level or baseline comfort level  Outcome: Progressing     Problem: Discharge Planning  Goal: Discharge to home or other facility with appropriate resources  Outcome: Progressing     Problem: Safety - Adult  Goal: Free from fall injury  Outcome: Met     Problem: Chronic Conditions and Co-morbidities  Goal: Patient's chronic conditions and co-morbidity symptoms are monitored and maintained or improved  Outcome: Met     Problem: Nutrition  Goal: Nutrient intake appropriate for maintaining nutritional needs  Outcome: Met   The patient's goals for the shift include rest and keeping informed.    The clinical goals for the shift include patient will have managable level of pain throughout the shift.

## 2025-08-07 NOTE — ASSESSMENT & PLAN NOTE
-Follow fever curve, WBCs  -Continue antibiotic coverage with vanc/zosyn/clindamycin for now (based on CT results; however, reviewed Dr. Raya's note and patient's exam, not clinically c/w nec fasc)  -High risk of morbidity from additional drug therapy requiring intensive monitoring for toxicity: Vancomycin IV   -(+) monitor for sxs of toxicity and/or rxn related to vancomycin; appreciate pharmacy assistance with management  -Podiatry + Orthopedics consultation appreciated   -ID consultation appreciated   -Wound care consultation appreciated     8/7: No evidence of necrotizing fasciitis.  Does have osteomyelitis in his 4th and 5th metatarsal as well as his cuboid with osteolytic damage.  Podiatry states his foot is not salvageable and recommends BKA.  Patient wishes to proceed.  Referral made to orthopedic surgery, current plan for surgery on 8/8.  Continue antibiotic therapy for now, may be able to de-escalate or discontinue soon.

## 2025-08-07 NOTE — ASSESSMENT & PLAN NOTE
-Clinically patient meets sepsis criteria and is being treated appropriately as outlined below  -Inflammatory markers: CRP 20.13, ESR 89   -Imaging results reviewed (pertinent): XR R Foot, CT R Foot, and DVT US (R)  -Lactate 3.1 -> 2.3 -> repeat pending  -BP is low-normal. Patient has had poor intake/appetite. He appears perhaps a bit volume down on exam. He rec'd 500cc bolus in ED, will order another. Have to be cautious with HF history.   -Blood cultures x2 and wound culture collected, in process.    Need for prophylactic measure

## 2025-08-07 NOTE — CONSULTS
Nutrition Initial Assessment:   Nutrition Assessment    Reason for Assessment: Admission nursing screening    Medical history per chart:   Kael Zepeda is a 60 y.o. male with PMHx s/f HTN, HLD, CAD s/p CABG, transient post-op Afib after CABG (no AC), HFrEF (EF 39% 07/24), COPD (no baseline O2), h/o IVDU (on long-term methadone), h/o Hep C (s/p treatment, in remission), RA (on methotrexate), hypothyroidism, IDDM-II c/b peripheral neuropathy and DM foot wounds (s/p transmetatarsal amputation of the right foot 04/30/25), presenting with advanced R foot wound. Patient had TMA of the R foot in setting of nonhealing wounds and OM 04/30/25.     8/7: Pt alert and in bed during visit. Pt reporting good appetite, but has not eaten today because breakfast order never showed up (possibly was not placed by pt despite claim). Ordered by nursing during visit. Pt reporting 1-3 meals a day and skipping meals sometimes. Does not manage DM very well and skips taking lantus and lispro sometimes.   Pt agrees to orange Rodrigo BID for would healing, and chocolate Glucerna BID.     Nutrition History:  Energy Intake: Good > 75 %  Food and Nutrient History: Pt reporting eating well and has good appetite, recalls eating 1-3 meals per day and getting take out for almost all meals. Pt skips meals sometimes and does not eat regularly.       Average meal Intake during admission: %   Dietary Orders (From admission, onward)       Start     Ordered    08/08/25 0001  NPO Diet Except: Sips with meds; Effective midnight  Diet effective midnight        Question:  Except:  Answer:  Sips with meds    08/07/25 0915    08/07/25 0915  Adult diet Consistent Carb, Cardiac; CCD 75 gm/meal; 70 gm fat; 2 - 3 grams Sodium  Diet effective now        Question Answer Comment   Diet type Consistent Carb    Diet type Cardiac    Carb diet selection: CCD 75 gm/meal    Fat restriction: 70 gm fat    Sodium restriction: 2 - 3 grams Sodium        08/07/25 0915  "   08/06/25 1955  May Participate in Room Service  ( ROOM SERVICE MAY PARTICIPATE)  Once        Question:  .  Answer:  Yes    08/06/25 1954                    Anthropometrics:  Height: 170.2 cm (5' 7.01\")   Weight: 78 kg (171 lb 15.3 oz)   BMI (Calculated): 26.93  IBW/kg (Dietitian Calculated): 67 kg        Weight History:   Wt Readings from Last 10 Encounters:   08/07/25 78 kg (171 lb 15.3 oz)   05/07/25 81.6 kg (180 lb)   04/30/25 81.6 kg (180 lb)   04/02/25 83.5 kg (184 lb)   11/29/24 83.6 kg (184 lb 4.9 oz)   10/15/24 83.6 kg (184 lb 3.2 oz)   10/10/24 83.9 kg (185 lb)   08/10/24 81.2 kg (179 lb)   08/09/24 80.7 kg (178 lb)   08/01/24 80.3 kg (177 lb)       Weight Change %:  Weight History / % Weight Change: #, #, wt steady  Significant Weight Loss: No    Nutrition Focused Physical Exam Findings:  Subcutaneous Fat Loss:   Orbital Fat Pads: Mild-Moderate (slight dark circles and slight hollowing)  Buccal Fat Pads: Mild-Moderate (flat cheeks, minimal bounce)  Triceps: Mild-Moderate (less than ample fat tissue)  Muscle Wasting:  Temporalis: Mild-Moderate (slight depression)  Pectoralis (Clavicular Region): Mild-Moderate (some protrusion of clavicle)  Deltoid/Trapezius: Mild-Moderate (slight protrusion of acromion process)  Interosseous: Mild-Moderate (slightly depressed area between thumb and forefinger)  Edema:  Edema: none  Physical Findings:  Skin: Positive (wounds on feet)    Nutrition Significant Labs:    Results from last 7 days   Lab Units 08/07/25  0417 08/06/25  1547   GLUCOSE mg/dL 90 320*   SODIUM mmol/L 131* 127*   POTASSIUM mmol/L 4.0 3.8   CHLORIDE mmol/L 98 95*   CO2 mmol/L 25 22   BUN mg/dL 10 12   CREATININE mg/dL 0.93 0.92   EGFR mL/min/1.73m*2 >90 >90   CALCIUM mg/dL 8.3* 8.7   MAGNESIUM mg/dL 1.62  --      Lab Results   Component Value Date    HGBA1C 8.2 (A) 01/31/2025    HGBA1C 7.6 (H) 10/10/2024     Results from last 7 days   Lab Units 08/07/25  1124 08/07/25  0750 " 08/06/25 2026   POCT GLUCOSE mg/dL 72* 74 183*       Nutrition Specific Medications:  Meds reviewed/noted.   Scheduled Medications[1]   Continuous Medications[2]     I/O:    ;      Estimated Needs:   Total Energy Estimated Needs in 24 hours (kCal):  (2000-2300kcal)  Method for Estimating Needs: 25-30kcal/kg  Total Protein Estimated Needs in 24 Hours (g):  (117g)  Method for Estimating 24 Hour Protein Needs: 1.5g/kg  Total Fluid Estimated Needs in 24 Hours (mL):  (2000ml)  Method for Estimating 24 Hour Fluid Needs: 25ml/kg        Nutrition Diagnosis   Malnutrition Diagnosis  Patient has Malnutrition Diagnosis: Yes  Diagnosis Status: New  Malnutrition Diagnosis: Moderate malnutrition related to acute disease or injury  Related to: Diabetic ulcer  As Evidenced by: mild fat losses (orbital, buccal, tricep), mild muscle wasting (temporalis, clavicular region, deltoid/trapazius, interrosseous)  Additional Assessment Information: Inconsistent PO intake    Nutrition Diagnosis  Patient has Nutrition Diagnosis: Yes  Diagnosis Status (1): New  Nutrition Diagnosis 1: Increased nutrient needs  Related to (1): Diabetic ulcer  As Evidenced by (1): Inconsistent PO intake, mild fat losses (orbital, buccal, tricep), mild muscle wasting (temporalis, clavicular region, deltoid/trapazius, interrosseous), wounds and infection on foot       Nutrition Interventions/Recommendations   Nutrition prescription for oral nutrition    Nutrition Recommendations:  Individualized Nutrition Prescription Provided for : Consume diet as ordered: Adult diet Consistent Carb, Cardiac; CCD 75 gm/meal; 70 gm fat; 2 - 3 grams Sodium. Provide orange Rodrigo (90kcal, 2.5g protein) BID for wound healing and chocolate Glucerna (220kcal, 10g protein) BID.    Nutrition Interventions/Goals:   Interventions: Medical food supplement  Medical Food Supplement: Commercial beverage medical food supplement therapy  Goal: Provide orange Rodrigo (90kcal, 2.5g protein) BID for  wound healing and chocolate Glucerna (220kcal, 10g protein) BID.      Education Documentation  Nutrition Related Education, taught by Susie Aguila at 8/7/2025 12:37 PM.  Learner: Patient  Readiness: Acceptance  Method: Explanation  Response: Verbalizes Understanding  Comment: Discussed ONS for wounds healing and for increased nutrient needs              Nutrition Monitoring and Evaluation   Food/Nutrient Related History Monitoring  Monitoring and Evaluation Plan: Estimated Energy Intake, Intake / amount of food  Estimated Energy Intake: Energy intake greater or equal to 75% of estimated energy needs  Intake / Amount of food: Consumes > or equal to 70% of supplement    Anthropometric Measurements  Monitoring and Evaluation Plan: Body weight  Body Weight: Body weight - Maintain stable weight    Biochemical Data, Medical Tests and Procedures  Monitoring and Evaluation Plan: Glucose/endocrine profile, Electrolyte/renal panel  Electrolyte and Renal Panel: Electrolytes within normal limits  Glucose/Endocrine Profile: Glucose within normal limits ( mg/dL)    Physical Exam Findings  Monitoring and Evaluation Plan: Skin  Skin Finding: Impaired wound healing - Improved wound healing    Goal Status: New goal(s) identified    Time Spent (min): 45 minutes              [1] aspirin, 81 mg, oral, Daily  atorvastatin, 40 mg, oral, Daily  cefTRIAXone, 2 g, intravenous, q24h  DULoxetine, 40 mg, oral, Daily  heparin (porcine), 5,000 Units, subcutaneous, q8h  hydroxychloroquine, 200 mg, oral, BID  insulin glargine, 40 Units, subcutaneous, q24h  insulin lispro, 0-10 Units, subcutaneous, TID  levothyroxine, 50 mcg, oral, Daily  methadone, 78 mg, oral, Nightly  methadone, 87 mg, oral, Daily  metoprolol succinate XL, 25 mg, oral, Daily  pantoprazole, 40 mg, oral, Daily before breakfast   Or  pantoprazole, 40 mg, intravenous, Daily before breakfast  polyethylene glycol, 17 g, oral, Daily  pregabalin, 200 mg, oral, TID  tiotropium,  2 puff, inhalation, Daily  vancomycin, 1,500 mg, intravenous, q24h     [2]

## 2025-08-07 NOTE — ASSESSMENT & PLAN NOTE
-On review of available labs (CBC, H&H), patient with baseline Hgb 7s-9s   -On admission, patient with Hgb of 7.9 which is at baseline   -Continue to monitor while admitted

## 2025-08-07 NOTE — NURSING NOTE
"End of Shift Report  8/7/25     Admission  Kael Zepeda was admitted on 8/6/2025 for the following diagnoses:   Gas gangrene of foot (Multi) [A48.0]  Osteomyelitis of right foot, unspecified type (Multi) [M86.9]  Diabetic ulcer of right foot associated with type 2 diabetes mellitus, unspecified part of foot, unspecified ulcer stage [E11.621, L97.519]    Significant Events  Blood administered for low hemoglobin before surgery tomorrow.    Interventions  RBC administered to improve hemoglobin.        Recent Vital Signs  Patient Vitals for the past 12 hrs:   BP Temp Temp src Pulse Resp SpO2 Height Weight   08/07/25 0752 97/59 36.6 °C (97.8 °F) Temporal 92 18 91 % -- --   08/07/25 1126 102/60 36.3 °C (97.4 °F) Temporal 89 16 98 % -- --   08/07/25 1222 -- -- -- -- -- -- 1.702 m (5' 7.01\") 78 kg (171 lb 15.3 oz)   08/07/25 1540 92/53 36.3 °C (97.4 °F) Temporal 89 18 94 % -- --   08/07/25 1544 97/63 36.1 °C (96.9 °F) Temporal 105 16 96 % -- --   08/07/25 1840 99/59 36.6 °C (97.8 °F) Temporal 82 16 98 % -- --   08/07/25 1855 92/56 36.7 °C (98.1 °F) Temporal 78 16 95 % -- --      0-10 (Numeric) Pain Score: 5 - Moderate pain     Latest labs  Lab Results   Component Value Date    WBC 9.8 08/07/2025    HGB 7.0 (L) 08/07/2025    HCT 22.5 (L) 08/07/2025     (H) 08/07/2025    CHOL 122 07/02/2024    TRIG 81 07/02/2024    HDL 45.9 07/02/2024    ALT 16 08/07/2025    AST 36 08/07/2025     (L) 08/07/2025    K 4.0 08/07/2025    CL 98 08/07/2025    CREATININE 0.93 08/07/2025    BUN 10 08/07/2025    CO2 25 08/07/2025    TSH 2.67 07/02/2024    INR 1.2 (H) 07/09/2024    HGBA1C 8.2 (A) 01/31/2025       Other Results  Lactate from 2.5 to 1.4 today.    Scheduled Medications:  Scheduled Medications[1]   Continuous Medications[2]          [1] aspirin, 81 mg, oral, Daily  atorvastatin, 40 mg, oral, Daily  cefTRIAXone, 2 g, intravenous, q24h  DULoxetine, 40 mg, oral, Daily  heparin (porcine), 5,000 Units, subcutaneous, " q8h  hydroxychloroquine, 200 mg, oral, BID  insulin glargine, 40 Units, subcutaneous, q24h  insulin lispro, 0-10 Units, subcutaneous, TID  levothyroxine, 50 mcg, oral, Daily  methadone, 78 mg, oral, Nightly  methadone, 87 mg, oral, Daily  metoprolol succinate XL, 25 mg, oral, Daily  pantoprazole, 40 mg, oral, Daily before breakfast   Or  pantoprazole, 40 mg, intravenous, Daily before breakfast  polyethylene glycol, 17 g, oral, Daily  pregabalin, 200 mg, oral, TID  tiotropium, 2 puff, inhalation, Daily  vancomycin, 1,500 mg, intravenous, q24h  [2]

## 2025-08-07 NOTE — PROGRESS NOTES
08/07/25 1139   Discharge Planning   Living Arrangements Family members   Support Systems Family members   Assistance Needed none   Type of Residence Private residence   Home or Post Acute Services None   Stroke Family Assessment   Stroke Family Assessment Needed No   Intensity of Service   Intensity of Service 0-30 min     Discharge planning assessment completed with patient. He is from home with his brother in a duplex. Patient states there are no stairs at home. He follows with PCP Radha Greer, states he drives himself to appointments and errands. Patient is admitted with a right foot ulcer. He was here for the same a couple of months ago, left AMA when faced with discharging to SNF for IV ATBX. Patient with hx of IVDU and is on Methadone. He said he follows with [facility] for this. He says he has spoken with his clinic and they are aware he is here. Patient was following at the [facility], and with Dr Raya previously. He did not follow up regularly after he left the hospital AMA a couple of months ago. Patient is now requiring a right BKA. Ortho is on board, planning for OR tomorrow. PT/OT is ordered, will see patient for evals post-operatively. Anticipate patient will be recommended for SNF- if agreeable will need a facility that is able to dispense Methadone. [facility] following.

## 2025-08-07 NOTE — ASSESSMENT & PLAN NOTE
Chronic HFrEF (EF 39% 07/2024)   -Patient appears euvolemic if not slightly hypovolemic on exam and is overall compensated from this standpoint  -Last limited echo from 07/24 at OSH reviewed -> overall seemingly similar akinetic segments and WMAs compared to the previous available at [place]. Patient does not have any overt symptoms otherwise.    -Will be continued on home medications (ASA, Toprol)  -Continue outpatient follow-up with cardiology/HF as scheduled

## 2025-08-07 NOTE — CARE PLAN
The patient's goals for the shift include      The clinical goals for the shift include Patient will continue to have manageable level of pain throughout shift.

## 2025-08-07 NOTE — ASSESSMENT & PLAN NOTE
IDDM-II c/b peripheral neuropathy, with current hyperglycemia   -Continue home basal insulin at current dosage (Lantus 40 units nightly -> from last admit until confirmed)  -Continue with SSI   -Continue with accucheks, hypoglycemic protocol   -Optimize glycemic control  -Monitor and adjust as needed   -Last available A1c = 8.2   -Continue home Lyrica

## 2025-08-07 NOTE — PROGRESS NOTES
Social work consult placed for discharge planning. SW reviewed pt's chart and communicated with [facility]. No SW needs foreseen at this time. SW signing off; available upon request.    STEPH Bertrand ([id number])   Care Transitions

## 2025-08-07 NOTE — PROGRESS NOTES
Kael Zepeda is a 60 y.o. male admitted for Diabetic ulcer of right foot associated with type 2 diabetes mellitus, unspecified part of foot, unspecified ulcer stage. Pharmacy reviewed the patient's [id number] medications and allergies for accuracy.    The list below reflects the PTA list prior to pharmacy medication history. A summary a changes to the PTA medication list has been listed below. Please review each medication in order reconciliation for additional clarification and justification.    Source of information:  Surescripts, Methadone clinic, T2P  Medications added:    Medications modified:  Methadone  87mg qam and 78mg qpm --> 10mg/ml 87mg qam and 78mg qpm    Medications to be removed:  Otezla starter pack  Levothyroxine 50mcg- pt says Dr. Del Toro stopped but I can't find any records indicating that.  Pantoprazole 40mg  Prednisone 1mg  Medications of concern:      Prior to Admission Medications   Prescriptions Last Dose Informant Patient Reported? Taking?   DULoxetine 40 mg DR capsule   Yes No   Sig: Take 1 capsule (40 mg) by mouth once daily.   albuterol 90 mcg/actuation inhaler   No No   Sig: Inhale 2 puffs by mouth into the lungs every 6 hours if needed for shortness of breath.   apremilast (Otezla Starter) 10 mg (4)-20 mg (4)-30 mg(19) tablets,dose pack tablet starter pack   Yes No   Sig: Take 1 tablet by mouth 2 times a day.   aspirin 81 mg EC tablet   Yes No   Sig: Take 1 tablet (81 mg) by mouth once daily.   atorvastatin (Lipitor) 40 mg tablet   Yes No   Sig: Take 1 tablet (40 mg) by mouth once daily.   blood sugar diagnostic (True Metrix Glucose Test Strip) strip   No No   Sig: Use to check blood sugar 4 times daily   fluticasone propion-salmeteroL (Wixela Inhub) 100-50 mcg/dose diskus inhaler   No No   Sig: Inhale 1 puff by mouth into the lungs 2 times a day. Rinse mouth with water after use to reduce aftertaste and incidence of candidiasis. Do not swallow.   Patient taking  differently: Inhale 1 puff 2 times a day as needed.   hydroxychloroquine (Plaquenil) 200 mg tablet   Yes Yes   Sig: Take 1 tablet (200 mg) by mouth twice a day.   insulin glargine (Lantus) 100 unit/mL (3 mL) pen   No No   Sig: Inject 25 Units under the skin once every 24 hours.   Patient taking differently: Inject 40 Units under the skin once every 24 hours.   insulin lispro (HumaLOG) 100 unit/mL injection   No No   Sig: Inject 8 Units plus 0 - 10 units per sliding scale under the skin 3 times a day before meals. Take as directed per insulin instructions.   lancets 33 gauge misc   No No   Sig: Use to check blood sugar 4 times daily   levothyroxine (Synthroid, Levoxyl) 50 mcg tablet   Yes No   Sig: Take 1 tablet (50 mcg) by mouth once daily.   metFORMIN (Glucophage) 500 mg tablet   No No   Sig: Take 1 tablet (500 mg) by mouth 2 times a day.   methadone HCl (METHADONE ORAL)   Yes No   Sig: Take 87mg by mouth every morning and 78mg by mouth  every evening   metoprolol succinate XL (Toprol-XL) 25 mg 24 hr tablet   No No   Sig: Take 1 tablet (25 mg) by mouth once daily. Do not crush or chew.   omeprazole (PriLOSEC) 40 mg DR capsule   Yes No   Sig: Take 1 capsule (40 mg) by mouth early in the morning..   pantoprazole (ProtoNix) 40 mg EC tablet   No No   Sig: Take 1 tablet (40 mg) by mouth once daily in the morning. Take before meals. Do not crush, chew, or split.   predniSONE (Deltasone) 1 mg tablet   Yes No   Sig: Take 1 tablet (1 mg) by mouth once daily.   pregabalin (Lyrica) 200 mg capsule   Yes No   Sig: Take 1 capsule (200 mg) by mouth 3 times a day.   tiotropium (Spiriva Respimat) 2.5 mcg/actuation inhaler   No No   Sig: Inhale 2 puffs by mouth into the lungs once daily.      Facility-Administered Medications: None       Maude Giles

## 2025-08-07 NOTE — PROGRESS NOTES
Updates:   Notified by pharmacist the local hospital protocol is to approve a one time dose of 30mg methadone if unable to have confirmation with clinic. Will allow that in place of the 78mg ordered from last admit. Clinic will be called in AM.      Tanisha Giles PA-C  Internal Medicine / Hospitalist Service  [facility]

## 2025-08-07 NOTE — PROGRESS NOTES
Kael Zepeda is a 60 y.o. male on day 1 of admission presenting with Diabetic ulcer of right foot associated with type 2 diabetes mellitus, unspecified part of foot, unspecified ulcer stage.    Review of Systems   Constitutional:  Positive for activity change and fatigue. Negative for chills, diaphoresis and fever.   HENT:  Positive for congestion. Negative for facial swelling, sneezing and sore throat.    Respiratory:  Positive for cough. Negative for choking, chest tightness, shortness of breath and wheezing.    Cardiovascular:  Positive for leg swelling. Negative for chest pain and palpitations.   Gastrointestinal:  Negative for abdominal pain, constipation, diarrhea, nausea and vomiting.   Genitourinary:  Negative for dysuria, hematuria and urgency.   Musculoskeletal:  Positive for back pain and gait problem. Negative for joint swelling and neck pain.   Skin:  Positive for color change and wound. Negative for rash.   Neurological:  Positive for weakness. Negative for syncope and light-headedness.   Psychiatric/Behavioral:  Positive for dysphoric mood. Negative for agitation, confusion and hallucinations. The patient is not nervous/anxious.    All other systems reviewed and are negative.     Subjective   Kael Zepeda is a 60 y.o. male with PMHx s/f HTN, HLD, CAD s/p CABG, transient post-op Afib after CABG (no AC), HFrEF (EF 39% 07/24), COPD (no baseline O2), h/o IVDU (on long-term methadone), h/o Hep C (s/p treatment, in remission), RA (on methotrexate), hypothyroidism, IDDM-II c/b peripheral neuropathy and DM foot wounds (s/p transmetatarsal amputation of the right foot 04/30/25), presenting with advanced R foot wound. Patient had TMA of the R foot in setting of nonhealing wounds and OM 04/30/25. He was supposed to continue outpatient IV antibiotics on discharge and go to rehab at that time; however, he elected to leave the hospital AMA (he reports he was afraid to miss weekly check-in for his  "methadone clinic). Since then, he has only intermittently followed up with the wound care center. He was seen earlier this week at wound care and described feeling weak, having poor appetite, and having increasing drainage from the wound which is malodorous. At that time there was significant concern he was septic and he was told to go to the ED for evaluation. The patient subsequently waited multiple days, coming in ~72 hours later, because he was \"finally ready.\" He has continued to feel ill the entire time. He has poor intake and malaise. Has also experienced swelling of the RLE > LLE. He is a bit of a difficult historian, reporting he takes \"a lot\" of medications but cannot name them or their doses. He has a difficult time telling me a time frame on his symptoms overall. Denies cp/pressure, palpitations, diaphoresis, SOB, CALDERON, dizziness / lightheadedness, syncope or near syncope, HA, vision changes, f/c/v/d/abd pain.     8/7: Patient seen.  No new complaints.  Patient states he is willing to proceed with right below-knee amputation as recommended by podiatry.  Patient has advanced osteomyelitis in the 4th and 5th metatarsal and cuboid with osteolytic destruction.  Podiatry states foot is not salvageable.  There is however no evidence of necrotizing fasciitis.  Referral was made to orthopedic surgery and the current plan is to take him for a below the knee amputation tomorrow 8/8.  Hemoglobin today is noted to be low at 7.0.  Patient is asymptomatic.  Hemoglobin was 7.9 on admission.  No obvious bleeding.  However given the plan for his amputation tomorrow I have elected to transfuse 1 unit of PRBCs.  Patient has completed consent form.  He is in agreement with plan to proceed with amputation tomorrow.       Objective     Last Recorded Vitals  BP 97/59 (BP Location: Left arm, Patient Position: Sitting)   Pulse 92   Temp 36.6 °C (97.8 °F) (Temporal)   Resp 18   Wt 78 kg (171 lb 14.4 oz)   SpO2 91% "   Intake/Output last 3 Shifts:    Intake/Output Summary (Last 24 hours) at 8/7/2025 0835  Last data filed at 8/7/2025 0626  Gross per 24 hour   Intake 540 ml   Output 1400 ml   Net -860 ml       Admission Weight  Weight: 81.6 kg (180 lb) (08/06/25 1502)    Daily Weight  08/07/25 : 78 kg (171 lb 14.4 oz)      Physical Exam     Lab Results  Results for orders placed or performed during the hospital encounter of 08/06/25 (from the past 24 hours)   CBC and Auto Differential   Result Value Ref Range    WBC 11.0 4.4 - 11.3 x10*3/uL    nRBC 0.0 0.0 - 0.0 /100 WBCs    RBC 3.72 (L) 4.50 - 5.90 x10*6/uL    Hemoglobin 7.9 (L) 13.5 - 17.5 g/dL    Hematocrit 26.1 (L) 41.0 - 52.0 %    MCV 70 (L) 80 - 100 fL    MCH 21.2 (L) 26.0 - 34.0 pg    MCHC 30.3 (L) 32.0 - 36.0 g/dL    RDW 18.1 (H) 11.5 - 14.5 %    Platelets 641 (H) 150 - 450 x10*3/uL    Neutrophils % 78.8 40.0 - 80.0 %    Immature Granulocytes %, Automated 2.6 (H) 0.0 - 0.9 %    Lymphocytes % 6.6 13.0 - 44.0 %    Monocytes % 10.0 2.0 - 10.0 %    Eosinophils % 1.5 0.0 - 6.0 %    Basophils % 0.5 0.0 - 2.0 %    Neutrophils Absolute 8.65 (H) 1.20 - 7.70 x10*3/uL    Immature Granulocytes Absolute, Automated 0.29 0.00 - 0.70 x10*3/uL    Lymphocytes Absolute 0.72 (L) 1.20 - 4.80 x10*3/uL    Monocytes Absolute 1.10 (H) 0.10 - 1.00 x10*3/uL    Eosinophils Absolute 0.16 0.00 - 0.70 x10*3/uL    Basophils Absolute 0.05 0.00 - 0.10 x10*3/uL   Comprehensive Metabolic Panel   Result Value Ref Range    Glucose 320 (H) 74 - 99 mg/dL    Sodium 127 (L) 136 - 145 mmol/L    Potassium 3.8 3.5 - 5.3 mmol/L    Chloride 95 (L) 98 - 107 mmol/L    Bicarbonate 22 21 - 32 mmol/L    Anion Gap 14 10 - 20 mmol/L    Urea Nitrogen 12 6 - 23 mg/dL    Creatinine 0.92 0.50 - 1.30 mg/dL    eGFR >90 >60 mL/min/1.73m*2    Calcium 8.7 8.6 - 10.3 mg/dL    Albumin 3.0 (L) 3.4 - 5.0 g/dL    Alkaline Phosphatase 182 (H) 33 - 136 U/L    Total Protein 7.1 6.4 - 8.2 g/dL    AST 35 9 - 39 U/L    Bilirubin, Total 0.6 0.0 -  1.2 mg/dL    ALT 17 10 - 52 U/L   Lactate   Result Value Ref Range    Lactate 3.1 (H) 0.4 - 2.0 mmol/L   Blood Culture    Specimen: Peripheral Venipuncture; Blood culture   Result Value Ref Range    Blood Culture Loaded on Instrument - Culture in progress    Blood Culture    Specimen: Peripheral Venipuncture; Blood culture   Result Value Ref Range    Blood Culture Loaded on Instrument - Culture in progress    B-Type Natriuretic Peptide   Result Value Ref Range     (H) 0 - 99 pg/mL   C-Reactive Protein   Result Value Ref Range    C-Reactive Protein 20.13 (H) <1.00 mg/dL   Sedimentation Rate   Result Value Ref Range    Sedimentation Rate 89 (H) 0 - 20 mm/h   Troponin I, High Sensitivity, Initial   Result Value Ref Range    Troponin I, High Sensitivity 4 0 - 20 ng/L   Troponin, High Sensitivity, 1 Hour   Result Value Ref Range    Troponin I, High Sensitivity 5 0 - 20 ng/L   Lactate   Result Value Ref Range    Lactate 2.3 (H) 0.4 - 2.0 mmol/L   Urinalysis with Reflex Culture and Microscopic   Result Value Ref Range    Color, Urine Yellow Light-Yellow, Yellow, Dark-Yellow    Appearance, Urine Clear Clear    Specific Gravity, Urine 1.014 1.005 - 1.035    pH, Urine 6.0 5.0, 5.5, 6.0, 6.5, 7.0, 7.5, 8.0    Protein, Urine 20 (TRACE) NEGATIVE, 10 (TRACE), 20 (TRACE) mg/dL    Glucose, Urine 1000 (4+) (A) Normal mg/dL    Blood, Urine NEGATIVE NEGATIVE mg/dL    Ketones, Urine NEGATIVE NEGATIVE mg/dL    Bilirubin, Urine NEGATIVE NEGATIVE mg/dL    Urobilinogen, Urine 4 (2+) (A) Normal mg/dL    Nitrite, Urine NEGATIVE NEGATIVE    Leukocyte Esterase, Urine NEGATIVE NEGATIVE   Urinalysis Microscopic   Result Value Ref Range    WBC, Urine 1-5 1-5, NONE /HPF    RBC, Urine 1-2 NONE, 1-2, 3-5 /HPF    Squamous Epithelial Cells, Urine 1-9 (SPARSE) Reference range not established. /HPF    Mucus, Urine FEW Reference range not established. /LPF    Hyaline Casts, Urine 1+ (A) NONE /LPF   Tissue/Wound Culture/Smear    Specimen:  Wound/Tissue; Tissue/Biopsy   Result Value Ref Range    Gram Stain (2+) Few Polymorphonuclear leukocytes (A)     Gram Stain (1+) Rare Gram positive cocci (A)    POCT GLUCOSE   Result Value Ref Range    POCT Glucose 183 (H) 74 - 99 mg/dL   Lactate   Result Value Ref Range    Lactate 2.5 (H) 0.4 - 2.0 mmol/L   Comprehensive Metabolic Panel   Result Value Ref Range    Glucose 90 74 - 99 mg/dL    Sodium 131 (L) 136 - 145 mmol/L    Potassium 4.0 3.5 - 5.3 mmol/L    Chloride 98 98 - 107 mmol/L    Bicarbonate 25 21 - 32 mmol/L    Anion Gap 12 10 - 20 mmol/L    Urea Nitrogen 10 6 - 23 mg/dL    Creatinine 0.93 0.50 - 1.30 mg/dL    eGFR >90 >60 mL/min/1.73m*2    Calcium 8.3 (L) 8.6 - 10.3 mg/dL    Albumin 2.9 (L) 3.4 - 5.0 g/dL    Alkaline Phosphatase 257 (H) 33 - 136 U/L    Total Protein 6.6 6.4 - 8.2 g/dL    AST 36 9 - 39 U/L    Bilirubin, Total 0.7 0.0 - 1.2 mg/dL    ALT 16 10 - 52 U/L   CBC and Auto Differential   Result Value Ref Range    WBC 9.8 4.4 - 11.3 x10*3/uL    nRBC 0.0 0.0 - 0.0 /100 WBCs    RBC 3.25 (L) 4.50 - 5.90 x10*6/uL    Hemoglobin 7.0 (L) 13.5 - 17.5 g/dL    Hematocrit 22.5 (L) 41.0 - 52.0 %    MCV 69 (L) 80 - 100 fL    MCH 21.5 (L) 26.0 - 34.0 pg    MCHC 31.1 (L) 32.0 - 36.0 g/dL    RDW 18.0 (H) 11.5 - 14.5 %    Platelets 633 (H) 150 - 450 x10*3/uL    Neutrophils % 69.0 40.0 - 80.0 %    Immature Granulocytes %, Automated 1.9 (H) 0.0 - 0.9 %    Lymphocytes % 11.0 13.0 - 44.0 %    Monocytes % 15.0 2.0 - 10.0 %    Eosinophils % 2.3 0.0 - 6.0 %    Basophils % 0.8 0.0 - 2.0 %    Neutrophils Absolute 6.75 1.20 - 7.70 x10*3/uL    Immature Granulocytes Absolute, Automated 0.19 0.00 - 0.70 x10*3/uL    Lymphocytes Absolute 1.08 (L) 1.20 - 4.80 x10*3/uL    Monocytes Absolute 1.47 (H) 0.10 - 1.00 x10*3/uL    Eosinophils Absolute 0.23 0.00 - 0.70 x10*3/uL    Basophils Absolute 0.08 0.00 - 0.10 x10*3/uL   Magnesium   Result Value Ref Range    Magnesium 1.62 1.60 - 2.40 mg/dL   POCT GLUCOSE   Result Value Ref Range     POCT Glucose 74 74 - 99 mg/dL     *Note: Due to a large number of results and/or encounters for the requested time period, some results have not been displayed. A complete set of results can be found in Results Review.        Image Results  CT foot right w IV contrast  Narrative: Interpreted By:  Kai Waterman,   STUDY:  CT FOOT RIGHT W IV CONTRAST;  8/6/2025 5:54 pm      INDICATION:  Signs/Symptoms:Preoperative evaluation for pending BKA osteomyelitis  with diabetic foot ulcer.      COMPARISON:  Same day earlier radiographs.      ACCESSION NUMBER(S):  [id number]      ORDERING CLINICIAN:  YESI BEACH      TECHNIQUE:  CT imaging of the right foot was obtained with administration of  intravenous contrast medium. Coronal and sagittal reformatted images  were performed. 3D reformatted images were created and reviewed at an  independent workstation.      FINDINGS:      OSSEOUS STRUCTURES:  Prior transmetatarsal amputation. There is osseous destruction of the  of the 4th and 5th metatarsal bases as well as the cuboid bone,  consistent with acute osteomyelitis. There is subtle lucency and  cortical irregularity of the base of the 3rd metatarsal bone, likely  additional site of osteomyelitis involvement. No acute fracture or  dislocation.      SOFT TISSUES:  There is marked skin thickening and subcutaneous edema involving the  entire midfoot and amputation stump. There are multiple gaseous foci  in the amputation stump as well as clustering adjacent to the site of  acute osteomyelitis in the 4th and 5th metatarsal bases and cuboid,  concerning for necrotizing soft tissue infection. No organized rim  enhancing fluid collection. Diffuse fatty atrophy of intrinsic foot  musculature.      Impression:     Necrotizing soft tissue infection of the amputation stump with acute  osteomyelitis involving the 4th and 5th metatarsal bases, cuboid and  likely 3rd metatarsal base. No rim enhancing fluid collection.      MACRO:       Kai Waterman discussed the significance and urgency of this  critical finding by EPIC secure chat with  YESI BEACH on 8/6/2025  at 6:37 pm.  (**-RCF-**) Findings:  See findings.          Signed by: Kai Waterman 8/6/2025 6:46 PM  Dictation workstation:   [id number]  Vascular US Lower Extremity Venous Duplex Right  Narrative: Interpreted By:  David Og,   STUDY:  VASC US LOWER EXTREMITY VENOUS DUPLEX RIGHT  8/6/2025 5:27 pm      INDICATION:  61 y/o   M with  Signs/Symptoms:RLE swelling and pain, history of  decreased movement and osteomyelitis of the ankle and foot. LMP:  Unknown.              COMPARISON:  None.      ACCESSION NUMBER(S):  [id number]      ORDERING CLINICIAN:  YESI BEACH      TECHNIQUE:  Routine ultrasound of the  right lower extremity was performed with  duplex Doppler (color and spectral) evaluation.   Static images were  obtained for remote interpretation.      FINDINGS:  THIGH VEINS:  The common femoral, femoral, popliteal, proximal medial  saphenous, and deep femoral veins are patent and free of thrombus.  The veins are normally compressible.  They demonstrate normal phasic  flow and augmentation response.      CALF VEINS:  The paired peroneal and posterior tibial calf veins are  patent.      Impression: Negative study.  No deep venous thrombosis of the  right lower  extremity.      MACRO:  None      Signed by: David Og 8/6/2025 5:59 PM  Dictation workstation:   [id number]  XR chest 1 view  Narrative: Interpreted By:  Pia Khan,   STUDY:  XR CHEST 1 VIEW;  8/6/2025 3:53 pm      INDICATION:  Signs/Symptoms:chest congestion.      COMPARISON:  07/17/2024      ACCESSION NUMBER(S):  [id number]      ORDERING CLINICIAN:  YESI BEACH      FINDINGS:  CARDIOMEDIASTINAL SILHOUETTE:  The heart is enlarged but unchanged.  There are sternotomy wires from prior cardiac surgery.      LUNGS:  There is mild vascular congestion.  There is no overt pulmonary edema, focal  infiltrate or pleural fluid.  There are probable underlying chronic interstitial changes of the  lungs.      ABDOMEN:  No remarkable upper abdominal findings.          BONES:  No acute osseous changes.      Impression: No acute cardiopulmonary process.      MACRO:  None      Signed by: Pia Khan 8/6/2025 4:16 PM  Dictation workstation:   [id number]  XR foot right 3+ views  Narrative: Interpreted By:  Pia Khan,   STUDY:  XR FOOT RIGHT 3+ VIEWS; ;  8/6/2025 3:25 pm      INDICATION:  Signs/Symptoms:diabetic foot ulcer; evaluate for lytic lesions  suspicious of osteomyelitis.          COMPARISON:  04/29/2025      ACCESSION NUMBER(S):  [id number]      ORDERING CLINICIAN:  YESI BAECH      FINDINGS:  Three views right foot:  Status post transmetatarsal amputation.  There is swelling.  There is air in the soft tissues. This could be secondary to  gas-forming infection. There is an ulcer along the plantar aspect of  the right forefoot measuring 1.4 cm in length and 0.4 cm in depth.  There is bony destruction of the base of the 5th metatarsal  consistent with osteomyelitis. There is osteomyelitis of the cuboid.  There is osteomyelitis of the base of the 4th metatarsal. There is  also a fracture of the base of the 5th metatarsal.      Impression: 1. Status post transmetatarsal amputation.  2. Soft tissue swelling with an ulcer along the plantar aspect of the  right or fluid. There is air in the soft tissues, likely gas-forming  infection.  3. Bony destruction of the bases of the 4th and 5th metatarsals and  cuboid consistent with osteomyelitis. Areas also a fracture of the  base of the 5th metatarsal.              MACRO:  None      Signed by: Pia Khan 8/6/2025 4:14 PM  Dictation workstation:   [id number]       Assessment/Plan     Assessment & Plan  Diabetic ulcer of right foot associated with type 2 diabetes mellitus, unspecified part of foot, unspecified ulcer stage  -Follow fever curve,  WBCs  -Continue antibiotic coverage with vanc/zosyn/clindamycin for now (based on CT results; however, reviewed Dr. Raya's note and patient's exam, not clinically c/w nec fasc)  -High risk of morbidity from additional drug therapy requiring intensive monitoring for toxicity: Vancomycin IV   -(+) monitor for sxs of toxicity and/or rxn related to vancomycin; appreciate pharmacy assistance with management  -Podiatry + Orthopedics consultation appreciated   -ID consultation appreciated   -Wound care consultation appreciated     8/7: No evidence of necrotizing fasciitis.  Does have osteomyelitis in his 4th and 5th metatarsal as well as his cuboid with osteolytic damage.  Podiatry states his foot is not salvageable and recommends BKA.  Patient wishes to proceed.  Referral made to orthopedic surgery, current plan for surgery on 8/8.  Continue antibiotic therapy for now, may be able to de-escalate or discontinue soon.  Sepsis with acute organ dysfunction (Multi)  -Clinically patient meets sepsis criteria and is being treated appropriately as outlined below  -Inflammatory markers: CRP 20.13, ESR 89   -Imaging results reviewed (pertinent): XR R Foot, CT R Foot, and DVT US (R)  -Lactate 3.1 -> 2.3 -> repeat pending  -BP is low-normal. Patient has had poor intake/appetite. He appears perhaps a bit volume down on exam. He rec'd 500cc bolus in ED, will order another. Have to be cautious with HF history.   -Blood cultures x2 and wound culture collected, in process.   Type 2 diabetes mellitus, with long-term current use of insulin  IDDM-II c/b peripheral neuropathy, with current hyperglycemia   -Continue home basal insulin at current dosage (Lantus 40 units nightly -> from last admit until confirmed)  -Continue with SSI   -Continue with accucheks, hypoglycemic protocol   -Optimize glycemic control  -Monitor and adjust as needed   -Last available A1c = 8.2   -Continue home Lyrica  Benign essential hypertension  -BP: low normal on  presentation, home therapies (Metoprolol Succinate 25mg AM) will be continued. Close monitoring and adjust as needed.   Fibromyalgia  Anxiety, depression  Fibromyalgia   -Patient will be continued on home therapies (Cymbalta)   -Continue home Lyrica  Hyperlipidemia, mixed  -HLD: will be continued on home therapies (Lipitor 40mg).  Hypothyroidism, acquired  -Continue home levothyroxine   Opioid dependence, in remission  -Continue home methadone dosing -> patient unable to confirm dosing. I spoke with pharmacy, his methadone clinic is closed for the evening. I have reviewed the patient's lost hospitalization and resumed the evening dose of 78mg from then, as the patient does not believe he has had any significant changes recently in that dose. Pharmacy will call the clinic in the morning and work on confirming morning dose and confirming no change to the evening doses.   -Patient is on long term methadone for management   -Patient unable to confirm dosing. I spoke with pharmacy, his methadone clinic is closed for the evening. I have reviewed the patient's lost hospitalization and resumed the evening dose of 78mg from then, as the patient does not believe he has had any significant changes recently in that dose. Pharmacy will call the clinic in the morning and work on confirming morning dose and confirming no change to the evening doses.  8/7: Was able to confirm his rather large methadone dosing.  Referral made to pharmacy for assistance with pain control following surgery.  Rheumatoid arthritis involving both hands with positive rheumatoid factor (Multi)  -Continue home methotrexate   CAD in native artery  -CAD: no current reports of chest pain or anginal equivalents. Continue 81mg ASA.   -Continued outpatient follow-up with cardiology and PCP as scheduled   Hepatitis C  -In remission, s/p treatment  Osteomyelitis of left foot, unspecified type (Multi)    Chronic systolic heart failure  Chronic HFrEF (EF 39% 07/2024)    -Patient appears euvolemic if not slightly hypovolemic on exam and is overall compensated from this standpoint  -Last limited echo from 07/24 at OSH reviewed -> overall seemingly similar akinetic segments and WMAs compared to the previous available at [facility]. Patient does not have any overt symptoms otherwise.    -Will be continued on home medications (ASA, Toprol)  -Continue outpatient follow-up with cardiology/HF as scheduled   Chronic anemia  -On review of available labs (CBC, H&H), patient with baseline Hgb 7s-9s   -On admission, patient with Hgb of 7.9 which is at baseline   -Continue to monitor while admitted   Hyponatremia  -Na 127 -> corrects to 132 for glucose of 320        David Keys MD

## 2025-08-07 NOTE — ASSESSMENT & PLAN NOTE
-CAD: no current reports of chest pain or anginal equivalents. Continue 81mg ASA.   -Continued outpatient follow-up with cardiology and PCP as scheduled

## 2025-08-08 ENCOUNTER — ANESTHESIA (OUTPATIENT)
Dept: OPERATING ROOM | Facility: HOSPITAL | Age: 60
End: 2025-08-08
Payer: MEDICARE

## 2025-08-08 ENCOUNTER — ANESTHESIA EVENT (OUTPATIENT)
Dept: OPERATING ROOM | Facility: HOSPITAL | Age: 60
End: 2025-08-08
Payer: MEDICARE

## 2025-08-08 LAB
ANION GAP SERPL CALC-SCNC: 13 MMOL/L (ref 10–20)
BLOOD EXPIRATION DATE: NORMAL
BUN SERPL-MCNC: 10 MG/DL (ref 6–23)
CALCIUM SERPL-MCNC: 8.5 MG/DL (ref 8.6–10.3)
CHLORIDE SERPL-SCNC: 101 MMOL/L (ref 98–107)
CO2 SERPL-SCNC: 22 MMOL/L (ref 21–32)
CREAT SERPL-MCNC: 0.84 MG/DL (ref 0.5–1.3)
DISPENSE STATUS: NORMAL
EGFRCR SERPLBLD CKD-EPI 2021: >90 ML/MIN/1.73M*2
ERYTHROCYTE [DISTWIDTH] IN BLOOD BY AUTOMATED COUNT: 18.6 % (ref 11.5–14.5)
GLUCOSE BLD MANUAL STRIP-MCNC: 142 MG/DL (ref 74–99)
GLUCOSE BLD MANUAL STRIP-MCNC: 152 MG/DL (ref 74–99)
GLUCOSE BLD MANUAL STRIP-MCNC: 184 MG/DL (ref 74–99)
GLUCOSE BLD MANUAL STRIP-MCNC: 226 MG/DL (ref 74–99)
GLUCOSE SERPL-MCNC: 155 MG/DL (ref 74–99)
HCT VFR BLD AUTO: 26.9 % (ref 41–52)
HGB BLD-MCNC: 8.2 G/DL (ref 13.5–17.5)
MCH RBC QN AUTO: 21.7 PG (ref 26–34)
MCHC RBC AUTO-ENTMCNC: 30.5 G/DL (ref 32–36)
MCV RBC AUTO: 71 FL (ref 80–100)
NRBC BLD-RTO: 0 /100 WBCS (ref 0–0)
PLATELET # BLD AUTO: 677 X10*3/UL (ref 150–450)
POTASSIUM SERPL-SCNC: 4 MMOL/L (ref 3.5–5.3)
PRODUCT BLOOD TYPE: 6200
PRODUCT CODE: NORMAL
RBC # BLD AUTO: 3.78 X10*6/UL (ref 4.5–5.9)
SODIUM SERPL-SCNC: 132 MMOL/L (ref 136–145)
UNIT ABO: NORMAL
UNIT NUMBER: NORMAL
UNIT RH: NORMAL
UNIT VOLUME: 350
VANCOMYCIN SERPL-MCNC: 18.8 UG/ML (ref 5–20)
WBC # BLD AUTO: 8.1 X10*3/UL (ref 4.4–11.3)
XM INTEP: NORMAL

## 2025-08-08 PROCEDURE — 82947 ASSAY GLUCOSE BLOOD QUANT: CPT

## 2025-08-08 PROCEDURE — 3600000008 HC OR TIME - EACH INCREMENTAL 1 MINUTE - PROCEDURE LEVEL THREE: Performed by: SPECIALIST

## 2025-08-08 PROCEDURE — 99233 SBSQ HOSP IP/OBS HIGH 50: CPT | Performed by: INTERNAL MEDICINE

## 2025-08-08 PROCEDURE — 2500000004 HC RX 250 GENERAL PHARMACY W/ HCPCS (ALT 636 FOR OP/ED): Performed by: SPECIALIST

## 2025-08-08 PROCEDURE — 2500000001 HC RX 250 WO HCPCS SELF ADMINISTERED DRUGS (ALT 637 FOR MEDICARE OP): Performed by: STUDENT IN AN ORGANIZED HEALTH CARE EDUCATION/TRAINING PROGRAM

## 2025-08-08 PROCEDURE — 3700000002 HC GENERAL ANESTHESIA TIME - EACH INCREMENTAL 1 MINUTE: Performed by: SPECIALIST

## 2025-08-08 PROCEDURE — 27880 AMPUTATION OF LOWER LEG: CPT | Performed by: SPECIALIST

## 2025-08-08 PROCEDURE — 2500000004 HC RX 250 GENERAL PHARMACY W/ HCPCS (ALT 636 FOR OP/ED): Performed by: STUDENT IN AN ORGANIZED HEALTH CARE EDUCATION/TRAINING PROGRAM

## 2025-08-08 PROCEDURE — 7100000001 HC RECOVERY ROOM TIME - INITIAL BASE CHARGE: Performed by: SPECIALIST

## 2025-08-08 PROCEDURE — 2500000001 HC RX 250 WO HCPCS SELF ADMINISTERED DRUGS (ALT 637 FOR MEDICARE OP): Performed by: SPECIALIST

## 2025-08-08 PROCEDURE — 7100000002 HC RECOVERY ROOM TIME - EACH INCREMENTAL 1 MINUTE: Performed by: SPECIALIST

## 2025-08-08 PROCEDURE — 85027 COMPLETE CBC AUTOMATED: CPT | Performed by: INTERNAL MEDICINE

## 2025-08-08 PROCEDURE — 2500000002 HC RX 250 W HCPCS SELF ADMINISTERED DRUGS (ALT 637 FOR MEDICARE OP, ALT 636 FOR OP/ED): Performed by: INTERNAL MEDICINE

## 2025-08-08 PROCEDURE — 2500000002 HC RX 250 W HCPCS SELF ADMINISTERED DRUGS (ALT 637 FOR MEDICARE OP, ALT 636 FOR OP/ED): Performed by: SPECIALIST

## 2025-08-08 PROCEDURE — 3700000001 HC GENERAL ANESTHESIA TIME - INITIAL BASE CHARGE: Performed by: SPECIALIST

## 2025-08-08 PROCEDURE — 0Y6H0Z1 DETACHMENT AT RIGHT LOWER LEG, HIGH, OPEN APPROACH: ICD-10-PCS | Performed by: SPECIALIST

## 2025-08-08 PROCEDURE — 2060000001 HC INTERMEDIATE ICU ROOM DAILY

## 2025-08-08 PROCEDURE — 80048 BASIC METABOLIC PNL TOTAL CA: CPT | Performed by: INTERNAL MEDICINE

## 2025-08-08 PROCEDURE — S0109 METHADONE ORAL 5MG: HCPCS | Performed by: INTERNAL MEDICINE

## 2025-08-08 PROCEDURE — 2500000004 HC RX 250 GENERAL PHARMACY W/ HCPCS (ALT 636 FOR OP/ED): Performed by: NURSE ANESTHETIST, CERTIFIED REGISTERED

## 2025-08-08 PROCEDURE — 2500000004 HC RX 250 GENERAL PHARMACY W/ HCPCS (ALT 636 FOR OP/ED): Performed by: ANESTHESIOLOGY

## 2025-08-08 PROCEDURE — 2500000005 HC RX 250 GENERAL PHARMACY W/O HCPCS: Performed by: SPECIALIST

## 2025-08-08 PROCEDURE — 2720000007 HC OR 272 NO HCPCS: Performed by: SPECIALIST

## 2025-08-08 PROCEDURE — 36415 COLL VENOUS BLD VENIPUNCTURE: CPT | Performed by: INTERNAL MEDICINE

## 2025-08-08 PROCEDURE — 3600000003 HC OR TIME - INITIAL BASE CHARGE - PROCEDURE LEVEL THREE: Performed by: SPECIALIST

## 2025-08-08 PROCEDURE — 80202 ASSAY OF VANCOMYCIN: CPT | Performed by: PHARMACIST

## 2025-08-08 RX ORDER — FENTANYL CITRATE 50 UG/ML
INJECTION, SOLUTION INTRAMUSCULAR; INTRAVENOUS AS NEEDED
Status: DISCONTINUED | OUTPATIENT
Start: 2025-08-08 | End: 2025-08-08

## 2025-08-08 RX ORDER — MORPHINE SULFATE 2 MG/ML
2 INJECTION, SOLUTION INTRAMUSCULAR; INTRAVENOUS EVERY 5 MIN PRN
Status: DISCONTINUED | OUTPATIENT
Start: 2025-08-08 | End: 2025-08-08 | Stop reason: HOSPADM

## 2025-08-08 RX ORDER — SODIUM CHLORIDE 9 MG/ML
75 INJECTION, SOLUTION INTRAVENOUS CONTINUOUS
Status: DISCONTINUED | OUTPATIENT
Start: 2025-08-08 | End: 2025-08-08

## 2025-08-08 RX ORDER — DIPHENHYDRAMINE HYDROCHLORIDE 50 MG/ML
12.5 INJECTION, SOLUTION INTRAMUSCULAR; INTRAVENOUS ONCE AS NEEDED
Status: DISCONTINUED | OUTPATIENT
Start: 2025-08-08 | End: 2025-08-08 | Stop reason: HOSPADM

## 2025-08-08 RX ORDER — LIDOCAINE HYDROCHLORIDE 10 MG/ML
0.1 INJECTION, SOLUTION EPIDURAL; INFILTRATION; INTRACAUDAL; PERINEURAL ONCE
Status: DISCONTINUED | OUTPATIENT
Start: 2025-08-08 | End: 2025-08-08 | Stop reason: HOSPADM

## 2025-08-08 RX ORDER — ROPIVACAINE HYDROCHLORIDE 5 MG/ML
INJECTION, SOLUTION EPIDURAL; INFILTRATION; PERINEURAL AS NEEDED
Status: DISCONTINUED | OUTPATIENT
Start: 2025-08-08 | End: 2025-08-08

## 2025-08-08 RX ORDER — FAMOTIDINE 10 MG/ML
20 INJECTION, SOLUTION INTRAVENOUS ONCE
Status: COMPLETED | OUTPATIENT
Start: 2025-08-08 | End: 2025-08-08

## 2025-08-08 RX ORDER — PROPOFOL 10 MG/ML
INJECTION, EMULSION INTRAVENOUS AS NEEDED
Status: DISCONTINUED | OUTPATIENT
Start: 2025-08-08 | End: 2025-08-08

## 2025-08-08 RX ORDER — PHENYLEPHRINE HCL IN 0.9% NACL 0.4MG/10ML
SYRINGE (ML) INTRAVENOUS AS NEEDED
Status: DISCONTINUED | OUTPATIENT
Start: 2025-08-08 | End: 2025-08-08

## 2025-08-08 RX ORDER — NALOXONE HYDROCHLORIDE 0.4 MG/ML
0.2 INJECTION, SOLUTION INTRAMUSCULAR; INTRAVENOUS; SUBCUTANEOUS EVERY 5 MIN PRN
Status: ACTIVE | OUTPATIENT
Start: 2025-08-08

## 2025-08-08 RX ORDER — METHADONE HYDROCHLORIDE 10 MG/ML
87 CONCENTRATE ORAL DAILY
Refills: 0 | Status: DISPENSED | OUTPATIENT
Start: 2025-08-09

## 2025-08-08 RX ORDER — SODIUM CHLORIDE 0.9 G/100ML
INJECTION, SOLUTION IRRIGATION AS NEEDED
Status: DISCONTINUED | OUTPATIENT
Start: 2025-08-08 | End: 2025-08-08 | Stop reason: HOSPADM

## 2025-08-08 RX ORDER — MIDAZOLAM HYDROCHLORIDE 1 MG/ML
INJECTION, SOLUTION INTRAMUSCULAR; INTRAVENOUS AS NEEDED
Status: DISCONTINUED | OUTPATIENT
Start: 2025-08-08 | End: 2025-08-08

## 2025-08-08 RX ORDER — HYDRALAZINE HYDROCHLORIDE 20 MG/ML
5 INJECTION INTRAMUSCULAR; INTRAVENOUS EVERY 30 MIN PRN
Status: DISCONTINUED | OUTPATIENT
Start: 2025-08-08 | End: 2025-08-08 | Stop reason: HOSPADM

## 2025-08-08 RX ORDER — METHADONE HYDROCHLORIDE 10 MG/ML
78 CONCENTRATE ORAL NIGHTLY
Refills: 0 | Status: DISPENSED | OUTPATIENT
Start: 2025-08-09

## 2025-08-08 RX ORDER — ONDANSETRON HYDROCHLORIDE 2 MG/ML
4 INJECTION, SOLUTION INTRAVENOUS ONCE AS NEEDED
Status: DISCONTINUED | OUTPATIENT
Start: 2025-08-08 | End: 2025-08-08 | Stop reason: HOSPADM

## 2025-08-08 RX ORDER — BUPIVACAINE HYDROCHLORIDE 2.5 MG/ML
INJECTION, SOLUTION EPIDURAL; INFILTRATION; INTRACAUDAL; PERINEURAL AS NEEDED
Status: DISCONTINUED | OUTPATIENT
Start: 2025-08-08 | End: 2025-08-08

## 2025-08-08 RX ORDER — ALBUTEROL SULFATE 0.83 MG/ML
2.5 SOLUTION RESPIRATORY (INHALATION) ONCE AS NEEDED
Status: DISCONTINUED | OUTPATIENT
Start: 2025-08-08 | End: 2025-08-08 | Stop reason: HOSPADM

## 2025-08-08 RX ORDER — ASPIRIN 81 MG/1
81 TABLET ORAL DAILY
Status: DISPENSED | OUTPATIENT
Start: 2025-08-08

## 2025-08-08 RX ORDER — LIDOCAINE HYDROCHLORIDE AND EPINEPHRINE 10; 10 UG/ML; MG/ML
INJECTION, SOLUTION INFILTRATION; PERINEURAL AS NEEDED
Status: DISCONTINUED | OUTPATIENT
Start: 2025-08-08 | End: 2025-08-08

## 2025-08-08 RX ORDER — SODIUM CHLORIDE, SODIUM LACTATE, POTASSIUM CHLORIDE, CALCIUM CHLORIDE 600; 310; 30; 20 MG/100ML; MG/100ML; MG/100ML; MG/100ML
75 INJECTION, SOLUTION INTRAVENOUS CONTINUOUS
Status: DISCONTINUED | OUTPATIENT
Start: 2025-08-08 | End: 2025-08-08

## 2025-08-08 RX ORDER — SODIUM CHLORIDE 9 MG/ML
75 INJECTION, SOLUTION INTRAVENOUS CONTINUOUS
Status: ACTIVE | OUTPATIENT
Start: 2025-08-08 | End: 2025-08-09

## 2025-08-08 RX ORDER — LABETALOL HYDROCHLORIDE 5 MG/ML
5 INJECTION, SOLUTION INTRAVENOUS ONCE AS NEEDED
Status: DISCONTINUED | OUTPATIENT
Start: 2025-08-08 | End: 2025-08-08 | Stop reason: HOSPADM

## 2025-08-08 RX ORDER — OXYCODONE AND ACETAMINOPHEN 5; 325 MG/1; MG/1
1 TABLET ORAL EVERY 4 HOURS PRN
Status: DISCONTINUED | OUTPATIENT
Start: 2025-08-08 | End: 2025-08-08 | Stop reason: HOSPADM

## 2025-08-08 RX ORDER — SODIUM CHLORIDE, SODIUM LACTATE, POTASSIUM CHLORIDE, CALCIUM CHLORIDE 600; 310; 30; 20 MG/100ML; MG/100ML; MG/100ML; MG/100ML
100 INJECTION, SOLUTION INTRAVENOUS CONTINUOUS
Status: DISCONTINUED | OUTPATIENT
Start: 2025-08-08 | End: 2025-08-08 | Stop reason: HOSPADM

## 2025-08-08 RX ADMIN — POLYETHYLENE GLYCOL 3350 17 G: 17 POWDER, FOR SOLUTION ORAL at 08:32

## 2025-08-08 RX ADMIN — ATORVASTATIN CALCIUM 40 MG: 40 TABLET, FILM COATED ORAL at 21:33

## 2025-08-08 RX ADMIN — PROPOFOL 70 MG: 10 INJECTION, EMULSION INTRAVENOUS at 13:11

## 2025-08-08 RX ADMIN — Medication 100 MCG: at 13:59

## 2025-08-08 RX ADMIN — LIDOCAINE HYDROCHLORIDE,EPINEPHRINE BITARTRATE 5 ML: 10; .01 INJECTION, SOLUTION INFILTRATION; PERINEURAL at 12:48

## 2025-08-08 RX ADMIN — PREGABALIN 200 MG: 75 CAPSULE ORAL at 17:49

## 2025-08-08 RX ADMIN — SODIUM CHLORIDE 75 ML/HR: 0.9 INJECTION, SOLUTION INTRAVENOUS at 15:56

## 2025-08-08 RX ADMIN — Medication 100 MCG: at 13:21

## 2025-08-08 RX ADMIN — DEXAMETHASONE SODIUM PHOSPHATE 4 MG: 4 INJECTION INTRA-ARTICULAR; INTRALESIONAL; INTRAMUSCULAR; INTRAVENOUS; SOFT TISSUE at 12:57

## 2025-08-08 RX ADMIN — LIDOCAINE HYDROCHLORIDE,EPINEPHRINE BITARTRATE 5 ML: 10; .01 INJECTION, SOLUTION INFILTRATION; PERINEURAL at 12:57

## 2025-08-08 RX ADMIN — METOPROLOL SUCCINATE 25 MG: 25 TABLET, EXTENDED RELEASE ORAL at 08:32

## 2025-08-08 RX ADMIN — FENTANYL CITRATE 50 MCG: 50 INJECTION INTRAMUSCULAR; INTRAVENOUS at 12:34

## 2025-08-08 RX ADMIN — PREGABALIN 200 MG: 75 CAPSULE ORAL at 21:33

## 2025-08-08 RX ADMIN — ROPIVACAINE HYDROCHLORIDE 20 ML: 5 INJECTION, SOLUTION EPIDURAL; INFILTRATION; PERINEURAL at 12:57

## 2025-08-08 RX ADMIN — MIDAZOLAM 1 MG: 1 INJECTION INTRAMUSCULAR; INTRAVENOUS at 12:34

## 2025-08-08 RX ADMIN — DULOXETINE 40 MG: 20 CAPSULE, DELAYED RELEASE ORAL at 08:32

## 2025-08-08 RX ADMIN — ONDANSETRON 4 MG: 2 INJECTION, SOLUTION INTRAMUSCULAR; INTRAVENOUS at 03:40

## 2025-08-08 RX ADMIN — PANTOPRAZOLE SODIUM 40 MG: 40 INJECTION, POWDER, FOR SOLUTION INTRAVENOUS at 05:05

## 2025-08-08 RX ADMIN — PREGABALIN 200 MG: 75 CAPSULE ORAL at 08:32

## 2025-08-08 RX ADMIN — ASPIRIN 81 MG: 81 TABLET, DELAYED RELEASE ORAL at 17:49

## 2025-08-08 RX ADMIN — HYDROXYCHLOROQUINE SULFATE 200 MG: 200 TABLET, FILM COATED ORAL at 21:33

## 2025-08-08 RX ADMIN — Medication 100 MCG: at 13:49

## 2025-08-08 RX ADMIN — CEFTRIAXONE 2 G: 2 INJECTION, SOLUTION INTRAVENOUS at 08:32

## 2025-08-08 RX ADMIN — Medication 100 MCG: at 13:34

## 2025-08-08 RX ADMIN — Medication: at 20:00

## 2025-08-08 RX ADMIN — DEXAMETHASONE SODIUM PHOSPHATE 4 MG: 4 INJECTION INTRA-ARTICULAR; INTRALESIONAL; INTRAMUSCULAR; INTRAVENOUS; SOFT TISSUE at 12:48

## 2025-08-08 RX ADMIN — Medication 100 MCG: at 13:44

## 2025-08-08 RX ADMIN — METHADONE HYDROCHLORIDE 87 MG: 10 CONCENTRATE ORAL at 10:49

## 2025-08-08 RX ADMIN — TIOTROPIUM BROMIDE INHALATION SPRAY 2 PUFF: 3.12 SPRAY, METERED RESPIRATORY (INHALATION) at 10:49

## 2025-08-08 RX ADMIN — VANCOMYCIN HYDROCHLORIDE 1.5 G: 1.5 INJECTION, SOLUTION INTRAVITREAL at 17:49

## 2025-08-08 RX ADMIN — HYDROXYCHLOROQUINE SULFATE 200 MG: 200 TABLET, FILM COATED ORAL at 08:32

## 2025-08-08 RX ADMIN — FAMOTIDINE 20 MG: 10 INJECTION, SOLUTION INTRAVENOUS at 12:02

## 2025-08-08 RX ADMIN — SODIUM CHLORIDE 75 ML/HR: 0.9 INJECTION, SOLUTION INTRAVENOUS at 12:03

## 2025-08-08 RX ADMIN — Medication 2 DOSE: at 15:44

## 2025-08-08 RX ADMIN — BUPIVACAINE HYDROCHLORIDE 20 ML: 2.5 INJECTION, SOLUTION EPIDURAL; INFILTRATION; INTRACAUDAL; PERINEURAL at 12:48

## 2025-08-08 SDOH — HEALTH STABILITY: MENTAL HEALTH: CURRENT SMOKER: 0

## 2025-08-08 ASSESSMENT — ENCOUNTER SYMPTOMS
JOINT SWELLING: 0
CHEST TIGHTNESS: 0
CONFUSION: 0
NAUSEA: 0
BACK PAIN: 1
ABDOMINAL PAIN: 0
DIARRHEA: 0
HEMATURIA: 0
COLOR CHANGE: 1
ACTIVITY CHANGE: 1
PALPITATIONS: 0
COUGH: 1
DYSPHORIC MOOD: 1
NECK PAIN: 0
CHILLS: 0
DIAPHORESIS: 0
SORE THROAT: 0
FACIAL SWELLING: 0
DYSURIA: 0
WHEEZING: 0
CONSTIPATION: 0
FEVER: 0
NERVOUS/ANXIOUS: 0
VOMITING: 0
SHORTNESS OF BREATH: 0
CHOKING: 0
FATIGUE: 1
AGITATION: 0
HALLUCINATIONS: 0
LIGHT-HEADEDNESS: 0
WOUND: 1
WEAKNESS: 1

## 2025-08-08 ASSESSMENT — PAIN - FUNCTIONAL ASSESSMENT
PAIN_FUNCTIONAL_ASSESSMENT: 0-10

## 2025-08-08 ASSESSMENT — PAIN SCALES - GENERAL
PAINLEVEL_OUTOF10: 3
PAINLEVEL_OUTOF10: 0 - NO PAIN
PAINLEVEL_OUTOF10: 6
PAINLEVEL_OUTOF10: 0 - NO PAIN
PAINLEVEL_OUTOF10: 6
PAINLEVEL_OUTOF10: 0 - NO PAIN
PAIN_LEVEL: 0

## 2025-08-08 NOTE — ANESTHESIA PROCEDURE NOTES
Airway  Date/Time: 8/8/2025 1:12 PM  Reason: elective    Airway not difficult    Staffing  Performed: CRNA   Authorized by: NIKKO Bender    Performed by: NIKKO Bender  Patient location during procedure: OR    Patient Condition  Indications for airway management: anesthesia  Patient position: sniffing  Sedation level: deep     Final Airway Details   Preoxygenated: yes  Final airway type: supraglottic airway  Successful airway: Supraglottic airway: igell5.  Size: 5   Ventilation between attempts: none  Number of attempts at approach: 1  Number of other approaches attempted: 0    Additional Comments  Atraumatic insertion of igel 5.

## 2025-08-08 NOTE — OP NOTE
RIGHT BELOW KNEE AMPUTATION (GEN/BLOCK) (R) Operative Note     Date: 2025  OR Location: [facility]    Name: Kael Zepeda, : 1965, Age: 60 y.o., MRN: [id number], Sex: male    Diagnosis  Pre-op Diagnosis      * Osteomyelitis of ankle or foot, right, acute (Multi) [M86.171] Post-op Diagnosis     * Osteomyelitis of ankle or foot, right, acute (Multi) [M86.171]     Procedures  RIGHT BELOW KNEE AMPUTATION (GEN/BLOCK)  84090 - DC AMPUTATION LEG THROUGH TIBIA&FIBULA      Surgeons      * Loy Pak - Primary    Resident/Fellow/Other Assistant:  Surgeons and Role:  * No surgeons found with a matching role *    Staff:   Katalinaulator: Amara Cordon Person: Vadim  Surgical Assistant: Andreea    Anesthesia Staff: CRNA: Nan Horton, APRN-CRNA; Leanna Sheffield APRN-CRNA    Procedure Summary  Anesthesia: Regional, General  ASA: IV  Estimated Blood Loss: 50 mL  Intra-op Medications:   Administrations occurring from 1245 to 1445 on 25:   Medication Name Total Dose   sodium chloride 0.9 % irrigation solution 1,000 mL   bupivacaine PF (Marcaine) injection 0.25 % 20 mL   dexAMETHasone (Decadron) 4 mg/mL IV Syringe 2 mL 8 mg   lidocaine 1%-EPINEPHrine 1:100,000 (Xylocaine W/EPI) injection 10 mL   phenylephrine 40 mcg/mL syringe 10 mL 500 mcg   propofol (Diprivan) injection 10 mg/mL 70 mg   ropivacaine PF (Naropin) 0.5 % - Vial 20 mL   sodium chloride 0.9% infusion 198.33 mL              Anesthesia Record               Intraprocedure I/O Totals       None           Specimen:   ID Type Source Tests Collected by Time   1 : RIGHT BKA Tissue LEG AMPUTATION BELOW THE KNEE RIGHT SURGICAL PATHOLOGY EXAM Loy Pak MD 2025 1346                 Drains and/or Catheters: * None in log *    Tourniquet Times:     Total Tourniquet Time Documented:  Thigh (Right) - 21 minutes  Thigh (Right) - 35 minutes  Total: Thigh (Right) - 56 minutes      Implants:     Findings: Foot with chronic wound and osteomyelitis.   Amputation site at normal level with healthy tissue no signs of infection.    Indications: Kael Zepeda is an 60 y.o. male who is having surgery for Osteomyelitis of ankle or foot, right, acute (Multi) [M86.171].  A preoperative informed consent was obtained from the patient    The patient was seen in the preoperative area. The risks, benefits, complications, treatment options, non-operative alternatives, expected recovery and outcomes were discussed with the patient. The possibilities of reaction to medication, pulmonary aspiration, injury to surrounding structures, bleeding, recurrent infection, the need for additional procedures, failure to diagnose a condition, and creating a complication requiring transfusion or operation were discussed with the patient. The patient concurred with the proposed plan, giving informed consent.  The site of surgery was properly noted/marked if necessary per policy. The patient has been actively warmed in preoperative area. Preoperative antibiotics were not necessary during the case he is on around-the-clock antibiotics on the floor.  Venous thrombosis prophylaxis have been ordered including unilateral sequential compression device    Procedure Details: Patient brought to the OR and placed supine on the OR table we then did a timeout with the patient to confirm the correct procedure and side.  Prior to surgery he had a lower extremity block.  And then had a general LMA anesthetic during the procedure.  After induction a right thigh tourniquet was placed and then a sterile prep and drape of the right lower extremity was performed in the usual manner.  We did place an Ioban sticky drape over the foot to protect it from the procedure.  After sterile prep and drape we elevated the leg and then elevated the thigh tourniquet to 300 mmHg.  We casper our incision for a BKA we made the bone cut 12 cm below the tibial tubercle and then casper the typical posterior flap skin incision.   Using a 10 blade we first made our anterior flap incision through the dermis and fascia down to bone.  2 cm above the skin incision we elevated the periosteum of the tibia with the tibial periosteal elevator protecting surrounding tissues we did a transverse cut of the tibia and then beveled the anterior margin and filed.  We then exposed the fibula and cut the fibula around 2 cm proximal to the level of the tibial cut.  We then completed our incision along the posterior flap through the dermis with a 10 blade.  With both bones cut we retracted the distal segment anteriorly and distally and then remove the distal segment with an amputation knife cutting subperiosteally down the posterior aspect of the tibia and fibula to reach the end of the posterior flap incision and then remove the amputated segment and placed in a box to be sent to path.  We then debulked the posterior compartment muscle leaving the gastrocnemius and most of the solea's intact.  We kept checking the length of the flap and trimmed it appropriately for closure under adequate tension.  We then let down the tourniquet.  We identified the tibial and sural nerves and under traction above the amputation site.  We also let down the tourniquet this time and did multiple stick ties of the peroneal and tibial vessels and other branches to control any bleeding.  There is good perfusion of the muscle with the tourniquet down.  There is no evidence of infection at the amputation level.  We then shazia irrigated with 2 L of saline.  We then closed in layers 0 Vicryl layer closed the posterior fascial layer to the anterior fascial layer/periosteum.  We then closed the dermis in layers subdermis with interrupted buried 2-0 Vicryl and the skin with vertical mattress 2-0 nylon.  We did place a Hemovac drain in the wound prior to closure and this was placed under suction at the end of the case.  We then cleansed the extremity and placed a well-padded posterior  fiberglass splint maintaining the knee in extension.  Patient awoken stable condition transferred recovery there are no complications about 50 cc of blood loss  Evidence of Infection: No   Complications:  None; patient tolerated the procedure well.    Disposition: PACU - hemodynamically stable.  Condition: stable         Task Performed by RNFA or Surgical Assistant:  SA was scrubbed to provide help with retraction and closure.          Additional Details: None    Attending Attestation: I was present and scrubbed for the entire procedure.    Loy Pak  Phone Number: [phone]

## 2025-08-08 NOTE — ANESTHESIA POSTPROCEDURE EVALUATION
Patient: Kael Zepeda    Procedure Summary       Date: 08/08/25 Room / Location: [facility] 06 / [facility]    Anesthesia Start: 1307 Anesthesia Stop: 1446    Procedure: RIGHT BELOW KNEE AMPUTATION (GEN/BLOCK) (Right: Knee) Diagnosis:       Osteomyelitis of ankle or foot, right, acute (Multi)      (Osteomyelitis of ankle or foot, right, acute (Multi) [M86.171])    Surgeons: Loy Pak MD Responsible Provider: CHACE Bender-ISAURA    Anesthesia Type: general, regional ASA Status: 4            Anesthesia Type: general, regional    Vitals Value Taken Time   BP 94/52 08/08/25 15:20   Temp 36.2 °C (97.1 °F) 08/08/25 15:20   Pulse 64 08/08/25 15:20   Resp 14 08/08/25 15:20   SpO2 97 % 08/08/25 15:20       Anesthesia Post Evaluation    Patient location during evaluation: PACU  Patient participation: complete - patient participated  Level of consciousness: awake  Pain score: 0  Pain management: adequate  Airway patency: patent  Cardiovascular status: acceptable  Respiratory status: acceptable  Hydration status: acceptable  Postoperative Nausea and Vomiting: none        There were no known notable events for this encounter.

## 2025-08-08 NOTE — ASSESSMENT & PLAN NOTE
-BP: low normal on presentation, home therapies (Metoprolol Succinate 25mg AM) will be continued. Close monitoring and adjust as needed.

## 2025-08-08 NOTE — ANESTHESIA PROCEDURE NOTES
Peripheral Block    Patient location during procedure: pre-op  Medication administered at: 8/8/2025 12:50 PM  End time: 8/8/2025 12:57 PM  Reason for block: at surgeon's request and post-op pain management  Staffing  Performed: CRNA   Authorized by: NIKKO Bender    Performed by: NIKKO Bender  Preanesthetic Checklist  Completed: patient identified, IV checked, site marked, risks and benefits discussed, surgical consent, monitors and equipment checked, pre-op evaluation and timeout performed   Timeout performed at: 8/8/2025 12:32 PM  Peripheral Block  Patient position: laying flat  Prep: ChloraPrep  Patient monitoring: heart rate, cardiac monitor and continuous pulse ox  Block type: femoral  Laterality: right  Injection technique: single-shot  Guidance: nerve stimulator  Local infiltration: ropivacaine  Infiltration strength: 0.5 %  Dose: 20 mL  Needle  Needle localization: ultrasound guidance and nerve stimulator  Assessment  Injection assessment: negative aspiration for heme, no paresthesia on injection, incremental injection and local visualized surrounding nerve on ultrasound  Paresthesia pain: none  Heart rate change: no  Slow fractionated injection: yes  Additional Notes  Block requested by surgeon. Risks benefits discussed. Patient agreeable to femoral nerve block.  Site marked. Monitors on., time out complete. Sedation administered as stated in popliteal nerve block note. Sterile prep and drape. Ultrasound guided with nerve stimulation. Procedure performed by Leanna Sheffield CRNA. 20g 4 inch stimuple needle used. Attempt x 1 needle tip visualized entire procedure. Positive motor response with nerve stimulation, with visualization of patellar snap. Loss of response at 0.48 mAmp. 5 ml 1% lidocaine with epinephrine 1:100,000 20 ml 0.5% ropivacaine with 4 mg decadron preservative free administered in divided doses. Aspiration every 3-5ml negative heme negative paresthesia, pt tolerated  procedure well.

## 2025-08-08 NOTE — PROGRESS NOTES
Occupational Therapy                 Therapy Communication Note    Patient Name: Kael Zepeda  MRN: [id number]  Department: [facility] 2 W  Room: 2007/2007-A  Today's Date: 8/8/2025     Discipline: Occupational Therapy    Missed Visit: OT Missed Visit: Yes     Missed Visit Reason: Missed Visit Reason: Patient placed on medical hold (Pt scheduled for R BKA this afternoon in OR. Will re attempt OT eval post op/ SAT.)    Missed Time: Attempt    Comment:

## 2025-08-08 NOTE — ANESTHESIA PROCEDURE NOTES
"Peripheral Block    Patient location during procedure: pre-op  Medication administered at: 8/8/2025 12:40 PM  End time: 8/8/2025 12:48 PM  Reason for block: at surgeon's request and post-op pain management  Staffing  Performed: CRNA   Authorized by: NIKKO Bender    Performed by: NIKKO Bender  Preanesthetic Checklist  Completed: patient identified, IV checked, site marked, risks and benefits discussed, surgical consent, monitors and equipment checked, pre-op evaluation and timeout performed   Timeout performed at: 8/8/2025 12:32 PM  Peripheral Block  Patient position: laying flat  Prep: ChloraPrep  Patient monitoring: heart rate, cardiac monitor and continuous pulse ox  Block type: popliteal  Laterality: right  Injection technique: single-shot  Guidance: nerve stimulator  Local infiltration: bupivicaine  Infiltration strength: 0.3 %  Dose: 20 mL  Needle  Needle localization: ultrasound guidance and nerve stimulator  Assessment  Injection assessment: negative aspiration for heme, no paresthesia on injection, incremental injection and local visualized surrounding nerve on ultrasound  Paresthesia pain: none  Heart rate change: no  Slow fractionated injection: yes  Additional Notes  Anesthesia consult was placed by Dr. Pak for post procedural analgesia. The patients chart was reviewed and they were deemed an appropriate candidate for the procedure. The patient was educated in detail on the risks, benefits, and alternatives to the block including but not limited to: temporary or permanent nerve damage, bleeding, and infection, damage to surrounding tissues, possible block failure, and the potential for local anesthesia toxicity syndrome. The patient expressed understanding and all questions were answered prior to initiation of the procedure. Informed consent was also signed by the patient and laterality determined per institutional policy. A formal \"time out\" consistent with the " institutions rules and regulations were performed by the anesthesia provider and appropriate RN.    Procedure  The patient was placed in the SUPINE position. The RIGHT side was marked and skin prep applied and allowed to dry. Proper monitors were applied with oxygen. Sedation was provided by administering:    Versed 1 mg IV  Fentanyl 50 mcg IV    A high frequency linear ultrasound probe with probe cover was placed over the popliteal fossa and sciatic nerve with popliteal vascular structures identified using sterile coupling gel following institutional skin prep.  The popliteal vein was easily collapsible, and popliteal artery found to be grossly normal under color Doppler flow prior to the procedure. An 20g 4 inch stimuplex was then advanced maintaining an in-plane visualization throughout the procedure, under ultrasound guidance from lateral to medial, to come to rest just deep to the sciatic neurovascular sheath at the level of the bifurcation, but avoiding contact of the common peroneal nerve. A positive motor response was visualized from the nerve stimulator. A loss of response was seen at 0.50 mAmp. Upon negative aspiration, 5ml 1% Lidocaine with epinephrine 1:100,000, 20ml 0.25% Bupivacaine with 4mg decadron preservative free was administered safely and cautiously around the nerve structure. Aspiration every 3-5 ml was done to avoid potential intravascular injection.  All injections were done without resistance and were free of blood.  The patient tolerated the procedure well without report of intense pain, tinnitus, metallic taste, or circumoral numbness.  The block was then evaluated after a few minutes and appeared to be functioning appropriately. IMAGES STORED ELCTRONICALLY?

## 2025-08-08 NOTE — ANESTHESIA PREPROCEDURE EVALUATION
Patient: Kael Zepeda    Procedure Information       Date/Time: 08/08/25 1245    Procedure: RIGHT BELOW KNEE AMPUTATION (GEN/BLOCK) (Right: Knee) - GENERAL/BLOCK, 90 MINUTES, STANDARD EQUIPMENT    Location: [facility] 06 / [facility]    Surgeons: Loy Pak MD            Relevant Problems   Anesthesia (within normal limits)      Cardiac   (+) Benign essential hypertension   (+) CAD in native artery   (+) Hyperlipidemia, mixed   (+) NSTEMI (non-ST elevated myocardial infarction) (Multi)      Pulmonary   (+) Pulmonary emphysema (Multi)      Neuro   (+) Depression   (+) Generalized anxiety disorder   (+) Lumbar radiculopathy   (+) Opioid dependence, in remission   (+) Peripheral neuropathy      GI   (+) Gastroesophageal reflux disease without esophagitis      /Renal   (+) Hyponatremia      Liver   (+) Hepatitis C      Endocrine   (+) Hypothyroidism, acquired   (+) Type 2 diabetes mellitus, with long-term current use of insulin      Hematology   (+) Chronic anemia   (+) Microcytic anemia      Musculoskeletal   (+) Degeneration of intervertebral disc of lumbar region   (+) Fibromyalgia   (+) Rheumatoid arthritis involving both hands with positive rheumatoid factor (Multi)   (+) Spinal stenosis of lumbosacral region      ID   (+) Acute osteomyelitis of left foot (Multi)   (+) Hepatitis C   (+) Osteomyelitis of ankle or foot, right, acute (Multi)   (+) Osteomyelitis of left foot, unspecified type (Multi)   (+) Osteomyelitis of right foot, unspecified type (Multi)       Clinical information reviewed:   Tobacco  Allergies  Meds  Problems  Med Hx  Surg Hx   Fam Hx  Soc   Hx        NPO Detail:  NPO/Void Status  Date of Last Liquid: 08/08/25  Time of Last Liquid: 0832  Date of Last Solid: 08/07/25  Time of Last Solid: 2359         Physical Exam    Airway  Mallampati: II  TM distance: >3 FB  Neck ROM: full  Mouth opening: 3 or more finger widths     Cardiovascular - normal exam   Dental     (+) upper  dentures, lower dentures     Pulmonary - normal exam   Abdominal - normal exam           Anesthesia Plan    History of general anesthesia?: yes  History of complications of general anesthesia?: no    ASA 4     general and regional   (Right Femoral and Popliteal Nerve Block)  The patient is not a current smoker.    intravenous induction   Postoperative pain plan includes opioids.  Anesthetic plan and risks discussed with patient.    Plan discussed with CRNA.

## 2025-08-08 NOTE — PROGRESS NOTES
Physical Therapy                 Therapy Communication Note    Patient Name: Kael Zepeda  MRN: [id number]  Department: [facility] 2 W  Room: 2007/2007-A  Today's Date: 8/8/2025     Discipline: Physical Therapy    Missed Visit:       Missed Visit Reason: Patient placed on medical hold (Patient awaiting R BKA this date. PT to hold eval until post surgery.)    Missed Time: Attempt

## 2025-08-08 NOTE — PROGRESS NOTES
Kael Zepeda is a 60 y.o. male on day 2 of admission presenting with Diabetic ulcer of right foot associated with type 2 diabetes mellitus, unspecified part of foot, unspecified ulcer stage.    Review of Systems   Constitutional:  Positive for activity change and fatigue. Negative for chills, diaphoresis and fever.   HENT:  Positive for congestion. Negative for facial swelling, sneezing and sore throat.    Respiratory:  Positive for cough. Negative for choking, chest tightness, shortness of breath and wheezing.    Cardiovascular:  Positive for leg swelling. Negative for chest pain and palpitations.   Gastrointestinal:  Negative for abdominal pain, constipation, diarrhea, nausea and vomiting.   Genitourinary:  Negative for dysuria, hematuria and urgency.   Musculoskeletal:  Positive for back pain and gait problem. Negative for joint swelling and neck pain.   Skin:  Positive for color change and wound. Negative for rash.   Neurological:  Positive for weakness. Negative for syncope and light-headedness.   Psychiatric/Behavioral:  Positive for dysphoric mood. Negative for agitation, confusion and hallucinations. The patient is not nervous/anxious.    All other systems reviewed and are negative.     Subjective   Kael Zepeda is a 60 y.o. male with PMHx s/f HTN, HLD, CAD s/p CABG, transient post-op Afib after CABG (no AC), HFrEF (EF 39% 07/24), COPD (no baseline O2), h/o IVDU (on long-term methadone), h/o Hep C (s/p treatment, in remission), RA (on methotrexate), hypothyroidism, IDDM-II c/b peripheral neuropathy and DM foot wounds (s/p transmetatarsal amputation of the right foot 04/30/25), presenting with advanced R foot wound. Patient had TMA of the R foot in setting of nonhealing wounds and OM 04/30/25. He was supposed to continue outpatient IV antibiotics on discharge and go to rehab at that time; however, he elected to leave the hospital AMA (he reports he was afraid to miss weekly check-in for his  "methadone clinic). Since then, he has only intermittently followed up with the wound care center. He was seen earlier this week at wound care and described feeling weak, having poor appetite, and having increasing drainage from the wound which is malodorous. At that time there was significant concern he was septic and he was told to go to the ED for evaluation. The patient subsequently waited multiple days, coming in ~72 hours later, because he was \"finally ready.\" He has continued to feel ill the entire time. He has poor intake and malaise. Has also experienced swelling of the RLE > LLE. He is a bit of a difficult historian, reporting he takes \"a lot\" of medications but cannot name them or their doses. He has a difficult time telling me a time frame on his symptoms overall. Denies cp/pressure, palpitations, diaphoresis, SOB, CALDERON, dizziness / lightheadedness, syncope or near syncope, HA, vision changes, f/c/v/d/abd pain.     8/7: Patient seen.  No new complaints.  Patient states he is willing to proceed with right below-knee amputation as recommended by podiatry.  Patient has advanced osteomyelitis in the 4th and 5th metatarsal and cuboid with osteolytic destruction.  Podiatry states foot is not salvageable.  There is however no evidence of necrotizing fasciitis.  Referral was made to orthopedic surgery and the current plan is to take him for a below the knee amputation tomorrow 8/8.  Hemoglobin today is noted to be low at 7.0.  Patient is asymptomatic.  Hemoglobin was 7.9 on admission.  No obvious bleeding.  However given the plan for his amputation tomorrow I have elected to transfuse 1 unit of PRBCs.  Patient has completed consent form.  He is in agreement with plan to proceed with amputation tomorrow.    8/8: Patient seen.  No complaints at this time.  Seen in room following amputation.  Large dressing on his wound.  Pain is adequately controlled at this time.  Appreciate input from orthopedics, podiatry, ID.  " Will reassess again in AM.  Hopefully patient recovers quickly.       Objective     Last Recorded Vitals  BP 94/51 (BP Location: Right arm, Patient Position: Lying)   Pulse 70   Temp 35.9 °C (96.7 °F) (Temporal)   Resp 16   Wt 77.6 kg (171 lb)   SpO2 95%   Intake/Output last 3 Shifts:    Intake/Output Summary (Last 24 hours) at 8/8/2025 1842  Last data filed at 8/8/2025 1758  Gross per 24 hour   Intake 408.75 ml   Output 1100 ml   Net -691.25 ml       Admission Weight  Weight: 81.6 kg (180 lb) (08/06/25 1502)    Daily Weight  08/08/25 : 77.6 kg (171 lb)      Physical Exam  Constitutional:       General: He is not in acute distress.  HENT:      Head: Normocephalic and atraumatic.      Nose: Nose normal. No congestion or rhinorrhea.      Mouth/Throat:      Mouth: Mucous membranes are dry.      Pharynx: Oropharynx is clear.     Eyes:      General: No scleral icterus.     Extraocular Movements: Extraocular movements intact.      Pupils: Pupils are equal, round, and reactive to light.       Cardiovascular:      Rate and Rhythm: Normal rate and regular rhythm.      Heart sounds: Normal heart sounds. No murmur heard.     No friction rub. No gallop.   Pulmonary:      Effort: Pulmonary effort is normal.      Breath sounds: Normal breath sounds. No wheezing, rhonchi or rales.   Chest:      Chest wall: No tenderness.   Abdominal:      General: There is no distension.      Palpations: Abdomen is soft.      Tenderness: There is no abdominal tenderness. There is no guarding or rebound.     Musculoskeletal:         General: No swelling, tenderness or signs of injury. Normal range of motion.      Cervical back: Normal range of motion.      Comments: Right below the knee amputation.  New dressings applied.  No seepage to the dressings at this time.     Skin:     General: Skin is warm and dry.     Neurological:      General: No focal deficit present.      Mental Status: He is alert and oriented to person, place, and time.           Lab Results  Results for orders placed or performed during the hospital encounter of 08/06/25 (from the past 24 hours)   POCT GLUCOSE   Result Value Ref Range    POCT Glucose 185 (H) 74 - 99 mg/dL   Vancomycin   Result Value Ref Range    Vancomycin 18.8 5.0 - 20.0 ug/mL   CBC   Result Value Ref Range    WBC 8.1 4.4 - 11.3 x10*3/uL    nRBC 0.0 0.0 - 0.0 /100 WBCs    RBC 3.78 (L) 4.50 - 5.90 x10*6/uL    Hemoglobin 8.2 (L) 13.5 - 17.5 g/dL    Hematocrit 26.9 (L) 41.0 - 52.0 %    MCV 71 (L) 80 - 100 fL    MCH 21.7 (L) 26.0 - 34.0 pg    MCHC 30.5 (L) 32.0 - 36.0 g/dL    RDW 18.6 (H) 11.5 - 14.5 %    Platelets 677 (H) 150 - 450 x10*3/uL   Basic Metabolic Panel   Result Value Ref Range    Glucose 155 (H) 74 - 99 mg/dL    Sodium 132 (L) 136 - 145 mmol/L    Potassium 4.0 3.5 - 5.3 mmol/L    Chloride 101 98 - 107 mmol/L    Bicarbonate 22 21 - 32 mmol/L    Anion Gap 13 10 - 20 mmol/L    Urea Nitrogen 10 6 - 23 mg/dL    Creatinine 0.84 0.50 - 1.30 mg/dL    eGFR >90 >60 mL/min/1.73m*2    Calcium 8.5 (L) 8.6 - 10.3 mg/dL   POCT GLUCOSE   Result Value Ref Range    POCT Glucose 184 (H) 74 - 99 mg/dL   POCT GLUCOSE   Result Value Ref Range    POCT Glucose 152 (H) 74 - 99 mg/dL   POCT GLUCOSE   Result Value Ref Range    POCT Glucose 142 (H) 74 - 99 mg/dL     *Note: Due to a large number of results and/or encounters for the requested time period, some results have not been displayed. A complete set of results can be found in Results Review.        Image Results  ECG 12 Lead  Sinus rhythm  Nonspecific intraventricular conduction delay  Consider inferior infarct  Baseline wander in lead(s) V6       Assessment/Plan     Assessment & Plan  Diabetic ulcer of right foot associated with type 2 diabetes mellitus, unspecified part of foot, unspecified ulcer stage  -Follow fever curve, WBCs  -Continue antibiotic coverage with vanc/zosyn/clindamycin for now (based on CT results; however, reviewed Dr. Raya's note and patient's exam, not  clinically c/w nec fasc)  -High risk of morbidity from additional drug therapy requiring intensive monitoring for toxicity: Vancomycin IV   -(+) monitor for sxs of toxicity and/or rxn related to vancomycin; appreciate pharmacy assistance with management  -Podiatry + Orthopedics consultation appreciated   -ID consultation appreciated   -Wound care consultation appreciated     8/7: No evidence of necrotizing fasciitis.  Does have osteomyelitis in his 4th and 5th metatarsal as well as his cuboid with osteolytic damage.  Podiatry states his foot is not salvageable and recommends BKA.  Patient wishes to proceed.  Referral made to orthopedic surgery, current plan for surgery on 8/8.  Right BKA done today.  Appears to be doing well.  Continue current treatment.  Consider de-escalating antibiotics.  Sepsis with acute organ dysfunction (Multi)  -Clinically patient meets sepsis criteria and is being treated appropriately as outlined below  -Inflammatory markers: CRP 20.13, ESR 89   -Imaging results reviewed (pertinent): XR R Foot, CT R Foot, and DVT US (R)  -Lactate 3.1 -> 2.3 -> repeat pending  -BP is low-normal. Patient has had poor intake/appetite. He appears perhaps a bit volume down on exam. He rec'd 500cc bolus in ED, will order another. Have to be cautious with HF history.   -Blood cultures x2 and wound culture collected, in process.  8/8: Wound cultures are growing Staph aureus.  Type 2 diabetes mellitus, with long-term current use of insulin  IDDM-II c/b peripheral neuropathy, with current hyperglycemia   -Continue home basal insulin at current dosage (Lantus 40 units nightly -> from last admit until confirmed)  -Continue with SSI   -Continue with accucheks, hypoglycemic protocol   -Optimize glycemic control  -Monitor and adjust as needed   -Last available A1c = 8.2   -Continue home Lyrica  Benign essential hypertension  -BP: low normal on presentation, home therapies (Metoprolol Succinate 25mg AM) will be continued.  Close monitoring and adjust as needed.   Fibromyalgia  Anxiety, depression  Fibromyalgia   -Patient will be continued on home therapies (Cymbalta)   -Continue home Lyrica  Hyperlipidemia, mixed  -HLD: will be continued on home therapies (Lipitor 40mg).  Hypothyroidism, acquired  -Continue home levothyroxine   Opioid dependence, in remission  -Continue home methadone dosing -> patient unable to confirm dosing. I spoke with pharmacy, his methadone clinic is closed for the evening. I have reviewed the patient's lost hospitalization and resumed the evening dose of 78mg from then, as the patient does not believe he has had any significant changes recently in that dose. Pharmacy will call the clinic in the morning and work on confirming morning dose and confirming no change to the evening doses.   -Patient is on long term methadone for management   -Patient unable to confirm dosing. I spoke with pharmacy, his methadone clinic is closed for the evening. I have reviewed the patient's lost hospitalization and resumed the evening dose of 78mg from then, as the patient does not believe he has had any significant changes recently in that dose. Pharmacy will call the clinic in the morning and work on confirming morning dose and confirming no change to the evening doses.  8/7: Was able to confirm his rather large methadone dosing.  Referral made to pharmacy for assistance with pain control following surgery.  8/8: Pain is adequately controlled at this time.  Rheumatoid arthritis involving both hands with positive rheumatoid factor (Multi)  -Continue home methotrexate   CAD in native artery  -CAD: no current reports of chest pain or anginal equivalents. Continue 81mg ASA.   -Continued outpatient follow-up with cardiology and PCP as scheduled   Hepatitis C  -In remission, s/p treatment  Osteomyelitis of left foot, unspecified type (Multi)    Chronic systolic heart failure  Chronic HFrEF (EF 39% 07/2024)   -Patient appears euvolemic  if not slightly hypovolemic on exam and is overall compensated from this standpoint  -Last limited echo from 07/24 at OSH reviewed -> overall seemingly similar akinetic segments and WMAs compared to the previous available at [place]. Patient does not have any overt symptoms otherwise.    -Will be continued on home medications (ASA, Toprol)  -Continue outpatient follow-up with cardiology/HF as scheduled   Chronic anemia  -On review of available labs (CBC, H&H), patient with baseline Hgb 7s-9s   -On admission, patient with Hgb of 7.9 which is at baseline   -Continue to monitor while admitted   Hyponatremia  -Na 127 -> corrects to 132 for glucose of 320  Osteomyelitis of ankle or foot, right, acute (Multi)          David Keys MD

## 2025-08-08 NOTE — ASSESSMENT & PLAN NOTE
-Continue home methadone dosing -> patient unable to confirm dosing. I spoke with pharmacy, his methadone clinic is closed for the evening. I have reviewed the patient's lost hospitalization and resumed the evening dose of 78mg from then, as the patient does not believe he has had any significant changes recently in that dose. Pharmacy will call the clinic in the morning and work on confirming morning dose and confirming no change to the evening doses.   -Patient is on long term methadone for management   -Patient unable to confirm dosing. I spoke with pharmacy, his methadone clinic is closed for the evening. I have reviewed the patient's lost hospitalization and resumed the evening dose of 78mg from then, as the patient does not believe he has had any significant changes recently in that dose. Pharmacy will call the clinic in the morning and work on confirming morning dose and confirming no change to the evening doses.  8/7: Was able to confirm his rather large methadone dosing.  Referral made to pharmacy for assistance with pain control following surgery.  8/8: Pain is adequately controlled at this time.

## 2025-08-08 NOTE — ASSESSMENT & PLAN NOTE
Chronic HFrEF (EF 39% 07/2024)   -Patient appears euvolemic if not slightly hypovolemic on exam and is overall compensated from this standpoint  -Last limited echo from 07/24 at OSH reviewed -> overall seemingly similar akinetic segments and WMAs compared to the previous available at [place]. Patient does not have any overt symptoms otherwise.    -Will be continued on home medications (ASA, Toprol)  -Continue outpatient follow-up with cardiology/HF as scheduled

## 2025-08-08 NOTE — PROGRESS NOTES
"Kael Zepeda is a 60 y.o. male on day 2 of admission presenting with Diabetic ulcer of right foot associated with type 2 diabetes mellitus, unspecified part of foot, unspecified ulcer stage.      Assessment/Plan:     #Right foot osteomyelitis  Podiatry recommending amputation to which patient agrees.  Clindamycin discontinued as less likely to be nec Fac.  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  Cultures positive for gram-positive cocci. Zosyn switched to ceftriaxone and continue vancomycin     HTN, HLD  CAD s/p CABG  HFrEF  COPD  hep C SVR  Hypothyroidism  IDDM     Recommendations:     -Monitor blood cultures, tissue cultures  -Continue vancomycin.  Patient has always grown MRSA  -Continue Rocephin  -Amputation today  -Will continue to follow        Chance Fong MD  Date of service: 8/8/2025  Time of service: 9:38 AM      Subjective   Interval History: No acute events overnight.  Patient denies any new complaint.        Review of Systems  Denies: fever, chills, nausea, vomiting, diarrhea, dysuria    Objective   Range of Vitals (last 24 hours)  Heart Rate:  []   Temp:  [36.1 °C (96.9 °F)-36.7 °C (98.1 °F)]   Resp:  [16-18]   BP: ()/(53-76)   Height:  [170.2 cm (5' 7.01\")]   Weight:  [78 kg (171 lb 15.3 oz)]   SpO2:  [92 %-98 %]  Daily Weight  08/07/25 : 78 kg (171 lb 15.3 oz)   Body mass index is 26.93 kg/m².    General: alert, oriented, NAD  HEENT: No conjunctival pallor, no scleral icterus  Lungs: bilaterally clear to auscultation, no wheezing  Abdomen: soft, non tender, non distended, BS+  Neuro: AAO x 3, CN grossly intact  Extremities: R foot dressing, RLE edema (DVT negative)  Skin: R foot dressing      Antibiotics  cefTRIAXone - 2 gram/50 mL  vancomycin - 1.5 gram/300 mL      Relevant Results  Labs  Results from last 72 hours   Lab Units 08/08/25  0340 08/07/25  0417 08/06/25  1547   WBC AUTO x10*3/uL 8.1 9.8 11.0   HEMOGLOBIN g/dL 8.2* 7.0* 7.9*   HEMATOCRIT % 26.9* 22.5* 26.1* "   PLATELETS AUTO x10*3/uL 677* 633* 641*   NEUTROS PCT AUTO %  --  69.0 78.8   LYMPHS PCT AUTO %  --  11.0 6.6   MONOS PCT AUTO %  --  15.0 10.0   EOS PCT AUTO %  --  2.3 1.5     Results from last 72 hours   Lab Units 08/08/25  0340 08/07/25  0417 08/06/25  1547   SODIUM mmol/L 132* 131* 127*   POTASSIUM mmol/L 4.0 4.0 3.8   CHLORIDE mmol/L 101 98 95*   CO2 mmol/L 22 25 22   BUN mg/dL 10 10 12   CREATININE mg/dL 0.84 0.93 0.92   GLUCOSE mg/dL 155* 90 320*   CALCIUM mg/dL 8.5* 8.3* 8.7   ANION GAP mmol/L 13 12 14   EGFR mL/min/1.73m*2 >90 >90 >90     Results from last 72 hours   Lab Units 08/07/25  0417 08/06/25  1547   ALK PHOS U/L 257* 182*   BILIRUBIN TOTAL mg/dL 0.7 0.6   PROTEIN TOTAL g/dL 6.6 7.1   ALT U/L 16 17   AST U/L 36 35   ALBUMIN g/dL 2.9* 3.0*     Estimated Creatinine Clearance: 86.3 mL/min (by C-G formula based on SCr of 0.84 mg/dL).  C-Reactive Protein   Date Value Ref Range Status   08/06/2025 20.13 (H) <1.00 mg/dL Final   04/29/2025 7.25 (H) <1.00 mg/dL Final   10/09/2024 13.42 (H) <1.00 mg/dL Final       Microbiology  Susceptibility data from last 14 days.  Collected Specimen Info Organism   08/06/25 Tissue/Biopsy from Wound/Tissue Staphylococcus aureus       Imaging    CT foot right w IV contrast  Result Date: 8/6/2025    Necrotizing soft tissue infection of the amputation stump with acute osteomyelitis involving the 4th and 5th metatarsal bases, cuboid and likely 3rd metatarsal base. No rim enhancing fluid collection.   MACRO:   Kai Waterman discussed the significance and urgency of this critical finding by EPIC secure chat with  YESI BEACH on 8/6/2025 at 6:37 pm.  (**-RCF-**) Findings:  See findings.     Signed by: Kai Waterman 8/6/2025 6:46 PM Dictation workstation:   [id number]    Vascular US Lower Extremity Venous Duplex Right  Result Date: 8/6/2025  Negative study.  No deep venous thrombosis of the  right lower extremity.   MACRO: None   Signed by: David Og 8/6/2025 5:59 PM  Dictation workstation:   [id number]    XR chest 1 view  Result Date: 8/6/2025  No acute cardiopulmonary process.   MACRO: None   Signed by: Pia Khan 8/6/2025 4:16 PM Dictation workstation:   [id number]    XR foot right 3+ views  Result Date: 8/6/2025  1. Status post transmetatarsal amputation. 2. Soft tissue swelling with an ulcer along the plantar aspect of the right or fluid. There is air in the soft tissues, likely gas-forming infection. 3. Bony destruction of the bases of the 4th and 5th metatarsals and cuboid consistent with osteomyelitis. Areas also a fracture of the base of the 5th metatarsal.       MACRO: None   Signed by: Pia Khan 8/6/2025 4:14 PM Dictation workstation:   [id number]

## 2025-08-08 NOTE — PROGRESS NOTES
Patient is scheduled for R BKA today. PT/OT evals to be completed post-op. Patient will remain hospitalized over the weekend. TCC following.

## 2025-08-08 NOTE — ASSESSMENT & PLAN NOTE
-Clinically patient meets sepsis criteria and is being treated appropriately as outlined below  -Inflammatory markers: CRP 20.13, ESR 89   -Imaging results reviewed (pertinent): XR R Foot, CT R Foot, and DVT US (R)  -Lactate 3.1 -> 2.3 -> repeat pending  -BP is low-normal. Patient has had poor intake/appetite. He appears perhaps a bit volume down on exam. He rec'd 500cc bolus in ED, will order another. Have to be cautious with HF history.   -Blood cultures x2 and wound culture collected, in process.  8/8: Wound cultures are growing Staph aureus.

## 2025-08-08 NOTE — ASSESSMENT & PLAN NOTE
-Follow fever curve, WBCs  -Continue antibiotic coverage with vanc/zosyn/clindamycin for now (based on CT results; however, reviewed Dr. Raya's note and patient's exam, not clinically c/w nec fasc)  -High risk of morbidity from additional drug therapy requiring intensive monitoring for toxicity: Vancomycin IV   -(+) monitor for sxs of toxicity and/or rxn related to vancomycin; appreciate pharmacy assistance with management  -Podiatry + Orthopedics consultation appreciated   -ID consultation appreciated   -Wound care consultation appreciated     8/7: No evidence of necrotizing fasciitis.  Does have osteomyelitis in his 4th and 5th metatarsal as well as his cuboid with osteolytic damage.  Podiatry states his foot is not salvageable and recommends BKA.  Patient wishes to proceed.  Referral made to orthopedic surgery, current plan for surgery on 8/8.  Right BKA done today.  Appears to be doing well.  Continue current treatment.  Consider de-escalating antibiotics.

## 2025-08-08 NOTE — PROGRESS NOTES
Vancomycin Dosing by Pharmacy- FOLLOW UP    Kael Zepeda is a 60 y.o. year old male who Pharmacy has been consulted for vancomycin dosing for Diabetic Rt foot with osteomyelitis and cellulitis. Based on the patient's indication and renal status this patient is being dosed based on a goal AUC of 400-600.     Renal function is currently stable.    Current vancomycin dose: 1500 mg given every 24 hours    Estimated vancomycin AUC on current dose: 460 mg/L.hr     Visit Vitals  /76 (Patient Position: Lying)   Pulse 93   Temp 36.2 °C (97.2 °F) (Temporal)   Resp 17        Lab Results   Component Value Date    CREATININE 0.84 2025    CREATININE 0.93 2025    CREATININE 0.92 2025    CREATININE 1.06 2025        Patient weight is as follows:   Vitals:    25 1222   Weight: 78 kg (171 lb 15.3 oz)       Cultures:  No results found for the encounter in last 14 days.       I/O last 3 completed shifts:  In: 1205 (15.4 mL/kg) [P.O.:340; Blood:215; IV Piggyback:650]  Out: 2200 (28.2 mL/kg) [Urine:2200 (0.8 mL/kg/hr)]  Weight: 78 kg   I/O during current shift:  No intake/output data recorded.    Temp (24hrs), Av.4 °C (97.5 °F), Min:36.1 °C (96.9 °F), Max:36.7 °C (98.1 °F)      Assessment/Plan    Within goal AUC range. Continue current vancomycin regimen.    This dosing regimen is predicted by [employer] to result in the following pharmacokinetic parameters:  <<<<<PASTE [employer] DATA HERE>>>>>  Regimen: 1500 mg IV every 24 hours.  Start time: 16:56 on 2025  Exposure target: AUC24 (range) 400-600 mg/L.hr   [id number]: 452 mg/L.hr  AUC24,ss: 460 mg/L.hr  Probability of AUC24 > 400: 81 %  Ctrough,ss: 11.9 mg/L  Probability of Ctrough,ss > 20: %    The next level will be obtained on  at 0500. May be obtained sooner if clinically indicated.   Will continue to monitor renal function daily while on vancomycin and order serum creatinine at least every 48 hours if not already ordered.  Follow  for continued vancomycin needs, clinical response, and signs/symptoms of toxicity.       Collette Chaudhari, PharmD

## 2025-08-09 LAB
ANION GAP SERPL CALC-SCNC: 11 MMOL/L (ref 10–20)
ATRIAL RATE: 82 BPM
BACTERIA SPEC CULT: ABNORMAL
BACTERIA SPEC CULT: ABNORMAL
BUN SERPL-MCNC: 10 MG/DL (ref 6–23)
CALCIUM SERPL-MCNC: 8.4 MG/DL (ref 8.6–10.3)
CHLORIDE SERPL-SCNC: 107 MMOL/L (ref 98–107)
CO2 SERPL-SCNC: 25 MMOL/L (ref 21–32)
CREAT SERPL-MCNC: 0.72 MG/DL (ref 0.5–1.3)
EGFRCR SERPLBLD CKD-EPI 2021: >90 ML/MIN/1.73M*2
ERYTHROCYTE [DISTWIDTH] IN BLOOD BY AUTOMATED COUNT: 19 % (ref 11.5–14.5)
GLUCOSE BLD MANUAL STRIP-MCNC: 137 MG/DL (ref 74–99)
GLUCOSE BLD MANUAL STRIP-MCNC: 156 MG/DL (ref 74–99)
GLUCOSE BLD MANUAL STRIP-MCNC: 173 MG/DL (ref 74–99)
GLUCOSE BLD MANUAL STRIP-MCNC: 85 MG/DL (ref 74–99)
GLUCOSE SERPL-MCNC: 155 MG/DL (ref 74–99)
GRAM STN SPEC: ABNORMAL
GRAM STN SPEC: ABNORMAL
HCT VFR BLD AUTO: 25.6 % (ref 41–52)
HGB BLD-MCNC: 7.8 G/DL (ref 13.5–17.5)
MCH RBC QN AUTO: 22 PG (ref 26–34)
MCHC RBC AUTO-ENTMCNC: 30.5 G/DL (ref 32–36)
MCV RBC AUTO: 72 FL (ref 80–100)
NRBC BLD-RTO: 0 /100 WBCS (ref 0–0)
P AXIS: 57 DEGREES
PLATELET # BLD AUTO: 687 X10*3/UL (ref 150–450)
POTASSIUM SERPL-SCNC: 4.1 MMOL/L (ref 3.5–5.3)
PR INTERVAL: 190 MS
Q ONSET: 249 MS
QRS COUNT: 13 BEATS
QRS DURATION: 124 MS
QT INTERVAL: 423 MS
QTC CALCULATION(BAZETT): 497 MS
QTC FREDERICIA: 471 MS
R AXIS: 36 DEGREES
RBC # BLD AUTO: 3.55 X10*6/UL (ref 4.5–5.9)
SODIUM SERPL-SCNC: 139 MMOL/L (ref 136–145)
T AXIS: 29 DEGREES
T OFFSET: 461 MS
VENTRICULAR RATE: 83 BPM
WBC # BLD AUTO: 9.3 X10*3/UL (ref 4.4–11.3)

## 2025-08-09 PROCEDURE — 97162 PT EVAL MOD COMPLEX 30 MIN: CPT | Mod: GP

## 2025-08-09 PROCEDURE — 82947 ASSAY GLUCOSE BLOOD QUANT: CPT

## 2025-08-09 PROCEDURE — 36415 COLL VENOUS BLD VENIPUNCTURE: CPT | Performed by: INTERNAL MEDICINE

## 2025-08-09 PROCEDURE — 2500000001 HC RX 250 WO HCPCS SELF ADMINISTERED DRUGS (ALT 637 FOR MEDICARE OP): Performed by: SPECIALIST

## 2025-08-09 PROCEDURE — 85027 COMPLETE CBC AUTOMATED: CPT | Performed by: INTERNAL MEDICINE

## 2025-08-09 PROCEDURE — 2500000001 HC RX 250 WO HCPCS SELF ADMINISTERED DRUGS (ALT 637 FOR MEDICARE OP): Performed by: INTERNAL MEDICINE

## 2025-08-09 PROCEDURE — 2500000002 HC RX 250 W HCPCS SELF ADMINISTERED DRUGS (ALT 637 FOR MEDICARE OP, ALT 636 FOR OP/ED): Performed by: SPECIALIST

## 2025-08-09 PROCEDURE — S0109 METHADONE ORAL 5MG: HCPCS | Performed by: INTERNAL MEDICINE

## 2025-08-09 PROCEDURE — 97530 THERAPEUTIC ACTIVITIES: CPT | Mod: GP

## 2025-08-09 PROCEDURE — 80048 BASIC METABOLIC PNL TOTAL CA: CPT | Performed by: INTERNAL MEDICINE

## 2025-08-09 PROCEDURE — 97530 THERAPEUTIC ACTIVITIES: CPT | Mod: GO

## 2025-08-09 PROCEDURE — 2500000004 HC RX 250 GENERAL PHARMACY W/ HCPCS (ALT 636 FOR OP/ED): Performed by: SPECIALIST

## 2025-08-09 PROCEDURE — 2500000004 HC RX 250 GENERAL PHARMACY W/ HCPCS (ALT 636 FOR OP/ED)

## 2025-08-09 PROCEDURE — 99233 SBSQ HOSP IP/OBS HIGH 50: CPT | Performed by: INTERNAL MEDICINE

## 2025-08-09 PROCEDURE — 2500000004 HC RX 250 GENERAL PHARMACY W/ HCPCS (ALT 636 FOR OP/ED): Mod: JZ | Performed by: SPECIALIST

## 2025-08-09 PROCEDURE — 97165 OT EVAL LOW COMPLEX 30 MIN: CPT | Mod: GO

## 2025-08-09 PROCEDURE — 2060000001 HC INTERMEDIATE ICU ROOM DAILY

## 2025-08-09 PROCEDURE — 2500000002 HC RX 250 W HCPCS SELF ADMINISTERED DRUGS (ALT 637 FOR MEDICARE OP, ALT 636 FOR OP/ED): Performed by: INTERNAL MEDICINE

## 2025-08-09 RX ORDER — HEPARIN SODIUM 5000 [USP'U]/ML
5000 INJECTION, SOLUTION INTRAVENOUS; SUBCUTANEOUS EVERY 8 HOURS
Status: DISPENSED | OUTPATIENT
Start: 2025-08-09

## 2025-08-09 RX ORDER — BACLOFEN 10 MG/1
10 TABLET ORAL 3 TIMES DAILY
Status: DISCONTINUED | OUTPATIENT
Start: 2025-08-09 | End: 2025-08-10

## 2025-08-09 RX ORDER — VANCOMYCIN HYDROCHLORIDE 1 G/200ML
1000 INJECTION, SOLUTION INTRAVENOUS EVERY 12 HOURS
Status: DISPENSED | OUTPATIENT
Start: 2025-08-09

## 2025-08-09 RX ADMIN — POLYETHYLENE GLYCOL 3350 17 G: 17 POWDER, FOR SOLUTION ORAL at 09:45

## 2025-08-09 RX ADMIN — PREGABALIN 200 MG: 75 CAPSULE ORAL at 15:07

## 2025-08-09 RX ADMIN — CEFTRIAXONE 2 G: 2 INJECTION, SOLUTION INTRAVENOUS at 09:48

## 2025-08-09 RX ADMIN — HEPARIN SODIUM 5000 UNITS: 5000 INJECTION, SOLUTION INTRAVENOUS; SUBCUTANEOUS at 09:48

## 2025-08-09 RX ADMIN — METOPROLOL SUCCINATE 25 MG: 25 TABLET, EXTENDED RELEASE ORAL at 09:47

## 2025-08-09 RX ADMIN — INSULIN LISPRO 2 UNITS: 100 INJECTION, SOLUTION INTRAVENOUS; SUBCUTANEOUS at 15:09

## 2025-08-09 RX ADMIN — PANTOPRAZOLE SODIUM 40 MG: 40 TABLET, DELAYED RELEASE ORAL at 06:36

## 2025-08-09 RX ADMIN — OXYCODONE HYDROCHLORIDE 10 MG: 10 TABLET ORAL at 21:28

## 2025-08-09 RX ADMIN — SODIUM CHLORIDE 75 ML/HR: 0.9 INJECTION, SOLUTION INTRAVENOUS at 06:37

## 2025-08-09 RX ADMIN — DULOXETINE 40 MG: 20 CAPSULE, DELAYED RELEASE ORAL at 09:46

## 2025-08-09 RX ADMIN — BACLOFEN 10 MG: 10 TABLET ORAL at 21:21

## 2025-08-09 RX ADMIN — HEPARIN SODIUM 5000 UNITS: 5000 INJECTION, SOLUTION INTRAVENOUS; SUBCUTANEOUS at 21:31

## 2025-08-09 RX ADMIN — BACLOFEN 10 MG: 10 TABLET ORAL at 15:09

## 2025-08-09 RX ADMIN — ATORVASTATIN CALCIUM 40 MG: 40 TABLET, FILM COATED ORAL at 21:21

## 2025-08-09 RX ADMIN — METHADONE HYDROCHLORIDE 78 MG: 10 CONCENTRATE ORAL at 21:43

## 2025-08-09 RX ADMIN — PREGABALIN 200 MG: 75 CAPSULE ORAL at 09:47

## 2025-08-09 RX ADMIN — LEVOTHYROXINE SODIUM 50 MCG: 0.05 TABLET ORAL at 06:36

## 2025-08-09 RX ADMIN — METHADONE HYDROCHLORIDE 87 MG: 10 CONCENTRATE ORAL at 09:48

## 2025-08-09 RX ADMIN — HYDROXYCHLOROQUINE SULFATE 200 MG: 200 TABLET, FILM COATED ORAL at 09:47

## 2025-08-09 RX ADMIN — ASPIRIN 81 MG: 81 TABLET, DELAYED RELEASE ORAL at 09:47

## 2025-08-09 RX ADMIN — HYDROXYCHLOROQUINE SULFATE 200 MG: 200 TABLET, FILM COATED ORAL at 21:21

## 2025-08-09 RX ADMIN — INSULIN GLARGINE 40 UNITS: 100 INJECTION, SOLUTION SUBCUTANEOUS at 21:41

## 2025-08-09 RX ADMIN — OXYCODONE HYDROCHLORIDE 10 MG: 10 TABLET ORAL at 15:08

## 2025-08-09 RX ADMIN — PREGABALIN 200 MG: 75 CAPSULE ORAL at 21:21

## 2025-08-09 RX ADMIN — VANCOMYCIN HYDROCHLORIDE 1000 MG: 1 INJECTION, SOLUTION INTRAVENOUS at 20:00

## 2025-08-09 RX ADMIN — OXYCODONE 5 MG: 5 TABLET ORAL at 06:47

## 2025-08-09 RX ADMIN — HYDROMORPHONE HYDROCHLORIDE 0.5 MG: 0.5 INJECTION, SOLUTION INTRAMUSCULAR; INTRAVENOUS; SUBCUTANEOUS at 13:29

## 2025-08-09 RX ADMIN — TIOTROPIUM BROMIDE INHALATION SPRAY 2 PUFF: 3.12 SPRAY, METERED RESPIRATORY (INHALATION) at 09:56

## 2025-08-09 ASSESSMENT — COGNITIVE AND FUNCTIONAL STATUS - GENERAL
TURNING FROM BACK TO SIDE WHILE IN FLAT BAD: A LOT
TOILETING: A LOT
DAILY ACTIVITIY SCORE: 15
MOBILITY SCORE: 9
CLIMB 3 TO 5 STEPS WITH RAILING: A LOT
STANDING UP FROM CHAIR USING ARMS: A LITTLE
MOBILITY SCORE: 13
DRESSING REGULAR UPPER BODY CLOTHING: A LITTLE
DRESSING REGULAR UPPER BODY CLOTHING: A LITTLE
DRESSING REGULAR LOWER BODY CLOTHING: A LITTLE
EATING MEALS: A LITTLE
WALKING IN HOSPITAL ROOM: TOTAL
MOVING FROM LYING ON BACK TO SITTING ON SIDE OF FLAT BED WITH BEDRAILS: A LITTLE
PERSONAL GROOMING: A LITTLE
CLIMB 3 TO 5 STEPS WITH RAILING: A LOT
MOVING TO AND FROM BED TO CHAIR: TOTAL
HELP NEEDED FOR BATHING: A LITTLE
TOILETING: A LITTLE
WALKING IN HOSPITAL ROOM: A LOT
MOVING TO AND FROM BED TO CHAIR: TOTAL
STANDING UP FROM CHAIR USING ARMS: TOTAL
WALKING IN HOSPITAL ROOM: A LOT
DAILY ACTIVITIY SCORE: 19
DRESSING REGULAR UPPER BODY CLOTHING: A LITTLE
HELP NEEDED FOR BATHING: TOTAL
DAILY ACTIVITIY SCORE: 18
TOILETING: A LITTLE
STANDING UP FROM CHAIR USING ARMS: TOTAL
PERSONAL GROOMING: A LITTLE
DRESSING REGULAR LOWER BODY CLOTHING: A LOT
MOBILITY SCORE: 17
CLIMB 3 TO 5 STEPS WITH RAILING: TOTAL
DRESSING REGULAR LOWER BODY CLOTHING: A LITTLE
MOVING TO AND FROM BED TO CHAIR: A LOT
TURNING FROM BACK TO SIDE WHILE IN FLAT BAD: A LITTLE
HELP NEEDED FOR BATHING: A LOT

## 2025-08-09 ASSESSMENT — ENCOUNTER SYMPTOMS
ABDOMINAL PAIN: 0
FATIGUE: 1
CONFUSION: 0
DYSURIA: 0
NERVOUS/ANXIOUS: 0
DIARRHEA: 0
SORE THROAT: 0
PALPITATIONS: 0
LIGHT-HEADEDNESS: 0
NECK PAIN: 0
WHEEZING: 0
VOMITING: 0
BACK PAIN: 1
DYSPHORIC MOOD: 1
CHILLS: 0
AGITATION: 0
FEVER: 0
NAUSEA: 0
CONSTIPATION: 0
COLOR CHANGE: 1
SHORTNESS OF BREATH: 0
CHEST TIGHTNESS: 0
FACIAL SWELLING: 0
HEMATURIA: 0
DIAPHORESIS: 0
COUGH: 1
ACTIVITY CHANGE: 1
JOINT SWELLING: 0
WEAKNESS: 1
CHOKING: 0
WOUND: 1
HALLUCINATIONS: 0

## 2025-08-09 ASSESSMENT — PAIN DESCRIPTION - DESCRIPTORS
DESCRIPTORS: JABBING;NAGGING;SHARP
DESCRIPTORS: SHARP;JABBING

## 2025-08-09 ASSESSMENT — PAIN DESCRIPTION - ORIENTATION
ORIENTATION: RIGHT
ORIENTATION: RIGHT

## 2025-08-09 ASSESSMENT — PAIN - FUNCTIONAL ASSESSMENT
PAIN_FUNCTIONAL_ASSESSMENT: 0-10

## 2025-08-09 ASSESSMENT — PAIN DESCRIPTION - LOCATION
LOCATION: LEG
LOCATION: LEG

## 2025-08-09 ASSESSMENT — PAIN SCALES - GENERAL
PAINLEVEL_OUTOF10: 0 - NO PAIN
PAINLEVEL_OUTOF10: 6
PAINLEVEL_OUTOF10: 7
PAINLEVEL_OUTOF10: 7
PAINLEVEL_OUTOF10: 0 - NO PAIN
PAINLEVEL_OUTOF10: 0 - NO PAIN
PAINLEVEL_OUTOF10: 7
PAINLEVEL_OUTOF10: 9
PAINLEVEL_OUTOF10: 0 - NO PAIN
PAINLEVEL_OUTOF10: 8

## 2025-08-09 ASSESSMENT — ACTIVITIES OF DAILY LIVING (ADL)
ADL_ASSISTANCE: INDEPENDENT
BATHING_ASSISTANCE: MAXIMAL

## 2025-08-09 NOTE — ASSESSMENT & PLAN NOTE
-Clinically patient meets sepsis criteria and is being treated appropriately as outlined below  -Inflammatory markers: CRP 20.13, ESR 89   -Imaging results reviewed (pertinent): XR R Foot, CT R Foot, and DVT US (R)  -Lactate 3.1 -> 2.3 -> repeat pending  -BP is low-normal. Patient has had poor intake/appetite. He appears perhaps a bit volume down on exam. He rec'd 500cc bolus in ED, will order another. Have to be cautious with HF history.   -Blood cultures x2 and wound culture collected, in process.  8/8: Wound cultures are growing Staph aureus.  8/9: Wound appears to be growing MRSA.  Appreciate ID input.  Remains on vancomycin and Rocephin.

## 2025-08-09 NOTE — PROGRESS NOTES
8/9/25 1500   08/09/25 1500   Discharge Planning   Home or Post Acute Services Post acute facilities (Rehab/SNF/etc)   Type of Post Acute Facility Services Skilled nursing   Expected Discharge Disposition SNF   Does the patient need discharge transport arranged? Yes   [facility] coordination needed? Yes   Patient Choice   Provider Choice list and CMS website (https://medicare.gov/care-compare#search) for post-acute Quality and Resource Measure Data were provided and reviewed with: Patient   Patient / Family choosing to utilize agency / facility established prior to hospitalization No   Intensity of Service   Intensity of Service >30 min     Followed up with pt on SNF LOC recommendation. Pt in agreement with SNF at this time. Provided skilled nursing list to PT and family from [facility] directory that includes facilities that are within  Post - Acute Quality Network, as well as meeting patient’s medical needs, and are in-network for patient’s insurance; while also in discharge geographic area patient prefers, and identifies each facilities CMS star rating. Pt requested to have mass referrals sent. Mass referrals sent to all 6 facilities on pt SNF list that also states they do Methadone. Requested facilities to confirm that they can do Methadone as well. 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 Awaiting facility responses.   Magdalena Contreras RN, BSN, [facility] Supervisor

## 2025-08-09 NOTE — DOCUMENTATION CLARIFICATION NOTE
"    PATIENT:               ANDRE AQUINO  ACCT #:                  [id number]  MRN:                       [id number]  :                       1965  ADMIT DATE:       2025 3:10 PM  DISCH DATE:  RESPONDING PROVIDER #:        [id number]          PROVIDER RESPONSE TEXT:    I agree with dietician diagnosis of moderate malnutrition diagnosed on 25    CDI QUERY TEXT:    Clarification        Instruction:    Based on your assessment of the patient and the clinical information, please provide the requested documentation by clicking on the appropriate radio button and enter any additional information if prompted.    Question: Please further clarify this patient nutritional status as    When answering this query, please exercise your independent professional judgment. The fact that a question is being asked, does not imply that any particular answer is desired or expected.    The patient's clinical indicators include:  Clinical Information:  - 59yo male admitted with Osteomyelitis and DM foot infection.    Clinical Indicators:  -   RD Swires: \"Moderate malnutrition related to acute disease or injury related to Diabetic ulcer as evidenced by mild fat losses - orbital, buccal, tricep, mild muscle wasting -temporalis, clavicular region, deltoid/trapezius, interosseous. Additional Assessment Information: Inconsistent PO intake \"      Treatment: Rd consult, Rodrigo and Glucerna.    Risk Factors: DM foot infection, osteomyelitis, inconsistent po intake.  Options provided:  -- I agree with dietician diagnosis of moderate malnutrition diagnosed on 25  -- Other - I will add my own diagnosis  -- Refer to Clinical Documentation Reviewer    Query created by: Maine Hector on 2025 1:06 PM      Electronically signed by:  DAY FUCHS MD 2025 9:20 PM          "

## 2025-08-09 NOTE — PROGRESS NOTES
Physical Therapy    Physical Therapy Evaluation    Patient Name: Kael Zepeda  MRN: [id number]  Department: [address]  Room: 2007/2007-A  Today's Date: 8/9/2025   Time Calculation  Start Time: 1009  Stop Time: 1039  Time Calculation (min): 30 min    Assessment/Plan   PT Assessment  PT Assessment Results: Decreased strength, Decreased range of motion, Decreased endurance, Impaired balance, Decreased mobility, Decreased safety awareness, Impaired judgement, Pain  Rehab Prognosis: Fair  Barriers to Discharge Home: Cognition needs, Caregiver assistance, Physical needs  Caregiver Assistance: Caregiver assistance needed per identified barriers - however, level of patient's required assistance exceeds assistance available at home  Cognition Needs: 24hr supervision for safety awareness needed, Medication and/or medical management daily assist needed, Recollection or understanding of precautions/restrictions limited, Recollection or understanding of home exercise program limited, Insight of patient limited regarding functional ability/needs, Cognition-related high falls risk  Physical Needs: Stair navigation into home limited by function/safety, Ambulating household distances limited by function/safety, 24hr mobility assistance needed, 24hr ADL assistance needed, High falls risk due to function or environment  Evaluation/Treatment Tolerance: Patient limited by fatigue, Patient limited by pain  End of Session Communication: Bedside nurse, PCT/NA/CTA  Assessment Comment: MOD X2 FOR FWW STANDIGN EOB, VERY FATIGUED W/ ACTIVITY, MIN A  FOR BED MOBILITY VC FOR SAFETY CONSTANT THROUGH OUT SESSIN, HGIH FALL RISK RECOMMEND MOD REHAB  End of Session Patient Position: Bed, 3 rail up, Alarm on (CALL LIGHTIN REACH)  IP OR SWING BED PT PLAN  Inpatient or Swing Bed: Inpatient  PT Plan  Treatment/Interventions: Bed mobility, Transfer training, Gait training, Strengthening, Endurance training, Therapeutic exercise, Therapeutic  activity  PT Plan: Ongoing PT  PT Frequency: 4 times per week (WHILE IN HOSPITAL)  PT Discharge Recommendations: Moderate intensity level of continued care; Based on current functional status and rehab potential, patient is anticipated to tolerate and benefit from 5 or more days per week of skilled rehabilitative therapy after discharge from this acute inpatient hospitalization.   Equipment Recommended upon Discharge:  (TBD)  PT Recommended Transfer Status: Assist x2 (FWW, NEW  R BKA)  PT - OK to Discharge: Yes (WHEN M EDICALLY CLEARED)    Subjective     PT Visit Info:  PT Received On: 08/09/25  General Visit Information:  General  Reason for Referral: RECENT SURG, GAIT TRAINING  Referred By: MARII  Past Medical History Relevant to Rehab: FOOT WOUND ON R; DX: R FOOT WOUND W/ OM & GANGRENE, R BKA DONE 8/8; HX: CAD, AFIB, CABG, COPD, HTN, HEP C, RA, HYPOTHYROID, DM, TMA R, TOBACCOUSE, ANEMIA, ANXIETY DEPRESSION, IVDA- ON METHADONE FALLS, NEEDS A TO GET UP  Family/Caregiver Present: Yes  [facility]  Co-Treatment: OT  Co-Treatment Reason: FACILITATE MOBILITY SAFETY  Prior to Session Communication: Bedside nurse (OK FOR THERAPY)  Patient Position Received: Bed, 3 rail up, Alarm on (ROOM 2007 ALERT IV, R BKA SLINT & ACE WRAP, ALERT, O2 OFF OK PER RN TO KEEP OFF IF SATS OK, SATS WERE 97% SO KEPT O2 OFF DURING SESSION, AGREES TO THERAPY)  General Comment: REPORTS ANXIETY IN ANTICIPATION OF MOBILITY  Home Living:  Home Living  Home Living Comments: BROTHER IN 1 LEVEL DUPLEX, 1 REESE, INDEP AMB & ADL, TUB SHOWER, HAS FWW, BROTHER DOES CHORES AND DRIVES,  Prior Level of Function:     Precautions:  Precautions  Medical Precautions: Fall precautions (NEW R BKA)             Objective   Pain:  Pain Assessment  0-10 (Numeric) Pain Score: 7 (R BKA PAIN)  Pain Interventions: Repositioned, Emotional support  Cognition:  Cognition  Overall Cognitive Status:  Within Functional Limits  Arousal/Alertness: Appropriate responses to stimuli  Orientation Level: Oriented X4  Following Commands:  (INCRESED TIME AND REPETITION)  Safety Judgment:  (DECREASED AWARENESS OF SAFETY PRECAUTIONS, EDUCATED THROUGH OUT SESSION)  Problem Solving: Assistance required to identify errors made  Cognition Comments: SLOW PROCESSING  Memory: Exceptions to WFL  Short-Term Memory: Impaired  Safety/Judgement: Exceptions to WFL  Complex Functional Tasks: Moderate  Novel Situations: Moderate  Routine Tasks: Moderate  Unable to Self-Monitor and Self-Correct Consistently: Moderate  Insight: Moderate (FOR MOBILITY SAFETY POST BKA)  Impulsive: Mildly  Flexibility of Thought: Reduced flexibility  Planning: Reduced planning skills  Organization: Mildly disorganized  Processing Speed: Delayed    General Assessments:                  Activity Tolerance  Endurance: Decreased tolerance for upright activites, Tolerates 10 - 20 min exercise with multiple rests    Sensation  Sensation Comment: NO C/O DENSAITON DEFICITS  DOES C/O NERVE ENDING PAIN ON R BKA    Strength  Strength Comments: ROM LLE WFL, STREHG LLE 3+/5 DECRESAED MUSCULR ENDURANCE, R KNEE IN SPLINT, R HIP WFL ROM STRENGTH 3/5=PAINFUL TO MOVE  Strength  Strength Comments: ROM LLE WFL, STREHG LLE 3+/5 DECRESAED MUSCULR ENDURANCE, R KNEE IN SPLINT, R HIP WFL ROM STRENGTH 3/5=PAINFUL TO MOVE                     Static Sitting Balance  Static Sitting-Comment/Number of Minutes: FAIR  Dynamic Sitting Balance  Dynamic Sitting-Comments: FAIR/FAIR-    Static Standing Balance  Static Standing-Comment/Number of Minutes: POOR+  Dynamic Standing Balance  Dynamic Standing-Comments: POOR+  Functional Assessments:  Bed Mobility  Bed Mobility:  (SUPINE<>SIT MIN A VC FOR SAFETY AS WANTS TO PUSH THROUGH HIS BKA TO MOVE)    Transfers  Transfer:  (SIT<>STAND 2 REPS MOD X2 A USED FWW, CONSTANT VC FOR SAFETY/FWW USE, STOOD FOR 2 MIN  FIRST STAND ADN 20 SEC 2ND STAND, FATIGUED  W/ STANDING NOT ABLE TO TAKE STEPS, LLE UNSTEADY NEEDED MOD X2 A THROUGH OUT STANDING)  Extremity/Trunk Assessments:     Outcome Measures:  [facility] Basic Mobility  Turning from your back to your side while in a flat bed without using bedrails: A little  Moving from lying on your back to sitting on the side of a flat bed without using bedrails: A lot  Moving to and from bed to chair (including a wheelchair): Total  Standing up from a chair using your arms (e.g. wheelchair or bedside chair): Total  To walk in hospital room: Total  Climbing 3-5 steps with railing: Total  Basic Mobility - Total Score: 9    Encounter Problems       Encounter Problems (Active)       Mobility       STG - Patient will ambulate (Not Progressing)       Start:  08/09/25    Expected End:  08/23/25       FWW  MIN A X1 75+ FT         Goal 1 (Not Progressing)       Start:  08/09/25    Expected End:  08/30/25       20 REPS RROM LLE, AROM R HIP INCREASING STRENGTH TO STABILIZE OOB ACTIVITIES            PT Transfers       STG - Transfer from bed to chair (Not Progressing)       Start:  08/09/25    Expected End:  08/12/25       MIN A X2 FWW         STG - Patient to transfer to and from sit to supine (Not Progressing)       Start:  08/09/25    Expected End:  08/11/25       SBA USING PROPER PRECAUTIONS FOR R BKA HOB FLAT NO RAILS         STG - Patient will transfer sit to and from stand (Not Progressing)       Start:  08/09/25    Expected End:  08/12/25       FWW MIN A X2 FWW USING PROPER TECHNIQUE                Education Documentation  Mobility Training, taught by Aundrea Salas, PT at 8/9/2025 12:26 PM.  Learner: Patient  Readiness: Acceptance  Method: Explanation  Response: Needs Reinforcement  Comment: MOBILITY SAFETY POST R BKA    Education Comments  No comments found.

## 2025-08-09 NOTE — ASSESSMENT & PLAN NOTE
-Follow fever curve, WBCs  -Continue antibiotic coverage with vanc/zosyn/clindamycin for now (based on CT results; however, reviewed Dr. Raya's note and patient's exam, not clinically c/w nec fasc)  -High risk of morbidity from additional drug therapy requiring intensive monitoring for toxicity: Vancomycin IV   -(+) monitor for sxs of toxicity and/or rxn related to vancomycin; appreciate pharmacy assistance with management  -Podiatry + Orthopedics consultation appreciated   -ID consultation appreciated   -Wound care consultation appreciated     8/7: No evidence of necrotizing fasciitis.  Does have osteomyelitis in his 4th and 5th metatarsal as well as his cuboid with osteolytic damage.  Podiatry states his foot is not salvageable and recommends BKA.  Patient wishes to proceed.  Referral made to orthopedic surgery, current plan for surgery on 8/8.  Right BKA done today.  Appears to be doing well.  Continue current treatment.  Consider de-escalating antibiotics.  8/9: Adding baclofen for muscle spasms.  Continue current pain medication at current dosing.  Remains on high dose Lyrica.  PT and OT.  Appreciate Ortho input.

## 2025-08-09 NOTE — PROGRESS NOTES
Kael Zepeda is a 60 y.o. male on day 3 of admission presenting with Diabetic ulcer of right foot associated with type 2 diabetes mellitus, unspecified part of foot, unspecified ulcer stage.    Review of Systems   Constitutional:  Positive for activity change and fatigue. Negative for chills, diaphoresis and fever.   HENT:  Positive for congestion. Negative for facial swelling, sneezing and sore throat.    Respiratory:  Positive for cough. Negative for choking, chest tightness, shortness of breath and wheezing.    Cardiovascular:  Positive for leg swelling. Negative for chest pain and palpitations.   Gastrointestinal:  Negative for abdominal pain, constipation, diarrhea, nausea and vomiting.   Genitourinary:  Negative for dysuria, hematuria and urgency.   Musculoskeletal:  Positive for back pain and gait problem. Negative for joint swelling and neck pain.   Skin:  Positive for color change and wound. Negative for rash.   Neurological:  Positive for weakness. Negative for syncope and light-headedness.   Psychiatric/Behavioral:  Positive for dysphoric mood. Negative for agitation, confusion and hallucinations. The patient is not nervous/anxious.    All other systems reviewed and are negative.     Subjective   Kael Zepeda is a 60 y.o. male with PMHx s/f HTN, HLD, CAD s/p CABG, transient post-op Afib after CABG (no AC), HFrEF (EF 39% 07/24), COPD (no baseline O2), h/o IVDU (on long-term methadone), h/o Hep C (s/p treatment, in remission), RA (on methotrexate), hypothyroidism, IDDM-II c/b peripheral neuropathy and DM foot wounds (s/p transmetatarsal amputation of the right foot 04/30/25), presenting with advanced R foot wound. Patient had TMA of the R foot in setting of nonhealing wounds and OM 04/30/25. He was supposed to continue outpatient IV antibiotics on discharge and go to rehab at that time; however, he elected to leave the hospital AMA (he reports he was afraid to miss weekly check-in for his  "methadone clinic). Since then, he has only intermittently followed up with the wound care center. He was seen earlier this week at wound care and described feeling weak, having poor appetite, and having increasing drainage from the wound which is malodorous. At that time there was significant concern he was septic and he was told to go to the ED for evaluation. The patient subsequently waited multiple days, coming in ~72 hours later, because he was \"finally ready.\" He has continued to feel ill the entire time. He has poor intake and malaise. Has also experienced swelling of the RLE > LLE. He is a bit of a difficult historian, reporting he takes \"a lot\" of medications but cannot name them or their doses. He has a difficult time telling me a time frame on his symptoms overall. Denies cp/pressure, palpitations, diaphoresis, SOB, CALDERON, dizziness / lightheadedness, syncope or near syncope, HA, vision changes, f/c/v/d/abd pain.     8/7: Patient seen.  No new complaints.  Patient states he is willing to proceed with right below-knee amputation as recommended by podiatry.  Patient has advanced osteomyelitis in the 4th and 5th metatarsal and cuboid with osteolytic destruction.  Podiatry states foot is not salvageable.  There is however no evidence of necrotizing fasciitis.  Referral was made to orthopedic surgery and the current plan is to take him for a below the knee amputation tomorrow 8/8.  Hemoglobin today is noted to be low at 7.0.  Patient is asymptomatic.  Hemoglobin was 7.9 on admission.  No obvious bleeding.  However given the plan for his amputation tomorrow I have elected to transfuse 1 unit of PRBCs.  Patient has completed consent form.  He is in agreement with plan to proceed with amputation tomorrow.    8/8: Patient seen.  No complaints at this time.  Seen in room following amputation.  Large dressing on his wound.  Pain is adequately controlled at this time.  Appreciate input from orthopedics, podiatry, ID.  " Will reassess again in AM.  Hopefully patient recovers quickly.    8/9: Patient seen.  Complains of pain in his right BKA site.  Dressings appear to be clear.  States he is tolerating the discomfort but is having spasms.  Asks for nerve pain medication.  Already on high dose Lyrica 2 mg 3 times daily.  Will add baclofen for muscle spasms.  Has not asked for change in his methadone or oxycodone.  Hemoglobin today is 7.8.  Will continue to monitor.  Reassess in AM.       Objective     Last Recorded Vitals  BP 99/59 (BP Location: Left arm, Patient Position: Lying)   Pulse 76   Temp 36.2 °C (97.1 °F) (Temporal)   Resp 16   Wt 82.9 kg (182 lb 12.2 oz)   SpO2 95%   Intake/Output last 3 Shifts:    Intake/Output Summary (Last 24 hours) at 8/9/2025 1514  Last data filed at 8/9/2025 0700  Gross per 24 hour   Intake 193.75 ml   Output 900 ml   Net -706.25 ml       Admission Weight  Weight: 81.6 kg (180 lb) (08/06/25 1502)    Daily Weight  08/09/25 : 82.9 kg (182 lb 12.2 oz)      Physical Exam  Constitutional:       General: He is not in acute distress.  HENT:      Head: Normocephalic and atraumatic.      Nose: Nose normal. No congestion or rhinorrhea.      Mouth/Throat:      Mouth: Mucous membranes are dry.      Pharynx: Oropharynx is clear.     Eyes:      General: No scleral icterus.     Extraocular Movements: Extraocular movements intact.      Pupils: Pupils are equal, round, and reactive to light.       Cardiovascular:      Rate and Rhythm: Normal rate and regular rhythm.      Heart sounds: Normal heart sounds. No murmur heard.     No friction rub. No gallop.   Pulmonary:      Effort: Pulmonary effort is normal.      Breath sounds: Normal breath sounds. No wheezing, rhonchi or rales.   Chest:      Chest wall: No tenderness.   Abdominal:      General: There is no distension.      Palpations: Abdomen is soft.      Tenderness: There is no abdominal tenderness. There is no guarding or rebound.     Musculoskeletal:          General: No swelling, tenderness or signs of injury. Normal range of motion.      Cervical back: Normal range of motion.      Comments: Right below the knee amputation.  New dressings applied.  No seepage to the dressings at this time.     Skin:     General: Skin is warm and dry.     Neurological:      General: No focal deficit present.      Mental Status: He is alert and oriented to person, place, and time.          Lab Results  Results for orders placed or performed during the hospital encounter of 08/06/25 (from the past 24 hours)   POCT GLUCOSE   Result Value Ref Range    POCT Glucose 142 (H) 74 - 99 mg/dL   POCT GLUCOSE   Result Value Ref Range    POCT Glucose 226 (H) 74 - 99 mg/dL   CBC   Result Value Ref Range    WBC 9.3 4.4 - 11.3 x10*3/uL    nRBC 0.0 0.0 - 0.0 /100 WBCs    RBC 3.55 (L) 4.50 - 5.90 x10*6/uL    Hemoglobin 7.8 (L) 13.5 - 17.5 g/dL    Hematocrit 25.6 (L) 41.0 - 52.0 %    MCV 72 (L) 80 - 100 fL    MCH 22.0 (L) 26.0 - 34.0 pg    MCHC 30.5 (L) 32.0 - 36.0 g/dL    RDW 19.0 (H) 11.5 - 14.5 %    Platelets 687 (H) 150 - 450 x10*3/uL   Basic Metabolic Panel   Result Value Ref Range    Glucose 155 (H) 74 - 99 mg/dL    Sodium 139 136 - 145 mmol/L    Potassium 4.1 3.5 - 5.3 mmol/L    Chloride 107 98 - 107 mmol/L    Bicarbonate 25 21 - 32 mmol/L    Anion Gap 11 10 - 20 mmol/L    Urea Nitrogen 10 6 - 23 mg/dL    Creatinine 0.72 0.50 - 1.30 mg/dL    eGFR >90 >60 mL/min/1.73m*2    Calcium 8.4 (L) 8.6 - 10.3 mg/dL   POCT GLUCOSE   Result Value Ref Range    POCT Glucose 137 (H) 74 - 99 mg/dL   POCT GLUCOSE   Result Value Ref Range    POCT Glucose 173 (H) 74 - 99 mg/dL     *Note: Due to a large number of results and/or encounters for the requested time period, some results have not been displayed. A complete set of results can be found in Results Review.        Image Results  ECG 12 Lead  Sinus rhythm  Nonspecific intraventricular conduction delay  Consider inferior infarct  Baseline wander in lead(s) V6    See  ED provider note for full interpretation and clinical correlation  Confirmed by Tanisha Hill ([id number]) on 8/9/2025 11:10:48 AM       Assessment/Plan     Assessment & Plan  Diabetic ulcer of right foot associated with type 2 diabetes mellitus, unspecified part of foot, unspecified ulcer stage  -Follow fever curve, WBCs  -Continue antibiotic coverage with vanc/zosyn/clindamycin for now (based on CT results; however, reviewed Dr. Raya's note and patient's exam, not clinically c/w nec fasc)  -High risk of morbidity from additional drug therapy requiring intensive monitoring for toxicity: Vancomycin IV   -(+) monitor for sxs of toxicity and/or rxn related to vancomycin; appreciate pharmacy assistance with management  -Podiatry + Orthopedics consultation appreciated   -ID consultation appreciated   -Wound care consultation appreciated     8/7: No evidence of necrotizing fasciitis.  Does have osteomyelitis in his 4th and 5th metatarsal as well as his cuboid with osteolytic damage.  Podiatry states his foot is not salvageable and recommends BKA.  Patient wishes to proceed.  Referral made to orthopedic surgery, current plan for surgery on 8/8.  Right BKA done today.  Appears to be doing well.  Continue current treatment.  Consider de-escalating antibiotics.  8/9: Adding baclofen for muscle spasms.  Continue current pain medication at current dosing.  Remains on high dose Lyrica.  PT and OT.  Appreciate Ortho input.  Sepsis with acute organ dysfunction (Multi)  -Clinically patient meets sepsis criteria and is being treated appropriately as outlined below  -Inflammatory markers: CRP 20.13, ESR 89   -Imaging results reviewed (pertinent): XR R Foot, CT R Foot, and DVT US (R)  -Lactate 3.1 -> 2.3 -> repeat pending  -BP is low-normal. Patient has had poor intake/appetite. He appears perhaps a bit volume down on exam. He rec'd 500cc bolus in ED, will order another. Have to be cautious with HF history.   -Blood cultures x2 and  wound culture collected, in process.  8/8: Wound cultures are growing Staph aureus.  8/9: Wound appears to be growing MRSA.  Appreciate ID input.  Remains on vancomycin and Rocephin.  Type 2 diabetes mellitus, with long-term current use of insulin  IDDM-II c/b peripheral neuropathy, with current hyperglycemia   -Continue home basal insulin at current dosage (Lantus 40 units nightly -> from last admit until confirmed)  -Continue with SSI   -Continue with accucheks, hypoglycemic protocol   -Optimize glycemic control  -Monitor and adjust as needed   -Last available A1c = 8.2   -Continue home Lyrica  Benign essential hypertension  -BP: low normal on presentation, home therapies (Metoprolol Succinate 25mg AM) will be continued. Close monitoring and adjust as needed.   Fibromyalgia  Anxiety, depression  Fibromyalgia   -Patient will be continued on home therapies (Cymbalta)   -Continue home Lyrica  Hyperlipidemia, mixed  -HLD: will be continued on home therapies (Lipitor 40mg).  Hypothyroidism, acquired  -Continue home levothyroxine   Opioid dependence, in remission  -Continue home methadone dosing -> patient unable to confirm dosing. I spoke with pharmacy, his methadone clinic is closed for the evening. I have reviewed the patient's lost hospitalization and resumed the evening dose of 78mg from then, as the patient does not believe he has had any significant changes recently in that dose. Pharmacy will call the clinic in the morning and work on confirming morning dose and confirming no change to the evening doses.   -Patient is on long term methadone for management   -Patient unable to confirm dosing. I spoke with pharmacy, his methadone clinic is closed for the evening. I have reviewed the patient's lost hospitalization and resumed the evening dose of 78mg from then, as the patient does not believe he has had any significant changes recently in that dose. Pharmacy will call the clinic in the morning and work on  confirming morning dose and confirming no change to the evening doses.  8/7: Was able to confirm his rather large methadone dosing.  Referral made to pharmacy for assistance with pain control following surgery.  8/8: Pain is adequately controlled at this time.  Rheumatoid arthritis involving both hands with positive rheumatoid factor (Multi)  -Continue home methotrexate   CAD in native artery  -CAD: no current reports of chest pain or anginal equivalents. Continue 81mg ASA.   -Continued outpatient follow-up with cardiology and PCP as scheduled   Hepatitis C  -In remission, s/p treatment  Osteomyelitis of left foot, unspecified type (Multi)    Chronic systolic heart failure  Chronic HFrEF (EF 39% 07/2024)   -Patient appears euvolemic if not slightly hypovolemic on exam and is overall compensated from this standpoint  -Last limited echo from 07/24 at OSH reviewed -> overall seemingly similar akinetic segments and WMAs compared to the previous available at [place]. Patient does not have any overt symptoms otherwise.    -Will be continued on home medications (ASA, Toprol)  -Continue outpatient follow-up with cardiology/HF as scheduled   Chronic anemia  -On review of available labs (CBC, H&H), patient with baseline Hgb 7s-9s   -On admission, patient with Hgb of 7.9 which is at baseline   -Continue to monitor while admitted   Hyponatremia  -Na 127 -> corrects to 132 for glucose of 320  Osteomyelitis of ankle or foot, right, acute (Multi)          David Keys MD

## 2025-08-09 NOTE — ASSESSMENT & PLAN NOTE
Chronic HFrEF (EF 39% 07/2024)   -Patient appears euvolemic if not slightly hypovolemic on exam and is overall compensated from this standpoint  -Last limited echo from 07/24 at OSH reviewed -> overall seemingly similar akinetic segments and WMAs compared to the previous available at [place]. Patient does not have any overt symptoms otherwise.    -Will be continued on home medications (ASA, Toprol)  -Continue outpatient follow-up with cardiology/HF as scheduled

## 2025-08-09 NOTE — PROGRESS NOTES
"Kael Zepeda is a 60 y.o. male on day 3 of admission presenting with Diabetic ulcer of right foot associated with type 2 diabetes mellitus, unspecified part of foot, unspecified ulcer stage.      Assessment/Plan:     #Right foot osteomyelitis s/p BKA 8/8  Podiatry recommending amputation to which patient agrees.  Clindamycin discontinued as less likely to be nec Fac.  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  Cultures positive for gram-positive cocci. Zosyn switched to ceftriaxone and continue vancomycin     HTN, HLD  CAD s/p CABG  HFrEF  COPD  hep C SVR  Hypothyroidism  IDDM     Recommendations:     -blood cultures -ve > 48 hrs  -Continue vancomycin.  Patient has always grown MRSA  -Continue Rocephin  -will continue to follow        Chance Fong MD  Date of service: 8/9/2025  Time of service: 8:25 AM      Subjective   Interval History: No acute events overnight.  Patient denies any new complaint.        Review of Systems  Denies: fever, chills, nausea, vomiting, diarrhea, dysuria    Objective   Range of Vitals (last 24 hours)  Heart Rate:  [58-73]   Temp:  [35 °C (95 °F)-36.3 °C (97.3 °F)]   Resp:  [10-20]   BP: ()/(51-76)   Height:  [170.2 cm (5' 7\")]   Weight:  [77.6 kg (171 lb)-82.9 kg (182 lb 12.2 oz)]   SpO2:  [92 %-100 %]  Daily Weight  08/09/25 : 82.9 kg (182 lb 12.2 oz)   Body mass index is 28.62 kg/m².    General: alert, oriented, NAD  HEENT: No conjunctival pallor, no scleral icterus  Lungs: bilaterally clear to auscultation, no wheezing  Abdomen: soft, non tender, non distended, BS+  Neuro: AAO x 3, CN grossly intact  Extremities: R BKA with dressing        Antibiotics  cefTRIAXone - 2 gram/50 mL  vancomycin - 1 gram/200 mL      Relevant Results  Labs  Results from last 72 hours   Lab Units 08/09/25  0439 08/08/25  0340 08/07/25  0417 08/06/25  1547   WBC AUTO x10*3/uL 9.3 8.1 9.8 11.0   HEMOGLOBIN g/dL 7.8* 8.2* 7.0* 7.9*   HEMATOCRIT % 25.6* 26.9* 22.5* 26.1*   PLATELETS AUTO x10*3/uL " 687* 677* 633* 641*   NEUTROS PCT AUTO %  --   --  69.0 78.8   LYMPHS PCT AUTO %  --   --  11.0 6.6   MONOS PCT AUTO %  --   --  15.0 10.0   EOS PCT AUTO %  --   --  2.3 1.5     Results from last 72 hours   Lab Units 08/09/25  0439 08/08/25  0340 08/07/25  0417   SODIUM mmol/L 139 132* 131*   POTASSIUM mmol/L 4.1 4.0 4.0   CHLORIDE mmol/L 107 101 98   CO2 mmol/L 25 22 25   BUN mg/dL 10 10 10   CREATININE mg/dL 0.72 0.84 0.93   GLUCOSE mg/dL 155* 155* 90   CALCIUM mg/dL 8.4* 8.5* 8.3*   ANION GAP mmol/L 11 13 12   EGFR mL/min/1.73m*2 >90 >90 >90     Results from last 72 hours   Lab Units 08/07/25  0417 08/06/25  1547   ALK PHOS U/L 257* 182*   BILIRUBIN TOTAL mg/dL 0.7 0.6   PROTEIN TOTAL g/dL 6.6 7.1   ALT U/L 16 17   AST U/L 36 35   ALBUMIN g/dL 2.9* 3.0*     Estimated Creatinine Clearance: 110.9 mL/min (by C-G formula based on SCr of 0.72 mg/dL).  C-Reactive Protein   Date Value Ref Range Status   08/06/2025 20.13 (H) <1.00 mg/dL Final   04/29/2025 7.25 (H) <1.00 mg/dL Final   10/09/2024 13.42 (H) <1.00 mg/dL Final       Microbiology  Susceptibility data from last 14 days.  Collected Specimen Info Organism   08/06/25 Tissue/Biopsy from Wound/Tissue Staphylococcus aureus       Imaging    CT foot right w IV contrast  Result Date: 8/6/2025    Necrotizing soft tissue infection of the amputation stump with acute osteomyelitis involving the 4th and 5th metatarsal bases, cuboid and likely 3rd metatarsal base. No rim enhancing fluid collection.   MACRO:   Kai Waterman discussed the significance and urgency of this critical finding by EPIC secure chat with  YESI BEACH on 8/6/2025 at 6:37 pm.  (**-RCF-**) Findings:  See findings.     Signed by: Kai Waterman 8/6/2025 6:46 PM Dictation workstation:   [id number]    Vascular US Lower Extremity Venous Duplex Right  Result Date: 8/6/2025  Negative study.  No deep venous thrombosis of the  right lower extremity.   MACRO: None   Signed by: David Og 8/6/2025 5:59 PM  Dictation workstation:   [id number]    XR chest 1 view  Result Date: 8/6/2025  No acute cardiopulmonary process.   MACRO: None   Signed by: Pia Khan 8/6/2025 4:16 PM Dictation workstation:   [id number]    XR foot right 3+ views  Result Date: 8/6/2025  1. Status post transmetatarsal amputation. 2. Soft tissue swelling with an ulcer along the plantar aspect of the right or fluid. There is air in the soft tissues, likely gas-forming infection. 3. Bony destruction of the bases of the 4th and 5th metatarsals and cuboid consistent with osteomyelitis. Areas also a fracture of the base of the 5th metatarsal.       MACRO: None   Signed by: Pia Khan 8/6/2025 4:14 PM Dictation workstation:   [id number]

## 2025-08-09 NOTE — DOCUMENTATION CLARIFICATION NOTE
"    PATIENT:               ANDRE AQUINO  ACCT #:                  [id number]  MRN:                       [id number]  :                       1965  ADMIT DATE:       2025 3:10 PM  DISCH DATE:  RESPONDING PROVIDER #:        [id number]          PROVIDER RESPONSE TEXT:    Sepsis was a differential diagnosis and ruled out after study    CDI QUERY TEXT:    Clarification        Instruction:  Based on your assessment of the patient and the clinical information, please provide the requested documentation by clicking on the appropriate radio button and enter any additional information if prompted.    Question: Sepsis was documented in the medical record. Based on the documentation and the clinical information, can the diagnosis be further clarified as    When answering this query, please exercise your independent professional judgment. The fact that a question is being asked, does not imply that any particular answer is desired or expected.    The patient's clinical indicators include:  Clinical Information:  59yo male admitted with Osteomyelitis and DM foot infection. PMH includes noncompliance with antibiotics and care postop TMA same foot 2 months ago.    Documented Diagnosis: H&P Po [place]: \" Sepsis\"    Clinical Indicators:  -Vital Signs  1502 : 98.5, 79,  17,  101/85,  98% ra  1647:            84,  20, 113/89,  100% ra  1720:            88,  20, 102/66,  100% ra  :  98.3,  84,  18, 102/65,  92 % ra    -WBC:  11.0 on arrival with repeat 9.8  -Microbiology Results:   BC NGTD  Wound culture:  Staph aureus  -Neutrophil Count/percent Neutrophil:  8.65 / 78.8%  -Lactic acid: 3.1 - 2.3 - 2.5  -BUN/Creat:   12/ 0.92  -Bilirubin:    0.6  -MAP:    over 80  -Karen Coma Scale:  15  -PAO2/FIO2:    over 300  -Procalcitonin:  not tested  -Platelets:      641  -Other clinical indicators: ESR 89, CRP 20.13    Treatment: 1500 ml IVF bolus, Vancomycin, Clindamycin, Zosyn, Rocephin.    Risk Factors:  Osteomyelitis " and DM foot infection.  Options provided:  -- Sepsis was a differential diagnosis and ruled out after study  -- Sepsis with other acute organ dysfunction, Please specify sepsis associated organ dysfunction below  -- Other - I will add my own diagnosis  -- Refer to Clinical Documentation Reviewer    Query created by: Maine Hector on 8/8/2025 1:25 PM      Electronically signed by:  DAY FUCHS MD 8/8/2025 9:20 PM

## 2025-08-09 NOTE — PROGRESS NOTES
Vancomycin Dosing by Pharmacy- FOLLOW UP    Keal Zepeda is a 60 y.o. year old male who Pharmacy has been consulted for vancomycin dosing for osteomyelitis/septic arthritis. Based on the patient's indication and renal status this patient is being dosed based on a goal AUC of 400-600.     Renal function is currently stable.    Current vancomycin dose: 1500 mg given every 24 hours    Estimated vancomycin AUC on current dose: 388 mg/L.hr     Visit Vitals  /55 (BP Location: Right arm, Patient Position: Lying)   Pulse 58   Temp 35 °C (95 °F) (Temporal)   Resp 16        Lab Results   Component Value Date    CREATININE 0.72 2025    CREATININE 0.84 2025    CREATININE 0.93 2025    CREATININE 0.92 2025        Patient weight is as follows:   Vitals:    25 0339   Weight: 82.9 kg (182 lb 12.2 oz)       Cultures:  Susceptibility data for the encounter in last 14 days.  Collected Specimen Info Organism   25 Tissue/Biopsy from Wound/Tissue Staphylococcus aureus        I/O last 3 completed shifts:  In: 408.8 (4.9 mL/kg) [I.V.:193.8 (2.3 mL/kg); Blood:215]  Out: 1700 (20.5 mL/kg) [Urine:1700 (0.6 mL/kg/hr)]  Weight: 82.9 kg   I/O during current shift:  No intake/output data recorded.    Temp (24hrs), Av °C (96.8 °F), Min:35 °C (95 °F), Max:36.3 °C (97.4 °F)      Assessment/Plan    Below goal AUC. Orders placed for new vancomcyin regimen of 1000 every 12 hours to begin at 2000.     This dosing regimen is predicted by [email or web address] to result in the following pharmacokinetic parameters:  Loading dose: N/A  Regimen: 1000 mg IV every 12 hours.  Start time: 20:00 on 2025  Exposure target: AUC24 (range) 400-600 mg/L.hr   [id number]: 476 mg/L.hr  AUC24,ss: 511 mg/L.hr  Probability of AUC24 > 400: 94 %  Ctrough,ss: 15.8 mg/L  Probability of Ctrough,ss > 20: 15 %    The next level will be obtained on  at 0500. May be obtained sooner if clinically indicated.   Will continue to monitor  renal function daily while on vancomycin and order serum creatinine at least every 48 hours if not already ordered.  Follow for continued vancomycin needs, clinical response, and signs/symptoms of toxicity.       Simba Noel, PharmD

## 2025-08-09 NOTE — CARE PLAN
Problem: Mobility  Goal: STG - Patient will ambulate  Description: FWW  MIN A X1 75+ FT  Outcome: Not Progressing  Goal: Goal 1  Description: 20 REPS RROM LLE, AROM R HIP INCREASING STRENGTH TO STABILIZE OOB ACTIVITIES  Outcome: Not Progressing     Problem: PT Transfers  Goal: STG - Transfer from bed to chair  Description: MIN A X2 FWW  Outcome: Not Progressing  Goal: STG - Patient to transfer to and from sit to supine  Description: SBA USING PROPER PRECAUTIONS FOR R BKA HOB FLAT NO RAILS  Outcome: Not Progressing  Goal: STG - Patient will transfer sit to and from stand  Description: FWW MIN A X2 FWW USING PROPER TECHNIQUE  Outcome: Not Progressing

## 2025-08-10 ENCOUNTER — APPOINTMENT (OUTPATIENT)
Dept: RADIOLOGY | Facility: HOSPITAL | Age: 60
DRG: 463 | End: 2025-08-10
Payer: MEDICARE

## 2025-08-10 LAB
AMMONIA PLAS-SCNC: 22 UMOL/L (ref 16–53)
ANION GAP SERPL CALC-SCNC: 15 MMOL/L (ref 10–20)
BACTERIA BLD CULT: NORMAL
BACTERIA BLD CULT: NORMAL
BASE EXCESS BLDV CALC-SCNC: 3 MMOL/L (ref -2–3)
BODY TEMPERATURE: 37 DEGREES CELSIUS
BUN SERPL-MCNC: 9 MG/DL (ref 6–23)
CALCIUM SERPL-MCNC: 8.2 MG/DL (ref 8.6–10.3)
CHLORIDE SERPL-SCNC: 107 MMOL/L (ref 98–107)
CO2 SERPL-SCNC: 26 MMOL/L (ref 21–32)
CREAT SERPL-MCNC: 0.86 MG/DL (ref 0.5–1.3)
EGFRCR SERPLBLD CKD-EPI 2021: >90 ML/MIN/1.73M*2
ERYTHROCYTE [DISTWIDTH] IN BLOOD BY AUTOMATED COUNT: 19.8 % (ref 11.5–14.5)
GLUCOSE BLD MANUAL STRIP-MCNC: 105 MG/DL (ref 74–99)
GLUCOSE BLD MANUAL STRIP-MCNC: 60 MG/DL (ref 74–99)
GLUCOSE BLD MANUAL STRIP-MCNC: 81 MG/DL (ref 74–99)
GLUCOSE BLD MANUAL STRIP-MCNC: 81 MG/DL (ref 74–99)
GLUCOSE BLD MANUAL STRIP-MCNC: 90 MG/DL (ref 74–99)
GLUCOSE BLD MANUAL STRIP-MCNC: 95 MG/DL (ref 74–99)
GLUCOSE BLD MANUAL STRIP-MCNC: 98 MG/DL (ref 74–99)
GLUCOSE SERPL-MCNC: 80 MG/DL (ref 74–99)
HCO3 BLDV-SCNC: 27.1 MMOL/L (ref 22–26)
HCT VFR BLD AUTO: 27.3 % (ref 41–52)
HGB BLD-MCNC: 8.2 G/DL (ref 13.5–17.5)
INHALED O2 CONCENTRATION: 0 %
MCH RBC QN AUTO: 21.6 PG (ref 26–34)
MCHC RBC AUTO-ENTMCNC: 30 G/DL (ref 32–36)
MCV RBC AUTO: 72 FL (ref 80–100)
NRBC BLD-RTO: 0 /100 WBCS (ref 0–0)
OXYHGB MFR BLDV: 87.1 % (ref 45–75)
PCO2 BLDV: 39 MM HG (ref 41–51)
PH BLDV: 7.45 PH (ref 7.33–7.43)
PLATELET # BLD AUTO: 683 X10*3/UL (ref 150–450)
PO2 BLDV: 55 MM HG (ref 35–45)
POTASSIUM SERPL-SCNC: 3.7 MMOL/L (ref 3.5–5.3)
RBC # BLD AUTO: 3.79 X10*6/UL (ref 4.5–5.9)
SAO2 % BLDV: 89 % (ref 45–75)
SODIUM SERPL-SCNC: 144 MMOL/L (ref 136–145)
WBC # BLD AUTO: 6.9 X10*3/UL (ref 4.4–11.3)

## 2025-08-10 PROCEDURE — 36415 COLL VENOUS BLD VENIPUNCTURE: CPT | Performed by: INTERNAL MEDICINE

## 2025-08-10 PROCEDURE — 2500000001 HC RX 250 WO HCPCS SELF ADMINISTERED DRUGS (ALT 637 FOR MEDICARE OP): Performed by: INTERNAL MEDICINE

## 2025-08-10 PROCEDURE — 2500000004 HC RX 250 GENERAL PHARMACY W/ HCPCS (ALT 636 FOR OP/ED)

## 2025-08-10 PROCEDURE — 2500000004 HC RX 250 GENERAL PHARMACY W/ HCPCS (ALT 636 FOR OP/ED): Performed by: STUDENT IN AN ORGANIZED HEALTH CARE EDUCATION/TRAINING PROGRAM

## 2025-08-10 PROCEDURE — 2500000002 HC RX 250 W HCPCS SELF ADMINISTERED DRUGS (ALT 637 FOR MEDICARE OP, ALT 636 FOR OP/ED): Performed by: SPECIALIST

## 2025-08-10 PROCEDURE — 36415 COLL VENOUS BLD VENIPUNCTURE: CPT | Performed by: STUDENT IN AN ORGANIZED HEALTH CARE EDUCATION/TRAINING PROGRAM

## 2025-08-10 PROCEDURE — 70450 CT HEAD/BRAIN W/O DYE: CPT

## 2025-08-10 PROCEDURE — 85027 COMPLETE CBC AUTOMATED: CPT | Performed by: INTERNAL MEDICINE

## 2025-08-10 PROCEDURE — 2500000002 HC RX 250 W HCPCS SELF ADMINISTERED DRUGS (ALT 637 FOR MEDICARE OP, ALT 636 FOR OP/ED): Performed by: INTERNAL MEDICINE

## 2025-08-10 PROCEDURE — 2500000001 HC RX 250 WO HCPCS SELF ADMINISTERED DRUGS (ALT 637 FOR MEDICARE OP): Performed by: SPECIALIST

## 2025-08-10 PROCEDURE — S0109 METHADONE ORAL 5MG: HCPCS | Performed by: INTERNAL MEDICINE

## 2025-08-10 PROCEDURE — 80048 BASIC METABOLIC PNL TOTAL CA: CPT | Performed by: INTERNAL MEDICINE

## 2025-08-10 PROCEDURE — 2500000005 HC RX 250 GENERAL PHARMACY W/O HCPCS: Performed by: SPECIALIST

## 2025-08-10 PROCEDURE — 2500000004 HC RX 250 GENERAL PHARMACY W/ HCPCS (ALT 636 FOR OP/ED): Performed by: SPECIALIST

## 2025-08-10 PROCEDURE — 82140 ASSAY OF AMMONIA: CPT | Performed by: STUDENT IN AN ORGANIZED HEALTH CARE EDUCATION/TRAINING PROGRAM

## 2025-08-10 PROCEDURE — 82947 ASSAY GLUCOSE BLOOD QUANT: CPT

## 2025-08-10 PROCEDURE — 2060000001 HC INTERMEDIATE ICU ROOM DAILY

## 2025-08-10 PROCEDURE — 70450 CT HEAD/BRAIN W/O DYE: CPT | Performed by: RADIOLOGY

## 2025-08-10 PROCEDURE — 99233 SBSQ HOSP IP/OBS HIGH 50: CPT | Performed by: INTERNAL MEDICINE

## 2025-08-10 PROCEDURE — 82805 BLOOD GASES W/O2 SATURATION: CPT | Performed by: STUDENT IN AN ORGANIZED HEALTH CARE EDUCATION/TRAINING PROGRAM

## 2025-08-10 RX ORDER — DEXTROSE MONOHYDRATE AND SODIUM CHLORIDE 5; .9 G/100ML; G/100ML
75 INJECTION, SOLUTION INTRAVENOUS CONTINUOUS
Status: ACTIVE | OUTPATIENT
Start: 2025-08-10 | End: 2025-08-11

## 2025-08-10 RX ORDER — BACLOFEN 10 MG/1
5 TABLET ORAL 3 TIMES DAILY PRN
Status: ACTIVE | OUTPATIENT
Start: 2025-08-10

## 2025-08-10 RX ADMIN — DULOXETINE 40 MG: 20 CAPSULE, DELAYED RELEASE ORAL at 09:24

## 2025-08-10 RX ADMIN — DEXTROSE MONOHYDRATE 12.5 G: 25 INJECTION, SOLUTION INTRAVENOUS at 16:00

## 2025-08-10 RX ADMIN — HEPARIN SODIUM 5000 UNITS: 5000 INJECTION, SOLUTION INTRAVENOUS; SUBCUTANEOUS at 22:31

## 2025-08-10 RX ADMIN — BACLOFEN 10 MG: 10 TABLET ORAL at 09:23

## 2025-08-10 RX ADMIN — PREGABALIN 200 MG: 75 CAPSULE ORAL at 09:23

## 2025-08-10 RX ADMIN — LEVOTHYROXINE SODIUM 50 MCG: 0.05 TABLET ORAL at 05:18

## 2025-08-10 RX ADMIN — OXYCODONE HYDROCHLORIDE 10 MG: 10 TABLET ORAL at 05:18

## 2025-08-10 RX ADMIN — PANTOPRAZOLE SODIUM 40 MG: 40 TABLET, DELAYED RELEASE ORAL at 06:12

## 2025-08-10 RX ADMIN — METHADONE HYDROCHLORIDE 87 MG: 10 CONCENTRATE ORAL at 09:29

## 2025-08-10 RX ADMIN — DEXTROSE AND SODIUM CHLORIDE 75 ML/HR: 5; .9 INJECTION, SOLUTION INTRAVENOUS at 20:17

## 2025-08-10 RX ADMIN — HEPARIN SODIUM 5000 UNITS: 5000 INJECTION, SOLUTION INTRAVENOUS; SUBCUTANEOUS at 05:19

## 2025-08-10 RX ADMIN — METOPROLOL SUCCINATE 25 MG: 25 TABLET, EXTENDED RELEASE ORAL at 09:24

## 2025-08-10 RX ADMIN — TIOTROPIUM BROMIDE INHALATION SPRAY 2 PUFF: 3.12 SPRAY, METERED RESPIRATORY (INHALATION) at 09:33

## 2025-08-10 RX ADMIN — HYDROXYCHLOROQUINE SULFATE 200 MG: 200 TABLET, FILM COATED ORAL at 09:24

## 2025-08-10 RX ADMIN — VANCOMYCIN HYDROCHLORIDE 1000 MG: 1 INJECTION, SOLUTION INTRAVENOUS at 09:25

## 2025-08-10 RX ADMIN — HEPARIN SODIUM 5000 UNITS: 5000 INJECTION, SOLUTION INTRAVENOUS; SUBCUTANEOUS at 15:54

## 2025-08-10 RX ADMIN — PREGABALIN 200 MG: 75 CAPSULE ORAL at 15:54

## 2025-08-10 RX ADMIN — VANCOMYCIN HYDROCHLORIDE 1000 MG: 1 INJECTION, SOLUTION INTRAVENOUS at 20:35

## 2025-08-10 RX ADMIN — ASPIRIN 81 MG: 81 TABLET, DELAYED RELEASE ORAL at 09:23

## 2025-08-10 ASSESSMENT — COGNITIVE AND FUNCTIONAL STATUS - GENERAL
MOVING TO AND FROM BED TO CHAIR: A LOT
CLIMB 3 TO 5 STEPS WITH RAILING: TOTAL
TOILETING: A LITTLE
DRESSING REGULAR LOWER BODY CLOTHING: A LOT
DAILY ACTIVITIY SCORE: 18
PERSONAL GROOMING: A LITTLE
WALKING IN HOSPITAL ROOM: TOTAL
STANDING UP FROM CHAIR USING ARMS: A LOT
HELP NEEDED FOR BATHING: A LITTLE
DRESSING REGULAR UPPER BODY CLOTHING: A LITTLE
TURNING FROM BACK TO SIDE WHILE IN FLAT BAD: A LITTLE
MOBILITY SCORE: 13

## 2025-08-10 ASSESSMENT — ENCOUNTER SYMPTOMS
AGITATION: 0
SORE THROAT: 0
DIARRHEA: 0
DIAPHORESIS: 0
CONSTIPATION: 0
CHILLS: 0
WOUND: 1
WEAKNESS: 1
BACK PAIN: 1
CHEST TIGHTNESS: 0
DYSURIA: 0
FATIGUE: 1
VOMITING: 0
LIGHT-HEADEDNESS: 0
SHORTNESS OF BREATH: 0
CONFUSION: 0
NERVOUS/ANXIOUS: 0
ACTIVITY CHANGE: 1
CHOKING: 0
COLOR CHANGE: 1
WHEEZING: 0
HALLUCINATIONS: 0
NECK PAIN: 0
COUGH: 1
PALPITATIONS: 0
FEVER: 0
DYSPHORIC MOOD: 1
NAUSEA: 0
JOINT SWELLING: 0
ABDOMINAL PAIN: 0
HEMATURIA: 0
FACIAL SWELLING: 0

## 2025-08-10 ASSESSMENT — PAIN SCALES - GENERAL
PAINLEVEL_OUTOF10: 7
PAINLEVEL_OUTOF10: 0 - NO PAIN

## 2025-08-10 ASSESSMENT — PAIN - FUNCTIONAL ASSESSMENT
PAIN_FUNCTIONAL_ASSESSMENT: 0-10

## 2025-08-10 ASSESSMENT — PAIN DESCRIPTION - ORIENTATION: ORIENTATION: RIGHT

## 2025-08-10 ASSESSMENT — PAIN DESCRIPTION - LOCATION: LOCATION: LEG

## 2025-08-10 NOTE — PROGRESS NOTES
Kael Zepeda is a 60 y.o. male on day 4 of admission presenting with Diabetic ulcer of right foot associated with type 2 diabetes mellitus, unspecified part of foot, unspecified ulcer stage.    Review of Systems   Constitutional:  Positive for activity change and fatigue. Negative for chills, diaphoresis and fever.   HENT:  Positive for congestion. Negative for facial swelling, sneezing and sore throat.    Respiratory:  Positive for cough. Negative for choking, chest tightness, shortness of breath and wheezing.    Cardiovascular:  Positive for leg swelling. Negative for chest pain and palpitations.   Gastrointestinal:  Negative for abdominal pain, constipation, diarrhea, nausea and vomiting.   Genitourinary:  Negative for dysuria, hematuria and urgency.   Musculoskeletal:  Positive for back pain and gait problem. Negative for joint swelling and neck pain.   Skin:  Positive for color change and wound. Negative for rash.   Neurological:  Positive for weakness. Negative for syncope and light-headedness.   Psychiatric/Behavioral:  Positive for dysphoric mood. Negative for agitation, confusion and hallucinations. The patient is not nervous/anxious.    All other systems reviewed and are negative.     Subjective   Kael Zepeda is a 60 y.o. male with PMHx s/f HTN, HLD, CAD s/p CABG, transient post-op Afib after CABG (no AC), HFrEF (EF 39% 07/24), COPD (no baseline O2), h/o IVDU (on long-term methadone), h/o Hep C (s/p treatment, in remission), RA (on methotrexate), hypothyroidism, IDDM-II c/b peripheral neuropathy and DM foot wounds (s/p transmetatarsal amputation of the right foot 04/30/25), presenting with advanced R foot wound. Patient had TMA of the R foot in setting of nonhealing wounds and OM 04/30/25. He was supposed to continue outpatient IV antibiotics on discharge and go to rehab at that time; however, he elected to leave the hospital AMA (he reports he was afraid to miss weekly check-in for his  "methadone clinic). Since then, he has only intermittently followed up with the wound care center. He was seen earlier this week at wound care and described feeling weak, having poor appetite, and having increasing drainage from the wound which is malodorous. At that time there was significant concern he was septic and he was told to go to the ED for evaluation. The patient subsequently waited multiple days, coming in ~72 hours later, because he was \"finally ready.\" He has continued to feel ill the entire time. He has poor intake and malaise. Has also experienced swelling of the RLE > LLE. He is a bit of a difficult historian, reporting he takes \"a lot\" of medications but cannot name them or their doses. He has a difficult time telling me a time frame on his symptoms overall. Denies cp/pressure, palpitations, diaphoresis, SOB, CALDERON, dizziness / lightheadedness, syncope or near syncope, HA, vision changes, f/c/v/d/abd pain.     8/7: Patient seen.  No new complaints.  Patient states he is willing to proceed with right below-knee amputation as recommended by podiatry.  Patient has advanced osteomyelitis in the 4th and 5th metatarsal and cuboid with osteolytic destruction.  Podiatry states foot is not salvageable.  There is however no evidence of necrotizing fasciitis.  Referral was made to orthopedic surgery and the current plan is to take him for a below the knee amputation tomorrow 8/8.  Hemoglobin today is noted to be low at 7.0.  Patient is asymptomatic.  Hemoglobin was 7.9 on admission.  No obvious bleeding.  However given the plan for his amputation tomorrow I have elected to transfuse 1 unit of PRBCs.  Patient has completed consent form.  He is in agreement with plan to proceed with amputation tomorrow.    8/8: Patient seen.  No complaints at this time.  Seen in room following amputation.  Large dressing on his wound.  Pain is adequately controlled at this time.  Appreciate input from orthopedics, podiatry, ID.  " Will reassess again in AM.  Hopefully patient recovers quickly.    8/9: Patient seen.  Complains of pain in his right BKA site.  Dressings appear to be clear.  States he is tolerating the discomfort but is having spasms.  Asks for nerve pain medication.  Already on high dose Lyrica 2 mg 3 times daily.  Will add baclofen for muscle spasms.  Has not asked for change in his methadone or oxycodone.  Hemoglobin today is 7.8.  Will continue to monitor.  Reassess in AM.    8/10: Patient seen.  Somewhat sedated today.  Lethargic.  Suspect baclofen may be too potent for patient as a scheduled medication.  Reduce to 5 mg today and make it as needed.  Pain appears to be uncontrolled at present.  Growing MRSA.  Remains on vancomycin per ID recommendations.  Will likely need skilled nursing on discharge.  Recheck hemoglobin in AM.       Objective     Last Recorded Vitals  /66 (BP Location: Right arm, Patient Position: Lying)   Pulse 70   Temp 36.8 °C (98.2 °F) (Temporal)   Resp 18   Wt 82.7 kg (182 lb 5.1 oz)   SpO2 92%   Intake/Output last 3 Shifts:    Intake/Output Summary (Last 24 hours) at 8/10/2025 1546  Last data filed at 8/10/2025 1212  Gross per 24 hour   Intake 2497 ml   Output 900 ml   Net 1597 ml       Admission Weight  Weight: 81.6 kg (180 lb) (08/06/25 1502)    Daily Weight  08/10/25 : 82.7 kg (182 lb 5.1 oz)      Physical Exam  Constitutional:       General: He is not in acute distress.  HENT:      Head: Normocephalic and atraumatic.      Nose: Nose normal. No congestion or rhinorrhea.      Mouth/Throat:      Mouth: Mucous membranes are dry.      Pharynx: Oropharynx is clear.     Eyes:      General: No scleral icterus.     Extraocular Movements: Extraocular movements intact.      Pupils: Pupils are equal, round, and reactive to light.       Cardiovascular:      Rate and Rhythm: Normal rate and regular rhythm.      Heart sounds: Normal heart sounds. No murmur heard.     No friction rub. No gallop.    Pulmonary:      Effort: Pulmonary effort is normal.      Breath sounds: Normal breath sounds. No wheezing, rhonchi or rales.   Chest:      Chest wall: No tenderness.   Abdominal:      General: There is no distension.      Palpations: Abdomen is soft.      Tenderness: There is no abdominal tenderness. There is no guarding or rebound.     Musculoskeletal:         General: No swelling, tenderness or signs of injury. Normal range of motion.      Cervical back: Normal range of motion.      Comments: Right below the knee amputation.  New dressings applied.  No seepage to the dressings at this time.     Skin:     General: Skin is warm and dry.     Neurological:      General: No focal deficit present.      Mental Status: He is alert and oriented to person, place, and time.          Lab Results  Results for orders placed or performed during the hospital encounter of 08/06/25 (from the past 24 hours)   POCT GLUCOSE   Result Value Ref Range    POCT Glucose 156 (H) 74 - 99 mg/dL   POCT GLUCOSE   Result Value Ref Range    POCT Glucose 81 74 - 99 mg/dL   POCT GLUCOSE   Result Value Ref Range    POCT Glucose 95 74 - 99 mg/dL     *Note: Due to a large number of results and/or encounters for the requested time period, some results have not been displayed. A complete set of results can be found in Results Review.        Image Results  ECG 12 Lead  Sinus rhythm  Nonspecific intraventricular conduction delay  Consider inferior infarct  Baseline wander in lead(s) V6    See ED provider note for full interpretation and clinical correlation  Confirmed by Tanisha Hill ([id number]) on 8/9/2025 11:10:48 AM       Assessment/Plan     Assessment & Plan  Diabetic ulcer of right foot associated with type 2 diabetes mellitus, unspecified part of foot, unspecified ulcer stage  -Follow fever curve, WBCs  -Continue antibiotic coverage with vanc/zosyn/clindamycin for now (based on CT results; however, reviewed Dr. Raya's note and patient's exam, not  clinically c/w nec fasc)  -High risk of morbidity from additional drug therapy requiring intensive monitoring for toxicity: Vancomycin IV   -(+) monitor for sxs of toxicity and/or rxn related to vancomycin; appreciate pharmacy assistance with management  -Podiatry + Orthopedics consultation appreciated   -ID consultation appreciated   -Wound care consultation appreciated     8/7: No evidence of necrotizing fasciitis.  Does have osteomyelitis in his 4th and 5th metatarsal as well as his cuboid with osteolytic damage.  Podiatry states his foot is not salvageable and recommends BKA.  Patient wishes to proceed.  Referral made to orthopedic surgery, current plan for surgery on 8/8.  Right BKA done today.  Appears to be doing well.  Continue current treatment.  Consider de-escalating antibiotics.  8/9: Adding baclofen for muscle spasms.  Continue current pain medication at current dosing.  Remains on high dose Lyrica.  PT and OT.  Appreciate Ortho input.  8/10: Reduce baclofen to 5 mg 3 times daily and make it as needed.  Patient is a little too lethargic today.  Pain appears to be otherwise well-controlled.  Will likely need skilled nursing on discharge.  Sepsis with acute organ dysfunction (Multi)  -Clinically patient meets sepsis criteria and is being treated appropriately as outlined below  -Inflammatory markers: CRP 20.13, ESR 89   -Imaging results reviewed (pertinent): XR R Foot, CT R Foot, and DVT US (R)  -Lactate 3.1 -> 2.3 -> repeat pending  -BP is low-normal. Patient has had poor intake/appetite. He appears perhaps a bit volume down on exam. He rec'd 500cc bolus in ED, will order another. Have to be cautious with HF history.   -Blood cultures x2 and wound culture collected, in process.  8/8: Wound cultures are growing Staph aureus.  8/9: Wound appears to be growing MRSA.  Appreciate ID input.  Remains on vancomycin and Rocephin.  8/10: Remains on vancomycin for MRSA.  Await ID recommendations.  Type 2 diabetes  mellitus, with long-term current use of insulin  IDDM-II c/b peripheral neuropathy, with current hyperglycemia   -Continue home basal insulin at current dosage (Lantus 40 units nightly -> from last admit until confirmed)  -Continue with SSI   -Continue with accucheks, hypoglycemic protocol   -Optimize glycemic control  -Monitor and adjust as needed   -Last available A1c = 8.2   -Continue home Lyrica  8/10: Blood pressures are controlled.  Benign essential hypertension  -BP: low normal on presentation, home therapies (Metoprolol Succinate 25mg AM) will be continued. Close monitoring and adjust as needed.   Fibromyalgia  Anxiety, depression  Fibromyalgia   -Patient will be continued on home therapies (Cymbalta)   -Continue home Lyrica  Hyperlipidemia, mixed  -HLD: will be continued on home therapies (Lipitor 40mg).  Hypothyroidism, acquired  -Continue home levothyroxine   Opioid dependence, in remission  -Continue home methadone dosing -> patient unable to confirm dosing. I spoke with pharmacy, his methadone clinic is closed for the evening. I have reviewed the patient's lost hospitalization and resumed the evening dose of 78mg from then, as the patient does not believe he has had any significant changes recently in that dose. Pharmacy will call the clinic in the morning and work on confirming morning dose and confirming no change to the evening doses.   -Patient is on long term methadone for management   -Patient unable to confirm dosing. I spoke with pharmacy, his methadone clinic is closed for the evening. I have reviewed the patient's lost hospitalization and resumed the evening dose of 78mg from then, as the patient does not believe he has had any significant changes recently in that dose. Pharmacy will call the clinic in the morning and work on confirming morning dose and confirming no change to the evening doses.  8/7: Was able to confirm his rather large methadone dosing.  Referral made to pharmacy for  assistance with pain control following surgery.  8/8: Pain is adequately controlled at this time.  Rheumatoid arthritis involving both hands with positive rheumatoid factor (Multi)  -Continue home methotrexate   CAD in native artery  -CAD: no current reports of chest pain or anginal equivalents. Continue 81mg ASA.   -Continued outpatient follow-up with cardiology and PCP as scheduled   Hepatitis C  -In remission, s/p treatment  Osteomyelitis of left foot, unspecified type (Multi)    Chronic systolic heart failure  Chronic HFrEF (EF 39% 07/2024)   -Patient appears euvolemic if not slightly hypovolemic on exam and is overall compensated from this standpoint  -Last limited echo from 07/24 at OSH reviewed -> overall seemingly similar akinetic segments and WMAs compared to the previous available at [place]. Patient does not have any overt symptoms otherwise.    -Will be continued on home medications (ASA, Toprol)  -Continue outpatient follow-up with cardiology/HF as scheduled   Chronic anemia  -On review of available labs (CBC, H&H), patient with baseline Hgb 7s-9s   -On admission, patient with Hgb of 7.9 which is at baseline   -Continue to monitor while admitted  8/10: Transfused 1 unit of PRBCs this admission.  Hyponatremia  -Na 127 -> corrects to 132 for glucose of 320  Osteomyelitis of ankle or foot, right, acute (Multi)          David Keys MD

## 2025-08-10 NOTE — ASSESSMENT & PLAN NOTE
-Follow fever curve, WBCs  -Continue antibiotic coverage with vanc/zosyn/clindamycin for now (based on CT results; however, reviewed Dr. Raya's note and patient's exam, not clinically c/w nec fasc)  -High risk of morbidity from additional drug therapy requiring intensive monitoring for toxicity: Vancomycin IV   -(+) monitor for sxs of toxicity and/or rxn related to vancomycin; appreciate pharmacy assistance with management  -Podiatry + Orthopedics consultation appreciated   -ID consultation appreciated   -Wound care consultation appreciated     8/7: No evidence of necrotizing fasciitis.  Does have osteomyelitis in his 4th and 5th metatarsal as well as his cuboid with osteolytic damage.  Podiatry states his foot is not salvageable and recommends BKA.  Patient wishes to proceed.  Referral made to orthopedic surgery, current plan for surgery on 8/8.  Right BKA done today.  Appears to be doing well.  Continue current treatment.  Consider de-escalating antibiotics.  8/9: Adding baclofen for muscle spasms.  Continue current pain medication at current dosing.  Remains on high dose Lyrica.  PT and OT.  Appreciate Ortho input.  8/10: Reduce baclofen to 5 mg 3 times daily and make it as needed.  Patient is a little too lethargic today.  Pain appears to be otherwise well-controlled.  Will likely need skilled nursing on discharge.

## 2025-08-10 NOTE — PROGRESS NOTES
Vancomycin Dosing by Pharmacy- FOLLOW UP    Kael Zepeda is a 60 y.o. year old male who Pharmacy has been consulted for vancomycin dosing for cellulitis, skin and soft tissue. Based on the patient's indication and renal status this patient is being dosed based on a goal AUC of 400-600.     Renal function is currently stable.    Current vancomycin dose: 1000 mg given every 12 hours    Estimated vancomycin AUC on current dose: 510 mg/L.hr     Visit Vitals  /58 (BP Location: Left arm, Patient Position: Lying)   Pulse 74   Temp 36.1 °C (96.9 °F) (Temporal)   Resp 18        Lab Results   Component Value Date    CREATININE 0.72 2025    CREATININE 0.84 2025    CREATININE 0.93 2025    CREATININE 0.92 2025        Patient weight is as follows:   Vitals:    08/10/25 0329   Weight: 82.7 kg (182 lb 5.1 oz)       Cultures:  Susceptibility data for the encounter in last 14 days.  Collected Specimen Info Organism Clindamycin Erythromycin Oxacillin Tetracycline Trimethoprim/Sulfamethoxazole Vancomycin   25 Tissue/Biopsy from Wound/Tissue Methicillin Resistant Staphylococcus aureus (MRSA)  S  S  R  S  S  S     Mixed Gram-Positive Bacteria               I/O last 3 completed shifts:  In: 433.8 (5.2 mL/kg) [P.O.:240; I.V.:193.8 (2.3 mL/kg)]  Out: 1100 (13.3 mL/kg) [Urine:1100 (0.4 mL/kg/hr)]  Weight: 82.9 kg   I/O during current shift:  I/O this shift:  In: 2497 [P.O.:527; I.V.:1970]  Out: 200 [Urine:200]    Temp (24hrs), Av.2 °C (97.1 °F), Min:35.8 °C (96.5 °F), Max:36.3 °C (97.4 °F)      Assessment/Plan    Within goal AUC range. Continue current vancomycin regimen.    This dosing regimen is predicted by [employer] to result in the following pharmacokinetic parameters:  Loading dose: N/A  Regimen: 1000 mg IV every 12 hours.  Start time: 20:00 on 08/10/2025  Exposure target: AUC24 (range) 400-600 mg/L.hr   [id number]: 462 mg/L.hr  AUC24,ss: 510 mg/L.hr  Probability of AUC24 > 400: 94  %  Ctrough,ss: 15.8 mg/L  Probability of Ctrough,ss > 20: 15 %    The next level will be obtained on 8/11 at 0500. May be obtained sooner if clinically indicated.   Will continue to monitor renal function daily while on vancomycin and order serum creatinine at least every 48 hours if not already ordered.  Follow for continued vancomycin needs, clinical response, and signs/symptoms of toxicity.       Simba Noel, PharmD

## 2025-08-10 NOTE — ASSESSMENT & PLAN NOTE
Chronic HFrEF (EF 39% 07/2024)   -Patient appears euvolemic if not slightly hypovolemic on exam and is overall compensated from this standpoint  -Last limited echo from 07/24 at OSH reviewed -> overall seemingly similar akinetic segments and WMAs compared to the previous available at [place] [facility]. Patient does not have any overt symptoms otherwise.    -Will be continued on home medications (ASA, Toprol)  -Continue outpatient follow-up with cardiology/HF as scheduled

## 2025-08-10 NOTE — CARE PLAN
The patient's goals for the shift include      The clinical goals for the shift include Patient will remain hemodynamically stable throughout shift.    Over the shift, the patient did not make progress toward the following goals. Barriers to progression include severity of condition. Recommendations to address these barriers include treatments and medications as ordered.

## 2025-08-10 NOTE — PROGRESS NOTES
Physical Therapy                 Therapy Communication Note    Patient Name: Kael Zepeda  MRN: [id number]  Department: [address]  Room: 2007/2007-A  Today's Date: 8/10/2025     Discipline: Physical Therapy    Missed Visit: PT Missed Visit: Yes     Missed Visit Reason: Missed Visit Reason: Patient placed on medical hold (Hold tx this session, pt. not alert, hypoglycemic. Will check later date/time.)    Missed Time: Attempt    Comment:

## 2025-08-10 NOTE — ASSESSMENT & PLAN NOTE
-On review of available labs (CBC, H&H), patient with baseline Hgb 7s-9s   -On admission, patient with Hgb of 7.9 which is at baseline   -Continue to monitor while admitted  8/10: Transfused 1 unit of PRBCs this admission.

## 2025-08-10 NOTE — PROGRESS NOTES
Kael Zepeda is a 60 y.o. male on day 4 of admission presenting with Diabetic ulcer of right foot associated with type 2 diabetes mellitus, unspecified part of foot, unspecified ulcer stage.      Assessment/Plan:     #Right foot osteomyelitis s/p BKA 8/8  Podiatry recommending amputation to which patient agrees.  Clindamycin discontinued as less likely to be nec Fac.  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  Cultures positive for gram-positive cocci. Zosyn switched to ceftriaxone and continue vancomycin     HTN, HLD  CAD s/p CABG  HFrEF  COPD  hep C SVR  Hypothyroidism  IDDM     Recommendations:     -blood cultures -ve > 48 hrs  -DC rocephin  -Continue vancomycin.  Patient has always grown MRSA  -Cont abx through 8/11/25  -will continue to follow        Chance Fong MD  Date of service: 8/10/2025  Time of service: 8:28 AM      Subjective   Interval History: No acute events overnight.  Patient more confused       Review of Systems  Unable to obtain    Objective   Range of Vitals (last 24 hours)  Heart Rate:  [68-85]   Temp:  [35.7 °C (96.3 °F)-36.3 °C (97.4 °F)]   Resp:  [16-18]   BP: ()/(56-64)   Weight:  [82.7 kg (182 lb 5.1 oz)]   SpO2:  [90 %-97 %]  Daily Weight  08/10/25 : 82.7 kg (182 lb 5.1 oz)   Body mass index is 28.56 kg/m².    General: confused  HEENT: No conjunctival pallor, no scleral icterus  Lungs: bilaterally clear to auscultation, no wheezing  Abdomen: soft, non tender, non distended, BS+  Neuro: AAO x 0  Extremities: R BKA with dressing        Antibiotics  vancomycin - 1 gram/200 mL      Relevant Results  Labs  Results from last 72 hours   Lab Units 08/09/25  0439 08/08/25  0340   WBC AUTO x10*3/uL 9.3 8.1   HEMOGLOBIN g/dL 7.8* 8.2*   HEMATOCRIT % 25.6* 26.9*   PLATELETS AUTO x10*3/uL 687* 677*     Results from last 72 hours   Lab Units 08/09/25  0439 08/08/25  0340   SODIUM mmol/L 139 132*   POTASSIUM mmol/L 4.1 4.0   CHLORIDE mmol/L 107 101   CO2 mmol/L 25 22   BUN mg/dL 10 10    CREATININE mg/dL 0.72 0.84   GLUCOSE mg/dL 155* 155*   CALCIUM mg/dL 8.4* 8.5*   ANION GAP mmol/L 11 13   EGFR mL/min/1.73m*2 >90 >90           Estimated Creatinine Clearance: 110.8 mL/min (by C-G formula based on SCr of 0.72 mg/dL).  C-Reactive Protein   Date Value Ref Range Status   08/06/2025 20.13 (H) <1.00 mg/dL Final   04/29/2025 7.25 (H) <1.00 mg/dL Final   10/09/2024 13.42 (H) <1.00 mg/dL Final       Microbiology  Susceptibility data from last 14 days.  Collected Specimen Info Organism Clindamycin Erythromycin Oxacillin Tetracycline Trimethoprim/Sulfamethoxazole Vancomycin   08/06/25 Tissue/Biopsy from Wound/Tissue Methicillin Resistant Staphylococcus aureus (MRSA)  S  S  R  S  S  S     Mixed Gram-Positive Bacteria              Imaging    CT foot right w IV contrast  Result Date: 8/6/2025    Necrotizing soft tissue infection of the amputation stump with acute osteomyelitis involving the 4th and 5th metatarsal bases, cuboid and likely 3rd metatarsal base. No rim enhancing fluid collection.   MACRO:   Kai Waterman discussed the significance and urgency of this critical finding by EPIC secure chat with  YESI BEACH on 8/6/2025 at 6:37 pm.  (**-RCF-**) Findings:  See findings.     Signed by: Kai Waterman 8/6/2025 6:46 PM Dictation workstation:   [id number]    Vascular US Lower Extremity Venous Duplex Right  Result Date: 8/6/2025  Negative study.  No deep venous thrombosis of the  right lower extremity.   MACRO: None   Signed by: David Og 8/6/2025 5:59 PM Dictation workstation:   [id number]    XR chest 1 view  Result Date: 8/6/2025  No acute cardiopulmonary process.   MACRO: None   Signed by: Pia Khan 8/6/2025 4:16 PM Dictation workstation:   [id number]    XR foot right 3+ views  Result Date: 8/6/2025  1. Status post transmetatarsal amputation. 2. Soft tissue swelling with an ulcer along the plantar aspect of the right or fluid. There is air in the soft tissues, likely gas-forming  infection. 3. Bony destruction of the bases of the 4th and 5th metatarsals and cuboid consistent with osteomyelitis. Areas also a fracture of the base of the 5th metatarsal.       MACRO: None   Signed by: Pia Khan 8/6/2025 4:14 PM Dictation workstation:   [id number]

## 2025-08-10 NOTE — ASSESSMENT & PLAN NOTE
-Clinically patient meets sepsis criteria and is being treated appropriately as outlined below  -Inflammatory markers: CRP 20.13, ESR 89   -Imaging results reviewed (pertinent): XR R Foot, CT R Foot, and DVT US (R)  -Lactate 3.1 -> 2.3 -> repeat pending  -BP is low-normal. Patient has had poor intake/appetite. He appears perhaps a bit volume down on exam. He rec'd 500cc bolus in ED, will order another. Have to be cautious with HF history.   -Blood cultures x2 and wound culture collected, in process.  8/8: Wound cultures are growing Staph aureus.  8/9: Wound appears to be growing MRSA.  Appreciate ID input.  Remains on vancomycin and Rocephin.  8/10: Remains on vancomycin for MRSA.  Await ID recommendations.

## 2025-08-10 NOTE — SIGNIFICANT EVENT
Pt is a 60 y.o. male with PMHx s/f HTN, HLD, CAD s/p CABG, transient post-op Afib after CABG (no AC), HFrEF (EF 39% 07/24), COPD (no baseline O2), h/o IVDU (on long-term methadone), h/o Hep C (s/p treatment, in remission), RA (on methotrexate), hypothyroidism, IDDM-II c/b peripheral neuropathy and DM foot wounds (s/p transmetatarsal amputation of the right foot 04/30/25), presenting with advanced R foot wound. He ended up having a R BKA done 8/8/25 and is now post-op.  Dr. Keys's assessment from earlier today noted worsening lethargy. Suspect his home baclofen may be too potent, so it was reduced to 5 mg today.  Called bedside by nursing at shift change for worsening lethargy. Pt on examination is A&O x1, unclear baseline, awakens to sternal rub. Knows name, but not why he is in the hospital, the date, or what surgery he had done a few days ago. Only follows a few commands (Squeeze hands), doesn't follow any cranial nerve commands.  Check CBC, CMP, ammonia, VBG. CT head stat ordered. Pt not cooperative with neuro exam, but nonfocal gross assessment. Vital signs stable. Pt on room air.  Nursing also noted hypoglycemia earlier with sugar 60 -> 81 after D50 administration. Will start D5 NS at 75 ml/hr and continue glucose checks q2h.

## 2025-08-10 NOTE — ASSESSMENT & PLAN NOTE
IDDM-II c/b peripheral neuropathy, with current hyperglycemia   -Continue home basal insulin at current dosage (Lantus 40 units nightly -> from last admit until confirmed)  -Continue with SSI   -Continue with accucheks, hypoglycemic protocol   -Optimize glycemic control  -Monitor and adjust as needed   -Last available A1c = 8.2   -Continue home Lyrica  8/10: Blood pressures are controlled.

## 2025-08-11 ENCOUNTER — APPOINTMENT (OUTPATIENT)
Dept: RADIOLOGY | Facility: HOSPITAL | Age: 60
DRG: 463 | End: 2025-08-11
Payer: MEDICARE

## 2025-08-11 VITALS
DIASTOLIC BLOOD PRESSURE: 64 MMHG | SYSTOLIC BLOOD PRESSURE: 104 MMHG | HEART RATE: 58 BPM | WEIGHT: 182.32 LBS | RESPIRATION RATE: 17 BRPM | TEMPERATURE: 97.7 F | BODY MASS INDEX: 28.62 KG/M2 | HEIGHT: 67 IN | OXYGEN SATURATION: 93 %

## 2025-08-11 LAB
ANION GAP BLDA CALCULATED.4IONS-SCNC: 7 MMO/L (ref 10–25)
ANION GAP SERPL CALC-SCNC: 11 MMOL/L (ref 10–20)
ARTERIAL PATENCY WRIST A: POSITIVE
BACTERIA BLD CULT: NORMAL
BACTERIA BLD CULT: NORMAL
BASE EXCESS BLDA CALC-SCNC: 3.9 MMOL/L (ref -2–3)
BODY TEMPERATURE: 37 DEGREES CELSIUS
BUN SERPL-MCNC: 9 MG/DL (ref 6–23)
CA-I BLDA-SCNC: 1.18 MMOL/L (ref 1.1–1.33)
CALCIUM SERPL-MCNC: 8.2 MG/DL (ref 8.6–10.3)
CHLORIDE BLDA-SCNC: 110 MMOL/L (ref 98–107)
CHLORIDE SERPL-SCNC: 109 MMOL/L (ref 98–107)
CO2 SERPL-SCNC: 24 MMOL/L (ref 21–32)
CREAT SERPL-MCNC: 0.78 MG/DL (ref 0.5–1.3)
EGFRCR SERPLBLD CKD-EPI 2021: >90 ML/MIN/1.73M*2
ERYTHROCYTE [DISTWIDTH] IN BLOOD BY AUTOMATED COUNT: 20.4 % (ref 11.5–14.5)
GLUCOSE BLD MANUAL STRIP-MCNC: 113 MG/DL (ref 74–99)
GLUCOSE BLD MANUAL STRIP-MCNC: 117 MG/DL (ref 74–99)
GLUCOSE BLD MANUAL STRIP-MCNC: 137 MG/DL (ref 74–99)
GLUCOSE BLD MANUAL STRIP-MCNC: 177 MG/DL (ref 74–99)
GLUCOSE BLD MANUAL STRIP-MCNC: 84 MG/DL (ref 74–99)
GLUCOSE BLD MANUAL STRIP-MCNC: 85 MG/DL (ref 74–99)
GLUCOSE BLD MANUAL STRIP-MCNC: 85 MG/DL (ref 74–99)
GLUCOSE BLD MANUAL STRIP-MCNC: 89 MG/DL (ref 74–99)
GLUCOSE BLD MANUAL STRIP-MCNC: 91 MG/DL (ref 74–99)
GLUCOSE BLD MANUAL STRIP-MCNC: 92 MG/DL (ref 74–99)
GLUCOSE BLD MANUAL STRIP-MCNC: 92 MG/DL (ref 74–99)
GLUCOSE BLDA-MCNC: 84 MG/DL (ref 74–99)
GLUCOSE SERPL-MCNC: 81 MG/DL (ref 74–99)
HCO3 BLDA-SCNC: 27.6 MMOL/L (ref 22–26)
HCT VFR BLD AUTO: 26.2 % (ref 41–52)
HCT VFR BLD EST: 26 % (ref 41–52)
HGB BLD-MCNC: 8.1 G/DL (ref 13.5–17.5)
HGB BLDA-MCNC: 8.7 G/DL (ref 13.5–17.5)
INHALED O2 CONCENTRATION: 21 %
LACTATE BLDA-SCNC: 0.7 MMOL/L (ref 0.4–2)
MCH RBC QN AUTO: 22 PG (ref 26–34)
MCHC RBC AUTO-ENTMCNC: 30.9 G/DL (ref 32–36)
MCV RBC AUTO: 71 FL (ref 80–100)
NRBC BLD-RTO: 0 /100 WBCS (ref 0–0)
OXYHGB MFR BLDA: 92.5 % (ref 94–98)
PCO2 BLDA: 37 MM HG (ref 38–42)
PH BLDA: 7.48 PH (ref 7.38–7.42)
PLATELET # BLD AUTO: 687 X10*3/UL (ref 150–450)
PO2 BLDA: 66 MM HG (ref 85–95)
POTASSIUM BLDA-SCNC: 3.4 MMOL/L (ref 3.5–5.3)
POTASSIUM SERPL-SCNC: 3.4 MMOL/L (ref 3.5–5.3)
RBC # BLD AUTO: 3.69 X10*6/UL (ref 4.5–5.9)
SAO2 % BLDA: 95 % (ref 94–100)
SODIUM BLDA-SCNC: 141 MMOL/L (ref 136–145)
SODIUM SERPL-SCNC: 141 MMOL/L (ref 136–145)
SPECIMEN DRAWN FROM PATIENT: ABNORMAL
TSH SERPL-ACNC: 1.02 MIU/L (ref 0.44–3.98)
VANCOMYCIN SERPL-MCNC: 25.9 UG/ML (ref 5–20)
WBC # BLD AUTO: 5.8 X10*3/UL (ref 4.4–11.3)

## 2025-08-11 PROCEDURE — 36600 WITHDRAWAL OF ARTERIAL BLOOD: CPT

## 2025-08-11 PROCEDURE — 97110 THERAPEUTIC EXERCISES: CPT | Mod: GP,CQ

## 2025-08-11 PROCEDURE — 2500000004 HC RX 250 GENERAL PHARMACY W/ HCPCS (ALT 636 FOR OP/ED): Performed by: INTERNAL MEDICINE

## 2025-08-11 PROCEDURE — 2500000004 HC RX 250 GENERAL PHARMACY W/ HCPCS (ALT 636 FOR OP/ED): Performed by: SPECIALIST

## 2025-08-11 PROCEDURE — 36415 COLL VENOUS BLD VENIPUNCTURE: CPT | Performed by: INTERNAL MEDICINE

## 2025-08-11 PROCEDURE — 80202 ASSAY OF VANCOMYCIN: CPT | Performed by: SPECIALIST

## 2025-08-11 PROCEDURE — 97530 THERAPEUTIC ACTIVITIES: CPT | Mod: GP,CQ

## 2025-08-11 PROCEDURE — 82947 ASSAY GLUCOSE BLOOD QUANT: CPT

## 2025-08-11 PROCEDURE — 84132 ASSAY OF SERUM POTASSIUM: CPT | Performed by: STUDENT IN AN ORGANIZED HEALTH CARE EDUCATION/TRAINING PROGRAM

## 2025-08-11 PROCEDURE — 99233 SBSQ HOSP IP/OBS HIGH 50: CPT | Performed by: INTERNAL MEDICINE

## 2025-08-11 PROCEDURE — 71045 X-RAY EXAM CHEST 1 VIEW: CPT

## 2025-08-11 PROCEDURE — 2060000001 HC INTERMEDIATE ICU ROOM DAILY

## 2025-08-11 PROCEDURE — 85027 COMPLETE CBC AUTOMATED: CPT | Performed by: INTERNAL MEDICINE

## 2025-08-11 PROCEDURE — 80048 BASIC METABOLIC PNL TOTAL CA: CPT | Performed by: INTERNAL MEDICINE

## 2025-08-11 PROCEDURE — 97530 THERAPEUTIC ACTIVITIES: CPT | Mod: GO,CO

## 2025-08-11 PROCEDURE — 84443 ASSAY THYROID STIM HORMONE: CPT | Performed by: STUDENT IN AN ORGANIZED HEALTH CARE EDUCATION/TRAINING PROGRAM

## 2025-08-11 PROCEDURE — 2500000001 HC RX 250 WO HCPCS SELF ADMINISTERED DRUGS (ALT 637 FOR MEDICARE OP): Performed by: SPECIALIST

## 2025-08-11 PROCEDURE — 2500000001 HC RX 250 WO HCPCS SELF ADMINISTERED DRUGS (ALT 637 FOR MEDICARE OP): Performed by: INTERNAL MEDICINE

## 2025-08-11 PROCEDURE — 97535 SELF CARE MNGMENT TRAINING: CPT | Mod: GO,CO

## 2025-08-11 PROCEDURE — 2500000002 HC RX 250 W HCPCS SELF ADMINISTERED DRUGS (ALT 637 FOR MEDICARE OP, ALT 636 FOR OP/ED): Performed by: SPECIALIST

## 2025-08-11 RX ORDER — DEXTROSE MONOHYDRATE AND SODIUM CHLORIDE 5; .9 G/100ML; G/100ML
75 INJECTION, SOLUTION INTRAVENOUS CONTINUOUS
Status: ACTIVE | OUTPATIENT
Start: 2025-08-11 | End: 2025-08-12

## 2025-08-11 RX ORDER — VANCOMYCIN HYDROCHLORIDE 1.5 G/300ML
1500 INJECTION, SOLUTION INTRAVITREAL EVERY 24 HOURS
Status: DISCONTINUED | OUTPATIENT
Start: 2025-08-11 | End: 2025-08-11

## 2025-08-11 RX ORDER — PREGABALIN 50 MG/1
50 CAPSULE ORAL 3 TIMES DAILY
Status: DISCONTINUED | OUTPATIENT
Start: 2025-08-11 | End: 2025-08-13 | Stop reason: HOSPADM

## 2025-08-11 RX ADMIN — DEXTROSE AND SODIUM CHLORIDE 75 ML/HR: 5; 900 INJECTION, SOLUTION INTRAVENOUS at 17:36

## 2025-08-11 RX ADMIN — PREGABALIN 50 MG: 50 CAPSULE ORAL at 20:31

## 2025-08-11 RX ADMIN — HYDROXYCHLOROQUINE SULFATE 200 MG: 200 TABLET, FILM COATED ORAL at 20:32

## 2025-08-11 RX ADMIN — ATORVASTATIN CALCIUM 40 MG: 40 TABLET, FILM COATED ORAL at 20:32

## 2025-08-11 RX ADMIN — INSULIN GLARGINE 40 UNITS: 100 INJECTION, SOLUTION SUBCUTANEOUS at 20:32

## 2025-08-11 RX ADMIN — HEPARIN SODIUM 5000 UNITS: 5000 INJECTION, SOLUTION INTRAVENOUS; SUBCUTANEOUS at 17:39

## 2025-08-11 RX ADMIN — PANTOPRAZOLE SODIUM 40 MG: 40 INJECTION, POWDER, FOR SOLUTION INTRAVENOUS at 06:41

## 2025-08-11 ASSESSMENT — PAIN SCALES - GENERAL
PAINLEVEL_OUTOF10: 0 - NO PAIN

## 2025-08-11 ASSESSMENT — PAIN - FUNCTIONAL ASSESSMENT
PAIN_FUNCTIONAL_ASSESSMENT: 0-10

## 2025-08-11 ASSESSMENT — COGNITIVE AND FUNCTIONAL STATUS - GENERAL
WALKING IN HOSPITAL ROOM: TOTAL
DAILY ACTIVITIY SCORE: 15
MOBILITY SCORE: 13
MOVING TO AND FROM BED TO CHAIR: A LOT
WALKING IN HOSPITAL ROOM: TOTAL
EATING MEALS: A LITTLE
PERSONAL GROOMING: A LITTLE
DRESSING REGULAR LOWER BODY CLOTHING: A LOT
MOVING FROM LYING ON BACK TO SITTING ON SIDE OF FLAT BED WITH BEDRAILS: A LITTLE
DRESSING REGULAR LOWER BODY CLOTHING: A LOT
STANDING UP FROM CHAIR USING ARMS: A LOT
HELP NEEDED FOR BATHING: A LITTLE
MOBILITY SCORE: 10
TURNING FROM BACK TO SIDE WHILE IN FLAT BAD: A LITTLE
DRESSING REGULAR UPPER BODY CLOTHING: A LITTLE
MOVING TO AND FROM BED TO CHAIR: TOTAL
STANDING UP FROM CHAIR USING ARMS: TOTAL
TURNING FROM BACK TO SIDE WHILE IN FLAT BAD: A LITTLE
HELP NEEDED FOR BATHING: A LOT
CLIMB 3 TO 5 STEPS WITH RAILING: TOTAL
DAILY ACTIVITIY SCORE: 20
TOILETING: A LOT
CLIMB 3 TO 5 STEPS WITH RAILING: TOTAL
TOILETING: A LITTLE

## 2025-08-11 ASSESSMENT — ACTIVITIES OF DAILY LIVING (ADL): HOME_MANAGEMENT_TIME_ENTRY: 20

## 2025-08-11 NOTE — PROGRESS NOTES
Occupational Therapy    Occupational Therapy Treatment    Name: Kael Zepeda  MRN: [id number]  Department: [facility] 2 W  Room: 2007/2007-A  Date: 08/11/25  Time Calculation  Start Time: 1501  Stop Time: 1539  Time Calculation (min): 38 min    Assessment:  OT Assessment: Pt declined EOB activity and completed UB ADLs in long sit with LLE dangled off edge. Pt would benefit from continued OT services to increase activity tolerance, balance, and independence with ADLs.  Barriers to Discharge Home: Caregiver assistance, Physical needs, Cognition needs  End of Session Communication: Bedside nurse  End of Session Patient Position: Bed, 3 rail up, Alarm on  Plan:  Treatment Interventions: ADL retraining, Functional transfer training, UE strengthening/ROM, Endurance training, Patient/family training, Equipment evaluation/education, Compensatory technique education  OT Frequency: 4 times per week (During this acute inpatient hospitalization)  OT Discharge Recommendations: Moderate intensity level of continued care (Based on current functional status and rehab potential, patient is anticipated to tolerate and benefit from 5 or more days per week of skilled rehabilitative therapy after discharge from this acute inpatient hospitalization.)  Equipment Recommended upon Discharge:  (TBD)  OT Recommended Transfer Status: Assist of 2  OT - OK to Discharge: Yes (When medically appropriate)    Subjective     OT Visit Info:  OT Received On: 08/11/25  General:  General  Co-Treatment: PT  Co-Treatment Reason: to optimize mobility and safety whlie focusing on discipline specific needs  Prior to Session Communication: Bedside nurse  Patient Position Received: Bed, 3 rail up, Alarm on  General Comment: Pt alert and cooperative with therapy. Gauze wrap of residual limb.  Precautions:  LE Weight Bearing Status: Right Non-Weight Bearing (R BKA)  Medical Precautions: Fall precautions    Pain Assessment:  Pain Assessment  Pain Assessment:  0-10  0-10 (Numeric) Pain Score:  (not rated. C/o pain in RLE during movement.)    Objective   Cognition:  Orientation Level: Disoriented to place, Disoriented to time, Disoriented to situation  Activities of Daily Living:           UE Bathing  UE Bathing Level of Assistance: Setup  UE Bathing Where Assessed: Bed level (long sit in bed with L leg dangle off edge)  UE Bathing Comments: cleanse arms, chest, face with rag. Cues to intitiate         UE Dressing  UE Dressing Level of Assistance: Minimum assistance  UE Dressing Where Assessed: Bed level (long sit in bed with L leg dangle off edge)  UE Dressing Comments: doff gown with cues to initiate, don with min assist       Bed Mobility/Transfers: Bed Mobility  Bed Mobility: Yes  Bed Mobility 1  Bed Mobility 1: Supine to sitting  Level of Assistance 1: Contact guard  Bed Mobility Comments 1: supine to long sit, HOB raised and use of bed rail     Sitting Balance:  Static Sitting Balance  Static Sitting-Balance Support: Feet unsupported, No upper extremity supported  Static Sitting-Level of Assistance: Contact guard  Static Sitting-Comment/Number of Minutes: 20 min in long sit w/LLE dangle off EOB  Therapy/Activity:      Therapeutic Activity  Therapeutic Activity Performed: Yes  Therapeutic Activity 1: Pt participated in bed mobility, UB ADLs, and sitting tolerance training.      Outcome Measures:  [facility] Daily Activity  Putting on and taking off regular lower body clothing: A lot  Bathing (including washing, rinsing, drying): A lot  Putting on and taking off regular upper body clothing: A little  Toileting, which includes using toilet, bedpan or urinal: A lot  Taking care of personal grooming such as brushing teeth: A little  Eating Meals: A little  Daily Activity - Total Score: 15        Education Documentation  Body Mechanics, taught by MUKESH Alex at 8/11/2025  3:55 PM.  Learner: Patient  Readiness: Acceptance  Method: Explanation  Response: Needs  Reinforcement    Precautions, taught by MUKESH Alex at 8/11/2025  3:55 PM.  Learner: Patient  Readiness: Acceptance  Method: Explanation  Response: Needs Reinforcement    ADL Training, taught by MUKESH Alex at 8/11/2025  3:55 PM.  Learner: Patient  Readiness: Acceptance  Method: Explanation  Response: Needs Reinforcement    Education Comments  No comments found.      Goals:  Encounter Problems       Encounter Problems (Active)       ADLs       Patient with complete lower body dressing with minimal assist  level of assistance  (Progressing)       Start:  08/09/25    Expected End:  08/23/25            Patient will complete toileting including hygiene clothing management/hygiene with minimal assist  level of assistance (Progressing)       Start:  08/09/25    Expected End:  08/23/25               COGNITION/SAFETY       Patient will recall and adhere to weight bearing restrictions with all ADL and functional mobility in order to promote healing and safety with functional tasks (Progressing)       Start:  08/09/25    Expected End:  08/23/25               TRANSFERS       Patient will complete functional transfers with least restrictive device with minimal assist  level of assistance. (Progressing)       Start:  08/09/25    Expected End:  08/23/25

## 2025-08-11 NOTE — PROGRESS NOTES
Vancomycin Dosing by Pharmacy- FOLLOW UP    Kael Zepeda is a 60 y.o. year old male who Pharmacy has been consulted for vancomycin dosing for cellulitis, skin and soft tissue. Based on the patient's indication and renal status this patient is being dosed based on a goal AUC of 400-600.     Renal function is currently stable.    Current vancomycin dose: 1000 mg given every 12 hours    Most recent random level: 25.9 mcg/mL    Visit Vitals  /65 (Patient Position: Lying)   Pulse 72   Temp 36.2 °C (97.2 °F) (Temporal)   Resp 17        Lab Results   Component Value Date    CREATININE 0.78 2025    CREATININE 0.86 08/10/2025    CREATININE 0.72 2025    CREATININE 0.84 2025        Patient weight is as follows:   Vitals:    25 0343   Weight: 83.4 kg (183 lb 13.8 oz)       Cultures:  Susceptibility data for the encounter in last 14 days.  Collected Specimen Info Organism Clindamycin Erythromycin Oxacillin Tetracycline Trimethoprim/Sulfamethoxazole Vancomycin   25 Tissue/Biopsy from Wound/Tissue Methicillin Resistant Staphylococcus aureus (MRSA)  S  S  R  S  S  S     Mixed Gram-Positive Bacteria               I/O last 3 completed shifts:  In: 2737 (33.1 mL/kg) [P.O.:767; I.V.:1970 (23.8 mL/kg)]  Out: 1400 (16.9 mL/kg) [Urine:1400 (0.5 mL/kg/hr)]  Weight: 82.7 kg   I/O during current shift:  I/O this shift:  In: 373.8 [I.V.:373.8]  Out: 5 [Drains:5]    Temp (24hrs), Av.4 °C (97.6 °F), Min:35.7 °C (96.3 °F), Max:36.8 °C (98.2 °F)      Assessment/Plan    Above goal AUC. Orders placed for new vancomcyin regimen of 1500 every 24 hours to begin at 0800.    This dosing regimen is predicted by [employer] to result in the following pharmacokinetic parameters:  Loading dose: N/A  Regimen: 1500 mg IV every 24 hours.  Start time: 20:35 on 2025  Exposure target: AUC24 (range) 400-600 mg/L.hr   WVK27-44: 564 mg/L.hr  AUC24,ss: 524 mg/L.hr  Probability of AUC24 > 400: >95 %  Ctrough,ss: 15.1  mg/L  Probability of Ctrough,ss > 20: 10 %      The next level will be obtained on 8/13 at 0500. May be obtained sooner if clinically indicated.   Will continue to monitor renal function daily while on vancomycin and order serum creatinine at least every 48 hours if not already ordered.  Follow for continued vancomycin needs, clinical response, and signs/symptoms of toxicity.       MARIS SCHULER

## 2025-08-11 NOTE — PROGRESS NOTES
Kael Zepeda is a 60 y.o. male on day 5 of admission presenting with Diabetic ulcer of right foot associated with type 2 diabetes mellitus, unspecified part of foot, unspecified ulcer stage.      Assessment/Plan:     #Right foot osteomyelitis s/p BKA 8/8  Podiatry recommending amputation to which patient agrees.  Clindamycin discontinued as less likely to be nec Fac.  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  Cultures positive for gram-positive cocci. Zosyn switched to ceftriaxone and continue vancomycin     HTN, HLD  CAD s/p CABG  HFrEF  COPD  hep C SVR  Hypothyroidism  IDDM     Recommendations:     -DC vanc  -Obtain ABG, TSH, CXR  -Afebrile, WBC count normal  -will continue to follow      Chance Fong MD  Date of service: 8/11/2025  Time of service: 10:43 AM      Subjective   Interval History: Patient remains confused postop.  CT head negative.        Review of Systems  Unable to obtain    Objective   Range of Vitals (last 24 hours)  Heart Rate:  [58-72]   Temp:  [36.1 °C (96.9 °F)-36.8 °C (98.2 °F)]   Resp:  [16-18]   BP: (104-126)/(62-67)   Weight:  [83.4 kg (183 lb 13.8 oz)]   SpO2:  [92 %-95 %]  Daily Weight  08/11/25 : 83.4 kg (183 lb 13.8 oz)   Body mass index is 28.8 kg/m².    General: confused  HEENT: No conjunctival pallor, no scleral icterus  Lungs: bilaterally clear to auscultation, no wheezing  Abdomen: soft, non tender, non distended, BS+  Neuro: AAO x 0  Extremities: R BKA clean stump with stitches     Antibiotics  This patient does not have an active medication from one of the medication groupers.      Relevant Results  Labs  Results from last 72 hours   Lab Units 08/11/25  0455 08/10/25  1926 08/09/25  0439   WBC AUTO x10*3/uL 5.8 6.9 9.3   HEMOGLOBIN g/dL 8.1* 8.2* 7.8*   HEMATOCRIT % 26.2* 27.3* 25.6*   PLATELETS AUTO x10*3/uL 687* 683* 687*     Results from last 72 hours   Lab Units 08/11/25  0455 08/10/25  1926 08/09/25  0439   SODIUM mmol/L 141 144 139   POTASSIUM mmol/L 3.4* 3.7 4.1    CHLORIDE mmol/L 109* 107 107   CO2 mmol/L 24 26 25   BUN mg/dL 9 9 10   CREATININE mg/dL 0.78 0.86 0.72   GLUCOSE mg/dL 81 80 155*   CALCIUM mg/dL 8.2* 8.2* 8.4*   ANION GAP mmol/L 11 15 11   EGFR mL/min/1.73m*2 >90 >90 >90         Estimated Creatinine Clearance: 102.7 mL/min (by C-G formula based on SCr of 0.78 mg/dL).  C-Reactive Protein   Date Value Ref Range Status   08/06/2025 20.13 (H) <1.00 mg/dL Final   04/29/2025 7.25 (H) <1.00 mg/dL Final   10/09/2024 13.42 (H) <1.00 mg/dL Final       Microbiology  Susceptibility data from last 14 days.  Collected Specimen Info Organism Clindamycin Erythromycin Oxacillin Tetracycline Trimethoprim/Sulfamethoxazole Vancomycin   08/06/25 Tissue/Biopsy from Wound/Tissue Methicillin Resistant Staphylococcus aureus (MRSA)  S  S  R  S  S  S     Mixed Gram-Positive Bacteria              Imaging    CT head wo IV contrast  Result Date: 8/10/2025  1.  No acute intracranial hemorrhage or acute territorial infarct. 2.  Chronic changes of atrophy and microvascular ischemia.     Signed by: Jennifer Macias 8/10/2025 9:46 PM Dictation workstation:   [id number]    CT foot right w IV contrast  Result Date: 8/6/2025    Necrotizing soft tissue infection of the amputation stump with acute osteomyelitis involving the 4th and 5th metatarsal bases, cuboid and likely 3rd metatarsal base. No rim enhancing fluid collection.   MACRO:   Kai Waterman discussed the significance and urgency of this critical finding by EPIC secure chat with  YESI BEACH on 8/6/2025 at 6:37 pm.  (**-RCF-**) Findings:  See findings.     Signed by: Kai Waterman 8/6/2025 6:46 PM Dictation workstation:   [id number]    Vascular US Lower Extremity Venous Duplex Right  Result Date: 8/6/2025  Negative study.  No deep venous thrombosis of the  right lower extremity.   MACRO: None   Signed by: David Og 8/6/2025 5:59 PM Dictation workstation:   [id number]    XR chest 1 view  Result Date: 8/6/2025  No acute  cardiopulmonary process.   MACRO: None   Signed by: Pia Khan 8/6/2025 4:16 PM Dictation workstation:   [id number]    XR foot right 3+ views  Result Date: 8/6/2025  1. Status post transmetatarsal amputation. 2. Soft tissue swelling with an ulcer along the plantar aspect of the right or fluid. There is air in the soft tissues, likely gas-forming infection. 3. Bony destruction of the bases of the 4th and 5th metatarsals and cuboid consistent with osteomyelitis. Areas also a fracture of the base of the 5th metatarsal.       MACRO: None   Signed by: Pia Khan 8/6/2025 4:14 PM Dictation workstation:   [id number]

## 2025-08-11 NOTE — PROGRESS NOTES
Vancomycin Dosing by Pharmacy- Cessation of Therapy    Consult to pharmacy for vancomycin dosing has been discontinued by the prescriber, pharmacy will sign off at this time.    Please call pharmacy if there are further questions or re-enter a consult if vancomycin is resumed.     Simba Noel, PharmD

## 2025-08-11 NOTE — PROGRESS NOTES
Physical Therapy    Physical Therapy Treatment    Patient Name: Kael Zepeda  MRN: [id number]  Department: [address]  Room: 2007/2007-A  Today's Date: 8/11/2025  Time Calculation  Start Time: 1500  Stop Time: 1540  Time Calculation (min): 40 min    Assessment/Plan   PT Assessment  Barriers to Discharge Home: Cognition needs, Caregiver assistance, Physical needs  End of Session Communication: Bedside nurse  Assessment Comment: pt exhibtied an increase in alertness and motivation to complete activity but requires an increase in time for position changes due to RLE pain.  End of Session Patient Position: Bed, 3 rail up, Alarm on     PT Plan  Treatment/Interventions: Bed mobility, Transfer training, Gait training, Strengthening, Endurance training, Therapeutic exercise, Therapeutic activity  PT Plan: Ongoing PT  PT Frequency: 4 times per week (WHILE IN HOSPITAL)  PT Discharge Recommendations: Moderate intensity level of continued care (Based on current functional status and rehab potential, patient is anticipated to tolerate and benefit from 5 or more days per week of skilled rehabilitative therapy after discharge from this acute inpatient hospitalization)  Equipment Recommended upon Discharge:  (TBD)  PT Recommended Transfer Status: Assist x2 (FWW, NEW  R BKA)  PT - OK to Discharge: Yes (WHEN M EDICALLY CLEARED)    PT Visit Info:  PT Received On: 08/11/25     General Visit Information:   General  Co-Treatment: OT  Co-Treatment Reason: to optimize mobility and safety whlie focusing on discipline specific needs  Prior to Session Communication: Bedside nurse  Patient Position Received: Bed, 3 rail up, Alarm on  General Comment: pt alert and agreeable    Subjective   Precautions:  Precautions  LE Weight Bearing Status: Right Non-Weight Bearing (s/p R BKA)  Medical Precautions: Fall precautions     Date/Time Vitals Session Patient Position Pulse Resp SpO2 BP MAP (mmHg)    08/11/25 1520 --  --  77  18  98 %  129/70  90       Objective   Pain:  Pain Assessment  Pain Assessment: 0-10  0-10 (Numeric) Pain Score:  (no rating)  Pain Location:  (residual limb)  Pain Orientation: Right  Pain Interventions: Repositioned  Cognition:  Cognition  Orientation Level: Disoriented to place, Disoriented to time, Disoriented to situation    Activity Tolerance:  Activity Tolerance  Endurance: Decreased tolerance for upright activites  Treatments:  Therapeutic Exercise  Therapeutic Exercise Performed: Yes  Therapeutic Exercise Activity 1: pt completed seated LLE exercises: AP x 10 reps, LAQ x 10 reps, hip flexion x 10 reps  Therapeutic Exercise Activity 2: pt completed supine RLE exercises:  knee extension x 10 reps, hip flexion x 5 reps    Therapeutic Activity  Therapeutic Activity Performed: Yes  Therapeutic Activity 1: pt particiapted in long sitting with LLE over EOB for 20 min with CGA; adamantly declined to attempt to move RLE towards EOB due to pain    Bed Mobility  Bed Mobility: Yes  Bed Mobility 1  Bed Mobility 1: Supine to sitting  Level of Assistance 1: Contact guard, Minimal verbal cues  Bed Mobility Comments 1: supine to long sitting with LLE off EOB  Bed Mobility 2  Bed Mobility  2: Sitting to supine  Level of Assistance 2: Contact guard, Minimum assistance, Minimal verbal cues  Bed Mobility Comments 2: long sitting with LLE off EOB to supine    Outcome Measures:  [facility] Basic Mobility  Turning from your back to your side while in a flat bed without using bedrails: A little  Moving from lying on your back to sitting on the side of a flat bed without using bedrails: A little  Moving to and from bed to chair (including a wheelchair): Total  Standing up from a chair using your arms (e.g. wheelchair or bedside chair): Total  To walk in hospital room: Total  Climbing 3-5 steps with railing: Total  Basic Mobility - Total Score: 10    Education Documentation  Precautions, taught by Julita Cha PTA at 8/11/2025  4:10 PM.  Learner:  Patient  Readiness: Acceptance  Method: Explanation  Response: Needs Reinforcement    Mobility Training, taught by Julita Cha PTA at 8/11/2025  4:10 PM.  Learner: Patient  Readiness: Acceptance  Method: Explanation  Response: Needs Reinforcement    Education Comments  No comments found.        OP EDUCATION:       Encounter Problems       Encounter Problems (Active)       Mobility       STG - Patient will ambulate (Not Progressing)       Start:  08/09/25    Expected End:  08/23/25       FWW  MIN A X1 75+ FT         Goal 1 (Progressing)       Start:  08/09/25    Expected End:  08/30/25       20 REPS RROM LLE, AROM R HIP INCREASING STRENGTH TO STABILIZE OOB ACTIVITIES            PT Transfers       STG - Transfer from bed to chair (Not Progressing)       Start:  08/09/25    Expected End:  08/12/25       MIN A X2 FWW         STG - Patient to transfer to and from sit to supine (Progressing)       Start:  08/09/25    Expected End:  08/11/25       SBA USING PROPER PRECAUTIONS FOR R BKA HOB FLAT NO RAILS         STG - Patient will transfer sit to and from stand (Not Progressing)       Start:  08/09/25    Expected End:  08/12/25       FWW MIN A X2 FWW USING PROPER TECHNIQUE

## 2025-08-11 NOTE — ASSESSMENT & PLAN NOTE
Chronic HFrEF (EF 39% 07/2024)   -Patient appears euvolemic if not slightly hypovolemic on exam and is overall compensated from this standpoint  -Last limited echo from 07/24 at OSH reviewed -> overall seemingly similar akinetic segments and WMAs compared to the previous available at [facility]. Patient does not have any overt symptoms otherwise.    -Will be continued on home medications (ASA, Toprol)  -Continue outpatient follow-up with cardiology/HF as scheduled

## 2025-08-11 NOTE — PROGRESS NOTES
8/11/25 1426   08/11/25 1426   Discharge Planning   Expected Discharge Disposition SNF   Intensity of Service   Intensity of Service 0-30 min     Updated pt that Only one facility is considering pt at this time and was inquiring about what Methadone clinic pt uses. Pt is unable to answer at this time. Pt requesting a follow up at a later time. Provided this CT Supervisor contact info.   Magdalena Contreras RN, BSN, [place] [place] CT Supervisor

## 2025-08-11 NOTE — PROGRESS NOTES
"Kael Zepeda is a 60 y.o. male on day 5 of admission presenting with Diabetic ulcer of right foot associated with type 2 diabetes mellitus, unspecified part of foot, unspecified ulcer stage.    Subjective   Patient is postop day 3 right BKA.  He had an episode of lethargic yesterday and is currently being monitored.  Currently labs are stable and glucose was at 60 but now stable.  Patient was lethargic during my exam today but did respond to manipulation of his surgical site.       Objective     Sedate, right BKA dressing taken down.  His incision line is intact with good perfusion.  No drainage.  No fluctuance no evidence of infection.  Neuroexam, aside from responding to stimuli, was difficult secondary to sedation.  Hemovac drain was pulled.    Last Recorded Vitals  Blood pressure 126/67, pulse 66, temperature 36.1 °C (96.9 °F), temperature source Temporal, resp. rate 18, height 1.702 m (5' 7\"), weight 83.4 kg (183 lb 13.8 oz), SpO2 93%.  Oxygen Therapy  Pulse Ox (24 hr min): 92  Medical Gas Therapy: Supplemental oxygen  Medical Gas Delivery Method: Nasal cannula  O2 Flow Rate (L/min): 2 L/min      Intake/Output last 3 Shifts:  I/O last 3 completed shifts:  In: 2870.8 (34.4 mL/kg) [P.O.:527; I.V.:2343.8 (28.1 mL/kg)]  Out: 1205 (14.4 mL/kg) [Urine:1200 (0.4 mL/kg/hr); Drains:5]  Weight: 83.4 kg     Relevant Results      Scheduled medications  Scheduled Medications[1]  Continuous medications  Continuous Medications[2]  PRN medications  PRN Medications[3]  Results for orders placed or performed during the hospital encounter of 08/06/25 (from the past 24 hours)   POCT GLUCOSE   Result Value Ref Range    POCT Glucose 95 74 - 99 mg/dL   POCT GLUCOSE   Result Value Ref Range    POCT Glucose 60 (L) 74 - 99 mg/dL   POCT GLUCOSE   Result Value Ref Range    POCT Glucose 105 (H) 74 - 99 mg/dL   POCT GLUCOSE   Result Value Ref Range    POCT Glucose 81 74 - 99 mg/dL   CBC   Result Value Ref Range    WBC 6.9 4.4 - 11.3 " x10*3/uL    nRBC 0.0 0.0 - 0.0 /100 WBCs    RBC 3.79 (L) 4.50 - 5.90 x10*6/uL    Hemoglobin 8.2 (L) 13.5 - 17.5 g/dL    Hematocrit 27.3 (L) 41.0 - 52.0 %    MCV 72 (L) 80 - 100 fL    MCH 21.6 (L) 26.0 - 34.0 pg    MCHC 30.0 (L) 32.0 - 36.0 g/dL    RDW 19.8 (H) 11.5 - 14.5 %    Platelets 683 (H) 150 - 450 x10*3/uL   Basic Metabolic Panel   Result Value Ref Range    Glucose 80 74 - 99 mg/dL    Sodium 144 136 - 145 mmol/L    Potassium 3.7 3.5 - 5.3 mmol/L    Chloride 107 98 - 107 mmol/L    Bicarbonate 26 21 - 32 mmol/L    Anion Gap 15 10 - 20 mmol/L    Urea Nitrogen 9 6 - 23 mg/dL    Creatinine 0.86 0.50 - 1.30 mg/dL    eGFR >90 >60 mL/min/1.73m*2    Calcium 8.2 (L) 8.6 - 10.3 mg/dL   Ammonia   Result Value Ref Range    Ammonia 22 16 - 53 umol/L   Blood Gas Venous   Result Value Ref Range    POCT pH, Venous 7.45 (H) 7.33 - 7.43 pH    POCT pCO2, Venous 39 (L) 41 - 51 mm Hg    POCT pO2, Venous 55 (H) 35 - 45 mm Hg    POCT SO2, Venous 89 (H) 45 - 75 %    POCT Oxy Hemoglobin, Venous 87.1 (H) 45.0 - 75.0 %    POCT Base Excess, Venous 3.0 -2.0 - 3.0 mmol/L    POCT HCO3 Calculated, Venous 27.1 (H) 22.0 - 26.0 mmol/L    Patient Temperature 37.0 degrees Celsius    FiO2 0 %   POCT GLUCOSE   Result Value Ref Range    POCT Glucose 98 74 - 99 mg/dL   POCT GLUCOSE   Result Value Ref Range    POCT Glucose 90 74 - 99 mg/dL   POCT GLUCOSE   Result Value Ref Range    POCT Glucose 92 74 - 99 mg/dL   POCT GLUCOSE   Result Value Ref Range    POCT Glucose 85 74 - 99 mg/dL   Vancomycin   Result Value Ref Range    Vancomycin 25.9 (H) 5.0 - 20.0 ug/mL   CBC   Result Value Ref Range    WBC 5.8 4.4 - 11.3 x10*3/uL    nRBC 0.0 0.0 - 0.0 /100 WBCs    RBC 3.69 (L) 4.50 - 5.90 x10*6/uL    Hemoglobin 8.1 (L) 13.5 - 17.5 g/dL    Hematocrit 26.2 (L) 41.0 - 52.0 %    MCV 71 (L) 80 - 100 fL    MCH 22.0 (L) 26.0 - 34.0 pg    MCHC 30.9 (L) 32.0 - 36.0 g/dL    RDW 20.4 (H) 11.5 - 14.5 %    Platelets 687 (H) 150 - 450 x10*3/uL   Basic Metabolic Panel    Result Value Ref Range    Glucose 81 74 - 99 mg/dL    Sodium 141 136 - 145 mmol/L    Potassium 3.4 (L) 3.5 - 5.3 mmol/L    Chloride 109 (H) 98 - 107 mmol/L    Bicarbonate 24 21 - 32 mmol/L    Anion Gap 11 10 - 20 mmol/L    Urea Nitrogen 9 6 - 23 mg/dL    Creatinine 0.78 0.50 - 1.30 mg/dL    eGFR >90 >60 mL/min/1.73m*2    Calcium 8.2 (L) 8.6 - 10.3 mg/dL   POCT GLUCOSE   Result Value Ref Range    POCT Glucose 89 74 - 99 mg/dL     *Note: Due to a large number of results and/or encounters for the requested time period, some results have not been displayed. A complete set of results can be found in Results Review.                             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  Workup per the medical service.  Relative to his right BKA he has minimal drainage output in the Hemovac and his hemoglobin has been stable.  The stump looks excellent.  I will have [facility] see him today for removal stump  and stump protector application.  When medically cleared he can be discharged no weightbearing on his stump.  I will need to see him in the office 3 weeks postop for suture removal.    I spent 15 minutes in the professional and overall care of this patient.      Loy Pak MD            [1] aspirin, 81 mg, oral, Daily  atorvastatin, 40 mg, oral, Daily  DULoxetine, 40 mg, oral, Daily  heparin (porcine), 5,000 Units, subcutaneous, q8h  hydroxychloroquine, 200 mg, oral, BID  insulin glargine, 40 Units, subcutaneous, q24h  insulin lispro, 0-10 Units, subcutaneous, TID  levothyroxine, 50 mcg, oral, Daily  methadone, 78 mg, oral, Nightly  methadone, 87 mg, oral, Daily  metoprolol succinate XL, 25 mg, oral, Daily  pantoprazole, 40 mg, oral, Daily before breakfast   Or  pantoprazole, 40 mg, intravenous, Daily before breakfast  polyethylene glycol, 17 g, oral, Daily  pregabalin, 200 mg, oral, TID  tiotropium, 2 puff, inhalation, Daily  vancomycin, 1,500 mg, intravenous, q24h    [2] D5 % and 0.9 % sodium chloride, 75 mL/hr, Last Rate: 75 mL/hr (08/11/25 0116)  oxygen,     [3] PRN medications: acetaminophen, albuterol, baclofen, bisacodyl, bisacodyl, dextrose, dextrose, glucagon, glucagon, guaiFENesin, HYDROmorphone, melatonin, naloxone, ondansetron **OR** ondansetron, oxyCODONE, oxyCODONE, vancomycin

## 2025-08-11 NOTE — PROGRESS NOTES
"Kael Zepeda is a 60 y.o. male on day 5 of admission presenting with Diabetic ulcer of right foot associated with type 2 diabetes mellitus, unspecified part of foot, unspecified ulcer stage.      Subjective   Kael Zepeda is a 60 y.o. male with PMHx s/f HTN, HLD, CAD s/p CABG, transient post-op Afib after CABG (no AC), HFrEF (EF 39% 07/24), COPD (no baseline O2), h/o IVDU (on long-term methadone), h/o Hep C (s/p treatment, in remission), RA (on methotrexate), hypothyroidism, IDDM-II c/b peripheral neuropathy and DM foot wounds (s/p transmetatarsal amputation of the right foot 04/30/25), presenting with advanced R foot wound. Patient had TMA of the R foot in setting of nonhealing wounds and OM 04/30/25. He was supposed to continue outpatient IV antibiotics on discharge and go to rehab at that time; however, he elected to leave the hospital AMA (he reports he was afraid to miss weekly check-in for his methadone clinic). Since then, he has only intermittently followed up with the wound care center. He was seen earlier this week at wound care and described feeling weak, having poor appetite, and having increasing drainage from the wound which is malodorous. At that time there was significant concern he was septic and he was told to go to the ED for evaluation. The patient subsequently waited multiple days, coming in ~72 hours later, because he was \"finally ready.\" He has continued to feel ill the entire time. He has poor intake and malaise. Has also experienced swelling of the RLE > LLE. He is a bit of a difficult historian, reporting he takes \"a lot\" of medications but cannot name them or their doses. He has a difficult time telling me a time frame on his symptoms overall. Denies cp/pressure, palpitations, diaphoresis, SOB, CALDERON, dizziness / lightheadedness, syncope or near syncope, HA, vision changes, f/c/v/d/abd pain.      8/7: Patient seen.  No new complaints.  Patient states he is willing to proceed " with right below-knee amputation as recommended by podiatry.  Patient has advanced osteomyelitis in the 4th and 5th metatarsal and cuboid with osteolytic destruction.  Podiatry states foot is not salvageable.  There is however no evidence of necrotizing fasciitis.  Referral was made to orthopedic surgery and the current plan is to take him for a below the knee amputation tomorrow 8/8.  Hemoglobin today is noted to be low at 7.0.  Patient is asymptomatic.  Hemoglobin was 7.9 on admission.  No obvious bleeding.  However given the plan for his amputation tomorrow I have elected to transfuse 1 unit of PRBCs.  Patient has completed consent form.  He is in agreement with plan to proceed with amputation tomorrow.     8/8: Patient seen.  No complaints at this time.  Seen in room following amputation.  Large dressing on his wound.  Pain is adequately controlled at this time.  Appreciate input from orthopedics, podiatry, ID.  Will reassess again in AM.  Hopefully patient recovers quickly.     8/9: Patient seen.  Complains of pain in his right BKA site.  Dressings appear to be clear.  States he is tolerating the discomfort but is having spasms.  Asks for nerve pain medication.  Already on high dose Lyrica 2 mg 3 times daily.  Will add baclofen for muscle spasms.  Has not asked for change in his methadone or oxycodone.  Hemoglobin today is 7.8.  Will continue to monitor.  Reassess in AM.     8/10: Patient seen.  Somewhat sedated today.  Lethargic.  Suspect baclofen may be too potent for patient as a scheduled medication.  Reduce to 5 mg today and make it as needed.  Pain appears to be uncontrolled at present.  Growing MRSA.  Remains on vancomycin per ID recommendations.  Will likely need skilled nursing on discharge.  Recheck hemoglobin in AM.    8/11: Acute events overnight - patient was drowsy and hypoglycemic. He is on methadone and receiving dilaudid. I have placed an urgent pharmacy to assist with pain management.   Orthopedic team recommendations appreciated.  The stump looks good.  ID recommendations appreciated.  Will start discharge process.       Objective     Last Recorded Vitals  /71 (BP Location: Left arm, Patient Position: Lying)   Pulse 78   Temp 36.2 °C (97.1 °F) (Temporal)   Resp 18   Wt 83.4 kg (183 lb 13.8 oz)   SpO2 93%   Intake/Output last 3 Shifts:    Intake/Output Summary (Last 24 hours) at 8/11/2025 1251  Last data filed at 8/11/2025 0646  Gross per 24 hour   Intake 373.75 ml   Output 305 ml   Net 68.75 ml       Admission Weight  Weight: 81.6 kg (180 lb) (08/06/25 1502)    Daily Weight  08/11/25 : 83.4 kg (183 lb 13.8 oz)    Image Results  XR chest 1 view  Narrative: Interpreted By:  Penelope Miner,   STUDY:  XR CHEST 1 VIEW;  8/11/2025 11:26 am      INDICATION:  Signs/Symptoms:SOB.          COMPARISON:  08/06/2025      ACCESSION NUMBER(S):  [id number]      ORDERING CLINICIAN:  TROY BURGESS      TECHNIQUE:  Portable AP upright      FINDINGS:  Sternotomy wires and plates and screws are present and unchanged.  Cardiac silhouette is enlarged but unchanged. Lung volumes are  shallow. No edema or consolidation is noted. There is no pleural  effusion. No acute osseous abnormality is identified.      Impression: No acute radiographic abnormality      Unchanged cardiomegaly      MACRO:  None.      Signed by: Penelope Miner 8/11/2025 12:39 PM  Dictation workstation:   [id number]      Physical Exam  CONSTITUTIONAL -drowsy  SKIN - normal skin color ,warm  HEAD - no trauma, normocephalic  EYES - pupils are equal and reactive to light  CHEST - clear to auscultation, no wheezing, no crackles and no rales, good effort  CARDIAC - regular rate and regular rhythm, no murmur  ABDOMEN - no organomegaly, soft, nontender, nondistended, normal bowel sounds, no guarding/rebound/rigidity  EXTREMITIES -right stump bandaged  NEUROLOGICAL - no acute focal signs      Relevant Results                Right foot  osteomyelitis s/p Diabetic Ulcer of right foot osteomyelitis  associated with type 2 diabetes mellitus s/p BKA 8/8 with sepsis POA  Type 2 diabetes mellitus  with long term current use of insulin  Long term methadone use  Metabolic encephalopathy  Essential Hypertension  Fibromyalgia  HTN, HLD  CAD s/p CABG  HFrEF  COPD  hep C SVR  Hypothyroidism          Malnutrition Diagnosis Status: New  Malnutrition Diagnosis: Moderate malnutrition related to acute disease or injury  Related to: Diabetic ulcer  As Evidenced by: mild fat losses (orbital, buccal, tricep), mild muscle wasting (temporalis, clavicular region, deltoid/trapazius, interrosseous)  I agree with the dietitian's malnutrition diagnosis.    Vancomycin discontinued  Afebrile, monitor white cell count  We will need to address the patient's pain regimen.  Pharmacy consulted  Orthopedic team recommendation appreciated  ID recommendations appreciated  Continue sliding scale    Mindy Duong MD

## 2025-08-12 LAB
ANION GAP SERPL CALC-SCNC: 10 MMOL/L (ref 10–20)
BASOPHILS # BLD AUTO: 0.06 X10*3/UL (ref 0–0.1)
BASOPHILS NFR BLD AUTO: 1.2 %
BUN SERPL-MCNC: 10 MG/DL (ref 6–23)
CALCIUM SERPL-MCNC: 7.9 MG/DL (ref 8.6–10.3)
CHLORIDE SERPL-SCNC: 106 MMOL/L (ref 98–107)
CO2 SERPL-SCNC: 26 MMOL/L (ref 21–32)
CREAT SERPL-MCNC: 0.78 MG/DL (ref 0.5–1.3)
EGFRCR SERPLBLD CKD-EPI 2021: >90 ML/MIN/1.73M*2
EOSINOPHIL # BLD AUTO: 0.17 X10*3/UL (ref 0–0.7)
EOSINOPHIL NFR BLD AUTO: 3.3 %
ERYTHROCYTE [DISTWIDTH] IN BLOOD BY AUTOMATED COUNT: 20.4 % (ref 11.5–14.5)
GLUCOSE BLD MANUAL STRIP-MCNC: 123 MG/DL (ref 74–99)
GLUCOSE BLD MANUAL STRIP-MCNC: 61 MG/DL (ref 74–99)
GLUCOSE BLD MANUAL STRIP-MCNC: 77 MG/DL (ref 74–99)
GLUCOSE BLD MANUAL STRIP-MCNC: 81 MG/DL (ref 74–99)
GLUCOSE BLD MANUAL STRIP-MCNC: 83 MG/DL (ref 74–99)
GLUCOSE BLD MANUAL STRIP-MCNC: 84 MG/DL (ref 74–99)
GLUCOSE BLD MANUAL STRIP-MCNC: 94 MG/DL (ref 74–99)
GLUCOSE BLD MANUAL STRIP-MCNC: 97 MG/DL (ref 74–99)
GLUCOSE SERPL-MCNC: 70 MG/DL (ref 74–99)
HCT VFR BLD AUTO: 26.3 % (ref 41–52)
HGB BLD-MCNC: 8.1 G/DL (ref 13.5–17.5)
IMM GRANULOCYTES # BLD AUTO: 0.16 X10*3/UL (ref 0–0.7)
IMM GRANULOCYTES NFR BLD AUTO: 3.1 % (ref 0–0.9)
LYMPHOCYTES # BLD AUTO: 1.33 X10*3/UL (ref 1.2–4.8)
LYMPHOCYTES NFR BLD AUTO: 25.6 %
MCH RBC QN AUTO: 22.2 PG (ref 26–34)
MCHC RBC AUTO-ENTMCNC: 30.8 G/DL (ref 32–36)
MCV RBC AUTO: 72 FL (ref 80–100)
MONOCYTES # BLD AUTO: 0.83 X10*3/UL (ref 0.1–1)
MONOCYTES NFR BLD AUTO: 16 %
NEUTROPHILS # BLD AUTO: 2.65 X10*3/UL (ref 1.2–7.7)
NEUTROPHILS NFR BLD AUTO: 50.8 %
NRBC BLD-RTO: 0 /100 WBCS (ref 0–0)
PLATELET # BLD AUTO: 666 X10*3/UL (ref 150–450)
POTASSIUM SERPL-SCNC: 3.4 MMOL/L (ref 3.5–5.3)
RBC # BLD AUTO: 3.65 X10*6/UL (ref 4.5–5.9)
SODIUM SERPL-SCNC: 139 MMOL/L (ref 136–145)
WBC # BLD AUTO: 5.2 X10*3/UL (ref 4.4–11.3)

## 2025-08-12 PROCEDURE — 80048 BASIC METABOLIC PNL TOTAL CA: CPT | Performed by: INTERNAL MEDICINE

## 2025-08-12 PROCEDURE — 82947 ASSAY GLUCOSE BLOOD QUANT: CPT

## 2025-08-12 PROCEDURE — 99233 SBSQ HOSP IP/OBS HIGH 50: CPT | Performed by: INTERNAL MEDICINE

## 2025-08-12 PROCEDURE — 85025 COMPLETE CBC W/AUTO DIFF WBC: CPT | Performed by: INTERNAL MEDICINE

## 2025-08-12 PROCEDURE — 2500000002 HC RX 250 W HCPCS SELF ADMINISTERED DRUGS (ALT 637 FOR MEDICARE OP, ALT 636 FOR OP/ED): Performed by: INTERNAL MEDICINE

## 2025-08-12 PROCEDURE — S0109 METHADONE ORAL 5MG: HCPCS | Performed by: INTERNAL MEDICINE

## 2025-08-12 PROCEDURE — 97530 THERAPEUTIC ACTIVITIES: CPT | Mod: GO,CO

## 2025-08-12 PROCEDURE — 2500000002 HC RX 250 W HCPCS SELF ADMINISTERED DRUGS (ALT 637 FOR MEDICARE OP, ALT 636 FOR OP/ED): Performed by: SPECIALIST

## 2025-08-12 PROCEDURE — 2500000004 HC RX 250 GENERAL PHARMACY W/ HCPCS (ALT 636 FOR OP/ED): Performed by: SPECIALIST

## 2025-08-12 PROCEDURE — 97530 THERAPEUTIC ACTIVITIES: CPT | Mod: GP,CQ

## 2025-08-12 PROCEDURE — 2500000001 HC RX 250 WO HCPCS SELF ADMINISTERED DRUGS (ALT 637 FOR MEDICARE OP): Performed by: INTERNAL MEDICINE

## 2025-08-12 PROCEDURE — 2500000001 HC RX 250 WO HCPCS SELF ADMINISTERED DRUGS (ALT 637 FOR MEDICARE OP): Performed by: SPECIALIST

## 2025-08-12 PROCEDURE — 1100000001 HC PRIVATE ROOM DAILY

## 2025-08-12 PROCEDURE — 36415 COLL VENOUS BLD VENIPUNCTURE: CPT | Performed by: INTERNAL MEDICINE

## 2025-08-12 RX ADMIN — METHADONE HYDROCHLORIDE 87 MG: 10 CONCENTRATE ORAL at 11:02

## 2025-08-12 RX ADMIN — LEVOTHYROXINE SODIUM 50 MCG: 0.05 TABLET ORAL at 04:40

## 2025-08-12 RX ADMIN — INSULIN GLARGINE 40 UNITS: 100 INJECTION, SOLUTION SUBCUTANEOUS at 21:53

## 2025-08-12 RX ADMIN — HEPARIN SODIUM 5000 UNITS: 5000 INJECTION, SOLUTION INTRAVENOUS; SUBCUTANEOUS at 00:08

## 2025-08-12 RX ADMIN — DULOXETINE 40 MG: 20 CAPSULE, DELAYED RELEASE ORAL at 09:40

## 2025-08-12 RX ADMIN — HEPARIN SODIUM 5000 UNITS: 5000 INJECTION, SOLUTION INTRAVENOUS; SUBCUTANEOUS at 09:40

## 2025-08-12 RX ADMIN — POLYETHYLENE GLYCOL 3350 17 G: 17 POWDER, FOR SOLUTION ORAL at 09:40

## 2025-08-12 RX ADMIN — PREGABALIN 50 MG: 50 CAPSULE ORAL at 21:53

## 2025-08-12 RX ADMIN — PANTOPRAZOLE SODIUM 40 MG: 40 TABLET, DELAYED RELEASE ORAL at 04:40

## 2025-08-12 RX ADMIN — PREGABALIN 50 MG: 50 CAPSULE ORAL at 15:01

## 2025-08-12 RX ADMIN — HEPARIN SODIUM 5000 UNITS: 5000 INJECTION, SOLUTION INTRAVENOUS; SUBCUTANEOUS at 17:05

## 2025-08-12 RX ADMIN — ASPIRIN 81 MG: 81 TABLET, DELAYED RELEASE ORAL at 09:40

## 2025-08-12 RX ADMIN — PREGABALIN 50 MG: 50 CAPSULE ORAL at 09:40

## 2025-08-12 RX ADMIN — ACETAMINOPHEN 650 MG: 325 TABLET ORAL at 09:41

## 2025-08-12 RX ADMIN — HYDROXYCHLOROQUINE SULFATE 200 MG: 200 TABLET, FILM COATED ORAL at 09:40

## 2025-08-12 RX ADMIN — TIOTROPIUM BROMIDE INHALATION SPRAY 2 PUFF: 3.12 SPRAY, METERED RESPIRATORY (INHALATION) at 10:05

## 2025-08-12 RX ADMIN — METHADONE HYDROCHLORIDE 78 MG: 10 CONCENTRATE ORAL at 22:54

## 2025-08-12 RX ADMIN — METOPROLOL SUCCINATE 25 MG: 25 TABLET, EXTENDED RELEASE ORAL at 09:40

## 2025-08-12 RX ADMIN — HYDROXYCHLOROQUINE SULFATE 200 MG: 200 TABLET, FILM COATED ORAL at 21:53

## 2025-08-12 RX ADMIN — ATORVASTATIN CALCIUM 40 MG: 40 TABLET, FILM COATED ORAL at 21:53

## 2025-08-12 ASSESSMENT — COGNITIVE AND FUNCTIONAL STATUS - GENERAL
CLIMB 3 TO 5 STEPS WITH RAILING: TOTAL
STANDING UP FROM CHAIR USING ARMS: A LOT
DRESSING REGULAR LOWER BODY CLOTHING: A LOT
TURNING FROM BACK TO SIDE WHILE IN FLAT BAD: A LITTLE
STANDING UP FROM CHAIR USING ARMS: A LOT
WALKING IN HOSPITAL ROOM: TOTAL
CLIMB 3 TO 5 STEPS WITH RAILING: TOTAL
TOILETING: A LITTLE
MOBILITY SCORE: 12
DRESSING REGULAR LOWER BODY CLOTHING: A LOT
DAILY ACTIVITIY SCORE: 20
MOVING TO AND FROM BED TO CHAIR: A LOT
WALKING IN HOSPITAL ROOM: TOTAL
CLIMB 3 TO 5 STEPS WITH RAILING: TOTAL
DAILY ACTIVITIY SCORE: 15
TOILETING: A LITTLE
EATING MEALS: A LITTLE
HELP NEEDED FOR BATHING: A LITTLE
WALKING IN HOSPITAL ROOM: TOTAL
PERSONAL GROOMING: A LITTLE
MOVING TO AND FROM BED TO CHAIR: A LOT
TOILETING: A LOT
DRESSING REGULAR UPPER BODY CLOTHING: A LITTLE
TURNING FROM BACK TO SIDE WHILE IN FLAT BAD: A LITTLE
STANDING UP FROM CHAIR USING ARMS: A LOT
MOVING TO AND FROM BED TO CHAIR: A LOT
HELP NEEDED FOR BATHING: A LITTLE
MOVING FROM LYING ON BACK TO SITTING ON SIDE OF FLAT BED WITH BEDRAILS: A LITTLE
MOBILITY SCORE: 13
MOBILITY SCORE: 13
DRESSING REGULAR LOWER BODY CLOTHING: A LOT
TURNING FROM BACK TO SIDE WHILE IN FLAT BAD: A LITTLE
HELP NEEDED FOR BATHING: A LOT
DAILY ACTIVITIY SCORE: 20

## 2025-08-12 ASSESSMENT — PAIN SCALES - GENERAL
PAINLEVEL_OUTOF10: 6
PAINLEVEL_OUTOF10: 8
PAINLEVEL_OUTOF10: 7
PAINLEVEL_OUTOF10: 0 - NO PAIN
PAINLEVEL_OUTOF10: 0 - NO PAIN
PAINLEVEL_OUTOF10: 6
PAINLEVEL_OUTOF10: 7

## 2025-08-12 ASSESSMENT — PAIN - FUNCTIONAL ASSESSMENT
PAIN_FUNCTIONAL_ASSESSMENT: 0-10

## 2025-08-12 ASSESSMENT — PAIN DESCRIPTION - LOCATION
LOCATION: KNEE
LOCATION: LEG

## 2025-08-12 ASSESSMENT — PAIN DESCRIPTION - DESCRIPTORS
DESCRIPTORS: ACHING

## 2025-08-12 ASSESSMENT — PAIN DESCRIPTION - ORIENTATION
ORIENTATION: RIGHT
ORIENTATION: RIGHT

## 2025-08-12 NOTE — PROGRESS NOTES
Kael Zepeda is a 60 y.o. male on day 6 of admission presenting with Diabetic ulcer of right foot associated with type 2 diabetes mellitus, unspecified part of foot, unspecified ulcer stage.      Assessment/Plan:     #Right foot osteomyelitis s/p BKA 8/8  Podiatry recommending amputation to which patient agrees.  Clindamycin discontinued as less likely to be nec Fac.  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  Cultures positive for gram-positive cocci. Zosyn switched to ceftriaxone and continue vancomycin     HTN, HLD  CAD s/p CABG  HFrEF  COPD  hep C SVR  Hypothyroidism  IDDM     Recommendations:     -Monitor off abx  -Afebrile, WBC count normal  -will continue to follow      Chance Fong MD  Date of service: 8/12/2025  Time of service: 11:16 AM      Subjective   Interval History: Patient more alert and awake today        Review of Systems  Denies: fever, chills, nausea, vomiting    Objective   Range of Vitals (last 24 hours)  Heart Rate:  [61-78]   Temp:  [36.2 °C (97.1 °F)-36.6 °C (97.8 °F)]   Resp:  [16-18]   BP: (113-129)/(61-71)   Weight:  [80.4 kg (177 lb 4 oz)]   SpO2:  [93 %-98 %]  Daily Weight  08/12/25 : 80.4 kg (177 lb 4 oz)   Body mass index is 27.76 kg/m².    General: alert and [place]  HEENT: No conjunctival pallor, no scleral icterus  Lungs: bilaterally clear to auscultation, no wheezing  Abdomen: soft, non tender, non distended, BS+  Neuro: AAO x 2  Extremities: R BKA clean stump with stitches     Antibiotics  This patient does not have an active medication from one of the medication groupers.      Relevant Results  Labs  Results from last 72 hours   Lab Units 08/12/25  0404 08/11/25  0455 08/10/25  1926   WBC AUTO x10*3/uL 5.2 5.8 6.9   HEMOGLOBIN g/dL 8.1* 8.1* 8.2*   HEMATOCRIT % 26.3* 26.2* 27.3*   PLATELETS AUTO x10*3/uL 666* 687* 683*   NEUTROS PCT AUTO % 50.8  --   --    LYMPHS PCT AUTO % 25.6  --   --    MONOS PCT AUTO % 16.0  --   --    EOS PCT AUTO % 3.3  --   --      Results from  last 72 hours   Lab Units 08/12/25  0404 08/11/25  0455 08/10/25  1926   SODIUM mmol/L 139 141 144   POTASSIUM mmol/L 3.4* 3.4* 3.7   CHLORIDE mmol/L 106 109* 107   CO2 mmol/L 26 24 26   BUN mg/dL 10 9 9   CREATININE mg/dL 0.78 0.78 0.86   GLUCOSE mg/dL 70* 81 80   CALCIUM mg/dL 7.9* 8.2* 8.2*   ANION GAP mmol/L 10 11 15   EGFR mL/min/1.73m*2 >90 >90 >90         Estimated Creatinine Clearance: 101 mL/min (by C-G formula based on SCr of 0.78 mg/dL).  C-Reactive Protein   Date Value Ref Range Status   08/06/2025 20.13 (H) <1.00 mg/dL Final   04/29/2025 7.25 (H) <1.00 mg/dL Final   10/09/2024 13.42 (H) <1.00 mg/dL Final       Microbiology  Susceptibility data from last 14 days.  Collected Specimen Info Organism Clindamycin Erythromycin Oxacillin Tetracycline Trimethoprim/Sulfamethoxazole Vancomycin   08/06/25 Tissue/Biopsy from Wound/Tissue Methicillin Resistant Staphylococcus aureus (MRSA)  S  S  R  S  S  S     Mixed Gram-Positive Bacteria              Imaging    CT head wo IV contrast  Result Date: 8/10/2025  1.  No acute intracranial hemorrhage or acute territorial infarct. 2.  Chronic changes of atrophy and microvascular ischemia.     Signed by: Jennifer Macias 8/10/2025 9:46 PM Dictation workstation:   [id number]    CT foot right w IV contrast  Result Date: 8/6/2025    Necrotizing soft tissue infection of the amputation stump with acute osteomyelitis involving the 4th and 5th metatarsal bases, cuboid and likely 3rd metatarsal base. No rim enhancing fluid collection.   MACRO:   Kai Waterman discussed the significance and urgency of this critical finding by EPIC secure chat with  YESI BEACH on 8/6/2025 at 6:37 pm.  (**-RCF-**) Findings:  See findings.     Signed by: Kai Waterman 8/6/2025 6:46 PM Dictation workstation:   [id number]    Vascular US Lower Extremity Venous Duplex Right  Result Date: 8/6/2025  Negative study.  No deep venous thrombosis of the  right lower extremity.   MACRO: None   Signed by:  David Og 8/6/2025 5:59 PM Dictation workstation:   [id number]    XR chest 1 view  Result Date: 8/6/2025  No acute cardiopulmonary process.   MACRO: None   Signed by: Pia Khan 8/6/2025 4:16 PM Dictation workstation:   [id number]    XR foot right 3+ views  Result Date: 8/6/2025  1. Status post transmetatarsal amputation. 2. Soft tissue swelling with an ulcer along the plantar aspect of the right or fluid. There is air in the soft tissues, likely gas-forming infection. 3. Bony destruction of the bases of the 4th and 5th metatarsals and cuboid consistent with osteomyelitis. Areas also a fracture of the base of the 5th metatarsal.       MACRO: None   Signed by: Pia Khan 8/6/2025 4:14 PM Dictation workstation:   [id number]

## 2025-08-12 NOTE — CARE PLAN
The patient's goals for the shift include      The clinical goals for the shift include patient safety will be maintained throughout this shift      Problem: Diabetes  Goal: Increase stability of blood glucose readings by end of shift  Outcome: Progressing  Goal: Decrease in ketones present in urine by end of shift  Outcome: Progressing  Goal: Maintain electrolyte levels within acceptable range throughout shift  Outcome: Progressing  Goal: Learn about and adhere to nutrition recommendations by end of shift  Outcome: Progressing  Goal: Vital signs within normal range for age by end of shift  Outcome: Progressing  Goal: Increase self care and/or family involovement by end of shift  Outcome: Progressing  Goal: Receive DSME education by end of shift  Outcome: Progressing     Problem: Pain  Goal: Takes deep breaths with improved pain control throughout the shift  Outcome: Progressing  Goal: Turns in bed with improved pain control throughout the shift  Outcome: Progressing  Goal: Walks with improved pain control throughout the shift  Outcome: Progressing  Goal: Performs ADL's with improved pain control throughout shift  Outcome: Progressing  Goal: Participates in PT with improved pain control throughout the shift  Outcome: Progressing  Goal: Free from opioid side effects throughout the shift  Outcome: Progressing  Goal: Free from acute confusion related to pain meds throughout the shift  Outcome: Progressing     Problem: Pain - Adult  Goal: Verbalizes/displays adequate comfort level or baseline comfort level  Outcome: Progressing     Problem: Discharge Planning  Goal: Discharge to home or other facility with appropriate resources  Outcome: Progressing     Problem: Heart Failure  Goal: Improved gas exchange this shift  Outcome: Progressing  Goal: Improved urinary output this shift  Outcome: Progressing  Goal: Reduction in peripheral edema within 24 hours  Outcome: Progressing  Goal: Weight from fluid excess reduced over  2-3 days, then stabilize  Outcome: Progressing  Goal: Increase self care and/or family involvement in 24 hours  Outcome: Progressing     Problem: Skin  Goal: Decreased wound size/increased tissue granulation at next dressing change  Outcome: Progressing  Flowsheets (Taken 8/12/2025 1802)  Decreased wound size/increased tissue granulation at next dressing change: Promote sleep for wound healing  Goal: Participates in plan/prevention/treatment measures  Outcome: Progressing  Flowsheets (Taken 8/12/2025 1802)  Participates in plan/prevention/treatment measures: Increase activity/out of bed for meals  Goal: Prevent/manage excess moisture  Outcome: Progressing  Flowsheets (Taken 8/12/2025 1802)  Prevent/manage excess moisture:   Cleanse incontinence/protect with barrier cream   Monitor for/manage infection if present   Follow provider orders for dressing changes  Goal: Prevent/minimize sheer/friction injuries  Outcome: Progressing  Flowsheets (Taken 8/12/2025 1802)  Prevent/minimize sheer/friction injuries:   Complete micro-shifts as needed if patient unable. Adjust patient position to relieve pressure points, not a full turn   Increase activity/out of bed for meals   Use pull sheet   HOB 30 degrees or less   Turn/reposition every 2 hours/use positioning/transfer devices  Goal: Promote/optimize nutrition  Outcome: Progressing  Flowsheets (Taken 8/12/2025 1802)  Promote/optimize nutrition:   Monitor/record intake including meals   Offer water/supplements/favorite foods   Consume > 50% meals/supplements  Goal: Promote skin healing  Outcome: Progressing  Flowsheets (Taken 8/12/2025 1802)  Promote skin healing:   Assess skin/pad under line(s)/device(s)   Protective dressings over bony prominences   Turn/reposition every 2 hours/use positioning/transfer devices   Rotate device position/do not position patient on device     Problem: Fall/Injury  Goal: Not fall by end of shift  Outcome: Progressing  Goal: Be free from injury by  end of the shift  Outcome: Progressing  Goal: Verbalize understanding of personal risk factors for fall in the hospital  Outcome: Progressing  Goal: Verbalize understanding of risk factor reduction measures to prevent injury from fall in the home  Outcome: Progressing  Goal: Use assistive devices by end of the shift  Outcome: Progressing  Goal: Pace activities to prevent fatigue by end of the shift  Outcome: Progressing

## 2025-08-12 NOTE — PROGRESS NOTES
"Kael Zepeda is a 60 y.o. male on day 6 of admission presenting with Diabetic ulcer of right foot associated with type 2 diabetes mellitus, unspecified part of foot, unspecified ulcer stage.    Subjective   Patient much more alert this morning asking when he can go home.  Denies significant pain at the operative site       Objective     Appropriate postop right BKA stump protector applied along with stump .    Last Recorded Vitals  Blood pressure 113/66, pulse 78, temperature 36.6 °C (97.8 °F), temperature source Temporal, resp. rate 18, height 1.702 m (5' 7\"), weight 80.4 kg (177 lb 4 oz), SpO2 93%.       Intake/Output last 3 Shifts:  I/O last 3 completed shifts:  In: 679.8 (8.5 mL/kg) [P.O.:118.5; I.V.:561.3 (7 mL/kg)]  Out: 505 (6.3 mL/kg) [Urine:500 (0.2 mL/kg/hr); Drains:5]  Weight: 80.4 kg     Relevant Results      Scheduled medications  Scheduled Medications[1]  Continuous medications  Continuous Medications[2]  PRN medications  PRN Medications[3]  Results for orders placed or performed during the hospital encounter of 08/06/25 (from the past 24 hours)   POCT GLUCOSE   Result Value Ref Range    POCT Glucose 84 74 - 99 mg/dL   Blood Gas Arterial Full Panel   Result Value Ref Range    POCT pH, Arterial 7.48 (H) 7.38 - 7.42 pH    POCT pCO2, Arterial 37 (L) 38 - 42 mm Hg    POCT pO2, Arterial 66 (L) 85 - 95 mm Hg    POCT SO2, Arterial 95 94 - 100 %    POCT Oxy Hemoglobin, Arterial 92.5 (L) 94.0 - 98.0 %    POCT Hematocrit Calculated, Arterial 26.0 (L) 41.0 - 52.0 %    POCT Sodium, Arterial 141 136 - 145 mmol/L    POCT Potassium, Arterial 3.4 (L) 3.5 - 5.3 mmol/L    POCT Chloride, Arterial 110 (H) 98 - 107 mmol/L    POCT Ionized Calcium, Arterial 1.18 1.10 - 1.33 mmol/L    POCT Glucose, Arterial 84 74 - 99 mg/dL    POCT Lactate, Arterial 0.7 0.4 - 2.0 mmol/L    POCT Base Excess, Arterial 3.9 (H) -2.0 - 3.0 mmol/L    POCT HCO3 Calculated, Arterial 27.6 (H) 22.0 - 26.0 mmol/L    POCT Hemoglobin, " Arterial 8.7 (L) 13.5 - 17.5 g/dL    POCT Anion Gap, Arterial 7 (L) 10 - 25 mmo/L    Patient Temperature 37.0 degrees Celsius    FiO2 21 %    Site of Arterial Puncture Radial Left     Lucian's Test Positive    POCT GLUCOSE   Result Value Ref Range    POCT Glucose 92 74 - 99 mg/dL   POCT GLUCOSE   Result Value Ref Range    POCT Glucose 91 74 - 99 mg/dL   POCT GLUCOSE   Result Value Ref Range    POCT Glucose 113 (H) 74 - 99 mg/dL   POCT GLUCOSE   Result Value Ref Range    POCT Glucose 117 (H) 74 - 99 mg/dL   POCT GLUCOSE   Result Value Ref Range    POCT Glucose 177 (H) 74 - 99 mg/dL   POCT GLUCOSE   Result Value Ref Range    POCT Glucose 137 (H) 74 - 99 mg/dL   POCT GLUCOSE   Result Value Ref Range    POCT Glucose 97 74 - 99 mg/dL   Basic Metabolic Panel   Result Value Ref Range    Glucose 70 (L) 74 - 99 mg/dL    Sodium 139 136 - 145 mmol/L    Potassium 3.4 (L) 3.5 - 5.3 mmol/L    Chloride 106 98 - 107 mmol/L    Bicarbonate 26 21 - 32 mmol/L    Anion Gap 10 10 - 20 mmol/L    Urea Nitrogen 10 6 - 23 mg/dL    Creatinine 0.78 0.50 - 1.30 mg/dL    eGFR >90 >60 mL/min/1.73m*2    Calcium 7.9 (L) 8.6 - 10.3 mg/dL   CBC and Auto Differential   Result Value Ref Range    WBC 5.2 4.4 - 11.3 x10*3/uL    nRBC 0.0 0.0 - 0.0 /100 WBCs    RBC 3.65 (L) 4.50 - 5.90 x10*6/uL    Hemoglobin 8.1 (L) 13.5 - 17.5 g/dL    Hematocrit 26.3 (L) 41.0 - 52.0 %    MCV 72 (L) 80 - 100 fL    MCH 22.2 (L) 26.0 - 34.0 pg    MCHC 30.8 (L) 32.0 - 36.0 g/dL    RDW 20.4 (H) 11.5 - 14.5 %    Platelets 666 (H) 150 - 450 x10*3/uL    Neutrophils % 50.8 40.0 - 80.0 %    Immature Granulocytes %, Automated 3.1 (H) 0.0 - 0.9 %    Lymphocytes % 25.6 13.0 - 44.0 %    Monocytes % 16.0 2.0 - 10.0 %    Eosinophils % 3.3 0.0 - 6.0 %    Basophils % 1.2 0.0 - 2.0 %    Neutrophils Absolute 2.65 1.20 - 7.70 x10*3/uL    Immature Granulocytes Absolute, Automated 0.16 0.00 - 0.70 x10*3/uL    Lymphocytes Absolute 1.33 1.20 - 4.80 x10*3/uL    Monocytes Absolute 0.83 0.10 - 1.00  x10*3/uL    Eosinophils Absolute 0.17 0.00 - 0.70 x10*3/uL    Basophils Absolute 0.06 0.00 - 0.10 x10*3/uL   POCT GLUCOSE   Result Value Ref Range    POCT Glucose 77 74 - 99 mg/dL     *Note: Due to a large number of results and/or encounters for the requested time period, some results have not been displayed. A complete set of results can be found in Results Review.                            Assessment & Plan  Diabetic ulcer of right foot associated with type 2 diabetes mellitus, unspecified part of foot, unspecified ulcer stage    Type 2 diabetes mellitus, with long-term current use of insulin    Benign essential hypertension    Fibromyalgia    Hyperlipidemia, mixed    Hypothyroidism, acquired    Opioid dependence, in remission    Rheumatoid arthritis involving both hands with positive rheumatoid factor (Multi)    CAD in native artery    Hepatitis C    Osteomyelitis of left foot, unspecified type (Multi)    Sepsis with acute organ dysfunction (Multi)    Chronic systolic heart failure    Chronic anemia    Hyponatremia    Osteomyelitis of ankle or foot, right, acute (Multi)    Patient's status post right BKA much improved.  When medically cleared can be discharged.  Patient wants to go home I told him to discuss with his primary care team.  I will need to see my office in 3 weeks postop to remove sutures.  In the meantime he can Every other day dressing changes.    I spent 15 minutes in the professional and overall care of this patient.      Loy aPk MD           [1] aspirin, 81 mg, oral, Daily  atorvastatin, 40 mg, oral, Daily  DULoxetine, 40 mg, oral, Daily  heparin (porcine), 5,000 Units, subcutaneous, q8h  hydroxychloroquine, 200 mg, oral, BID  insulin glargine, 40 Units, subcutaneous, q24h  insulin lispro, 0-10 Units, subcutaneous, TID  levothyroxine, 50 mcg, oral, Daily  [Held by provider] methadone, 78 mg, oral, Nightly  methadone, 87 mg, oral, Daily  metoprolol succinate XL, 25 mg, oral,  Daily  pantoprazole, 40 mg, oral, Daily before breakfast   Or  pantoprazole, 40 mg, intravenous, Daily before breakfast  polyethylene glycol, 17 g, oral, Daily  pregabalin, 50 mg, oral, TID  tiotropium, 2 puff, inhalation, Daily    [2] D5 % and 0.9 % sodium chloride, 75 mL/hr, Last Rate: 75 mL/hr (08/11/25 2006)  oxygen,     [3] PRN medications: acetaminophen, albuterol, bisacodyl, bisacodyl, dextrose, dextrose, glucagon, glucagon, guaiFENesin, [Held by provider] HYDROmorphone, melatonin, naloxone, ondansetron **OR** ondansetron, [Held by provider] oxyCODONE, [Held by provider] oxyCODONE

## 2025-08-12 NOTE — PROGRESS NOTES
"Kael Zepeda is a 60 y.o. male on day 6 of admission presenting with Diabetic ulcer of right foot associated with type 2 diabetes mellitus, unspecified part of foot, unspecified ulcer stage.      Subjective   Kael Zepeda is a 60 y.o. male with PMHx s/f HTN, HLD, CAD s/p CABG, transient post-op Afib after CABG (no AC), HFrEF (EF 39% 07/24), COPD (no baseline O2), h/o IVDU (on long-term methadone), h/o Hep C (s/p treatment, in remission), RA (on methotrexate), hypothyroidism, IDDM-II c/b peripheral neuropathy and DM foot wounds (s/p transmetatarsal amputation of the right foot 04/30/25), presenting with advanced R foot wound. Patient had TMA of the R foot in setting of nonhealing wounds and OM 04/30/25. He was supposed to continue outpatient IV antibiotics on discharge and go to rehab at that time; however, he elected to leave the hospital AMA (he reports he was afraid to miss weekly check-in for his methadone clinic). Since then, he has only intermittently followed up with the wound care center. He was seen earlier this week at wound care and described feeling weak, having poor appetite, and having increasing drainage from the wound which is malodorous. At that time there was significant concern he was septic and he was told to go to the ED for evaluation. The patient subsequently waited multiple days, coming in ~72 hours later, because he was \"finally ready.\" He has continued to feel ill the entire time. He has poor intake and malaise. Has also experienced swelling of the RLE > LLE. He is a bit of a difficult historian, reporting he takes \"a lot\" of medications but cannot name them or their doses. He has a difficult time telling me a time frame on his symptoms overall. Denies cp/pressure, palpitations, diaphoresis, SOB, CALDERON, dizziness / lightheadedness, syncope or near syncope, HA, vision changes, f/c/v/d/abd pain.      8/7: Patient seen.  No new complaints.  Patient states he is willing to proceed " with right below-knee amputation as recommended by podiatry.  Patient has advanced osteomyelitis in the 4th and 5th metatarsal and cuboid with osteolytic destruction.  Podiatry states foot is not salvageable.  There is however no evidence of necrotizing fasciitis.  Referral was made to orthopedic surgery and the current plan is to take him for a below the knee amputation tomorrow 8/8.  Hemoglobin today is noted to be low at 7.0.  Patient is asymptomatic.  Hemoglobin was 7.9 on admission.  No obvious bleeding.  However given the plan for his amputation tomorrow I have elected to transfuse 1 unit of PRBCs.  Patient has completed consent form.  He is in agreement with plan to proceed with amputation tomorrow.     8/8: Patient seen.  No complaints at this time.  Seen in room following amputation.  Large dressing on his wound.  Pain is adequately controlled at this time.  Appreciate input from orthopedics, podiatry, ID.  Will reassess again in AM.  Hopefully patient recovers quickly.     8/9: Patient seen.  Complains of pain in his right BKA site.  Dressings appear to be clear.  States he is tolerating the discomfort but is having spasms.  Asks for nerve pain medication.  Already on high dose Lyrica 2 mg 3 times daily.  Will add baclofen for muscle spasms.  Has not asked for change in his methadone or oxycodone.  Hemoglobin today is 7.8.  Will continue to monitor.  Reassess in AM.     8/10: Patient seen.  Somewhat sedated today.  Lethargic.  Suspect baclofen may be too potent for patient as a scheduled medication.  Reduce to 5 mg today and make it as needed.  Pain appears to be uncontrolled at present.  Growing MRSA.  Remains on vancomycin per ID recommendations.  Will likely need skilled nursing on discharge.  Recheck hemoglobin in AM.    8/11: Acute events overnight - patient was drowsy and hypoglycemic. He is on methadone and receiving dilaudid. I have placed an urgent pharmacy to assist with pain management.   Orthopedic team recommendations appreciated.  The stump looks good.  ID recommendations appreciated.  Will start discharge process.     8/12: No acute events overnight.  Patient is much more alert today.  We did hold some of his methadone, although oxycodone, baclofen and Dilaudid.  His brother was present during the interview.  We agreed to continue his methadone twice a day as prescribed at home.  He does follow with the clinic and has been on methadone for 10 years.  I explained to him that we will discontinue all other narcotics unless absolutely necessary.  Both of them were in agreement with this plan.  Will start discharge process to skilled nursing facility.  Patient will need PT and OT.  Ortho and ID recommendations much appreciated.  Start discharge process.    Objective     Last Recorded Vitals  /66 (BP Location: Left arm, Patient Position: Lying)   Pulse 75   Temp 36.3 °C (97.4 °F) (Temporal)   Resp 18   Wt 80.4 kg (177 lb 4 oz)   SpO2 94%   Intake/Output last 3 Shifts:    Intake/Output Summary (Last 24 hours) at 8/12/2025 1116  Last data filed at 8/12/2025 0407  Gross per 24 hour   Intake 306 ml   Output 500 ml   Net -194 ml       Admission Weight  Weight: 81.6 kg (180 lb) (08/06/25 1502)    Daily Weight  08/12/25 : 80.4 kg (177 lb 4 oz)    Image Results  XR chest 1 view  Narrative: Interpreted By:  Penelope Miner,   STUDY:  XR CHEST 1 VIEW;  8/11/2025 11:26 am      INDICATION:  Signs/Symptoms:SOB.          COMPARISON:  08/06/2025      ACCESSION NUMBER(S):  [id number]      ORDERING CLINICIAN:  TROY BURGESS      TECHNIQUE:  Portable AP upright      FINDINGS:  Sternotomy wires and plates and screws are present and unchanged.  Cardiac silhouette is enlarged but unchanged. Lung volumes are  shallow. No edema or consolidation is noted. There is no pleural  effusion. No acute osseous abnormality is identified.      Impression: No acute radiographic abnormality      Unchanged cardiomegaly       MACRO:  None.      Signed by: Penelope Miner 8/11/2025 12:39 PM  Dictation workstation:   [id number]      Physical Exam  CONSTITUTIONAL -alert, pleasant  SKIN - normal skin color ,warm  HEAD - no trauma, normocephalic  EYES - pupils are equal and reactive to light  CHEST - clear to auscultation, no wheezing, no crackles and no rales, good effort  CARDIAC - regular rate and regular rhythm, no murmur  ABDOMEN - no organomegaly, soft, nontender, nondistended, normal bowel sounds, no guarding/rebound/rigidity  EXTREMITIES -right stump bandaged with a brace  NEUROLOGICAL - no acute focal signs      Relevant Results                Right foot osteomyelitis s/p Diabetic Ulcer of right foot osteomyelitis  associated with type 2 diabetes mellitus s/p BKA 8/8 with sepsis POA  Type 2 diabetes mellitus  with long term current use of insulin  Long term methadone use  Metabolic encephalopathy  Essential Hypertension  Fibromyalgia  HTN, HLD  CAD s/p CABG  HFrEF  COPD  hep C SVR  Hypothyroidism           Malnutrition Diagnosis Status: New  Malnutrition Diagnosis: Moderate malnutrition related to acute disease or injury  Related to: Diabetic ulcer  As Evidenced by: mild fat losses (orbital, buccal, tricep), mild muscle wasting (temporalis, clavicular region, deltoid/trapazius, interrosseous)  I agree with the dietitian's malnutrition diagnosis.    Vancomycin discontinued  Afebrile, monitor white cell count  We will need to address the patient's pain regimen.  Pharmacy consulted  Orthopedic team recommendation appreciated  ID recommendations appreciated  Continue sliding scale  8/12: Much more alert today.  Discontinue all oxycodone and Dilaudid.  Discontinue baclofen.  Continue home methadone twice a day.  Continue PT and OT  Start discharge process to skilled nursing facility    Mindy Duong MD

## 2025-08-12 NOTE — PROGRESS NOTES
8/12/25 1044   08/12/25 1044   Discharge Planning   Type of Post Acute Facility Services Skilled nursing   Expected Discharge Disposition SNF   Patient Choice   Provider Choice list and CMS website (https://medicare.gov/care-compare#search) for post-acute Quality and Resource Measure Data were provided and reviewed with: Patient   Patient / Family choosing to utilize agency / facility established prior to hospitalization No   Intensity of Service   Intensity of Service >30 min     Followed up with pt and pt brother at bedside. Pt appeared more alert than yesterday. Explained that only facility considering pt at this time is [facility]. Pt and brother not very happy with how far away they are. Sent additional referrals to [facility] and [facility]. Will update pt with responses. After much discussion, it was determined that pt gets methadone through [facility] with [facility] on [address] [place] [address] in [place]. Will await facility responses.   Magdalena Contreras RN, BSN, [place]/ Flavio CT Supervisor     8/12/25 1227  Updated pt on facility responses. Pt agreeable to onsite visit and is leaning towards [facility] at this time. Pt prefers to speak with them first prior to making a final decision.   Magdalena Contreras RN, YOLIN, [place]/ [place]avio CT Supervisor     8/12/25 5315  Followed up with pt after [facility] Liaison spoke with pt. Pt confirmed [facility] as FOC and agreeable to auth start. Pt aware that possible discharge will be tomorrow pending pt condition and auth approval. Request  auth team to start auth. [facility] aware of ADOD as well.   Magdalena Contreras RN, YOLIN, [place]/ Flavio CT Supervisor

## 2025-08-12 NOTE — PROGRESS NOTES
Occupational Therapy    Occupational Therapy Treatment    Name: Kael Zepeda  MRN: [id number]  Department: [facility] 2 W  Room: 2007/2007-A  Date: 08/12/25  Time Calculation  Start Time: 1037  Stop Time: 1100  Time Calculation (min): 23 min    Assessment:  OT Assessment: Pt tolerates EOB activity this date and stands from EOB with Mod assist x2, min assist x2 to maintain. Educated on proper positioning of RLE and contracture prevention. Pt would benefit from continued OT services to increase activity tolerance, standing balance, and independence with ADLs.  Barriers to Discharge Home: Caregiver assistance, Physical needs, Cognition needs  End of Session Communication: Bedside nurse  End of Session Patient Position: Bed, 3 rail up, Alarm on  Plan:  Treatment Interventions: ADL retraining, Functional transfer training, UE strengthening/ROM, Endurance training, Patient/family training, Equipment evaluation/education, Compensatory technique education  OT Frequency: 4 times per week (During this acute inpatient hospitalization)  OT Discharge Recommendations: Moderate intensity level of continued care (Based on current functional status and rehab potential, patient is anticipated to tolerate and benefit from 5 or more days per week of skilled rehabilitative therapy after discharge from this acute inpatient hospitalization.)  Equipment Recommended upon Discharge:  (TBD)  OT Recommended Transfer Status: Assist of 2  OT - OK to Discharge: Yes (When medically appropriate)    Subjective     OT Visit Info:  OT Received On: 08/12/25  General:  General  Co-Treatment: PT  Co-Treatment Reason: to optimize mobility and safety whlie focusing on discipline specific needs  Prior to Session Communication: Bedside nurse  Patient Position Received: Bed, 3 rail up, Alarm on  General Comment: Pt alert and agreeable to therapy  Precautions:  LE Weight Bearing Status: Right Non-Weight Bearing (R BKA)  Medical Precautions: Fall  precautions    Pain Assessment:  Pain Assessment  Pain Assessment: 0-10  0-10 (Numeric) Pain Score: 6  Pain Type: Chronic pain  Pain Location: Leg  Pain Orientation: Right  Pain Interventions: Repositioned    Objective   Cognition:  Orientation Level: Disoriented to time  Activities of Daily Living:      UE Bathing  UE Bathing Level of Assistance: Setup  UE Bathing Where Assessed: Bed level (long sit in bed with L leg dangle off edge)  UE Bathing Comments: cleanse arms, chest, face with rag. Cues to intitiate    UE Dressing  UE Dressing Level of Assistance: Minimum assistance  UE Dressing Where Assessed: Bed level (long sit in bed with L leg dangle off edge)  UE Dressing Comments: doff gown with cues to initiate, don with min assist      Functional Standing Tolerance:  Functional Standing Tolerance  Time: 1-2 min  Activity: standing tolerance training  Functional Standing Tolerance Comments: x2 trials w/FWW and min assist x2 to maintain standing balance  Bed Mobility/Transfers: Bed Mobility  Bed Mobility: Yes  Bed Mobility 1  Bed Mobility 1: Supine to sitting, Sitting to supine  Level of Assistance 1: Minimum assistance  Bed Mobility Comments 1: assist with LEs in and out of bed    Transfers  Transfer: Yes  Transfer 1  Technique 1: Sit to stand, Stand to sit  Transfer Device 1: Walker  Transfer Level of Assistance 1: Moderate assistance, +2  Trials/Comments 1: STS x2 from EOB, cues for hand placement     Sitting Balance:  Static Sitting Balance  Static Sitting-Balance Support: Feet supported, Bilateral upper extremity supported  Static Sitting-Level of Assistance: Contact guard  Static Sitting-Comment/Number of Minutes: 8 min     Therapy/Activity:      Therapeutic Activity  Therapeutic Activity Performed: Yes  Therapeutic Activity 1: Pt participated in bed mobility, sitting tolerance training, and standing tolerance training.  Outcome Measures:  [facility] Daily Activity  Putting on and taking off regular lower body  clothing: A lot  Bathing (including washing, rinsing, drying): A lot  Putting on and taking off regular upper body clothing: A little  Toileting, which includes using toilet, bedpan or urinal: A lot  Taking care of personal grooming such as brushing teeth: A little  Eating Meals: A little  Daily Activity - Total Score: 15        Education Documentation  Body Mechanics, taught by MUKESH Alex at 8/12/2025  1:34 PM.  Learner: Patient  Readiness: Acceptance  Method: Explanation  Response: Needs Reinforcement    Precautions, taught by MUKESH Alex at 8/12/2025  1:34 PM.  Learner: Patient  Readiness: Acceptance  Method: Explanation  Response: Needs Reinforcement    Body Mechanics, taught by MUKESH Alex at 8/11/2025  3:55 PM.  Learner: Patient  Readiness: Acceptance  Method: Explanation  Response: Needs Reinforcement    Precautions, taught by MUKESH Alex at 8/11/2025  3:55 PM.  Learner: Patient  Readiness: Acceptance  Method: Explanation  Response: Needs Reinforcement    ADL Training, taught by MUKESH Alex at 8/11/2025  3:55 PM.  Learner: Patient  Readiness: Acceptance  Method: Explanation  Response: Needs Reinforcement    Education Comments  No comments found.      Goals:  Encounter Problems       Encounter Problems (Active)       ADLs       Patient with complete lower body dressing with minimal assist  level of assistance  (Progressing)       Start:  08/09/25    Expected End:  08/23/25            Patient will complete toileting including hygiene clothing management/hygiene with minimal assist  level of assistance (Progressing)       Start:  08/09/25    Expected End:  08/23/25               COGNITION/SAFETY       Patient will recall and adhere to weight bearing restrictions with all ADL and functional mobility in order to promote healing and safety with functional tasks (Progressing)       Start:  08/09/25    Expected End:  08/23/25               TRANSFERS       Patient will complete  functional transfers with least restrictive device with minimal assist  level of assistance. (Progressing)       Start:  08/09/25    Expected End:  08/23/25

## 2025-08-12 NOTE — CARE PLAN
Problem: Skin  Goal: Decreased wound size/increased tissue granulation at next dressing change  Outcome: Progressing  Flowsheets (Taken 8/12/2025 0519)  Decreased wound size/increased tissue granulation at next dressing change: Promote sleep for wound healing  Goal: Participates in plan/prevention/treatment measures  Outcome: Progressing  Flowsheets (Taken 8/12/2025 0519)  Participates in plan/prevention/treatment measures: Elevate heels  Goal: Prevent/manage excess moisture  Outcome: Progressing  Flowsheets (Taken 8/12/2025 0519)  Prevent/manage excess moisture: Cleanse incontinence/protect with barrier cream  Goal: Prevent/minimize sheer/friction injuries  Outcome: Progressing  Flowsheets (Taken 8/12/2025 0519)  Prevent/minimize sheer/friction injuries: Turn/reposition every 2 hours/use positioning/transfer devices  Goal: Promote/optimize nutrition  Outcome: Progressing  Flowsheets (Taken 8/12/2025 0519)  Promote/optimize nutrition: Monitor/record intake including meals  Goal: Promote skin healing  Outcome: Progressing  Flowsheets (Taken 8/12/2025 0519)  Promote skin healing:   Turn/reposition every 2 hours/use positioning/transfer devices   Assess skin/pad under line(s)/device(s)

## 2025-08-12 NOTE — PROGRESS NOTES
Physical Therapy    Physical Therapy Treatment    Patient Name: Kael Zepeda  MRN: [id number]  Department: [address]  Room: 2007/2007-A  Today's Date: 8/12/2025  Time Calculation  Start Time: 1038  Stop Time: 1102  Time Calculation (min): 24 min         Assessment/Plan   PT Assessment  Barriers to Discharge Home: Cognition needs, Caregiver assistance, Physical needs  End of Session Communication: Bedside nurse  Assessment Comment: patient progessed to sitting and standing balance  End of Session Patient Position: Bed, 3 rail up, Alarm on  PT Plan  Inpatient/Swing Bed or Outpatient: Inpatient  PT Plan  Treatment/Interventions: Bed mobility, Transfer training, Gait training, Strengthening, Endurance training, Therapeutic exercise, Therapeutic activity  PT Plan: Ongoing PT  PT Frequency: 4 times per week (WHILE IN HOSPITAL)  PT Discharge Recommendations: Moderate intensity level of continued care (Based on current functional status and rehab potential, patient is anticipated to tolerate and benefit from 5 or more days per week of skilled rehabilitative therapy after discharge from this acute inpatient hospitalization)  Equipment Recommended upon Discharge:  (TBD)  PT Recommended Transfer Status: Assist x2 (FWW, NEW  R BKA)  PT - OK to Discharge: Yes (WHEN M EDICALLY CLEARED)    PT Visit Info:  PT Received On: 08/12/25  Response to Previous Treatment: Patient reporting fatigue but able to participate.     General Visit Information:   General  Co-Treatment: OT  Co-Treatment Reason: to optimize mobility and safety while focusing on discipline specific needs  Patient Position Received: Bed, 3 rail up, Alarm on  General Comment: patient in bed agreeable to therapy, still needs persuasion to participate.   Subjective   Precautions:  Precautions  Precautions Comment: right BKA     Date/Time Vitals Session Patient Position Pulse Resp SpO2 BP MAP (mmHg)    08/12/25 1133 --  --  65  18  95 %  116/70  85            Objective    Pain:  Pain Assessment  Pain Assessment: 0-10  0-10 (Numeric) Pain Score: 6  Pain Type: Chronic pain  Pain Location: Leg  Pain Orientation: Right  Pain Interventions: Repositioned  Response to Interventions:  (no change, but patient able to progress wiht treatment today)  Cognition:  Cognition  Orientation Level: Disoriented to time, Disoriented to situation    Activity Tolerance:  Activity Tolerance  Endurance: Tolerates 10 - 20 min exercise with multiple rests  Treatments:  patient performed hip abduction 15 reps  right and left, transfer training supine with min assist x2 , slow transfers. static sitting eOB x 8 mins with min assist x2.  sit to stand x2 reps with mod assist x2, static standing x 60 seconds x2 reps with min assist x2. sit to supine with min assist x2    Outcome Measures:  [facility] Basic Mobility  Turning from your back to your side while in a flat bed without using bedrails: A little  Moving from lying on your back to sitting on the side of a flat bed without using bedrails: A little  Moving to and from bed to chair (including a wheelchair): A lot  Standing up from a chair using your arms (e.g. wheelchair or bedside chair): A lot  To walk in hospital room: Total  Climbing 3-5 steps with railing: Total  Basic Mobility - Total Score: 12    Education Documentation  Precautions, taught by Vanita Devi PTA at 8/12/2025 11:34 AM.  Learner: Patient  Readiness: Acceptance  Method: Explanation  Response: Verbalizes Understanding, Needs Reinforcement  Comment: safety with mobility    Mobility Training, taught by Vanita Devi PTA at 8/12/2025 11:34 AM.  Learner: Patient  Readiness: Acceptance  Method: Explanation  Response: Verbalizes Understanding, Needs Reinforcement  Comment: safety with mobility    Education Comments  No comments found.        OP EDUCATION:       Encounter Problems       Encounter Problems (Active)       Mobility       STG - Patient will ambulate (Progressing)       Start:  08/09/25     Expected End:  08/23/25       FWW  MIN A X1 75+ FT         Goal 1 (Progressing)       Start:  08/09/25    Expected End:  08/30/25       20 REPS RROM LLE, AROM R HIP INCREASING STRENGTH TO STABILIZE OOB ACTIVITIES            PT Transfers       STG - Transfer from bed to chair (Progressing)       Start:  08/09/25    Expected End:  08/12/25       MIN A X2 FWW         STG - Patient to transfer to and from sit to supine (Progressing)       Start:  08/09/25    Expected End:  08/11/25       SBA USING PROPER PRECAUTIONS FOR R BKA HOB FLAT NO RAILS         STG - Patient will transfer sit to and from stand (Progressing)       Start:  08/09/25    Expected End:  08/12/25       FWW MIN A X2 FWW USING PROPER TECHNIQUE

## 2025-08-13 VITALS
HEIGHT: 67 IN | BODY MASS INDEX: 27.82 KG/M2 | TEMPERATURE: 97.3 F | DIASTOLIC BLOOD PRESSURE: 64 MMHG | SYSTOLIC BLOOD PRESSURE: 118 MMHG | WEIGHT: 177.25 LBS | RESPIRATION RATE: 16 BRPM | HEART RATE: 67 BPM | OXYGEN SATURATION: 99 %

## 2025-08-13 LAB
ANION GAP SERPL CALC-SCNC: 15 MMOL/L (ref 10–20)
BUN SERPL-MCNC: 10 MG/DL (ref 6–23)
CALCIUM SERPL-MCNC: 8.1 MG/DL (ref 8.6–10.3)
CHLORIDE SERPL-SCNC: 103 MMOL/L (ref 98–107)
CO2 SERPL-SCNC: 25 MMOL/L (ref 21–32)
CREAT SERPL-MCNC: 0.87 MG/DL (ref 0.5–1.3)
EGFRCR SERPLBLD CKD-EPI 2021: >90 ML/MIN/1.73M*2
GLUCOSE BLD MANUAL STRIP-MCNC: 131 MG/DL (ref 74–99)
GLUCOSE BLD MANUAL STRIP-MCNC: 139 MG/DL (ref 74–99)
GLUCOSE SERPL-MCNC: 100 MG/DL (ref 74–99)
POTASSIUM SERPL-SCNC: 3.6 MMOL/L (ref 3.5–5.3)
SODIUM SERPL-SCNC: 139 MMOL/L (ref 136–145)

## 2025-08-13 PROCEDURE — 2500000001 HC RX 250 WO HCPCS SELF ADMINISTERED DRUGS (ALT 637 FOR MEDICARE OP): Performed by: INTERNAL MEDICINE

## 2025-08-13 PROCEDURE — 82947 ASSAY GLUCOSE BLOOD QUANT: CPT

## 2025-08-13 PROCEDURE — S0109 METHADONE ORAL 5MG: HCPCS | Performed by: INTERNAL MEDICINE

## 2025-08-13 PROCEDURE — 2500000002 HC RX 250 W HCPCS SELF ADMINISTERED DRUGS (ALT 637 FOR MEDICARE OP, ALT 636 FOR OP/ED): Performed by: INTERNAL MEDICINE

## 2025-08-13 PROCEDURE — 2500000004 HC RX 250 GENERAL PHARMACY W/ HCPCS (ALT 636 FOR OP/ED): Performed by: INTERNAL MEDICINE

## 2025-08-13 PROCEDURE — 99239 HOSP IP/OBS DSCHRG MGMT >30: CPT | Performed by: INTERNAL MEDICINE

## 2025-08-13 PROCEDURE — 97530 THERAPEUTIC ACTIVITIES: CPT | Mod: GP,CQ

## 2025-08-13 PROCEDURE — 97110 THERAPEUTIC EXERCISES: CPT | Mod: GP,CQ

## 2025-08-13 PROCEDURE — 82374 ASSAY BLOOD CARBON DIOXIDE: CPT | Performed by: INTERNAL MEDICINE

## 2025-08-13 PROCEDURE — 36415 COLL VENOUS BLD VENIPUNCTURE: CPT | Performed by: INTERNAL MEDICINE

## 2025-08-13 PROCEDURE — 97116 GAIT TRAINING THERAPY: CPT | Mod: GP,CQ

## 2025-08-13 RX ORDER — PREGABALIN 75 MG/1
75 CAPSULE ORAL 3 TIMES DAILY
Qty: 90 CAPSULE | Refills: 2 | Status: SHIPPED | OUTPATIENT
Start: 2025-08-13 | End: 2025-11-11

## 2025-08-13 RX ORDER — INSULIN GLARGINE 100 [IU]/ML
40 INJECTION, SOLUTION SUBCUTANEOUS EVERY 24 HOURS
Start: 2025-08-13 | End: 2025-09-12

## 2025-08-13 RX ADMIN — LEVOTHYROXINE SODIUM 50 MCG: 0.05 TABLET ORAL at 05:45

## 2025-08-13 RX ADMIN — POLYETHYLENE GLYCOL 3350 17 G: 17 POWDER, FOR SOLUTION ORAL at 09:55

## 2025-08-13 RX ADMIN — HEPARIN SODIUM 5000 UNITS: 5000 INJECTION, SOLUTION INTRAVENOUS; SUBCUTANEOUS at 01:17

## 2025-08-13 RX ADMIN — PREGABALIN 50 MG: 50 CAPSULE ORAL at 09:54

## 2025-08-13 RX ADMIN — METHADONE HYDROCHLORIDE 87 MG: 10 CONCENTRATE ORAL at 09:55

## 2025-08-13 RX ADMIN — DULOXETINE 40 MG: 20 CAPSULE, DELAYED RELEASE ORAL at 09:53

## 2025-08-13 RX ADMIN — ASPIRIN 81 MG: 81 TABLET, DELAYED RELEASE ORAL at 09:53

## 2025-08-13 RX ADMIN — PANTOPRAZOLE SODIUM 40 MG: 40 TABLET, DELAYED RELEASE ORAL at 05:45

## 2025-08-13 RX ADMIN — HYDROXYCHLOROQUINE SULFATE 200 MG: 200 TABLET, FILM COATED ORAL at 09:53

## 2025-08-13 RX ADMIN — METOPROLOL SUCCINATE 25 MG: 25 TABLET, EXTENDED RELEASE ORAL at 09:54

## 2025-08-13 RX ADMIN — HEPARIN SODIUM 5000 UNITS: 5000 INJECTION, SOLUTION INTRAVENOUS; SUBCUTANEOUS at 09:54

## 2025-08-13 ASSESSMENT — COGNITIVE AND FUNCTIONAL STATUS - GENERAL
HELP NEEDED FOR BATHING: A LITTLE
TOILETING: A LITTLE
WALKING IN HOSPITAL ROOM: TOTAL
DAILY ACTIVITIY SCORE: 20
MOBILITY SCORE: 13
WALKING IN HOSPITAL ROOM: A LOT
MOVING TO AND FROM BED TO CHAIR: A LOT
CLIMB 3 TO 5 STEPS WITH RAILING: TOTAL
MOVING FROM LYING ON BACK TO SITTING ON SIDE OF FLAT BED WITH BEDRAILS: A LITTLE
STANDING UP FROM CHAIR USING ARMS: A LOT
CLIMB 3 TO 5 STEPS WITH RAILING: TOTAL
MOBILITY SCORE: 13
TURNING FROM BACK TO SIDE WHILE IN FLAT BAD: A LITTLE
MOVING TO AND FROM BED TO CHAIR: A LOT
DRESSING REGULAR LOWER BODY CLOTHING: A LOT
TURNING FROM BACK TO SIDE WHILE IN FLAT BAD: A LITTLE
STANDING UP FROM CHAIR USING ARMS: A LOT

## 2025-08-13 ASSESSMENT — PAIN - FUNCTIONAL ASSESSMENT: PAIN_FUNCTIONAL_ASSESSMENT: 0-10

## 2025-08-13 ASSESSMENT — PAIN SCALES - GENERAL: PAINLEVEL_OUTOF10: 0 - NO PAIN

## 2025-08-13 NOTE — PROGRESS NOTES
Kael Zepeda is a 60 y.o. male on day 7 of admission presenting with Diabetic ulcer of right foot associated with type 2 diabetes mellitus, unspecified part of foot, unspecified ulcer stage.      Assessment/Plan:     #Right foot osteomyelitis s/p BKA 8/8  Podiatry recommending amputation to which patient agrees.  Clindamycin discontinued as less likely to be nec Fac.  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  Cultures positive for gram-positive cocci. Zosyn switched to ceftriaxone and now off antibiotics     HTN, HLD  CAD s/p CABG  HFrEF  COPD  hep C SVR  Hypothyroidism  IDDM     Recommendations:     -Monitor off abx  -Afebrile, WBC count normal  -will continue to follow      Chance Fong MD  Date of service: 8/13/2025  Time of service: 11:40 AM      Subjective   Interval History: Patient denies any complaints        Review of Systems  Denies: fever, chills, nausea, vomiting    Objective   Range of Vitals (last 24 hours)  Heart Rate:  [67-77]   Temp:  [35.9 °C (96.7 °F)-37.4 °C (99.3 °F)]   Resp:  [16-18]   BP: (101-129)/(61-70)   SpO2:  [95 %-99 %]  Daily Weight  08/12/25 : 80.4 kg (177 lb 4 oz)   Body mass index is 27.76 kg/m².    General: alert and [place]  HEENT: No conjunctival pallor, no scleral icterus  Lungs: bilaterally clear to auscultation, no wheezing  Abdomen: soft, non tender, non distended, BS+  Neuro: AAO x 3  Extremities: R BKA dressing     Antibiotics  This patient does not have an active medication from one of the medication groupers.      Relevant Results  Labs  Results from last 72 hours   Lab Units 08/12/25  0404 08/11/25  0455 08/10/25  1926   WBC AUTO x10*3/uL 5.2 5.8 6.9   HEMOGLOBIN g/dL 8.1* 8.1* 8.2*   HEMATOCRIT % 26.3* 26.2* 27.3*   PLATELETS AUTO x10*3/uL 666* 687* 683*   NEUTROS PCT AUTO % 50.8  --   --    LYMPHS PCT AUTO % 25.6  --   --    MONOS PCT AUTO % 16.0  --   --    EOS PCT AUTO % 3.3  --   --      Results from last 72 hours   Lab Units 08/13/25  0555 08/12/25  0404  08/11/25  0455   SODIUM mmol/L 139 139 141   POTASSIUM mmol/L 3.6 3.4* 3.4*   CHLORIDE mmol/L 103 106 109*   CO2 mmol/L 25 26 24   BUN mg/dL 10 10 9   CREATININE mg/dL 0.87 0.78 0.78   GLUCOSE mg/dL 100* 70* 81   CALCIUM mg/dL 8.1* 7.9* 8.2*   ANION GAP mmol/L 15 10 11   EGFR mL/min/1.73m*2 >90 >90 >90         Estimated Creatinine Clearance: 90.6 mL/min (by C-G formula based on SCr of 0.87 mg/dL).  C-Reactive Protein   Date Value Ref Range Status   08/06/2025 20.13 (H) <1.00 mg/dL Final   04/29/2025 7.25 (H) <1.00 mg/dL Final   10/09/2024 13.42 (H) <1.00 mg/dL Final       Microbiology  Susceptibility data from last 14 days.  Collected Specimen Info Organism Clindamycin Erythromycin Oxacillin Tetracycline Trimethoprim/Sulfamethoxazole Vancomycin   08/06/25 Tissue/Biopsy from Wound/Tissue Methicillin Resistant Staphylococcus aureus (MRSA)  S  S  R  S  S  S     Mixed Gram-Positive Bacteria              Imaging    CT head wo IV contrast  Result Date: 8/10/2025  1.  No acute intracranial hemorrhage or acute territorial infarct. 2.  Chronic changes of atrophy and microvascular ischemia.     Signed by: Jennifer Macias 8/10/2025 9:46 PM Dictation workstation:   [id number]    CT foot right w IV contrast  Result Date: 8/6/2025    Necrotizing soft tissue infection of the amputation stump with acute osteomyelitis involving the 4th and 5th metatarsal bases, cuboid and likely 3rd metatarsal base. No rim enhancing fluid collection.   MACRO:   Kai Waterman discussed the significance and urgency of this critical finding by EPIC secure chat with  YESI BEACH on 8/6/2025 at 6:37 pm.  (**-RCF-**) Findings:  See findings.     Signed by: Kai Waterman 8/6/2025 6:46 PM Dictation workstation:   [id number]    Vascular US Lower Extremity Venous Duplex Right  Result Date: 8/6/2025  Negative study.  No deep venous thrombosis of the  right lower extremity.   MACRO: None   Signed by: David Og 8/6/2025 5:59 PM Dictation workstation:    [id number]    XR chest 1 view  Result Date: 8/6/2025  No acute cardiopulmonary process.   MACRO: None   Signed by: Pia Khan 8/6/2025 4:16 PM Dictation workstation:   [id number]    XR foot right 3+ views  Result Date: 8/6/2025  1. Status post transmetatarsal amputation. 2. Soft tissue swelling with an ulcer along the plantar aspect of the right or fluid. There is air in the soft tissues, likely gas-forming infection. 3. Bony destruction of the bases of the 4th and 5th metatarsals and cuboid consistent with osteomyelitis. Areas also a fracture of the base of the 5th metatarsal.       MACRO: None   Signed by: Pia Khan 8/6/2025 4:14 PM Dictation workstation:   [id number]

## 2025-08-13 NOTE — NURSING NOTE
Patient discharged to [facility]. Report given to receiving nurse. No questions at this time. IV removed prior to DC. Script for the lyrica, discharge instructions and belongings sent with the patient and physicians ambulance services.

## 2025-08-13 NOTE — NURSING NOTE
Called to give report but nurse unavailable. Left name and number for her to call back. Informed  that patient is being picked up at 11A

## 2025-08-13 NOTE — CARE PLAN
The patient's goals for the shift include      The clinical goals for the shift include Patient safety will be maintained throughout this shift      Problem: Diabetes  Goal: Increase stability of blood glucose readings by end of shift  Outcome: Progressing  Goal: Decrease in ketones present in urine by end of shift  Outcome: Progressing  Goal: Maintain electrolyte levels within acceptable range throughout shift  Outcome: Progressing  Goal: Learn about and adhere to nutrition recommendations by end of shift  Outcome: Progressing  Goal: Vital signs within normal range for age by end of shift  Outcome: Progressing  Goal: Increase self care and/or family involovement by end of shift  Outcome: Progressing  Goal: Receive DSME education by end of shift  Outcome: Progressing     Problem: Pain  Goal: Takes deep breaths with improved pain control throughout the shift  Outcome: Progressing  Goal: Turns in bed with improved pain control throughout the shift  Outcome: Progressing  Goal: Walks with improved pain control throughout the shift  Outcome: Progressing  Goal: Performs ADL's with improved pain control throughout shift  Outcome: Progressing  Goal: Participates in PT with improved pain control throughout the shift  Outcome: Progressing  Goal: Free from opioid side effects throughout the shift  Outcome: Progressing  Goal: Free from acute confusion related to pain meds throughout the shift  Outcome: Progressing     Problem: Pain - Adult  Goal: Verbalizes/displays adequate comfort level or baseline comfort level  Outcome: Progressing     Problem: Discharge Planning  Goal: Discharge to home or other facility with appropriate resources  Outcome: Progressing     Problem: Heart Failure  Goal: Improved gas exchange this shift  Outcome: Progressing  Goal: Improved urinary output this shift  Outcome: Progressing  Goal: Reduction in peripheral edema within 24 hours  Outcome: Progressing  Goal: Weight from fluid excess reduced over  2-3 days, then stabilize  Outcome: Progressing  Goal: Increase self care and/or family involvement in 24 hours  Outcome: Progressing     Problem: Skin  Goal: Decreased wound size/increased tissue granulation at next dressing change  Outcome: Progressing  Flowsheets (Taken 8/13/2025 1051)  Decreased wound size/increased tissue granulation at next dressing change: Promote sleep for wound healing  Goal: Participates in plan/prevention/treatment measures  Outcome: Progressing  Flowsheets (Taken 8/13/2025 1051)  Participates in plan/prevention/treatment measures:   Elevate heels   Increase activity/out of bed for meals  Goal: Prevent/manage excess moisture  Outcome: Progressing  Flowsheets (Taken 8/13/2025 1051)  Prevent/manage excess moisture:   Cleanse incontinence/protect with barrier cream   Monitor for/manage infection if present   Follow provider orders for dressing changes   Moisturize dry skin  Goal: Prevent/minimize sheer/friction injuries  Outcome: Progressing  Flowsheets (Taken 8/13/2025 1051)  Prevent/minimize sheer/friction injuries:   Complete micro-shifts as needed if patient unable. Adjust patient position to relieve pressure points, not a full turn   Increase activity/out of bed for meals   Use pull sheet   HOB 30 degrees or less   Turn/reposition every 2 hours/use positioning/transfer devices  Goal: Promote/optimize nutrition  Outcome: Progressing  Flowsheets (Taken 8/13/2025 1051)  Promote/optimize nutrition:   Monitor/record intake including meals   Consume > 50% meals/supplements   Offer water/supplements/favorite foods  Goal: Promote skin healing  Outcome: Progressing  Flowsheets (Taken 8/13/2025 1051)  Promote skin healing:   Assess skin/pad under line(s)/device(s)   Protective dressings over bony prominences   Turn/reposition every 2 hours/use positioning/transfer devices   Rotate device position/do not position patient on device     Problem: Fall/Injury  Goal: Not fall by end of shift  Outcome:  Progressing  Goal: Be free from injury by end of the shift  Outcome: Progressing  Goal: Verbalize understanding of personal risk factors for fall in the hospital  Outcome: Progressing  Goal: Verbalize understanding of risk factor reduction measures to prevent injury from fall in the home  Outcome: Progressing  Goal: Use assistive devices by end of the shift  Outcome: Progressing  Goal: Pace activities to prevent fatigue by end of the shift  Outcome: Progressing

## 2025-08-13 NOTE — PROGRESS NOTES
Physical Therapy    Physical Therapy Treatment    Patient Name: Kael Zepeda  MRN: [id number]  Department: [address]  Room: [facility]-A  Today's Date: 8/13/2025  Time Calculation  Start Time: 0830  Stop Time: 0908  Time Calculation (min): 38 min         Assessment/Plan   PT Assessment  Barriers to Discharge Home: Cognition needs, Caregiver assistance, Physical needs  End of Session Communication: Bedside nurse  Assessment Comment: patient required tactile aqnd visual cues on how to perform NWB right LE. but then able to progress to transfers and  4 feet of gait training  End of Session Patient Position: Bed, 3 rail up, Alarm on  PT Plan  Inpatient/Swing Bed or Outpatient: Inpatient  PT Plan  Treatment/Interventions: Bed mobility, Transfer training, Gait training, Strengthening, Endurance training, Therapeutic exercise, Therapeutic activity  PT Plan: Ongoing PT  PT Frequency for Current Admission: 4 times per week during this acute inpatient hospitalization (WHILE IN HOSPITAL)  PT Discharge Recommendations: Moderate intensity level of continued care (Based on current functional status and rehab potential, patient is anticipated to tolerate and benefit from 5 or more days per week of skilled rehabilitative therapy after discharge from this acute inpatient hospitalization)  Equipment Recommended upon Discharge:  (TBD)  PT Recommended Transfer Status: Assist x2 (FWW, NEW  R BKA)  PT - OK to Discharge: Yes (WHEN M EDICALLY CLEARED)    PT Visit Info:  PT Received On: 08/13/25  Response to Previous Treatment: Patient reporting fatigue but able to participate.     General Visit Information:   General  Co-Treatment: OT  Co-Treatment Reason: to optimize mobility and safety while focusing on discipline specific needs  Patient Position Received: Bed, 3 rail up, Alarm on  General Comment: patient in bed agreeable to therapy, still needs persuasion to participate.  Subjective   Precautions:  Precautions  Precautions Comment:  right BKA     Date/Time Vitals Session Patient Position Pulse Resp SpO2 BP MAP (mmHg)    08/13/25 0809 --  --  67  16  99 %  118/64  82            Objective   Pain:  Pain Assessment  Pain Assessment: 0-10  0-10 (Numeric) Pain Score: 0 - No pain (no complaints of pain this treatment session. just states it feels weird)  Pain Type: Chronic pain  Pain Location: Leg  Pain Orientation: Right  Pain Interventions: Repositioned  Response to Interventions:  (no change, but patient able to progress wiht treatment today)  Cognition:  Cognition  Orientation Level: Disoriented X4 (but sometimes inapproiate)    Activity Tolerance:  Activity Tolerance  Endurance: Tolerates 10 - 20 min exercise with multiple rests  Treatments:   patient performed hip abduction 15 reps  right and left, transfer training supine with CGA , slow transfers. static sitting eOB x 10 CGA sit to stand x2 reps with min assist x1, static standing x 60 seconds x2 reps  with CGA. gait training 4 side steps with min assist. seated active recovery  then transfer training  NWB right LE  5 steps to chair with min assist    Outcome Measures:  [facility] Basic Mobility  Turning from your back to your side while in a flat bed without using bedrails: A little  Moving from lying on your back to sitting on the side of a flat bed without using bedrails: A little  Moving to and from bed to chair (including a wheelchair): A lot  Standing up from a chair using your arms (e.g. wheelchair or bedside chair): A lot  To walk in hospital room: A lot (for safety)  Climbing 3-5 steps with railing: Total  Basic Mobility - Total Score: 13    Education Documentation  Precautions, taught by Vanita Devi PTA at 8/13/2025  9:59 AM.  Learner: Patient  Readiness: Acceptance  Method: Explanation  Response: Verbalizes Understanding, Needs Reinforcement  Comment: safety with mobility    Mobility Training, taught by Vanita Devi PTA at 8/13/2025  9:59 AM.  Learner: Patient  Readiness:  Acceptance  Method: Explanation  Response: Verbalizes Understanding, Needs Reinforcement  Comment: safety with mobility    Education Comments  No comments found.        OP EDUCATION:       Encounter Problems       Encounter Problems (Active)       Mobility       STG - Patient will ambulate (Progressing)       Start:  08/09/25    Expected End:  08/23/25       FWW  MIN A X1 75+ FT         Goal 1 (Progressing)       Start:  08/09/25    Expected End:  08/30/25       20 REPS RROM LLE, AROM R HIP INCREASING STRENGTH TO STABILIZE OOB ACTIVITIES            PT Transfers       STG - Transfer from bed to chair (Progressing)       Start:  08/09/25    Expected End:  08/12/25       MIN A X2 FWW         STG - Patient to transfer to and from sit to supine (Progressing)       Start:  08/09/25    Expected End:  08/11/25       SBA USING PROPER PRECAUTIONS FOR R BKA HOB FLAT NO RAILS         STG - Patient will transfer sit to and from stand (Progressing)       Start:  08/09/25    Expected End:  08/12/25       FWW MIN A X2 FWW USING PROPER TECHNIQUE

## 2025-08-13 NOTE — PROGRESS NOTES
08/13/25 1041   Discharge Planning   Has discharge transport been arranged? Yes   What day is the transport expected? 08/13/25   What time is the transport expected? 1100     TITUS Leblanc has been notified of transport time and  provided phone number for report.  DC docs sent to facility and they also made aware of transport time.

## 2025-08-13 NOTE — CARE PLAN
Problem: Diabetes  Goal: Learn about and adhere to nutrition recommendations by end of shift  Outcome: Progressing  Flowsheets (Taken 8/13/2025 0135)  Learn about and adhere to nutrition recommendations by end of shift: Ensure/encourage compliance with appropriate diet     Problem: Pain - Adult  Goal: Verbalizes/displays adequate comfort level or baseline comfort level  Outcome: Progressing  Flowsheets (Taken 8/13/2025 0135)  Verbalizes/displays adequate comfort level or baseline comfort level:   Assess pain using appropriate pain scale   Encourage patient to monitor pain and request assistance   Administer analgesics based on type and severity of pain and evaluate response   Consider cultural and social influences on pain and pain management   Implement non-pharmacological measures as appropriate and evaluate response   Notify Licensed Independent Practitioner if interventions unsuccessful or patient reports new pain     Problem: Skin  Goal: Promote skin healing  Outcome: Progressing  Flowsheets (Taken 8/13/2025 0135)  Promote skin healing:   Assess skin/pad under line(s)/device(s)   Protective dressings over bony prominences   Turn/reposition every 2 hours/use positioning/transfer devices   Ensure correct size (line/device) and apply per  instructions   Rotate device position/do not position patient on device

## 2025-08-13 NOTE — PROGRESS NOTES
"Nutrition Assessment         Recent events since last RDN visit:    8/13: Pt with breakfast tray in front of him about to start eating. Pt reports his appetite is getting better. Pt s/p R KIMA 8/8. Discussed Glucerna and Rodrigo supplements- pt reports he has tried them. Provided reinforcement of rationale for supplements for wound healing. Pt agreeable to try to drink them.     Nutrition Intake Since Last RDN Visit:  Energy Intake: Poor < 50 %       Anthropometrics:  Height: 170.2 cm (5' 7\")   Weight: 80.4 kg (177 lb 4 oz)   BMI (Calculated): 27.75  IBW/kg (Dietitian Calculated): 67 kg                         Nutrition Focused Physical Exam Findings:    Subcutaneous Fat Loss:   Orbital Fat Pads: Mild-Moderate (slight dark circles and slight hollowing)  Buccal Fat Pads: Mild-Moderate (flat cheeks, minimal bounce)  Triceps: Mild-Moderate (less than ample fat tissue)  Muscle Wasting:  Temporalis: Mild-Moderate (slight depression)  Pectoralis (Clavicular Region): Mild-Moderate (some protrusion of clavicle)  Deltoid/Trapezius: Mild-Moderate (slight protrusion of acromion process)  Interosseous: Mild-Moderate (slightly depressed area between thumb and forefinger)  Edema:  Edema: none  Physical Findings:  Skin: Positive (wounds on feet)    Nutrition Significant Labs:  CBC Trend:   Results from last 7 days   Lab Units 08/12/25  0404 08/11/25  0455 08/10/25  1926 08/09/25  0439   WBC AUTO x10*3/uL 5.2 5.8 6.9 9.3   RBC AUTO x10*6/uL 3.65* 3.69* 3.79* 3.55*   HEMOGLOBIN g/dL 8.1* 8.1* 8.2* 7.8*   HEMATOCRIT % 26.3* 26.2* 27.3* 25.6*   MCV fL 72* 71* 72* 72*   PLATELETS AUTO x10*3/uL 666* 687* 683* 687*    , BMP Trend:   Results from last 7 days   Lab Units 08/13/25  0555 08/12/25  0404 08/11/25  0455 08/10/25  1926   GLUCOSE mg/dL 100* 70* 81 80   CALCIUM mg/dL 8.1* 7.9* 8.2* 8.2*   SODIUM mmol/L 139 139 141 144   POTASSIUM mmol/L 3.6 3.4* 3.4* 3.7   CO2 mmol/L 25 26 24 26   CHLORIDE mmol/L 103 106 109* 107   BUN mg/dL 10 10 9 " 9   CREATININE mg/dL 0.87 0.78 0.78 0.86          I/O:   Last BM Date: 08/10/25;      Dietary Orders (From admission, onward)       Start     Ordered    08/07/25 1424  Oral nutritional supplements  Until discontinued        Comments: Rodrigo   Question Answer Comment   Deliver with Breakfast    Deliver with Dinner    Select supplement: Rodrigo        08/07/25 1423    08/07/25 1424  Oral nutritional supplements  Until discontinued        Comments: Tammie   Question Answer Comment   Deliver with Lunch    Deliver with Dinner    Select supplement: Glucernica Pascualke        08/07/25 1423    08/06/25 1955  May Participate in Room Service  ( ROOM SERVICE MAY PARTICIPATE)  Once        Question:  .  Answer:  Yes    08/06/25 1954                     Estimated Needs:   Total Energy Estimated Needs in 24 hours (kCal):  (2000-2300kcal)  Method for Estimating Needs: 25-30kcal/kg  Total Protein Estimated Needs in 24 Hours (g):  (117g)  Method for Estimating 24 Hour Protein Needs: 1.5g/kg  Total Fluid Estimated Needs in 24 Hours (mL):  (2000ml)  Method for Estimating 24 Hour Fluid Needs: 25ml/kg        Nutrition Diagnosis   Malnutrition Diagnosis  Patient has Malnutrition Diagnosis: Yes  Diagnosis Status: Active  Malnutrition Diagnosis: Moderate malnutrition related to acute disease or injury  Related to: Diabetic ulcer  As Evidenced by: mild fat losses (orbital, buccal, tricep), mild muscle wasting (temporalis, clavicular region, deltoid/trapazius, interrosseous)  Additional Assessment Information: Inconsistent PO intake    Nutrition Diagnosis  Patient has Nutrition Diagnosis: Yes  Diagnosis Status (1): Active  Nutrition Diagnosis 1: Increased nutrient needs  Related to (1): Diabetic ulcer  As Evidenced by (1): Inconsistent PO intake, mild fat losses (orbital, buccal, tricep), mild muscle wasting (temporalis, clavicular region, deltoid/trapazius, interrosseous), wounds and infection on foot       Nutrition Interventions/Recommendations    Nutrition prescription for oral nutrition    Nutrition Recommendations:  Individualized Nutrition Prescription Provided for : Continue PO diet per MD orders. Continue Orange Rodrigo BID and Chocolate Glucerna BID to promote oral intake and help meet estimated needs.    Nutrition Interventions/Goals:   Meals and Snacks: Carbohydrate-modified diet, Fat-modified diet, Mineral-modified diet  Goal: Consume 50% or greater for most meals.  Medical Food Supplement: Commercial beverage medical food supplement therapy  Goal: Rodrigo provides arginine and glutamine to aid in wound healing.  Additional Interventions: Glucerna provides 220 kcal and 10g protein per bottle.      Education Documentation  Nutrition Related Education, taught by Brooklyn Fisher RDN, [facility] at 8/13/2025 11:18 AM.  Learner: Patient  Readiness: Acceptance  Method: Explanation  Response: Verbalizes Understanding  Comment: Explained rationale for ONS to help meet estimated needs and support wound healing. Encouraged pt to drink Rodrigo BID and glucerna BID; he was agreeable.              Nutrition Monitoring and Evaluation   Estimated Energy Intake: Energy intake 50 -75% of estimated energy needs  Intake / Amount of food: Consumes > or equal to 70% of supplement    Body Weight: Body weight - Maintain stable weight    Electrolyte and Renal Panel: Electrolytes within normal limits  Glucose/Endocrine Profile: Glucose within normal limits ( mg/dL)    Skin Finding: Impaired wound healing - Improved wound healing    Goal Status: Some progress toward goal(s)    Time Spent (min): 35 minutes

## 2025-08-14 ENCOUNTER — APPOINTMENT (OUTPATIENT)
Dept: WOUND CARE | Facility: CLINIC | Age: 60
End: 2025-08-14
Payer: MEDICARE

## 2025-08-14 NOTE — DOCUMENTATION CLARIFICATION NOTE
"    PATIENT:               ANDRE AQUINO  ACCT #:                  [id number]  MRN:                       [id number]  :                       1965  ADMIT DATE:       2025 3:10 PM  DISCH DATE:        2025 11:59 AM  RESPONDING PROVIDER #:        [id number]          PROVIDER RESPONSE TEXT:    Sepsis ruled in with EOD metabolic encephalopathy    CDI QUERY TEXT:    Clarification        Instruction:    Based on your assessment of the patient and the clinical information, please provide the requested documentation by clicking on the appropriate radio button and enter any additional information if prompted.    Question: Based on your medical judgment, can you please clarify which of these conditions is the most clinically supported    When answering this query, please exercise your independent professional judgment. The fact that a question is being asked, does not imply that any particular answer is desired or expected.    The patient's clinical indicators include:  Clinical Information: There is conflicting documentation in the medical record which requires clarification.    -The diagnosis of Sepsis ruled out was documented on  by Dr Keys    -The diagnosis of  Sepsis was documented on DC summary  by Dr Duong    Clinical Indicators:  -Vital Signs  1502 : 98.5, 79,  17,  101/85,  98% ra  1647:            84,  20, 113/89,  100% ra  1720:            88,  20, 102/66,  100% ra  :  98.3,  84,  18, 102/65,  92 % ra    -WBC:  11.0 on arrival with repeat 9.8  -Microbiology Results:   BC NGTD  Wound culture:  Staph aureus  -Neutrophil Count/percent Neutrophil:  8.65 / 78.8%  -Lactic acid: 3.1 - 2.3 - 2.5  -BUN/Creat:   12/ 0.92  -Bilirubin:    0.6  -MAP:    over 80  -[place] Coma Scale:  15  -PAO2/FIO2:    over 300  -Procalcitonin:  not tested  -Platelets:      641  -Other clinical indicators: ESR 89, CRP 20.13    -  Dr Duong : \" Acute events overnight - patient was drowsy and hypoglycemic. He is on " "methadone and receiving dilaudid. I have placed an urgent pharmacy to assist with pain management.... metabolic encephalopathy\"    Treatment: 1500 ml IVF bolus, Vancomycin, Clindamycin, Zosyn, Rocephin.    Risk Factors:  Osteomyelitis and DM foot infection.  Options provided:  -- Sepsis was ruled out, Metabolic encephalopathy ruled in  -- Sepsis ruled in with EOD metabolic encephalopathy  -- Other - I will add my own diagnosis  -- Refer to Clinical Documentation Reviewer    Query created by: Maine Hector on 8/14/2025 11:58 AM      Electronically signed by:  COSTA DEGROOT MD 8/14/2025 12:26 PM          "

## 2025-08-14 NOTE — DISCHARGE SUMMARY
Discharge Diagnosis  Diabetic ulcer of right foot associated with type 2 diabetes mellitus, unspecified part of foot, unspecified ulcer stage   Right foot osteomyelitis s/p Diabetic Ulcer of right foot osteomyelitis  associated with type 2 diabetes mellitus s/p BKA 8/8 with sepsis POA  Type 2 diabetes mellitus  with long term current use of insulin  Long term methadone use  Metabolic encephalopathy  Essential Hypertension  Fibromyalgia  HTN, HLD  CAD s/p CABG  HFrEF  COPD  hep C SVR  Hypothyroidism        Issues Requiring Follow-Up  Follow with PCP in 1 week's time  Follow-up in the orthopedic clinic in 3 weeks time  Continue PT and OT  Continue methadone  We have reduced his Lyrica to 75 mg 3 times daily which should be increased if needed incrementally    Discharge Meds     Medication List      CHANGE how you take these medications     pregabalin 75 mg capsule; Commonly known as: Lyrica; Take 1 capsule (75   mg) by mouth 3 times a day.; What changed: medication strength, how much   to take     CONTINUE taking these medications     albuterol 90 mcg/actuation inhaler; Inhale 2 puffs by mouth into the   lungs every 6 hours if needed for shortness of breath.   aspirin 81 mg EC tablet   atorvastatin 40 mg tablet; Commonly known as: Lipitor   DULoxetine 40 mg DR capsule   fluticasone propion-salmeteroL 100-50 mcg/dose diskus inhaler; Commonly   known as: Wixela Inhub; Inhale 1 puff by mouth into the lungs 2 times a   day. Rinse mouth with water after use to reduce aftertaste and incidence   of candidiasis. Do not swallow.   hydroxychloroquine 200 mg tablet; Commonly known as: Plaquenil   insulin glargine 100 unit/mL (3 mL) pen; Commonly known as: Lantus;   Inject 40 Units under the skin once every 24 hours.   insulin lispro 100 unit/mL pen; Commonly known as: HumaLOG; Inject 8   Units plus 0 - 10 units per sliding scale under the skin 3 times a day   before meals. Take as directed per insulin instructions.   lancets 33  gauge misc; Use to check blood sugar 4 times daily   metFORMIN 500 mg tablet; Commonly known as: Glucophage; Take 1 tablet   (500 mg) by mouth 2 times a day.   methadone 10 mg/mL   metoprolol succinate XL 25 mg 24 hr tablet; Commonly known as:   Toprol-XL; Take 1 tablet (25 mg) by mouth once daily. Do not crush or   chew.   omeprazole 40 mg DR capsule; Commonly known as: PriLOSEC   Otezla Starter 10 mg (4)-20 mg (4)-30 mg(19) tablets,dose pack tablet   starter pack; Generic drug: apremilast   Spiriva Respimat 2.5 mcg/actuation inhaler; Generic drug: tiotropium;   Inhale 2 puffs by mouth into the lungs once daily.   True Metrix Glucose Test Strip; Generic drug: blood sugar diagnostic;   Use to check blood sugar 4 times daily     STOP taking these medications     pantoprazole 40 mg EC tablet; Commonly known as: ProtoNix       Test Results Pending At Discharge  Pending Labs       Order Current Status    Surgical Pathology Exam In process            Hospital Course   Kael Zepeda is a 60 y.o. male with PMHx s/f HTN, HLD, CAD s/p CABG, transient post-op Afib after CABG (no AC), HFrEF (EF 39% 07/24), COPD (no baseline O2), h/o IVDU (on long-term methadone), h/o Hep C (s/p treatment, in remission), RA (on methotrexate), hypothyroidism, IDDM-II c/b peripheral neuropathy and DM foot wounds (s/p transmetatarsal amputation of the right foot 04/30/25), presenting with advanced R foot wound. Patient had TMA of the R foot in setting of nonhealing wounds and OM 04/30/25. He was supposed to continue outpatient IV antibiotics on discharge and go to rehab at that time; however, he elected to leave the hospital AMA (he reports he was afraid to miss weekly check-in for his methadone clinic). Since then, he has only intermittently followed up with the wound care center. He was seen earlier this week at wound care and described feeling weak, having poor appetite, and having increasing drainage from the wound which is malodorous.  "At that time there was significant concern he was septic and he was told to go to the ED for evaluation. The patient subsequently waited multiple days, coming in ~72 hours later, because he was \"finally ready.\" He has continued to feel ill the entire time. He has poor intake and malaise. Has also experienced swelling of the RLE > LLE. He is a bit of a difficult historian, reporting he takes \"a lot\" of medications but cannot name them or their doses. He has a difficult time telling me a time frame on his symptoms overall. Denies cp/pressure, palpitations, diaphoresis, SOB, CALDERON, dizziness / lightheadedness, syncope or near syncope, HA, vision changes, f/c/v/d/abd pain.      8/7: Patient seen.  No new complaints.  Patient states he is willing to proceed with right below-knee amputation as recommended by podiatry.  Patient has advanced osteomyelitis in the 4th and 5th metatarsal and cuboid with osteolytic destruction.  Podiatry states foot is not salvageable.  There is however no evidence of necrotizing fasciitis.  Referral was made to orthopedic surgery and the current plan is to take him for a below the knee amputation tomorrow 8/8.  Hemoglobin today is noted to be low at 7.0.  Patient is asymptomatic.  Hemoglobin was 7.9 on admission.  No obvious bleeding.  However given the plan for his amputation tomorrow I have elected to transfuse 1 unit of PRBCs.  Patient has completed consent form.  He is in agreement with plan to proceed with amputation tomorrow.     8/8: Patient seen.  No complaints at this time.  Seen in room following amputation.  Large dressing on his wound.  Pain is adequately controlled at this time.  Appreciate input from orthopedics, podiatry, ID.  Will reassess again in AM.  Hopefully patient recovers quickly.     8/9: Patient seen.  Complains of pain in his right BKA site.  Dressings appear to be clear.  States he is tolerating the discomfort but is having spasms.  Asks for nerve pain medication.  " Already on high dose Lyrica 2 mg 3 times daily.  Will add baclofen for muscle spasms.  Has not asked for change in his methadone or oxycodone.  Hemoglobin today is 7.8.  Will continue to monitor.  Reassess in AM.     8/10: Patient seen.  Somewhat sedated today.  Lethargic.  Suspect baclofen may be too potent for patient as a scheduled medication.  Reduce to 5 mg today and make it as needed.  Pain appears to be uncontrolled at present.  Growing MRSA.  Remains on vancomycin per ID recommendations.  Will likely need skilled nursing on discharge.  Recheck hemoglobin in AM.     8/11: Acute events overnight - patient was drowsy and hypoglycemic. He is on methadone and receiving dilaudid. I have placed an urgent pharmacy to assist with pain management.  Orthopedic team recommendations appreciated.  The stump looks good.  ID recommendations appreciated.  Will start discharge process.     8/12: No acute events overnight.  Patient is much more alert today.  We did hold some of his methadone, although oxycodone, baclofen and Dilaudid.  His brother was present during the interview.  We agreed to continue his methadone twice a day as prescribed at home.  He does follow with the clinic and has been on methadone for 10 years.  I explained to him that we will discontinue all other narcotics unless absolutely necessary.  Both of them were in agreement with this plan.  Will start discharge process to skilled nursing facility.  Patient will need PT and OT.  Ortho and ID recommendations much appreciated.  Start discharge process.    8/13: No acute events overnight.  Patient denies any new symptoms.  He feels much better.  We have obtained authorization to skilled nursing facility.  Discharge orders were placed.  See full discharge orders.  Follow-up in orthopedic clinic in 3 weeks time.  Continue pain control.  Continue PT and OT.    Pertinent Physical Exam At Time of Discharge  Physical Exam  CONSTITUTIONAL -alert, pleasant  SKIN -  normal skin color ,warm  HEAD - no trauma, normocephalic  EYES - pupils are equal and reactive to light  CHEST - clear to auscultation, no wheezing, no crackles and no rales, good effort  CARDIAC - regular rate and regular rhythm, no murmur  ABDOMEN - no organomegaly, soft, nontender, nondistended, normal bowel sounds, no guarding/rebound/rigidity  EXTREMITIES -right stump bandaged with a brace  NEUROLOGICAL - no acute focal signs     Outpatient Follow-Up  No future appointments.    Discharge planning took more than 30 minutes  Mindy Duong MD

## 2025-08-21 ENCOUNTER — HOME HEALTH ADMISSION (OUTPATIENT)
Dept: HOME HEALTH SERVICES | Facility: HOME HEALTH | Age: 60
End: 2025-08-21
Payer: MEDICARE

## 2025-08-21 ENCOUNTER — DOCUMENTATION (OUTPATIENT)
Dept: HOME HEALTH SERVICES | Facility: HOME HEALTH | Age: 60
End: 2025-08-21
Payer: MEDICARE

## 2025-08-21 DIAGNOSIS — Z89.511 STATUS POST BELOW-KNEE AMPUTATION OF RIGHT LOWER EXTREMITY: ICD-10-CM

## 2025-08-26 ENCOUNTER — HOME CARE VISIT (OUTPATIENT)
Dept: HOME HEALTH SERVICES | Facility: HOME HEALTH | Age: 60
End: 2025-08-26
Payer: MEDICARE

## 2025-08-26 PROCEDURE — G0151 HHCP-SERV OF PT,EA 15 MIN: HCPCS | Mod: HHH

## 2025-08-26 PROCEDURE — 169592 NO-PAY CLAIM PROCEDURE

## 2025-08-27 VITALS
RESPIRATION RATE: 16 BRPM | HEIGHT: 66 IN | WEIGHT: 170 LBS | BODY MASS INDEX: 27.32 KG/M2 | HEART RATE: 64 BPM | SYSTOLIC BLOOD PRESSURE: 96 MMHG | TEMPERATURE: 97 F | DIASTOLIC BLOOD PRESSURE: 44 MMHG | OXYGEN SATURATION: 96 %

## 2025-08-27 SDOH — ECONOMIC STABILITY: HOUSING INSECURITY: HOME SAFETY: PT WOULD EVACUTE TO HIS SISTER'S HOME.

## 2025-08-27 ASSESSMENT — ENCOUNTER SYMPTOMS
FATIGUES EASILY: 1
PERSON REPORTING PAIN: PATIENT
PAIN SEVERITY GOAL: 2/10
PAIN LOCATION - PAIN QUALITY: ACHE
AGGRESSION WITHIN DEFINED LIMITS: 1
LOWEST PAIN SEVERITY IN PAST 24 HOURS: 3/10
SLEEP QUALITY: FAIR
SHORTNESS OF BREATH: T
SUBJECTIVE PAIN PROGRESSION: GRADUALLY IMPROVING
LIMITED RANGE OF MOTION: 1
HIGHEST PAIN SEVERITY IN PAST 24 HOURS: 5/10
PAIN LOCATION: RIGHT LEG
MUSCLE WEAKNESS: 1
HYPERTENSION: 1
PAIN LOCATION - PAIN FREQUENCY: CONSTANT
PAIN: 1
ANGER WITHIN DEFINED LIMITS: 1

## 2025-08-27 ASSESSMENT — PAIN SCALES - PAIN ASSESSMENT IN ADVANCED DEMENTIA (PAINAD)
CONSOLABILITY: 0
BREATHING: 0
NEGVOCALIZATION: 0
BODYLANGUAGE: 0 - RELAXED.
BODYLANGUAGE: 0
NEGVOCALIZATION: 0 - NONE.
FACIALEXPRESSION: 0
FACIALEXPRESSION: 0 - SMILING OR INEXPRESSIVE.
CONSOLABILITY: 0 - NO NEED TO CONSOLE.
TOTALSCORE: 0

## 2025-08-27 ASSESSMENT — ACTIVITIES OF DAILY LIVING (ADL)
ENTERING_EXITING_HOME: MAXIMUM ASSIST
OASIS_M1830: 05

## 2025-09-02 ENCOUNTER — APPOINTMENT (OUTPATIENT)
Dept: ORTHOPEDIC SURGERY | Facility: HOSPITAL | Age: 60
End: 2025-09-02
Payer: MEDICARE

## 2025-09-03 ENCOUNTER — HOME CARE VISIT (OUTPATIENT)
Dept: HOME HEALTH SERVICES | Facility: HOME HEALTH | Age: 60
End: 2025-09-03
Payer: MEDICARE

## 2025-09-03 ENCOUNTER — APPOINTMENT (OUTPATIENT)
Dept: ORTHOPEDIC SURGERY | Facility: HOSPITAL | Age: 60
End: 2025-09-03
Payer: MEDICARE

## 2025-09-03 PROCEDURE — G0151 HHCP-SERV OF PT,EA 15 MIN: HCPCS | Mod: HHH

## 2025-09-04 ENCOUNTER — OFFICE VISIT (OUTPATIENT)
Dept: ORTHOPEDIC SURGERY | Facility: CLINIC | Age: 60
End: 2025-09-04
Payer: MEDICARE

## 2025-09-04 VITALS
SYSTOLIC BLOOD PRESSURE: 104 MMHG | TEMPERATURE: 96.9 F | RESPIRATION RATE: 16 BRPM | DIASTOLIC BLOOD PRESSURE: 50 MMHG | HEART RATE: 72 BPM | OXYGEN SATURATION: 97 %

## 2025-09-04 VITALS — HEIGHT: 66 IN | WEIGHT: 170 LBS | BODY MASS INDEX: 27.32 KG/M2

## 2025-09-04 DIAGNOSIS — S88.111S BELOW-KNEE AMPUTATION OF RIGHT LOWER EXTREMITY, SEQUELA: Primary | ICD-10-CM

## 2025-09-04 SDOH — HEALTH STABILITY: PHYSICAL HEALTH
EXERCISE COMMENTS: INSTRUCTION AND RETURN DEMONSTRATION OF THERAPEUTIC EXERCISES INCLUDING SUPINE ANKLE PUMPS, SUPINE QUAD AND GLUT SETS, SUPINE HEEL SLIDES, SUPINE HIP ABD-ADD, SUPINE SAQ (LT), SUPINE SLR 1 SET 10 REPS EACH.  PATIENT REQUIRED VERBAL AND VISUAL CUING F

## 2025-09-04 SDOH — HEALTH STABILITY: PHYSICAL HEALTH: EXERCISE COMMENTS: OR PROPER EXECUTION OF EXERCISES.  PT HAS BEEN PROVIDED WRITTEN HOME EX HANDOUTS.

## 2025-09-04 SDOH — HEALTH STABILITY: PHYSICAL HEALTH: EXERCISE TYPE: BLE ROM AND STRENGTHENING EXERCISES.

## 2025-09-04 ASSESSMENT — PAIN SCALES - PAIN ASSESSMENT IN ADVANCED DEMENTIA (PAINAD)
FACIALEXPRESSION: 0
FACIALEXPRESSION: 0 - SMILING OR INEXPRESSIVE.
NEGVOCALIZATION: 0 - NONE.
BODYLANGUAGE: 0
BREATHING: 0
TOTALSCORE: 0
CONSOLABILITY: 0 - NO NEED TO CONSOLE.
CONSOLABILITY: 0
NEGVOCALIZATION: 0
BODYLANGUAGE: 0 - RELAXED.

## 2025-09-04 ASSESSMENT — ENCOUNTER SYMPTOMS
PAIN SEVERITY GOAL: 2/10
SUBJECTIVE PAIN PROGRESSION: UNCHANGED
PAIN LOCATION - PAIN FREQUENCY: CONSTANT
PAIN: 1
PERSON REPORTING PAIN: PATIENT
HIGHEST PAIN SEVERITY IN PAST 24 HOURS: 5/10
LOWEST PAIN SEVERITY IN PAST 24 HOURS: 2/10
PAIN LOCATION: RIGHT LEG

## 2025-09-23 ENCOUNTER — APPOINTMENT (OUTPATIENT)
Dept: ORTHOPEDIC SURGERY | Facility: HOSPITAL | Age: 60
End: 2025-09-23
Payer: MEDICARE

## (undated) DEVICE — SPONGE, HEMOSTAT, SURGICEL FIBRILLAR, ABS, 4 X 4, LF

## (undated) DEVICE — PACING CABLE, EXTENSION, 12 FT BLUE, DISPOSABLE

## (undated) DEVICE — DRESSING, GAUZE, PETROLATUM, STRIP, XEROFORM, 1 X 8 IN, STERILE

## (undated) DEVICE — TIP, SUCTION, YANKAUER, BULB, ADULT

## (undated) DEVICE — NEEDLE, HYPODERMIC, REGULAR WALL, REGULAR BEVEL, 25 G X 1.5 IN

## (undated) DEVICE — Device

## (undated) DEVICE — FILTER, IV, BLOOD, MICROAGGREGATE, 40 MIC, RBC TRANSFUSION

## (undated) DEVICE — TAPE, POLYESTER, BRAIDED, PRE-CUT 2 X 30, WHITE"

## (undated) DEVICE — DRESSING, ABDOMINAL, WET PRUF, TENDERSORB, 5 X 9 IN, STERILE

## (undated) DEVICE — PERFUSION SERVICES

## (undated) DEVICE — CANNULA, EOPA 20F W/O GUIDEWIRE

## (undated) DEVICE — CLEANER, ELECTROSURGICAL, TIP, 5 X 5 CM, LF

## (undated) DEVICE — DRAPE, SHEET, FAN FOLDED, HALF, 44 X 58 IN, DISPOSABLE, LF, STERILE

## (undated) DEVICE — DRESSING, GAUZE, SPONGE, VERSALON, ALL PURPOSE, 4 X 4 IN, SOFT

## (undated) DEVICE — BANDAGE, ELASTIC, ACE, ACE, DOUBLE LENGTH, 6 X 550 IN, LF

## (undated) DEVICE — PUNCH, AORTIC 4MM

## (undated) DEVICE — APPLICATOR, CHLORAPREP, W/ORANGE TINT, 26ML

## (undated) DEVICE — COVER HANDLE LIGHT, STERIS, BLUE, STERILE

## (undated) DEVICE — TUBING PACK, OXYGENATOR, ADULT

## (undated) DEVICE — TUBE SET, PNEUMOLAR HEATED, SMOKE EVACU, HIGH-FLOW

## (undated) DEVICE — DRAPE, EXTREMITY, HEAVY DUTY, W/ARMBOARD COVERS

## (undated) DEVICE — DRAPE, SHEET, THREE QUARTER, FAN FOLD, 57 X 77 IN

## (undated) DEVICE — GLOVE, PROTEXIS PI CLASSIC, SZ-7.5, PF, LF

## (undated) DEVICE — BANDAGE, ELASTIC, PREMIUM, SELF-CLOSE, 4 IN X 5.5 YD, STERILE

## (undated) DEVICE — BLADE, OSCILLATING/SAGITTAL, 31MM X 9MM

## (undated) DEVICE — CATHETER, DRAINAGE, NASOGASTRIC, DOUBLE LUMEN, FUNNEL END, SUMP, SALEM, 18 FR, 48 IN, PVC, STERILE

## (undated) DEVICE — CATHETER, THORACIC, STRAIGHT, ADULT, 28 FR, PVC

## (undated) DEVICE — SPONGE, GAUZE, XRAY DECT, 16 PLY, 4 X 4, W/MASTER DMT,STERILE

## (undated) DEVICE — TOWEL, OR, XRAY DETECT 5 PK, WHITE, 17X26, W/DMT TAG, ST

## (undated) DEVICE — SUMP, INTRACARDIAC, MULTI PORT, VENT TIP, PEDIATRIC, 12 FR X 30 CM

## (undated) DEVICE — BANDAGE, COMPRESSION, W/CLIP, FLEX-MASTER, DOUBLE LENGTH, 4 IN X 11 YD, LF

## (undated) DEVICE — ELECTRODE, QUICK-COMBO, REDI PACK

## (undated) DEVICE — CANNULA, CARDIAC SUMP

## (undated) DEVICE — SPONGE KIT, BLACK SPIRAL, WOUND VAC

## (undated) DEVICE — SUTURE, SILK, 0, 30 IN, CT-1, BLACK

## (undated) DEVICE — CUFF, TOURNIQUET, 18 X 4, DUAL PORT/SNGL BLADDER, DISP, LF

## (undated) DEVICE — GUIDE WIRE, 260CM, HI-TORQUE, VERSACORE, MODIFIED J

## (undated) DEVICE — CONNECTOR, Y, CARDIOPULMONARY, 0.375 X 0.25 X 0.25 IN

## (undated) DEVICE — CUFF, TOURNIQUET, 30 X 4, DUAL PORT/SNGL BLADDER, DISP, LF

## (undated) DEVICE — GLOVE, SURGICAL, PROTEXIS PI BLUE W/NEUTHERA, 8.0, PF, LF

## (undated) DEVICE — CATHETER, SUCTION, SAFE-T-VAC VALVE, COIL PACKED, 14 FR

## (undated) DEVICE — KIT, TOTAL JOINT, CUSTOM, PORTAGE

## (undated) DEVICE — BONE WAX, 3.5G ABSORBABLE, OSTENE

## (undated) DEVICE — SOLUTION, INJECTION, USP, PLASMALYTE A, PH 7.4, TYPE 1, 1000 ML, BAG

## (undated) DEVICE — DRESSING, ADHESIVE, ISLAND, TELFA, 2 X 3.75 IN, LF

## (undated) DEVICE — NEEDLE, HYPODERMIC, REGULAR WALL, REGULAR BEVEL, 18 G X 1.5 IN

## (undated) DEVICE — BATTERY, VARISPEED

## (undated) DEVICE — DRESSING, ADAPTIC, NON-ADHERENT, 3 X 8 IN

## (undated) DEVICE — APPLIER,  LIGACLIP MULTI CLIP, 30 MED 11 1/2

## (undated) DEVICE — SYRINGE, 10 CC, LUER LOCK

## (undated) DEVICE — TUBING, SUCTION, CONNECTING, STERILE 0.25 X 120 IN., LF

## (undated) DEVICE — BANDAGE, COMPRESSION, EZE-BAND, DBL, 6 X 11 YDS

## (undated) DEVICE — BANDAGE, GAUZE, CONFORMING, KERLIX, 6 PLY, 4.5 IN X 4.1 YD

## (undated) DEVICE — PACING CABLE, EXTENSION, 12 FT BEIGE, DISPOSABLE

## (undated) DEVICE — WOUND VAC KIT, W/CANISTER, 500ML

## (undated) DEVICE — MANIFOLD, 4 PORT NEPTUNE STANDARD

## (undated) DEVICE — SUTURE, VICRYL, 1, 36 IN, CT-1, UNDYED

## (undated) DEVICE — COLLECTION UNIT, DRAINAGE, THORACIC, SINGLE TUBE, DRY SUCTION, ATS COMPATIBLE, OASIS 3600, LF

## (undated) DEVICE — COLLECTION/DELIVERY SYSTEM, COPAN ESWAB, REG SIZE SWAB

## (undated) DEVICE — DRAPE, INCISE, ANTIMICROBIAL, IOBAN 2, LARGE, 17 X 23 IN, DISPOSABLE, STERILE

## (undated) DEVICE — WIPE, BARRIER, PROTECTIVE, ALLKARE, 50-86F

## (undated) DEVICE — TOWEL, SURGICAL, NEURO, O/R, 16 X 26, BLUE, STERILE

## (undated) DEVICE — SHUNT, SENSOR

## (undated) DEVICE — MONITORING KIT, TRANSDUCER, RETROGRADE, MPS, W/EXTENSION, LF

## (undated) DEVICE — DEVICE, ENDOSCOPIC VESSEL HARVESTING, SAPHENA VENAPAX

## (undated) DEVICE — MAYO TRAY, SMALL

## (undated) DEVICE — DRAPE, SHEET, U, STERI DRAPE, 47 X 51 IN, DISPOSABLE, STERILE

## (undated) DEVICE — INSERT, CLAMP, SURGICAL, SOFT/TRACTION, STEALTH, 5 MM

## (undated) DEVICE — PADDING, CAST, SOFT, 6 X 4YD, LF

## (undated) DEVICE — CLIPPER, SURGICAL BLADE ASSEMBLY, GENERAL PURPOSE, SINGLE USE

## (undated) DEVICE — PITCHER, GRADUATE, 32 OZ (1200CC), STERILE

## (undated) DEVICE — DRAPE, SHEET, CARDIOVASCULAR, ANTIMICROBIAL, W/ANESTHESIA SCREEN, IOBAN 2, STERI DRAPE, 107 X 133 IN, DISPOSABLE, FABRIC, BLUE, STERILE

## (undated) DEVICE — COUNTER, NEEDLE, FOAM BLOCK, POP-N-COUNT, W/BLADEGUARD, W/ADHESIVE 40 COUNT, RED

## (undated) DEVICE — CLIP, SPRING, BULLDOG, FOGARTY, SOFT JAW, 6 MM, LF

## (undated) DEVICE — SUTURE, PROLENE, 4-0, 18 IN, PC-3, BLUE

## (undated) DEVICE — SUTURE, ETHILON, 3-0, 18 IN, PS1, BLACK

## (undated) DEVICE — SUTURE, SURGICAL STEEL, STERNUM 7, 18 IN, KV40, SINGL-WIRE

## (undated) DEVICE — RETRACTOR, SUTURE, HOLDING, INSERT, OCTOBASE, DISPOSABLE

## (undated) DEVICE — SOLUTION, IRRIGATION, SODIUM CHLORIDE 0.9%, 1000 ML, POUR BOTTLE

## (undated) DEVICE — DRAIN, CHANNEL, BLAKE, HUBLESS, ROUND, 28FR

## (undated) DEVICE — SUTURE, ETHILON, 2-0, 18 IN, FS, BLACK, BX/36

## (undated) DEVICE — CANNULA, VENOUS, 2-STAGE, 32/40 ROUND

## (undated) DEVICE — SHUNT, INTRACORONARY, 2.0 MM, CLEARVIEW

## (undated) DEVICE — ELECTRODE, ELECTROSURGICAL, BLADE EXT 4 INCH, INSULATED

## (undated) DEVICE — ADAPTER, CORONARY, PERFUSION, Y, 34.3 CM

## (undated) DEVICE — DRAPE, INSTRUMENT, W/POUCH, STERI DRAPE, 7 X 11 IN, DISPOSABLE, STERILE

## (undated) DEVICE — SUTURE, PROLENE 4-0, TAPER POINT, SH-1 BLUE 30 INCH

## (undated) DEVICE — WAX, BONE, 2.5 GM

## (undated) DEVICE — POSITONER, URCHIN

## (undated) DEVICE — DRESSING, MEPILEX, BORDER, SACRUM, 8.7 X 9.8 IN

## (undated) DEVICE — PADDING, CAST, SOFT, 4 IN

## (undated) DEVICE — GLOVE, PROTEXIS PI CLASSIC, SZ-8.0, PF, PF, LF

## (undated) DEVICE — DRAPE, FLUID WARMER

## (undated) DEVICE — SUTURE, PROLENE, 2-0, 30 IN, SH, BLUE

## (undated) DEVICE — COVER, CART, 45 X 27 X 48 IN, CLEAR

## (undated) DEVICE — SYRINGE, 60 CC, IRRIGATION, BULB, CONTRO-BULB, PAPER POUCH

## (undated) DEVICE — ELECTRODE, ELECTROSURGICAL, BLADE, INSULATED, ENT/IMA, STERILE

## (undated) DEVICE — SHUNT, INTRACORONARY, 1.5 MM, CLEARVIEW

## (undated) DEVICE — SUTURE, PROLENE, 5-0, 36 IN, RB1, DA, BLUE

## (undated) DEVICE — PERFUSION PACK, HEMOCONCENTRATOR, MINNTECH, W/TUBING

## (undated) DEVICE — SPONGE, LAP, XRAY DECT, 18IN X 18IN, W/MASTER DMT, STERILE

## (undated) DEVICE — SPONGE, HEMOSTATIC, GELATIN, SURGIFOAM, 8 X 12.5 CM X 10 MM

## (undated) DEVICE — SUTURE, VICRYL, 0, 36 IN, CT-1, UNDYED

## (undated) DEVICE — CONNECTOR, W/O PARTING LINE, 0.25 X 0.375 IN

## (undated) DEVICE — KNIFE, OPHTHALMIC, SLIT, 22.5 DEG

## (undated) DEVICE — SUTURE, PROLENE, 3-0, 36 IN, SH, DA, BLUE

## (undated) DEVICE — PAD, GROUNDING, ELECTROSURGICAL, W/9 FT CABLE, POLYHESIVE II, ADULT, LF

## (undated) DEVICE — APPLIER, LIGACLIP, MULTIPLE, SMALL 9-3/8IN

## (undated) DEVICE — CATHETER, OPTITORQUE, 5FR, TIG, 1H/100CM

## (undated) DEVICE — RESERVOIR, WOUND, W/TROCAR, PVC, MEDIUM, 400CC, DAVOL, 1/8 IN, 10FR

## (undated) DEVICE — SUTURE, VICRYL 2-0, TAPER POINT, CT-1 UNDYED 27 INCH

## (undated) DEVICE — CONNECTOR, STRAIGHT, 0.5 X 0.5 IN

## (undated) DEVICE — KIT, CELL SAVER, W/COLLECTION SET, 225ML WASH SET

## (undated) DEVICE — SUTURE, PROLENE, 4-0, 36 IN, RB1, DA, BLUE

## (undated) DEVICE — DRESSING, ISLAND, TELFA, 4 X 5 IN

## (undated) DEVICE — TR BAND, RADIAL COMPRESSION, STANDARD, 24CM

## (undated) DEVICE — TUBING, SUCTION, CONNECTING, NON-CONDUCTIVE, SURE GRIP CONNECTORS, 3/16 X 18 IN, PVC

## (undated) DEVICE — BANDAGE, COFLEX, 4 X 5 YDS, TAN, STERILE, LF

## (undated) DEVICE — MICROCOAGULATION TEST, ACT+ TEST CUVETTE

## (undated) DEVICE — BANDAGE, ESMARK, 4 X 4IN, PEEL POUCH

## (undated) DEVICE — OXYGENATOR FX 25, W/HR, ARTERIAL FILTER

## (undated) DEVICE — LEAD, PACING, MYOCARDIAL, BIPOLAR, TEMPORARY

## (undated) DEVICE — GOWN, SURGICAL, SMARTGOWN, XLARGE, STERILE

## (undated) DEVICE — SEALANT, HEMOSTATIC, FLOSEAL, 10 ML

## (undated) DEVICE — PLEDGET, PTFE, SOFT, LARGE, 3/8 X 3/16 X 1/16 IN

## (undated) DEVICE — CLIP, LIGATING, W/ADHESIVE PAD, MEDIUM, TITANIUM

## (undated) DEVICE — COVER, EQUIPMENT, SOLUTION, SLUSH, LF, 122CM X 122 CM, STERILE

## (undated) DEVICE — SPONGE, HEMOSTATIC, GELATIN, SURGIFOAM, 2 X 6 CM X 7 MM

## (undated) DEVICE — DRESSING, MEPILEX, BORDER, HEEL, 8.7 X 9.1 IN

## (undated) DEVICE — SENSOR, OXYGEN, CEREBRAL, SOMASENSOR, ADULT

## (undated) DEVICE — COVER, PLASTIC, MAYO STAND, 29.5IN X 55.5IN

## (undated) DEVICE — TOWEL PACK, STERILE, 4/PACK, BLUE

## (undated) DEVICE — SUTURE, ETHIBOND, XTRA, 30 IN, 0, CT-1, GREEN

## (undated) DEVICE — TRAY, SURESTEP, URINE METER, 14FR, SILICONE

## (undated) DEVICE — DRESSING, NON-ADHERENT, 3 X 3 IN, STERILE

## (undated) DEVICE — GEL, ULTRASOUND, AQUASONIC 100, 20 GM, STERILE

## (undated) DEVICE — DRESSING, ADHESIVE, ISLAND, TELFA, 4 X 14 IN

## (undated) DEVICE — CASSETTE, BLOOD, PLEGIC SET

## (undated) DEVICE — CLIP, LIGATING, W/ADHESIVE, WIDE SLOT, SMALL, TITANIUM

## (undated) DEVICE — MARKER, SKIN, DUAL TIP INK W/9 LABEL AND REMOVABLE TIME OUT SLEEVE

## (undated) DEVICE — KIT, TOURNIQUET, 7"

## (undated) DEVICE — SHEATH, GLIDESHEATH, SLENDER, 6FR 10CM